# Patient Record
Sex: MALE | Race: WHITE | NOT HISPANIC OR LATINO | Employment: FULL TIME | URBAN - METROPOLITAN AREA
[De-identification: names, ages, dates, MRNs, and addresses within clinical notes are randomized per-mention and may not be internally consistent; named-entity substitution may affect disease eponyms.]

---

## 2017-01-17 ENCOUNTER — OFFICE VISIT (OUTPATIENT)
Dept: SLEEP MEDICINE | Facility: HOSPITAL | Age: 62
End: 2017-01-17

## 2017-01-17 VITALS
OXYGEN SATURATION: 94 % | BODY MASS INDEX: 32.93 KG/M2 | HEART RATE: 78 BPM | WEIGHT: 230 LBS | HEIGHT: 70 IN | SYSTOLIC BLOOD PRESSURE: 116 MMHG | DIASTOLIC BLOOD PRESSURE: 68 MMHG

## 2017-01-17 DIAGNOSIS — E66.9 NON MORBID OBESITY, UNSPECIFIED OBESITY TYPE: ICD-10-CM

## 2017-01-17 DIAGNOSIS — G47.33 SEVERE OBSTRUCTIVE SLEEP APNEA: Primary | ICD-10-CM

## 2017-01-17 PROCEDURE — 99213 OFFICE O/P EST LOW 20 MIN: CPT | Performed by: INTERNAL MEDICINE

## 2017-01-17 NOTE — MR AVS SNAPSHOT
Akshat Winkler   1/17/2017 2:00 PM   Office Visit    Dept Phone:  [phone]   Encounter #:  [id number]    Provider:  Tadeo Houston MD   Department:  [facility] SLEEP MEDICINE                Your Full Care Plan              Your Updated Medication List          This list is accurate as of: 1/17/17  2:43 PM.  Always use your most recent med list.                apixaban 5 MG tablet tablet   Commonly known as:  ELIQUIS   Take 1 tablet by mouth every 12 (twelve) hours for 360 days.       aspirin 81 MG tablet       bumetanide 2 MG tablet   Commonly known as:  BUMEX   Take 1 tablet by mouth 2 (two) times a day for 360 days.       lisinopril 20 MG tablet   Commonly known as:  PRINIVIL,ZESTRIL   Take 1 tablet by mouth daily for 360 days.       rosuvastatin 20 MG tablet   Commonly known as:  CRESTOR   Take 1 tablet by mouth daily for 360 days.       spironolactone 25 MG tablet   Commonly known as:  ALDACTONE   Take 1 tablet by mouth daily for 360 days.               We Performed the Following     PAP Therapy       You Were Diagnosed With        Codes Comments    Severe obstructive sleep apnea    -  Primary ICD-10-CM: G47.33  ICD-9-CM: 327.23     Non morbid obesity, unspecified obesity type     ICD-10-CM: E66.9  ICD-9-CM: 278.00       Instructions     None    Patient Instructions History      Upcoming Appointments     Visit Type Date Time Department    FOLLOW UP 1/17/2017  2:00 PM [facility] SLEEP MEDICINE [facility]    FOLLOW UP 5/9/2017  9:30 AM [facility] [place] [facility]      Eyegroove Signup     [facility] Eyegroove allows you to send messages to your doctor, view your test results, renew your prescriptions, schedule appointments, and more. To sign up, go to [email or web address] and click on the Sign Up Now link in the New User? box. Enter your Eyegroove Activation Code exactly as it appears below along with the last four digits of your Social Security Number and your Date of Birth  "() to complete the sign-up process. If you do not sign up before the expiration date, you must request a new code.    [employer] Activation Code: [email or web address]  Expires: 2017  5:38 AM    If you have questions, you can email [email or web address] or call [phone] to talk to our Webcrumb[employer] staff. Remember, Webcrumbz is NOT to be used for urgent needs. For medical emergencies, dial 911.               Other Info from Your Visit           Your Appointments     May 09, 2017  9:30 AM EDT   Follow Up with FAITH Roy   [facility] CARDIOLOGY (--)    [address] [place] [address] [address] [place] [id number] [phone]           Arrive 15 minutes prior to appointment.              Allergies     Bisoprolol      Flecainide      Metoprolol       bradycardia      Reason for Visit     Follow-up           Vital Signs     Blood Pressure Pulse Height Weight Oxygen Saturation Body Mass Index    116/68 78 70\" (177.8 cm) 230 lb (104 kg) 94% 33 kg/m2    Smoking Status                   Never Smoker           Problems and Diagnoses Noted     Obesity    Severe obstructive sleep apnea        "

## 2017-01-17 NOTE — PROGRESS NOTES
Subjective:     Chief Complaint:   Chief Complaint   Patient presents with   • Follow-up       HPI:    Akshat Winkler is a 61 y.o. male here for follow-up of obstructive sleep apnea.    I have not seen him in over 2 years.  He had a sleep study which revealed severe obstructive sleep apnea.  His titration was incomplete.  We were going to start him on BiPAP therapy but for some reason he didn't get his prescription.  Somehow he got an order for CPAP.  He never came for follow-up.  He is currently using CPAP at a pressure of 18.  He initially turned it up as high as it would go at 20 but has since turned down to 18.  His download indicates he is well treated with an AHI of less than 1.  He is using a fullface mask.  His only complaint is that his humidifier does not seem to be working and he is overly dry.    Since starting PAP sleep problem has: increased  Currently using PAP: yes Hours of usage during the night: 6    Amount of sleep per night : 6 hours  Average length of time it takes to fall asleep : 30 minutes  Number of awakenings per night : 3     He feels fatigue (tiredness, exhaustion, lethargy) in the daytime even when not sleepy? Occasionally   He feels sleepy (or struggles to stay awake) in the daytime? Occasionally     Cecil Scale scored as 8/24.    Type of mask: full face mask    He (since starting PAP or since last visit) has problems with the following:   Pressure from the mask 1 - Mild Problems  Skin irritation from the mask 0 - No Problem  Mask coming off face 1 - Mild Problems  Air leaks from the mask 1 - Mild Problems  Dry mouth or throat 10 - Severe Problems  Nasal congestion 0 - No Problem    I reviewed his PAP download:  Average pressure: 18  Average AHI:  1  Average minutes in large leak per night: NA      Current medications are:   Current Outpatient Prescriptions:   •  apixaban (ELIQUIS) 5 MG tablet tablet, Take 1 tablet by mouth every 12 (twelve) hours for 360 days., Disp: 180  tablet, Rfl: 3  •  aspirin 81 MG tablet, Take  by mouth daily., Disp: , Rfl:   •  bumetanide (BUMEX) 2 MG tablet, Take 1 tablet by mouth 2 (two) times a day for 360 days., Disp: 180 tablet, Rfl: 3  •  lisinopril (PRINIVIL,ZESTRIL) 20 MG tablet, Take 1 tablet by mouth daily for 360 days., Disp: 90 tablet, Rfl: 3  •  rosuvastatin (CRESTOR) 20 MG tablet, Take 1 tablet by mouth daily for 360 days., Disp: 90 tablet, Rfl: 3  •  spironolactone (ALDACTONE) 25 MG tablet, Take 1 tablet by mouth daily for 360 days., Disp: 90 tablet, Rfl: 3.      The patient's relevant past medical, surgical, family and social history were reviewed and updated in Epic as appropriate.     ROS:    Review of Systems   Constitutional: Positive for fatigue.   HENT:        Dry mouth   Respiratory: Positive for apnea.    Psychiatric/Behavioral: Positive for sleep disturbance.         Objective:    Physical Exam   Constitutional: He is oriented to person, place, and time. He appears well-developed and well-nourished.   HENT:   Head: Normocephalic and atraumatic.   Mouth/Throat: Oropharynx is clear and moist.   Class IV airway   Neck: Neck supple. No thyromegaly present.   Cardiovascular: Normal rate and regular rhythm.  Exam reveals no gallop and no friction rub.    No murmur heard.  Pulmonary/Chest: Effort normal. No respiratory distress. He has no wheezes. He has no rales.   Abdominal: Soft. Bowel sounds are normal.   Musculoskeletal: He exhibits edema.   Neurological: He is alert and oriented to person, place, and time.   Skin: Skin is warm and dry.   Psychiatric: He has a normal mood and affect. His behavior is normal.   Vitals reviewed.      Data:    Patient's CPAP download was personally reviewed including raw data and results.    Assessment:    Problem List Items Addressed This Visit        Pulmonary Problems    Severe obstructive sleep apnea - Primary    Overview     CPAP therapy            Other    Obesity              Plan:     He could try a  CPAP setting of 16 but if he does so he should get a download from us or [employer] to make sure his AHI remains normal.  He will take his CPAP in to [facility] so they can look at his humidifier.  I am happy to order a repair or a new machine as they see necessary.  No change in his mask size or type.  Continued efforts at further weight loss.  I urged 7-8 hours of sleep per night on average.  I renewed supplies for the next year.  Follow-up scheduled.  If problems in the interim he knows how to contact us or his [employer] company.  Discussed the above in detail with the patient and any family present.        Signed by  Tadeo Houston MD

## 2017-02-16 DIAGNOSIS — I48.0 PAROXYSMAL ATRIAL FIBRILLATION (HCC): ICD-10-CM

## 2017-05-09 ENCOUNTER — RESULTS ENCOUNTER (OUTPATIENT)
Dept: CARDIOLOGY | Facility: CLINIC | Age: 62
End: 2017-05-09

## 2017-05-09 ENCOUNTER — OFFICE VISIT (OUTPATIENT)
Dept: CARDIOLOGY | Facility: CLINIC | Age: 62
End: 2017-05-09

## 2017-05-09 VITALS
HEIGHT: 70 IN | WEIGHT: 290.6 LBS | SYSTOLIC BLOOD PRESSURE: 120 MMHG | HEART RATE: 76 BPM | BODY MASS INDEX: 41.6 KG/M2 | DIASTOLIC BLOOD PRESSURE: 76 MMHG

## 2017-05-09 DIAGNOSIS — E11.9 TYPE 2 DIABETES MELLITUS WITHOUT COMPLICATION, WITHOUT LONG-TERM CURRENT USE OF INSULIN (HCC): ICD-10-CM

## 2017-05-09 DIAGNOSIS — I10 ESSENTIAL HYPERTENSION: ICD-10-CM

## 2017-05-09 DIAGNOSIS — I25.10 CORONARY ARTERY DISEASE INVOLVING NATIVE CORONARY ARTERY OF NATIVE HEART WITHOUT ANGINA PECTORIS: Primary | ICD-10-CM

## 2017-05-09 DIAGNOSIS — E78.5 HYPERLIPIDEMIA LDL GOAL <70: ICD-10-CM

## 2017-05-09 DIAGNOSIS — I50.32 CHRONIC DIASTOLIC HEART FAILURE (HCC): ICD-10-CM

## 2017-05-09 DIAGNOSIS — I48.0 PAROXYSMAL ATRIAL FIBRILLATION (HCC): ICD-10-CM

## 2017-05-09 PROCEDURE — 99214 OFFICE O/P EST MOD 30 MIN: CPT | Performed by: NURSE PRACTITIONER

## 2017-05-09 RX ORDER — BUMETANIDE 2 MG/1
2 TABLET ORAL 2 TIMES DAILY
Qty: 180 TABLET | Refills: 3 | Status: SHIPPED | OUTPATIENT
Start: 2017-05-09 | End: 2017-05-09 | Stop reason: SDUPTHER

## 2017-05-09 RX ORDER — BUMETANIDE 2 MG/1
2 TABLET ORAL DAILY
Qty: 180 TABLET | Refills: 3 | Status: SHIPPED | OUTPATIENT
Start: 2017-05-09 | End: 2017-07-18 | Stop reason: SDUPTHER

## 2017-05-09 RX ORDER — SPIRONOLACTONE 25 MG/1
25 TABLET ORAL DAILY
Qty: 90 TABLET | Refills: 3 | Status: SHIPPED | OUTPATIENT
Start: 2017-05-09 | End: 2017-11-28 | Stop reason: SDUPTHER

## 2017-05-09 RX ORDER — LISINOPRIL 20 MG/1
20 TABLET ORAL DAILY
Qty: 90 TABLET | Refills: 3 | Status: SHIPPED | OUTPATIENT
Start: 2017-05-09 | End: 2017-11-28 | Stop reason: SDUPTHER

## 2017-05-09 RX ORDER — ROSUVASTATIN CALCIUM 20 MG/1
20 TABLET, COATED ORAL DAILY
Qty: 90 TABLET | Refills: 3 | Status: SHIPPED | OUTPATIENT
Start: 2017-05-09 | End: 2017-09-26 | Stop reason: SDUPTHER

## 2017-05-10 LAB
ALBUMIN SERPL-MCNC: 4 G/DL (ref 3.2–4.8)
ALBUMIN/GLOB SERPL: 1.1 G/DL (ref 1.5–2.5)
ALP SERPL-CCNC: 176 U/L (ref 25–100)
ALT SERPL-CCNC: 36 U/L (ref 7–40)
AST SERPL-CCNC: 54 U/L (ref 0–33)
BILIRUB SERPL-MCNC: 2.4 MG/DL (ref 0.3–1.2)
BUN SERPL-MCNC: 26 MG/DL (ref 9–23)
BUN/CREAT SERPL: 21.7 (ref 7–25)
CALCIUM SERPL-MCNC: 10.2 MG/DL (ref 8.7–10.4)
CHLORIDE SERPL-SCNC: 99 MMOL/L (ref 99–109)
CHOLEST SERPL-MCNC: 138 MG/DL (ref 0–200)
CO2 SERPL-SCNC: 32 MMOL/L (ref 20–31)
CREAT SERPL-MCNC: 1.2 MG/DL (ref 0.6–1.3)
GLOBULIN SER CALC-MCNC: 3.5 GM/DL
GLUCOSE SERPL-MCNC: 99 MG/DL (ref 70–100)
HBA1C MFR BLD: 5.8 % (ref 4.8–5.6)
HDLC SERPL-MCNC: 61 MG/DL (ref 40–60)
LDLC SERPL CALC-MCNC: 68 MG/DL (ref 0–100)
POTASSIUM SERPL-SCNC: 3.7 MMOL/L (ref 3.5–5.5)
PROT SERPL-MCNC: 7.5 G/DL (ref 5.7–8.2)
SODIUM SERPL-SCNC: 137 MMOL/L (ref 132–146)
TRIGL SERPL-MCNC: 44 MG/DL (ref 0–150)
VLDLC SERPL CALC-MCNC: 8.8 MG/DL

## 2017-07-17 ENCOUNTER — TELEPHONE (OUTPATIENT)
Dept: CARDIOLOGY | Facility: HOSPITAL | Age: 62
End: 2017-07-17

## 2017-07-18 ENCOUNTER — OFFICE VISIT (OUTPATIENT)
Dept: CARDIOLOGY | Facility: HOSPITAL | Age: 62
End: 2017-07-18

## 2017-07-18 VITALS
HEART RATE: 76 BPM | HEIGHT: 70 IN | BODY MASS INDEX: 42.95 KG/M2 | DIASTOLIC BLOOD PRESSURE: 64 MMHG | WEIGHT: 300 LBS | TEMPERATURE: 97.7 F | RESPIRATION RATE: 20 BRPM | SYSTOLIC BLOOD PRESSURE: 112 MMHG | OXYGEN SATURATION: 96 %

## 2017-07-18 DIAGNOSIS — I48.0 PAROXYSMAL ATRIAL FIBRILLATION (HCC): Primary | ICD-10-CM

## 2017-07-18 DIAGNOSIS — G47.33 SEVERE OBSTRUCTIVE SLEEP APNEA: ICD-10-CM

## 2017-07-18 DIAGNOSIS — I50.33 ACUTE ON CHRONIC DIASTOLIC HEART FAILURE (HCC): ICD-10-CM

## 2017-07-18 DIAGNOSIS — I10 ESSENTIAL HYPERTENSION: ICD-10-CM

## 2017-07-18 PROCEDURE — 99214 OFFICE O/P EST MOD 30 MIN: CPT | Performed by: NURSE PRACTITIONER

## 2017-07-18 RX ORDER — BUMETANIDE 2 MG/1
2 TABLET ORAL 2 TIMES DAILY
Qty: 180 TABLET | Refills: 3 | Status: SHIPPED | OUTPATIENT
Start: 2017-07-18 | End: 2017-11-28 | Stop reason: SDUPTHER

## 2017-07-18 RX ORDER — METOLAZONE 2.5 MG/1
2.5 TABLET ORAL DAILY
Qty: 10 TABLET | Refills: 0 | Status: SHIPPED | OUTPATIENT
Start: 2017-07-18 | End: 2017-11-28 | Stop reason: SDUPTHER

## 2017-07-18 RX ORDER — APIXABAN 5 MG/1
5 TABLET, FILM COATED ORAL 2 TIMES DAILY
COMMUNITY
Start: 2017-07-15 | End: 2017-11-28 | Stop reason: SDUPTHER

## 2017-07-18 NOTE — TELEPHONE ENCOUNTER
----- Message from Sabrina German MA sent at 7/17/2017  4:24 PM EDT -----  Pt is calling with concerns of swelling bilateral feet, left leg is weeping. Does not think water pill is working as effectively as it used to. Appointment scheduled for 7/18/17 at 2:30pm per FAITH Garibay.

## 2017-07-18 NOTE — PROGRESS NOTES
Encounter Date:07/18/2017      Patient ID: Akshat Winkler is a 62 y.o. male.        Subjective:     Chief Complaint: Follow-up (Edema)     History of Present Illness patient presents to the heart and valve center today as an add-on for ongoing evaluation of his diastolic heart failure. He notes that he had been doing well from a cardiac standpoint up until 3 weeks ago when he began to retain fluid. He notes that he began taking his Bumex to twice a day at that point and has continued to do so.  Patient notes a 12 pound weight gain in the last 3 weeks. He also reports dyspnea, pedal edema, orthopnea, and lightheadedness. Dyspnea occurs at rest intermittently and always occurs with exertion. He notes that he is wearing his compression socks daily but reports that his legs continue to swell. He notes orthopnea over the last 2 nights.   Patient denies cp, chest pressure, presyncope, syncope, pnd, early satiety, claudication or cough.  He  is anticoagulated with Eliquis and denies any signs and symptoms of bleeding.    Patient Active Problem List   Diagnosis   • Chronic diastolic heart failure   • Paroxysmal atrial fibrillation   • Type 2 diabetes mellitus without complication, without long-term current use of insulin   • Hyperlipidemia LDL goal <70   • Essential hypertension   • Coronary artery disease involving native coronary artery of native heart without angina pectoris   • Peripheral artery disease   • Severe obstructive sleep apnea   • Obesity   • HX: anticoagulation       Past Surgical History:   Procedure Laterality Date   • OTHER SURGICAL HISTORY  06/29/2015    PVA       Allergies   Allergen Reactions   • Bisoprolol    • Flecainide    • Metoprolol       bradycardia         Current Outpatient Prescriptions:   •  aspirin 81 MG tablet, Take  by mouth daily., Disp: , Rfl:   •  ELIQUIS 5 MG tablet tablet, Take 5 mg by mouth 2 (Two) Times a Day., Disp: , Rfl:   •  lisinopril (PRINIVIL,ZESTRIL) 20 MG tablet, Take 1  tablet by mouth Daily for 360 days., Disp: 90 tablet, Rfl: 3  •  rosuvastatin (CRESTOR) 20 MG tablet, Take 1 tablet by mouth Daily for 360 days., Disp: 90 tablet, Rfl: 3  •  spironolactone (ALDACTONE) 25 MG tablet, Take 1 tablet by mouth Daily for 360 days., Disp: 90 tablet, Rfl: 3  •  bumetanide (BUMEX) 2 MG tablet, Take 1 tablet by mouth 2 (Two) Times a Day for 360 days., Disp: 180 tablet, Rfl: 3    Medication reconciliation completed today per Kirsten Brady, Pharm D  The following portions of the chart were reviewed and updated as appropriate: Allergies, current medications, past family history, social history, past medical history.     Review of Systems   Constitution: Positive for weight gain (12 lbs in 3 weeks). Negative for chills, decreased appetite, diaphoresis, fever, weakness, malaise/fatigue, night sweats and weight loss.   HENT: Negative for congestion, headaches, hearing loss, hoarse voice and nosebleeds.    Eyes: Negative for blurred vision, visual disturbance and visual halos.   Cardiovascular: Positive for dyspnea on exertion, leg swelling and orthopnea. Negative for chest pain, claudication, cyanosis, irregular heartbeat, near-syncope, palpitations, paroxysmal nocturnal dyspnea and syncope.   Respiratory: Positive for shortness of breath. Negative for cough, hemoptysis, sleep disturbances due to breathing, snoring, sputum production and wheezing.    Hematologic/Lymphatic: Negative for bleeding problem. Does not bruise/bleed easily.   Skin: Negative for dry skin, itching and rash.   Musculoskeletal: Positive for muscle cramps. Negative for arthritis, joint pain, joint swelling and myalgias.   Gastrointestinal: Positive for bloating. Negative for abdominal pain, constipation, diarrhea, flatus, heartburn, hematemesis, hematochezia, melena, nausea and vomiting.   Genitourinary: Negative for dysuria, frequency, hematuria, nocturia and urgency.   Neurological: Positive for excessive daytime sleepiness,  "light-headedness and loss of balance.   Psychiatric/Behavioral: Negative for depression. The patient does not have insomnia and is not nervous/anxious.            Objective:     Vitals:    07/18/17 1434 07/18/17 1435 07/18/17 1436   BP: 113/54 112/56 112/64   BP Location: Right arm Left arm Left arm   Patient Position: Sitting Sitting Standing   Cuff Size: Adult     Pulse: 67 68 76   Resp: 20     Temp: 97.7 °F (36.5 °C)     TempSrc: Temporal Artery      SpO2: 96%     Weight: 300 lb (136 kg)     Height: 70\" (177.8 cm)           Physical Exam   Constitutional: He is oriented to person, place, and time. He appears well-developed and well-nourished. He is active and cooperative. No distress.   HENT:   Head: Normocephalic and atraumatic.   Mouth/Throat: Oropharynx is clear and moist.   Eyes: Conjunctivae and EOM are normal. Pupils are equal, round, and reactive to light.   Neck: Normal range of motion. Neck supple. No JVD present. No tracheal deviation present. No thyromegaly present.   Cardiovascular: Normal rate, regular rhythm, normal heart sounds and intact distal pulses.    Pulmonary/Chest: Effort normal. He has rales.   Abdominal: Bowel sounds are normal. He exhibits distension. There is tenderness.   Musculoskeletal: Normal range of motion. He exhibits edema (3+ pitting edema noted bilaterally, compression socks in place bilaterally. pea sized ulceration noted on LLE, open without drainage. Ulceration beefy red).   Neurological: He is alert and oriented to person, place, and time.   Skin: Skin is warm, dry and intact.   Psychiatric: He has a normal mood and affect. His behavior is normal.   Nursing note and vitals reviewed.      Lab and Diagnostic Review:      Results for orders placed or performed in visit on 05/09/17   Comprehensive Metabolic Panel   Result Value Ref Range    Glucose 99 70 - 100 mg/dL    BUN 26 (H) 9 - 23 mg/dL    Creatinine 1.20 0.60 - 1.30 mg/dL    eGFR Non African Am 62 >60 mL/min/1.73    " eGFR African Am 75 >60 mL/min/1.73    BUN/Creatinine Ratio 21.7 7.0 - 25.0    Sodium 137 132 - 146 mmol/L    Potassium 3.7 3.5 - 5.5 mmol/L    Chloride 99 99 - 109 mmol/L    Total CO2 32.0 (H) 20.0 - 31.0 mmol/L    Calcium 10.2 8.7 - 10.4 mg/dL    Total Protein 7.5 5.7 - 8.2 g/dL    Albumin 4.00 3.20 - 4.80 g/dL    Globulin 3.5 gm/dL    A/G Ratio 1.1 (L) 1.5 - 2.5 g/dL    Total Bilirubin 2.4 (H) 0.3 - 1.2 mg/dL    Alkaline Phosphatase 176 (H) 25 - 100 U/L    AST (SGOT) 54 (H) 0 - 33 U/L    ALT (SGPT) 36 7 - 40 U/L   Lipid Panel   Result Value Ref Range    Total Cholesterol 138 0 - 200 mg/dL    Triglycerides 44 0 - 150 mg/dL    HDL Cholesterol 61 (H) 40 - 60 mg/dL    VLDL Cholesterol 8.8 mg/dL    LDL Cholesterol  68 0 - 100 mg/dL   Hemoglobin A1c   Result Value Ref Range    Hemoglobin A1C 5.80 (H) 4.80 - 5.60 %         Assessment and Plan:         1. Acute on chronic diastolic heart failure  Patient to begin metolazone 2. 5mg qd for the next 3 days.   - RF sent to pharmacy  bumetanide (BUMEX) 2 MG tablet; Take 1 tablet by mouth 2 (Two) Times a Day for 360 days.  Dispense: 180 tablet; Refill: 3  Heart failure education today including signs and symptoms, the role of the heart failure center, daily weights, low sodium diet (less than 1500 mg per day), and daily physical activity. Reviewed HF Zones with patient and family.  Patient to continue current medications as previously ordered.   2. Paroxysmal atrial fibrillation  Maintaining nsr   On eliquis    3. Essential hypertension  Well controlled on lisinopril, bumex and aldactone  HTN Education provided today including signs and symptoms, medication management, daily blood pressure monitoring. Patient encouraged to call the Heart and Valve center with any abnormal readings.   4. Severe obstructive sleep apnea  Compliant with cpap      It has been a pleasure to participate in the care of this patient.  Patient was instructed to call the Heart and Valve Center with any  questions, concerns, or worsening symptoms.      * Please note that portions of this note were completed with a voice recognition program. Efforts were made to edit the dictation but occasionally words are transcribed.

## 2017-07-21 ENCOUNTER — TELEPHONE (OUTPATIENT)
Dept: CARDIOLOGY | Facility: HOSPITAL | Age: 62
End: 2017-07-21

## 2017-07-21 NOTE — TELEPHONE ENCOUNTER
Spoke with patient today regarding metolazone diuretic challenge. He notes a weight loss of 5 lbs  And improvement of his dyspnea and pedal edema. He is to continue current medications. He was instructed to call the office with any symptoms.

## 2017-09-26 DIAGNOSIS — E78.5 HYPERLIPIDEMIA LDL GOAL <70: ICD-10-CM

## 2017-09-26 RX ORDER — ROSUVASTATIN CALCIUM 20 MG/1
20 TABLET, COATED ORAL DAILY
Qty: 90 TABLET | Refills: 3 | Status: SHIPPED | OUTPATIENT
Start: 2017-09-26 | End: 2017-11-28 | Stop reason: SDUPTHER

## 2017-11-28 ENCOUNTER — OFFICE VISIT (OUTPATIENT)
Dept: CARDIOLOGY | Facility: CLINIC | Age: 62
End: 2017-11-28

## 2017-11-28 VITALS
WEIGHT: 302.4 LBS | HEIGHT: 70 IN | BODY MASS INDEX: 43.29 KG/M2 | DIASTOLIC BLOOD PRESSURE: 62 MMHG | OXYGEN SATURATION: 99 % | SYSTOLIC BLOOD PRESSURE: 104 MMHG | HEART RATE: 109 BPM

## 2017-11-28 DIAGNOSIS — I48.0 PAROXYSMAL ATRIAL FIBRILLATION (HCC): ICD-10-CM

## 2017-11-28 DIAGNOSIS — I48.4 ATYPICAL ATRIAL FLUTTER (HCC): Primary | ICD-10-CM

## 2017-11-28 DIAGNOSIS — IMO0001 CLASS 3 OBESITY WITHOUT SERIOUS COMORBIDITY WITH BODY MASS INDEX (BMI) OF 40.0 TO 44.9 IN ADULT, UNSPECIFIED OBESITY TYPE: ICD-10-CM

## 2017-11-28 DIAGNOSIS — I50.32 CHRONIC DIASTOLIC HEART FAILURE (HCC): ICD-10-CM

## 2017-11-28 DIAGNOSIS — E78.5 HYPERLIPIDEMIA LDL GOAL <70: ICD-10-CM

## 2017-11-28 DIAGNOSIS — I10 ESSENTIAL HYPERTENSION: ICD-10-CM

## 2017-11-28 DIAGNOSIS — I25.10 CORONARY ARTERY DISEASE INVOLVING NATIVE CORONARY ARTERY OF NATIVE HEART WITHOUT ANGINA PECTORIS: ICD-10-CM

## 2017-11-28 PROBLEM — I48.91 ATRIAL FIBRILLATION: Status: ACTIVE | Noted: 2017-11-28

## 2017-11-28 PROBLEM — I48.92 ATRIAL FLUTTER (HCC): Status: ACTIVE | Noted: 2017-11-28

## 2017-11-28 PROCEDURE — 99214 OFFICE O/P EST MOD 30 MIN: CPT | Performed by: INTERNAL MEDICINE

## 2017-11-28 RX ORDER — BUMETANIDE 2 MG/1
2 TABLET ORAL DAILY
Qty: 90 TABLET | Refills: 3 | Status: SHIPPED | OUTPATIENT
Start: 2017-11-28 | End: 2017-12-22

## 2017-11-28 RX ORDER — ROSUVASTATIN CALCIUM 20 MG/1
20 TABLET, COATED ORAL DAILY
Qty: 90 TABLET | Refills: 3 | Status: SHIPPED | OUTPATIENT
Start: 2017-11-28 | End: 2019-01-20 | Stop reason: SDUPTHER

## 2017-11-28 RX ORDER — LISINOPRIL 20 MG/1
20 TABLET ORAL DAILY
Qty: 90 TABLET | Refills: 3 | Status: SHIPPED | OUTPATIENT
Start: 2017-11-28 | End: 2017-12-28 | Stop reason: HOSPADM

## 2017-11-28 RX ORDER — METOLAZONE 2.5 MG/1
2.5 TABLET ORAL AS NEEDED
Qty: 30 TABLET | Refills: 1 | Status: SHIPPED | OUTPATIENT
Start: 2017-11-28 | End: 2017-12-22

## 2017-11-28 RX ORDER — SPIRONOLACTONE 25 MG/1
25 TABLET ORAL DAILY
Qty: 90 TABLET | Refills: 3 | Status: SHIPPED | OUTPATIENT
Start: 2017-11-28 | End: 2017-12-28 | Stop reason: HOSPADM

## 2017-11-28 NOTE — ASSESSMENT & PLAN NOTE
· Patient has atypical flutter on EKG today.    · Will attempt cardioversion and initiation of antiarrhythmic therapy with flecainide 100 mg twice a day.

## 2017-11-28 NOTE — PROGRESS NOTES
Encounter Date:11/28/2017    Patient ID: Akshat Winkler is a  62 y.o. male who resides in [place], [place].    CC/Reason for visit:  Atrial Fibrillation and Coronary Artery Disease            Akshat Winkler returns to my office today as a follow up for coronary artery disease, hypertension, hyperlipidemia, and atrial fibrillation. Since last visit, patient has been feeling well overall from a cardiovascular standpoint. He denies feeling palpitations or fluttering. He states his breathing has been stable. He monitors his blood pressure at home and notes it has been running within normal limits. He reports that he tried going through with a weight loss program, but did not proceed due to extreme cost. He is now taking Herbal Life drinks twice a day and spending fifteen minutes on the treadmill daily.     Review of Systems   Constitution: Negative for weakness and malaise/fatigue.   Eyes: Negative for vision loss in left eye and vision loss in right eye.   Cardiovascular: Positive for leg swelling. Negative for chest pain, dyspnea on exertion, near-syncope, orthopnea, palpitations, paroxysmal nocturnal dyspnea and syncope.   Respiratory: Positive for shortness of breath.    Skin: Positive for dry skin and itching.   Musculoskeletal: Positive for muscle cramps. Negative for myalgias.   Gastrointestinal: Positive for bloating.   Neurological: Negative for brief paralysis, excessive daytime sleepiness, focal weakness, numbness and paresthesias.   All other systems reviewed and are negative.      The patient's past medical, social, family history and ROS reviewed in the patient's electronic medical record.    Allergies  Bisoprolol; Flecainide; and Metoprolol    Outpatient Prescriptions Marked as Taking for the 11/28/17 encounter (Office Visit) with Lucian Alvarez IV, MD   Medication Sig Dispense Refill   • apixaban (ELIQUIS) 5 MG tablet tablet Take 1 tablet by mouth 2 (Two) Times a Day for 360 days. 180  "tablet 3   • aspirin 81 MG tablet Take  by mouth daily.     • bumetanide (BUMEX) 2 MG tablet Take 1 tablet by mouth Daily for 360 days. 90 tablet 3   • lisinopril (PRINIVIL,ZESTRIL) 20 MG tablet Take 1 tablet by mouth Daily for 360 days. 90 tablet 3   • metOLazone (ZAROXOLYN) 2.5 MG tablet Take 1 tablet by mouth As Needed (swelling). 30 tablet 1   • rosuvastatin (CRESTOR) 20 MG tablet Take 1 tablet by mouth Daily for 360 days. 90 tablet 3   • [DISCONTINUED] bumetanide (BUMEX) 2 MG tablet Take 1 tablet by mouth 2 (Two) Times a Day for 360 days. (Patient taking differently: Take 2 mg by mouth Daily.) 180 tablet 3   • [DISCONTINUED] ELIQUIS 5 MG tablet tablet Take 5 mg by mouth 2 (Two) Times a Day.     • [DISCONTINUED] lisinopril (PRINIVIL,ZESTRIL) 20 MG tablet Take 1 tablet by mouth Daily for 360 days. 90 tablet 3   • [DISCONTINUED] metOLazone (ZAROXOLYN) 2.5 MG tablet Take 1 tablet by mouth Daily. (Patient taking differently: Take 2.5 mg by mouth As Needed.) 10 tablet 0   • [DISCONTINUED] rosuvastatin (CRESTOR) 20 MG tablet Take 1 tablet by mouth Daily for 360 days. 90 tablet 3         Blood pressure 104/62, pulse 109, height 70\" (177.8 cm), weight (!) 302 lb 6.4 oz (137 kg), SpO2 99 %.  Body mass index is 43.39 kg/(m^2).    Physical Exam   Constitutional: He is oriented to person, place, and time. He appears well-developed and well-nourished.   HENT:   Head: Normocephalic and atraumatic.   Eyes: Pupils are equal, round, and reactive to light. No scleral icterus.   Neck: No JVD present. Carotid bruit is not present. No thyromegaly present.   Cardiovascular: Normal rate, regular rhythm, S1 normal and S2 normal.  Exam reveals no gallop.    No murmur heard.  Pulmonary/Chest: Effort normal and breath sounds normal.   Abdominal: Soft. There is no hepatosplenomegaly. There is no tenderness.   Neurological: He is alert and oriented to person, place, and time.   Skin: Skin is warm and dry. No cyanosis. Nails show no " clubbing.   Psychiatric: He has a normal mood and affect. His behavior is normal.       Data Review:     BUN 20, Cr 1.05      Procedures       Problem List Items Addressed This Visit        Cardiovascular and Mediastinum    Chronic diastolic heart failure    Overview     · Cardiac cath (02/20/14):  elevated LVEDP suggesting diastolic heart failure.  · Intolerant to beta blocker therapy         Current Assessment & Plan     · Stable functional class II heart failure symptoms  · Continue diuretics and lisinopril         Relevant Medications    spironolactone (ALDACTONE) 25 MG tablet    bumetanide (BUMEX) 2 MG tablet    metOLazone (ZAROXOLYN) 2.5 MG tablet    Coronary artery disease involving native coronary artery of native heart without angina pectoris    Overview       · Cardiac PET (01/2014): partially reversible anteroseptal defect, normal LVEF.  · Cardiac cath (02/20/14): mild nonobstructive CAD, LVEF 55%, elevated LVEDP suggesting diastolic heart failure.         Current Assessment & Plan     · No anginal symptoms  · Continue Crestor and low-dose aspirin         Essential hypertension    Current Assessment & Plan     · Resume controlled  · Continue present medical therapy         Relevant Medications    spironolactone (ALDACTONE) 25 MG tablet    bumetanide (BUMEX) 2 MG tablet    lisinopril (PRINIVIL,ZESTRIL) 20 MG tablet    metOLazone (ZAROXOLYN) 2.5 MG tablet    Hyperlipidemia LDL goal <70    Overview     · High intensity statin therapy indicated given presence of coronary artery disease         Relevant Medications    rosuvastatin (CRESTOR) 20 MG tablet    Paroxysmal atrial fibrillation    Overview     · [id number] 3 (HTN, CAD, DM)  · Diagnosed 2011  · Echo (10/11/2011): Normal LVEF, mild MR, mild LA dilation  · PVA with Dr. Cary, 6/29/2015         Relevant Medications    apixaban (ELIQUIS) 5 MG tablet tablet       Other    Atypical atrial flutter - Primary    Overview     · Atrial flutter following PVA  status post cardioversion, 2015  · Asymptomatic atrial flutter noted on EKG, 11/20/17         Current Assessment & Plan     · Patient has atypical flutter on EKG today.    · Will attempt cardioversion and initiation of antiarrhythmic therapy with flecainide 100 mg twice a day.         Class 3 obesity in adult    Current Assessment & Plan     · Recommended routine aerobic exercise and high protein low carbohydrate diet             Akshat has had a stable clinical course from a cardiovascular standpoint.  Today, he is in atrial flutter without symptoms.  This is the second time this is occurred since 2015.  I recommend we proceed with scheduled external cardioversion without LOLY.       · External cardioversion without LOLY for atrial flutter  · Patient should continue Eliquis uninterrupted    Lucian Alvarez IV, MD  11/28/2017     Scribed for Lucian Alvarez IV, MD by Alexandre Doherty. 11/28/2017  5:20 PM

## 2017-11-30 ENCOUNTER — PREP FOR SURGERY (OUTPATIENT)
Dept: OTHER | Facility: HOSPITAL | Age: 62
End: 2017-11-30

## 2017-12-20 ENCOUNTER — HOSPITAL ENCOUNTER (OUTPATIENT)
Dept: CARDIOLOGY | Facility: HOSPITAL | Age: 62
Discharge: HOME | End: 2017-12-20
Attending: INTERNAL MEDICINE | Admitting: INTERNAL MEDICINE

## 2017-12-20 VITALS
TEMPERATURE: 97.8 F | HEIGHT: 70 IN | BODY MASS INDEX: 41.91 KG/M2 | RESPIRATION RATE: 16 BRPM | OXYGEN SATURATION: 97 % | DIASTOLIC BLOOD PRESSURE: 70 MMHG | HEART RATE: 72 BPM | SYSTOLIC BLOOD PRESSURE: 113 MMHG | WEIGHT: 292.77 LBS

## 2017-12-20 DIAGNOSIS — I48.4 ATYPICAL ATRIAL FLUTTER (HCC): ICD-10-CM

## 2017-12-20 LAB
ALBUMIN SERPL-MCNC: 4 G/DL (ref 3.2–4.8)
ALBUMIN/GLOB SERPL: 1.1 G/DL (ref 1.5–2.5)
ALP SERPL-CCNC: 137 U/L (ref 25–100)
ALT SERPL W P-5'-P-CCNC: 34 U/L (ref 7–40)
ANION GAP SERPL CALCULATED.3IONS-SCNC: 10 MMOL/L (ref 3–11)
AST SERPL-CCNC: 42 U/L (ref 0–33)
BILIRUB SERPL-MCNC: 2.3 MG/DL (ref 0.3–1.2)
BUN BLD-MCNC: 25 MG/DL (ref 9–23)
BUN/CREAT SERPL: 20.8 (ref 7–25)
CALCIUM SPEC-SCNC: 9.8 MG/DL (ref 8.7–10.4)
CHLORIDE SERPL-SCNC: 101 MMOL/L (ref 99–109)
CO2 SERPL-SCNC: 26 MMOL/L (ref 20–31)
CREAT BLD-MCNC: 1.2 MG/DL (ref 0.6–1.3)
GFR SERPL CREATININE-BSD FRML MDRD: 62 ML/MIN/1.73
GLOBULIN UR ELPH-MCNC: 3.6 GM/DL
GLUCOSE BLD-MCNC: 116 MG/DL (ref 70–100)
MAGNESIUM SERPL-MCNC: 1.8 MG/DL (ref 1.3–2.7)
POTASSIUM BLD-SCNC: 3.5 MMOL/L (ref 3.5–5.5)
PROT SERPL-MCNC: 7.6 G/DL (ref 5.7–8.2)
SODIUM BLD-SCNC: 137 MMOL/L (ref 132–146)

## 2017-12-20 PROCEDURE — 36415 COLL VENOUS BLD VENIPUNCTURE: CPT

## 2017-12-20 PROCEDURE — 25010000002 MIDAZOLAM PER 1 MG: Performed by: INTERNAL MEDICINE

## 2017-12-20 PROCEDURE — 83735 ASSAY OF MAGNESIUM: CPT | Performed by: INTERNAL MEDICINE

## 2017-12-20 PROCEDURE — 92960 CARDIOVERSION ELECTRIC EXT: CPT | Performed by: INTERNAL MEDICINE

## 2017-12-20 PROCEDURE — 92960 CARDIOVERSION ELECTRIC EXT: CPT

## 2017-12-20 PROCEDURE — 93005 ELECTROCARDIOGRAM TRACING: CPT | Performed by: INTERNAL MEDICINE

## 2017-12-20 PROCEDURE — 93005 ELECTROCARDIOGRAM TRACING: CPT | Performed by: NURSE PRACTITIONER

## 2017-12-20 PROCEDURE — 80053 COMPREHEN METABOLIC PANEL: CPT | Performed by: INTERNAL MEDICINE

## 2017-12-20 PROCEDURE — 99152 MOD SED SAME PHYS/QHP 5/>YRS: CPT

## 2017-12-20 PROCEDURE — 93010 ELECTROCARDIOGRAM REPORT: CPT | Performed by: INTERNAL MEDICINE

## 2017-12-20 RX ORDER — BISOPROLOL FUMARATE 5 MG/1
2.5 TABLET, FILM COATED ORAL DAILY
Qty: 45 TABLET | Refills: 1 | Status: SHIPPED | OUTPATIENT
Start: 2017-12-20 | End: 2017-12-22

## 2017-12-20 RX ORDER — MIDAZOLAM HYDROCHLORIDE 1 MG/ML
INJECTION INTRAMUSCULAR; INTRAVENOUS
Status: DISCONTINUED
Start: 2017-12-20 | End: 2017-12-20 | Stop reason: HOSPADM

## 2017-12-20 RX ORDER — FENTANYL CITRATE 50 UG/ML
INJECTION, SOLUTION INTRAMUSCULAR; INTRAVENOUS
Status: DISCONTINUED
Start: 2017-12-20 | End: 2017-12-20 | Stop reason: WASHOUT

## 2017-12-20 RX ORDER — NALOXONE HYDROCHLORIDE 0.4 MG/ML
INJECTION, SOLUTION INTRAMUSCULAR; INTRAVENOUS; SUBCUTANEOUS
Status: DISCONTINUED
Start: 2017-12-20 | End: 2017-12-20 | Stop reason: WASHOUT

## 2017-12-20 RX ORDER — FLECAINIDE ACETATE 50 MG/1
50 TABLET ORAL EVERY 12 HOURS SCHEDULED
Qty: 60 TABLET | Refills: 5 | Status: SHIPPED | OUTPATIENT
Start: 2017-12-20 | End: 2017-12-22

## 2017-12-20 RX ORDER — FLUMAZENIL 0.1 MG/ML
INJECTION INTRAVENOUS
Status: DISCONTINUED
Start: 2017-12-20 | End: 2017-12-20 | Stop reason: WASHOUT

## 2017-12-20 RX ORDER — MIDAZOLAM HYDROCHLORIDE 1 MG/ML
INJECTION INTRAMUSCULAR; INTRAVENOUS
Status: COMPLETED | OUTPATIENT
Start: 2017-12-20 | End: 2017-12-20

## 2017-12-20 RX ADMIN — METHOHEXITAL SODIUM 20 MG: 500 INJECTION, POWDER, LYOPHILIZED, FOR SOLUTION INTRAMUSCULAR; INTRAVENOUS; RECTAL at 11:12

## 2017-12-20 RX ADMIN — MIDAZOLAM HYDROCHLORIDE 2 MG: 1 INJECTION, SOLUTION INTRAMUSCULAR; INTRAVENOUS at 11:10

## 2017-12-20 RX ADMIN — METHOHEXITAL SODIUM 30 MG: 500 INJECTION, POWDER, LYOPHILIZED, FOR SOLUTION INTRAMUSCULAR; INTRAVENOUS; RECTAL at 11:10

## 2017-12-20 RX ADMIN — MIDAZOLAM HYDROCHLORIDE 2 MG: 1 INJECTION, SOLUTION INTRAMUSCULAR; INTRAVENOUS at 11:13

## 2017-12-20 NOTE — H&P (VIEW-ONLY)
Encounter Date:11/28/2017    Patient ID: Akshat Winkler is a  62 y.o. male who resides in [place], [place].    CC/Reason for visit:  Atrial Fibrillation and Coronary Artery Disease            Akshat Winkler returns to my office today as a follow up for coronary artery disease, hypertension, hyperlipidemia, and atrial fibrillation. Since last visit, patient has been feeling well overall from a cardiovascular standpoint. He denies feeling palpitations or fluttering. He states his breathing has been stable. He monitors his blood pressure at home and notes it has been running within normal limits. He reports that he tried going through with a weight loss program, but did not proceed due to extreme cost. He is now taking Herbal Life drinks twice a day and spending fifteen minutes on the treadmill daily.     Review of Systems   Constitution: Negative for weakness and malaise/fatigue.   Eyes: Negative for vision loss in left eye and vision loss in right eye.   Cardiovascular: Positive for leg swelling. Negative for chest pain, dyspnea on exertion, near-syncope, orthopnea, palpitations, paroxysmal nocturnal dyspnea and syncope.   Respiratory: Positive for shortness of breath.    Skin: Positive for dry skin and itching.   Musculoskeletal: Positive for muscle cramps. Negative for myalgias.   Gastrointestinal: Positive for bloating.   Neurological: Negative for brief paralysis, excessive daytime sleepiness, focal weakness, numbness and paresthesias.   All other systems reviewed and are negative.      The patient's past medical, social, family history and ROS reviewed in the patient's electronic medical record.    Allergies  Bisoprolol; Flecainide; and Metoprolol    Outpatient Prescriptions Marked as Taking for the 11/28/17 encounter (Office Visit) with Lucian Alvarez IV, MD   Medication Sig Dispense Refill   • apixaban (ELIQUIS) 5 MG tablet tablet Take 1 tablet by mouth 2 (Two) Times a Day for 360 days. 180  "tablet 3   • aspirin 81 MG tablet Take  by mouth daily.     • bumetanide (BUMEX) 2 MG tablet Take 1 tablet by mouth Daily for 360 days. 90 tablet 3   • lisinopril (PRINIVIL,ZESTRIL) 20 MG tablet Take 1 tablet by mouth Daily for 360 days. 90 tablet 3   • metOLazone (ZAROXOLYN) 2.5 MG tablet Take 1 tablet by mouth As Needed (swelling). 30 tablet 1   • rosuvastatin (CRESTOR) 20 MG tablet Take 1 tablet by mouth Daily for 360 days. 90 tablet 3   • [DISCONTINUED] bumetanide (BUMEX) 2 MG tablet Take 1 tablet by mouth 2 (Two) Times a Day for 360 days. (Patient taking differently: Take 2 mg by mouth Daily.) 180 tablet 3   • [DISCONTINUED] ELIQUIS 5 MG tablet tablet Take 5 mg by mouth 2 (Two) Times a Day.     • [DISCONTINUED] lisinopril (PRINIVIL,ZESTRIL) 20 MG tablet Take 1 tablet by mouth Daily for 360 days. 90 tablet 3   • [DISCONTINUED] metOLazone (ZAROXOLYN) 2.5 MG tablet Take 1 tablet by mouth Daily. (Patient taking differently: Take 2.5 mg by mouth As Needed.) 10 tablet 0   • [DISCONTINUED] rosuvastatin (CRESTOR) 20 MG tablet Take 1 tablet by mouth Daily for 360 days. 90 tablet 3         Blood pressure 104/62, pulse 109, height 70\" (177.8 cm), weight (!) 302 lb 6.4 oz (137 kg), SpO2 99 %.  Body mass index is 43.39 kg/(m^2).    Physical Exam   Constitutional: He is oriented to person, place, and time. He appears well-developed and well-nourished.   HENT:   Head: Normocephalic and atraumatic.   Eyes: Pupils are equal, round, and reactive to light. No scleral icterus.   Neck: No JVD present. Carotid bruit is not present. No thyromegaly present.   Cardiovascular: Normal rate, regular rhythm, S1 normal and S2 normal.  Exam reveals no gallop.    No murmur heard.  Pulmonary/Chest: Effort normal and breath sounds normal.   Abdominal: Soft. There is no hepatosplenomegaly. There is no tenderness.   Neurological: He is alert and oriented to person, place, and time.   Skin: Skin is warm and dry. No cyanosis. Nails show no " clubbing.   Psychiatric: He has a normal mood and affect. His behavior is normal.       Data Review:     BUN 20, Cr 1.05      Procedures       Problem List Items Addressed This Visit        Cardiovascular and Mediastinum    Chronic diastolic heart failure    Overview     · Cardiac cath (02/20/14):  elevated LVEDP suggesting diastolic heart failure.  · Intolerant to beta blocker therapy         Current Assessment & Plan     · Stable functional class II heart failure symptoms  · Continue diuretics and lisinopril         Relevant Medications    spironolactone (ALDACTONE) 25 MG tablet    bumetanide (BUMEX) 2 MG tablet    metOLazone (ZAROXOLYN) 2.5 MG tablet    Coronary artery disease involving native coronary artery of native heart without angina pectoris    Overview       · Cardiac PET (01/2014): partially reversible anteroseptal defect, normal LVEF.  · Cardiac cath (02/20/14): mild nonobstructive CAD, LVEF 55%, elevated LVEDP suggesting diastolic heart failure.         Current Assessment & Plan     · No anginal symptoms  · Continue Crestor and low-dose aspirin         Essential hypertension    Current Assessment & Plan     · Resume controlled  · Continue present medical therapy         Relevant Medications    spironolactone (ALDACTONE) 25 MG tablet    bumetanide (BUMEX) 2 MG tablet    lisinopril (PRINIVIL,ZESTRIL) 20 MG tablet    metOLazone (ZAROXOLYN) 2.5 MG tablet    Hyperlipidemia LDL goal <70    Overview     · High intensity statin therapy indicated given presence of coronary artery disease         Relevant Medications    rosuvastatin (CRESTOR) 20 MG tablet    Paroxysmal atrial fibrillation    Overview     · [id number] 3 (HTN, CAD, DM)  · Diagnosed 2011  · Echo (10/11/2011): Normal LVEF, mild MR, mild LA dilation  · PVA with Dr. Cary, 6/29/2015         Relevant Medications    apixaban (ELIQUIS) 5 MG tablet tablet       Other    Atypical atrial flutter - Primary    Overview     · Atrial flutter following PVA  status post cardioversion, 2015  · Asymptomatic atrial flutter noted on EKG, 11/20/17         Current Assessment & Plan     · Patient has atypical flutter on EKG today.    · Will attempt cardioversion and initiation of antiarrhythmic therapy with flecainide 100 mg twice a day.         Class 3 obesity in adult    Current Assessment & Plan     · Recommended routine aerobic exercise and high protein low carbohydrate diet             Akshat has had a stable clinical course from a cardiovascular standpoint.  Today, he is in atrial flutter without symptoms.  This is the second time this is occurred since 2015.  I recommend we proceed with scheduled external cardioversion without LOLY.       · External cardioversion without LOLY for atrial flutter  · Patient should continue Eliquis uninterrupted    Lucian Alvarez IV, MD  11/28/2017     Scribed for Lucian Alvarez IV, MD by Alexandre Doherty. 11/28/2017  5:20 PM

## 2017-12-20 NOTE — PLAN OF CARE
Problem: Arrhythmia/Dysrhythmia (Symptomatic) (Adult)  Goal: Signs and Symptoms of Listed Potential Problems Will be Absent or Manageable (Arrhythmia/Dysrhythmia)  Outcome: Outcome(s) achieved Date Met: 12/20/17 12/20/17 4027   Arrhythmia/Dysrhythmia (Symptomatic)   Problems Assessed (Arrhythmia/Dysrhythmia) all   Problems Present (Arrhythmia/Dysrhythmia) none

## 2017-12-20 NOTE — PLAN OF CARE
Problem: Patient Care Overview (Adult)  Goal: Plan of Care Review  Outcome: Outcome(s) achieved Date Met: 12/20/17 12/20/17 3022   Coping/Psychosocial Response Interventions   Plan Of Care Reviewed With spouse;patient   Patient Care Overview   Progress progress toward functional goals as expected   Outcome Evaluation   Outcome Summary/Follow up Plan PT ARRIVED IN [facility] FOR ECV. ECV SUCCESFUL, PT NSR WITH VSS. PT STARTED ON FLECAINIDE AND BISOPROLOL PER DR. FERRARI. PT CONCERNS ABOUT SIDE EFFECTS FROM TAKING MEDICATION PREVIOUSLY DISCUSSED WITH DR. FERRARI. SIDE EFFECTS DETERMINED TO BE INSIGNIFICANT.

## 2017-12-20 NOTE — DISCHARGE INSTR - LAB
Dr. Alvarez wants you to have an EKG on Friday, 12/22/2017.  You have been given an order to have this done. You may go the provider of your choice.  The results can be faxed to Dr. Alvarez's office [phone]

## 2017-12-20 NOTE — INTERVAL H&P NOTE
H & P reviewed with no changes    No changes in physical exam    Hospital Problem List     * (Principal)Atypical atrial flutter    Overview Addendum 12/20/2017  8:33 AM by FAITH Roy     · Atrial flutter following PVA status post cardioversion, 2015  · Asymptomatic atrial flutter noted on EKG, 11/28/17  · External Cardioversion (12/20/2017)             Patient has been intolerant to propafenone, flecainide and beta blockers due to severe bradycardia with syncope with ED presentations in the past.     Plan:    · Obtain EKG   · Proceed with external cardioversion if in flutter       Lucian Alvarez IV, MD  12/20/2017

## 2017-12-21 ENCOUNTER — TELEPHONE (OUTPATIENT)
Dept: CARDIOLOGY | Facility: CLINIC | Age: 62
End: 2017-12-21

## 2017-12-21 NOTE — TELEPHONE ENCOUNTER
Patient called and says that one of the pills that you started yesterday did the same thing to him that it did before.   He says that you all talked about it and he is not sure which medicine and he is not sure which one either.   Says that it made him pass out the last time.  New is flecainide and bisoprolol .

## 2017-12-22 ENCOUNTER — APPOINTMENT (OUTPATIENT)
Dept: GENERAL RADIOLOGY | Facility: HOSPITAL | Age: 62
End: 2017-12-22

## 2017-12-22 ENCOUNTER — HOSPITAL ENCOUNTER (INPATIENT)
Facility: HOSPITAL | Age: 62
LOS: 6 days | Discharge: HOME HEALTH | End: 2017-12-28
Attending: EMERGENCY MEDICINE | Admitting: FAMILY MEDICINE

## 2017-12-22 ENCOUNTER — APPOINTMENT (OUTPATIENT)
Dept: CT IMAGING | Facility: HOSPITAL | Age: 62
End: 2017-12-22

## 2017-12-22 DIAGNOSIS — Z74.09 IMPAIRED FUNCTIONAL MOBILITY, BALANCE, GAIT, AND ENDURANCE: ICD-10-CM

## 2017-12-22 DIAGNOSIS — M43.9 COMPRESSION DEFORMITY OF VERTEBRA: ICD-10-CM

## 2017-12-22 DIAGNOSIS — I95.9 HYPOTENSION, UNSPECIFIED HYPOTENSION TYPE: ICD-10-CM

## 2017-12-22 DIAGNOSIS — I48.0 PAROXYSMAL ATRIAL FIBRILLATION (HCC): ICD-10-CM

## 2017-12-22 DIAGNOSIS — N17.9 ACUTE KIDNEY INJURY (HCC): Primary | ICD-10-CM

## 2017-12-22 DIAGNOSIS — E11.9 TYPE 2 DIABETES MELLITUS WITHOUT COMPLICATION, WITHOUT LONG-TERM CURRENT USE OF INSULIN (HCC): ICD-10-CM

## 2017-12-22 PROBLEM — S32.000A COMPRESSION FRACTURE OF LUMBAR VERTEBRA (HCC): Status: ACTIVE | Noted: 2017-12-22

## 2017-12-22 LAB
ALBUMIN SERPL-MCNC: 4.1 G/DL (ref 3.2–4.8)
ALBUMIN/GLOB SERPL: 1.1 G/DL (ref 1.5–2.5)
ALP SERPL-CCNC: 127 U/L (ref 25–100)
ALT SERPL W P-5'-P-CCNC: 33 U/L (ref 7–40)
ANION GAP SERPL CALCULATED.3IONS-SCNC: 15 MMOL/L (ref 3–11)
ANION GAP SERPL CALCULATED.3IONS-SCNC: 16 MMOL/L (ref 3–11)
AST SERPL-CCNC: 39 U/L (ref 0–33)
BASOPHILS # BLD AUTO: 0.01 10*3/MM3 (ref 0–0.2)
BASOPHILS NFR BLD AUTO: 0.1 % (ref 0–1)
BILIRUB SERPL-MCNC: 2.2 MG/DL (ref 0.3–1.2)
BUN BLD-MCNC: 58 MG/DL (ref 9–23)
BUN BLD-MCNC: 61 MG/DL (ref 9–23)
BUN/CREAT SERPL: 10.2 (ref 7–25)
BUN/CREAT SERPL: 11.3 (ref 7–25)
CALCIUM SPEC-SCNC: 8.6 MG/DL (ref 8.7–10.4)
CALCIUM SPEC-SCNC: 9.4 MG/DL (ref 8.7–10.4)
CHLORIDE SERPL-SCNC: 102 MMOL/L (ref 99–109)
CHLORIDE SERPL-SCNC: 98 MMOL/L (ref 99–109)
CO2 SERPL-SCNC: 18 MMOL/L (ref 20–31)
CO2 SERPL-SCNC: 23 MMOL/L (ref 20–31)
CREAT BLD-MCNC: 5.4 MG/DL (ref 0.6–1.3)
CREAT BLD-MCNC: 5.7 MG/DL (ref 0.6–1.3)
DEPRECATED RDW RBC AUTO: 53.3 FL (ref 37–54)
EOSINOPHIL # BLD AUTO: 0.03 10*3/MM3 (ref 0–0.3)
EOSINOPHIL NFR BLD AUTO: 0.3 % (ref 0–3)
ERYTHROCYTE [DISTWIDTH] IN BLOOD BY AUTOMATED COUNT: 14.7 % (ref 11.3–14.5)
GFR SERPL CREATININE-BSD FRML MDRD: 10 ML/MIN/1.73
GFR SERPL CREATININE-BSD FRML MDRD: 11 ML/MIN/1.73
GLOBULIN UR ELPH-MCNC: 3.6 GM/DL
GLUCOSE BLD-MCNC: 122 MG/DL (ref 70–100)
GLUCOSE BLD-MCNC: 132 MG/DL (ref 70–100)
HCT VFR BLD AUTO: 43.9 % (ref 38.9–50.9)
HGB BLD-MCNC: 14.5 G/DL (ref 13.1–17.5)
HOLD SPECIMEN: NORMAL
HOLD SPECIMEN: NORMAL
IMM GRANULOCYTES # BLD: 0.03 10*3/MM3 (ref 0–0.03)
IMM GRANULOCYTES NFR BLD: 0.3 % (ref 0–0.6)
LYMPHOCYTES # BLD AUTO: 0.52 10*3/MM3 (ref 0.6–4.8)
LYMPHOCYTES NFR BLD AUTO: 4.9 % (ref 24–44)
MAGNESIUM SERPL-MCNC: 2.1 MG/DL (ref 1.3–2.7)
MCH RBC QN AUTO: 32.5 PG (ref 27–31)
MCHC RBC AUTO-ENTMCNC: 33 G/DL (ref 32–36)
MCV RBC AUTO: 98.4 FL (ref 80–99)
MONOCYTES # BLD AUTO: 1.14 10*3/MM3 (ref 0–1)
MONOCYTES NFR BLD AUTO: 10.8 % (ref 0–12)
NEUTROPHILS # BLD AUTO: 8.85 10*3/MM3 (ref 1.5–8.3)
NEUTROPHILS NFR BLD AUTO: 83.6 % (ref 41–71)
PLATELET # BLD AUTO: 121 10*3/MM3 (ref 150–450)
PMV BLD AUTO: 12.3 FL (ref 6–12)
POTASSIUM BLD-SCNC: 3.7 MMOL/L (ref 3.5–5.5)
POTASSIUM BLD-SCNC: 4 MMOL/L (ref 3.5–5.5)
PROT SERPL-MCNC: 7.7 G/DL (ref 5.7–8.2)
RBC # BLD AUTO: 4.46 10*6/MM3 (ref 4.2–5.76)
SODIUM BLD-SCNC: 136 MMOL/L (ref 132–146)
SODIUM BLD-SCNC: 136 MMOL/L (ref 132–146)
TROPONIN I SERPL-MCNC: 0.01 NG/ML (ref 0–0.07)
WBC NRBC COR # BLD: 10.58 10*3/MM3 (ref 3.5–10.8)
WHOLE BLOOD HOLD SPECIMEN: NORMAL
WHOLE BLOOD HOLD SPECIMEN: NORMAL

## 2017-12-22 PROCEDURE — 71250 CT THORAX DX C-: CPT

## 2017-12-22 PROCEDURE — 99223 1ST HOSP IP/OBS HIGH 75: CPT | Performed by: INTERNAL MEDICINE

## 2017-12-22 PROCEDURE — 72131 CT LUMBAR SPINE W/O DYE: CPT

## 2017-12-22 PROCEDURE — 99285 EMERGENCY DEPT VISIT HI MDM: CPT

## 2017-12-22 PROCEDURE — 99232 SBSQ HOSP IP/OBS MODERATE 35: CPT | Performed by: INTERNAL MEDICINE

## 2017-12-22 PROCEDURE — 85025 COMPLETE CBC W/AUTO DIFF WBC: CPT

## 2017-12-22 PROCEDURE — 80053 COMPREHEN METABOLIC PANEL: CPT

## 2017-12-22 PROCEDURE — 25010000002 HEPARIN (PORCINE) PER 1000 UNITS: Performed by: INTERNAL MEDICINE

## 2017-12-22 PROCEDURE — 94660 CPAP INITIATION&MGMT: CPT

## 2017-12-22 PROCEDURE — 80048 BASIC METABOLIC PNL TOTAL CA: CPT | Performed by: INTERNAL MEDICINE

## 2017-12-22 PROCEDURE — 83735 ASSAY OF MAGNESIUM: CPT

## 2017-12-22 PROCEDURE — 25010000002 HYDROMORPHONE PER 4 MG: Performed by: INTERNAL MEDICINE

## 2017-12-22 PROCEDURE — 94799 UNLISTED PULMONARY SVC/PX: CPT

## 2017-12-22 PROCEDURE — 84484 ASSAY OF TROPONIN QUANT: CPT

## 2017-12-22 PROCEDURE — 93005 ELECTROCARDIOGRAM TRACING: CPT | Performed by: EMERGENCY MEDICINE

## 2017-12-22 RX ORDER — ONDANSETRON 4 MG/1
4 TABLET, FILM COATED ORAL EVERY 6 HOURS PRN
Status: DISCONTINUED | OUTPATIENT
Start: 2017-12-22 | End: 2017-12-28 | Stop reason: HOSPADM

## 2017-12-22 RX ORDER — NALOXONE HCL 0.4 MG/ML
0.4 VIAL (ML) INJECTION
Status: DISCONTINUED | OUTPATIENT
Start: 2017-12-22 | End: 2017-12-25

## 2017-12-22 RX ORDER — ASPIRIN 81 MG/1
81 TABLET ORAL DAILY
Status: DISCONTINUED | OUTPATIENT
Start: 2017-12-22 | End: 2017-12-28 | Stop reason: HOSPADM

## 2017-12-22 RX ORDER — FAMOTIDINE 20 MG/1
40 TABLET, FILM COATED ORAL DAILY
Status: DISCONTINUED | OUTPATIENT
Start: 2017-12-22 | End: 2017-12-28 | Stop reason: HOSPADM

## 2017-12-22 RX ORDER — ACETAMINOPHEN 325 MG/1
650 TABLET ORAL EVERY 4 HOURS PRN
Status: DISCONTINUED | OUTPATIENT
Start: 2017-12-22 | End: 2017-12-28 | Stop reason: HOSPADM

## 2017-12-22 RX ORDER — HYDROCODONE BITARTRATE AND ACETAMINOPHEN 7.5; 325 MG/1; MG/1
1 TABLET ORAL EVERY 4 HOURS PRN
Status: DISCONTINUED | OUTPATIENT
Start: 2017-12-22 | End: 2017-12-23

## 2017-12-22 RX ORDER — BUMETANIDE 2 MG/1
2 TABLET ORAL DAILY
COMMUNITY
End: 2018-03-06 | Stop reason: HOSPADM

## 2017-12-22 RX ORDER — SODIUM CHLORIDE 0.9 % (FLUSH) 0.9 %
10 SYRINGE (ML) INJECTION AS NEEDED
Status: DISCONTINUED | OUTPATIENT
Start: 2017-12-22 | End: 2017-12-28

## 2017-12-22 RX ORDER — ONDANSETRON 2 MG/ML
4 INJECTION INTRAMUSCULAR; INTRAVENOUS EVERY 6 HOURS PRN
Status: DISCONTINUED | OUTPATIENT
Start: 2017-12-22 | End: 2017-12-28 | Stop reason: HOSPADM

## 2017-12-22 RX ORDER — ASPIRIN 81 MG/1
81 TABLET ORAL EVERY MORNING
COMMUNITY
End: 2018-10-30

## 2017-12-22 RX ORDER — HYDROMORPHONE HYDROCHLORIDE 1 MG/ML
0.5 INJECTION, SOLUTION INTRAMUSCULAR; INTRAVENOUS; SUBCUTANEOUS
Status: DISCONTINUED | OUTPATIENT
Start: 2017-12-22 | End: 2017-12-25

## 2017-12-22 RX ORDER — HEPARIN SODIUM 5000 [USP'U]/ML
5000 INJECTION, SOLUTION INTRAVENOUS; SUBCUTANEOUS EVERY 12 HOURS SCHEDULED
Status: DISCONTINUED | OUTPATIENT
Start: 2017-12-22 | End: 2017-12-25

## 2017-12-22 RX ORDER — METOLAZONE 2.5 MG/1
2.5 TABLET ORAL DAILY PRN
COMMUNITY
End: 2017-12-28 | Stop reason: HOSPADM

## 2017-12-22 RX ORDER — SODIUM CHLORIDE 9 MG/ML
125 INJECTION, SOLUTION INTRAVENOUS CONTINUOUS
Status: DISCONTINUED | OUTPATIENT
Start: 2017-12-22 | End: 2017-12-24

## 2017-12-22 RX ORDER — OXYCODONE HYDROCHLORIDE AND ACETAMINOPHEN 5; 325 MG/1; MG/1
1 TABLET ORAL EVERY 4 HOURS PRN
Status: DISCONTINUED | OUTPATIENT
Start: 2017-12-22 | End: 2017-12-25

## 2017-12-22 RX ORDER — SODIUM CHLORIDE 0.9 % (FLUSH) 0.9 %
1-10 SYRINGE (ML) INJECTION AS NEEDED
Status: DISCONTINUED | OUTPATIENT
Start: 2017-12-22 | End: 2017-12-28 | Stop reason: HOSPADM

## 2017-12-22 RX ORDER — HEPARIN SODIUM 5000 [USP'U]/ML
5000 INJECTION, SOLUTION INTRAVENOUS; SUBCUTANEOUS EVERY 8 HOURS SCHEDULED
Status: DISCONTINUED | OUTPATIENT
Start: 2017-12-22 | End: 2017-12-22

## 2017-12-22 RX ORDER — SODIUM CHLORIDE 0.9 % (FLUSH) 0.9 %
1-10 SYRINGE (ML) INJECTION AS NEEDED
Status: DISCONTINUED | OUTPATIENT
Start: 2017-12-22 | End: 2017-12-28

## 2017-12-22 RX ADMIN — SODIUM CHLORIDE 1000 ML: 9 INJECTION, SOLUTION INTRAVENOUS at 09:00

## 2017-12-22 RX ADMIN — SODIUM CHLORIDE 1000 ML: 9 INJECTION, SOLUTION INTRAVENOUS at 11:04

## 2017-12-22 RX ADMIN — FAMOTIDINE 40 MG: 20 TABLET ORAL at 13:50

## 2017-12-22 RX ADMIN — POLYETHYLENE GLYCOL 3350 17 G: 17 POWDER, FOR SOLUTION ORAL at 13:51

## 2017-12-22 RX ADMIN — SODIUM CHLORIDE 100 ML/HR: 9 INJECTION, SOLUTION INTRAVENOUS at 12:22

## 2017-12-22 RX ADMIN — HEPARIN SODIUM 5000 UNITS: 5000 INJECTION, SOLUTION INTRAVENOUS; SUBCUTANEOUS at 20:56

## 2017-12-22 RX ADMIN — SODIUM CHLORIDE 125 ML/HR: 9 INJECTION, SOLUTION INTRAVENOUS at 20:56

## 2017-12-22 RX ADMIN — HYDROMORPHONE HYDROCHLORIDE 0.5 MG: 1 INJECTION, SOLUTION INTRAMUSCULAR; INTRAVENOUS; SUBCUTANEOUS at 13:51

## 2017-12-22 RX ADMIN — HYDROMORPHONE HYDROCHLORIDE 0.5 MG: 1 INJECTION, SOLUTION INTRAMUSCULAR; INTRAVENOUS; SUBCUTANEOUS at 20:56

## 2017-12-22 RX ADMIN — SODIUM CHLORIDE 1000 ML: 9 INJECTION, SOLUTION INTRAVENOUS at 11:05

## 2017-12-22 RX ADMIN — ASPIRIN 81 MG: 81 TABLET, COATED ORAL at 13:51

## 2017-12-22 RX ADMIN — HYDROCODONE BITARTRATE AND ACETAMINOPHEN 1 TABLET: 7.5; 325 TABLET ORAL at 14:33

## 2017-12-22 RX ADMIN — HYDROCODONE BITARTRATE AND ACETAMINOPHEN 1 TABLET: 7.5; 325 TABLET ORAL at 18:07

## 2017-12-22 RX ADMIN — HEPARIN SODIUM 5000 UNITS: 5000 INJECTION, SOLUTION INTRAVENOUS; SUBCUTANEOUS at 13:50

## 2017-12-22 RX ADMIN — HYDROMORPHONE HYDROCHLORIDE 0.5 MG: 1 INJECTION, SOLUTION INTRAMUSCULAR; INTRAVENOUS; SUBCUTANEOUS at 16:23

## 2017-12-23 LAB
ANION GAP SERPL CALCULATED.3IONS-SCNC: 18 MMOL/L (ref 3–11)
BUN BLD-MCNC: 64 MG/DL (ref 9–23)
BUN/CREAT SERPL: 13.9 (ref 7–25)
CALCIUM SPEC-SCNC: 8.3 MG/DL (ref 8.7–10.4)
CHLORIDE SERPL-SCNC: 98 MMOL/L (ref 99–109)
CO2 SERPL-SCNC: 17 MMOL/L (ref 20–31)
CREAT BLD-MCNC: 4.6 MG/DL (ref 0.6–1.3)
DEPRECATED RDW RBC AUTO: 54.9 FL (ref 37–54)
ERYTHROCYTE [DISTWIDTH] IN BLOOD BY AUTOMATED COUNT: 15.1 % (ref 11.3–14.5)
GFR SERPL CREATININE-BSD FRML MDRD: 13 ML/MIN/1.73
GLUCOSE BLD-MCNC: 113 MG/DL (ref 70–100)
HCT VFR BLD AUTO: 42.4 % (ref 38.9–50.9)
HGB BLD-MCNC: 13.8 G/DL (ref 13.1–17.5)
MCH RBC QN AUTO: 32.2 PG (ref 27–31)
MCHC RBC AUTO-ENTMCNC: 32.5 G/DL (ref 32–36)
MCV RBC AUTO: 99.1 FL (ref 80–99)
PLATELET # BLD AUTO: 119 10*3/MM3 (ref 150–450)
PMV BLD AUTO: 12.6 FL (ref 6–12)
POTASSIUM BLD-SCNC: 4.1 MMOL/L (ref 3.5–5.5)
RBC # BLD AUTO: 4.28 10*6/MM3 (ref 4.2–5.76)
SODIUM BLD-SCNC: 133 MMOL/L (ref 132–146)
WBC NRBC COR # BLD: 10.42 10*3/MM3 (ref 3.5–10.8)

## 2017-12-23 PROCEDURE — 99232 SBSQ HOSP IP/OBS MODERATE 35: CPT | Performed by: INTERNAL MEDICINE

## 2017-12-23 PROCEDURE — 97110 THERAPEUTIC EXERCISES: CPT | Performed by: PHYSICAL THERAPIST

## 2017-12-23 PROCEDURE — 97162 PT EVAL MOD COMPLEX 30 MIN: CPT | Performed by: PHYSICAL THERAPIST

## 2017-12-23 PROCEDURE — 80048 BASIC METABOLIC PNL TOTAL CA: CPT | Performed by: INTERNAL MEDICINE

## 2017-12-23 PROCEDURE — 25010000002 HEPARIN (PORCINE) PER 1000 UNITS: Performed by: INTERNAL MEDICINE

## 2017-12-23 PROCEDURE — 85027 COMPLETE CBC AUTOMATED: CPT | Performed by: INTERNAL MEDICINE

## 2017-12-23 RX ORDER — HYDROCODONE BITARTRATE AND ACETAMINOPHEN 10; 325 MG/1; MG/1
1 TABLET ORAL EVERY 4 HOURS PRN
Status: DISCONTINUED | OUTPATIENT
Start: 2017-12-23 | End: 2017-12-28 | Stop reason: HOSPADM

## 2017-12-23 RX ADMIN — POLYETHYLENE GLYCOL 3350 17 G: 17 POWDER, FOR SOLUTION ORAL at 09:10

## 2017-12-23 RX ADMIN — HYDROCODONE BITARTRATE AND ACETAMINOPHEN 1 TABLET: 7.5; 325 TABLET ORAL at 01:46

## 2017-12-23 RX ADMIN — HYDROCODONE BITARTRATE AND ACETAMINOPHEN 1 TABLET: 10; 325 TABLET ORAL at 13:31

## 2017-12-23 RX ADMIN — HYDROCODONE BITARTRATE AND ACETAMINOPHEN 1 TABLET: 10; 325 TABLET ORAL at 20:55

## 2017-12-23 RX ADMIN — OXYCODONE AND ACETAMINOPHEN 1 TABLET: 5; 325 TABLET ORAL at 23:09

## 2017-12-23 RX ADMIN — SODIUM CHLORIDE 125 ML/HR: 9 INJECTION, SOLUTION INTRAVENOUS at 14:16

## 2017-12-23 RX ADMIN — SODIUM CHLORIDE 125 ML/HR: 9 INJECTION, SOLUTION INTRAVENOUS at 05:02

## 2017-12-23 RX ADMIN — HEPARIN SODIUM 5000 UNITS: 5000 INJECTION, SOLUTION INTRAVENOUS; SUBCUTANEOUS at 09:10

## 2017-12-23 RX ADMIN — HEPARIN SODIUM 5000 UNITS: 5000 INJECTION, SOLUTION INTRAVENOUS; SUBCUTANEOUS at 20:55

## 2017-12-23 RX ADMIN — ASPIRIN 81 MG: 81 TABLET, COATED ORAL at 09:10

## 2017-12-23 RX ADMIN — FAMOTIDINE 40 MG: 20 TABLET ORAL at 09:10

## 2017-12-23 RX ADMIN — HYDROCODONE BITARTRATE AND ACETAMINOPHEN 1 TABLET: 10; 325 TABLET ORAL at 17:23

## 2017-12-24 LAB
ANION GAP SERPL CALCULATED.3IONS-SCNC: 14 MMOL/L (ref 3–11)
BUN BLD-MCNC: 71 MG/DL (ref 9–23)
BUN/CREAT SERPL: 22.9 (ref 7–25)
CALCIUM SPEC-SCNC: 8.7 MG/DL (ref 8.7–10.4)
CHLORIDE SERPL-SCNC: 104 MMOL/L (ref 99–109)
CO2 SERPL-SCNC: 20 MMOL/L (ref 20–31)
CREAT BLD-MCNC: 3.1 MG/DL (ref 0.6–1.3)
DEPRECATED RDW RBC AUTO: 55.3 FL (ref 37–54)
ERYTHROCYTE [DISTWIDTH] IN BLOOD BY AUTOMATED COUNT: 15.2 % (ref 11.3–14.5)
GFR SERPL CREATININE-BSD FRML MDRD: 21 ML/MIN/1.73
GLUCOSE BLD-MCNC: 106 MG/DL (ref 70–100)
HCT VFR BLD AUTO: 42.4 % (ref 38.9–50.9)
HGB BLD-MCNC: 14 G/DL (ref 13.1–17.5)
MCH RBC QN AUTO: 32.9 PG (ref 27–31)
MCHC RBC AUTO-ENTMCNC: 33 G/DL (ref 32–36)
MCV RBC AUTO: 99.8 FL (ref 80–99)
PLATELET # BLD AUTO: 127 10*3/MM3 (ref 150–450)
PMV BLD AUTO: 12.7 FL (ref 6–12)
POTASSIUM BLD-SCNC: 4.3 MMOL/L (ref 3.5–5.5)
RBC # BLD AUTO: 4.25 10*6/MM3 (ref 4.2–5.76)
SODIUM BLD-SCNC: 138 MMOL/L (ref 132–146)
WBC NRBC COR # BLD: 11.66 10*3/MM3 (ref 3.5–10.8)

## 2017-12-24 PROCEDURE — 99232 SBSQ HOSP IP/OBS MODERATE 35: CPT | Performed by: INTERNAL MEDICINE

## 2017-12-24 PROCEDURE — 85027 COMPLETE CBC AUTOMATED: CPT | Performed by: INTERNAL MEDICINE

## 2017-12-24 PROCEDURE — 25010000002 HEPARIN (PORCINE) PER 1000 UNITS: Performed by: INTERNAL MEDICINE

## 2017-12-24 PROCEDURE — 80048 BASIC METABOLIC PNL TOTAL CA: CPT | Performed by: INTERNAL MEDICINE

## 2017-12-24 PROCEDURE — 99232 SBSQ HOSP IP/OBS MODERATE 35: CPT | Performed by: NURSE PRACTITIONER

## 2017-12-24 RX ORDER — FUROSEMIDE 40 MG/1
40 TABLET ORAL
Status: DISCONTINUED | OUTPATIENT
Start: 2017-12-24 | End: 2017-12-25

## 2017-12-24 RX ADMIN — HEPARIN SODIUM 5000 UNITS: 5000 INJECTION, SOLUTION INTRAVENOUS; SUBCUTANEOUS at 09:23

## 2017-12-24 RX ADMIN — HEPARIN SODIUM 5000 UNITS: 5000 INJECTION, SOLUTION INTRAVENOUS; SUBCUTANEOUS at 20:00

## 2017-12-24 RX ADMIN — FUROSEMIDE 40 MG: 40 TABLET ORAL at 13:15

## 2017-12-24 RX ADMIN — OXYCODONE AND ACETAMINOPHEN 1 TABLET: 5; 325 TABLET ORAL at 20:03

## 2017-12-24 RX ADMIN — FAMOTIDINE 40 MG: 20 TABLET ORAL at 09:23

## 2017-12-24 RX ADMIN — POLYETHYLENE GLYCOL 3350 17 G: 17 POWDER, FOR SOLUTION ORAL at 09:23

## 2017-12-24 RX ADMIN — HYDROCODONE BITARTRATE AND ACETAMINOPHEN 1 TABLET: 10; 325 TABLET ORAL at 17:26

## 2017-12-24 RX ADMIN — FUROSEMIDE 40 MG: 40 TABLET ORAL at 17:26

## 2017-12-24 RX ADMIN — OXYCODONE AND ACETAMINOPHEN 1 TABLET: 5; 325 TABLET ORAL at 23:50

## 2017-12-24 RX ADMIN — HYDROCODONE BITARTRATE AND ACETAMINOPHEN 1 TABLET: 10; 325 TABLET ORAL at 21:53

## 2017-12-24 RX ADMIN — ASPIRIN 81 MG: 81 TABLET, COATED ORAL at 09:23

## 2017-12-24 RX ADMIN — HYDROCODONE BITARTRATE AND ACETAMINOPHEN 1 TABLET: 10; 325 TABLET ORAL at 09:32

## 2017-12-25 LAB
ANION GAP SERPL CALCULATED.3IONS-SCNC: 11 MMOL/L (ref 3–11)
APTT PPP: 37.9 SECONDS (ref 45–60)
APTT PPP: 39.9 SECONDS (ref 45–60)
BUN BLD-MCNC: 79 MG/DL (ref 9–23)
BUN/CREAT SERPL: 28.2 (ref 7–25)
CALCIUM SPEC-SCNC: 9.1 MG/DL (ref 8.7–10.4)
CHLORIDE SERPL-SCNC: 107 MMOL/L (ref 99–109)
CO2 SERPL-SCNC: 20 MMOL/L (ref 20–31)
CREAT BLD-MCNC: 2.8 MG/DL (ref 0.6–1.3)
GFR SERPL CREATININE-BSD FRML MDRD: 23 ML/MIN/1.73
GLUCOSE BLD-MCNC: 101 MG/DL (ref 70–100)
POTASSIUM BLD-SCNC: 4.2 MMOL/L (ref 3.5–5.5)
SODIUM BLD-SCNC: 138 MMOL/L (ref 132–146)

## 2017-12-25 PROCEDURE — 25010000002 HEPARIN (PORCINE) PER 1000 UNITS

## 2017-12-25 PROCEDURE — 25010000002 HEPARIN (PORCINE) PER 1000 UNITS: Performed by: INTERNAL MEDICINE

## 2017-12-25 PROCEDURE — 99232 SBSQ HOSP IP/OBS MODERATE 35: CPT | Performed by: INTERNAL MEDICINE

## 2017-12-25 PROCEDURE — 85730 THROMBOPLASTIN TIME PARTIAL: CPT

## 2017-12-25 PROCEDURE — 80048 BASIC METABOLIC PNL TOTAL CA: CPT | Performed by: INTERNAL MEDICINE

## 2017-12-25 RX ORDER — OXYCODONE HYDROCHLORIDE 5 MG/1
5 TABLET ORAL EVERY 8 HOURS PRN
Status: DISCONTINUED | OUTPATIENT
Start: 2017-12-25 | End: 2017-12-28 | Stop reason: HOSPADM

## 2017-12-25 RX ORDER — SENNA AND DOCUSATE SODIUM 50; 8.6 MG/1; MG/1
2 TABLET, FILM COATED ORAL 2 TIMES DAILY
Status: DISCONTINUED | OUTPATIENT
Start: 2017-12-25 | End: 2017-12-28 | Stop reason: HOSPADM

## 2017-12-25 RX ADMIN — HYDROCODONE BITARTRATE AND ACETAMINOPHEN 1 TABLET: 10; 325 TABLET ORAL at 18:45

## 2017-12-25 RX ADMIN — OXYCODONE HYDROCHLORIDE 5 MG: 5 TABLET ORAL at 12:52

## 2017-12-25 RX ADMIN — POLYETHYLENE GLYCOL 3350 17 G: 17 POWDER, FOR SOLUTION ORAL at 08:40

## 2017-12-25 RX ADMIN — OXYCODONE HYDROCHLORIDE 5 MG: 5 TABLET ORAL at 21:42

## 2017-12-25 RX ADMIN — HEPARIN SODIUM 12 UNITS/KG/HR: 10000 INJECTION, SOLUTION INTRAVENOUS at 12:42

## 2017-12-25 RX ADMIN — FUROSEMIDE 60 MG: 20 TABLET ORAL at 18:37

## 2017-12-25 RX ADMIN — ASPIRIN 81 MG: 81 TABLET, COATED ORAL at 08:40

## 2017-12-25 RX ADMIN — Medication 2 TABLET: at 08:40

## 2017-12-25 RX ADMIN — HEPARIN SODIUM 5000 UNITS: 5000 INJECTION, SOLUTION INTRAVENOUS; SUBCUTANEOUS at 08:40

## 2017-12-25 RX ADMIN — FUROSEMIDE 40 MG: 40 TABLET ORAL at 08:40

## 2017-12-25 RX ADMIN — HYDROCODONE BITARTRATE AND ACETAMINOPHEN 1 TABLET: 10; 325 TABLET ORAL at 23:05

## 2017-12-25 RX ADMIN — FAMOTIDINE 40 MG: 20 TABLET ORAL at 08:40

## 2017-12-25 RX ADMIN — Medication 2 TABLET: at 21:16

## 2017-12-26 LAB
ANION GAP SERPL CALCULATED.3IONS-SCNC: 12 MMOL/L (ref 3–11)
APTT PPP: 41.5 SECONDS (ref 45–60)
APTT PPP: 50 SECONDS (ref 45–60)
APTT PPP: 57.3 SECONDS (ref 45–60)
BUN BLD-MCNC: 92 MG/DL (ref 9–23)
BUN/CREAT SERPL: 31.7 (ref 7–25)
CALCIUM SPEC-SCNC: 8.9 MG/DL (ref 8.7–10.4)
CHLORIDE SERPL-SCNC: 105 MMOL/L (ref 99–109)
CO2 SERPL-SCNC: 20 MMOL/L (ref 20–31)
CREAT BLD-MCNC: 2.9 MG/DL (ref 0.6–1.3)
GFR SERPL CREATININE-BSD FRML MDRD: 22 ML/MIN/1.73
GLUCOSE BLD-MCNC: 93 MG/DL (ref 70–100)
POTASSIUM BLD-SCNC: 4.4 MMOL/L (ref 3.5–5.5)
SODIUM BLD-SCNC: 137 MMOL/L (ref 132–146)

## 2017-12-26 PROCEDURE — 80069 RENAL FUNCTION PANEL: CPT | Performed by: NURSE PRACTITIONER

## 2017-12-26 PROCEDURE — 97116 GAIT TRAINING THERAPY: CPT

## 2017-12-26 PROCEDURE — 85730 THROMBOPLASTIN TIME PARTIAL: CPT

## 2017-12-26 PROCEDURE — 97110 THERAPEUTIC EXERCISES: CPT

## 2017-12-26 PROCEDURE — 99232 SBSQ HOSP IP/OBS MODERATE 35: CPT | Performed by: INTERNAL MEDICINE

## 2017-12-26 PROCEDURE — 99233 SBSQ HOSP IP/OBS HIGH 50: CPT | Performed by: INTERNAL MEDICINE

## 2017-12-26 PROCEDURE — 25010000002 HEPARIN (PORCINE) PER 1000 UNITS

## 2017-12-26 PROCEDURE — 80048 BASIC METABOLIC PNL TOTAL CA: CPT | Performed by: INTERNAL MEDICINE

## 2017-12-26 RX ORDER — FUROSEMIDE 10 MG/ML
20 SYRINGE (ML) INJECTION CONTINUOUS
Status: DISCONTINUED | OUTPATIENT
Start: 2017-12-27 | End: 2017-12-26 | Stop reason: SDUPTHER

## 2017-12-26 RX ORDER — BUMETANIDE 0.25 MG/ML
1 INJECTION INTRAMUSCULAR; INTRAVENOUS CONTINUOUS
Status: DISCONTINUED | OUTPATIENT
Start: 2017-12-27 | End: 2017-12-26 | Stop reason: CLARIF

## 2017-12-26 RX ADMIN — HEPARIN SODIUM 9 UNITS/KG/HR: 10000 INJECTION, SOLUTION INTRAVENOUS at 09:13

## 2017-12-26 RX ADMIN — HYDROCODONE BITARTRATE AND ACETAMINOPHEN 1 TABLET: 10; 325 TABLET ORAL at 21:13

## 2017-12-26 RX ADMIN — POLYETHYLENE GLYCOL 3350 17 G: 17 POWDER, FOR SOLUTION ORAL at 09:13

## 2017-12-26 RX ADMIN — FAMOTIDINE 40 MG: 20 TABLET ORAL at 09:20

## 2017-12-26 RX ADMIN — ASPIRIN 81 MG: 81 TABLET, COATED ORAL at 09:13

## 2017-12-26 RX ADMIN — Medication 2 TABLET: at 09:13

## 2017-12-26 RX ADMIN — Medication 2 TABLET: at 21:13

## 2017-12-26 RX ADMIN — HYDROCODONE BITARTRATE AND ACETAMINOPHEN 1 TABLET: 10; 325 TABLET ORAL at 06:19

## 2017-12-27 LAB
ALBUMIN SERPL-MCNC: 3.6 G/DL (ref 3.2–4.8)
ANION GAP SERPL CALCULATED.3IONS-SCNC: 12 MMOL/L (ref 3–11)
ANION GAP SERPL CALCULATED.3IONS-SCNC: 13 MMOL/L (ref 3–11)
APTT PPP: 50 SECONDS (ref 45–60)
APTT PPP: 50.6 SECONDS (ref 45–60)
APTT PPP: 64.1 SECONDS (ref 45–60)
BUN BLD-MCNC: 82 MG/DL (ref 9–23)
BUN BLD-MCNC: 84 MG/DL (ref 9–23)
BUN/CREAT SERPL: 35 (ref 7–25)
BUN/CREAT SERPL: 37.3 (ref 7–25)
CALCIUM SPEC-SCNC: 9.6 MG/DL (ref 8.7–10.4)
CALCIUM SPEC-SCNC: 9.6 MG/DL (ref 8.7–10.4)
CHLORIDE SERPL-SCNC: 105 MMOL/L (ref 99–109)
CHLORIDE SERPL-SCNC: 108 MMOL/L (ref 99–109)
CO2 SERPL-SCNC: 17 MMOL/L (ref 20–31)
CO2 SERPL-SCNC: 20 MMOL/L (ref 20–31)
CREAT BLD-MCNC: 2.2 MG/DL (ref 0.6–1.3)
CREAT BLD-MCNC: 2.4 MG/DL (ref 0.6–1.3)
DEPRECATED RDW RBC AUTO: 53 FL (ref 37–54)
ERYTHROCYTE [DISTWIDTH] IN BLOOD BY AUTOMATED COUNT: 14.9 % (ref 11.3–14.5)
GFR SERPL CREATININE-BSD FRML MDRD: 28 ML/MIN/1.73
GFR SERPL CREATININE-BSD FRML MDRD: 31 ML/MIN/1.73
GLUCOSE BLD-MCNC: 98 MG/DL (ref 70–100)
GLUCOSE BLD-MCNC: 98 MG/DL (ref 70–100)
HCT VFR BLD AUTO: 39.3 % (ref 38.9–50.9)
HGB BLD-MCNC: 13.1 G/DL (ref 13.1–17.5)
MCH RBC QN AUTO: 32.8 PG (ref 27–31)
MCHC RBC AUTO-ENTMCNC: 33.3 G/DL (ref 32–36)
MCV RBC AUTO: 98.5 FL (ref 80–99)
PHOSPHATE SERPL-MCNC: 5.1 MG/DL (ref 2.4–5.1)
PLATELET # BLD AUTO: 143 10*3/MM3 (ref 150–450)
PMV BLD AUTO: 12.3 FL (ref 6–12)
POTASSIUM BLD-SCNC: 3.4 MMOL/L (ref 3.5–5.5)
POTASSIUM BLD-SCNC: 3.6 MMOL/L (ref 3.5–5.5)
RBC # BLD AUTO: 3.99 10*6/MM3 (ref 4.2–5.76)
SODIUM BLD-SCNC: 137 MMOL/L (ref 132–146)
SODIUM BLD-SCNC: 138 MMOL/L (ref 132–146)
WBC NRBC COR # BLD: 6.53 10*3/MM3 (ref 3.5–10.8)

## 2017-12-27 PROCEDURE — 25010000002 FUROSEMIDE PER 20 MG: Performed by: INTERNAL MEDICINE

## 2017-12-27 PROCEDURE — 80048 BASIC METABOLIC PNL TOTAL CA: CPT | Performed by: INTERNAL MEDICINE

## 2017-12-27 PROCEDURE — 85027 COMPLETE CBC AUTOMATED: CPT | Performed by: INTERNAL MEDICINE

## 2017-12-27 PROCEDURE — 85730 THROMBOPLASTIN TIME PARTIAL: CPT

## 2017-12-27 PROCEDURE — 99232 SBSQ HOSP IP/OBS MODERATE 35: CPT | Performed by: INTERNAL MEDICINE

## 2017-12-27 PROCEDURE — 99232 SBSQ HOSP IP/OBS MODERATE 35: CPT | Performed by: FAMILY MEDICINE

## 2017-12-27 PROCEDURE — 25010000002 HEPARIN (PORCINE) PER 1000 UNITS

## 2017-12-27 RX ORDER — POTASSIUM CHLORIDE 1.5 G/1.77G
40 POWDER, FOR SOLUTION ORAL AS NEEDED
Status: DISCONTINUED | OUTPATIENT
Start: 2017-12-27 | End: 2017-12-28 | Stop reason: HOSPADM

## 2017-12-27 RX ORDER — POTASSIUM CHLORIDE 750 MG/1
40 CAPSULE, EXTENDED RELEASE ORAL AS NEEDED
Status: DISCONTINUED | OUTPATIENT
Start: 2017-12-27 | End: 2017-12-28 | Stop reason: HOSPADM

## 2017-12-27 RX ADMIN — ASPIRIN 81 MG: 81 TABLET, COATED ORAL at 08:26

## 2017-12-27 RX ADMIN — POTASSIUM CHLORIDE 40 MEQ: 750 CAPSULE, EXTENDED RELEASE ORAL at 17:39

## 2017-12-27 RX ADMIN — HEPARIN SODIUM 11 UNITS/KG/HR: 10000 INJECTION, SOLUTION INTRAVENOUS at 03:43

## 2017-12-27 RX ADMIN — POTASSIUM CHLORIDE 40 MEQ: 750 CAPSULE, EXTENDED RELEASE ORAL at 23:02

## 2017-12-27 RX ADMIN — OXYCODONE HYDROCHLORIDE 5 MG: 5 TABLET ORAL at 20:28

## 2017-12-27 RX ADMIN — HYDROCODONE BITARTRATE AND ACETAMINOPHEN 1 TABLET: 10; 325 TABLET ORAL at 23:01

## 2017-12-27 RX ADMIN — POLYETHYLENE GLYCOL 3350 17 G: 17 POWDER, FOR SOLUTION ORAL at 08:26

## 2017-12-27 RX ADMIN — FAMOTIDINE 40 MG: 20 TABLET ORAL at 08:26

## 2017-12-27 RX ADMIN — FUROSEMIDE 20 MG/HR: 10 INJECTION, SOLUTION INTRAMUSCULAR; INTRAVENOUS at 06:35

## 2017-12-27 RX ADMIN — HEPARIN SODIUM 10 UNITS/KG/HR: 10000 INJECTION, SOLUTION INTRAVENOUS at 20:30

## 2017-12-27 RX ADMIN — Medication 2 TABLET: at 08:26

## 2017-12-28 VITALS
SYSTOLIC BLOOD PRESSURE: 108 MMHG | HEART RATE: 64 BPM | TEMPERATURE: 98.2 F | OXYGEN SATURATION: 97 % | RESPIRATION RATE: 20 BRPM | HEIGHT: 70 IN | BODY MASS INDEX: 44.92 KG/M2 | DIASTOLIC BLOOD PRESSURE: 63 MMHG | WEIGHT: 313.8 LBS

## 2017-12-28 LAB
ALBUMIN SERPL-MCNC: 3.5 G/DL (ref 3.2–4.8)
ANION GAP SERPL CALCULATED.3IONS-SCNC: 11 MMOL/L (ref 3–11)
APTT PPP: 31.7 SECONDS (ref 45–60)
APTT PPP: 32.7 SECONDS (ref 45–60)
BUN BLD-MCNC: 61 MG/DL (ref 9–23)
BUN/CREAT SERPL: 35.9 (ref 7–25)
CALCIUM SPEC-SCNC: 9.8 MG/DL (ref 8.7–10.4)
CHLORIDE SERPL-SCNC: 108 MMOL/L (ref 99–109)
CO2 SERPL-SCNC: 21 MMOL/L (ref 20–31)
CREAT BLD-MCNC: 1.7 MG/DL (ref 0.6–1.3)
GFR SERPL CREATININE-BSD FRML MDRD: 41 ML/MIN/1.73
GLUCOSE BLD-MCNC: 95 MG/DL (ref 70–100)
MAGNESIUM SERPL-MCNC: 1.9 MG/DL (ref 1.3–2.7)
PHOSPHATE SERPL-MCNC: 4.4 MG/DL (ref 2.4–5.1)
POTASSIUM BLD-SCNC: 3.6 MMOL/L (ref 3.5–5.5)
SODIUM BLD-SCNC: 140 MMOL/L (ref 132–146)

## 2017-12-28 PROCEDURE — 97116 GAIT TRAINING THERAPY: CPT

## 2017-12-28 PROCEDURE — 85730 THROMBOPLASTIN TIME PARTIAL: CPT | Performed by: FAMILY MEDICINE

## 2017-12-28 PROCEDURE — 85730 THROMBOPLASTIN TIME PARTIAL: CPT

## 2017-12-28 PROCEDURE — 97110 THERAPEUTIC EXERCISES: CPT

## 2017-12-28 PROCEDURE — 80069 RENAL FUNCTION PANEL: CPT | Performed by: INTERNAL MEDICINE

## 2017-12-28 PROCEDURE — 83735 ASSAY OF MAGNESIUM: CPT | Performed by: INTERNAL MEDICINE

## 2017-12-28 PROCEDURE — 99238 HOSP IP/OBS DSCHRG MGMT 30/<: CPT | Performed by: INTERNAL MEDICINE

## 2017-12-28 RX ORDER — FUROSEMIDE 40 MG/1
40 TABLET ORAL DAILY
Qty: 30 TABLET | Refills: 0 | Status: CANCELLED | OUTPATIENT
Start: 2017-12-28 | End: 2018-01-27

## 2017-12-28 RX ORDER — POTASSIUM CHLORIDE 750 MG/1
10 TABLET, FILM COATED, EXTENDED RELEASE ORAL DAILY
Qty: 30 TABLET | Refills: 11 | Status: SHIPPED | OUTPATIENT
Start: 2017-12-28 | End: 2018-01-09

## 2017-12-28 RX ORDER — FUROSEMIDE 40 MG/1
40 TABLET ORAL DAILY
Status: DISCONTINUED | OUTPATIENT
Start: 2017-12-28 | End: 2017-12-28 | Stop reason: HOSPADM

## 2017-12-28 RX ORDER — HYDROCODONE BITARTRATE AND ACETAMINOPHEN 10; 325 MG/1; MG/1
1 TABLET ORAL EVERY 4 HOURS PRN
Qty: 30 TABLET | Refills: 0 | Status: SHIPPED | OUTPATIENT
Start: 2017-12-28 | End: 2018-01-02

## 2017-12-28 RX ORDER — POTASSIUM CHLORIDE 1500 MG/1
20 TABLET, FILM COATED, EXTENDED RELEASE ORAL DAILY
Qty: 90 TABLET | Refills: 3 | Status: CANCELLED | OUTPATIENT
Start: 2017-12-28 | End: 2018-03-28

## 2017-12-28 RX ADMIN — FUROSEMIDE 40 MG: 40 TABLET ORAL at 13:30

## 2017-12-28 RX ADMIN — HYDROCODONE BITARTRATE AND ACETAMINOPHEN 1 TABLET: 10; 325 TABLET ORAL at 12:54

## 2017-12-28 RX ADMIN — Medication 2 TABLET: at 08:57

## 2017-12-28 RX ADMIN — POLYETHYLENE GLYCOL 3350 17 G: 17 POWDER, FOR SOLUTION ORAL at 08:57

## 2017-12-28 RX ADMIN — FAMOTIDINE 40 MG: 20 TABLET ORAL at 08:57

## 2017-12-28 RX ADMIN — APIXABAN 5 MG: 5 TABLET, FILM COATED ORAL at 10:33

## 2017-12-28 RX ADMIN — ASPIRIN 81 MG: 81 TABLET, COATED ORAL at 08:57

## 2017-12-29 ENCOUNTER — TELEPHONE (OUTPATIENT)
Dept: CARDIOLOGY | Facility: CLINIC | Age: 62
End: 2017-12-29

## 2017-12-29 ENCOUNTER — TRANSITIONAL CARE MANAGEMENT TELEPHONE ENCOUNTER (OUTPATIENT)
Dept: FAMILY MEDICINE CLINIC | Facility: CLINIC | Age: 62
End: 2017-12-29

## 2017-12-29 NOTE — TELEPHONE ENCOUNTER
I called to check on the patient and see how he was feeling after he was discharged home. I had to leave a message. Will await return call.

## 2018-01-02 ENCOUNTER — LAB REQUISITION (OUTPATIENT)
Dept: LAB | Facility: HOSPITAL | Age: 63
End: 2018-01-02

## 2018-01-02 ENCOUNTER — TELEPHONE (OUTPATIENT)
Dept: CARDIOLOGY | Facility: CLINIC | Age: 63
End: 2018-01-02

## 2018-01-02 ENCOUNTER — APPOINTMENT (OUTPATIENT)
Dept: LAB | Facility: HOSPITAL | Age: 63
End: 2018-01-02

## 2018-01-02 DIAGNOSIS — Z00.00 ROUTINE GENERAL MEDICAL EXAMINATION AT A HEALTH CARE FACILITY: ICD-10-CM

## 2018-01-02 PROCEDURE — 36415 COLL VENOUS BLD VENIPUNCTURE: CPT | Performed by: INTERNAL MEDICINE

## 2018-01-02 PROCEDURE — 80048 BASIC METABOLIC PNL TOTAL CA: CPT | Performed by: INTERNAL MEDICINE

## 2018-01-02 NOTE — TELEPHONE ENCOUNTER
Patient called to report ankle swelling since he was discharged home. If his BP is OK, can we increase his Bumex? Or add something else? Currently on 2 mg daily. Creatinine is 1.7, BUN 61

## 2018-01-03 ENCOUNTER — LAB REQUISITION (OUTPATIENT)
Dept: LAB | Facility: HOSPITAL | Age: 63
End: 2018-01-03

## 2018-01-03 ENCOUNTER — TELEPHONE (OUTPATIENT)
Dept: CARDIOLOGY | Facility: CLINIC | Age: 63
End: 2018-01-03

## 2018-01-03 DIAGNOSIS — Z00.00 ROUTINE GENERAL MEDICAL EXAMINATION AT A HEALTH CARE FACILITY: ICD-10-CM

## 2018-01-03 DIAGNOSIS — N17.9 ACUTE KIDNEY FAILURE (HCC): ICD-10-CM

## 2018-01-03 LAB
ANION GAP SERPL CALCULATED.3IONS-SCNC: 11 MMOL/L (ref 3–11)
BUN BLD-MCNC: 27 MG/DL (ref 9–23)
BUN/CREAT SERPL: 18 (ref 7–25)
CALCIUM SPEC-SCNC: 8.9 MG/DL (ref 8.7–10.4)
CHLORIDE SERPL-SCNC: 97 MMOL/L (ref 99–109)
CO2 SERPL-SCNC: 32 MMOL/L (ref 20–31)
CREAT BLD-MCNC: 1.5 MG/DL (ref 0.6–1.3)
GFR SERPL CREATININE-BSD FRML MDRD: 48 ML/MIN/1.73
GLUCOSE BLD-MCNC: 92 MG/DL (ref 70–100)
POTASSIUM BLD-SCNC: 3.8 MMOL/L (ref 3.5–5.5)
SODIUM BLD-SCNC: 140 MMOL/L (ref 132–146)

## 2018-01-03 NOTE — TELEPHONE ENCOUNTER
Patient is still c/o severe leg/ankle swelling. After multiple attempts to obtain labs, they were finally resulted and creatinine is down to 1.5. I have called the [facility] and scheduled him an appointment with Hannah Leonard tomorrow at 11:00. He was hoping to get IV diuresis, as that is what he has needed in the past. He has been doubling his Bumex dose at home with no changes in symptoms.

## 2018-01-04 ENCOUNTER — OFFICE VISIT (OUTPATIENT)
Dept: CARDIOLOGY | Facility: HOSPITAL | Age: 63
End: 2018-01-04

## 2018-01-04 ENCOUNTER — HOSPITAL ENCOUNTER (OUTPATIENT)
Dept: CARDIOLOGY | Facility: HOSPITAL | Age: 63
Discharge: HOME | End: 2018-01-04
Admitting: NURSE PRACTITIONER

## 2018-01-04 VITALS
TEMPERATURE: 97.6 F | SYSTOLIC BLOOD PRESSURE: 126 MMHG | RESPIRATION RATE: 20 BRPM | HEART RATE: 109 BPM | OXYGEN SATURATION: 95 % | WEIGHT: 292 LBS | HEIGHT: 70 IN | DIASTOLIC BLOOD PRESSURE: 79 MMHG | BODY MASS INDEX: 41.8 KG/M2

## 2018-01-04 DIAGNOSIS — I50.33 ACUTE ON CHRONIC DIASTOLIC CONGESTIVE HEART FAILURE (HCC): Primary | ICD-10-CM

## 2018-01-04 DIAGNOSIS — I48.4 ATYPICAL ATRIAL FLUTTER (HCC): ICD-10-CM

## 2018-01-04 DIAGNOSIS — I50.32 CHRONIC DIASTOLIC CONGESTIVE HEART FAILURE (HCC): ICD-10-CM

## 2018-01-04 DIAGNOSIS — N28.9 RENAL INSUFFICIENCY: ICD-10-CM

## 2018-01-04 DIAGNOSIS — I10 ESSENTIAL HYPERTENSION: ICD-10-CM

## 2018-01-04 LAB
BUN SERPL-MCNC: 27 MG/DL (ref 9–23)
BUN/CREAT SERPL: 18 (ref 7–25)
CALCIUM SERPL-MCNC: 8.9 MG/DL (ref 8.7–10.4)
CHLORIDE SERPL-SCNC: 97 MMOL/L (ref 99–109)
CO2 SERPL-SCNC: 32 MMOL/L (ref 20–31)
CREAT SERPL-MCNC: 1.5 MG/DL (ref 0.6–1.3)
GLUCOSE SERPL-MCNC: 92 MG/DL (ref 70–100)
POTASSIUM SERPL-SCNC: 3.8 MMOL/L (ref 3.5–5.5)
SODIUM SERPL-SCNC: 140 MMOL/L (ref 132–146)

## 2018-01-04 PROCEDURE — 93005 ELECTROCARDIOGRAM TRACING: CPT | Performed by: NURSE PRACTITIONER

## 2018-01-04 PROCEDURE — 93010 ELECTROCARDIOGRAM REPORT: CPT | Performed by: INTERNAL MEDICINE

## 2018-01-04 PROCEDURE — 99215 OFFICE O/P EST HI 40 MIN: CPT | Performed by: NURSE PRACTITIONER

## 2018-01-04 RX ORDER — HYDROCODONE BITARTRATE AND ACETAMINOPHEN 10; 325 MG/1; MG/1
1 TABLET ORAL EVERY 4 HOURS PRN
COMMUNITY
End: 2018-01-12 | Stop reason: DRUGHIGH

## 2018-01-04 NOTE — PROGRESS NOTES
Encounter Date:01/04/2018      Patient ID: Akshat Winkler is a 62 y.o. male.        Subjective:     Chief Complaint: Follow-up (c/o Shortness of air, swelling, weight gain); Atrial Flutter (s/p ECV  on 12/20/17 for Atrial Flutter); and Follow-up (s/p Hospitalization on 12/22/17-12/28/17 for Kidney injury and Hypotension)     History of Present Illness patient presents to the heart and valve center today for ongoing evaluation of his diastolic heart failure and atrial flutter.  He shouldn't underwent a cardioversion on 12/20/2017 for atrial flutter and was started on flecainide.  Patient experienced syncope and hypotension and was hospitalized [id number] for acute kidney injury.  Creatinine was as high as 5.  Repeat labs from yesterday show a creatinine of 1.5.  Patient notes 10 pound weight gain over the last week, worsening dyspnea, abdominal fullness and pedal edema.  Please note metolazone and aldactone was held due to acute kidney injury. Patient denies symptoms of palpitations, fluttering or tachycardia.     Patient Active Problem List   Diagnosis   • Chronic diastolic heart failure   • Type 2 diabetes mellitus without complication, without long-term current use of insulin   • Hyperlipidemia LDL goal <70   • Essential hypertension   • Coronary artery disease involving native coronary artery of native heart without angina pectoris   • Peripheral artery disease   • Severe obstructive sleep apnea   • Class 3 obesity in adult   • Paroxysmal atrial fibrillation   • Atypical atrial flutter   • Acute kidney injury   • Compression fracture of lumbar vertebra       Past Surgical History:   Procedure Laterality Date   • CARDIOVERSION      multiple   • OTHER SURGICAL HISTORY  06/29/2015    PVA       Allergies   Allergen Reactions   • Bisoprolol Other (See Comments)     Severe Hypotension   • Flecainide Other (See Comments)     Syncope / collapse   • Metoprolol Other (See Comments)      Bradycardia, Severe Hypotension          Current Outpatient Prescriptions:   •  apixaban (ELIQUIS) 5 MG tablet tablet, Take 1 tablet by mouth 2 (Two) Times a Day for 360 days., Disp: 180 tablet, Rfl: 3  •  aspirin 81 MG EC tablet, Take 81 mg by mouth Daily., Disp: , Rfl:   •  bumetanide (BUMEX) 2 MG tablet, Take 2 mg by mouth Daily., Disp: , Rfl:   •  HYDROcodone-acetaminophen (NORCO)  MG per tablet, Take 1 tablet by mouth Every 4 (Four) Hours As Needed., Disp: , Rfl:   •  potassium chloride (K-DUR) 10 MEQ CR tablet, Take 1 tablet by mouth Daily., Disp: 30 tablet, Rfl: 11  •  rosuvastatin (CRESTOR) 20 MG tablet, Take 1 tablet by mouth Daily for 360 days., Disp: 90 tablet, Rfl: 3    The following portions of the chart were reviewed and updated as appropriate: Allergies, current medications, past family history, social history, past medical history.     Review of Systems   Constitution: Positive for weight gain. Negative for chills, decreased appetite, diaphoresis, fever, weakness, malaise/fatigue, night sweats and weight loss.   HENT: Negative for congestion, hearing loss, hoarse voice and nosebleeds.    Eyes: Negative for blurred vision, visual disturbance and visual halos.   Cardiovascular: Positive for dyspnea on exertion and leg swelling. Negative for chest pain, claudication, cyanosis, irregular heartbeat, near-syncope, orthopnea, palpitations, paroxysmal nocturnal dyspnea and syncope.   Respiratory: Positive for shortness of breath and sleep disturbances due to breathing. Negative for cough, hemoptysis, snoring, sputum production and wheezing.    Hematologic/Lymphatic: Negative for bleeding problem. Does not bruise/bleed easily.   Skin: Negative for dry skin, itching and rash.   Musculoskeletal: Negative for arthritis, joint pain, joint swelling and myalgias.   Gastrointestinal: Negative for bloating, abdominal pain, constipation, diarrhea, flatus, heartburn, hematemesis, hematochezia, melena, nausea and vomiting.   Genitourinary:  "Negative for dysuria, frequency, hematuria, nocturia and urgency.   Neurological: Positive for excessive daytime sleepiness. Negative for dizziness, headaches, light-headedness and loss of balance.   Psychiatric/Behavioral: Negative for depression. The patient does not have insomnia and is not nervous/anxious.            Objective:     Vitals:    01/04/18 0907 01/04/18 0909   BP: 131/79 126/79   BP Location: Right arm Left arm   Patient Position: Sitting Sitting   Cuff Size: Adult    Pulse: 110 109   Resp: 20    Temp: 97.6 °F (36.4 °C)    TempSrc: Temporal Artery     SpO2: 95%    Weight: 132 kg (292 lb)    Height: 177.8 cm (70\")          Physical Exam   Constitutional: He is oriented to person, place, and time. He appears well-developed and well-nourished. He is active and cooperative. No distress.   HENT:   Head: Normocephalic and atraumatic.   Mouth/Throat: Oropharynx is clear and moist.   Eyes: Conjunctivae and EOM are normal. Pupils are equal, round, and reactive to light.   Neck: Normal range of motion. Neck supple. No JVD present. No tracheal deviation present. No thyromegaly present.   Cardiovascular: Regular rhythm, normal heart sounds and intact distal pulses.  Tachycardia present.    Pulmonary/Chest: Effort normal and breath sounds normal.   Abdominal: Bowel sounds are normal. He exhibits distension. There is tenderness.   Musculoskeletal: Normal range of motion. He exhibits edema (3+ pitting edema noted).   Neurological: He is alert and oriented to person, place, and time.   Skin: Skin is warm, dry and intact.   Psychiatric: He has a normal mood and affect. His behavior is normal.   Nursing note and vitals reviewed.      Lab and Diagnostic Review:  1/3/2018: Glucose 92, BUN 27, creatinine 1.5, sodium 140, potassium 3.8, chloride 97, CO2 32, calcium 8.9, estimated GFR 48  EKG: atrial flutter with 2:1 conduction at 110 bpm    Assessment and Plan:         1. Acute on chronic diastolic congestive heart " failure  IV diuresis today in office. Patient received 80  mg lasix (NDC 6276-5705-83)  today through a # butterfly in left AC over slow IV push. During IV diuresis, vitals were monitored and stable. Please see IV diuresis record for those vitals. Patient voided 950 ml in the office prior to discharge from the office. Butterfly was d/c'd and area was free of erythema, ecchymosis, or drainage.  Patient was  instructed to record urinary output for the next 24 hours. Patient will receive a follow up call from the HF center in 24 hours to evaluate urinary output and reassess signs and symptoms.   - ECG 12 Lead; Future    2. Atypical atrial flutter  Anticoagulated with eliquis; denies any s/s of bleeding  S/p ECV 12/20/17  Unable to tolerate BB, flecainide,propafenone  PVA per Dr Cary June 2015  - Ambulatory Referral to Cardiology    3. Essential hypertension  Well controlled  HTN Education provided today including signs and symptoms, medication management, daily blood pressure monitoring. Patient encouraged to call the Heart and Valve center with any abnormal readings.     4. Renal insufficiency  Creatinine 1/3/17: 1.5   Will repeat labs in one week    It has been a pleasure to participate in the care of this patient.  Patient was instructed to call the [facility] with any questions, concerns, or worsening symptoms.        * Please note that portions of this note were completed with a voice recognition program. Efforts were made to edit the dictation but occasionally words are transcribed.

## 2018-01-05 ENCOUNTER — TELEPHONE (OUTPATIENT)
Dept: CARDIOLOGY | Facility: HOSPITAL | Age: 63
End: 2018-01-05

## 2018-01-05 NOTE — TELEPHONE ENCOUNTER
Follow up call to patient s/p IV diuresis. Patient notes that he voided 1600 ml post IV diuresis for a total of 2550ml in the last 24 hours. Patient notes dyspnea and abdominal fullness has improved.   Patient's creatinine 1/3/18 was 1.5. Patient notes ongoing pedal edema. Patient to take bumex 2 mg q am and 1 mg qpm Saturday and Sunday only. Keep scheduled follow up in [facility] on 1/9/18

## 2018-01-09 ENCOUNTER — TELEPHONE (OUTPATIENT)
Dept: CARDIOLOGY | Facility: CLINIC | Age: 63
End: 2018-01-09

## 2018-01-09 ENCOUNTER — HOSPITAL ENCOUNTER (OUTPATIENT)
Dept: CARDIOLOGY | Facility: HOSPITAL | Age: 63
Discharge: HOME | End: 2018-01-09
Admitting: NURSE PRACTITIONER

## 2018-01-09 ENCOUNTER — OFFICE VISIT (OUTPATIENT)
Dept: CARDIOLOGY | Facility: HOSPITAL | Age: 63
End: 2018-01-09

## 2018-01-09 VITALS
SYSTOLIC BLOOD PRESSURE: 124 MMHG | WEIGHT: 291 LBS | HEIGHT: 70 IN | HEART RATE: 106 BPM | TEMPERATURE: 97.4 F | RESPIRATION RATE: 20 BRPM | OXYGEN SATURATION: 96 % | DIASTOLIC BLOOD PRESSURE: 78 MMHG | BODY MASS INDEX: 41.66 KG/M2

## 2018-01-09 DIAGNOSIS — I10 ESSENTIAL HYPERTENSION: ICD-10-CM

## 2018-01-09 DIAGNOSIS — N17.9 AKI (ACUTE KIDNEY INJURY) (HCC): ICD-10-CM

## 2018-01-09 DIAGNOSIS — I48.4 ATYPICAL ATRIAL FLUTTER (HCC): ICD-10-CM

## 2018-01-09 DIAGNOSIS — I50.33 ACUTE ON CHRONIC DIASTOLIC HEART FAILURE (HCC): Primary | ICD-10-CM

## 2018-01-09 DIAGNOSIS — I48.0 PAF (PAROXYSMAL ATRIAL FIBRILLATION) (HCC): ICD-10-CM

## 2018-01-09 LAB
ANION GAP SERPL CALCULATED.3IONS-SCNC: 6 MMOL/L (ref 3–11)
BUN BLD-MCNC: 23 MG/DL (ref 9–23)
BUN/CREAT SERPL: 17.7 (ref 7–25)
CALCIUM SPEC-SCNC: 9.3 MG/DL (ref 8.7–10.4)
CHLORIDE SERPL-SCNC: 102 MMOL/L (ref 99–109)
CO2 SERPL-SCNC: 31 MMOL/L (ref 20–31)
CREAT BLD-MCNC: 1.3 MG/DL (ref 0.6–1.3)
GFR SERPL CREATININE-BSD FRML MDRD: 56 ML/MIN/1.73
GLUCOSE BLD-MCNC: 103 MG/DL (ref 70–100)
POTASSIUM BLD-SCNC: 4.1 MMOL/L (ref 3.5–5.5)
SODIUM BLD-SCNC: 139 MMOL/L (ref 132–146)

## 2018-01-09 PROCEDURE — 93005 ELECTROCARDIOGRAM TRACING: CPT | Performed by: NURSE PRACTITIONER

## 2018-01-09 PROCEDURE — 99214 OFFICE O/P EST MOD 30 MIN: CPT | Performed by: NURSE PRACTITIONER

## 2018-01-09 PROCEDURE — 80048 BASIC METABOLIC PNL TOTAL CA: CPT | Performed by: NURSE PRACTITIONER

## 2018-01-09 PROCEDURE — 93010 ELECTROCARDIOGRAM REPORT: CPT | Performed by: INTERNAL MEDICINE

## 2018-01-09 RX ORDER — METOLAZONE 2.5 MG/1
TABLET ORAL
Qty: 10 TABLET | Refills: 1 | Status: SHIPPED | OUTPATIENT
Start: 2018-01-09 | End: 2018-01-23

## 2018-01-09 RX ORDER — SPIRONOLACTONE 25 MG/1
25 TABLET ORAL DAILY
Qty: 90 TABLET | Refills: 3 | Status: ON HOLD | OUTPATIENT
Start: 2018-01-09 | End: 2018-07-20

## 2018-01-09 NOTE — PROGRESS NOTES
Encounter Date:01/09/2018      Patient ID: Akshat Winkler is a 62 y.o. male.        Subjective:     Chief Complaint: Follow-up   DHF, atrial flutter  History of Present Illness patient presents to the heart and valve center today for ongoing evaluation of his acute on chronic diastolic heart failure, atypical atrial flutter and MARKO. Patient was seen last in [facility] on 1/4/18 and underwent IV diuresis. He notes moderate improvement in symptoms of dyspnea, abdominal fullness and pedal edema. Patient notes compliance with his bumex. Creatinine 1/4/18 was 1.5 (as high as 5.4). He notes ongoing dyspnea, pedal edema and abdominal fullness. Denies chest pain. He also notes that he is experiencing a lot of pain in his back and on his right side due to broken ribs he sustained when he passed out.     Patient Active Problem List   Diagnosis   • Chronic diastolic heart failure   • Type 2 diabetes mellitus without complication, without long-term current use of insulin   • Hyperlipidemia LDL goal <70   • Essential hypertension   • Coronary artery disease involving native coronary artery of native heart without angina pectoris   • Peripheral artery disease   • Severe obstructive sleep apnea   • Class 3 obesity in adult   • Paroxysmal atrial fibrillation   • Atypical atrial flutter   • Acute kidney injury   • Compression fracture of lumbar vertebra       Past Surgical History:   Procedure Laterality Date   • CARDIOVERSION      multiple   • OTHER SURGICAL HISTORY  06/29/2015    PVA       Allergies   Allergen Reactions   • Bisoprolol Other (See Comments)     Severe Hypotension   • Flecainide Other (See Comments)     Syncope / collapse   • Metoprolol Other (See Comments)      Bradycardia, Severe Hypotension         Current Outpatient Prescriptions:   •  apixaban (ELIQUIS) 5 MG tablet tablet, Take 1 tablet by mouth 2 (Two) Times a Day for 360 days., Disp: 180 tablet, Rfl: 3  •  aspirin 81 MG EC tablet, Take 81 mg by mouth Daily., Disp:  , Rfl:   •  bumetanide (BUMEX) 2 MG tablet, Take 2 mg by mouth Daily., Disp: , Rfl:   •  HYDROcodone-acetaminophen (NORCO)  MG per tablet, Take 1 tablet by mouth Every 4 (Four) Hours As Needed., Disp: , Rfl:   •  potassium chloride (K-DUR) 10 MEQ CR tablet, Take 1 tablet by mouth Daily., Disp: 30 tablet, Rfl: 11  •  rosuvastatin (CRESTOR) 20 MG tablet, Take 1 tablet by mouth Daily for 360 days., Disp: 90 tablet, Rfl: 3    The following portions of the chart were reviewed and updated as appropriate: Allergies, current medications, past family history, social history, past medical history.     Review of Systems   Constitution: Positive for malaise/fatigue and weight gain. Negative for chills, decreased appetite, diaphoresis, fever, weakness, night sweats and weight loss.   HENT: Negative for congestion, hearing loss, hoarse voice and nosebleeds.    Eyes: Negative for blurred vision, visual disturbance and visual halos.   Cardiovascular: Positive for dyspnea on exertion and leg swelling. Negative for chest pain, claudication, cyanosis, irregular heartbeat, near-syncope, orthopnea, palpitations, paroxysmal nocturnal dyspnea and syncope.   Respiratory: Positive for sleep disturbances due to breathing. Negative for cough, hemoptysis, shortness of breath, snoring, sputum production and wheezing.    Endocrine: Positive for polydipsia.   Hematologic/Lymphatic: Negative for bleeding problem. Does not bruise/bleed easily.   Skin: Positive for itching. Negative for dry skin and rash.   Musculoskeletal: Positive for muscle cramps. Negative for arthritis, falls, joint pain, joint swelling and myalgias.   Gastrointestinal: Negative for bloating, abdominal pain, constipation, diarrhea, flatus, heartburn, hematemesis, hematochezia, melena, nausea and vomiting.   Genitourinary: Negative for dysuria, frequency, hematuria, nocturia and urgency.   Neurological: Positive for excessive daytime sleepiness. Negative for dizziness,  "headaches, light-headedness and loss of balance.   Psychiatric/Behavioral: Negative for depression. The patient does not have insomnia and is not nervous/anxious.            Objective:     Vitals:    01/09/18 0818 01/09/18 0820 01/09/18 0821   BP: 127/76 123/79 124/78   BP Location: Right arm Left arm Left arm   Patient Position: Sitting Sitting Standing   Cuff Size: Adult     Pulse: 105 104 106   Resp: 20     Temp: 97.4 °F (36.3 °C)     TempSrc: Temporal Artery      SpO2: 96%     Weight: 132 kg (291 lb)     Height: 177.8 cm (70\")           Physical Exam   Constitutional: He is oriented to person, place, and time. He appears well-developed and well-nourished. He is active and cooperative. No distress.   HENT:   Head: Normocephalic and atraumatic.   Mouth/Throat: Oropharynx is clear and moist.   Eyes: Conjunctivae and EOM are normal. Pupils are equal, round, and reactive to light.   Neck: Normal range of motion. Neck supple. No JVD present. No tracheal deviation present. No thyromegaly present.   Cardiovascular: Normal rate, regular rhythm, normal heart sounds and intact distal pulses.    Pulmonary/Chest: Effort normal and breath sounds normal.   Abdominal: Soft. Bowel sounds are normal. He exhibits no distension. There is no tenderness.   Musculoskeletal: Normal range of motion. He exhibits edema (2+ pitting edema, improved from last office visit).   Neurological: He is alert and oriented to person, place, and time.   Skin: Skin is warm, dry and intact.   Psychiatric: He has a normal mood and affect. His behavior is normal.   Nursing note and vitals reviewed.      Lab and Diagnostic Review:      Results for orders placed or performed in visit on 01/09/18   Basic Metabolic Panel   Result Value Ref Range    Glucose 103 (H) 70 - 100 mg/dL    BUN 23 9 - 23 mg/dL    Creatinine 1.30 0.60 - 1.30 mg/dL    Sodium 139 132 - 146 mmol/L    Potassium 4.1 3.5 - 5.5 mmol/L    Chloride 102 99 - 109 mmol/L    CO2 31.0 20.0 - 31.0 " mmol/L    Calcium 9.3 8.7 - 10.4 mg/dL    eGFR Non African Amer 56 (L) >60 mL/min/1.73    BUN/Creatinine Ratio 17.7 7.0 - 25.0    Anion Gap 6.0 3.0 - 11.0 mmol/L     EKG today: atrial flutter at 104 bpm      Assessment and Plan:         1. Acute on chronic diastolic heart failure  May take one dose of metolazone today with afternoon dose of bumex 1 mg.   Discontinue KCL due to re initiation of aldactone  Will restart aldactone 25 mg once daily  - Basic Metabolic Panel  Heart failure education today including signs and symptoms, the role of the heart failure center, daily weights, low sodium diet (less than 1500 mg per day), and daily physical activity. Reviewed HF Zones with patient and family.  Patient to continue current medications as previously ordered.   2. Atypical atrial flutter  Patient has been referred to Dr Jeffery for evaluation of Tikosyn vs repeat ablation  - ECG 12 Lead; Future    3. MARKO (acute kidney injury)  Resolved  Creatinine today is 1.3, will continue to monitor  - Basic Metabolic Panel    4. Essential hypertension  Well controlled    It has been a pleasure to participate in the care of this patient.  Patient was instructed to call the [facility] with any questions, concerns, or worsening symptoms.        * Please note that portions of this note were completed with a voice recognition program. Efforts were made to edit the dictation but occasionally words are transcribed.

## 2018-01-09 NOTE — TELEPHONE ENCOUNTER
Patient called saying he is in severe pain from his previous fall. I advised him to call his PCP for a refill. He says he hasn't seen his primary in 3 years. One was scheduled 1/4/18 but patient cancelled it. Did you want to refill his Norco temporarily? He says he has tried tylenol and it has not helped.

## 2018-01-12 ENCOUNTER — TELEPHONE (OUTPATIENT)
Dept: CARDIOLOGY | Facility: HOSPITAL | Age: 63
End: 2018-01-12

## 2018-01-12 ENCOUNTER — OFFICE VISIT (OUTPATIENT)
Dept: FAMILY MEDICINE CLINIC | Facility: CLINIC | Age: 63
End: 2018-01-12

## 2018-01-12 VITALS
BODY MASS INDEX: 40.03 KG/M2 | RESPIRATION RATE: 16 BRPM | DIASTOLIC BLOOD PRESSURE: 80 MMHG | HEART RATE: 104 BPM | WEIGHT: 279 LBS | SYSTOLIC BLOOD PRESSURE: 110 MMHG | TEMPERATURE: 99.3 F

## 2018-01-12 DIAGNOSIS — S22.41XD CLOSED FRACTURE OF MULTIPLE RIBS OF RIGHT SIDE WITH ROUTINE HEALING, SUBSEQUENT ENCOUNTER: ICD-10-CM

## 2018-01-12 DIAGNOSIS — S32.040D CLOSED COMPRESSION FRACTURE OF L4 LUMBAR VERTEBRA WITH ROUTINE HEALING, SUBSEQUENT ENCOUNTER: Primary | ICD-10-CM

## 2018-01-12 DIAGNOSIS — I50.32 CHRONIC DIASTOLIC HEART FAILURE (HCC): ICD-10-CM

## 2018-01-12 PROCEDURE — 99213 OFFICE O/P EST LOW 20 MIN: CPT | Performed by: FAMILY MEDICINE

## 2018-01-12 RX ORDER — HYDROCODONE BITARTRATE AND ACETAMINOPHEN 5; 325 MG/1; MG/1
1 TABLET ORAL EVERY 6 HOURS PRN
Qty: 100 TABLET | Refills: 0 | Status: SHIPPED | OUTPATIENT
Start: 2018-01-12 | End: 2018-03-12 | Stop reason: SDUPTHER

## 2018-01-12 RX ORDER — POTASSIUM CHLORIDE 750 MG/1
10 TABLET, FILM COATED, EXTENDED RELEASE ORAL DAILY
Qty: 180 TABLET | Refills: 3
Start: 2018-01-12 | End: 2018-01-23

## 2018-01-12 NOTE — TELEPHONE ENCOUNTER
Spoke with patient who notes significant improvement in his pedal edema and abdominal fullness. He notes that he took KCL 10 meq due to cramping in bilateral hands and feet. He notes that the cramping resolved after taking KCL. Patient to continue bumex 2 mg q am and aldactone 25 mg once daily. Instructed to call the office with any change in symptoms.

## 2018-01-12 NOTE — PROGRESS NOTES
Subjective   Akshat Winkler is a 62 y.o. male.     History of Present Illness   Hospital for heart failure, atrial fibrillation.  Fell at home fractured two ribs on right, vertebral compression and coccyx fracture.  Renal failure over [place].  Still uncomfortable from rib problem, can extend arms and take deep breath OK.  Pain right hip and leg fold.   Sleeps on side and uses heat pad.   The following portions of the patient's history were reviewed and updated as appropriate: allergies, current medications, past family history, past medical history, past social history, past surgical history and problem list.    Review of Systems   Constitutional: Negative.    Respiratory: Negative.    Cardiovascular: Negative.    Gastrointestinal: Negative.    Musculoskeletal: Positive for arthralgias and back pain.       Objective   Physical Exam   Constitutional: He appears well-developed and well-nourished.   Pulmonary/Chest: Effort normal and breath sounds normal. He exhibits tenderness (right lateral.).   Musculoskeletal: He exhibits edema (2+ right ankle).        Lumbar back: He exhibits bony tenderness (coccyx).   Neurological: He is alert.   Vitals reviewed.      Assessment/Plan   Akshat was seen today for follow-up.    Diagnoses and all orders for this visit:    Closed compression fracture of L4 lumbar vertebra with routine healing, subsequent encounter  -     Ambulatory Referral to Physical Therapy Ortho  -     HYDROcodone-acetaminophen (NORCO) 5-325 MG per tablet; Take 1 tablet by mouth Every 6 (Six) Hours As Needed for Moderate Pain .    Closed fracture of multiple ribs of right side with routine healing, subsequent encounter    Chronic diastolic heart failure

## 2018-01-23 ENCOUNTER — OFFICE VISIT (OUTPATIENT)
Dept: CARDIOLOGY | Facility: CLINIC | Age: 63
End: 2018-01-23

## 2018-01-23 VITALS
SYSTOLIC BLOOD PRESSURE: 128 MMHG | BODY MASS INDEX: 39.57 KG/M2 | HEART RATE: 114 BPM | WEIGHT: 276.4 LBS | HEIGHT: 70 IN | DIASTOLIC BLOOD PRESSURE: 78 MMHG

## 2018-01-23 DIAGNOSIS — I48.19 PERSISTENT ATRIAL FIBRILLATION (HCC): Primary | ICD-10-CM

## 2018-01-23 DIAGNOSIS — I25.10 CORONARY ARTERY DISEASE INVOLVING NATIVE CORONARY ARTERY OF NATIVE HEART WITHOUT ANGINA PECTORIS: ICD-10-CM

## 2018-01-23 DIAGNOSIS — I10 ESSENTIAL HYPERTENSION: ICD-10-CM

## 2018-01-23 DIAGNOSIS — E78.5 HYPERLIPIDEMIA LDL GOAL <70: ICD-10-CM

## 2018-01-23 DIAGNOSIS — I50.32 CHRONIC DIASTOLIC HEART FAILURE (HCC): ICD-10-CM

## 2018-01-23 PROBLEM — N17.9 ACUTE KIDNEY INJURY: Status: RESOLVED | Noted: 2017-12-22 | Resolved: 2018-01-23

## 2018-01-23 PROBLEM — I48.4 ATYPICAL ATRIAL FLUTTER (HCC): Status: RESOLVED | Noted: 2017-11-28 | Resolved: 2018-01-23

## 2018-01-23 PROCEDURE — 99214 OFFICE O/P EST MOD 30 MIN: CPT | Performed by: INTERNAL MEDICINE

## 2018-01-23 PROCEDURE — 93000 ELECTROCARDIOGRAM COMPLETE: CPT | Performed by: INTERNAL MEDICINE

## 2018-01-23 RX ORDER — LISINOPRIL 2.5 MG/1
2.5 TABLET ORAL NIGHTLY
COMMUNITY
End: 2018-03-06 | Stop reason: HOSPADM

## 2018-01-23 RX ORDER — ACETAMINOPHEN 500 MG
500 TABLET ORAL 2 TIMES DAILY
COMMUNITY
End: 2018-07-20 | Stop reason: HOSPADM

## 2018-01-23 NOTE — PROGRESS NOTES
Encounter Date:01/23/2018    Patient ID: Akshat Winkler is a 63 y.o. male who resides in [place], [place].    CC/Reason for visit:  Coronary Artery Disease            Akshat Winkler returns for hospital follow-up after developing profound hypotension and renal failure from low-dose bisoprolol and flecainide. The patient sustained a lumbar compression fracture as well as broken ribs which is his primary complaint today. He has both back pain and rib pain for which he has been requiring intermittent narcotics. He thinks his back is improving although the last several days not so much. He is scheduled to start rehabilitation for this next week. He denies palpitation symptoms and is unaware that he is in atrial flutter with a heart rate of 108 bpm today. He recently saw Dr. Prado regarding acute renal failure and his recent creatinine of 1.3 is a return to his baseline. Dr. Prado resumed low-dose lisinopril as his blood pressure was elevated in the office.    Review of Systems   Constitution: Negative for weakness and malaise/fatigue.   Eyes: Negative for vision loss in left eye and vision loss in right eye.   Cardiovascular: Positive for leg swelling. Negative for chest pain, dyspnea on exertion, near-syncope, orthopnea, palpitations, paroxysmal nocturnal dyspnea and syncope.   Skin: Positive for dry skin.   Musculoskeletal: Positive for back pain. Negative for myalgias.   Neurological: Negative for brief paralysis, excessive daytime sleepiness, focal weakness, numbness and paresthesias.   All other systems reviewed and are negative.      The patient's past medical, social, family history and ROS reviewed in the patient's electronic medical record.    Allergies  Bisoprolol; Flecainide; and Metoprolol    Outpatient Prescriptions Marked as Taking for the 1/23/18 encounter (Office Visit) with Lucian Alvarez IV, MD   Medication Sig Dispense Refill   • acetaminophen (TYLENOL) 500 MG tablet Take 500 mg by  "mouth As Needed for Mild Pain .     • apixaban (ELIQUIS) 5 MG tablet tablet Take 1 tablet by mouth 2 (Two) Times a Day for 360 days. 180 tablet 3   • aspirin 81 MG EC tablet Take 81 mg by mouth Daily.     • bumetanide (BUMEX) 2 MG tablet Take 2 mg by mouth Daily.     • HYDROcodone-acetaminophen (NORCO) 5-325 MG per tablet Take 1 tablet by mouth Every 6 (Six) Hours As Needed for Moderate Pain . 100 tablet 0   • lisinopril (PRINIVIL,ZESTRIL) 2.5 MG tablet Take 2.5 mg by mouth Daily.     • rosuvastatin (CRESTOR) 20 MG tablet Take 1 tablet by mouth Daily for 360 days. 90 tablet 3   • spironolactone (ALDACTONE) 25 MG tablet Take 1 tablet by mouth Daily. 90 tablet 3   • [DISCONTINUED] potassium chloride (K-DUR) 10 MEQ CR tablet Take 1 tablet by mouth Daily. 180 tablet 3         Blood pressure 128/78, pulse 114, height 177.8 cm (70\"), weight 125 kg (276 lb 6.4 oz).  Body mass index is 39.66 kg/(m^2).    Physical Exam   Constitutional: He is oriented to person, place, and time. He appears well-developed and well-nourished.   HENT:   Head: Normocephalic and atraumatic.   Eyes: Pupils are equal, round, and reactive to light. No scleral icterus.   Neck: No JVD present. Carotid bruit is not present. No thyromegaly present.   Cardiovascular: Normal rate, regular rhythm, S1 normal and S2 normal.  Exam reveals no gallop.    No murmur heard.  Pulmonary/Chest: Effort normal and breath sounds normal.   Abdominal: Soft. There is no hepatosplenomegaly. There is no tenderness.   Neurological: He is alert and oriented to person, place, and time.   Skin: Skin is warm and dry. No cyanosis. Nails show no clubbing.   Psychiatric: He has a normal mood and affect. His behavior is normal.       Data Review:     ECG 12 Lead  Date/Time: 1/23/2018 3:00 PM  Performed by: CHARANJIT FERRARI IV  Authorized by: CHARANJIT FERRARI IV   Rhythm: atrial flutter  BPM: 108  QRS axis: right  Clinical impression: abnormal ECG             "   Problem List Items Addressed This Visit        Cardiovascular and Mediastinum    Persistent atrial fibrillation/flutter - Primary    Overview     · Diagnosed 2011  · [id number] 3 (HTN, CAD, DM)  · Echo (10/11/2011): Normal LVEF, mild MR, mild LA dilation  · LOLY/ECVcardioversion, 12/21/2011, with initiation of propafenone therapy.   · Successful LOLY/ECV for recurrent atrial fibrillation, 11/15/2013  · PVA with Dr. Cary, 6/29/2015  · Cardioversion (07/17/2015) for atrial flutter occurring post ablation   · ECV for recurrent atrial flutter, 12/20/17 with reattempt at beta blocker/flecainide.  · Intolerant to beta blocker/flecanide with severe hypotension, intolerant to propafenone         Current Assessment & Plan     Patient is in atrial flutter with variable block and ventricular rate of 108 bpm today. He denies any palpitations or symptomatology from this. He is intolerant to multiple antiarrhythmic medications and was recently discharged from the hospital after having a anaphylactic reaction to flecainide and low-dose beta blocker. Given his intolerance to AV joby blocking agents, I would like the patient to see Dr. Jeffery for evaluation of atypical flutter. Given his asymptomatic status, we may very well continue present medical therapy. However, I would like EP opinion regarding administration of Tikosyn would be of benefit.         Chronic diastolic heart failure    Overview     · Cardiac cath (02/20/14):  elevated LVEDP suggesting diastolic heart failure.  · Intolerant to beta blocker therapy         Current Assessment & Plan     Functional class III diastolic heart failure symptoms.  Continue         Coronary artery disease involving native coronary artery of native heart without angina pectoris    Overview       · Cardiac PET (01/2014): partially reversible anteroseptal defect, normal LVEF.  · Cardiac cath (02/20/14): mild nonobstructive CAD, LVEF 55%, elevated LVEDP suggesting diastolic heart  failure.         Current Assessment & Plan     · No anginal symptoms  · Continue Crestor and low-dose aspirin         Essential hypertension    Relevant Medications    lisinopril (PRINIVIL,ZESTRIL) 2.5 MG tablet    Hyperlipidemia LDL goal <70    Overview     · High intensity statin therapy indicated given presence of coronary artery disease         Current Assessment & Plan     · Continue Crestor 20 mg daily             Patient continues to recover from his back and rib fractures following profound hypotension from low-dose bisoprolol and flecainide. He is in atrial flutter today but has no symptoms. I have previously scheduled him to see Dr. Jeffery regarding his opinion regarding whether Tikosyn should be considered for rhythm control. The patient is an talked to AV joby blocking agents but thankfully his resting heart rate is only modestly elevated.       · Await evaluation with Dr. Jeffery  · Continue present mental therapy  Return in about 6 months (around 7/23/2018).      Lucian Alvarez IV, MD  1/23/2018

## 2018-01-23 NOTE — ASSESSMENT & PLAN NOTE
Patient is in atrial flutter with variable block and ventricular rate of 108 bpm today. He denies any palpitations or symptomatology from this. He is intolerant to multiple antiarrhythmic medications and was recently discharged from the hospital after having a anaphylactic reaction to flecainide and low-dose beta blocker. Given his intolerance to AV joby blocking agents, I would like the patient to see Dr. Jeffery for evaluation of atypical flutter. Given his asymptomatic status, we may very well continue present medical therapy. However, I would like EP opinion regarding administration of Tikosyn would be of benefit.

## 2018-01-25 ENCOUNTER — TREATMENT (OUTPATIENT)
Dept: PHYSICAL THERAPY | Facility: CLINIC | Age: 63
End: 2018-01-25

## 2018-01-25 DIAGNOSIS — M54.50 ACUTE BILATERAL LOW BACK PAIN WITHOUT SCIATICA: Primary | ICD-10-CM

## 2018-01-25 PROCEDURE — 97161 PT EVAL LOW COMPLEX 20 MIN: CPT | Performed by: PHYSICAL THERAPIST

## 2018-01-25 PROCEDURE — 97110 THERAPEUTIC EXERCISES: CPT | Performed by: PHYSICAL THERAPIST

## 2018-01-25 PROCEDURE — 97014 ELECTRIC STIMULATION THERAPY: CPT | Performed by: PHYSICAL THERAPIST

## 2018-01-25 NOTE — PROGRESS NOTES
Physical Therapy Initial Evaluation and Plan of Care    TOTAL TIME: 60 MINUTES    Subjective Evaluation    History of Present Illness  Mechanism of injury: Fall on Dec 20th, was in hospital until ~ [place]   Got dizzy and lightheaded after taking his meds, puppy ran in front of him and he fell forward, took him ~ 1 hour to get up off the floor; went upstairs to bed, could not get back down the stairs during the night secondary to pain; called 911 and went to hospital; stayed in hospital about one week for kidney issues    Chief complaints: pelvic pain around the back and around the front of right hip; difficult to find comfortable position; pain/difficulty raising right leg and foot       Went in for a cardioversion on Dec 20th      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 Quality of life: fair    Pain  Current pain rating: 3  At worst pain ratin  Quality: dull ache and discomfort    Patient Goals  Patient goals for therapy: increased strength, independence with ADLs/IADLs, return to sport/leisure activities, return to work, decreased pain and increased motion             Objective       Postural Observations  Seated posture: poor  Standing posture: poor    Additional Postural Observation Details  Amb with cane, pain observed with rising from chair    Refuses to lie down secondary to pain    Palpation   Left   Tenderness of the lumbar paraspinals and quadratus lumborum.     Right Tenderness of the lumbar paraspinals and quadratus lumborum.     Neurological Testing     Sensation     Lumbar   Left   Intact: light touch    Right   Intact: light touch    Reflexes   Left   Patellar (L4): normal (2+)  Achilles (S1): normal (2+)    Right   Patellar (L4): normal (2+)  Achilles (S1): normal (2+)    Active Range of Motion     Lumbar   Flexion: 60 degrees   Extension: 10 degrees   Left lateral flexion: 25 degrees with pain  Right lateral flexion: 25 degrees with pain  Left rotation: 30 degrees with pain  Right  rotation: 30 degrees with pain    Strength/Myotome Testing     Left Hip   Planes of Motion   Flexion: 4    Right Hip   Planes of Motion   Flexion: 2    Left Knee   Flexion: 4+  Extension: 4+    Right Knee   Flexion: 4+  Extension: 4+    Left Ankle/Foot   Dorsiflexion: 5  Plantar flexion: 5    Right Ankle/Foot   Dorsiflexion: 5  Plantar flexion: 5    Tests     Additional Tests Details  Unable to lie down secondary to pain         Assessment & Plan     Assessment  Impairments: abnormal gait, abnormal or restricted ROM, activity intolerance, impaired physical strength, lacks appropriate home exercise program and pain with function  Assessment details: S/S consistent with LBP secondary to compression fx after a fall; pain with ROM and supine position; neuro intact; performed well with tx today; needs PT to restore ROM and strength in order to regain [place] with AD's and RTW on full duty  Prognosis: fair  Functional Limitations: carrying objects, lifting, sleeping, walking, pulling, pushing, uncomfortable because of pain, moving in bed, sitting, standing and stooping  Goals  Plan Goals: 4 weeks:  1. [place] with HEP  2. Full ROM lumbar spine  3. Able to flex hip with 4+/5 MMT in order to get in/out car without pain  4. Able to tolerate work simulated activities without pain or dysfunction    Plan  Therapy options: will be seen for skilled physical therapy services  Planned modality interventions: iontophoresis, TENS, thermotherapy (hydrocollator packs), ultrasound, cryotherapy, electrical stimulation/Russian stimulation and high voltage pulsed current (pain management)  Planned therapy interventions: manual therapy, neuromuscular re-education, soft tissue mobilization, stretching, strengthening, therapeutic activities, joint mobilization, functional ROM exercises, home exercise program, gait training and flexibility  Frequency: 2x week  Duration in weeks: 4  Treatment plan discussed with: patient        Manual  Therapy:         mins  89025;  Therapeutic Exercise:    20     mins  93753;     Neuromuscular Tami:        mins  43911;    Therapeutic Activity:          mins  76059;     Gait Training:           mins  69255;     Ultrasound:          mins  52208;    Electrical Stimulation:    15     mins  80424 ( );  Dry Needling          mins self-pay    Timed Treatment:   20   mins   Total Treatment:     60   mins    PT SIGNATURE: Trace Boothe, PT   DATE TREATMENT INITIATED: 1/25/2018    Initial Certification  Certification Period: 4/25/2018  I certify that the therapy services are furnished while this patient is under my care.  The services outlined above are required by this patient, and will be reviewed every 90 days.     PHYSICIAN: Oren Mark MD      DATE:     Please sign and return via fax to  .. Thank you, [facility].

## 2018-02-01 ENCOUNTER — TREATMENT (OUTPATIENT)
Dept: PHYSICAL THERAPY | Facility: CLINIC | Age: 63
End: 2018-02-01

## 2018-02-01 DIAGNOSIS — M54.50 ACUTE BILATERAL LOW BACK PAIN WITHOUT SCIATICA: Primary | ICD-10-CM

## 2018-02-01 PROCEDURE — 97014 ELECTRIC STIMULATION THERAPY: CPT | Performed by: PHYSICAL THERAPIST

## 2018-02-01 PROCEDURE — 97110 THERAPEUTIC EXERCISES: CPT | Performed by: PHYSICAL THERAPIST

## 2018-02-01 NOTE — PROGRESS NOTES
Physical Therapy Daily Progress Note    TOTAL TIME: 55 MINUTES    Akshat Winkler reports: C/O BACK PAIN THAT IS MINIMAL, BUT PAIN IS PRIMARILY IN RIGHT LATERAL HIP AND THIGH, WORSENS WITH GETTING OUT OF CAR AND TAKING THE FIRST FEW STEPS; GOT TENS UNIT FOR HOME, HELPS SOME      Objective   See Exercise, Manual, and Modality Logs for complete treatment.     THERAPEUTIC EXERCISES/ACTIVITIES ADDED TODAY: STANDING LUMBAR EXTENSIONS ESPECIALLY AFTER PROLONGED PERIODS OF SITTING OR DRIVING, STANDING HIP ABD, MIP, TOE RAISES    Assessment/Plan  PATIENT NEEDS CONTINUED PHYSICAL THERAPY IN ORDER TO ACHIEVE FULL ROM, STRENGTH AND FUNCTION WITH NO REPORTS OF PAIN IN ORDER TO RTW WITHOUT RESTRICTIONS AND WITHOUT PAIN OR DYSFUNCTION   PATIENT IS UNABLE TO TOLERATE LYING DOWN ON PLINTH FOR TREATMENT, MANUAL THERAPY OR EXERCISE; NO TTP GREATER TROCHANTER; SOME SYMPTOMS CONSISTENT WITH SCIATICA / PIRIFORMIS IRRITATION    EDUCATION RE: TENS UNIT, POSITIONAL CHANGES, ADJUST SEAT OF CAR    Progress per Plan of Care and Progress strengthening /stabilization /functional activity           Manual Therapy:         mins  51095;  Therapeutic Exercise:    20     mins  48373;     Neuromuscular Tami:        mins  53729;    Therapeutic Activity:          mins  96163;     Gait Training:           mins  66227;     Ultrasound:          mins  96397;    Electrical Stimulation:    20     mins  96310 ( );  Dry Needling          mins self-pay    Timed Treatment:   20   mins   Total Treatment:     55   mins    Trace Boothe, PT  Physical Therapist

## 2018-02-02 ENCOUNTER — TELEPHONE (OUTPATIENT)
Dept: FAMILY MEDICINE CLINIC | Facility: CLINIC | Age: 63
End: 2018-02-02

## 2018-02-02 NOTE — TELEPHONE ENCOUNTER
----- Message from Saba Joy Rep sent at 2/2/2018 11:38 AM EST -----  Regarding: CALL PT  Contact: [phone]  PT CALLED TO LET YOU KNOW THAT THE PT,HE HAS BEEN 3X, IS NOT HELPING AND HE WANT S TO KNOW WHAT ELSE HE CAN DO? CALL PT.    Spoke with pt and he stated that he was unable to walk, I advised him to go to the ER to be evaluated by a Dr. Lynne, CMA

## 2018-02-05 ENCOUNTER — TELEPHONE (OUTPATIENT)
Dept: FAMILY MEDICINE CLINIC | Facility: CLINIC | Age: 63
End: 2018-02-05

## 2018-02-05 NOTE — TELEPHONE ENCOUNTER
----- Message from Ольга Bobby sent at 2/5/2018  8:18 AM EST -----  Contact: [phone]  Pt would like someone to call him in regards to the pain medication. He states that it might as well be cough drops. Not helping at all.    Spoke with pt and he wanted to know if ok to take 2 of his pain pills, I told him that I would not advise doing that.  BBsymone, CMA

## 2018-02-06 ENCOUNTER — OFFICE VISIT (OUTPATIENT)
Dept: ORTHOPEDIC SURGERY | Facility: CLINIC | Age: 63
End: 2018-02-06

## 2018-02-06 VITALS
DIASTOLIC BLOOD PRESSURE: 84 MMHG | SYSTOLIC BLOOD PRESSURE: 141 MMHG | HEART RATE: 113 BPM | HEIGHT: 68 IN | BODY MASS INDEX: 40.76 KG/M2 | WEIGHT: 268.96 LBS

## 2018-02-06 DIAGNOSIS — M54.50 ACUTE RIGHT-SIDED LOW BACK PAIN WITHOUT SCIATICA: Primary | ICD-10-CM

## 2018-02-06 DIAGNOSIS — M51.36 BULGING OF LUMBAR INTERVERTEBRAL DISC: ICD-10-CM

## 2018-02-06 PROCEDURE — 99203 OFFICE O/P NEW LOW 30 MIN: CPT | Performed by: PHYSICIAN ASSISTANT

## 2018-02-06 NOTE — PROGRESS NOTES
"    [facility] [place] [facility] Note    Subjective     Pain of the Right Hip (DOI: 12/21/2017, fall at home)      HPI    Akshat Winkler is a 63 y.o. male. Patient presents to the orthopedic clinic for a 1-1/2-2 week history of right sided low back/buttock/posterior lateral hip pain.  He had a history of a fall on the 21st December resulting in significant low back pain for which he's attended 2 sessions of PT.  The low back/ posterior lateral hip pain requires him to use a walker for ambulation assistance.  He denies any bladder or bowel issues.  Patient also denies any numbness, tingling or radicular symptoms into the lower extremities.  No reported loss of motor function to the lower extremities.  No groin pain.  He denies any previous reported history of injury or trauma to his back or hip other than what's stated above.  He describes the pain as a continuous \"knife\" sharp pain.  Pain scale at maximum 9/10.  Oral narcotic pain control provides minimal relief.    Past Medical History:   Diagnosis Date   • Acute diastolic HF (heart failure)    • Acute hypokalemia    • Arrhythmia    • Back injury     Fall on 12/20/17   • Cellulitis of leg    • Chest pain syndrome    • Diabetes mellitus     type 2   • Hyperlipidemia    • Hypertension    • Obesity    • Obstructive sleep apnea     cpap hs   • Paroxysmal a-fib    • Peripheral artery disease       Past Surgical History:   Procedure Laterality Date   • CARDIOVERSION      multiple   • OTHER SURGICAL HISTORY  06/29/2015    PVA      Family History   Problem Relation Age of Onset   • Hypertension Mother    • Stroke Mother    • Stroke Father    • Sleep disorder Father    • Cancer Brother      Social History     Social History   • Marital status:      Spouse name: N/A   • Number of children: N/A   • Years of education: N/A     Occupational History   • Not on file.     Social History Main Topics   • Smoking status: Never Smoker   • Smokeless tobacco: Never Used   • " Alcohol use No   • Drug use: No   • Sexual activity: Defer     Other Topics Concern   • Not on file     Social History Narrative    Patient lives at home with his wife and denies caffeine intake.      Current Outpatient Prescriptions on File Prior to Visit   Medication Sig Dispense Refill   • acetaminophen (TYLENOL) 500 MG tablet Take 500 mg by mouth As Needed for Mild Pain .     • apixaban (ELIQUIS) 5 MG tablet tablet Take 1 tablet by mouth 2 (Two) Times a Day for 360 days. 180 tablet 3   • aspirin 81 MG EC tablet Take 81 mg by mouth Daily.     • bumetanide (BUMEX) 2 MG tablet Take 2 mg by mouth Daily.     • HYDROcodone-acetaminophen (NORCO) 5-325 MG per tablet Take 1 tablet by mouth Every 6 (Six) Hours As Needed for Moderate Pain . 100 tablet 0   • lisinopril (PRINIVIL,ZESTRIL) 2.5 MG tablet Take 2.5 mg by mouth Daily.     • rosuvastatin (CRESTOR) 20 MG tablet Take 1 tablet by mouth Daily for 360 days. 90 tablet 3   • spironolactone (ALDACTONE) 25 MG tablet Take 1 tablet by mouth Daily. 90 tablet 3     No current facility-administered medications on file prior to visit.       Allergies   Allergen Reactions   • Bisoprolol Other (See Comments)     Severe Hypotension   • Flecainide Other (See Comments)     Syncope / collapse   • Metoprolol Other (See Comments)      Bradycardia, Severe Hypotension        The following portions of the patient's history were reviewed and updated as appropriate: allergies, current medications, past family history, past medical history, past social history, past surgical history and problem list.    Review of Systems   Constitutional: Negative.    HENT: Negative.    Eyes: Negative.    Respiratory: Negative.    Cardiovascular: Positive for leg swelling.   Gastrointestinal: Negative.    Endocrine: Negative.    Genitourinary: Negative.    Musculoskeletal: Positive for arthralgias.   Skin: Negative.    Allergic/Immunologic: Negative.    Neurological: Negative.    Hematological: Negative.   "  Psychiatric/Behavioral: Negative.         Objective      Physical Exam  /84  Pulse 113  Ht 172.7 cm (68\")  Wt 122 kg (268 lb 15.4 oz)  BMI 40.9 kg/m2    Body mass index is 40.9 kg/(m^2).    General  Mental Status - alert  General Appearance - cooperative, well groomed, not in acute distress  Orientation - Oriented X3  Build & Nutrition - well developed and well nourished  Posture - normal posture  Gait - Antalgic gait with use of a walker to assist     Integumentary  Global Assessment  Examination of related systems reveals - no lymphadenopathy  General Characteristics  Overall examination of the patient's skin reveals - no rashes, no evidence of scars, no suspicious lesions and no bruises.  Color - normal coloration of skin.    Ortho Exam  Peripheral Vascular  Lower Extremity   Edema - None bilaterally    Musculoskeletal  Lower Extremity   Right Hip    No instability, subluxation or laxity    No known fractures or dislocations    Normal Sensation and coordination   Inspection and palpation    Tenderness - only with deep palpation to the right buttock/posterior hip. Negative hip flexors, greater trochanter     Tissue tension/texture is pliable and soft    Normal warmth   Strength and Tone    Left hip flexors - 4/5    Range of Motion    Internal Rotation: PROM - 25    External Rotation: PROM - 45   Functional Testing    Hip axial load/log roll negative    Straight leg raise positive right buttock pain   Neurovascular    Motor: Intact Q/HS/TA/GS/EHL    Sensory: Intact L2-S1    2+ DP    Reflex: 2+ knee, 2+ ankle, 2 beat clonus    Imaging/Studies  Reviewed CT scan lumbar spine (12/22/2017), with broad-based disc protrusion L3-L4 mild canal stenosis.  Right hip films (2/2/2018) reviewed and show no acute bony abnormality, fracture or dislocation.  See chart for official results    Assessment:  1. Acute right-sided low back pain without sciatica    2. Bulging of lumbar intervertebral disc  " "      Plan:  1. Discussion was had with patient regarding exam, assessment and treatment options.  2. At this time his symptoms don't him to be coming from the hip joint or greater trochanteric area.  3. I have recommended that he continues with physical therapy working on low back stretching strengthening through use other modalities to also help assist with pain control.  4. With regard to his current pain believe this coming from the low back/lumbar spine secondary to the above-stated CT findings and the exam.  5. I have placed a pain management consult to help assist with pain control as well as recommend further evaluation by neurosurgery if he continues to have no resolution or worsening symptoms.  6. Reviewed low back/lumbar spine signs and symptoms that are indicating worsening condition (\"red flags\").  Patient understands that if he has any of these he needs to report to the emergency room immediately for evaluation.  7. Continue pain medication as directed by his PCP.   8. He'll follow up with orthopedic clinic as needed.  9. Questions and concerns were answered.      Medical Decision Making  Management Options : prescription/IM medicine  Data/Risk: radiology tests    Mandy Clark PA-C  02/06/18  10:53 AM          "

## 2018-02-07 ENCOUNTER — TELEPHONE (OUTPATIENT)
Dept: PAIN MEDICINE | Facility: CLINIC | Age: 63
End: 2018-02-07

## 2018-02-07 PROBLEM — M47.816 SPONDYLOSIS OF LUMBAR REGION WITHOUT MYELOPATHY OR RADICULOPATHY: Status: ACTIVE | Noted: 2018-02-07

## 2018-02-07 PROBLEM — M48.062 LUMBAR STENOSIS WITH NEUROGENIC CLAUDICATION: Status: ACTIVE | Noted: 2018-02-07

## 2018-02-07 NOTE — PROGRESS NOTES
"Chief Complaint: \"Pain in my lower back and my right buttocks and groin.\"     History of Present Illness:   Patient: Mr. Akshat Winkler, 63 y.o. male   Referring physician: Mandy Clark PA-C  Reason for referral: Consultation for intractable lower back pain.   Pain history: Patient reports a 7-week history of pain, which began after a fall at his home (got dizzy and fell forward when raising from a chair) on the 21st December. Pain has progressed in intensity since onset.   Pain description: constant pain with intermittent exacerbation, described as aching, dull, sharp, shooting and stabbing sensation.   Radiation of pain: The pain radiates from the lower back into the buttocks and right inguinal region  Pain intensity today: 9/10  Average pain intensity last week: 9/10  Pain intensity ranges from: 6/10 to 10/10  Aggravating factors: Pain increases with twisting, bending, sitting longer than 10 minutes, standing longer than 5 minutes and ambulating more than 5 minutes.  Alleviating factors: Pain decreases with lying on the left side.   Associated symptoms:   Patient denies pain, numbness or weakness in the lower extremities.  Patient denies any new bladder or bowel problems.   Patient denies difficulties with his balance. He uses a walker for ambulation.  Pain interferes with ADLs and sleep     Review of previous therapies and additional medical records:  Akshat Winkler has already failed the following measures, including:   Conservative measures: oral analgesics, opioids, topical analgesics, physical therapy, ice, heat and TENs   Interventional measures: None  Surgical measures: No history of lumbar spine surgery  Akshat Winkler underwent surgical consultation with orthopedics on 02/06/2017, and was found not to be a surgical candidate.  Akshat Winklre presents with significant comorbidities including paroxysmal atrial fibrillation, diabetes mellitus type 2, hypertension, obesity, JOSESITO with CPAP, " peripheral arterial disease, engaged in treatment.  Patient is on ELIQUIS.  In terms of current analgesics, Akshat Winkler takes: Norco, Tylenol  I have reviewed García Report #[id number] consistent to medication reconciliation.     Review of Diagnostic Studies:    CT LUMBAR SPINE WO CONTRAST- 12/21/2017. No significant abnormalities of lumbar vertebral alignment. Sagittal images show no obvious fracture or evidence of displaced fracture. There is a superior endplate compression deformity of L4, with some associated bony lucency, but no fracture line is directly visible on the sagittal series. There is a small Schmorl's node of superior endplate of L3, and minor disc associated endplate irregularities elsewhere. Coronal images show a more concerning lucency paralleling the superior L4 endplate and a suggestion of a fracture line laterally to the left. Axial bone window images show bony structures to appear intact down to the level of the L4 superior endplate where again there is a small fracture or avulsion of the superior anterior aspect of the vertebral endplate on the left. No fracture is seen elsewhere. Incidental note is made of mild right SI joint DJD.   L3-L4: mild canal stenosis due to broad-based disc bulge. The more inferior levels appear normal.  XR Right Hip With Pelvis 02/02/2018: Bones/joints: Small spur lesser trochanter.  No acute fracture.  No dislocation. Soft tissues: Unremarkable.   IMPRESSION: No acute bony abnormality. Small spur lesser trochanter.    Review of Systems   Respiratory: Positive for apnea.    Gastrointestinal: Positive for constipation.   Musculoskeletal: Positive for arthralgias and back pain.   All other systems reviewed and are negative.        Patient Active Problem List   Diagnosis   • Chronic diastolic heart failure   • Type 2 diabetes mellitus without complication, without long-term current use of insulin   • Hyperlipidemia LDL goal <70   • Essential hypertension   •  Coronary artery disease involving native coronary artery of native heart without angina pectoris   • Severe obstructive sleep apnea   • Class 3 obesity in adult   • Persistent atrial fibrillation/flutter   • Compression fracture of lumbar vertebra   • Spondylosis of 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 or radiculopathy   • Lumbar stenosis with neurogenic claudication   • Lumbar disc herniation   • Myofascial pain       Past Medical History:   Diagnosis Date   • Acute diastolic HF (heart failure)    • Acute hypokalemia    • Arrhythmia    • Back injury     Fall on 12/20/17   • Cellulitis of leg    • Chest pain syndrome    • Diabetes mellitus     type 2   • Hyperlipidemia    • Hypertension    • Obesity    • Obstructive sleep apnea     cpap hs   • Paroxysmal a-fib    • Peripheral artery disease    • Spondylosis of 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 or radiculopathy 2/7/2018         Past Surgical History:   Procedure Laterality Date   • CARDIOVERSION      multiple   • OTHER SURGICAL HISTORY  06/29/2015    PVA         Family History   Problem Relation Age of Onset   • Hypertension Mother    • Stroke Mother    • Stroke Father    • Sleep disorder Father    • Cancer Brother          Social History     Social History   • Marital status:      Spouse name: N/A   • Number of children: N/A   • Years of education: N/A     Social History Main Topics   • Smoking status: Never Smoker   • Smokeless tobacco: Never Used   • Alcohol use No   • Drug use: No   • Sexual activity: Defer     Other Topics Concern   • None     Social History Narrative    Patient lives at home with his wife and denies caffeine intake.           Current Outpatient Prescriptions:   •  acetaminophen (TYLENOL) 500 MG tablet, Take 500 mg by mouth As Needed for Mild Pain ., Disp: , Rfl:   •  apixaban (ELIQUIS) 5 MG tablet tablet, Take 1 tablet by mouth 2 (Two) Times a Day for 360 days., Disp: 180 tablet, Rfl: 3  •  aspirin 81 MG EC tablet, Take 81 mg by mouth Daily.,  "Disp: , Rfl:   •  bumetanide (BUMEX) 2 MG tablet, Take 2 mg by mouth Daily., Disp: , Rfl:   •  HYDROcodone-acetaminophen (NORCO) 5-325 MG per tablet, Take 1 tablet by mouth Every 6 (Six) Hours As Needed for Moderate Pain ., Disp: 100 tablet, Rfl: 0  •  lisinopril (PRINIVIL,ZESTRIL) 2.5 MG tablet, Take 2.5 mg by mouth Daily., Disp: , Rfl:   •  rosuvastatin (CRESTOR) 20 MG tablet, Take 1 tablet by mouth Daily for 360 days., Disp: 90 tablet, Rfl: 3  •  spironolactone (ALDACTONE) 25 MG tablet, Take 1 tablet by mouth Daily., Disp: 90 tablet, Rfl: 3  •  gabapentin (NEURONTIN) 100 MG capsule, Start 1 tab QHS for 3 days, then, BID for 3 days, then, TID for 3 days, then, cont. QID, Disp: 120 capsule, Rfl: 1  •  tiZANidine (ZANAFLEX) 2 MG tablet, Take 1 tablet by mouth Every 8 (Eight) Hours As Needed for Muscle Spasms. 1/2 tab QHS, then 1/2 to 1 tab TID PRN muscle spasm, Disp: 90 tablet, Rfl: 0      Allergies   Allergen Reactions   • Bisoprolol Other (See Comments)     Severe Hypotension   • Flecainide Other (See Comments)     Syncope / collapse   • Metoprolol Other (See Comments)      Bradycardia, Severe Hypotension         /73 (BP Location: Left arm, Patient Position: Sitting)  Pulse 115  Temp 98.2 °F (36.8 °C) (Temporal Artery )   Resp 20  Ht 177.8 cm (70\")  Wt 122 kg (268 lb)  SpO2 99%  BMI 38.45 kg/m2      Physical Exam:  Constitutional: Patient is oriented to person, place, and time. Vital signs are normal. Patient appears well-developed and well-nourished.   HENT: Head: Normocephalic and atraumatic. Eyes: Conjunctivae and lids are normal. Pupils: Equal, round, reactive to light.   Neck: Trachea normal. Neck supple. No JVD present.   Pulmonary Respiratory effort: No increased work of breathing or signs of respiratory distress. Auscultation of lungs: Clear to auscultation.   Cardiovascular Auscultation of heart: Normal rate and rhythm, normal S1 and S2, no murmurs.   Bilateral lower extremity edema; 2+. " Presence of varicose veins.  Abdomen: The abdomen was soft and nontender. Bowel sounds were normal.   Musculoskeletal   Gait and station: Gait evaluation demonstrated limping. Walks with a walker  Lumbar spine: The range of motion of the lumbar spine is almost full and without significant pain. Lumbar facet joint loading maneuvers are negative.   Frederick and Gaenslen's tests are negative   Piriformis maneuvers are negative   Palpation of the bilateral ischial tuberosities, unrevealing   Palpation of the bilateral greater trochanter, unrevealing   Examination of the Iliotibial band: unrevealing   Hip joints: The range of motion of the hip joints is full and without pain   Neurological: Patient is alert and oriented to person, place, and time. Speech: speech is normal. Cortical function: Normal mental status.   Cranial nerves: Cranial nerves 2-12 intact.   Reflex Scores:  Right patellar: 1+  Left patellar: 2+  Right achilles: 1+  Left achilles: 1+  Motor strength: 5/5  Motor Tone: normal tone.   Involuntary movements: none.   Superficial/Primitive Reflexes: primitive reflexes were absent.   Right De La Cruz: absent  Left De La Cruz: absent  Right ankle clonus: absent  Left ankle clonus: absent   Negative long tract signs. Straight leg raising test elicits severe right gluteal pain. Femoral stretch sign is negative.   Sensation: No sensory loss. Sensory exam: intact to light touch, intact pain and temperature sensation, intact vibration sensation and normal proprioception.   Coordination: Normal finger to nose and heel to shin. Normal balance and negative. Romberg's sign negative.   Skin and subcutaneous tissue: Skin is warm and intact. No rash noted. No cyanosis.   Psychiatric: Judgment and insight: Normal. Orientation to person, place and time: Normal. Recent and remote memory: Intact. Mood and affect: Normal.     ASSESSMENT:   1. Lumbar disc herniation    2. Closed compression fracture of L4 lumbar vertebra with routine  healing, subsequent encounter    3. Spondylosis of 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 or radiculopathy    4. Lumbar stenosis with neurogenic claudication    5. Myofascial pain    6. Class 3 obesity without serious comorbidity with body mass index (BMI) of 40.0 to 44.9 in adult, unspecified obesity type    7. Type 2 diabetes mellitus without complication, without long-term current use of insulin    8. Severe obstructive sleep apnea    9. Coronary artery disease involving native coronary artery of native heart without angina pectoris    10. Persistent atrial fibrillation/flutter      PLAN/MEDICAL DECISION MAKING: I had a lengthy conversation with . Akshat Winkler regarding his chronic pain condition and potential therapeutic options including risks, benefits, alternative therapies, to name a few. Patient has failed to obtain pain relief with conservative measures, as referenced above. Patient sustained a fall injury on the 21st December from a near syncopal episode resulting in severe lower back pain. Patient has failed conservative measures including opioid therapy. He failed physical therapy. CT lumbar spine 12/21/2017. No significant abnormalities of lumbar vertebral alignment. Superior endplate compression deformity of L4, with associated bony lucency, but no fracture line is directly visible on the sagittal series. There is a small Schmorl's node of superior endplate of L3, and minor disc associated endplate irregularities elsewhere. Coronal images show a more concerning lucency paralleling the superior L4 endplate and a suggestion of a fracture line laterally to the left. Axial images; L3-L4, mild canal stenosis due to broad-based disc bulge. Right hip films (2/2/2018) no acute bony abnormality, fracture or dislocation. His physical examination is unrevealing for right hip derangement or pain due to his compression fracture. Due to the mechanism or injury, a negative history of back pain prior to his accident,  the progression of symptoms, the interference with his daily activities, and the general features of his pain; it raises the suspicion for disc herniation, progression of fracture. Patient presents with significant comorbidity including diabetes mellitus type 2, moderate obesity, obstructive sleep apnea/CPAP, hypertension, paroxysmal a-fib on Eliquis, peripheral artery disease. I have reviewed all available patient's medical records as well as previous therapies as referenced above.  Therefore, I have proposed the following plan:  1. Diagnostic studies:  A. MRI of the lumbar spine without contrast  B. X-rays of the lumbar full views with flexion and extension  2. Pharmacological measures: Reviewed. Discussed.   A. Patient takes Norco, Tylenol with minimal relief  B. Trial with gabapentin 100 mg four times per day   C. Trial with tizanidine 2 mg 1/2 to 1 tablet three times a day as needed muscle spasms  3.  Interventional pain management measures: I have discussed with the patient the possibility of transforaminal epidural steroid injections (after reviewing his MRI). In that case, patient will need to stop apixaban (Eliquis) at least 48 hours between last dose and procedure. I will contact the patient with the results of his MRI and x-rays  4. Long-term rehabilitation efforts:  A. The patient has a history of falls. I did complete a risk assessment for falls. Patient fell as a result of a near syncopal spell due to his heart meds. Since his fall, he has stopped those medications and has not had any dizzy spells  B. Patient will start a comprehensive physical therapy program for core strengthening, gait and balance training, neurodynamics, ultrasound and myofascial release once pain is under control  C. Start an exercise program such as water therapy  D. Trial with TENS unit  E. Contrast therapy: Apply ice-packs for 15-20 minutes, followed by heating pads for 15-20 minutes to affected area   F. Referral to [facility]  5. The patient has been instructed to contact my office with any questions or difficulties. The patient understands the plan and agrees to proceed accordingly.       Patient Care Team:  Oren Mark MD as PCP - General (Family Medicine)  Lucian Alvarez IV, MD as Consulting Physician (Cardiology)  Mandy Clark PA-C as Physician Assistant (Physician Assistant)  Josh Moss MD as Consulting Physician (Pain Medicine)     New Medications Ordered This Visit   Medications   • gabapentin (NEURONTIN) 100 MG capsule     Sig: Start 1 tab QHS for 3 days, then, BID for 3 days, then, TID for 3 days, then, cont. QID     Dispense:  120 capsule     Refill:  1   • tiZANidine (ZANAFLEX) 2 MG tablet     Sig: Take 1 tablet by mouth Every 8 (Eight) Hours As Needed for Muscle Spasms. 1/2 tab QHS, then 1/2 to 1 tab TID PRN muscle spasm     Dispense:  90 tablet     Refill:  0         Future Appointments  Date Time Provider Department Center   2/8/2018 8:00 AM Oren Mark MD [id number] None   3/2/2018 9:00 AM Joaquin Jeffery MD [facility] None   7/24/2018 1:15 PM FA[facility] None         Josh Moss MD     EMR Dragon/Transcription disclaimer:  Much of this encounter note is an electronic transcription of spoken language to printed text. Electronic transcription of spoken language may permit erroneous, or at times, nonsensical words or phrases to be inadvertently transcribed. Although I have reviewed the note for such errors, some may still exist.

## 2018-02-08 ENCOUNTER — OFFICE VISIT (OUTPATIENT)
Dept: PAIN MEDICINE | Facility: CLINIC | Age: 63
End: 2018-02-08

## 2018-02-08 ENCOUNTER — OFFICE VISIT (OUTPATIENT)
Dept: FAMILY MEDICINE CLINIC | Facility: CLINIC | Age: 63
End: 2018-02-08

## 2018-02-08 VITALS
SYSTOLIC BLOOD PRESSURE: 110 MMHG | HEART RATE: 112 BPM | RESPIRATION RATE: 16 BRPM | DIASTOLIC BLOOD PRESSURE: 80 MMHG | OXYGEN SATURATION: 98 % | WEIGHT: 276 LBS | BODY MASS INDEX: 39.6 KG/M2 | TEMPERATURE: 98.4 F

## 2018-02-08 VITALS
DIASTOLIC BLOOD PRESSURE: 73 MMHG | TEMPERATURE: 98.2 F | WEIGHT: 268 LBS | RESPIRATION RATE: 20 BRPM | BODY MASS INDEX: 38.37 KG/M2 | HEART RATE: 115 BPM | SYSTOLIC BLOOD PRESSURE: 119 MMHG | OXYGEN SATURATION: 99 % | HEIGHT: 70 IN

## 2018-02-08 DIAGNOSIS — M79.18 MYOFASCIAL PAIN: ICD-10-CM

## 2018-02-08 DIAGNOSIS — M51.26 LUMBAR DISC HERNIATION: Primary | ICD-10-CM

## 2018-02-08 DIAGNOSIS — IMO0001 CLASS 3 OBESITY WITHOUT SERIOUS COMORBIDITY WITH BODY MASS INDEX (BMI) OF 40.0 TO 44.9 IN ADULT, UNSPECIFIED OBESITY TYPE: ICD-10-CM

## 2018-02-08 DIAGNOSIS — I10 ESSENTIAL HYPERTENSION: ICD-10-CM

## 2018-02-08 DIAGNOSIS — I25.10 CORONARY ARTERY DISEASE INVOLVING NATIVE CORONARY ARTERY OF NATIVE HEART WITHOUT ANGINA PECTORIS: ICD-10-CM

## 2018-02-08 DIAGNOSIS — M47.816 SPONDYLOSIS OF LUMBAR REGION WITHOUT MYELOPATHY OR RADICULOPATHY: ICD-10-CM

## 2018-02-08 DIAGNOSIS — S32.040D CLOSED COMPRESSION FRACTURE OF L4 LUMBAR VERTEBRA WITH ROUTINE HEALING, SUBSEQUENT ENCOUNTER: Primary | ICD-10-CM

## 2018-02-08 DIAGNOSIS — I50.32 CHRONIC DIASTOLIC HEART FAILURE (HCC): ICD-10-CM

## 2018-02-08 DIAGNOSIS — M48.062 LUMBAR STENOSIS WITH NEUROGENIC CLAUDICATION: ICD-10-CM

## 2018-02-08 DIAGNOSIS — M51.26 LUMBAR DISC HERNIATION: ICD-10-CM

## 2018-02-08 DIAGNOSIS — E11.9 TYPE 2 DIABETES MELLITUS WITHOUT COMPLICATION, WITHOUT LONG-TERM CURRENT USE OF INSULIN (HCC): ICD-10-CM

## 2018-02-08 DIAGNOSIS — G47.33 SEVERE OBSTRUCTIVE SLEEP APNEA: ICD-10-CM

## 2018-02-08 DIAGNOSIS — I48.19 PERSISTENT ATRIAL FIBRILLATION (HCC): ICD-10-CM

## 2018-02-08 DIAGNOSIS — S32.040D CLOSED COMPRESSION FRACTURE OF L4 LUMBAR VERTEBRA WITH ROUTINE HEALING, SUBSEQUENT ENCOUNTER: ICD-10-CM

## 2018-02-08 LAB
BASOPHILS # BLD AUTO: 0.02 10*3/MM3 (ref 0–0.2)
BASOPHILS NFR BLD AUTO: 0.3 % (ref 0–1)
BUN SERPL-MCNC: 16 MG/DL (ref 9–23)
BUN/CREAT SERPL: 16 (ref 7–25)
CALCIUM SERPL-MCNC: 9.4 MG/DL (ref 8.7–10.4)
CHLORIDE SERPL-SCNC: 98 MMOL/L (ref 99–109)
CO2 SERPL-SCNC: 31 MMOL/L (ref 20–31)
CREAT SERPL-MCNC: 1 MG/DL (ref 0.6–1.3)
EOSINOPHIL # BLD AUTO: 0.13 10*3/MM3 (ref 0–0.3)
EOSINOPHIL NFR BLD AUTO: 2.3 % (ref 0–3)
ERYTHROCYTE [DISTWIDTH] IN BLOOD BY AUTOMATED COUNT: 15.1 % (ref 11.3–14.5)
GFR SERPLBLD CREATININE-BSD FMLA CKD-EPI: 76 ML/MIN/1.73
GFR SERPLBLD CREATININE-BSD FMLA CKD-EPI: 92 ML/MIN/1.73
GLUCOSE SERPL-MCNC: 81 MG/DL (ref 70–100)
HCT VFR BLD AUTO: 43.8 % (ref 38.9–50.9)
HGB BLD-MCNC: 13.9 G/DL (ref 13.1–17.5)
IMM GRANULOCYTES # BLD: 0 10*3/MM3 (ref 0–0.03)
IMM GRANULOCYTES NFR BLD: 0 % (ref 0–0.6)
LYMPHOCYTES # BLD AUTO: 0.71 10*3/MM3 (ref 0.6–4.8)
LYMPHOCYTES NFR BLD AUTO: 12.4 % (ref 24–44)
MCH RBC QN AUTO: 32.9 PG (ref 27–31)
MCHC RBC AUTO-ENTMCNC: 31.7 G/DL (ref 32–36)
MCV RBC AUTO: 103.8 FL (ref 80–99)
MONOCYTES # BLD AUTO: 0.63 10*3/MM3 (ref 0–1)
MONOCYTES NFR BLD AUTO: 11 % (ref 0–12)
NEUTROPHILS # BLD AUTO: 4.24 10*3/MM3 (ref 1.5–8.3)
NEUTROPHILS NFR BLD AUTO: 74 % (ref 41–71)
PLATELET # BLD AUTO: 169 10*3/MM3 (ref 150–450)
POTASSIUM SERPL-SCNC: 3.4 MMOL/L (ref 3.5–5.5)
RBC # BLD AUTO: 4.22 10*6/MM3 (ref 4.2–5.76)
SODIUM SERPL-SCNC: 138 MMOL/L (ref 132–146)
WBC # BLD AUTO: 5.73 10*3/MM3 (ref 3.5–10.8)

## 2018-02-08 PROCEDURE — 36415 COLL VENOUS BLD VENIPUNCTURE: CPT | Performed by: FAMILY MEDICINE

## 2018-02-08 PROCEDURE — 99204 OFFICE O/P NEW MOD 45 MIN: CPT | Performed by: ANESTHESIOLOGY

## 2018-02-08 PROCEDURE — 99213 OFFICE O/P EST LOW 20 MIN: CPT | Performed by: FAMILY MEDICINE

## 2018-02-08 RX ORDER — GABAPENTIN 100 MG/1
CAPSULE ORAL
Qty: 120 CAPSULE | Refills: 1 | Status: SHIPPED | OUTPATIENT
Start: 2018-02-08 | End: 2018-02-27

## 2018-02-08 RX ORDER — TIZANIDINE 2 MG/1
2 TABLET ORAL EVERY 8 HOURS PRN
Qty: 90 TABLET | Refills: 0 | Status: SHIPPED | OUTPATIENT
Start: 2018-02-08 | End: 2018-02-27

## 2018-02-08 NOTE — PROGRESS NOTES
Subjective   Akshat Winkler is a 63 y.o. male.     History of Present Illness   Continues back pain from vertebra collapse.  Finds that ibuprofen about as effective as any medication.  Not sleeping.  Seen by pain management early this morning, recommended steroid injection series, started on gabapentin and Zanaflex.  No chest discomfort.  Minimal leg swelling.  Using walker.  Turning trunk sends sharp pains into hips and legs.   Needs labs for kidney function. Taking medications routinely for BP, blood thinner.   The following portions of the patient's history were reviewed and updated as appropriate: allergies, current medications, past family history, past medical history, past social history, past surgical history and problem list.    Review of Systems   Constitutional: Negative.    Respiratory: Negative.    Cardiovascular: Negative.    Gastrointestinal: Negative.    Musculoskeletal: Positive for back pain.       Objective   Physical Exam   Constitutional: He appears well-developed and well-nourished. He appears distressed (quite uncomfortable).   Pulmonary/Chest: Effort normal and breath sounds normal.   Musculoskeletal: He exhibits edema (1+ both ankles).        Lumbar back: He exhibits bony tenderness.   Neurological: He is alert.   Vitals reviewed.      Assessment/Plan   Akshat was seen today for back pain.    Diagnoses and all orders for this visit:    Closed compression fracture of L4 lumbar vertebra with routine healing, subsequent encounter    Lumbar stenosis with neurogenic claudication    Chronic diastolic heart failure    Severe obstructive sleep apnea    Essential hypertension  -     Basic Metabolic Panel  -     CBC & Differential

## 2018-02-10 ENCOUNTER — HOSPITAL ENCOUNTER (OUTPATIENT)
Dept: MRI IMAGING | Facility: HOSPITAL | Age: 63
Discharge: HOME | End: 2018-02-10
Attending: ANESTHESIOLOGY | Admitting: ANESTHESIOLOGY

## 2018-02-10 DIAGNOSIS — S32.040K: ICD-10-CM

## 2018-02-10 DIAGNOSIS — M48.062 LUMBAR STENOSIS WITH NEUROGENIC CLAUDICATION: Primary | ICD-10-CM

## 2018-02-10 PROCEDURE — 72148 MRI LUMBAR SPINE W/O DYE: CPT

## 2018-02-14 ENCOUNTER — OFFICE VISIT (OUTPATIENT)
Dept: NEUROSURGERY | Facility: CLINIC | Age: 63
End: 2018-02-14

## 2018-02-14 VITALS — BODY MASS INDEX: 39.37 KG/M2 | WEIGHT: 275 LBS | HEIGHT: 70 IN | TEMPERATURE: 98.6 F

## 2018-02-14 DIAGNOSIS — S32.040G CLOSED COMPRESSION FRACTURE OF L4 LUMBAR VERTEBRA WITH DELAYED HEALING, SUBSEQUENT ENCOUNTER: Primary | ICD-10-CM

## 2018-02-14 PROCEDURE — 99213 OFFICE O/P EST LOW 20 MIN: CPT | Performed by: NEUROLOGICAL SURGERY

## 2018-02-14 NOTE — PROGRESS NOTES
Patient: Akshat Winkler  : 1955    Primary Care Provider: Oren Mark MD    Requesting Provider: Dr. Moss        History    Chief Complaint: Low back and right hip pain.    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  I saw him as an inpatient consultation in December.  He was undergoing adjustment of some of his cardiac medicines and suffered a syncopal or near syncopal episode and fell at home.  He was noted to have a modest upper endplate fracture of L4.  Nonoperative measures were recommended given his extensive medical comorbidities including substantial heart issues and renal insufficiency.  He also has obesity, hypertension, and diabetes.  He was supposed to follow up in 3-4 weeks after discharge.  As of late he has developed increasing pain in his back extending into the right hip.  He's had some right inguinal symptoms.  He is worse with walking, standing, or sitting.  He has no numbness.  He denies bowel or bladder dysfunction.    Review of Systems   Constitutional: Negative for activity change, appetite change, chills, diaphoresis, fatigue, fever and unexpected weight change.   HENT: Negative for congestion, dental problem, drooling, ear discharge, ear pain, facial swelling, hearing loss, mouth sores, nosebleeds, postnasal drip, rhinorrhea, sinus pressure, sneezing, sore throat, tinnitus, trouble swallowing and voice change.    Eyes: Negative for photophobia, pain, discharge, redness, itching and visual disturbance.   Respiratory: Positive for apnea. Negative for cough, choking, chest tightness, shortness of breath, wheezing and stridor.    Cardiovascular: Negative for chest pain, palpitations and leg swelling.   Gastrointestinal: Negative for abdominal distention, abdominal pain, anal bleeding, blood in stool, constipation, diarrhea, nausea, rectal pain and vomiting.   Endocrine: Negative for cold intolerance,  "heat intolerance, polydipsia, polyphagia and polyuria.   Genitourinary: Negative for decreased urine volume, difficulty urinating, dysuria, enuresis, flank pain, frequency, genital sores, hematuria and urgency.   Musculoskeletal: Positive for back pain. Negative for arthralgias, gait problem, joint swelling, myalgias, neck pain and neck stiffness.   Skin: Negative for color change, pallor, rash and wound.   Allergic/Immunologic: Negative for environmental allergies, food allergies and immunocompromised state.   Neurological: Negative for dizziness, tremors, seizures, syncope, facial asymmetry, speech difficulty, weakness, light-headedness, numbness and headaches.   Hematological: Negative for adenopathy. Does not bruise/bleed easily.   Psychiatric/Behavioral: Negative for agitation, behavioral problems, confusion, decreased concentration, dysphoric mood, hallucinations, self-injury, sleep disturbance and suicidal ideas. The patient is not nervous/anxious and is not hyperactive.        The patient's past medical history, past surgical history, family history, and social history have been reviewed at length in the electronic medical record.    Physical Exam:   Temp 98.6 °F (37 °C) (Temporal Artery )   Ht 177.8 cm (70\")  Wt 125 kg (275 lb)  BMI 39.46 kg/m2  MUSCULOSKELETAL:  Straight leg raising is negative.  Frederick's Sign is negative.  ROM in back is normal.  Tenderness in the back to palpation is not observed.  NEUROLOGICAL:  Strength is intact in the lower extremities to direct testing.  Muscle tone is normal throughout.  Station and gait are somewhat stooped.  Sensation is intact to light touch testing throughout.  Deep tendon reflexes are 1+ and symmetrical.  Coordination is intact.      Medical Decision Making    Data Review:   MRI of the lumbar spine reveals a near planum fracture of L4.  There is jogging back of the upper posterior endplates that narrows the spinal canal.  His CT from the hospital in " December shows good maintenance of height.    Diagnosis:   The patient has a substantial L4 fracture that has progressed.  He's probably having a good deal of mechanical pain related to this.  Based on the progression of his fracture I'm wondering whether he has some significantly diminished bone density.    Treatment Options:   I'm going to check a DEXA scan and some plain films.  He may well require kyphoplasty at L4 if that is feasible given the marked collapse of the vertebral body.  Otherwise treatment would entail L4 corpectomy with cage strutting and fusion.  Given his medical comorbidities I doubt that he is up to something of that nature.  Of note he is anticoagulated.       Diagnosis Plan   1. Closed compression fracture of L4 lumbar vertebra with delayed healing, subsequent encounter  dexa bone density axial    XR Spine Lumbar 2 or 3 View       Scribed for Akshat Davidson MD by Indu Massey CMA on 02/14/2018 at 4:36 PM      I, Dr. Davidson, personally performed the services described in the documentation, as scribed in my presence, and it is both accurate and complete.

## 2018-02-16 ENCOUNTER — HOSPITAL ENCOUNTER (OUTPATIENT)
Dept: BONE DENSITY | Facility: HOSPITAL | Age: 63
Discharge: HOME | End: 2018-02-16
Attending: NEUROLOGICAL SURGERY | Admitting: NEUROLOGICAL SURGERY

## 2018-02-16 ENCOUNTER — HOSPITAL ENCOUNTER (OUTPATIENT)
Dept: GENERAL RADIOLOGY | Facility: HOSPITAL | Age: 63
Discharge: HOME | End: 2018-02-16
Attending: NEUROLOGICAL SURGERY

## 2018-02-16 DIAGNOSIS — S32.040G CLOSED COMPRESSION FRACTURE OF L4 LUMBAR VERTEBRA WITH DELAYED HEALING, SUBSEQUENT ENCOUNTER: ICD-10-CM

## 2018-02-16 PROCEDURE — 72100 X-RAY EXAM L-S SPINE 2/3 VWS: CPT

## 2018-02-16 PROCEDURE — 77080 DXA BONE DENSITY AXIAL: CPT

## 2018-02-21 ENCOUNTER — PREP FOR SURGERY (OUTPATIENT)
Dept: OTHER | Facility: HOSPITAL | Age: 63
End: 2018-02-21

## 2018-02-21 ENCOUNTER — OFFICE VISIT (OUTPATIENT)
Dept: NEUROSURGERY | Facility: CLINIC | Age: 63
End: 2018-02-21

## 2018-02-21 ENCOUNTER — TELEPHONE (OUTPATIENT)
Dept: CARDIOLOGY | Facility: CLINIC | Age: 63
End: 2018-02-21

## 2018-02-21 VITALS — WEIGHT: 282 LBS | BODY MASS INDEX: 40.37 KG/M2 | TEMPERATURE: 98.2 F | HEIGHT: 70 IN

## 2018-02-21 DIAGNOSIS — M48.50XA PATHOLOGICAL COMPRESSION FRACTURE OF VERTEBRA, INITIAL ENCOUNTER (HCC): Primary | ICD-10-CM

## 2018-02-21 DIAGNOSIS — S32.040G CLOSED COMPRESSION FRACTURE OF L4 LUMBAR VERTEBRA WITH DELAYED HEALING, SUBSEQUENT ENCOUNTER: Primary | ICD-10-CM

## 2018-02-21 PROCEDURE — 99214 OFFICE O/P EST MOD 30 MIN: CPT | Performed by: NEUROLOGICAL SURGERY

## 2018-02-21 RX ORDER — SODIUM CHLORIDE 0.9 % (FLUSH) 0.9 %
1-10 SYRINGE (ML) INJECTION AS NEEDED
Status: CANCELLED | OUTPATIENT
Start: 2018-02-21

## 2018-02-21 RX ORDER — CEFAZOLIN SODIUM 2 G/100ML
2 INJECTION, SOLUTION INTRAVENOUS ONCE
Status: CANCELLED | OUTPATIENT
Start: 2018-02-21 | End: 2018-02-21

## 2018-02-21 RX ORDER — FAMOTIDINE 20 MG/1
20 TABLET, FILM COATED ORAL
Status: CANCELLED | OUTPATIENT
Start: 2018-02-21

## 2018-02-21 NOTE — H&P
Patient: Akshat Winkler  : 1955     Primary Care Provider: Oren Mark MD     Requesting Provider: As above           History     Chief Complaint: Low back and right hip pain.     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  I saw him as an inpatient consultation in December.  He was undergoing adjustment of some of his cardiac medicines and suffered a syncopal or near syncopal episode and fell at home.  He was noted to have a modest upper endplate fracture of L4.  Nonoperative measures were recommended given his extensive medical comorbidities including substantial heart issues and renal insufficiency.  He also has obesity, hypertension, and diabetes.  He was supposed to follow up in 3-4 weeks after discharge.  As of late he has developed increasing pain in his back extending into the right hip.  He's had some right inguinal symptoms.  He is worse with walking, standing, or sitting.  He has no numbness.  He denies bowel or bladder dysfunction.     Review of Systems   Constitutional: Negative for activity change, appetite change, chills, diaphoresis, fatigue, fever and unexpected weight change.   HENT: Negative for congestion, dental problem, drooling, ear discharge, ear pain, facial swelling, hearing loss, mouth sores, nosebleeds, postnasal drip, rhinorrhea, sinus pressure, sneezing, sore throat, tinnitus, trouble swallowing and voice change.    Eyes: Negative for photophobia, pain, discharge, redness, itching and visual disturbance.   Respiratory: Positive for apnea. Negative for cough, choking, chest tightness, shortness of breath, wheezing and stridor.    Cardiovascular: Positive for palpitations and leg swelling. Negative for chest pain.   Gastrointestinal: Negative for abdominal distention, abdominal pain, anal bleeding, blood in stool, constipation, diarrhea, nausea, rectal pain and vomiting.   Endocrine: Negative for cold  intolerance, heat intolerance, polydipsia, polyphagia and polyuria.   Genitourinary: Negative for decreased urine volume, difficulty urinating, dysuria, enuresis, flank pain, frequency, genital sores, hematuria and urgency.   Musculoskeletal: Positive for back pain. Negative for arthralgias, gait problem, joint swelling, myalgias, neck pain and neck stiffness.   Skin: Negative for color change, pallor, rash and wound.   Allergic/Immunologic: Negative for environmental allergies, food allergies and immunocompromised state.   Neurological: Negative for dizziness, tremors, seizures, syncope, facial asymmetry, speech difficulty, weakness, light-headedness, numbness and headaches.   Hematological: Negative for adenopathy. Does not bruise/bleed easily.   Psychiatric/Behavioral: Negative for agitation, behavioral problems, confusion, decreased concentration, dysphoric mood, hallucinations, self-injury, sleep disturbance and suicidal ideas. The patient is not nervous/anxious and is not hyperactive.          The patient's past medical history, past surgical history, family history, and social history have been reviewed at length in the electronic medical record.     Past Medical History:   Diagnosis Date   • Acute diastolic HF (heart failure)    • Acute hypokalemia    • Arrhythmia    • Back injury     Fall on 12/20/17   • Cellulitis of leg    • Chest pain syndrome    • Diabetes mellitus     type 2   • Hyperlipidemia    • Hypertension    • Obesity    • Obstructive sleep apnea     cpap hs   • Paroxysmal a-fib    • Peripheral artery disease    • Spondylosis of 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 or radiculopathy 2/7/2018     Past Surgical History:   Procedure Laterality Date   • CARDIOVERSION      multiple   • OTHER SURGICAL HISTORY  06/29/2015    PVA     Family History   Problem Relation Age of Onset   • Hypertension Mother    • Stroke Mother    • Stroke Father    • Sleep disorder Father    • Cancer Brother      Social History  "    Social History   • Marital status:      Spouse name: N/A   • Number of children: N/A   • Years of education: N/A     Occupational History   • Not on file.     Social History Main Topics   • Smoking status: Never Smoker   • Smokeless tobacco: Never Used   • Alcohol use No   • Drug use: No   • Sexual activity: Defer     Other Topics Concern   • Not on file     Social History Narrative    Patient lives at home with his wife and denies caffeine intake.     Allergies   Allergen Reactions   • Bisoprolol Other (See Comments)     Severe Hypotension   • Flecainide Other (See Comments)     Syncope / collapse   • Metoprolol Other (See Comments)      Bradycardia, Severe Hypotension       Physical Exam:   Temp 98.2 °F (36.8 °C) (Temporal Artery )   Ht 177.8 cm (70\")  Wt 128 kg (282 lb)  BMI 40.46 kg/m2  MUSCULOSKELETAL:  Straight leg raising is negative.  Frederick's Sign is negative.  ROM in back is limited in all directions.  Tenderness in the back to palpation is not observed.  NEUROLOGICAL:  Strength is intact in the lower extremities to direct testing.  Muscle tone is normal throughout.  Station and gait are somewhat lipping.  Sensation is intact to light touch testing throughout.     Medical Decision Making     Data Review:   Plain films of the lumbar spine demonstrate the known L4 planum compression fracture.  Pedicles are visible.  DEXA scan demonstrates osteopenia.     Diagnosis:   L4 compression fracture in the setting of diminished bone density.     Treatment Options:   I've recommended L4 kyphoplasty in an effort to ameliorate his pain.  This may be technically difficult to do given the severe compression of the vertebral body.  One of the risks of the surgery is that we have to abort because I cannot successfully placed the working cannulae.  Other options would include L4 corpectomy with extensive spinal reconstruction.  Obviously given his osteopenia and other medical comorbidities this is not a " feasible option. The nature of the procedure as well as the potential risks, complications, limitations, and alternatives to the procedure were discussed at length with the patient and the patient has agreed to proceed with surgery.  We will need to hold his Eliquis for 2-3 days preceding the intervention.  He should be able to resume that a day or 2 later.          Diagnosis Plan   1. Closed compression fracture of L4 lumbar vertebra with delayed healing, subsequent encounter

## 2018-02-21 NOTE — PROGRESS NOTES
Patient: Akshat Winkler  : 1955    Primary Care Provider: Oren Mark MD    Requesting Provider: As above        History    Chief Complaint: Low back and right hip pain.    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  I saw him as an inpatient consultation in December.  He was undergoing adjustment of some of his cardiac medicines and suffered a syncopal or near syncopal episode and fell at home.  He was noted to have a modest upper endplate fracture of L4.  Nonoperative measures were recommended given his extensive medical comorbidities including substantial heart issues and renal insufficiency.  He also has obesity, hypertension, and diabetes.  He was supposed to follow up in 3-4 weeks after discharge.  As of late he has developed increasing pain in his back extending into the right hip.  He's had some right inguinal symptoms.  He is worse with walking, standing, or sitting.  He has no numbness.  He denies bowel or bladder dysfunction.    Review of Systems   Constitutional: Negative for activity change, appetite change, chills, diaphoresis, fatigue, fever and unexpected weight change.   HENT: Negative for congestion, dental problem, drooling, ear discharge, ear pain, facial swelling, hearing loss, mouth sores, nosebleeds, postnasal drip, rhinorrhea, sinus pressure, sneezing, sore throat, tinnitus, trouble swallowing and voice change.    Eyes: Negative for photophobia, pain, discharge, redness, itching and visual disturbance.   Respiratory: Positive for apnea. Negative for cough, choking, chest tightness, shortness of breath, wheezing and stridor.    Cardiovascular: Positive for palpitations and leg swelling. Negative for chest pain.   Gastrointestinal: Negative for abdominal distention, abdominal pain, anal bleeding, blood in stool, constipation, diarrhea, nausea, rectal pain and vomiting.   Endocrine: Negative for cold  "intolerance, heat intolerance, polydipsia, polyphagia and polyuria.   Genitourinary: Negative for decreased urine volume, difficulty urinating, dysuria, enuresis, flank pain, frequency, genital sores, hematuria and urgency.   Musculoskeletal: Positive for back pain. Negative for arthralgias, gait problem, joint swelling, myalgias, neck pain and neck stiffness.   Skin: Negative for color change, pallor, rash and wound.   Allergic/Immunologic: Negative for environmental allergies, food allergies and immunocompromised state.   Neurological: Negative for dizziness, tremors, seizures, syncope, facial asymmetry, speech difficulty, weakness, light-headedness, numbness and headaches.   Hematological: Negative for adenopathy. Does not bruise/bleed easily.   Psychiatric/Behavioral: Negative for agitation, behavioral problems, confusion, decreased concentration, dysphoric mood, hallucinations, self-injury, sleep disturbance and suicidal ideas. The patient is not nervous/anxious and is not hyperactive.        The patient's past medical history, past surgical history, family history, and social history have been reviewed at length in the electronic medical record.    Physical Exam:   Temp 98.2 °F (36.8 °C) (Temporal Artery )   Ht 177.8 cm (70\")  Wt 128 kg (282 lb)  BMI 40.46 kg/m2  MUSCULOSKELETAL:  Straight leg raising is negative.  Frederick's Sign is negative.  ROM in back is limited in all directions.  Tenderness in the back to palpation is not observed.  NEUROLOGICAL:  Strength is intact in the lower extremities to direct testing.  Muscle tone is normal throughout.  Station and gait are somewhat lipping.  Sensation is intact to light touch testing throughout.    Medical Decision Making    Data Review:   Plain films of the lumbar spine demonstrate the known L4 planum compression fracture.  Pedicles are visible.  DEXA scan demonstrates osteopenia.    Diagnosis:   L4 compression fracture in the setting of diminished bone " density.    Treatment Options:   I've recommended L4 kyphoplasty in an effort to ameliorate his pain.  This may be technically difficult to do given the severe compression of the vertebral body.  One of the risks of the surgery is that we have to abort because I cannot successfully placed the working cannulae.  Other options would include L4 corpectomy with extensive spinal reconstruction.  Obviously given his osteopenia and other medical comorbidities this is not a feasible option. The nature of the procedure as well as the potential risks, complications, limitations, and alternatives to the procedure were discussed at length with the patient and the patient has agreed to proceed with surgery.  We will need to hold his Eliquis for 2-3 days preceding the intervention.  He should be able to resume that a day or 2 later.       Diagnosis Plan   1. Closed compression fracture of L4 lumbar vertebra with delayed healing, subsequent encounter         Scribed for Akshat Davidson MD by Indu Massey CMA on 02/21/2018 at 8:22 AM    I, Dr. Davidson, personally performed the services described in the documentation, as scribed in my presence, and it is both accurate and complete.

## 2018-02-21 NOTE — TELEPHONE ENCOUNTER
Patient needs cardiac clearance for a kyphoplasty of L4 with Dr. Davidson at [facility].  They are planning on doing the procedure on 2/26/18 and would like the patient to hold his Eliquis 2-3 days prior.  He was last seen in the office on 1/23/18.    Please advise.      [facility]  [phone]

## 2018-02-25 ENCOUNTER — APPOINTMENT (OUTPATIENT)
Dept: PREADMISSION TESTING | Facility: HOSPITAL | Age: 63
End: 2018-02-25

## 2018-02-25 VITALS — WEIGHT: 287.04 LBS | HEIGHT: 69 IN | BODY MASS INDEX: 42.51 KG/M2

## 2018-02-25 DIAGNOSIS — M48.50XA PATHOLOGICAL COMPRESSION FRACTURE OF VERTEBRA, INITIAL ENCOUNTER (HCC): ICD-10-CM

## 2018-02-25 LAB
DEPRECATED RDW RBC AUTO: 54.9 FL (ref 37–54)
ERYTHROCYTE [DISTWIDTH] IN BLOOD BY AUTOMATED COUNT: 15 % (ref 11.3–14.5)
HCT VFR BLD AUTO: 41 % (ref 38.9–50.9)
HGB BLD-MCNC: 13.3 G/DL (ref 13.1–17.5)
MCH RBC QN AUTO: 32.4 PG (ref 27–31)
MCHC RBC AUTO-ENTMCNC: 32.4 G/DL (ref 32–36)
MCV RBC AUTO: 100 FL (ref 80–99)
MRSA DNA SPEC QL NAA+PROBE: NEGATIVE
PLATELET # BLD AUTO: 157 10*3/MM3 (ref 150–450)
PMV BLD AUTO: 11.8 FL (ref 6–12)
POTASSIUM BLD-SCNC: 4 MMOL/L (ref 3.5–5.5)
RBC # BLD AUTO: 4.1 10*6/MM3 (ref 4.2–5.76)
WBC NRBC COR # BLD: 8.61 10*3/MM3 (ref 3.5–10.8)

## 2018-02-25 PROCEDURE — 84132 ASSAY OF SERUM POTASSIUM: CPT | Performed by: ANESTHESIOLOGY

## 2018-02-25 PROCEDURE — 85027 COMPLETE CBC AUTOMATED: CPT | Performed by: ANESTHESIOLOGY

## 2018-02-25 PROCEDURE — 87641 MR-STAPH DNA AMP PROBE: CPT | Performed by: NEUROLOGICAL SURGERY

## 2018-02-25 PROCEDURE — 36415 COLL VENOUS BLD VENIPUNCTURE: CPT

## 2018-02-25 PROCEDURE — 83880 ASSAY OF NATRIURETIC PEPTIDE: CPT | Performed by: NURSE PRACTITIONER

## 2018-02-25 RX ORDER — POTASSIUM CHLORIDE 750 MG/1
10 TABLET, FILM COATED, EXTENDED RELEASE ORAL DAILY PRN
COMMUNITY
End: 2018-05-18 | Stop reason: SDUPTHER

## 2018-02-26 ENCOUNTER — HOSPITAL ENCOUNTER (OUTPATIENT)
Facility: HOSPITAL | Age: 63
Discharge: HOME | End: 2018-02-26
Attending: NEUROLOGICAL SURGERY | Admitting: NEUROLOGICAL SURGERY

## 2018-02-26 ENCOUNTER — ANESTHESIA (OUTPATIENT)
Dept: PERIOP | Facility: HOSPITAL | Age: 63
End: 2018-02-26

## 2018-02-26 ENCOUNTER — ANESTHESIA EVENT (OUTPATIENT)
Dept: PERIOP | Facility: HOSPITAL | Age: 63
End: 2018-02-26

## 2018-02-26 ENCOUNTER — OFFICE VISIT (OUTPATIENT)
Dept: CARDIOLOGY | Facility: HOSPITAL | Age: 63
End: 2018-02-26

## 2018-02-26 VITALS
DIASTOLIC BLOOD PRESSURE: 48 MMHG | OXYGEN SATURATION: 93 % | RESPIRATION RATE: 18 BRPM | TEMPERATURE: 98.1 F | SYSTOLIC BLOOD PRESSURE: 105 MMHG | HEART RATE: 71 BPM

## 2018-02-26 VITALS
DIASTOLIC BLOOD PRESSURE: 53 MMHG | HEART RATE: 68 BPM | RESPIRATION RATE: 20 BRPM | TEMPERATURE: 98.3 F | BODY MASS INDEX: 41.37 KG/M2 | HEIGHT: 70 IN | SYSTOLIC BLOOD PRESSURE: 94 MMHG | OXYGEN SATURATION: 97 % | WEIGHT: 289 LBS

## 2018-02-26 DIAGNOSIS — M48.50XA PATHOLOGICAL COMPRESSION FRACTURE OF VERTEBRA, INITIAL ENCOUNTER (HCC): ICD-10-CM

## 2018-02-26 DIAGNOSIS — S32.040D CLOSED COMPRESSION FRACTURE OF L4 LUMBAR VERTEBRA WITH ROUTINE HEALING, SUBSEQUENT ENCOUNTER: ICD-10-CM

## 2018-02-26 DIAGNOSIS — I10 ESSENTIAL HYPERTENSION: ICD-10-CM

## 2018-02-26 DIAGNOSIS — I48.19 PERSISTENT ATRIAL FIBRILLATION (HCC): ICD-10-CM

## 2018-02-26 DIAGNOSIS — I50.33 ACUTE ON CHRONIC DIASTOLIC HEART FAILURE (HCC): Primary | ICD-10-CM

## 2018-02-26 DIAGNOSIS — N28.9 RENAL INSUFFICIENCY: ICD-10-CM

## 2018-02-26 LAB
ANION GAP SERPL CALCULATED.3IONS-SCNC: 13 MMOL/L (ref 3–11)
BNP SERPL-MCNC: 203 PG/ML (ref 0–100)
BUN BLD-MCNC: 77 MG/DL (ref 9–23)
BUN/CREAT SERPL: 12.4 (ref 7–25)
CALCIUM SPEC-SCNC: 8.9 MG/DL (ref 8.7–10.4)
CHLORIDE SERPL-SCNC: 95 MMOL/L (ref 99–109)
CO2 SERPL-SCNC: 25 MMOL/L (ref 20–31)
CREAT BLD-MCNC: 6.2 MG/DL (ref 0.6–1.3)
GFR SERPL CREATININE-BSD FRML MDRD: 9 ML/MIN/1.73
GLUCOSE BLD-MCNC: 117 MG/DL (ref 70–100)
GLUCOSE BLDC GLUCOMTR-MCNC: 102 MG/DL (ref 70–130)
POTASSIUM BLD-SCNC: 3.8 MMOL/L (ref 3.5–5.5)
SODIUM BLD-SCNC: 133 MMOL/L (ref 132–146)

## 2018-02-26 PROCEDURE — 82962 GLUCOSE BLOOD TEST: CPT

## 2018-02-26 PROCEDURE — 83880 ASSAY OF NATRIURETIC PEPTIDE: CPT | Performed by: NURSE PRACTITIONER

## 2018-02-26 PROCEDURE — 80048 BASIC METABOLIC PNL TOTAL CA: CPT | Performed by: NURSE PRACTITIONER

## 2018-02-26 PROCEDURE — 99214 OFFICE O/P EST MOD 30 MIN: CPT | Performed by: NURSE PRACTITIONER

## 2018-02-26 RX ORDER — SODIUM CHLORIDE 0.9 % (FLUSH) 0.9 %
1-10 SYRINGE (ML) INJECTION AS NEEDED
Status: DISCONTINUED | OUTPATIENT
Start: 2018-02-26 | End: 2018-02-26 | Stop reason: HOSPADM

## 2018-02-26 RX ORDER — FAMOTIDINE 20 MG/1
20 TABLET, FILM COATED ORAL ONCE
Status: CANCELLED | OUTPATIENT
Start: 2018-02-26 | End: 2018-02-26

## 2018-02-26 RX ORDER — FAMOTIDINE 20 MG/1
20 TABLET, FILM COATED ORAL ONCE
Status: COMPLETED | OUTPATIENT
Start: 2018-02-26 | End: 2018-02-26

## 2018-02-26 RX ORDER — SODIUM CHLORIDE, SODIUM LACTATE, POTASSIUM CHLORIDE, CALCIUM CHLORIDE 600; 310; 30; 20 MG/100ML; MG/100ML; MG/100ML; MG/100ML
9 INJECTION, SOLUTION INTRAVENOUS CONTINUOUS
Status: DISCONTINUED | OUTPATIENT
Start: 2018-02-26 | End: 2018-02-26 | Stop reason: HOSPADM

## 2018-02-26 RX ORDER — SODIUM CHLORIDE, SODIUM LACTATE, POTASSIUM CHLORIDE, CALCIUM CHLORIDE 600; 310; 30; 20 MG/100ML; MG/100ML; MG/100ML; MG/100ML
9 INJECTION, SOLUTION INTRAVENOUS CONTINUOUS
Status: CANCELLED | OUTPATIENT
Start: 2018-02-26

## 2018-02-26 RX ORDER — SODIUM CHLORIDE 0.9 % (FLUSH) 0.9 %
1-10 SYRINGE (ML) INJECTION AS NEEDED
Status: CANCELLED | OUTPATIENT
Start: 2018-02-26

## 2018-02-26 RX ORDER — FAMOTIDINE 10 MG/ML
20 INJECTION, SOLUTION INTRAVENOUS ONCE
Status: CANCELLED | OUTPATIENT
Start: 2018-02-26 | End: 2018-02-26

## 2018-02-26 RX ORDER — CEFAZOLIN SODIUM 2 G/100ML
2 INJECTION, SOLUTION INTRAVENOUS ONCE
Status: DISCONTINUED | OUTPATIENT
Start: 2018-02-26 | End: 2018-02-26 | Stop reason: HOSPADM

## 2018-02-26 RX ADMIN — FAMOTIDINE 20 MG: 20 TABLET, FILM COATED ORAL at 09:50

## 2018-02-26 RX ADMIN — SODIUM CHLORIDE, POTASSIUM CHLORIDE, SODIUM LACTATE AND CALCIUM CHLORIDE 9 ML/HR: 600; 310; 30; 20 INJECTION, SOLUTION INTRAVENOUS at 09:51

## 2018-02-26 NOTE — PROGRESS NOTES
Encounter Date:02/26/2018      Patient ID: Akshat Winkler is a 63 y.o. male.        Subjective:     Chief Complaint: Follow-up   [facility]  History of Present Illness patient presents to the office today at the request of Pre-op for ongoing evaluation of his fluid overload. Patient was scheduled to have kyphoplasty today with Dr Flores but surgery was rescheduled due to his fluid overload. He notes that he has been holding on to more fluid over the last week. He notes worsening fatigue, dyspnea on exertion, abdominal fullness and pedal edema. He notes intermittent lightheadedness with sudden position changes. He denies chest pain. He notes compliance with his medications. He ambulates with a walker due to back pain.     Patient Active Problem List   Diagnosis   • Chronic diastolic heart failure   • Type 2 diabetes mellitus without complication, without long-term current use of insulin   • Hyperlipidemia LDL goal <70   • Essential hypertension   • Coronary artery disease involving native coronary artery of native heart without angina pectoris   • Severe obstructive sleep apnea   • Class 3 obesity in adult   • Persistent atrial fibrillation/flutter   • Compression fracture of lumbar vertebra   • Spondylosis of 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 or radiculopathy   • Lumbar stenosis with neurogenic claudication   • Lumbar disc herniation   • Myofascial pain   • Pathologic compression fracture of vertebra       Past Surgical History:   Procedure Laterality Date   • CARDIAC CATHETERIZATION     • CARDIOVERSION      multiple   • COLONOSCOPY     • OTHER SURGICAL HISTORY  06/29/2015    PVA       Allergies   Allergen Reactions   • Bisoprolol Other (See Comments)     Severe Hypotension   • Flecainide Other (See Comments)     Syncope / collapse   • Metoprolol Other (See Comments)      Bradycardia, Severe Hypotension         Current Outpatient Prescriptions:   •  acetaminophen (TYLENOL) 500 MG tablet, Take 500 mg by mouth As Needed for  Mild Pain ., Disp: , Rfl:   •  apixaban (ELIQUIS) 5 MG tablet tablet, Take 1 tablet by mouth 2 (Two) Times a Day for 360 days. (Patient taking differently: Take 5 mg by mouth 2 (Two) Times a Day. Last dose 2-23-18 per orders), Disp: 180 tablet, Rfl: 3  •  aspirin 81 MG EC tablet, Take 81 mg by mouth Daily., Disp: , Rfl:   •  bumetanide (BUMEX) 2 MG tablet, Take 2 mg by mouth Daily., Disp: , Rfl:   •  gabapentin (NEURONTIN) 100 MG capsule, Start 1 tab QHS for 3 days, then, BID for 3 days, then, TID for 3 days, then, cont. QID (Patient taking differently: 100 mg 4 (Four) Times a Day. Start 1 tab QHS for 3 days, then, BID for 3 days, then, TID for 3 days, then, cont. QID ), Disp: 120 capsule, Rfl: 1  •  HYDROcodone-acetaminophen (NORCO) 5-325 MG per tablet, Take 1 tablet by mouth Every 6 (Six) Hours As Needed for Moderate Pain ., Disp: 100 tablet, Rfl: 0  •  lisinopril (PRINIVIL,ZESTRIL) 2.5 MG tablet, Take 2.5 mg by mouth Every Night., Disp: , Rfl:   •  potassium chloride (K-DUR) 10 MEQ CR tablet, Take 10 mEq by mouth As Needed (hypokalemia)., Disp: , Rfl:   •  rosuvastatin (CRESTOR) 20 MG tablet, Take 1 tablet by mouth Daily for 360 days., Disp: 90 tablet, Rfl: 3  •  spironolactone (ALDACTONE) 25 MG tablet, Take 1 tablet by mouth Daily., Disp: 90 tablet, Rfl: 3  •  tiZANidine (ZANAFLEX) 2 MG tablet, Take 1 tablet by mouth Every 8 (Eight) Hours As Needed for Muscle Spasms. 1/2 tab QHS, then 1/2 to 1 tab TID PRN muscle spasm, Disp: 90 tablet, Rfl: 0  No current facility-administered medications for this visit.     The following portions of the chart were reviewed and updated as appropriate: Allergies, current medications, past family history, social history, past medical history.     Review of Systems   Constitution: Positive for weakness and malaise/fatigue. Negative for chills, decreased appetite, diaphoresis, fever, night sweats, weight gain and weight loss.   HENT: Negative for congestion, hearing loss, hoarse  "voice and nosebleeds.    Eyes: Negative for blurred vision, visual disturbance and visual halos.   Cardiovascular: Positive for dyspnea on exertion, irregular heartbeat, leg swelling, near-syncope and orthopnea. Negative for chest pain, claudication, cyanosis, palpitations, paroxysmal nocturnal dyspnea and syncope.   Respiratory: Positive for snoring. Negative for cough, hemoptysis, shortness of breath, sleep disturbances due to breathing, sputum production and wheezing.    Endocrine: Positive for polydipsia.   Hematologic/Lymphatic: Negative for bleeding problem. Does not bruise/bleed easily.   Skin: Negative for dry skin, itching and rash.   Musculoskeletal: Positive for back pain, falls and muscle weakness. Negative for arthritis, joint pain, joint swelling and myalgias.   Gastrointestinal: Positive for bloating and constipation. Negative for abdominal pain, diarrhea, flatus, heartburn, hematemesis, hematochezia, melena, nausea and vomiting.   Genitourinary: Positive for frequency. Negative for dysuria, hematuria, nocturia and urgency.   Neurological: Positive for excessive daytime sleepiness, light-headedness, loss of balance and tremors. Negative for dizziness and headaches.   Psychiatric/Behavioral: Positive for altered mental status. Negative for depression. The patient has insomnia. The patient is not nervous/anxious.            Objective:     Vitals:    02/26/18 1339 02/26/18 1347   BP: 95/50 94/53   BP Location: Right arm Left arm   Patient Position: Sitting Sitting   Pulse: 69 68   Resp: 20    Temp: 98.3 °F (36.8 °C)    TempSrc: Temporal Artery     SpO2: 97%    Weight: 131 kg (289 lb)    Height: 177.8 cm (70\")          Physical Exam   Constitutional: He is oriented to person, place, and time. He appears well-developed and well-nourished. He is active and cooperative. No distress.   HENT:   Head: Normocephalic and atraumatic.   Mouth/Throat: Oropharynx is clear and moist.   Eyes: Conjunctivae and EOM are " normal. Pupils are equal, round, and reactive to light.   Neck: Normal range of motion. Neck supple. No JVD present. No tracheal deviation present. No thyromegaly present.   Cardiovascular: Normal rate, regular rhythm, normal heart sounds and intact distal pulses.    Pulmonary/Chest: Effort normal. He has rales.   Abdominal: Soft. Bowel sounds are normal. He exhibits distension. There is no tenderness.   Musculoskeletal: Normal range of motion. He exhibits edema (3+ pitting edema noted bilateral LEs).   Neurological: He is alert and oriented to person, place, and time.   Skin: Skin is warm, dry and intact.   Psychiatric: He has a normal mood and affect. His behavior is normal.   Nursing note and vitals reviewed.      Lab and Diagnostic Review:      Lab Results   Component Value Date    GLUCOSE 103 (H) 01/09/2018    CALCIUM 9.4 02/08/2018     02/08/2018    K 4.0 02/25/2018    CO2 31.0 02/08/2018    CL 98 (L) 02/08/2018    BUN 16 02/08/2018    CREATININE 1.00 02/08/2018    EGFRIFAFRI 92 02/08/2018    EGFRIFNONA 76 02/08/2018    BCR 16.0 02/08/2018    ANIONGAP 6.0 01/09/2018     Lab Results   Component Value Date    WBC 8.61 02/25/2018    HGB 13.3 02/25/2018    HCT 41.0 02/25/2018    .0 (H) 02/25/2018     02/25/2018     BMP pending from today    Assessment and Plan:         1. Acute on chronic diastolic heart failure  IV diuresis today in office. Patient received 2 mg bumex (NDC 4978-7204-82)  today through a # 18 in the left hand over slow IV push. During IV diuresis, vitals were monitored. Patient fell asleep while being monitored and became hypotensive with bp reading of 73/46. Patient placed in TBurg and pressure was rechecked. Recheck BP was 90/46. BLood pressure continued to run low and NS was initiated. Patient received 500 ml of NS in the office. Bp at discharge was 109/59.  Please see IV diuresis record for those vitals. Patient voided 100 ml in the office prior to discharge from the  office. Urine was very dark and Provider became concerned. BMP was then drawn to compare to previous bmp from 2/8/18.  IV was d/c'd and area was free of erythema, ecchymosis, or drainage.  Patient was  instructed to record urinary output for the next 24 hours.   2. Essential hypertension  Patient's blood pressure dropped while asleep in the 70/40s. NS bolus given over   Hold lisinopril tonight  HTN Education provided today including signs and symptoms, medication management, daily blood pressure monitoring. Patient encouraged to call the [facility] with any abnormal readings.     3. Persistent atrial fibrillation/flutter  CHADS-VASc Risk Assessment            3       Total Score        1 Hypertension    1 DM    1 Vascular Disease        Criteria that do not apply:    CHF    Age >/= 75    PRIOR STROKE/TIA/THROMBO    Age 65-74    Sex: Female        Currently holding eliquis due to upcoming surgery  4. Closed compression fracture of L4 lumbar vertebra with routine healing, subsequent encounter  Plan for kyphoplasty with Dr Flores     It has been a pleasure to participate in the care of this patient.  Patient was instructed to call the [facility] with any questions, concerns, or worsening symptoms.  * Please note that portions of this note were completed with a voice recognition program. Efforts were made to edit the dictation but occasionally words are transcribed.

## 2018-02-27 ENCOUNTER — HOSPITAL ENCOUNTER (INPATIENT)
Facility: HOSPITAL | Age: 63
LOS: 7 days | Discharge: HOME | End: 2018-03-06
Attending: EMERGENCY MEDICINE | Admitting: INTERNAL MEDICINE

## 2018-02-27 ENCOUNTER — APPOINTMENT (OUTPATIENT)
Dept: ULTRASOUND IMAGING | Facility: HOSPITAL | Age: 63
End: 2018-02-27

## 2018-02-27 ENCOUNTER — APPOINTMENT (OUTPATIENT)
Dept: GENERAL RADIOLOGY | Facility: HOSPITAL | Age: 63
End: 2018-02-27

## 2018-02-27 ENCOUNTER — DOCUMENTATION (OUTPATIENT)
Dept: CARDIOLOGY | Facility: HOSPITAL | Age: 63
End: 2018-02-27

## 2018-02-27 ENCOUNTER — APPOINTMENT (OUTPATIENT)
Dept: CARDIOLOGY | Facility: HOSPITAL | Age: 63
End: 2018-02-27
Attending: HOSPITALIST

## 2018-02-27 ENCOUNTER — APPOINTMENT (OUTPATIENT)
Dept: CARDIOLOGY | Facility: HOSPITAL | Age: 63
End: 2018-02-27

## 2018-02-27 ENCOUNTER — APPOINTMENT (OUTPATIENT)
Dept: BONE DENSITY | Facility: HOSPITAL | Age: 63
End: 2018-02-27
Attending: NEUROLOGICAL SURGERY

## 2018-02-27 DIAGNOSIS — N17.9 ACUTE RENAL FAILURE, UNSPECIFIED ACUTE RENAL FAILURE TYPE (HCC): ICD-10-CM

## 2018-02-27 DIAGNOSIS — M48.50XA PATHOLOGICAL COMPRESSION FRACTURE OF VERTEBRA, INITIAL ENCOUNTER (HCC): ICD-10-CM

## 2018-02-27 DIAGNOSIS — I50.9 ACUTE CONGESTIVE HEART FAILURE, UNSPECIFIED CONGESTIVE HEART FAILURE TYPE: Primary | ICD-10-CM

## 2018-02-27 DIAGNOSIS — Z74.09 IMPAIRED FUNCTIONAL MOBILITY, BALANCE, GAIT, AND ENDURANCE: ICD-10-CM

## 2018-02-27 LAB
ALBUMIN SERPL-MCNC: 4 G/DL (ref 3.2–4.8)
ALBUMIN/GLOB SERPL: 1 G/DL (ref 1.5–2.5)
ALP SERPL-CCNC: 230 U/L (ref 25–100)
ALT SERPL W P-5'-P-CCNC: 28 U/L (ref 7–40)
ANION GAP SERPL CALCULATED.3IONS-SCNC: 15 MMOL/L (ref 3–11)
AST SERPL-CCNC: 44 U/L (ref 0–33)
BACTERIA UR QL AUTO: ABNORMAL /HPF
BASOPHILS # BLD AUTO: 0.05 10*3/MM3 (ref 0–0.2)
BASOPHILS NFR BLD AUTO: 0.6 % (ref 0–1)
BILIRUB SERPL-MCNC: 2.2 MG/DL (ref 0.3–1.2)
BILIRUB UR QL STRIP: ABNORMAL
BNP SERPL-MCNC: 256 PG/ML (ref 0–100)
BUN BLD-MCNC: 81 MG/DL (ref 9–23)
BUN/CREAT SERPL: 11.4 (ref 7–25)
CALCIUM SPEC-SCNC: 9.6 MG/DL (ref 8.7–10.4)
CHLORIDE SERPL-SCNC: 96 MMOL/L (ref 99–109)
CLARITY UR: ABNORMAL
CO2 SERPL-SCNC: 24 MMOL/L (ref 20–31)
COLOR UR: ABNORMAL
CREAT BLD-MCNC: 7.1 MG/DL (ref 0.6–1.3)
DEPRECATED RDW RBC AUTO: 55.9 FL (ref 37–54)
EOSINOPHIL # BLD AUTO: 0.21 10*3/MM3 (ref 0–0.3)
EOSINOPHIL NFR BLD AUTO: 2.6 % (ref 0–3)
ERYTHROCYTE [DISTWIDTH] IN BLOOD BY AUTOMATED COUNT: 15.3 % (ref 11.3–14.5)
GFR SERPL CREATININE-BSD FRML MDRD: 8 ML/MIN/1.73
GLOBULIN UR ELPH-MCNC: 4.1 GM/DL
GLUCOSE BLD-MCNC: 115 MG/DL (ref 70–100)
GLUCOSE UR STRIP-MCNC: NEGATIVE MG/DL
GRAN CASTS URNS QL MICRO: ABNORMAL /LPF
HCT VFR BLD AUTO: 42.4 % (ref 38.9–50.9)
HGB BLD-MCNC: 13.8 G/DL (ref 13.1–17.5)
HGB UR QL STRIP.AUTO: ABNORMAL
HOLD SPECIMEN: NORMAL
HOLD SPECIMEN: NORMAL
HYALINE CASTS UR QL AUTO: ABNORMAL /LPF
IMM GRANULOCYTES # BLD: 0.01 10*3/MM3 (ref 0–0.03)
IMM GRANULOCYTES NFR BLD: 0.1 % (ref 0–0.6)
INR PPP: 1.2 (ref 0.91–1.09)
KETONES UR QL STRIP: NEGATIVE
LEUKOCYTE ESTERASE UR QL STRIP.AUTO: ABNORMAL
LYMPHOCYTES # BLD AUTO: 0.46 10*3/MM3 (ref 0.6–4.8)
LYMPHOCYTES NFR BLD AUTO: 5.8 % (ref 24–44)
MAGNESIUM SERPL-MCNC: 2.2 MG/DL (ref 1.3–2.7)
MCH RBC QN AUTO: 32.5 PG (ref 27–31)
MCHC RBC AUTO-ENTMCNC: 32.5 G/DL (ref 32–36)
MCV RBC AUTO: 100 FL (ref 80–99)
MONOCYTES # BLD AUTO: 0.85 10*3/MM3 (ref 0–1)
MONOCYTES NFR BLD AUTO: 10.7 % (ref 0–12)
NEUTROPHILS # BLD AUTO: 6.39 10*3/MM3 (ref 1.5–8.3)
NEUTROPHILS NFR BLD AUTO: 80.2 % (ref 41–71)
NITRITE UR QL STRIP: NEGATIVE
PH UR STRIP.AUTO: <=5 [PH] (ref 5–8)
PLATELET # BLD AUTO: 175 10*3/MM3 (ref 150–450)
PMV BLD AUTO: 12.2 FL (ref 6–12)
POTASSIUM BLD-SCNC: 4.4 MMOL/L (ref 3.5–5.5)
PROT SERPL-MCNC: 8.1 G/DL (ref 5.7–8.2)
PROT UR QL STRIP: ABNORMAL
PROTHROMBIN TIME: 12.6 SECONDS (ref 9.6–11.5)
RBC # BLD AUTO: 4.24 10*6/MM3 (ref 4.2–5.76)
RBC # UR: ABNORMAL /HPF
REF LAB TEST METHOD: ABNORMAL
SODIUM BLD-SCNC: 135 MMOL/L (ref 132–146)
SP GR UR STRIP: 1.02 (ref 1–1.03)
SQUAMOUS #/AREA URNS HPF: ABNORMAL /HPF
TROPONIN I SERPL-MCNC: 0.02 NG/ML
UROBILINOGEN UR QL STRIP: ABNORMAL
WBC NRBC COR # BLD: 7.97 10*3/MM3 (ref 3.5–10.8)
WBC UR QL AUTO: ABNORMAL /HPF
WHOLE BLOOD HOLD SPECIMEN: NORMAL
WHOLE BLOOD HOLD SPECIMEN: NORMAL

## 2018-02-27 PROCEDURE — 25010000002 HYDROMORPHONE PER 4 MG: Performed by: EMERGENCY MEDICINE

## 2018-02-27 PROCEDURE — 99253 IP/OBS CNSLTJ NEW/EST LOW 45: CPT | Performed by: PHYSICIAN ASSISTANT

## 2018-02-27 PROCEDURE — 83880 ASSAY OF NATRIURETIC PEPTIDE: CPT | Performed by: PHYSICIAN ASSISTANT

## 2018-02-27 PROCEDURE — 99284 EMERGENCY DEPT VISIT MOD MDM: CPT

## 2018-02-27 PROCEDURE — 93005 ELECTROCARDIOGRAM TRACING: CPT | Performed by: PHYSICIAN ASSISTANT

## 2018-02-27 PROCEDURE — 99223 1ST HOSP IP/OBS HIGH 75: CPT | Performed by: HOSPITALIST

## 2018-02-27 PROCEDURE — 80053 COMPREHEN METABOLIC PANEL: CPT | Performed by: PHYSICIAN ASSISTANT

## 2018-02-27 PROCEDURE — 93306 TTE W/DOPPLER COMPLETE: CPT | Performed by: INTERNAL MEDICINE

## 2018-02-27 PROCEDURE — 93306 TTE W/DOPPLER COMPLETE: CPT

## 2018-02-27 PROCEDURE — 71045 X-RAY EXAM CHEST 1 VIEW: CPT

## 2018-02-27 PROCEDURE — 85025 COMPLETE CBC W/AUTO DIFF WBC: CPT | Performed by: PHYSICIAN ASSISTANT

## 2018-02-27 PROCEDURE — 25010000002 SULFUR HEXAFLUORIDE MICROSPH 60.7-25 MG RECONSTITUTED SUSPENSION: Performed by: HOSPITALIST

## 2018-02-27 PROCEDURE — 76775 US EXAM ABDO BACK WALL LIM: CPT

## 2018-02-27 PROCEDURE — 81001 URINALYSIS AUTO W/SCOPE: CPT | Performed by: PHYSICIAN ASSISTANT

## 2018-02-27 PROCEDURE — 84484 ASSAY OF TROPONIN QUANT: CPT | Performed by: EMERGENCY MEDICINE

## 2018-02-27 PROCEDURE — 85610 PROTHROMBIN TIME: CPT | Performed by: PHYSICIAN ASSISTANT

## 2018-02-27 PROCEDURE — 83735 ASSAY OF MAGNESIUM: CPT | Performed by: PHYSICIAN ASSISTANT

## 2018-02-27 RX ORDER — GABAPENTIN 100 MG/1
100 CAPSULE ORAL 3 TIMES DAILY
Status: DISCONTINUED | OUTPATIENT
Start: 2018-02-27 | End: 2018-03-06 | Stop reason: HOSPADM

## 2018-02-27 RX ORDER — SODIUM CHLORIDE 9 MG/ML
125 INJECTION, SOLUTION INTRAVENOUS CONTINUOUS
Status: DISCONTINUED | OUTPATIENT
Start: 2018-02-27 | End: 2018-02-27

## 2018-02-27 RX ORDER — ACETAMINOPHEN 500 MG
500 TABLET ORAL AS NEEDED
Status: DISCONTINUED | OUTPATIENT
Start: 2018-02-27 | End: 2018-03-06 | Stop reason: HOSPADM

## 2018-02-27 RX ORDER — SODIUM CHLORIDE 9 MG/ML
100 INJECTION, SOLUTION INTRAVENOUS CONTINUOUS
Status: DISCONTINUED | OUTPATIENT
Start: 2018-02-27 | End: 2018-03-02

## 2018-02-27 RX ORDER — GABAPENTIN 100 MG/1
100 CAPSULE ORAL 3 TIMES DAILY
COMMUNITY
End: 2018-04-11 | Stop reason: SDUPTHER

## 2018-02-27 RX ORDER — BISACODYL 10 MG
10 SUPPOSITORY, RECTAL RECTAL DAILY PRN
Status: DISCONTINUED | OUTPATIENT
Start: 2018-02-27 | End: 2018-03-06 | Stop reason: HOSPADM

## 2018-02-27 RX ORDER — TIZANIDINE 4 MG/1
2 TABLET ORAL EVERY 8 HOURS PRN
Status: DISCONTINUED | OUTPATIENT
Start: 2018-02-27 | End: 2018-02-27

## 2018-02-27 RX ORDER — SODIUM CHLORIDE 0.9 % (FLUSH) 0.9 %
10 SYRINGE (ML) INJECTION AS NEEDED
Status: DISCONTINUED | OUTPATIENT
Start: 2018-02-27 | End: 2018-03-06 | Stop reason: HOSPADM

## 2018-02-27 RX ORDER — HYDROMORPHONE HYDROCHLORIDE 1 MG/ML
1 INJECTION, SOLUTION INTRAMUSCULAR; INTRAVENOUS; SUBCUTANEOUS ONCE
Status: COMPLETED | OUTPATIENT
Start: 2018-02-27 | End: 2018-02-27

## 2018-02-27 RX ORDER — METHOCARBAMOL 500 MG/1
500 TABLET, FILM COATED ORAL EVERY 8 HOURS SCHEDULED
Status: DISCONTINUED | OUTPATIENT
Start: 2018-02-27 | End: 2018-02-28

## 2018-02-27 RX ORDER — SODIUM CHLORIDE 0.9 % (FLUSH) 0.9 %
1-10 SYRINGE (ML) INJECTION AS NEEDED
Status: DISCONTINUED | OUTPATIENT
Start: 2018-02-27 | End: 2018-03-06 | Stop reason: HOSPADM

## 2018-02-27 RX ORDER — MORPHINE SULFATE 2 MG/ML
1 INJECTION, SOLUTION INTRAMUSCULAR; INTRAVENOUS EVERY 8 HOURS PRN
Status: DISCONTINUED | OUTPATIENT
Start: 2018-02-27 | End: 2018-03-06 | Stop reason: HOSPADM

## 2018-02-27 RX ORDER — ASPIRIN 81 MG/1
81 TABLET ORAL DAILY
Status: DISCONTINUED | OUTPATIENT
Start: 2018-02-27 | End: 2018-03-06 | Stop reason: HOSPADM

## 2018-02-27 RX ORDER — HYDROCODONE BITARTRATE AND ACETAMINOPHEN 5; 325 MG/1; MG/1
1 TABLET ORAL EVERY 12 HOURS PRN
Status: COMPLETED | OUTPATIENT
Start: 2018-02-27 | End: 2018-03-02

## 2018-02-27 RX ORDER — TIZANIDINE 2 MG/1
2 TABLET ORAL EVERY 8 HOURS PRN
COMMUNITY
End: 2018-05-08 | Stop reason: ALTCHOICE

## 2018-02-27 RX ORDER — MORPHINE SULFATE 2 MG/ML
2 INJECTION, SOLUTION INTRAMUSCULAR; INTRAVENOUS EVERY 4 HOURS PRN
Status: DISCONTINUED | OUTPATIENT
Start: 2018-02-27 | End: 2018-02-27

## 2018-02-27 RX ORDER — HYDROCODONE BITARTRATE AND ACETAMINOPHEN 5; 325 MG/1; MG/1
1 TABLET ORAL EVERY 6 HOURS PRN
Status: DISCONTINUED | OUTPATIENT
Start: 2018-02-27 | End: 2018-02-27

## 2018-02-27 RX ORDER — DOCUSATE SODIUM 100 MG/1
100 CAPSULE, LIQUID FILLED ORAL 2 TIMES DAILY
Status: DISCONTINUED | OUTPATIENT
Start: 2018-02-27 | End: 2018-03-06 | Stop reason: HOSPADM

## 2018-02-27 RX ORDER — GABAPENTIN 100 MG/1
100 CAPSULE ORAL EVERY 12 HOURS SCHEDULED
Status: DISCONTINUED | OUTPATIENT
Start: 2018-02-27 | End: 2018-03-05 | Stop reason: SDUPTHER

## 2018-02-27 RX ORDER — BISACODYL 5 MG/1
5 TABLET, DELAYED RELEASE ORAL DAILY PRN
Status: DISCONTINUED | OUTPATIENT
Start: 2018-02-27 | End: 2018-03-06 | Stop reason: HOSPADM

## 2018-02-27 RX ORDER — ROSUVASTATIN CALCIUM 20 MG/1
20 TABLET, COATED ORAL DAILY
Status: DISCONTINUED | OUTPATIENT
Start: 2018-02-27 | End: 2018-03-06 | Stop reason: HOSPADM

## 2018-02-27 RX ADMIN — ASPIRIN 81 MG: 81 TABLET, COATED ORAL at 16:37

## 2018-02-27 RX ADMIN — GABAPENTIN 100 MG: 100 CAPSULE ORAL at 21:27

## 2018-02-27 RX ADMIN — ROSUVASTATIN CALCIUM 20 MG: 20 TABLET, FILM COATED ORAL at 16:37

## 2018-02-27 RX ADMIN — SODIUM CHLORIDE: 9 INJECTION, SOLUTION INTRAVENOUS at 09:40

## 2018-02-27 RX ADMIN — SODIUM CHLORIDE 100 ML/HR: 9 INJECTION, SOLUTION INTRAVENOUS at 16:38

## 2018-02-27 RX ADMIN — GABAPENTIN 100 MG: 100 CAPSULE ORAL at 16:37

## 2018-02-27 RX ADMIN — DOCUSATE SODIUM 100 MG: 100 CAPSULE, LIQUID FILLED ORAL at 21:26

## 2018-02-27 RX ADMIN — SODIUM CHLORIDE 500 ML: 9 INJECTION, SOLUTION INTRAVENOUS at 22:30

## 2018-02-27 RX ADMIN — SULFUR HEXAFLUORIDE 4 ML: KIT at 18:00

## 2018-02-27 RX ADMIN — HYDROMORPHONE HYDROCHLORIDE 1 MG: 10 INJECTION INTRAMUSCULAR; INTRAVENOUS; SUBCUTANEOUS at 09:59

## 2018-02-27 NOTE — PROGRESS NOTES
Labs resulted after patient had left the office showing a creatinine of 6.2 after NS bolus given in office. Patient called at 6:30 pm 2/26/18 and instructed  to hold lisinopril, bumex and aldactone. Patient notes that he is feeling fine and he is drinking plenty of fluids. Patient would like to repeat labs in the morning and re-evaluate.  Patient presented to  Center today for repeat labs. Patient notes that he did void last night twice. Pedal edema improved. Patient still notes abdominal fullness.   Patient reluctant to go to ED but patient taken to ED for admission and ongoing evaluation of his acute renal failure in wheelchair by staff member.   After reviewing patient's history patient went into acute renal failure after a syncopal spell. Patient's creatinine increased to 5.4 and he was followed by Nephrology during hospital stay. MARKO resolved and creatinine was 1.0 (his baseline) 2/8/18.

## 2018-02-28 PROBLEM — I50.9 ACUTE CONGESTIVE HEART FAILURE: Status: RESOLVED | Noted: 2018-02-27 | Resolved: 2018-02-28

## 2018-02-28 LAB
ALBUMIN SERPL-MCNC: 3.5 G/DL (ref 3.2–4.8)
ANION GAP SERPL CALCULATED.3IONS-SCNC: 14 MMOL/L (ref 3–11)
BH CV ECHO MEAS - BSA(HAYCOCK): 2.6 M^2
BH CV ECHO MEAS - BSA: 2.4 M^2
BH CV ECHO MEAS - BZI_BMI: 41.6 KILOGRAMS/M^2
BH CV ECHO MEAS - BZI_METRIC_HEIGHT: 177.8 CM
BH CV ECHO MEAS - BZI_METRIC_WEIGHT: 131.5 KG
BH CV ECHO MEAS - EDV(CUBED): 65.6 ML
BH CV ECHO MEAS - EDV(TEICH): 71.3 ML
BH CV ECHO MEAS - EF(CUBED): 71.8 %
BH CV ECHO MEAS - EF(TEICH): 64 %
BH CV ECHO MEAS - ESV(CUBED): 18.5 ML
BH CV ECHO MEAS - ESV(TEICH): 25.7 ML
BH CV ECHO MEAS - FS: 34.4 %
BH CV ECHO MEAS - LVIDD: 4 CM
BH CV ECHO MEAS - LVIDS: 2.6 CM
BH CV ECHO MEAS - MV E MAX VEL: 95.8 CM/SEC
BH CV ECHO MEAS - PA ACC SLOPE: 604.2 CM/SEC^2
BH CV ECHO MEAS - PA ACC TIME: 0.11 SEC
BH CV ECHO MEAS - PA PR(ACCEL): 27.9 MMHG
BH CV ECHO MEAS - RAP SYSTOLE: 15 MMHG
BH CV ECHO MEAS - RVSP: 49 MMHG
BH CV ECHO MEAS - SI(CUBED): 19.2 ML/M^2
BH CV ECHO MEAS - SI(TEICH): 18.7 ML/M^2
BH CV ECHO MEAS - SV(CUBED): 47.1 ML
BH CV ECHO MEAS - SV(TEICH): 45.7 ML
BH CV ECHO MEAS - TAPSE (>1.6): 1.1 CM2
BH CV ECHO MEAS - TR MAX VEL: 291.8 CM/SEC
BH CV VAS BP LEFT ARM: NORMAL MMHG
BH CV XLRA - RV BASE: 4.5 CM
BH CV XLRA - RV LENGTH: 8.7 CM
BH CV XLRA - RV MID: 3.4 CM
BUN BLD-MCNC: 83 MG/DL (ref 9–23)
BUN/CREAT SERPL: 12.8 (ref 7–25)
CALCIUM SPEC-SCNC: 8.5 MG/DL (ref 8.7–10.4)
CHLORIDE SERPL-SCNC: 97 MMOL/L (ref 99–109)
CK SERPL-CCNC: 269 U/L (ref 26–174)
CO2 SERPL-SCNC: 24 MMOL/L (ref 20–31)
CREAT BLD-MCNC: 6.5 MG/DL (ref 0.6–1.3)
CREAT UR-MCNC: 55 MG/DL
DEPRECATED RDW RBC AUTO: 55.6 FL (ref 37–54)
EOSINOPHIL SPEC QL MICRO: 0 % EOS/100 CELLS (ref 0–0)
ERYTHROCYTE [DISTWIDTH] IN BLOOD BY AUTOMATED COUNT: 15.3 % (ref 11.3–14.5)
GFR SERPL CREATININE-BSD FRML MDRD: 9 ML/MIN/1.73
GLUCOSE BLD-MCNC: 106 MG/DL (ref 70–100)
HCT VFR BLD AUTO: 39.1 % (ref 38.9–50.9)
HGB BLD-MCNC: 12.7 G/DL (ref 13.1–17.5)
LV EF 2D ECHO EST: 60 %
MCH RBC QN AUTO: 32.4 PG (ref 27–31)
MCHC RBC AUTO-ENTMCNC: 32.5 G/DL (ref 32–36)
MCV RBC AUTO: 99.7 FL (ref 80–99)
PHOSPHATE SERPL-MCNC: 7.3 MG/DL (ref 2.4–5.1)
PLATELET # BLD AUTO: 162 10*3/MM3 (ref 150–450)
PMV BLD AUTO: 11.6 FL (ref 6–12)
POTASSIUM BLD-SCNC: 3.7 MMOL/L (ref 3.5–5.5)
PROT UR-MCNC: 84 MG/DL (ref 1–14)
RBC # BLD AUTO: 3.92 10*6/MM3 (ref 4.2–5.76)
SODIUM BLD-SCNC: 135 MMOL/L (ref 132–146)
SODIUM UR-SCNC: 48 MMOL/L (ref 30–90)
URATE SERPL-MCNC: 13.7 MG/DL (ref 3.7–9.2)
WBC NRBC COR # BLD: 5.35 10*3/MM3 (ref 3.5–10.8)

## 2018-02-28 PROCEDURE — 99233 SBSQ HOSP IP/OBS HIGH 50: CPT | Performed by: INTERNAL MEDICINE

## 2018-02-28 PROCEDURE — 80069 RENAL FUNCTION PANEL: CPT | Performed by: INTERNAL MEDICINE

## 2018-02-28 PROCEDURE — 84550 ASSAY OF BLOOD/URIC ACID: CPT | Performed by: INTERNAL MEDICINE

## 2018-02-28 PROCEDURE — 84156 ASSAY OF PROTEIN URINE: CPT | Performed by: INTERNAL MEDICINE

## 2018-02-28 PROCEDURE — 82550 ASSAY OF CK (CPK): CPT | Performed by: INTERNAL MEDICINE

## 2018-02-28 PROCEDURE — 82570 ASSAY OF URINE CREATININE: CPT | Performed by: INTERNAL MEDICINE

## 2018-02-28 PROCEDURE — 87186 SC STD MICRODIL/AGAR DIL: CPT | Performed by: INTERNAL MEDICINE

## 2018-02-28 PROCEDURE — 87086 URINE CULTURE/COLONY COUNT: CPT | Performed by: INTERNAL MEDICINE

## 2018-02-28 PROCEDURE — 85027 COMPLETE CBC AUTOMATED: CPT | Performed by: NURSE PRACTITIONER

## 2018-02-28 PROCEDURE — 97162 PT EVAL MOD COMPLEX 30 MIN: CPT

## 2018-02-28 PROCEDURE — 84300 ASSAY OF URINE SODIUM: CPT | Performed by: INTERNAL MEDICINE

## 2018-02-28 PROCEDURE — 25010000002 HEPARIN (PORCINE) PER 1000 UNITS: Performed by: INTERNAL MEDICINE

## 2018-02-28 PROCEDURE — 94660 CPAP INITIATION&MGMT: CPT

## 2018-02-28 PROCEDURE — 94799 UNLISTED PULMONARY SVC/PX: CPT

## 2018-02-28 PROCEDURE — 87077 CULTURE AEROBIC IDENTIFY: CPT | Performed by: INTERNAL MEDICINE

## 2018-02-28 PROCEDURE — 87205 SMEAR GRAM STAIN: CPT | Performed by: INTERNAL MEDICINE

## 2018-02-28 RX ORDER — HEPARIN SODIUM 5000 [USP'U]/ML
5000 INJECTION, SOLUTION INTRAVENOUS; SUBCUTANEOUS EVERY 8 HOURS SCHEDULED
Status: DISCONTINUED | OUTPATIENT
Start: 2018-02-28 | End: 2018-03-06 | Stop reason: HOSPADM

## 2018-02-28 RX ADMIN — GABAPENTIN 100 MG: 100 CAPSULE ORAL at 21:05

## 2018-02-28 RX ADMIN — GABAPENTIN 100 MG: 100 CAPSULE ORAL at 08:14

## 2018-02-28 RX ADMIN — DOCUSATE SODIUM 100 MG: 100 CAPSULE, LIQUID FILLED ORAL at 08:14

## 2018-02-28 RX ADMIN — ROSUVASTATIN CALCIUM 20 MG: 20 TABLET, FILM COATED ORAL at 08:14

## 2018-02-28 RX ADMIN — GABAPENTIN 100 MG: 100 CAPSULE ORAL at 16:14

## 2018-02-28 RX ADMIN — HEPARIN SODIUM 5000 UNITS: 5000 INJECTION, SOLUTION INTRAVENOUS; SUBCUTANEOUS at 14:51

## 2018-02-28 RX ADMIN — SODIUM CHLORIDE 500 ML: 9 INJECTION, SOLUTION INTRAVENOUS at 01:08

## 2018-02-28 RX ADMIN — HEPARIN SODIUM 5000 UNITS: 5000 INJECTION, SOLUTION INTRAVENOUS; SUBCUTANEOUS at 21:06

## 2018-02-28 RX ADMIN — DOCUSATE SODIUM 100 MG: 100 CAPSULE, LIQUID FILLED ORAL at 21:05

## 2018-02-28 RX ADMIN — ASPIRIN 81 MG: 81 TABLET, COATED ORAL at 08:14

## 2018-02-28 RX ADMIN — SODIUM CHLORIDE 100 ML/HR: 9 INJECTION, SOLUTION INTRAVENOUS at 05:30

## 2018-03-01 LAB
ANION GAP SERPL CALCULATED.3IONS-SCNC: 14 MMOL/L (ref 3–11)
BUN BLD-MCNC: 59 MG/DL (ref 9–23)
BUN/CREAT SERPL: 12.8 (ref 7–25)
CALCIUM SPEC-SCNC: 9.5 MG/DL (ref 8.7–10.4)
CHLORIDE SERPL-SCNC: 106 MMOL/L (ref 99–109)
CO2 SERPL-SCNC: 23 MMOL/L (ref 20–31)
CREAT BLD-MCNC: 4.6 MG/DL (ref 0.6–1.3)
GFR SERPL CREATININE-BSD FRML MDRD: 13 ML/MIN/1.73
GLUCOSE BLD-MCNC: 148 MG/DL (ref 70–100)
POTASSIUM BLD-SCNC: 3.1 MMOL/L (ref 3.5–5.5)
SODIUM BLD-SCNC: 143 MMOL/L (ref 132–146)

## 2018-03-01 PROCEDURE — 97110 THERAPEUTIC EXERCISES: CPT

## 2018-03-01 PROCEDURE — 25010000002 MORPHINE SULFATE (PF) 2 MG/ML SOLUTION: Performed by: HOSPITALIST

## 2018-03-01 PROCEDURE — 25010000002 HEPARIN (PORCINE) PER 1000 UNITS: Performed by: INTERNAL MEDICINE

## 2018-03-01 PROCEDURE — 80048 BASIC METABOLIC PNL TOTAL CA: CPT | Performed by: INTERNAL MEDICINE

## 2018-03-01 PROCEDURE — 97116 GAIT TRAINING THERAPY: CPT

## 2018-03-01 PROCEDURE — 99233 SBSQ HOSP IP/OBS HIGH 50: CPT | Performed by: INTERNAL MEDICINE

## 2018-03-01 RX ADMIN — DOCUSATE SODIUM 100 MG: 100 CAPSULE, LIQUID FILLED ORAL at 21:35

## 2018-03-01 RX ADMIN — GABAPENTIN 100 MG: 100 CAPSULE ORAL at 09:10

## 2018-03-01 RX ADMIN — HEPARIN SODIUM 5000 UNITS: 5000 INJECTION, SOLUTION INTRAVENOUS; SUBCUTANEOUS at 15:46

## 2018-03-01 RX ADMIN — HYDROCODONE BITARTRATE AND ACETAMINOPHEN 1 TABLET: 5; 325 TABLET ORAL at 09:12

## 2018-03-01 RX ADMIN — ACETAMINOPHEN 500 MG: 500 TABLET ORAL at 09:09

## 2018-03-01 RX ADMIN — HEPARIN SODIUM 5000 UNITS: 5000 INJECTION, SOLUTION INTRAVENOUS; SUBCUTANEOUS at 21:34

## 2018-03-01 RX ADMIN — GABAPENTIN 100 MG: 100 CAPSULE ORAL at 15:48

## 2018-03-01 RX ADMIN — SODIUM CHLORIDE 100 ML/HR: 9 INJECTION, SOLUTION INTRAVENOUS at 00:03

## 2018-03-01 RX ADMIN — GABAPENTIN 100 MG: 100 CAPSULE ORAL at 21:34

## 2018-03-01 RX ADMIN — MORPHINE SULFATE 1 MG: 2 INJECTION, SOLUTION INTRAMUSCULAR; INTRAVENOUS at 19:36

## 2018-03-01 RX ADMIN — DOCUSATE SODIUM 100 MG: 100 CAPSULE, LIQUID FILLED ORAL at 09:10

## 2018-03-01 RX ADMIN — ROSUVASTATIN CALCIUM 20 MG: 20 TABLET, FILM COATED ORAL at 09:10

## 2018-03-01 RX ADMIN — ASPIRIN 81 MG: 81 TABLET, COATED ORAL at 09:09

## 2018-03-01 RX ADMIN — HEPARIN SODIUM 5000 UNITS: 5000 INJECTION, SOLUTION INTRAVENOUS; SUBCUTANEOUS at 06:02

## 2018-03-01 NOTE — PROGRESS NOTES
Continued Stay Note  [facility]     Patient Name: Akshat Winkler  MRN: [id number]  Today's Date: 3/1/2018    Admit Date: 2/27/2018          Discharge Plan       03/01/18 1424    Case Management/Social Work Plan    Additional Comments Pt consented to the transitional care nurse program              Discharge Codes     None        Expected Discharge Date and Time     Expected Discharge Date Expected Discharge Time    Mar 5, 2018             Francia Ferreira RN

## 2018-03-01 NOTE — PLAN OF CARE
Problem: Patient Care Overview (Adult)  Goal: Plan of Care Review  Outcome: Ongoing (interventions implemented as appropriate)  Patient appeared to rest well overnight. Assisted patient in turning every 2-4 hrs. Patient continues to have a large amount of urine output

## 2018-03-01 NOTE — PROGRESS NOTES
"   LOS: 2 days    Patient Care Team:  Oren Mark MD as PCP - General (Family Medicine)  Lucian Alvarez IV, MD as Consulting Physician (Cardiology)  Mandy Clark PA-C as Physician Assistant (Physician Assistant)  Josh Moss MD as Consulting Physician (Pain Medicine)    Chief Complaint:  CHF    Subjective     Interval History:     No acute events overnight. No new complaints.     Review of Systems:   No CP or SOA    Objective     Vital Sign Min/Max for last 24 hours  Temp  Min: 98.1 °F (36.7 °C)  Max: 99 °F (37.2 °C)   BP  Min: 105/73  Max: 123/81   Pulse  Min: 74  Max: 116   Resp  Min: 14  Max: 20   SpO2  Min: 96 %  Max: 98 %   No Data Recorded   No Data Recorded     Flowsheet Rows         First Filed Value    Admission Height  177.8 cm (70\") Documented at 02/27/2018 0848    Admission Weight  132 kg (290 lb) Documented at 02/27/2018 0848          I/O this shift:  In: 360 [P.O.:360]  Out: 1500 [Urine:1500]  I/O last 3 completed shifts:  In: 2133 [P.O.:280; I.V.:1353; IV Piggyback:500]  Out: 9150 [Urine:9150]    Physical Exam:     General Appearance:    Alert, cooperative, in no acute distress   Head:    Normocephalic, without obvious abnormality, atraumatic               Neck:   No adenopathy, supple, trachea midline, no thyromegaly, no   carotid bruit, no JVD       Lungs:     Clear to auscultation,respirations regular, even and                  unlabored    Heart:    Regular rhythm and normal rate, normal S1 and S2, no            murmur, no gallop, no rub, no click   Chest Wall:    No abnormalities observed   Abdomen:     Normal bowel sounds, no masses, no organomegaly, soft        non-tender, non-distended, no guarding, no rebound                tenderness   Rectal:     Deferred   Extremities:   Moves all extremities well, +2  edema, no cyanosis, no             redness               Neurologic:   Cranial nerves 2 - 12 grossly intact, sensation intact, DTR       present and " equal bilaterally       WBC WBC   Date Value Ref Range Status   02/28/2018 5.35 3.50 - 10.80 10*3/mm3 Final   02/27/2018 7.97 3.50 - 10.80 10*3/mm3 Final      HGB Hemoglobin   Date Value Ref Range Status   02/28/2018 12.7 (L) 13.1 - 17.5 g/dL Final   02/27/2018 13.8 13.1 - 17.5 g/dL Final      HCT Hematocrit   Date Value Ref Range Status   02/28/2018 39.1 38.9 - 50.9 % Final   02/27/2018 42.4 38.9 - 50.9 % Final      Platlets No results found for: LABPLAT   MCV MCV   Date Value Ref Range Status   02/28/2018 99.7 (H) 80.0 - 99.0 fL Final   02/27/2018 100.0 (H) 80.0 - 99.0 fL Final          Sodium Sodium   Date Value Ref Range Status   02/28/2018 135 132 - 146 mmol/L Final   02/27/2018 135 132 - 146 mmol/L Final   02/26/2018 133 132 - 146 mmol/L Final      Potassium Potassium   Date Value Ref Range Status   02/28/2018 3.7 3.5 - 5.5 mmol/L Final   02/27/2018 4.4 3.5 - 5.5 mmol/L Final   02/26/2018 3.8 3.5 - 5.5 mmol/L Final      Chloride Chloride   Date Value Ref Range Status   02/28/2018 97 (L) 99 - 109 mmol/L Final   02/27/2018 96 (L) 99 - 109 mmol/L Final   02/26/2018 95 (L) 99 - 109 mmol/L Final      CO2 CO2   Date Value Ref Range Status   02/28/2018 24.0 20.0 - 31.0 mmol/L Final   02/27/2018 24.0 20.0 - 31.0 mmol/L Final   02/26/2018 25.0 20.0 - 31.0 mmol/L Final      BUN BUN   Date Value Ref Range Status   02/28/2018 83 (H) 9 - 23 mg/dL Final   02/27/2018 81 (H) 9 - 23 mg/dL Final   02/26/2018 77 (H) 9 - 23 mg/dL Final      Creatinine Creatinine   Date Value Ref Range Status   02/28/2018 6.50 (H) 0.60 - 1.30 mg/dL Final   02/27/2018 7.10 (H) 0.60 - 1.30 mg/dL Final   02/26/2018 6.20 (H) 0.60 - 1.30 mg/dL Final      Calcium Calcium   Date Value Ref Range Status   02/28/2018 8.5 (L) 8.7 - 10.4 mg/dL Final   02/27/2018 9.6 8.7 - 10.4 mg/dL Final   02/26/2018 8.9 8.7 - 10.4 mg/dL Final      PO4 No results found for: CAPO4   Albumin Albumin   Date Value Ref Range Status   02/28/2018 3.50 3.20 - 4.80 g/dL Final    02/27/2018 4.00 3.20 - 4.80 g/dL Final      Magnesium Magnesium   Date Value Ref Range Status   02/27/2018 2.2 1.3 - 2.7 mg/dL Final      Uric Acid Uric Acid   Date Value Ref Range Status   02/28/2018 13.7 (H) 3.7 - 9.2 mg/dL Final     Comment:     Falsely depressed results may occur on samples drawn from patients receiving N-Acetylcysteine (NAC) or Metamizole.           Results Review:     I reviewed the patient's new clinical results.      aspirin 81 mg Oral Daily   docusate sodium 100 mg Oral BID   gabapentin 100 mg Oral TID   gabapentin 100 mg Oral Q12H   heparin (porcine) 5,000 Units Subcutaneous Q8H   rosuvastatin 20 mg Oral Daily       sodium chloride 100 mL/hr Last Rate: 100 mL/hr (03/01/18 0003)     Renal Ultrasound:    FINDINGS: The right kidney measures 13.4 cm in length.  The left kidney  measures 12.6 cm in length. There is no evidence of renal mass, stone or  obstruction.      IMPRESSION:  Normal ultrasound of the kidneys.    Medication Review:     Assessment/Plan     Principal Problem:    Acute renal failure  Active Problems:    Acute on chronic diastolic heart failure    Essential hypertension    Coronary artery disease involving native coronary artery of native heart without angina pectoris    Class 3 obesity in adult    Persistent atrial fibrillation/flutter    Compression fracture of lumbar vertebra    Pathologic compression fracture of vertebra      1- MARKO - Non oliguric -Scr 6.5 improved form 7.1 - Pre-renal vs renal vs SNIDER.  Hypotensive at 71 systolic. At home was on spironolactone, lisinopril and bumex.  FeNa 4.2 %   2- Proteinuria -UPCR 1.5 gm.   3- Dysuria   4- HTN - stable  5- Volume overload      Plan:  - labs @ AM   - Continue to hold spironolactone, lisinopril and Bumex for now.   - renal diet.   - Avoid nephrotoxic agents.   - Monitor I/O   - Adjust med per renal function.   - No need of dialysis.         Jens Chan MD  03/01/18  11:33 AM

## 2018-03-01 NOTE — THERAPY TREATMENT NOTE
Acute Care - Physical Therapy Treatment Note  [facility]     Patient Name: Akshat Winkler  : 1955  MRN: [id number]  Today's Date: 3/1/2018  Onset of Illness/Injury or Date of Surgery Date: 18  Date of Referral to PT: 18  Referring Physician: MD Anusha    Admit Date: 2018    Visit Dx:    ICD-10-CM ICD-9-CM   1. Acute congestive heart failure, unspecified congestive heart failure type I50.9 428.0   2. Acute renal failure, unspecified acute renal failure type N17.9 584.9   3. Impaired functional mobility, balance, gait, and endurance Z74.09 V49.89     Patient Active Problem List   Diagnosis   • Acute on chronic diastolic heart failure   • Type 2 diabetes mellitus without complication, without long-term current use of insulin   • Hyperlipidemia LDL goal <70   • Essential hypertension   • Coronary artery disease involving native coronary artery of native heart without angina pectoris   • Severe obstructive sleep apnea   • Class 3 obesity in adult   • Persistent atrial fibrillation/flutter   • Compression fracture of lumbar vertebra   • Spondylosis of 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 or radiculopathy   • Lumbar stenosis with neurogenic claudication   • Lumbar disc herniation   • Myofascial pain   • Pathologic compression fracture of vertebra   • Acute renal failure               Adult Rehabilitation Note       18 0811          Rehab Assessment/Intervention    Discipline physical therapy assistant  -AS      Document Type therapy note (daily note)  -AS      Subjective Information agree to therapy;complains of;pain;weakness  -AS      Patient Effort, Rehab Treatment good  -AS      Symptoms Noted During/After Treatment increased pain  -AS      Precautions/Limitations fall precautions;spinal precautions;other (see comments)   L4 compression fx awaiting surgery  -AS      Recorded by [AS] Kirsten Berger PTA      Pain Assessment    Pain Assessment 0-10  -AS      Pain Score 7  -AS      Post  Pain Score 7  -AS      Pain Type Acute pain  -AS      Pain Location Back  -AS      Pain Orientation Lower  -AS      Pain Intervention(s) Repositioned;Ambulation/increased activity  -AS      Response to Interventions tolerated  -AS      Recorded by [AS] Kirsten Berger PTA      Cognitive Assessment/Intervention    Current Cognitive/Communication Assessment functional  -AS      Orientation Status oriented x 4  -AS      Follows Commands/Answers Questions 100% of the time  -AS      Personal Safety WNL/WFL  -AS      Personal Safety Interventions fall prevention program maintained;gait belt;nonskid shoes/slippers when out of bed;other (see comments)   exit alarm  -AS      Recorded by [AS] Kirsten Berger PTA      Bed Mobility, Assessment/Treatment    Bed Mob, Supine to Sit, [place] verbal cues required;moderate assist (50% patient effort)  -AS      Bed Mob, Sit to Supine, [place] not tested  -AS      Bed Mobility, Comment verbal cues for log roll technique  -AS      Recorded by [AS] Kirsten Berger PTA      Transfer Assessment/Treatment    Transfers, Sit-Stand [place] verbal cues required;moderate assist (50% patient effort);2 person assist required  -AS      Transfers, Stand-Sit [place] supervision required;minimum assist (75% patient effort);2 person assist required  -AS      Transfers, Sit-Stand-Sit, Assist Device rolling walker  -AS      Transfer, Safety Issues weight-shifting ability decreased;step length decreased  -AS      Transfer, Impairments pain;strength decreased  -AS      Transfer, Comment verbal cues for technique and safe hand placement  -AS      Recorded by [AS] Kirsten Berger PTA      Gait Assessment/Treatment    Gait, [place] Level verbal cues required;contact guard assist;2 person assist required  -AS      Gait, Assistive Device rolling walker  -AS      Gait, Distance (Feet) 16  -AS      Gait, Gait Deviations vic decreased;forward flexed posture;step length  decreased  -AS      Gait, Safety Issues step length decreased;weight-shifting ability decreased  -AS      Gait, Impairments strength decreased;pain  -AS      Gait, Comment verbal cues for posture and walker placement  -AS      Recorded by [AS] Kirsten Berger PTA      Therapy Exercises    Bilateral Lower Extremities AROM:;10 reps;sitting;ankle pumps/circles;hip flexion;LAQ  -AS      Recorded by [AS] Kirsten Berger PTA      Positioning and Restraints    Pre-Treatment Position in bed  -AS      Post Treatment Position chair  -AS      In Chair sitting;call light within reach;encouraged to call for assist;exit alarm on;waffle cushion  -AS      Recorded by [AS] Kirsten Berger PTA        User Key  (r) = Recorded By, (t) = Taken By, (c) = Cosigned By    Initials Name Effective Dates    AS Kirsten Berger PTA 06/22/15 -                 IP PT Goals       03/01/18 0846 02/28/18 1446       Bed Mobility PT LTG    Bed Mobility PT LTG, Date Established  02/28/18  -     Bed Mobility PT LTG, Time to Achieve  2 wks  -EH     Bed Mobility PT LTG, Activity Type  roll left/roll right;sidelying to sit/sit to sidelying  -     Bed Mobility PT LTG, [place] Level  conditional independence  -EH     Bed Mobility PT LTG, Date Goal Reviewed 03/01/18  -AS      Bed Mobility PT LTG, Outcome goal ongoing  -AS      Transfer Training PT LTG    Transfer Training PT LTG, Date Established  02/28/18  -     Transfer Training PT LTG, Time to Achieve  2 wks  -EH     Transfer Training PT LTG, Activity Type  sit to stand/stand to sit  -     Transfer Training PT LTG, [place] Level  conditional independence  -EH     Transfer Training PT LTG, Assist Device  walker, rolling  -EH     Transfer Training PT  LTG, Date Goal Reviewed 03/01/18  -AS      Transfer Training PT LTG, Outcome goal ongoing  -AS      Gait Training PT LTG    Gait Training Goal PT LTG, Date Established  02/28/18  -     Gait Training Goal PT LTG, Time to Achieve   1 wk  -     Gait Training Goal PT LTG, [place] Level  conditional independence  -     Gait Training Goal PT LTG, Assist Device  walker, rolling  -EH     Gait Training Goal PT LTG, Distance to Achieve  150  -     Gait Training Goal PT LTG, Date Goal Reviewed 03/01/18  -AS      Gait Training Goal PT LTG, Outcome goal ongoing  -AS goal ongoing  -       User Key  (r) = Recorded By, (t) = Taken By, (c) = Cosigned By    Initials Name Provider Type     Sil Castrejon, PT Physical Therapist    AS Kirsten Berger, JOANNE Physical Therapy Assistant          Physical Therapy Education     Title: PT OT SLP Therapies (Active)     Topic: Physical Therapy (Active)     Point: Mobility training (Active)    Learning Progress Summary    Learner Readiness Method Response Comment Documented by Status   Patient Acceptance E NR  AS 03/01/18 0845 Active    Acceptance E VU,NR   02/28/18 1446 Done               Point: Home exercise program (Active)    Learning Progress Summary    Learner Readiness Method Response Comment Documented by Status   Patient Acceptance E NR  AS 03/01/18 0845 Active    Acceptance E VU,NR   02/28/18 1446 Done               Point: Body mechanics (Active)    Learning Progress Summary    Learner Readiness Method Response Comment Documented by Status   Patient Acceptance E NR  AS 03/01/18 0845 Active    Acceptance E VU,NR   02/28/18 1446 Done               Point: Precautions (Active)    Learning Progress Summary    Learner Readiness Method Response Comment Documented by Status   Patient Acceptance E NR  AS 03/01/18 0845 Active    Acceptance E VU,NR   02/28/18 1446 Done                      User Key     Initials Effective Dates Name Provider Type Discipline     06/19/15 -  Sil Castrejon, PT Physical Therapist PT    AS 06/22/15 -  Kirsten Berger PTA Physical Therapy Assistant PT                    PT Recommendation and Plan  Anticipated Discharge Disposition: long term acute care  facility (dependent upon pt progression)  Planned Therapy Interventions: balance training, bed mobility training, gait training, home exercise program, transfer training, strengthening, ROM (Range of Motion)  PT Frequency: daily  Plan of Care Review  Plan Of Care Reviewed With: patient  Progress: improving  Outcome Summary/Follow up Plan: patient continues to be limited due to pain, verbal cueing for safe transfers and bed mobility, performed B LE up in chair.          Outcome Measures       03/01/18 0811 02/28/18 1400       How much help from another person do you currently need...    Turning from your back to your side while in flat bed without using bedrails? 2  -AS 3  -EH     Moving from lying on back to sitting on the side of a flat bed without bedrails? 2  -AS 2  -EH     Moving to and from a bed to a chair (including a wheelchair)? 2  -AS 3  -EH     Standing up from a chair using your arms (e.g., wheelchair, bedside chair)? 2  -AS 3  -EH     Climbing 3-5 steps with a railing? 2  -AS 3  -EH     To walk in hospital room? 3  -AS 3  -EH     AM-PAC 6 Clicks Score 13  -AS 17  -EH     Functional Assessment    Outcome Measure Options AM-PAC 6 Clicks Basic Mobility (PT)  -AS AM-PAC 6 Clicks Basic Mobility (PT)  -EH       User Key  (r) = Recorded By, (t) = Taken By, (c) = Cosigned By    Initials Name Provider Type     Sil Castrejon, PT Physical Therapist    AS Kirsten Berger PTA Physical Therapy Assistant           Time Calculation:         PT Charges       03/01/18 0847          Time Calculation    Start Time 0811  -AS      PT Received On 03/01/18  -AS      PT Goal Re-Cert Due Date 03/10/18  -AS      Time Calculation- PT    Total Timed Code Minutes- PT 23 minute(s)  -AS        User Key  (r) = Recorded By, (t) = Taken By, (c) = Cosigned By    Initials Name Provider Type    AS Kirsten Berger PTA Physical Therapy Assistant          Therapy Charges for Today     Code Description Service Date Service  Provider Modifiers Qty    03709280374 HC GAIT TRAINING EA 15 MIN 3/1/2018 Kirsten Berger, PTA GP 1    29055564138 HC PT THER PROC EA 15 MIN 3/1/2018 Kirsten Berger, PTA GP 1    86117966553 HC PT THER SUPP EA 15 MIN 3/1/2018 Kirsten Berger, JOANNE GP 2          PT G-Codes  Outcome Measure Options: AM-PAC 6 Clicks Basic Mobility (PT)    Kirsten Berger PTA  3/1/2018

## 2018-03-01 NOTE — NURSING NOTE
Patients heart rate increased from 60's-80's to 100's-110's around 1730 on 2/28   And has maintained in the 100's-110's

## 2018-03-01 NOTE — PLAN OF CARE
Problem: Patient Care Overview (Adult)  Goal: Plan of Care Review  Outcome: Ongoing (interventions implemented as appropriate)   03/01/18 0846   Coping/Psychosocial Response Interventions   Plan Of Care Reviewed With patient   Patient Care Overview   Progress improving   Outcome Evaluation   Outcome Summary/Follow up Plan patient continues to be limited due to pain, verbal cueing for safe transfers and bed mobility, performed B LE up in chair.       Problem: Inpatient Physical Therapy  Goal: Bed Mobility Goal LTG- PT  Outcome: Ongoing (interventions implemented as appropriate)   02/28/18 1446 03/01/18 0846   Bed Mobility PT LTG   Bed Mobility PT LTG, Date Established 02/28/18 --    Bed Mobility PT LTG, Time to Achieve 2 wks --    Bed Mobility PT LTG, Activity Type roll left/roll right;sidelying to sit/sit to sidelying --    Bed Mobility PT LTG, [place] Level conditional independence --    Bed Mobility PT LTG, Date Goal Reviewed --  03/01/18   Bed Mobility PT LTG, Outcome --  goal ongoing     Goal: Transfer Training Goal 1 LTG- PT  Outcome: Ongoing (interventions implemented as appropriate)   02/28/18 1446 03/01/18 0846   Transfer Training PT LTG   Transfer Training PT LTG, Date Established 02/28/18 --    Transfer Training PT LTG, Time to Achieve 2 wks --    Transfer Training PT LTG, Activity Type sit to stand/stand to sit --    Transfer Training PT LTG, [place] Level conditional independence --    Transfer Training PT LTG, Assist Device walker, rolling --    Transfer Training PT LTG, Date Goal Reviewed --  03/01/18   Transfer Training PT LTG, Outcome --  goal ongoing     Goal: Gait Training Goal LTG- PT  Outcome: Ongoing (interventions implemented as appropriate)   02/28/18 1446 03/01/18 0846   Gait Training PT LTG   Gait Training Goal PT LTG, Date Established 02/28/18 --    Gait Training Goal PT LTG, Time to Achieve 1 wk --    Gait Training Goal PT LTG, [place] Level conditional independence --     Gait Training Goal PT LTG, Assist Device walker, rolling --    Gait Training Goal PT LTG, Distance to Achieve 150 --    Gait Training Goal PT LTG, Date Goal Reviewed --  03/01/18   Gait Training Goal PT LTG, Outcome --  goal ongoing

## 2018-03-01 NOTE — PROGRESS NOTES
[facility] Services  PROGRESS NOTE    Patient Name: Akshat Winkler  : 1955  MRN: [id number]    Date of Admission: 2018  Length of Stay: 2  Primary Care Physician: Oren Mark MD    Subjective   Subjective     CC:  F/u ARF    HPI:  Says he feels better today.  Good UOP.  Worked with PT yesterday, is def weak.  Feels LE edema continues to improved    Review of Systems   Constitutional: Negative for fever.   Respiratory: Positive for shortness of breath.    Cardiovascular: Positive for leg swelling. Negative for chest pain.   Neurological: Positive for weakness.   All other systems reviewed and are negative.    Otherwise ROS is negative except as mentioned in the HPI.    Objective   Objective     Vital Signs:   Temp:  [98.1 °F (36.7 °C)-99 °F (37.2 °C)] 98.3 °F (36.8 °C)  Heart Rate:  [105-116] 111  Resp:  [14-18] 16  BP: (105-123)/(66-81) 108/80        Physical Exam:  Constitutional: No acute distress, awake, alert, looks older than age  HENT: NCAT, mucous membranes moist  Respiratory: Clear to auscultation bilaterally, respiratory effort normal   Cardiovascular: irregular  And tachy(looks like SR with PACs on tele) , no murmurs, rubs, or gallops  Gastrointestinal: Positive bowel sounds, soft, nontender, nondistended, obese  Musculoskeletal: 3+ bilateral ankle edema, some chronic venous stasis changes  : azul in place with dark urine  Psychiatric: Appropriate affect, cooperative  Neurologic: Oriented x 3, no focal deficits, speech clear  Skin: No rashes      Results Reviewed:  I have personally reviewed current lab, radiology, and data and agree.      Results from last 7 days  Lab Units 18  0559 18  0926 18  1040   WBC 10*3/mm3 5.35 7.97 8.61   HEMOGLOBIN g/dL 12.7* 13.8 13.3   HEMATOCRIT % 39.1 42.4 41.0   PLATELETS 10*3/mm3 162 175 157   INR   --  1.20*  --        Results from last 7 days  Lab Units 18  0559 18  09  02/26/18  1647   SODIUM mmol/L 135 135 133   POTASSIUM mmol/L 3.7 4.4 3.8   CHLORIDE mmol/L 97* 96* 95*   CO2 mmol/L 24.0 24.0 25.0   BUN mg/dL 83* 81* 77*   CREATININE mg/dL 6.50* 7.10* 6.20*   GLUCOSE mg/dL 106* 115* 117*   CALCIUM mg/dL 8.5* 9.6 8.9   ALT (SGPT) U/L  --  28  --    AST (SGOT) U/L  --  44*  --    TROPONIN I ng/mL  --  0.021  --      Estimated Creatinine Clearance: 16.3 mL/min (by C-G formula based on Cr of 6.5).  BNP   Date Value Ref Range Status   02/27/2018 256.0 (H) 0.0 - 100.0 pg/mL Final     Comment:     Results may be falsely decreased if patient taking Biotin.     No results found for: PHART    Microbiology Results Abnormal     Procedure Component Value - Date/Time    Urine Culture - Urine, Urine, Clean Catch [[id number]]  (Abnormal) Collected:  02/28/18 1448    Lab Status:  Preliminary result Specimen:  Urine from Urine, Clean Catch Updated:  03/01/18 0912     Urine Culture --      30,000-40,000 CFU/mL Enterococcus species (A)    Eosinophil Smear - Urine, Urine, Clean Catch [[id number]]  (Normal) Collected:  02/28/18 1448    Lab Status:  Final result Specimen:  Urine from Urine, Clean Catch Updated:  02/28/18 1708     Eosinophil Smear 0 % EOS/100 Cells     Narrative:       No eosinophil seen          Imaging Results (last 24 hours)     ** No results found for the last 24 hours. **        Results for orders placed during the hospital encounter of 02/27/18   Adult Transthoracic Echo Complete W/ Cont if Necessary Per Protocol    Narrative · Technically difficult study due to body habitus  · Left ventricular systolic function is normal. Estimated EF = 60%.  · The cardiac valves are poorly visualized but there does not appear to be   significant stenotic or regurgitant lesions.  · Right ventricular cavity is mild-to-moderately dilated.  · Atrial flutter noted throughout the examination.          I have reviewed the medications.    Assessment/Plan   Assessment / Plan     Hospital Problem List     *  (Principal)Acute renal failure    Acute on chronic diastolic heart failure    Overview Addendum 11/28/2017  5:15 PM by Lucian Alvarez IV, MD     · Cardiac cath (02/20/14):  elevated LVEDP suggesting diastolic heart failure.  · Intolerant to beta blocker therapy         Essential hypertension    Coronary artery disease involving native coronary artery of native heart without angina pectoris    Overview Addendum 5/9/2017  9:00 AM by FAITH Roy       · Cardiac PET (01/2014): partially reversible anteroseptal defect, normal LVEF.  · Cardiac cath (02/20/14): mild nonobstructive CAD, LVEF 55%, elevated LVEDP suggesting diastolic heart failure.         Class 3 obesity in adult    Persistent atrial fibrillation/flutter    Overview Addendum 12/26/2017  7:42 AM by Lucian Alvarez IV, MD     · Diagnosed 2011  · [id number] 3 (HTN, CAD, DM)  · Echo (10/11/2011): Normal LVEF, mild MR, mild LA dilation  · LOLY/ECVcardioversion, 12/21/2011, with initiation of propafenone therapy.   · Successful LOLY/ECV for recurrent atrial fibrillation, 11/15/2013  · PVA with Dr. Cary, 6/29/2015  · Cardioversion (07/17/2015) for atrial flutter occurring post ablation   · ECV for recurrent atrial flutter, 12/20/17 with reattempt at beta blocker/flecainide.  · Intolerant to beta blocker/flecanide with severe hypotension, intolerant to propafenone         Compression fracture of lumbar vertebra    Pathologic compression fracture of vertebra    Overview Signed 2/21/2018  2:36 PM by Akshat Davidson MD     Added automatically from request for surgery [id number]                  Brief Hospital Course to date:  Akshat Winkler is a 63 y.o. male with a-flutter, obeisity, chronic diastolic CHF, recent compression fx presents with ARF.  Had Cr of 5 in 12/2017 but returned to normal.  Seen in CHF clinic on 2/26 and noted to be hypotensive.  Labs returned Cr 6.1 and called to come in.      Assessment & Plan:  - BMP is pending  today, Cr was trending down yesterday. History seems most consistent with pre-renal from possible hypotension and over-diuresis (was 70s SBP in CHF clinic).  Also possible obstructive (does mention some difficulty getting started) but US is normal.  Will keep azul for now, consider out tomorrow.  - hold diuretics, ACE.  Renal following.  - hold eliquis until renal function improves.  - has paroxysmal flutter s/p ECV in 12/2017, set up to see Jose D as outpatient.  - has back pain related to recent compression fx, followed by Mark.  Considering kyphoplasty if medical status allows.  - d/w Dr. Alvarez yesterday, can consult him if needed.  - reviewed therapy notes, might need rehab at discharge vs home health.  Will probably be here through the weekend watching renal function.    DVT Prophylaxis:  SQ heparin    CODE STATUS: Full Code    Disposition: I expect the patient to be discharged early next week.    High complexity.    Electronically signed by Christian Tavares MD, 03/01/18, 2:30 PM.

## 2018-03-01 NOTE — PLAN OF CARE
Problem: Patient Care Overview (Adult)  Goal: Plan of Care Review  Outcome: Ongoing (interventions implemented as appropriate)   03/01/18 1271   Coping/Psychosocial Response Interventions   Plan Of Care Reviewed With patient   Patient Care Overview   Progress improving   Outcome Evaluation   Outcome Summary/Follow up Plan Pt has produced and drained over 3 liters of fluid this shift. His leg edema is much improved from yesterday evening and pt reports decreased pain in BLE. Reports continued pain in back. Likely discharge the first of next week. No acute changes/issues.

## 2018-03-02 PROBLEM — E87.6 HYPOKALEMIA: Status: ACTIVE | Noted: 2018-03-02

## 2018-03-02 LAB
ALBUMIN SERPL-MCNC: 3.7 G/DL (ref 3.2–4.8)
ANION GAP SERPL CALCULATED.3IONS-SCNC: 9 MMOL/L (ref 3–11)
BACTERIA SPEC AEROBE CULT: ABNORMAL
BACTERIA SPEC AEROBE CULT: ABNORMAL
BUN BLD-MCNC: 43 MG/DL (ref 9–23)
BUN/CREAT SERPL: 13 (ref 7–25)
CALCIUM SPEC-SCNC: 9.6 MG/DL (ref 8.7–10.4)
CHLORIDE SERPL-SCNC: 109 MMOL/L (ref 99–109)
CO2 SERPL-SCNC: 24 MMOL/L (ref 20–31)
CREAT BLD-MCNC: 3.3 MG/DL (ref 0.6–1.3)
GFR SERPL CREATININE-BSD FRML MDRD: 19 ML/MIN/1.73
GLUCOSE BLD-MCNC: 99 MG/DL (ref 70–100)
MAGNESIUM SERPL-MCNC: 1.4 MG/DL (ref 1.3–2.7)
PHOSPHATE SERPL-MCNC: 4.5 MG/DL (ref 2.4–5.1)
POTASSIUM BLD-SCNC: 2.7 MMOL/L (ref 3.5–5.5)
POTASSIUM BLD-SCNC: 3 MMOL/L (ref 3.5–5.5)
SODIUM BLD-SCNC: 142 MMOL/L (ref 132–146)

## 2018-03-02 PROCEDURE — 83735 ASSAY OF MAGNESIUM: CPT | Performed by: INTERNAL MEDICINE

## 2018-03-02 PROCEDURE — 80069 RENAL FUNCTION PANEL: CPT | Performed by: INTERNAL MEDICINE

## 2018-03-02 PROCEDURE — 84132 ASSAY OF SERUM POTASSIUM: CPT | Performed by: INTERNAL MEDICINE

## 2018-03-02 PROCEDURE — 25010000002 MORPHINE SULFATE (PF) 2 MG/ML SOLUTION: Performed by: HOSPITALIST

## 2018-03-02 PROCEDURE — 94660 CPAP INITIATION&MGMT: CPT

## 2018-03-02 PROCEDURE — 25010000002 HEPARIN (PORCINE) PER 1000 UNITS: Performed by: INTERNAL MEDICINE

## 2018-03-02 PROCEDURE — 99232 SBSQ HOSP IP/OBS MODERATE 35: CPT | Performed by: INTERNAL MEDICINE

## 2018-03-02 PROCEDURE — 99232 SBSQ HOSP IP/OBS MODERATE 35: CPT | Performed by: NEUROLOGICAL SURGERY

## 2018-03-02 PROCEDURE — 94799 UNLISTED PULMONARY SVC/PX: CPT

## 2018-03-02 RX ORDER — MAGNESIUM SULFATE HEPTAHYDRATE 40 MG/ML
2 INJECTION, SOLUTION INTRAVENOUS AS NEEDED
Status: DISCONTINUED | OUTPATIENT
Start: 2018-03-02 | End: 2018-03-06 | Stop reason: HOSPADM

## 2018-03-02 RX ORDER — POTASSIUM CHLORIDE 7.45 MG/ML
10 INJECTION INTRAVENOUS
Status: DISCONTINUED | OUTPATIENT
Start: 2018-03-02 | End: 2018-03-06 | Stop reason: HOSPADM

## 2018-03-02 RX ORDER — HYDROCODONE BITARTRATE AND ACETAMINOPHEN 5; 325 MG/1; MG/1
1 TABLET ORAL EVERY 12 HOURS PRN
Status: DISCONTINUED | OUTPATIENT
Start: 2018-03-02 | End: 2018-03-03

## 2018-03-02 RX ORDER — POTASSIUM CHLORIDE 750 MG/1
40 CAPSULE, EXTENDED RELEASE ORAL AS NEEDED
Status: DISCONTINUED | OUTPATIENT
Start: 2018-03-02 | End: 2018-03-06 | Stop reason: HOSPADM

## 2018-03-02 RX ORDER — POTASSIUM CHLORIDE 1.5 G/1.77G
40 POWDER, FOR SOLUTION ORAL AS NEEDED
Status: DISCONTINUED | OUTPATIENT
Start: 2018-03-02 | End: 2018-03-06 | Stop reason: HOSPADM

## 2018-03-02 RX ORDER — MAGNESIUM SULFATE HEPTAHYDRATE 40 MG/ML
4 INJECTION, SOLUTION INTRAVENOUS AS NEEDED
Status: DISCONTINUED | OUTPATIENT
Start: 2018-03-02 | End: 2018-03-06 | Stop reason: HOSPADM

## 2018-03-02 RX ADMIN — ROSUVASTATIN CALCIUM 20 MG: 20 TABLET, FILM COATED ORAL at 08:17

## 2018-03-02 RX ADMIN — DOCUSATE SODIUM 100 MG: 100 CAPSULE, LIQUID FILLED ORAL at 20:06

## 2018-03-02 RX ADMIN — DOCUSATE SODIUM 100 MG: 100 CAPSULE, LIQUID FILLED ORAL at 08:16

## 2018-03-02 RX ADMIN — SODIUM CHLORIDE 100 ML/HR: 9 INJECTION, SOLUTION INTRAVENOUS at 05:40

## 2018-03-02 RX ADMIN — GABAPENTIN 100 MG: 100 CAPSULE ORAL at 20:06

## 2018-03-02 RX ADMIN — GABAPENTIN 100 MG: 100 CAPSULE ORAL at 08:16

## 2018-03-02 RX ADMIN — HEPARIN SODIUM 5000 UNITS: 5000 INJECTION, SOLUTION INTRAVENOUS; SUBCUTANEOUS at 05:26

## 2018-03-02 RX ADMIN — HEPARIN SODIUM 5000 UNITS: 5000 INJECTION, SOLUTION INTRAVENOUS; SUBCUTANEOUS at 13:42

## 2018-03-02 RX ADMIN — ASPIRIN 81 MG: 81 TABLET, COATED ORAL at 08:17

## 2018-03-02 RX ADMIN — HYDROCODONE BITARTRATE AND ACETAMINOPHEN 1 TABLET: 5; 325 TABLET ORAL at 00:23

## 2018-03-02 RX ADMIN — HYDROCODONE BITARTRATE AND ACETAMINOPHEN 1 TABLET: 5; 325 TABLET ORAL at 14:30

## 2018-03-02 RX ADMIN — MORPHINE SULFATE 1 MG: 2 INJECTION, SOLUTION INTRAMUSCULAR; INTRAVENOUS at 08:18

## 2018-03-02 RX ADMIN — GABAPENTIN 100 MG: 100 CAPSULE ORAL at 16:17

## 2018-03-02 RX ADMIN — MORPHINE SULFATE 1 MG: 2 INJECTION, SOLUTION INTRAMUSCULAR; INTRAVENOUS at 21:50

## 2018-03-02 RX ADMIN — POTASSIUM CHLORIDE 40 MEQ: 750 CAPSULE, EXTENDED RELEASE ORAL at 21:49

## 2018-03-02 RX ADMIN — POTASSIUM CHLORIDE 40 MEQ: 750 CAPSULE, EXTENDED RELEASE ORAL at 09:17

## 2018-03-02 RX ADMIN — HEPARIN SODIUM 5000 UNITS: 5000 INJECTION, SOLUTION INTRAVENOUS; SUBCUTANEOUS at 20:05

## 2018-03-02 NOTE — PROGRESS NOTES
Adult Nutrition  Assessment/PES    Patient Name:  Akshat Winkler  YOB: 1955  MRN: [id number]  Admit Date:  2/27/2018    Assessment Date:  3/2/2018    Comments:            Reason for Assessment       03/02/18 1057    Reason for Assessment    Reason For Assessment/Visit follow up protocol    Time Spent (min) 15    Diagnosis --   per notes this adm                        Nutrition Prescription Ordered       03/02/18 1057    Nutrition Prescription PO    Current PO Diet Regular    Supplement Boost Glucose Control    Supplement Frequency Daily    Common Modifiers Renal            Evaluation of Received Nutrient/Fluid Intake       03/02/18 1058    PO Evaluation    Number of Meals 4    % PO Intake 81            Problem/Interventions:        Problem 1       03/02/18 1058    Nutrition Diagnoses Problem 1    Problem 1 Nutrition Appropriate for Condition at this Time    Etiology (related to) MNT for Treatment/Condition    Signs/Symptoms (evidenced by) PO Intake    Percent (%) intake recorded 81 %    Over number of meals 4                    Intervention Goal       03/02/18 1058    Intervention Goal    PO Maintain intake            Nutrition Intervention       03/02/18 1059    Nutrition Intervention    RD/Tech Action Follow Tx progress              Education/Evaluation       03/02/18 1059    Monitor/Evaluation    Monitor Per protocol        Electronically signed by:  Emelia Eubanks, MS,RD,LD  03/02/18 10:59 AM

## 2018-03-02 NOTE — PLAN OF CARE
"Problem: Patient Care Overview (Adult)  Goal: Plan of Care Review  Outcome: Ongoing (interventions implemented as appropriate)   03/02/18 1317   Coping/Psychosocial Response Interventions   Plan Of Care Reviewed With patient   Outcome Evaluation   Outcome Summary/Follow up Plan pt stable this shift. C/o pain/discomfort. PRN pain meds given and were effective. Pt has been up with x 1 assist all shift but told other nurses that he is supposed to have 3 people \"lifting\" him at all times. I educated pt that this was not the case and that he shouldn't be bending and twisting but he was abolutely safe to be up with his walker and x 1 assist.      Goal: Adult Individualization and Mutuality  Outcome: Ongoing (interventions implemented as appropriate)    Goal: Discharge Needs Assessment  Outcome: Ongoing (interventions implemented as appropriate)      Problem: Cardiac: Heart Failure (Adult)  Goal: Signs and Symptoms of Listed Potential Problems Will be Absent or Manageable (Cardiac: Heart Failure)  Outcome: Ongoing (interventions implemented as appropriate)      Problem: Skin Integrity Impairment, Risk/Actual (Adult)  Goal: Identify Related Risk Factors and Signs and Symptoms  Outcome: Ongoing (interventions implemented as appropriate)    Goal: Skin Integrity/Wound Healing  Outcome: Ongoing (interventions implemented as appropriate)      Problem: Fall Risk (Adult)  Goal: Identify Related Risk Factors and Signs and Symptoms  Outcome: Ongoing (interventions implemented as appropriate)    Goal: Absence of Falls  Outcome: Ongoing (interventions implemented as appropriate)        "

## 2018-03-02 NOTE — PROGRESS NOTES
Continued Stay Note  [facility]     Patient Name: Akshat Winkler  MRN: [id number]  Today's Date: 3/2/2018    Admit Date: 2/27/2018          Discharge Plan     Consent obtained for the participation in the [facility] Transitions Visit Laila Hernandez RN                Discharge Codes     None        Expected Discharge Date and Time     Expected Discharge Date Expected Discharge Time    Mar 5, 2018             Laila Hernandez RN

## 2018-03-02 NOTE — PLAN OF CARE
Problem: Patient Care Overview (Adult)  Goal: Plan of Care Review  Outcome: Ongoing (interventions implemented as appropriate)  Patient uncomfortable in bed. Slept in chair the second half of the shift. Edema in leds has improved. Reports back pain. Appeared to rest well overnight

## 2018-03-02 NOTE — PROGRESS NOTES
[facility] Medicine Services  PROGRESS NOTE    Patient Name: Akshat Winkler  : 1955  MRN: [id number]    Date of Admission: 2018  Length of Stay: 3  Primary Care Physician: Oren Mark MD    Subjective   Subjective     CC:  F/u ARF    HPI:  LE edema continues to improved (down 12L since admission).  Continues to complain of some back pain.    Review of Systems   Constitutional: Negative for fever.   Respiratory: Positive for shortness of breath.    Cardiovascular: Positive for leg swelling. Negative for chest pain.   Neurological: Positive for weakness.   All other systems reviewed and are negative.    Otherwise ROS is negative except as mentioned in the HPI.    Objective   Objective     Vital Signs:   Temp:  [98 °F (36.7 °C)-98.8 °F (37.1 °C)] 98 °F (36.7 °C)  Heart Rate:  [102-120] 116  Resp:  [16-22] 18  BP: (117-140)/() 135/101        Physical Exam:  Constitutional: No acute distress, awake, alert, chronically ill appearing  HENT: NCAT, mucous membranes moist  Respiratory: Clear to auscultation bilaterally, respiratory effort normal   Cardiovascular: irregular and tachy, no murmurs, rubs, or gallops  Gastrointestinal: Positive bowel sounds, soft, nontender, nondistended, obese  Musculoskeletal: 2+ bilateral ankle edema (improved), some chronic venous stasis changes  : azul in place with dark urine  Psychiatric: Appropriate affect, cooperative  Neurologic: Oriented x 3, no focal deficits, speech clear  Skin: No rashes      Results Reviewed:  I have personally reviewed current lab, radiology, and data and agree.      Results from last 7 days  Lab Units 18  0559 18  0926 18  1040   WBC 10*3/mm3 5.35 7.97 8.61   HEMOGLOBIN g/dL 12.7* 13.8 13.3   HEMATOCRIT % 39.1 42.4 41.0   PLATELETS 10*3/mm3 162 175 157   INR   --  1.20*  --        Results from last 7 days  Lab Units 18  0649 18  1337 18  0559 18  0926    SODIUM mmol/L 142 143 135 135   POTASSIUM mmol/L 2.7* 3.1* 3.7 4.4   CHLORIDE mmol/L 109 106 97* 96*   CO2 mmol/L 24.0 23.0 24.0 24.0   BUN mg/dL 43* 59* 83* 81*   CREATININE mg/dL 3.30* 4.60* 6.50* 7.10*   GLUCOSE mg/dL 99 148* 106* 115*   CALCIUM mg/dL 9.6 9.5 8.5* 9.6   ALT (SGPT) U/L  --   --   --  28   AST (SGOT) U/L  --   --   --  44*   TROPONIN I ng/mL  --   --   --  0.021     Estimated Creatinine Clearance: 32.2 mL/min (by C-G formula based on Cr of 3.3).  No results found for: BNP  No results found for: PHART    Microbiology Results Abnormal     Procedure Component Value - Date/Time    Urine Culture - Urine, Urine, Clean Catch [[id number]]  (Abnormal)  (Susceptibility) Collected:  02/28/18 1448    Lab Status:  Final result Specimen:  Urine from Urine, Clean Catch Updated:  03/02/18 1326     Urine Culture --      30,000-40,000 CFU/mL Enterococcus faecalis (A)    Susceptibility      Enterococcus faecalis     GOPAL     Ampicillin <=2 ug/ml Susceptible     Gentamicin High Level Synergy <=500 ug/ml Susceptible     Levofloxacin <=1 ug/ml Susceptible     Linezolid 2 ug/ml Susceptible     Nitrofurantoin <=32 ug/ml Susceptible     Penicillin G 2 ug/ml Susceptible     Streptomycin High Level Synergy <=1000 ug/ml Susceptible     Tetracycline >8 ug/ml Resistant     Vancomycin 2 ug/ml Susceptible                    Eosinophil Smear - Urine, Urine, Clean Catch [[id number]]  (Normal) Collected:  02/28/18 1448    Lab Status:  Final result Specimen:  Urine from Urine, Clean Catch Updated:  02/28/18 1708     Eosinophil Smear 0 % EOS/100 Cells     Narrative:       No eosinophil seen          Imaging Results (last 24 hours)     ** No results found for the last 24 hours. **        Results for orders placed during the hospital encounter of 02/27/18   Adult Transthoracic Echo Complete W/ Cont if Necessary Per Protocol    Narrative · Technically difficult study due to body habitus  · Left ventricular systolic function is normal.  Estimated EF = 60%.  · The cardiac valves are poorly visualized but there does not appear to be   significant stenotic or regurgitant lesions.  · Right ventricular cavity is mild-to-moderately dilated.  · Atrial flutter noted throughout the examination.          I have reviewed the medications.    Assessment/Plan   Assessment / Plan     Hospital Problem List     * (Principal)Acute renal failure    Acute on chronic diastolic heart failure    Overview Addendum 11/28/2017  5:15 PM by Lucian Alvarez IV, MD     · Cardiac cath (02/20/14):  elevated LVEDP suggesting diastolic heart failure.  · Intolerant to beta blocker therapy         Essential hypertension    Coronary artery disease involving native coronary artery of native heart without angina pectoris    Overview Addendum 5/9/2017  9:00 AM by FAITH Roy       · Cardiac PET (01/2014): partially reversible anteroseptal defect, normal LVEF.  · Cardiac cath (02/20/14): mild nonobstructive CAD, LVEF 55%, elevated LVEDP suggesting diastolic heart failure.         Class 3 obesity in adult    Persistent atrial fibrillation/flutter    Overview Addendum 12/26/2017  7:42 AM by Lucian Alvarez IV, MD     · Diagnosed 2011  · [id number] 3 (HTN, CAD, DM)  · Echo (10/11/2011): Normal LVEF, mild MR, mild LA dilation  · LOLY/ECVcardioversion, 12/21/2011, with initiation of propafenone therapy.   · Successful LOLY/ECV for recurrent atrial fibrillation, 11/15/2013  · PVA with Dr. Cary, 6/29/2015  · Cardioversion (07/17/2015) for atrial flutter occurring post ablation   · ECV for recurrent atrial flutter, 12/20/17 with reattempt at beta blocker/flecainide.  · Intolerant to beta blocker/flecanide with severe hypotension, intolerant to propafenone         Compression fracture of lumbar vertebra    Hypokalemia             Brief Hospital Course to date:  Akshat Winkler is a 63 y.o. male with a-flutter, obeisity, chronic diastolic CHF, recent compression fx  presents with ARF.  Had Cr of 5 in 12/2017 but returned to normal.  Seen in CHF clinic on 2/26 and noted to be hypotensive.  Labs returned Cr 6.1 and called to come in.      Assessment & Plan:  - Cr continues to improved.   History seems most consistent with pre-renal from possible hypotension and over-diuresis (was 70s SBP in CHF clinic).  Also possible obstructive (does mention some difficulty getting started) but US is normal.  Will keep azul for now, consider out soon.  Has been auto-diuresing well (down 12L since admit)  - hold diuretics, consider restarting ACE soon  - hold eliquis until renal function improves.  - has paroxysmal flutter s/p ECV in 12/2017, set up to see Jose D as outpatient.  D/w Dr. Alvarez at start of admission, can consult him if needed  - has back pain related to recent compression fx, followed by Mark.  D/w Dr. Flores this morning, he will tenatively plan on kyphoplasty on Monday if medically appropriate.  - continue PT/OT and see how he does after procedure to determine home vs rehab.    DVT Prophylaxis:  SQ heparin    CODE STATUS: Full Code    Disposition: I expect the patient to be discharged mid next week.    High complexity.    Electronically signed by Christian Tavares MD, 03/02/18, 5:27 PM.

## 2018-03-02 NOTE — PROGRESS NOTES
"   LOS: 3 days    Patient Care Team:  Oren Mark MD as PCP - General (Family Medicine)  Lucian Alvarez IV, MD as Consulting Physician (Cardiology)  Mandy Clark PA-C as Physician Assistant (Physician Assistant)  Josh Moss MD as Consulting Physician (Pain Medicine)    Chief Complaint:  CHF    Subjective     Interval History:     No acute events overnight. No new complaints.     Review of Systems:   No CP or SOA    Objective     Vital Sign Min/Max for last 24 hours  Temp  Min: 98.3 °F (36.8 °C)  Max: 98.8 °F (37.1 °C)   BP  Min: 108/80  Max: 140/103   Pulse  Min: 102  Max: 115   Resp  Min: 16  Max: 16   SpO2  Min: 96 %  Max: 98 %   No Data Recorded   No Data Recorded     Flowsheet Rows         First Filed Value    Admission Height  177.8 cm (70\") Documented at 02/27/2018 0848    Admission Weight  132 kg (290 lb) Documented at 02/27/2018 0848             I/O last 3 completed shifts:  In: 1080 [P.O.:1080]  Out: 29822 [Urine:75335]    Physical Exam:     General Appearance:    Alert, cooperative, in no acute distress   Head:    Normocephalic, without obvious abnormality, atraumatic               Neck:   No adenopathy, supple, trachea midline, no thyromegaly, no   carotid bruit, no JVD       Lungs:     Clear to auscultation,respirations regular, even and                  unlabored    Heart:    Regular rhythm and normal rate, normal S1 and S2, no            murmur, no gallop, no rub, no click   Chest Wall:    No abnormalities observed   Abdomen:     Normal bowel sounds, no masses, no organomegaly, soft        non-tender, non-distended, no guarding, no rebound                tenderness   Rectal:     Deferred   Extremities:   Moves all extremities well, +2  edema, no cyanosis, no             redness               Neurologic:   Cranial nerves 2 - 12 grossly intact, sensation intact, DTR       present and equal bilaterally       WBC WBC   Date Value Ref Range Status   02/28/2018 5.35 " 3.50 - 10.80 10*3/mm3 Final   02/27/2018 7.97 3.50 - 10.80 10*3/mm3 Final      HGB Hemoglobin   Date Value Ref Range Status   02/28/2018 12.7 (L) 13.1 - 17.5 g/dL Final   02/27/2018 13.8 13.1 - 17.5 g/dL Final      HCT Hematocrit   Date Value Ref Range Status   02/28/2018 39.1 38.9 - 50.9 % Final   02/27/2018 42.4 38.9 - 50.9 % Final      Platlets No results found for: LABPLAT   MCV MCV   Date Value Ref Range Status   02/28/2018 99.7 (H) 80.0 - 99.0 fL Final   02/27/2018 100.0 (H) 80.0 - 99.0 fL Final          Sodium Sodium   Date Value Ref Range Status   03/02/2018 142 132 - 146 mmol/L Final   03/01/2018 143 132 - 146 mmol/L Final   02/28/2018 135 132 - 146 mmol/L Final   02/27/2018 135 132 - 146 mmol/L Final      Potassium Potassium   Date Value Ref Range Status   03/02/2018 2.7 (C) 3.5 - 5.5 mmol/L Final   03/01/2018 3.1 (L) 3.5 - 5.5 mmol/L Final   02/28/2018 3.7 3.5 - 5.5 mmol/L Final   02/27/2018 4.4 3.5 - 5.5 mmol/L Final      Chloride Chloride   Date Value Ref Range Status   03/02/2018 109 99 - 109 mmol/L Final   03/01/2018 106 99 - 109 mmol/L Final   02/28/2018 97 (L) 99 - 109 mmol/L Final   02/27/2018 96 (L) 99 - 109 mmol/L Final      CO2 CO2   Date Value Ref Range Status   03/02/2018 24.0 20.0 - 31.0 mmol/L Final   03/01/2018 23.0 20.0 - 31.0 mmol/L Final   02/28/2018 24.0 20.0 - 31.0 mmol/L Final   02/27/2018 24.0 20.0 - 31.0 mmol/L Final      BUN BUN   Date Value Ref Range Status   03/02/2018 43 (H) 9 - 23 mg/dL Final   03/01/2018 59 (H) 9 - 23 mg/dL Final   02/28/2018 83 (H) 9 - 23 mg/dL Final   02/27/2018 81 (H) 9 - 23 mg/dL Final      Creatinine Creatinine   Date Value Ref Range Status   03/02/2018 3.30 (H) 0.60 - 1.30 mg/dL Final   03/01/2018 4.60 (H) 0.60 - 1.30 mg/dL Final   02/28/2018 6.50 (H) 0.60 - 1.30 mg/dL Final   02/27/2018 7.10 (H) 0.60 - 1.30 mg/dL Final      Calcium Calcium   Date Value Ref Range Status   03/02/2018 9.6 8.7 - 10.4 mg/dL Final   03/01/2018 9.5 8.7 - 10.4 mg/dL Final    02/28/2018 8.5 (L) 8.7 - 10.4 mg/dL Final   02/27/2018 9.6 8.7 - 10.4 mg/dL Final      PO4 No results found for: CAPO4   Albumin Albumin   Date Value Ref Range Status   03/02/2018 3.70 3.20 - 4.80 g/dL Final   02/28/2018 3.50 3.20 - 4.80 g/dL Final   02/27/2018 4.00 3.20 - 4.80 g/dL Final      Magnesium Magnesium   Date Value Ref Range Status   02/27/2018 2.2 1.3 - 2.7 mg/dL Final      Uric Acid Uric Acid   Date Value Ref Range Status   02/28/2018 13.7 (H) 3.7 - 9.2 mg/dL Final     Comment:     Falsely depressed results may occur on samples drawn from patients receiving N-Acetylcysteine (NAC) or Metamizole.           Results Review:     I reviewed the patient's new clinical results.      aspirin 81 mg Oral Daily   docusate sodium 100 mg Oral BID   gabapentin 100 mg Oral TID   gabapentin 100 mg Oral Q12H   heparin (porcine) 5,000 Units Subcutaneous Q8H   rosuvastatin 20 mg Oral Daily       sodium chloride 100 mL/hr Last Rate: 100 mL/hr (03/02/18 0540)     Renal Ultrasound:    FINDINGS: The right kidney measures 13.4 cm in length.  The left kidney  measures 12.6 cm in length. There is no evidence of renal mass, stone or  obstruction.      IMPRESSION:  Normal ultrasound of the kidneys.    Medication Review:     Assessment/Plan     Principal Problem:    Acute renal failure  Active Problems:    Acute on chronic diastolic heart failure    Essential hypertension    Coronary artery disease involving native coronary artery of native heart without angina pectoris    Class 3 obesity in adult    Persistent atrial fibrillation/flutter    Compression fracture of lumbar vertebra    Pathologic compression fracture of vertebra      1- MARKO - Non oliguric -Scr 3.3 improved form 7.1 - Pre-renal vs renal vs SNIDER.  Hypotensive at 71 systolic. At home was on spironolactone, lisinopril and bumex. FeNa 4.2 %   2- Proteinuria -UPCR 1.5 gm.   3- Dysuria   4- HTN - not controlled  5- Volume overload- improving.   6- Hypokalemia    Plan:  -  Will initiate Potassium replacement protocol. Will check magnesium level.   - Monitor blood pressure. If elevated can be restarted on lisinopril.   - Continue to hold spironolactone and Bumex for now.   - Renal diet.   - Avoid nephrotoxic agents.   - Monitor I/O.   - Adjust med per renal function.   - No need of dialysis.       Jens Chan MD  03/02/18  8:30 AM

## 2018-03-03 LAB
MAGNESIUM SERPL-MCNC: 1.3 MG/DL (ref 1.3–2.7)
POTASSIUM BLD-SCNC: 3.5 MMOL/L (ref 3.5–5.5)
POTASSIUM BLD-SCNC: 3.7 MMOL/L (ref 3.5–5.5)

## 2018-03-03 PROCEDURE — 86225 DNA ANTIBODY NATIVE: CPT | Performed by: INTERNAL MEDICINE

## 2018-03-03 PROCEDURE — 84155 ASSAY OF PROTEIN SERUM: CPT | Performed by: INTERNAL MEDICINE

## 2018-03-03 PROCEDURE — 86160 COMPLEMENT ANTIGEN: CPT | Performed by: INTERNAL MEDICINE

## 2018-03-03 PROCEDURE — 84165 PROTEIN E-PHORESIS SERUM: CPT | Performed by: INTERNAL MEDICINE

## 2018-03-03 PROCEDURE — 86256 FLUORESCENT ANTIBODY TITER: CPT | Performed by: INTERNAL MEDICINE

## 2018-03-03 PROCEDURE — 84132 ASSAY OF SERUM POTASSIUM: CPT | Performed by: INTERNAL MEDICINE

## 2018-03-03 PROCEDURE — 25010000002 MORPHINE SULFATE (PF) 2 MG/ML SOLUTION: Performed by: HOSPITALIST

## 2018-03-03 PROCEDURE — 94660 CPAP INITIATION&MGMT: CPT

## 2018-03-03 PROCEDURE — 83516 IMMUNOASSAY NONANTIBODY: CPT | Performed by: INTERNAL MEDICINE

## 2018-03-03 PROCEDURE — 97110 THERAPEUTIC EXERCISES: CPT

## 2018-03-03 PROCEDURE — 97116 GAIT TRAINING THERAPY: CPT

## 2018-03-03 PROCEDURE — 86038 ANTINUCLEAR ANTIBODIES: CPT | Performed by: INTERNAL MEDICINE

## 2018-03-03 PROCEDURE — 99232 SBSQ HOSP IP/OBS MODERATE 35: CPT | Performed by: NURSE PRACTITIONER

## 2018-03-03 PROCEDURE — 83520 IMMUNOASSAY QUANT NOS NONAB: CPT | Performed by: INTERNAL MEDICINE

## 2018-03-03 PROCEDURE — 25010000002 HEPARIN (PORCINE) PER 1000 UNITS: Performed by: INTERNAL MEDICINE

## 2018-03-03 PROCEDURE — 94799 UNLISTED PULMONARY SVC/PX: CPT

## 2018-03-03 PROCEDURE — 83735 ASSAY OF MAGNESIUM: CPT | Performed by: INTERNAL MEDICINE

## 2018-03-03 RX ORDER — HYDROCODONE BITARTRATE AND ACETAMINOPHEN 5; 325 MG/1; MG/1
1 TABLET ORAL EVERY 6 HOURS PRN
Status: DISCONTINUED | OUTPATIENT
Start: 2018-03-03 | End: 2018-03-06 | Stop reason: HOSPADM

## 2018-03-03 RX ADMIN — POTASSIUM CHLORIDE 40 MEQ: 750 CAPSULE, EXTENDED RELEASE ORAL at 02:30

## 2018-03-03 RX ADMIN — GABAPENTIN 100 MG: 100 CAPSULE ORAL at 08:13

## 2018-03-03 RX ADMIN — POTASSIUM CHLORIDE 40 MEQ: 750 CAPSULE, EXTENDED RELEASE ORAL at 08:13

## 2018-03-03 RX ADMIN — POTASSIUM CHLORIDE 40 MEQ: 750 CAPSULE, EXTENDED RELEASE ORAL at 11:36

## 2018-03-03 RX ADMIN — HYDROCODONE BITARTRATE AND ACETAMINOPHEN 1 TABLET: 5; 325 TABLET ORAL at 03:50

## 2018-03-03 RX ADMIN — GABAPENTIN 100 MG: 100 CAPSULE ORAL at 20:53

## 2018-03-03 RX ADMIN — HYDROCODONE BITARTRATE AND ACETAMINOPHEN 1 TABLET: 5; 325 TABLET ORAL at 22:18

## 2018-03-03 RX ADMIN — ASPIRIN 81 MG: 81 TABLET, COATED ORAL at 08:13

## 2018-03-03 RX ADMIN — MORPHINE SULFATE 1 MG: 2 INJECTION, SOLUTION INTRAMUSCULAR; INTRAVENOUS at 08:12

## 2018-03-03 RX ADMIN — GABAPENTIN 100 MG: 100 CAPSULE ORAL at 16:35

## 2018-03-03 RX ADMIN — POTASSIUM CHLORIDE 40 MEQ: 750 CAPSULE, EXTENDED RELEASE ORAL at 17:16

## 2018-03-03 RX ADMIN — HEPARIN SODIUM 5000 UNITS: 5000 INJECTION, SOLUTION INTRAVENOUS; SUBCUTANEOUS at 21:00

## 2018-03-03 RX ADMIN — GABAPENTIN 100 MG: 100 CAPSULE ORAL at 20:55

## 2018-03-03 RX ADMIN — DOCUSATE SODIUM 100 MG: 100 CAPSULE, LIQUID FILLED ORAL at 08:13

## 2018-03-03 RX ADMIN — DOCUSATE SODIUM 100 MG: 100 CAPSULE, LIQUID FILLED ORAL at 20:53

## 2018-03-03 RX ADMIN — HEPARIN SODIUM 5000 UNITS: 5000 INJECTION, SOLUTION INTRAVENOUS; SUBCUTANEOUS at 05:21

## 2018-03-03 RX ADMIN — HYDROCODONE BITARTRATE AND ACETAMINOPHEN 1 TABLET: 5; 325 TABLET ORAL at 14:22

## 2018-03-03 RX ADMIN — ROSUVASTATIN CALCIUM 20 MG: 20 TABLET, FILM COATED ORAL at 08:13

## 2018-03-03 RX ADMIN — ACETAMINOPHEN 500 MG: 500 TABLET ORAL at 09:11

## 2018-03-03 RX ADMIN — HEPARIN SODIUM 5000 UNITS: 5000 INJECTION, SOLUTION INTRAVENOUS; SUBCUTANEOUS at 14:22

## 2018-03-03 NOTE — PLAN OF CARE
Problem: Patient Care Overview (Adult)  Goal: Plan of Care Review  Outcome: Ongoing (interventions implemented as appropriate)   03/03/18 1507   Coping/Psychosocial Response Interventions   Plan Of Care Reviewed With patient   Patient Care Overview   Progress progress toward functional goals as expected   Outcome Evaluation   Outcome Summary/Follow up Plan Pt performed sit to stand with mod A with cues for safe hand placement. Pt able to ambulate 150' with RW and CGA. Limited by pain, decreased strength, and decreased ROM secondary to spinal precautions/pain.        Problem: Inpatient Physical Therapy  Goal: Bed Mobility Goal LTG- PT  Outcome: Ongoing (interventions implemented as appropriate)   02/28/18 1446 03/01/18 0846   Bed Mobility PT LTG   Bed Mobility PT LTG, Date Established 02/28/18 --    Bed Mobility PT LTG, Time to Achieve 2 wks --    Bed Mobility PT LTG, Activity Type roll left/roll right;sidelying to sit/sit to sidelying --    Bed Mobility PT LTG, [place] Level conditional independence --    Bed Mobility PT LTG, Date Goal Reviewed --  03/01/18   Bed Mobility PT LTG, Outcome --  goal ongoing     Goal: Transfer Training Goal 1 LTG- PT  Outcome: Ongoing (interventions implemented as appropriate)   02/28/18 1446 03/01/18 0846   Transfer Training PT LTG   Transfer Training PT LTG, Date Established 02/28/18 --    Transfer Training PT LTG, Time to Achieve 2 wks --    Transfer Training PT LTG, Activity Type sit to stand/stand to sit --    Transfer Training PT LTG, [place] Level conditional independence --    Transfer Training PT LTG, Assist Device walker, rolling --    Transfer Training PT LTG, Date Goal Reviewed --  03/01/18   Transfer Training PT LTG, Outcome --  goal ongoing     Goal: Gait Training Goal LTG- PT  Outcome: Ongoing (interventions implemented as appropriate)   02/28/18 1446 03/01/18 0846 03/03/18 1507   Gait Training PT LTG   Gait Training Goal PT LTG, Date Established 02/28/18 --  --     Gait Training Goal PT LTG, Time to Achieve --  --  2 wks   Gait Training Goal PT LTG, [place] Level --  --  conditional independence   Gait Training Goal PT LTG, Assist Device --  --  walker, rolling   Gait Training Goal PT LTG, Distance to Achieve --  --  150   Gait Training Goal PT LTG, Date Goal Reviewed --  03/01/18 --    Gait Training Goal PT LTG, Outcome --  goal ongoing --

## 2018-03-03 NOTE — PROGRESS NOTES
[facility] Medicine Services  PROGRESS NOTE    Patient Name: Akshat Winkler  : 1955  MRN: [id number]    Date of Admission: 2018  Length of Stay: 4  Primary Care Physician: Oren Mark MD    Subjective   Subjective     CC:  F/u ARF    HPI:  Complains of back pain. Thinks his heart rate is up more because of the ball game ([place] is loosing)    Review of Systems   Gen- No fevers, chills  CV- No chest pain, palpitations  Resp- No cough, dyspnea  GI- No N/V/D, abd pain    Otherwise ROS is negative except as mentioned in the HPI.    Objective   Objective     Vital Signs:   Temp:  [97.7 °F (36.5 °C)-98.7 °F (37.1 °C)] 98 °F (36.7 °C)  Heart Rate:  [] 111  Resp:  [14-18] 18  BP: (118-140)/() 137/93        Physical Exam:  Constitutional: No acute distress, awake, alert, sitting up in chair  Respiratory: Clear to auscultation bilaterally, respiratory effort normal   Cardiovascular: RR tachy 126 with occ PAC, no murmurs, rubs, or gallops  Gastrointestinal: Positive bowel sounds, soft, nontender, nondistended, obese  Musculoskeletal: 2+ bilateral ankle edema (improved), some chronic venous stasis changes  : azul yellow with sediment  Psychiatric: Appropriate affect, cooperative  Neurologic: Oriented x 3, no focal deficits,     Results Reviewed:  I have personally reviewed current lab, radiology, and data and agree.      Results from last 7 days  Lab Units 18  0559 18  0926 18  1040   WBC 10*3/mm3 5.35 7.97 8.61   HEMOGLOBIN g/dL 12.7* 13.8 13.3   HEMATOCRIT % 39.1 42.4 41.0   PLATELETS 10*3/mm3 162 175 157   INR   --  1.20*  --        Results from last 7 days  Lab Units 18  0757 18  1845 18  0649 18  1337 18  0559 18  0926   SODIUM mmol/L  --   --  142 143 135 135   POTASSIUM mmol/L 3.5 3.0* 2.7* 3.1* 3.7 4.4   CHLORIDE mmol/L  --   --  109 106 97* 96*   CO2 mmol/L  --   --  24.0 23.0 24.0 24.0   BUN mg/dL   --   --  43* 59* 83* 81*   CREATININE mg/dL  --   --  3.30* 4.60* 6.50* 7.10*   GLUCOSE mg/dL  --   --  99 148* 106* 115*   CALCIUM mg/dL  --   --  9.6 9.5 8.5* 9.6   ALT (SGPT) U/L  --   --   --   --   --  28   AST (SGOT) U/L  --   --   --   --   --  44*   TROPONIN I ng/mL  --   --   --   --   --  0.021     Estimated Creatinine Clearance: 32.2 mL/min (by C-G formula based on Cr of 3.3).      Microbiology Results Abnormal     Procedure Component Value - Date/Time    Urine Culture - Urine, Urine, Clean Catch [[id number]]  (Abnormal)  (Susceptibility) Collected:  02/28/18 1448    Lab Status:  Final result Specimen:  Urine from Urine, Clean Catch Updated:  03/02/18 1326     Urine Culture --      30,000-40,000 CFU/mL Enterococcus faecalis (A)    Susceptibility      Enterococcus faecalis     GOPAL     Ampicillin <=2 ug/ml Susceptible     Gentamicin High Level Synergy <=500 ug/ml Susceptible     Levofloxacin <=1 ug/ml Susceptible     Linezolid 2 ug/ml Susceptible     Nitrofurantoin <=32 ug/ml Susceptible     Penicillin G 2 ug/ml Susceptible     Streptomycin High Level Synergy <=1000 ug/ml Susceptible     Tetracycline >8 ug/ml Resistant     Vancomycin 2 ug/ml Susceptible                    Eosinophil Smear - Urine, Urine, Clean Catch [[id number]]  (Normal) Collected:  02/28/18 1448    Lab Status:  Final result Specimen:  Urine from Urine, Clean Catch Updated:  02/28/18 1708     Eosinophil Smear 0 % EOS/100 Cells     Narrative:       No eosinophil seen          Results for orders placed during the hospital encounter of 02/27/18   Adult Transthoracic Echo Complete W/ Cont if Necessary Per Protocol    Narrative · Technically difficult study due to body habitus  · Left ventricular systolic function is normal. Estimated EF = 60%.  · The cardiac valves are poorly visualized but there does not appear to be   significant stenotic or regurgitant lesions.  · Right ventricular cavity is mild-to-moderately dilated.  · Atrial flutter noted  throughout the examination.          I have reviewed the medications.    Assessment/Plan   Assessment / Plan     Hospital Problem List     * (Principal)Acute renal failure    Acute on chronic diastolic heart failure    Overview Addendum 11/28/2017  5:15 PM by Lucian Alvarez IV, MD     · Cardiac cath (02/20/14):  elevated LVEDP suggesting diastolic heart failure.  · Intolerant to beta blocker therapy         Essential hypertension    Coronary artery disease involving native coronary artery of native heart without angina pectoris    Overview Addendum 5/9/2017  9:00 AM by FAITH Roy       · Cardiac PET (01/2014): partially reversible anteroseptal defect, normal LVEF.  · Cardiac cath (02/20/14): mild nonobstructive CAD, LVEF 55%, elevated LVEDP suggesting diastolic heart failure.         Class 3 obesity in adult    Persistent atrial fibrillation/flutter    Overview Addendum 12/26/2017  7:42 AM by Lucian Alvarez IV, MD     · Diagnosed 2011  · [id number] 3 (HTN, CAD, DM)  · Echo (10/11/2011): Normal LVEF, mild MR, mild LA dilation  · LOLY/ECVcardioversion, 12/21/2011, with initiation of propafenone therapy.   · Successful LOLY/ECV for recurrent atrial fibrillation, 11/15/2013  · PVA with Dr. Cary, 6/29/2015  · Cardioversion (07/17/2015) for atrial flutter occurring post ablation   · ECV for recurrent atrial flutter, 12/20/17 with reattempt at beta blocker/flecainide.  · Intolerant to beta blocker/flecanide with severe hypotension, intolerant to propafenone         Compression fracture of lumbar vertebra    Hypokalemia             Brief Hospital Course to date:  Akshat Winkler is a 63 y.o. male with a-flutter, obeisity, chronic diastolic CHF, recent compression fx presents with ARF.  Had Cr of 5 in 12/2017 but returned to normal.  Seen in CHF clinic on 2/26 and noted to be hypotensive.  Labs returned Cr 6.1 and called to come in.      Assessment & Plan:  - Cr continues to improved.    History seems most consistent with pre-renal from possible hypotension and over-diuresis (was 70s SBP in CHF clinic).  Also possible obstructive (does mention some difficulty getting started) but US is normal.  Will keep azul for now, consider out soon.  Has been auto-diuresing well (down 12L since admit), nephro following  - hold diuretics, consider restarting ACE soon  - hold eliquis until renal function improves.   - has paroxysmal flutter s/p ECV in 12/2017, set up to see Jose D as outpatient.  D/w Dr. Alvarez at start of admission, can consult him if needed  - has back pain related to recent compression fx, followed by Mark. Tenative plan for kyphoplasty on Monday if he is clinically stable  - continue PT/OT and see how he does after procedure to determine home vs rehab.     DVT Prophylaxis:  SQ heparin    CODE STATUS: Full Code    Disposition: I expect the patient to be discharged mid next week.    High complexity.    Electronically signed by FAITH Garcia, 03/03/18, 2:10 PM.

## 2018-03-03 NOTE — PLAN OF CARE
Problem: Patient Care Overview (Adult)  Goal: Plan of Care Review  Outcome: Ongoing (interventions implemented as appropriate)   03/03/18 1107   Coping/Psychosocial Response Interventions   Plan Of Care Reviewed With patient   Outcome Evaluation   Outcome Summary/Follow up Plan pt stable this shift. C/o pain in back. Scheduled for Kyphoplasty with Dr. Davidson on Monday. Giving PRN pain meds to decrease pain.      Goal: Adult Individualization and Mutuality  Outcome: Ongoing (interventions implemented as appropriate)    Goal: Discharge Needs Assessment  Outcome: Ongoing (interventions implemented as appropriate)      Problem: Cardiac: Heart Failure (Adult)  Goal: Signs and Symptoms of Listed Potential Problems Will be Absent or Manageable (Cardiac: Heart Failure)  Outcome: Ongoing (interventions implemented as appropriate)      Problem: Skin Integrity Impairment, Risk/Actual (Adult)  Goal: Identify Related Risk Factors and Signs and Symptoms  Outcome: Ongoing (interventions implemented as appropriate)    Goal: Skin Integrity/Wound Healing  Outcome: Ongoing (interventions implemented as appropriate)      Problem: Fall Risk (Adult)  Goal: Identify Related Risk Factors and Signs and Symptoms  Outcome: Ongoing (interventions implemented as appropriate)    Goal: Absence of Falls  Outcome: Ongoing (interventions implemented as appropriate)

## 2018-03-03 NOTE — PROGRESS NOTES
"   LOS: 4 days    Patient Care Team:  Oren Mark MD as PCP - General (Family Medicine)  Lucian Alvarez IV, MD as Consulting Physician (Cardiology)  Mandy Clark PA-C as Physician Assistant (Physician Assistant)  Josh Moss MD as Consulting Physician (Pain Medicine)    Chief Complaint:  CHF    Subjective     Interval History:     No acute events overnight. No new complaints.     Review of Systems:   No CP or SOA    Objective     Vital Sign Min/Max for last 24 hours  Temp  Min: 97.7 °F (36.5 °C)  Max: 98.7 °F (37.1 °C)   BP  Min: 118/87  Max: 140/102   Pulse  Min: 96  Max: 122   Resp  Min: 14  Max: 22   SpO2  Min: 94 %  Max: 97 %   No Data Recorded   No Data Recorded     Flowsheet Rows         First Filed Value    Admission Height  177.8 cm (70\") Documented at 02/27/2018 0848    Admission Weight  132 kg (290 lb) Documented at 02/27/2018 0848             I/O last 3 completed shifts:  In: 240 [P.O.:240]  Out: 5400 [Urine:5400]    Physical Exam:     General Appearance:    Alert, cooperative, in no acute distress   Head:    Normocephalic, without obvious abnormality, atraumatic               Neck:   No adenopathy, supple, trachea midline, no thyromegaly, no   carotid bruit, no JVD       Lungs:     Clear to auscultation,respirations regular, even and                  unlabored    Heart:    Regular rhythm and normal rate, normal S1 and S2, no            murmur, no gallop, no rub, no click   Chest Wall:    No abnormalities observed   Abdomen:     Normal bowel sounds, no masses, no organomegaly, soft        non-tender, non-distended, no guarding, no rebound                tenderness   Rectal:     Deferred   Extremities:   Moves all extremities well, +2  edema, no cyanosis, no             redness               Neurologic:   Cranial nerves 2 - 12 grossly intact, sensation intact, DTR       present and equal bilaterally       WBC No results found for: WBC   HGB No results found for: HGB "   HCT No results found for: HCT   Platlets No results found for: LABPLAT   MCV No results found for: MCV       Sodium Sodium   Date Value Ref Range Status   03/02/2018 142 132 - 146 mmol/L Final   03/01/2018 143 132 - 146 mmol/L Final      Potassium Potassium   Date Value Ref Range Status   03/03/2018 3.5 3.5 - 5.5 mmol/L Final   03/02/2018 3.0 (L) 3.5 - 5.5 mmol/L Final   03/02/2018 2.7 (C) 3.5 - 5.5 mmol/L Final   03/01/2018 3.1 (L) 3.5 - 5.5 mmol/L Final      Chloride Chloride   Date Value Ref Range Status   03/02/2018 109 99 - 109 mmol/L Final   03/01/2018 106 99 - 109 mmol/L Final      CO2 CO2   Date Value Ref Range Status   03/02/2018 24.0 20.0 - 31.0 mmol/L Final   03/01/2018 23.0 20.0 - 31.0 mmol/L Final      BUN BUN   Date Value Ref Range Status   03/02/2018 43 (H) 9 - 23 mg/dL Final   03/01/2018 59 (H) 9 - 23 mg/dL Final      Creatinine Creatinine   Date Value Ref Range Status   03/02/2018 3.30 (H) 0.60 - 1.30 mg/dL Final   03/01/2018 4.60 (H) 0.60 - 1.30 mg/dL Final      Calcium Calcium   Date Value Ref Range Status   03/02/2018 9.6 8.7 - 10.4 mg/dL Final   03/01/2018 9.5 8.7 - 10.4 mg/dL Final      PO4 No results found for: CAPO4   Albumin Albumin   Date Value Ref Range Status   03/02/2018 3.70 3.20 - 4.80 g/dL Final      Magnesium Magnesium   Date Value Ref Range Status   03/03/2018 1.3 1.3 - 2.7 mg/dL Final   03/02/2018 1.4 1.3 - 2.7 mg/dL Final      Uric Acid No results found for: URICACID        Results Review:     I reviewed the patient's new clinical results.      aspirin 81 mg Oral Daily   docusate sodium 100 mg Oral BID   gabapentin 100 mg Oral TID   gabapentin 100 mg Oral Q12H   heparin (porcine) 5,000 Units Subcutaneous Q8H   pharmacy consult - MTM  Does not apply Daily   rosuvastatin 20 mg Oral Daily        Renal Ultrasound:    FINDINGS: The right kidney measures 13.4 cm in length.  The left kidney  measures 12.6 cm in length. There is no evidence of renal mass, stone  or  obstruction.      IMPRESSION:  Normal ultrasound of the kidneys.    Medication Review:     Assessment/Plan     Principal Problem:    Acute renal failure  Active Problems:    Acute on chronic diastolic heart failure    Essential hypertension    Coronary artery disease involving native coronary artery of native heart without angina pectoris    Class 3 obesity in adult    Persistent atrial fibrillation/flutter    Compression fracture of lumbar vertebra    Hypokalemia      1- MARKO - Non oliguric -Scr 3.3 improved form 7.1 - Pre-renal vs renal vs SNIDER.  Hypotensive at 71 systolic. At home was on spironolactone, lisinopril and bumex. FeNa 4.2 %   2- Proteinuria -UPCR 1.5 gm.   3- Dysuria   4- HTN -  controlled  5- Volume overload- improving.   6- Hypokalemia - improving.     Plan:  - Labs @ AM.  - Continue with  Potassium replacement protocol.    - Continue to hold spironolactone and Bumex for now.   - Renal diet.   - Avoid nephrotoxic agents.   - Monitor I/O. Discontinue IV fluids.   - Adjust med per renal function.   - No need of dialysis.       Jens Chan MD  03/03/18  8:53 AM

## 2018-03-03 NOTE — PLAN OF CARE
Problem: Patient Care Overview (Adult)  Goal: Plan of Care Review  Outcome: Ongoing (interventions implemented as appropriate)      Problem: Cardiac: Heart Failure (Adult)  Goal: Signs and Symptoms of Listed Potential Problems Will be Absent or Manageable (Cardiac: Heart Failure)  Outcome: Ongoing (interventions implemented as appropriate)      Problem: Fall Risk (Adult)  Goal: Identify Related Risk Factors and Signs and Symptoms  Outcome: Ongoing (interventions implemented as appropriate)   03/03/18 1657   Fall Risk   Fall Risk: Related Risk Factors age-related changes;gait/mobility problems;fear of falling;fatigue/slow reaction

## 2018-03-03 NOTE — PLAN OF CARE
Problem: Patient Care Overview (Adult)  Goal: Plan of Care Review  Outcome: Ongoing (interventions implemented as appropriate)   03/03/18 1057   Coping/Psychosocial Response Interventions   Plan Of Care Reviewed With patient;spouse   Outcome Evaluation   Outcome Summary/Follow up Plan pt stable this shift. Febrile, Tylenol given and was effective. Spouse at bedside. No c/o pain or discomfort at this time.      Goal: Adult Individualization and Mutuality  Outcome: Ongoing (interventions implemented as appropriate)    Goal: Discharge Needs Assessment  Outcome: Ongoing (interventions implemented as appropriate)      Problem: Cardiac: Heart Failure (Adult)  Goal: Signs and Symptoms of Listed Potential Problems Will be Absent or Manageable (Cardiac: Heart Failure)  Outcome: Ongoing (interventions implemented as appropriate)      Problem: Skin Integrity Impairment, Risk/Actual (Adult)  Goal: Identify Related Risk Factors and Signs and Symptoms  Outcome: Ongoing (interventions implemented as appropriate)    Goal: Skin Integrity/Wound Healing  Outcome: Ongoing (interventions implemented as appropriate)      Problem: Fall Risk (Adult)  Goal: Identify Related Risk Factors and Signs and Symptoms  Outcome: Ongoing (interventions implemented as appropriate)    Goal: Absence of Falls  Outcome: Ongoing (interventions implemented as appropriate)

## 2018-03-03 NOTE — THERAPY TREATMENT NOTE
Acute Care - Physical Therapy Treatment Note  [facility]     Patient Name: Akshat Winkler  : 1955  MRN: [id number]  Today's Date: 3/3/2018  Onset of Illness/Injury or Date of Surgery Date: 18  Date of Referral to PT: 18  Referring Physician: MD Anusha    Admit Date: 2018    Visit Dx:    ICD-10-CM ICD-9-CM   1. Acute congestive heart failure, unspecified congestive heart failure type I50.9 428.0   2. Acute renal failure, unspecified acute renal failure type N17.9 584.9   3. Impaired functional mobility, balance, gait, and endurance Z74.09 V49.89   4. Pathological compression fracture of vertebra, initial encounter M48.50XA 733.13     Patient Active Problem List   Diagnosis   • Acute on chronic diastolic heart failure   • Type 2 diabetes mellitus without complication, without long-term current use of insulin   • Hyperlipidemia LDL goal <70   • Essential hypertension   • Coronary artery disease involving native coronary artery of native heart without angina pectoris   • Severe obstructive sleep apnea   • Class 3 obesity in adult   • Persistent atrial fibrillation/flutter   • Compression fracture of lumbar vertebra   • Spondylosis of 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 or radiculopathy   • Lumbar stenosis with neurogenic claudication   • Lumbar disc herniation   • Myofascial pain   • Acute renal failure   • Hypokalemia               Adult Rehabilitation Note       18 1430 18 0811       Rehab Assessment/Intervention    Discipline physical therapist  -ES physical therapy assistant  -AS     Document Type therapy note (daily note)  -ES therapy note (daily note)  -AS     Subjective Information agree to therapy;complains of;pain  -ES agree to therapy;complains of;pain;weakness  -AS     Patient Effort, Rehab Treatment good  -ES good  -AS     Symptoms Noted During/After Treatment none  -ES increased pain  -AS     Precautions/Limitations spinal precautions  -ES fall precautions;spinal  precautions;other (see comments)   L4 compression fx awaiting surgery  -AS     Recorded by [ES] Sarah Pan, PT [AS] Kirsten Berger PTA     Vital Signs    Pretreatment Heart Rate (beats/min) 127  -ES      Posttreatment Heart Rate (beats/min) 128  -ES      Pre Patient Position Sitting  -ES      Intra Patient Position Standing  -ES      Post Patient Position Sitting  -ES      Recorded by [ES] Sarah Pan, PT      Pain Assessment    Pain Assessment 0-10  -ES 0-10  -AS     Pain Score 9  -ES 7  -AS     Post Pain Score 9  -ES 7  -AS     Pain Type Acute pain  -ES Acute pain  -AS     Pain Location Back  -ES Back  -AS     Pain Orientation  Lower  -AS     Pain Intervention(s) Medication (See MAR);Ambulation/increased activity;Repositioned   RN gave medication 5 minutes prior to PT   -ES Repositioned;Ambulation/increased activity  -AS     Response to Interventions tolerated  -ES tolerated  -AS     Recorded by [ES] Sarah Pan, PT [AS] Kirsten Berger PTA     Cognitive Assessment/Intervention    Current Cognitive/Communication Assessment functional  -ES functional  -AS     Orientation Status oriented x 4  -ES oriented x 4  -AS     Follows Commands/Answers Questions 100% of the time;able to follow single-step instructions  -% of the time  -AS     Personal Safety  WNL/WFL  -AS     Personal Safety Interventions  fall prevention program maintained;gait belt;nonskid shoes/slippers when out of bed;other (see comments)   exit alarm  -AS     Recorded by [ES] Sarah Pan, PT [AS] Kirsten Berger PTA     Bed Mobility, Assessment/Treatment    Bed Mob, Supine to Sit, [place]  verbal cues required;moderate assist (50% patient effort)  -AS     Bed Mob, Sit to Supine, [place]  not tested  -AS     Bed Mobility, Comment  verbal cues for log roll technique  -AS     Recorded by  [AS] Kirsten Berger PTA     Transfer Assessment/Treatment    Transfers, Sit-Stand [place] moderate assist (50% patient  effort)  -ES verbal cues required;moderate assist (50% patient effort);2 person assist required  -AS     Transfers, Stand-Sit [place] contact guard assist  -ES supervision required;minimum assist (75% patient effort);2 person assist required  -AS     Transfers, Sit-Stand-Sit, Assist Device rolling walker  -ES rolling walker  -AS     Transfer, Safety Issues weight-shifting ability decreased;step length decreased  -ES weight-shifting ability decreased;step length decreased  -AS     Transfer, Impairments pain;strength decreased  -ES pain;strength decreased  -AS     Transfer, Comment v/c for technique and hand placement  -ES verbal cues for technique and safe hand placement  -AS     Recorded by [ES] Sarah Pan, PT [AS] Kirsten Berger, JOANNE     Gait Assessment/Treatment    Gait, [place] Level contact guard assist  -ES verbal cues required;contact guard assist;2 person assist required  -AS     Gait, Assistive Device rolling walker  -ES rolling walker  -AS     Gait, Distance (Feet) 150  -ES 16  -AS     Gait, Gait Pattern Analysis swing-through gait  -ES      Gait, Gait Deviations forward flexed posture;vic decreased  -ES vic decreased;forward flexed posture;step length decreased  -AS     Gait, Safety Issues step length decreased;balance decreased during turns;weight-shifting ability decreased  -ES step length decreased;weight-shifting ability decreased  -AS     Gait, Impairments pain;strength decreased;ROM decreased  -ES strength decreased;pain  -AS     Gait, Comment Pt ambulated in hallway, requires extra time for pre-gait activities.  -ES verbal cues for posture and walker placement  -AS     Recorded by [ES] Sarah Pan, ROBERT [AS] Kirsten Berger, JOANNE     Therapy Exercises    Bilateral Lower Extremities AROM:;10 reps;sitting;ankle pumps/circles;LAQ;hip flexion  -ES AROM:;10 reps;sitting;ankle pumps/circles;hip flexion;LAQ  -AS     Recorded by [ES] Sarah Pan PT [AS] Kirsten Berger,  PTA     Positioning and Restraints    Pre-Treatment Position sitting in chair/recliner  -ES in bed  -AS     Post Treatment Position chair  -ES chair  -AS     In Chair notified nsg;reclined;call light within reach;encouraged to call for assist  -ES sitting;call light within reach;encouraged to call for assist;exit alarm on;waffle cushion  -AS     Recorded by [ES] Sarah Pan, PT [AS] Kirsten Berger, JOANNE       User Key  (r) = Recorded By, (t) = Taken By, (c) = Cosigned By    Initials Name Effective Dates    AS Kirsten Berger, PTA 06/22/15 -     ES Sarah Pan, PT 11/13/17 -                 IP PT Goals       03/03/18 1507 03/01/18 0846 02/28/18 1446    Bed Mobility PT LTG    Bed Mobility PT LTG, Date Established   02/28/18  -EH    Bed Mobility PT LTG, Time to Achieve   2 wks  -EH    Bed Mobility PT LTG, Activity Type   roll left/roll right;sidelying to sit/sit to sidelying  -EH    Bed Mobility PT LTG, [place] Level   conditional independence  -EH    Bed Mobility PT LTG, Date Goal Reviewed  03/01/18  -AS     Bed Mobility PT LTG, Outcome  goal ongoing  -AS     Transfer Training PT LTG    Transfer Training PT LTG, Date Established   02/28/18  -EH    Transfer Training PT LTG, Time to Achieve   2 wks  -EH    Transfer Training PT LTG, Activity Type   sit to stand/stand to sit  -EH    Transfer Training PT LTG, [place] Level   conditional independence  -EH    Transfer Training PT LTG, Assist Device   walker, rolling  -EH    Transfer Training PT  LTG, Date Goal Reviewed  03/01/18  -AS     Transfer Training PT LTG, Outcome  goal ongoing  -AS     Gait Training PT LTG    Gait Training Goal PT LTG, Date Established   02/28/18  -EH    Gait Training Goal PT LTG, Time to Achieve 2 wks  -ES  1 wk  -EH    Gait Training Goal PT LTG, [place] Level conditional independence  -ES  conditional independence  -EH    Gait Training Goal PT LTG, Assist Device walker, rolling  -ES  walker, rolling  -EH    Gait Training  Goal PT LTG, Distance to Achieve 150  -ES  150  -EH    Gait Training Goal PT LTG, Date Goal Reviewed  03/01/18  -AS     Gait Training Goal PT LTG, Outcome  goal ongoing  -AS goal ongoing  -EH      User Key  (r) = Recorded By, (t) = Taken By, (c) = Cosigned By    Initials Name Provider Type     Sil Castrejon, PT Physical Therapist    AS Kirsten Berger, JOANNE Physical Therapy Assistant    ES Sarah Pan, PT Physical Therapist          Physical Therapy Education     Title: PT OT SLP Therapies (Active)     Topic: Physical Therapy (Active)     Point: Mobility training (Active)    Learning Progress Summary    Learner Readiness Method Response Comment Documented by Status   Patient Acceptance E NR  ES 03/03/18 1501 Active    Acceptance E NR  AS 03/01/18 0845 Active    Acceptance E VU,NR   02/28/18 1446 Done               Point: Home exercise program (Active)    Learning Progress Summary    Learner Readiness Method Response Comment Documented by Status   Patient Acceptance E NR  ES 03/03/18 1501 Active    Acceptance E NR  AS 03/01/18 0845 Active    Acceptance E VU,NR   02/28/18 1446 Done               Point: Body mechanics (Active)    Learning Progress Summary    Learner Readiness Method Response Comment Documented by Status   Patient Acceptance E NR  ES 03/03/18 1501 Active    Acceptance E NR  AS 03/01/18 0845 Active    Acceptance E VU,NR   02/28/18 1446 Done               Point: Precautions (Active)    Learning Progress Summary    Learner Readiness Method Response Comment Documented by Status   Patient Acceptance E NR  ES 03/03/18 1501 Active    Acceptance E NR  AS 03/01/18 0845 Active    Acceptance E VU,NR   02/28/18 1446 Done                      User Key     Initials Effective Dates Name Provider Type [facility] 06/19/15 -  Sil Castrejon, PT Physical Therapist PT    AS 06/22/15 -  Kirsten Berger, JOANNE Physical Therapy Assistant PT    ES 11/13/17 -  Sarah Pan, PT Physical Therapist  PT                    PT Recommendation and Plan  Anticipated Discharge Disposition: long term acute care facility (dependent upon pt progression)  Planned Therapy Interventions: balance training, bed mobility training, gait training, home exercise program, transfer training, strengthening, ROM (Range of Motion)  PT Frequency: daily  Plan of Care Review  Plan Of Care Reviewed With: patient  Progress: progress toward functional goals as expected  Outcome Summary/Follow up Plan: Pt performed sit to stand with mod A with cues for safe hand placement.  Pt able to ambulate 150' with RW and CGA.  Limited by pain, decreased strength, and decreased ROM secondary to spinal precautions/pain.            Outcome Measures       03/03/18 1430 03/01/18 0811       How much help from another person do you currently need...    Turning from your back to your side while in flat bed without using bedrails? 2  -ES 2  -AS     Moving from lying on back to sitting on the side of a flat bed without bedrails? 2  -ES 2  -AS     Moving to and from a bed to a chair (including a wheelchair)? 2  -ES 2  -AS     Standing up from a chair using your arms (e.g., wheelchair, bedside chair)? 3  -ES 2  -AS     Climbing 3-5 steps with a railing? 2  -ES 2  -AS     To walk in hospital room? 3  -ES 3  -AS     AM-PAC 6 Clicks Score 14  -ES 13  -AS     Functional Assessment    Outcome Measure Options  AM-PAC 6 Clicks Basic Mobility (PT)  -AS       User Key  (r) = Recorded By, (t) = Taken By, (c) = Cosigned By    Initials Name Provider Type    AS Kirsten Berger, JOANNE Physical Therapy Assistant    RAMON Pan, PT Physical Therapist           Time Calculation:         PT Charges       03/03/18 1513          Time Calculation    Start Time 1430  -ES      PT Received On 03/03/18  -ES      PT Goal Re-Cert Due Date 03/10/18  -ES      Time Calculation- PT    Total Timed Code Minutes- PT 23 minute(s)  -ES        User Key  (r) = Recorded By, (t) = Taken By, (c) =  Cosigned By    Initials Name Provider Type    ES Sarah Pan PT Physical Therapist          Therapy Charges for Today     Code Description Service Date Service Provider Modifiers Qty    85601525888 HC GAIT TRAINING EA 15 MIN 3/3/2018 Sarah Pan, PT GP 1    12744931386 HC PT THER PROC EA 15 MIN 3/3/2018 Sarah Pan PT GP 1          PT G-Codes  Outcome Measure Options: AM-PAC 6 Clicks Basic Mobility (PT)    Sarah Pan PT  3/3/2018

## 2018-03-04 LAB
ALBUMIN SERPL-MCNC: 3.9 G/DL (ref 3.2–4.8)
ANION GAP SERPL CALCULATED.3IONS-SCNC: 10 MMOL/L (ref 3–11)
BUN BLD-MCNC: 23 MG/DL (ref 9–23)
BUN/CREAT SERPL: 13.5 (ref 7–25)
C3 SERPL-MCNC: 189 MG/DL (ref 82–167)
C4 SERPL-MCNC: 38 MG/DL (ref 14–44)
CALCIUM SPEC-SCNC: 9.8 MG/DL (ref 8.7–10.4)
CHLORIDE SERPL-SCNC: 109 MMOL/L (ref 99–109)
CO2 SERPL-SCNC: 21 MMOL/L (ref 20–31)
CREAT BLD-MCNC: 1.7 MG/DL (ref 0.6–1.3)
DEPRECATED RDW RBC AUTO: 55.4 FL (ref 37–54)
ERYTHROCYTE [DISTWIDTH] IN BLOOD BY AUTOMATED COUNT: 15.4 % (ref 11.3–14.5)
GFR SERPL CREATININE-BSD FRML MDRD: 41 ML/MIN/1.73
GLUCOSE BLD-MCNC: 94 MG/DL (ref 70–100)
HCT VFR BLD AUTO: 45.4 % (ref 38.9–50.9)
HGB BLD-MCNC: 14.7 G/DL (ref 13.1–17.5)
MCH RBC QN AUTO: 32.3 PG (ref 27–31)
MCHC RBC AUTO-ENTMCNC: 32.4 G/DL (ref 32–36)
MCV RBC AUTO: 99.8 FL (ref 80–99)
PHOSPHATE SERPL-MCNC: 2.8 MG/DL (ref 2.4–5.1)
PLATELET # BLD AUTO: 177 10*3/MM3 (ref 150–450)
PMV BLD AUTO: 11.7 FL (ref 6–12)
POTASSIUM BLD-SCNC: 3.5 MMOL/L (ref 3.5–5.5)
POTASSIUM BLD-SCNC: 3.5 MMOL/L (ref 3.5–5.5)
RBC # BLD AUTO: 4.55 10*6/MM3 (ref 4.2–5.76)
SODIUM BLD-SCNC: 140 MMOL/L (ref 132–146)
WBC NRBC COR # BLD: 7.27 10*3/MM3 (ref 3.5–10.8)

## 2018-03-04 PROCEDURE — 80069 RENAL FUNCTION PANEL: CPT | Performed by: NURSE PRACTITIONER

## 2018-03-04 PROCEDURE — 84132 ASSAY OF SERUM POTASSIUM: CPT | Performed by: INTERNAL MEDICINE

## 2018-03-04 PROCEDURE — 25010000002 MORPHINE SULFATE (PF) 2 MG/ML SOLUTION: Performed by: HOSPITALIST

## 2018-03-04 PROCEDURE — 99233 SBSQ HOSP IP/OBS HIGH 50: CPT | Performed by: INTERNAL MEDICINE

## 2018-03-04 PROCEDURE — 25010000002 HEPARIN (PORCINE) PER 1000 UNITS: Performed by: INTERNAL MEDICINE

## 2018-03-04 PROCEDURE — 85027 COMPLETE CBC AUTOMATED: CPT | Performed by: NURSE PRACTITIONER

## 2018-03-04 RX ORDER — BUMETANIDE 1 MG/1
1 TABLET ORAL DAILY
Status: DISCONTINUED | OUTPATIENT
Start: 2018-03-04 | End: 2018-03-05

## 2018-03-04 RX ADMIN — GABAPENTIN 100 MG: 100 CAPSULE ORAL at 20:59

## 2018-03-04 RX ADMIN — ROSUVASTATIN CALCIUM 20 MG: 20 TABLET, FILM COATED ORAL at 08:46

## 2018-03-04 RX ADMIN — BUMETANIDE 1 MG: 1 TABLET ORAL at 11:24

## 2018-03-04 RX ADMIN — ASPIRIN 81 MG: 81 TABLET, COATED ORAL at 08:46

## 2018-03-04 RX ADMIN — HYDROCODONE BITARTRATE AND ACETAMINOPHEN 1 TABLET: 5; 325 TABLET ORAL at 20:59

## 2018-03-04 RX ADMIN — DOCUSATE SODIUM 100 MG: 100 CAPSULE, LIQUID FILLED ORAL at 08:46

## 2018-03-04 RX ADMIN — HEPARIN SODIUM 5000 UNITS: 5000 INJECTION, SOLUTION INTRAVENOUS; SUBCUTANEOUS at 20:59

## 2018-03-04 RX ADMIN — HYDROCODONE BITARTRATE AND ACETAMINOPHEN 1 TABLET: 5; 325 TABLET ORAL at 08:46

## 2018-03-04 RX ADMIN — HEPARIN SODIUM 5000 UNITS: 5000 INJECTION, SOLUTION INTRAVENOUS; SUBCUTANEOUS at 06:46

## 2018-03-04 RX ADMIN — POTASSIUM CHLORIDE 40 MEQ: 750 CAPSULE, EXTENDED RELEASE ORAL at 15:08

## 2018-03-04 RX ADMIN — HEPARIN SODIUM 5000 UNITS: 5000 INJECTION, SOLUTION INTRAVENOUS; SUBCUTANEOUS at 15:07

## 2018-03-04 RX ADMIN — GABAPENTIN 100 MG: 100 CAPSULE ORAL at 15:07

## 2018-03-04 RX ADMIN — DOCUSATE SODIUM 100 MG: 100 CAPSULE, LIQUID FILLED ORAL at 20:59

## 2018-03-04 RX ADMIN — GABAPENTIN 100 MG: 100 CAPSULE ORAL at 08:46

## 2018-03-04 RX ADMIN — HYDROCODONE BITARTRATE AND ACETAMINOPHEN 1 TABLET: 5; 325 TABLET ORAL at 15:12

## 2018-03-04 RX ADMIN — MORPHINE SULFATE 1 MG: 2 INJECTION, SOLUTION INTRAMUSCULAR; INTRAVENOUS at 06:46

## 2018-03-04 NOTE — PLAN OF CARE
"Problem: Patient Care Overview (Adult)  Goal: Plan of Care Review  Outcome: Ongoing (interventions implemented as appropriate)   03/04/18 1247   Coping/Psychosocial Response Interventions   Plan Of Care Reviewed With patient   Outcome Evaluation   Outcome Summary/Follow up Plan pt stable this shift. C/o back pain that is chronic. PRN pain meds given. Pt reports that meds help \"take the edge off.\" Pt having Kyphoplasty Monday. NPO after midnight tonight.     Goal: Adult Individualization and Mutuality  Outcome: Ongoing (interventions implemented as appropriate)    Goal: Discharge Needs Assessment  Outcome: Ongoing (interventions implemented as appropriate)      Problem: Cardiac: Heart Failure (Adult)  Goal: Signs and Symptoms of Listed Potential Problems Will be Absent or Manageable (Cardiac: Heart Failure)  Outcome: Ongoing (interventions implemented as appropriate)      Problem: Skin Integrity Impairment, Risk/Actual (Adult)  Goal: Identify Related Risk Factors and Signs and Symptoms  Outcome: Ongoing (interventions implemented as appropriate)    Goal: Skin Integrity/Wound Healing  Outcome: Ongoing (interventions implemented as appropriate)      Problem: Fall Risk (Adult)  Goal: Identify Related Risk Factors and Signs and Symptoms  Outcome: Ongoing (interventions implemented as appropriate)    Goal: Absence of Falls  Outcome: Ongoing (interventions implemented as appropriate)        "

## 2018-03-04 NOTE — PLAN OF CARE
Problem: Patient Care Overview (Adult)  Goal: Plan of Care Review  Outcome: Ongoing (interventions implemented as appropriate)    Goal: Adult Individualization and Mutuality  Outcome: Ongoing (interventions implemented as appropriate)    Goal: Discharge Needs Assessment  Outcome: Ongoing (interventions implemented as appropriate)      Problem: Cardiac: Heart Failure (Adult)  Goal: Signs and Symptoms of Listed Potential Problems Will be Absent or Manageable (Cardiac: Heart Failure)  Outcome: Ongoing (interventions implemented as appropriate)      Problem: Skin Integrity Impairment, Risk/Actual (Adult)  Goal: Identify Related Risk Factors and Signs and Symptoms  Outcome: Ongoing (interventions implemented as appropriate)    Goal: Skin Integrity/Wound Healing  Outcome: Ongoing (interventions implemented as appropriate)      Problem: Fall Risk (Adult)  Goal: Identify Related Risk Factors and Signs and Symptoms  Outcome: Ongoing (interventions implemented as appropriate)    Goal: Absence of Falls  Outcome: Ongoing (interventions implemented as appropriate)

## 2018-03-04 NOTE — PROGRESS NOTES
[facility] Medicine Services  PROGRESS NOTE    Patient Name: Akshat Winkler  : 1955  MRN: [id number]    Date of Admission: 2018  Length of Stay: 5  Primary Care Physician: Oren Mark MD    Subjective   Subjective     CC:  F/u ARF    HPI:  Wife at bedside.  Continues to improve (down 17L from admission).  HR remains elevated.    Review of Systems   Constitutional: Negative for fever.   Respiratory: Positive for shortness of breath.    Cardiovascular: Positive for leg swelling. Negative for chest pain.   Neurological: Positive for weakness.   All other systems reviewed and are negative.    Otherwise ROS is negative except as mentioned in the HPI.    Objective   Objective     Vital Signs:   Temp:  [97.9 °F (36.6 °C)-99 °F (37.2 °C)] 99 °F (37.2 °C)  Heart Rate:  [111-123] 123  Resp:  [18] 18  BP: (131-137)/(84-97) 133/84        Physical Exam:  Constitutional: No acute distress, awake, alert, up in bed  HENT: NCAT, mucous membranes moist  Respiratory: Clear to auscultation bilaterally, respiratory effort normal   Cardiovascular: irregular and tachy, no murmurs, rubs, or gallops  Gastrointestinal: Positive bowel sounds, soft, nontender, nondistended, obese  Musculoskeletal: 2+ bilateral ankle edema (improved), some chronic venous stasis changes  : azul in place with dark urine  Psychiatric: Appropriate affect, cooperative  Neurologic: Oriented x 3, no focal deficits, speech clear  Skin: No rashes  Exam is unchanged from previous    Results Reviewed:  I have personally reviewed current lab, radiology, and data and agree.      Results from last 7 days  Lab Units 18  0738 18  0559 18  0926   WBC 10*3/mm3 7.27 5.35 7.97   HEMOGLOBIN g/dL 14.7 12.7* 13.8   HEMATOCRIT % 45.4 39.1 42.4   PLATELETS 10*3/mm3 177 162 175   INR   --   --  1.20*       Results from last 7 days  Lab Units 18  0738 18  2054 18  0757  18  0649  03/01/18  1337  02/27/18  0926   SODIUM mmol/L 140  --   --   --  142 143  < > 135   POTASSIUM mmol/L 3.5 3.7 3.5  < > 2.7* 3.1*  < > 4.4   CHLORIDE mmol/L 109  --   --   --  109 106  < > 96*   CO2 mmol/L 21.0  --   --   --  24.0 23.0  < > 24.0   BUN mg/dL 23  --   --   --  43* 59*  < > 81*   CREATININE mg/dL 1.70*  --   --   --  3.30* 4.60*  < > 7.10*   GLUCOSE mg/dL 94  --   --   --  99 148*  < > 115*   CALCIUM mg/dL 9.8  --   --   --  9.6 9.5  < > 9.6   ALT (SGPT) U/L  --   --   --   --   --   --   --  28   AST (SGOT) U/L  --   --   --   --   --   --   --  44*   TROPONIN I ng/mL  --   --   --   --   --   --   --  0.021   < > = values in this interval not displayed.  Estimated Creatinine Clearance: 62.4 mL/min (by C-G formula based on Cr of 1.7).  No results found for: BNP  No results found for: PHART    Microbiology Results Abnormal     Procedure Component Value - Date/Time    Urine Culture - Urine, Urine, Clean Catch [[id number]]  (Abnormal)  (Susceptibility) Collected:  02/28/18 1448    Lab Status:  Final result Specimen:  Urine from Urine, Clean Catch Updated:  03/02/18 1326     Urine Culture --      30,000-40,000 CFU/mL Enterococcus faecalis (A)    Susceptibility      Enterococcus faecalis     GOPAL     Ampicillin <=2 ug/ml Susceptible     Gentamicin High Level Synergy <=500 ug/ml Susceptible     Levofloxacin <=1 ug/ml Susceptible     Linezolid 2 ug/ml Susceptible     Nitrofurantoin <=32 ug/ml Susceptible     Penicillin G 2 ug/ml Susceptible     Streptomycin High Level Synergy <=1000 ug/ml Susceptible     Tetracycline >8 ug/ml Resistant     Vancomycin 2 ug/ml Susceptible                    Eosinophil Smear - Urine, Urine, Clean Catch [[id number]]  (Normal) Collected:  02/28/18 1448    Lab Status:  Final result Specimen:  Urine from Urine, Clean Catch Updated:  02/28/18 2356     Eosinophil Smear 0 % EOS/100 Cells     Narrative:       No eosinophil seen          Imaging Results (last 24 hours)     ** No results  found for the last 24 hours. **        Results for orders placed during the hospital encounter of 02/27/18   Adult Transthoracic Echo Complete W/ Cont if Necessary Per Protocol    Narrative · Technically difficult study due to body habitus  · Left ventricular systolic function is normal. Estimated EF = 60%.  · The cardiac valves are poorly visualized but there does not appear to be   significant stenotic or regurgitant lesions.  · Right ventricular cavity is mild-to-moderately dilated.  · Atrial flutter noted throughout the examination.          I have reviewed the medications.    Assessment/Plan   Assessment / Plan     Hospital Problem List     * (Principal)Acute renal failure    Acute on chronic diastolic heart failure    Overview Addendum 11/28/2017  5:15 PM by Lucian Alvarez IV, MD     · Cardiac cath (02/20/14):  elevated LVEDP suggesting diastolic heart failure.  · Intolerant to beta blocker therapy         Essential hypertension    Coronary artery disease involving native coronary artery of native heart without angina pectoris    Overview Addendum 5/9/2017  9:00 AM by FAITH Roy       · Cardiac PET (01/2014): partially reversible anteroseptal defect, normal LVEF.  · Cardiac cath (02/20/14): mild nonobstructive CAD, LVEF 55%, elevated LVEDP suggesting diastolic heart failure.         Class 3 obesity in adult    Persistent atrial fibrillation/flutter    Overview Addendum 12/26/2017  7:42 AM by Lucian Alvarez IV, MD     · Diagnosed 2011  · [id number] 3 (HTN, CAD, DM)  · Echo (10/11/2011): Normal LVEF, mild MR, mild LA dilation  · LOLY/ECVcardioversion, 12/21/2011, with initiation of propafenone therapy.   · Successful LOLY/ECV for recurrent atrial fibrillation, 11/15/2013  · PVA with Dr. Cary, 6/29/2015  · Cardioversion (07/17/2015) for atrial flutter occurring post ablation   · ECV for recurrent atrial flutter, 12/20/17 with reattempt at beta blocker/flecainide.  · Intolerant  to beta blocker/flecanide with severe hypotension, intolerant to propafenone         Compression fracture of lumbar vertebra    Hypokalemia             Brief Hospital Course to date:  Akshat Winkler is a 63 y.o. male with a-flutter, obeisity, chronic diastolic CHF, recent compression fx presents with ARF.  Had Cr of 5 in 12/2017 but returned to normal.  Seen in CHF clinic on 2/26 and noted to be hypotensive.  Labs returned Cr 6.1 and called to come in.      Assessment & Plan:  - Cr is almost back to baseline.   History seems most consistent with pre-renal from possible hypotension and over-diuresis (was 70s SBP in CHF clinic).  Also possible obstructive (does mention some difficulty getting started) but US is normal.   Has been auto-diuresing well (down 17L since admit)  - trial azul out tomorrow after procedure  - hold diuretics, consider restarting ACE soon if ok with renal  - hold eliquis until after kyphoplasty  - has paroxysmal flutter s/p ECV in 12/2017, set up to see Jose D as outpatient.  He has had persistent tachycardia last couple days.  Per my previous discussion with Dr. Alvarez, he is intolerant of any AV joby bloking agents (BB, sotaolol, tikosyn).  Given lack of effect med options, will ask Jose D to see tomorrow while he is here.  - has back pain related to recent compression fx, followed by Mark.  D/w Dr. Flores on Friday, he will tenatively plan on kyphoplasty on Monday if medically appropriate.  NPO p MN.  - continue PT/OT, he plans home at discharge    DVT Prophylaxis:  SQ heparin    CODE STATUS: Full Code    Disposition: I expect the patient to be discharged home possibly 2-3 days.    High complexity.    Electronically signed by Christian Tavares MD, 03/04/18, 10:18 AM

## 2018-03-04 NOTE — PROGRESS NOTES
"   LOS: 5 days    Patient Care Team:  Oren Mark MD as PCP - General (Family Medicine)  Lucian Alvarez IV, MD as Consulting Physician (Cardiology)  Mandy Clark PA-C as Physician Assistant (Physician Assistant)  Josh Moss MD as Consulting Physician (Pain Medicine)    Chief Complaint:  CHF    Subjective     Interval History:     No acute events overnight. No new complaints.     Review of Systems:   No CP or SOA    Objective     Vital Sign Min/Max for last 24 hours  Temp  Min: 97.9 °F (36.6 °C)  Max: 98.4 °F (36.9 °C)   BP  Min: 118/87  Max: 140/102   Pulse  Min: 96  Max: 114   Resp  Min: 18  Max: 18   SpO2  Min: 96 %  Max: 98 %   No Data Recorded   No Data Recorded     Flowsheet Rows         First Filed Value    Admission Height  177.8 cm (70\") Documented at 02/27/2018 0848    Admission Weight  132 kg (290 lb) Documented at 02/27/2018 0848             I/O last 3 completed shifts:  In: 960 [P.O.:960]  Out: 3100 [Urine:3100]    Physical Exam:     General Appearance:    Alert, cooperative, in no acute distress   Head:    Normocephalic, without obvious abnormality, atraumatic               Neck:   No adenopathy, supple, trachea midline, no thyromegaly, no   carotid bruit, no JVD       Lungs:     Clear to auscultation,respirations regular, even and                  unlabored    Heart:    Regular rhythm and normal rate, normal S1 and S2, no            murmur, no gallop, no rub, no click   Chest Wall:    No abnormalities observed   Abdomen:     Normal bowel sounds, no masses, no organomegaly, soft        non-tender, non-distended, no guarding, no rebound                tenderness   Rectal:     Deferred   Extremities:   Moves all extremities well, +2  edema, no cyanosis, no             redness               Neurologic:   Cranial nerves 2 - 12 grossly intact, sensation intact, DTR       present and equal bilaterally       WBC No results found for: WBC   HGB No results found for: HGB "   HCT No results found for: HCT   Platlets No results found for: LABPLAT   MCV No results found for: MCV       Sodium Sodium   Date Value Ref Range Status   03/04/2018 140 132 - 146 mmol/L Final   03/02/2018 142 132 - 146 mmol/L Final   03/01/2018 143 132 - 146 mmol/L Final      Potassium Potassium   Date Value Ref Range Status   03/04/2018 3.5 3.5 - 5.5 mmol/L Final   03/03/2018 3.7 3.5 - 5.5 mmol/L Final   03/03/2018 3.5 3.5 - 5.5 mmol/L Final   03/02/2018 3.0 (L) 3.5 - 5.5 mmol/L Final   03/02/2018 2.7 (C) 3.5 - 5.5 mmol/L Final   03/01/2018 3.1 (L) 3.5 - 5.5 mmol/L Final      Chloride Chloride   Date Value Ref Range Status   03/04/2018 109 99 - 109 mmol/L Final   03/02/2018 109 99 - 109 mmol/L Final   03/01/2018 106 99 - 109 mmol/L Final      CO2 CO2   Date Value Ref Range Status   03/04/2018 21.0 20.0 - 31.0 mmol/L Final   03/02/2018 24.0 20.0 - 31.0 mmol/L Final   03/01/2018 23.0 20.0 - 31.0 mmol/L Final      BUN BUN   Date Value Ref Range Status   03/02/2018 43 (H) 9 - 23 mg/dL Final   03/01/2018 59 (H) 9 - 23 mg/dL Final      Creatinine Creatinine   Date Value Ref Range Status   03/04/2018 1.70 (H) 0.60 - 1.30 mg/dL Final   03/02/2018 3.30 (H) 0.60 - 1.30 mg/dL Final   03/01/2018 4.60 (H) 0.60 - 1.30 mg/dL Final      Calcium Calcium   Date Value Ref Range Status   03/04/2018 9.8 8.7 - 10.4 mg/dL Final   03/02/2018 9.6 8.7 - 10.4 mg/dL Final   03/01/2018 9.5 8.7 - 10.4 mg/dL Final      PO4 No results found for: CAPO4   Albumin Albumin   Date Value Ref Range Status   03/02/2018 3.70 3.20 - 4.80 g/dL Final      Magnesium Magnesium   Date Value Ref Range Status   03/03/2018 1.3 1.3 - 2.7 mg/dL Final   03/02/2018 1.4 1.3 - 2.7 mg/dL Final      Uric Acid No results found for: URICACID        Results Review:     I reviewed the patient's new clinical results.      aspirin 81 mg Oral Daily   docusate sodium 100 mg Oral BID   gabapentin 100 mg Oral TID   gabapentin 100 mg Oral Q12H   heparin (porcine) 5,000 Units  Subcutaneous Q8H   pharmacy consult - MTM  Does not apply Daily   rosuvastatin 20 mg Oral Daily        Renal Ultrasound:    FINDINGS: The right kidney measures 13.4 cm in length.  The left kidney  measures 12.6 cm in length. There is no evidence of renal mass, stone or  obstruction.      IMPRESSION:  Normal ultrasound of the kidneys.    Medication Review:     Assessment/Plan     Principal Problem:    Acute renal failure  Active Problems:    Acute on chronic diastolic heart failure    Essential hypertension    Coronary artery disease involving native coronary artery of native heart without angina pectoris    Class 3 obesity in adult    Persistent atrial fibrillation/flutter    Compression fracture of lumbar vertebra    Hypokalemia      1- MARKO - Non oliguric -Scr 1.7 improved form 7.1 - Pre-renal vs renal vs SNIDER.  Hypotensive at 71 systolic. At home was on spironolactone, lisinopril and bumex. FeNa 4.2 % baseline Scr 1.1 -1.2 range.   2- Proteinuria -UPCR 1.5 gm. Complement level normal. Serologic workup pendng.   3- Dysuria   4- HTN -  Not controlled  5- Volume overload  6- Hypokalemia - improving.     Plan:  - Continue with  Potassium replacement protocol.    - Continue to hold spironolactone.  - Restart bumex- will do at 1mg po q daily for now.   - Renal diet.   - Avoid nephrotoxic agents.   - Monitor I/O.  - Adjust med per renal function.   - No need of dialysis.       Jens Chan MD  03/04/18  7:45 AM

## 2018-03-05 ENCOUNTER — ANESTHESIA EVENT (OUTPATIENT)
Dept: PERIOP | Facility: HOSPITAL | Age: 63
End: 2018-03-05

## 2018-03-05 ENCOUNTER — ANESTHESIA (OUTPATIENT)
Dept: PERIOP | Facility: HOSPITAL | Age: 63
End: 2018-03-05

## 2018-03-05 ENCOUNTER — APPOINTMENT (OUTPATIENT)
Dept: GENERAL RADIOLOGY | Facility: HOSPITAL | Age: 63
End: 2018-03-05

## 2018-03-05 LAB
ALBUMIN SERPL-MCNC: 2.8 G/DL (ref 2.9–4.4)
ALBUMIN/GLOB SERPL: 0.7 {RATIO} (ref 0.7–1.7)
ALPHA1 GLOB FLD ELPH-MCNC: 0.4 G/DL (ref 0–0.4)
ALPHA2 GLOB SERPL ELPH-MCNC: 1 G/DL (ref 0.4–1)
ANA SER QL: NEGATIVE
ANION GAP SERPL CALCULATED.3IONS-SCNC: 10 MMOL/L (ref 3–11)
B-GLOBULIN SERPL ELPH-MCNC: 1.3 G/DL (ref 0.7–1.3)
BUN BLD-MCNC: 19 MG/DL (ref 9–23)
BUN/CREAT SERPL: 11.9 (ref 7–25)
C-ANCA TITR SER IF: ABNORMAL TITER
CALCIUM SPEC-SCNC: 9.8 MG/DL (ref 8.7–10.4)
CHLORIDE SERPL-SCNC: 107 MMOL/L (ref 99–109)
CO2 SERPL-SCNC: 22 MMOL/L (ref 20–31)
CREAT BLD-MCNC: 1.6 MG/DL (ref 0.6–1.3)
DSDNA AB SER-ACNC: 1 IU/ML (ref 0–9)
GAMMA GLOB SERPL ELPH-MCNC: 1.4 G/DL (ref 0.4–1.8)
GBM IGG SER-ACNC: 4 UNITS (ref 0–20)
GFR SERPL CREATININE-BSD FRML MDRD: 44 ML/MIN/1.73
GLOBULIN SER CALC-MCNC: 4.2 G/DL (ref 2.2–3.9)
GLUCOSE BLD-MCNC: 87 MG/DL (ref 70–100)
GLUCOSE BLDC GLUCOMTR-MCNC: 80 MG/DL (ref 70–130)
Lab: ABNORMAL
M-SPIKE: ABNORMAL G/DL
MYELOPEROXIDASE AB SER-ACNC: <9 U/ML (ref 0–9)
P-ANCA ATYPICAL TITR SER IF: ABNORMAL TITER
P-ANCA TITR SER IF: ABNORMAL TITER
POTASSIUM BLD-SCNC: 3.3 MMOL/L (ref 3.5–5.5)
PROT PATTERN SERPL ELPH-IMP: ABNORMAL
PROT SERPL-MCNC: 7 G/DL (ref 6–8.5)
PROTEINASE3 AB SER IA-ACNC: <3.5 U/ML (ref 0–3.5)
SODIUM BLD-SCNC: 139 MMOL/L (ref 132–146)

## 2018-03-05 PROCEDURE — 25010000002 HEPARIN (PORCINE) PER 1000 UNITS: Performed by: INTERNAL MEDICINE

## 2018-03-05 PROCEDURE — 25010000002 FENTANYL CITRATE (PF) 100 MCG/2ML SOLUTION: Performed by: NURSE ANESTHETIST, CERTIFIED REGISTERED

## 2018-03-05 PROCEDURE — 25010000003 CEFAZOLIN 1 GM/50ML SOLUTION: Performed by: NEUROLOGICAL SURGERY

## 2018-03-05 PROCEDURE — 25010000002 ONDANSETRON PER 1 MG: Performed by: NURSE ANESTHETIST, CERTIFIED REGISTERED

## 2018-03-05 PROCEDURE — C1713 ANCHOR/SCREW BN/BN,TIS/BN: HCPCS | Performed by: NEUROLOGICAL SURGERY

## 2018-03-05 PROCEDURE — 0 IOPAMIDOL 41 % SOLUTION: Performed by: NEUROLOGICAL SURGERY

## 2018-03-05 PROCEDURE — 0QU03JZ SUPPLEMENT LUMBAR VERTEBRA WITH SYNTHETIC SUBSTITUTE, PERCUTANEOUS APPROACH: ICD-10-PCS | Performed by: NEUROLOGICAL SURGERY

## 2018-03-05 PROCEDURE — 80048 BASIC METABOLIC PNL TOTAL CA: CPT | Performed by: INTERNAL MEDICINE

## 2018-03-05 PROCEDURE — 99233 SBSQ HOSP IP/OBS HIGH 50: CPT | Performed by: INTERNAL MEDICINE

## 2018-03-05 PROCEDURE — 22514 PERQ VERTEBRAL AUGMENTATION: CPT | Performed by: NEUROLOGICAL SURGERY

## 2018-03-05 PROCEDURE — 22510 PERQ CERVICOTHORACIC INJECT: CPT

## 2018-03-05 PROCEDURE — 25010000002 PROPOFOL 10 MG/ML EMULSION: Performed by: NURSE ANESTHETIST, CERTIFIED REGISTERED

## 2018-03-05 PROCEDURE — 25010000002 MORPHINE SULFATE (PF) 2 MG/ML SOLUTION: Performed by: HOSPITALIST

## 2018-03-05 PROCEDURE — 25010000002 DEXAMETHASONE PER 1 MG: Performed by: NURSE ANESTHETIST, CERTIFIED REGISTERED

## 2018-03-05 PROCEDURE — 0QS03ZZ REPOSITION LUMBAR VERTEBRA, PERCUTANEOUS APPROACH: ICD-10-PCS | Performed by: NEUROLOGICAL SURGERY

## 2018-03-05 PROCEDURE — 82962 GLUCOSE BLOOD TEST: CPT

## 2018-03-05 DEVICE — BONE CEMENT C10A KYPHON ACTIVOS 10
Type: IMPLANTABLE DEVICE | Site: SPINE LUMBAR | Status: FUNCTIONAL
Brand: ACTIVOS™ 10 BONE CEMENT

## 2018-03-05 RX ORDER — BUMETANIDE 1 MG/1
1 TABLET ORAL 2 TIMES DAILY
Status: DISCONTINUED | OUTPATIENT
Start: 2018-03-05 | End: 2018-03-06 | Stop reason: HOSPADM

## 2018-03-05 RX ORDER — ATRACURIUM BESYLATE 10 MG/ML
INJECTION, SOLUTION INTRAVENOUS AS NEEDED
Status: DISCONTINUED | OUTPATIENT
Start: 2018-03-05 | End: 2018-03-05 | Stop reason: SURG

## 2018-03-05 RX ORDER — FAMOTIDINE 20 MG/1
20 TABLET, FILM COATED ORAL ONCE
Status: COMPLETED | OUTPATIENT
Start: 2018-03-05 | End: 2018-03-05

## 2018-03-05 RX ORDER — SODIUM CHLORIDE, SODIUM LACTATE, POTASSIUM CHLORIDE, CALCIUM CHLORIDE 600; 310; 30; 20 MG/100ML; MG/100ML; MG/100ML; MG/100ML
INJECTION, SOLUTION INTRAVENOUS CONTINUOUS PRN
Status: DISCONTINUED | OUTPATIENT
Start: 2018-03-05 | End: 2018-03-05 | Stop reason: SURG

## 2018-03-05 RX ORDER — LIDOCAINE HYDROCHLORIDE 10 MG/ML
0.5 INJECTION, SOLUTION EPIDURAL; INFILTRATION; INTRACAUDAL; PERINEURAL ONCE AS NEEDED
Status: COMPLETED | OUTPATIENT
Start: 2018-03-05 | End: 2018-03-05

## 2018-03-05 RX ORDER — SODIUM CHLORIDE 0.9 % (FLUSH) 0.9 %
1-10 SYRINGE (ML) INJECTION AS NEEDED
Status: DISCONTINUED | OUTPATIENT
Start: 2018-03-05 | End: 2018-03-05 | Stop reason: HOSPADM

## 2018-03-05 RX ORDER — PROMETHAZINE HYDROCHLORIDE 25 MG/1
25 SUPPOSITORY RECTAL ONCE AS NEEDED
Status: DISCONTINUED | OUTPATIENT
Start: 2018-03-05 | End: 2018-03-05 | Stop reason: HOSPADM

## 2018-03-05 RX ORDER — ONDANSETRON 2 MG/ML
4 INJECTION INTRAMUSCULAR; INTRAVENOUS ONCE AS NEEDED
Status: DISCONTINUED | OUTPATIENT
Start: 2018-03-05 | End: 2018-03-05 | Stop reason: HOSPADM

## 2018-03-05 RX ORDER — PROMETHAZINE HYDROCHLORIDE 25 MG/ML
6.25 INJECTION, SOLUTION INTRAMUSCULAR; INTRAVENOUS ONCE AS NEEDED
Status: DISCONTINUED | OUTPATIENT
Start: 2018-03-05 | End: 2018-03-05 | Stop reason: HOSPADM

## 2018-03-05 RX ORDER — SODIUM CHLORIDE 9 MG/ML
9 INJECTION, SOLUTION INTRAVENOUS CONTINUOUS PRN
Status: DISCONTINUED | OUTPATIENT
Start: 2018-03-05 | End: 2018-03-05 | Stop reason: HOSPADM

## 2018-03-05 RX ORDER — SODIUM CHLORIDE, SODIUM LACTATE, POTASSIUM CHLORIDE, CALCIUM CHLORIDE 600; 310; 30; 20 MG/100ML; MG/100ML; MG/100ML; MG/100ML
9 INJECTION, SOLUTION INTRAVENOUS CONTINUOUS
Status: CANCELLED | OUTPATIENT
Start: 2018-03-05

## 2018-03-05 RX ORDER — SPIRONOLACTONE 25 MG/1
25 TABLET ORAL DAILY
Status: DISCONTINUED | OUTPATIENT
Start: 2018-03-05 | End: 2018-03-06 | Stop reason: HOSPADM

## 2018-03-05 RX ORDER — LIDOCAINE HYDROCHLORIDE 10 MG/ML
INJECTION, SOLUTION EPIDURAL; INFILTRATION; INTRACAUDAL; PERINEURAL AS NEEDED
Status: DISCONTINUED | OUTPATIENT
Start: 2018-03-05 | End: 2018-03-05 | Stop reason: SURG

## 2018-03-05 RX ORDER — DEXAMETHASONE SODIUM PHOSPHATE 10 MG/ML
INJECTION INTRAMUSCULAR; INTRAVENOUS AS NEEDED
Status: DISCONTINUED | OUTPATIENT
Start: 2018-03-05 | End: 2018-03-05 | Stop reason: SURG

## 2018-03-05 RX ORDER — ONDANSETRON 2 MG/ML
INJECTION INTRAMUSCULAR; INTRAVENOUS AS NEEDED
Status: DISCONTINUED | OUTPATIENT
Start: 2018-03-05 | End: 2018-03-05 | Stop reason: SURG

## 2018-03-05 RX ORDER — FENTANYL CITRATE 50 UG/ML
INJECTION, SOLUTION INTRAMUSCULAR; INTRAVENOUS AS NEEDED
Status: DISCONTINUED | OUTPATIENT
Start: 2018-03-05 | End: 2018-03-05 | Stop reason: SURG

## 2018-03-05 RX ORDER — PROMETHAZINE HYDROCHLORIDE 25 MG/1
25 TABLET ORAL ONCE AS NEEDED
Status: DISCONTINUED | OUTPATIENT
Start: 2018-03-05 | End: 2018-03-05 | Stop reason: HOSPADM

## 2018-03-05 RX ORDER — FENTANYL CITRATE 50 UG/ML
50 INJECTION, SOLUTION INTRAMUSCULAR; INTRAVENOUS
Status: DISCONTINUED | OUTPATIENT
Start: 2018-03-05 | End: 2018-03-05 | Stop reason: HOSPADM

## 2018-03-05 RX ORDER — PROPOFOL 10 MG/ML
VIAL (ML) INTRAVENOUS AS NEEDED
Status: DISCONTINUED | OUTPATIENT
Start: 2018-03-05 | End: 2018-03-05 | Stop reason: SURG

## 2018-03-05 RX ADMIN — ONDANSETRON 4 MG: 2 INJECTION INTRAMUSCULAR; INTRAVENOUS at 10:12

## 2018-03-05 RX ADMIN — ATRACURIUM BESYLATE 40 MG: 10 INJECTION, SOLUTION INTRAVENOUS at 10:11

## 2018-03-05 RX ADMIN — FENTANYL CITRATE 50 MCG: 50 INJECTION INTRAMUSCULAR; INTRAVENOUS at 11:25

## 2018-03-05 RX ADMIN — FENTANYL CITRATE 100 MCG: 50 INJECTION, SOLUTION INTRAMUSCULAR; INTRAVENOUS at 10:05

## 2018-03-05 RX ADMIN — MORPHINE SULFATE 1 MG: 2 INJECTION, SOLUTION INTRAMUSCULAR; INTRAVENOUS at 14:05

## 2018-03-05 RX ADMIN — CEFAZOLIN SODIUM 1 G: 1 INJECTION, SOLUTION INTRAVENOUS at 16:50

## 2018-03-05 RX ADMIN — LIDOCAINE HYDROCHLORIDE 0.5 ML: 10 INJECTION, SOLUTION EPIDURAL; INFILTRATION; INTRACAUDAL; PERINEURAL at 08:15

## 2018-03-05 RX ADMIN — LIDOCAINE HYDROCHLORIDE 50 MG: 10 INJECTION, SOLUTION EPIDURAL; INFILTRATION; INTRACAUDAL; PERINEURAL at 10:05

## 2018-03-05 RX ADMIN — DOCUSATE SODIUM 100 MG: 100 CAPSULE, LIQUID FILLED ORAL at 20:28

## 2018-03-05 RX ADMIN — SPIRONOLACTONE 25 MG: 25 TABLET, FILM COATED ORAL at 15:22

## 2018-03-05 RX ADMIN — SODIUM CHLORIDE 9 ML/HR: 9 INJECTION, SOLUTION INTRAVENOUS at 08:15

## 2018-03-05 RX ADMIN — GABAPENTIN 100 MG: 100 CAPSULE ORAL at 20:28

## 2018-03-05 RX ADMIN — FAMOTIDINE 20 MG: 20 TABLET, FILM COATED ORAL at 08:25

## 2018-03-05 RX ADMIN — FENTANYL CITRATE 50 MCG: 50 INJECTION INTRAMUSCULAR; INTRAVENOUS at 11:35

## 2018-03-05 RX ADMIN — HYDROCODONE BITARTRATE AND ACETAMINOPHEN 1 TABLET: 5; 325 TABLET ORAL at 20:59

## 2018-03-05 RX ADMIN — HEPARIN SODIUM 5000 UNITS: 5000 INJECTION, SOLUTION INTRAVENOUS; SUBCUTANEOUS at 15:18

## 2018-03-05 RX ADMIN — POTASSIUM CHLORIDE 40 MEQ: 750 CAPSULE, EXTENDED RELEASE ORAL at 15:23

## 2018-03-05 RX ADMIN — HYDROCODONE BITARTRATE AND ACETAMINOPHEN 1 TABLET: 5; 325 TABLET ORAL at 12:12

## 2018-03-05 RX ADMIN — BUMETANIDE 1 MG: 1 TABLET ORAL at 20:28

## 2018-03-05 RX ADMIN — PROPOFOL 200 MG: 10 INJECTION, EMULSION INTRAVENOUS at 10:05

## 2018-03-05 RX ADMIN — POTASSIUM CHLORIDE 40 MEQ: 750 CAPSULE, EXTENDED RELEASE ORAL at 20:28

## 2018-03-05 RX ADMIN — SODIUM CHLORIDE, POTASSIUM CHLORIDE, SODIUM LACTATE AND CALCIUM CHLORIDE: 600; 310; 30; 20 INJECTION, SOLUTION INTRAVENOUS at 10:04

## 2018-03-05 RX ADMIN — GABAPENTIN 100 MG: 100 CAPSULE ORAL at 15:18

## 2018-03-05 RX ADMIN — DEXAMETHASONE SODIUM PHOSPHATE 8 MG: 10 INJECTION INTRAMUSCULAR; INTRAVENOUS at 10:12

## 2018-03-05 RX ADMIN — HEPARIN SODIUM 5000 UNITS: 5000 INJECTION, SOLUTION INTRAVENOUS; SUBCUTANEOUS at 20:29

## 2018-03-05 NOTE — PROGRESS NOTES
[facility] Medicine Services  PROGRESS NOTE    Patient Name: Akshat Winkler  : 1955  MRN: [id number]    Date of Admission: 2018  Length of Stay: 6  Primary Care Physician: Oren Mark MD    Subjective   Subjective     CC: f/u back pain    HPI: Patient seen after kyphoplasty. Doing relatively well. Comfortable. No complaints.    Review of Systems  Gen- No fevers, chills  CV- No chest pain, palpitations  Resp- No cough, dyspnea  GI- No N/V/D, abd pain    Otherwise ROS is negative except as mentioned in the HPI.    Objective   Objective     Vital Signs:   Temp:  [98 °F (36.7 °C)-99.1 °F (37.3 °C)] 98.4 °F (36.9 °C)  Heart Rate:  [] 114  Resp:  [16-20] 18  BP: ()/(71-95) 124/91        Physical Exam:  Constitutional: No acute distress, awake, alert  HENT: NCAT, mucous membranes moist  Respiratory: Clear to auscultation bilaterally, respiratory effort normal   Cardiovascular: RRR, no murmurs, rubs, or gallops, palpable pedal pulses bilaterally  Gastrointestinal: Positive bowel sounds, soft, nontender, nondistended  : [place]  Musculoskeletal: Tr edema bilaterally  Psychiatric: Appropriate affect, cooperative  Neurologic: Oriented x 3, strength symmetric in all extremities, Cranial Nerves grossly intact to confrontation, speech clear  Skin: Chronic venous stasis changes in LE.    Results Reviewed:  I have personally reviewed current lab, radiology, and data and agree.      Results from last 7 days  Lab Units 18  0738 18  0559 18  0926   WBC 10*3/mm3 7.27 5.35 7.97   HEMOGLOBIN g/dL 14.7 12.7* 13.8   HEMATOCRIT % 45.4 39.1 42.4   PLATELETS 10*3/mm3 177 162 175   INR   --   --  1.20*       Results from last 7 days  Lab Units 18  0543 18  1909 18  0738  18  0649  18  0926   SODIUM mmol/L 139  --  140  --  142  < > 135   POTASSIUM mmol/L 3.3* 3.5 3.5  < > 2.7*  < > 4.4   CHLORIDE mmol/L 107  --  109  --  109  < >  96*   CO2 mmol/L 22.0  --  21.0  --  24.0  < > 24.0   BUN mg/dL 19  --  23  --  43*  < > 81*   CREATININE mg/dL 1.60*  --  1.70*  --  3.30*  < > 7.10*   GLUCOSE mg/dL 87  --  94  --  99  < > 115*   CALCIUM mg/dL 9.8  --  9.8  --  9.6  < > 9.6   ALT (SGPT) U/L  --   --   --   --   --   --  28   AST (SGOT) U/L  --   --   --   --   --   --  44*   TROPONIN I ng/mL  --   --   --   --   --   --  0.021   < > = values in this interval not displayed.  Estimated Creatinine Clearance: 62.5 mL/min (by C-G formula based on Cr of 1.6).  No results found for: BNP  No results found for: PHART    Microbiology Results Abnormal     Procedure Component Value - Date/Time    Urine Culture - Urine, Urine, Clean Catch [[id number]]  (Abnormal)  (Susceptibility) Collected:  02/28/18 1448    Lab Status:  Final result Specimen:  Urine from Urine, Clean Catch Updated:  03/02/18 1326     Urine Culture --      30,000-40,000 CFU/mL Enterococcus faecalis (A)    Susceptibility      Enterococcus faecalis     GOPAL     Ampicillin <=2 ug/ml Susceptible     Gentamicin High Level Synergy <=500 ug/ml Susceptible     Levofloxacin <=1 ug/ml Susceptible     Linezolid 2 ug/ml Susceptible     Nitrofurantoin <=32 ug/ml Susceptible     Penicillin G 2 ug/ml Susceptible     Streptomycin High Level Synergy <=1000 ug/ml Susceptible     Tetracycline >8 ug/ml Resistant     Vancomycin 2 ug/ml Susceptible                    Eosinophil Smear - Urine, Urine, Clean Catch [[id number]]  (Normal) Collected:  02/28/18 1448    Lab Status:  Final result Specimen:  Urine from Urine, Clean Catch Updated:  02/28/18 1708     Eosinophil Smear 0 % EOS/100 Cells     Narrative:       No eosinophil seen          Imaging Results (last 24 hours)     Procedure Component Value Units Date/Time    Kyphoplasty Vertebroplasty [[id number]] Updated:  03/05/18 1058        Results for orders placed during the hospital encounter of 02/27/18   Adult Transthoracic Echo Complete W/ Cont if Necessary Per  Protocol    Narrative · Technically difficult study due to body habitus  · Left ventricular systolic function is normal. Estimated EF = 60%.  · The cardiac valves are poorly visualized but there does not appear to be   significant stenotic or regurgitant lesions.  · Right ventricular cavity is mild-to-moderately dilated.  · Atrial flutter noted throughout the examination.          I have reviewed the medications.    Assessment/Plan   Assessment / Plan     Hospital Problem List     * (Principal)Acute renal failure    Acute on chronic diastolic heart failure    Overview Addendum 11/28/2017  5:15 PM by Lucian Alvarez IV, MD     · Cardiac cath (02/20/14):  elevated LVEDP suggesting diastolic heart failure.  · Intolerant to beta blocker therapy         Essential hypertension    Coronary artery disease involving native coronary artery of native heart without angina pectoris    Overview Addendum 5/9/2017  9:00 AM by FAITH Roy       · Cardiac PET (01/2014): partially reversible anteroseptal defect, normal LVEF.  · Cardiac cath (02/20/14): mild nonobstructive CAD, LVEF 55%, elevated LVEDP suggesting diastolic heart failure.         Class 3 obesity in adult    Persistent atrial fibrillation/flutter    Overview Addendum 12/26/2017  7:42 AM by Lucian Alvarez IV, MD     · Diagnosed 2011  · [id number] 3 (HTN, CAD, DM)  · Echo (10/11/2011): Normal LVEF, mild MR, mild LA dilation  · LOLY/ECVcardioversion, 12/21/2011, with initiation of propafenone therapy.   · Successful LOLY/ECV for recurrent atrial fibrillation, 11/15/2013  · PVA with Dr. Cary, 6/29/2015  · Cardioversion (07/17/2015) for atrial flutter occurring post ablation   · ECV for recurrent atrial flutter, 12/20/17 with reattempt at beta blocker/flecainide.  · Intolerant to beta blocker/flecanide with severe hypotension, intolerant to propafenone         Compression fracture of lumbar vertebra    Hypokalemia             [facility]  Course to date:  Akshat Winkler is a 63 y.o. male with a-flutter, obeisity, chronic diastolic CHF, recent compression fx presents with ARF.  Had Cr of 5 in 12/2017 but returned to normal.  Seen in CHF clinic on 2/26 and noted to be hypotensive.  Labs returned Cr 6.1 and called to come in.        Assessment & Plan:    MARKO  --SCr is almost back to baseline.   History seems most consistent with pre-renal from possible hypotension and over-diuresis (was 70s SBP in CHF clinic).  Also possible obstructive (does mention some difficulty getting started) but US is normal.  --Back on bumex. Hold aldactone. Consider restarting ACE soon if ok with renal  --D/C azul today for voiding trial.    Atiral fibrillation.  --Holding eliquis until okay with NS following kypho.  --Has paroxysmal flutter s/p ECV in 12/2017, set up to see Jose D as outpatient.  He has had persistent tachycardia last couple days.  Per my previous discussion with Dr. Alvarez, he is intolerant of any AV joby bloking agents (BB, sotaolol, tikosyn).  Given lack of effect med options, will ask Jose D to see tomorrow while he is here.    L4 compression fx  --S/P L4 kyphoplasty.    --PT/OT, he plans home at discharge     DVT Prophylaxis:  SQ heparin. Restart eliquis upon d/c.     CODE STATUS: Full Code     Disposition: I expect the patient to be discharged home 1-2 days.     Electronically signed by Trina Dupont II, DO, 03/05/18, 1:36 PM.

## 2018-03-05 NOTE — ANESTHESIA PROCEDURE NOTES
Airway  Urgency: elective    Date/Time: 3/5/2018 10:33 AM  End Time:3/5/2018 10:33 AM  Airway not difficult    General Information and Staff    Patient location during procedure: OR  Anesthesiologist: FRANCISCA BANDA  CRNA: DEANNA TEMPLE    Indications and Patient Condition  Indications for airway management: airway protection    Preoxygenated: yes  MILS not maintained throughout  Mask difficulty assessment: 1 - vent by mask    Final Airway Details  Final airway type: endotracheal airway      Successful airway: ETT  Cuffed: yes   Successful intubation technique: direct laryngoscopy  Facilitating devices/methods: anterior pressure/BURP and intubating stylet  Endotracheal tube insertion site: oral  Blade: Ralph  Blade size: #3  ETT size: 7.5 mm  Cormack-Lehane Classification: grade IIb - view of arytenoids or posterior of glottis only  Placement verified by: chest auscultation and capnometry   Measured from: lips  ETT to lips (cm): 20  Number of attempts at approach: 1    Additional Comments  Negative epigastric sounds, Breath sound equal bilaterally with symmetric chest rise and fall. Atraumatic

## 2018-03-05 NOTE — ANESTHESIA PREPROCEDURE EVALUATION
Anesthesia Evaluation     Patient summary reviewed and Nursing notes reviewed   NPO Solid Status: > 8 hours  NPO Liquid Status: > 8 hours           Airway   Mallampati: II  TM distance: >3 FB  Neck ROM: full  No difficulty expected  Dental      Pulmonary    (+) sleep apnea,   (-) COPD, asthma, not a smoker, lung cancer  Cardiovascular     ECG reviewed    (+) hypertension, dysrhythmias Atrial Flutter, CHF, PVD, hyperlipidemia,   CAD: MILD 2014 cath     ROS comment: EKG  Normal sinus rhythm   Rightward axis  Nonspecific ST and T wave abnormality Prolonged QT    ECHO Technically difficult study  EF = 60%.   NO significant stenotic or regurgitant lesions   RV mild-to-moderately dilated.  Atrial flutter noted   CATH 2014  Mild, nonobstructive coronary artery disease.   elevated left ventricular filling pressures -chronic  diastolic heart failure.     Neuro/Psych  (-) seizures, TIA, CVA  GI/Hepatic/Renal/Endo    (+) morbid obesity,  renal disease ARF and CRI,   (-)  obesity, liver disease    Musculoskeletal     (+) back pain,   Abdominal    Substance History      OB/GYN          Other   (+) arthritis     ROS/Med Hx Other: Eliquis   Admitted with renal failure   acute on chronic   Creat 1.7- 1.6    BSL <100                Anesthesia Plan    ASA 3     general     intravenous induction   Anesthetic plan and risks discussed with patient.    Plan discussed with CRNA.

## 2018-03-05 NOTE — PLAN OF CARE
Problem: Patient Care Overview (Adult)  Goal: Plan of Care Review  Outcome: Ongoing (interventions implemented as appropriate)    Goal: Adult Individualization and Mutuality  Outcome: Ongoing (interventions implemented as appropriate)      Problem: Perioperative Period (Adult)  Goal: Signs and Symptoms of Listed Potential Problems Will be Absent or Manageable (Perioperative Period)  Outcome: Outcome(s) achieved Date Met: 03/05/18

## 2018-03-05 NOTE — PLAN OF CARE
Problem: Patient Care Overview (Adult)  Goal: Plan of Care Review  Outcome: Ongoing (interventions implemented as appropriate)   03/05/18 0126   Coping/Psychosocial Response Interventions   Plan Of Care Reviewed With patient   Patient Care Overview   Progress progress towards functional goals is fair   Outcome Evaluation   Outcome Summary/Follow up Plan Pt continues to sit in chair.       Problem: Cardiac: Heart Failure (Adult)  Goal: Signs and Symptoms of Listed Potential Problems Will be Absent or Manageable (Cardiac: Heart Failure)  Outcome: Ongoing (interventions implemented as appropriate)   03/05/18 0126   Cardiac: Heart Failure   Problems Assessed (Heart Failure) all   Problems Present (Heart Failure) fluid/electrolyte imbalance      03/05/18 0126   Cardiac: Heart Failure   Problems Assessed (Heart Failure) all   Problems Present (Heart Failure) fluid/electrolyte imbalance       Problem: Skin Integrity Impairment, Risk/Actual (Adult)  Goal: Identify Related Risk Factors and Signs and Symptoms  Outcome: Ongoing (interventions implemented as appropriate)   03/05/18 0126   Skin Integrity Impairment, Risk/Actual   Skin Integrity Impairment, Risk/Actual: Related Risk Factors age extremes   Signs and Symptoms (Skin Integrity Impairment) inflammation       Problem: Fall Risk (Adult)  Goal: Identify Related Risk Factors and Signs and Symptoms  Outcome: Ongoing (interventions implemented as appropriate)   03/05/18 0126   Fall Risk   Fall Risk: Related Risk Factors age-related changes   Fall Risk: Signs and Symptoms presence of risk factors

## 2018-03-05 NOTE — PLAN OF CARE
Problem: Patient Care Overview (Adult)  Goal: Plan of Care Review  Outcome: Ongoing (interventions implemented as appropriate)    Goal: Adult Individualization and Mutuality  Outcome: Ongoing (interventions implemented as appropriate)    Goal: Discharge Needs Assessment  Outcome: Ongoing (interventions implemented as appropriate)    Goal: Plan of Care Review  Outcome: Ongoing (interventions implemented as appropriate)    Goal: Adult Individualization and Mutuality  Outcome: Ongoing (interventions implemented as appropriate)    Goal: Discharge Needs Assessment  Outcome: Ongoing (interventions implemented as appropriate)      Problem: Cardiac: Heart Failure (Adult)  Goal: Signs and Symptoms of Listed Potential Problems Will be Absent or Manageable (Cardiac: Heart Failure)  Outcome: Ongoing (interventions implemented as appropriate)      Problem: Skin Integrity Impairment, Risk/Actual (Adult)  Goal: Identify Related Risk Factors and Signs and Symptoms  Outcome: Ongoing (interventions implemented as appropriate)    Goal: Skin Integrity/Wound Healing  Outcome: Ongoing (interventions implemented as appropriate)      Problem: Fall Risk (Adult)  Goal: Identify Related Risk Factors and Signs and Symptoms  Outcome: Ongoing (interventions implemented as appropriate)    Goal: Absence of Falls  Outcome: Ongoing (interventions implemented as appropriate)

## 2018-03-05 NOTE — PROGRESS NOTES
Continued Stay Note  [facility]     Patient Name: Akshat Winkler  MRN: [id number]  Today's Date: 3/5/2018    Admit Date: 2/27/2018          Discharge Plan       03/05/18 1439    Case Management/Social Work Plan    Plan Home    Patient/Family In Agreement With Plan yes    Additional Comments Spoke with patient in room.  Patient's plan is to return home with his wife at discharge.  Denies any needs at this time.  CM will continue to follow.  Sara Dubose RN [phone]              Discharge Codes     None        Expected Discharge Date and Time     Expected Discharge Date Expected Discharge Time    Mar 5, 2018             Sara Dubose RN

## 2018-03-05 NOTE — PROGRESS NOTES
"   LOS: 6 days    Patient Care Team:  Oren Mark MD as PCP - General (Family Medicine)  Lucian Alvarez IV, MD as Consulting Physician (Cardiology)  Mandy Clark PA-C as Physician Assistant (Physician Assistant)  Josh Moss MD as Consulting Physician (Pain Medicine)    Chief Complaint:  CHF    Subjective     New events, nor is distress.    Review of Systems:   Complains of lower extremities edema.  Denies any nausea vomiting, dysuria, hematuria, shortness of breath or chest pain.    Objective     Vital Sign Min/Max for last 24 hours  Temp  Min: 98 °F (36.7 °C)  Max: 99.1 °F (37.3 °C)   BP  Min: 99/71  Max: 139/95   Pulse  Min: 98  Max: 123   Resp  Min: 16  Max: 20   SpO2  Min: 93 %  Max: 99 %   Flow (L/min)  Min: 2  Max: 2   Weight  Min: 122 kg (270 lb)  Max: 122 kg (270 lb)     Flowsheet Rows         First Filed Value    Admission Height  177.8 cm (70\") Documented at 02/27/2018 0848    Admission Weight  132 kg (290 lb) Documented at 02/27/2018 0848             I/O last 3 completed shifts:  In: -   Out: 4475 [Urine:4475]    Physical Exam:     General appearance: No obvious distress alert oriented ×3 morbidly obese  male.  Eyes: PERR, EOMI.  Neck: Supple no JVD.  Oral: Mucosa moist.  Lungs: There are rhonchi's or Rales equal chest movement.  Heart: Irregular heartbeat,    Murmur or gallop  Abdomen: Morbidly obese nontender positive bowel sounds.  Extremities: Positive edema is noted.  Stasis dermatitis.  Skin: Warm and dry, skin changes are noted.  Neuro: Grossly intact no focal deficit.  Moving all extremities.    WBC WBC   Date Value Ref Range Status   03/04/2018 7.27 3.50 - 10.80 10*3/mm3 Final      HGB Hemoglobin   Date Value Ref Range Status   03/04/2018 14.7 13.1 - 17.5 g/dL Final      HCT Hematocrit   Date Value Ref Range Status   03/04/2018 45.4 38.9 - 50.9 % Final      Platlets No results found for: LABPLAT   MCV MCV   Date Value Ref Range Status   03/04/2018 " 99.8 (H) 80.0 - 99.0 fL Final          Sodium Sodium   Date Value Ref Range Status   03/05/2018 139 132 - 146 mmol/L Final   03/04/2018 140 132 - 146 mmol/L Final      Potassium Potassium   Date Value Ref Range Status   03/05/2018 3.3 (L) 3.5 - 5.5 mmol/L Final   03/04/2018 3.5 3.5 - 5.5 mmol/L Final   03/04/2018 3.5 3.5 - 5.5 mmol/L Final   03/03/2018 3.7 3.5 - 5.5 mmol/L Final   03/03/2018 3.5 3.5 - 5.5 mmol/L Final   03/02/2018 3.0 (L) 3.5 - 5.5 mmol/L Final      Chloride Chloride   Date Value Ref Range Status   03/05/2018 107 99 - 109 mmol/L Final   03/04/2018 109 99 - 109 mmol/L Final      CO2 CO2   Date Value Ref Range Status   03/05/2018 22.0 20.0 - 31.0 mmol/L Final   03/04/2018 21.0 20.0 - 31.0 mmol/L Final      BUN BUN   Date Value Ref Range Status   03/05/2018 19 9 - 23 mg/dL Final   03/04/2018 23 9 - 23 mg/dL Final      Creatinine Creatinine   Date Value Ref Range Status   03/05/2018 1.60 (H) 0.60 - 1.30 mg/dL Final   03/04/2018 1.70 (H) 0.60 - 1.30 mg/dL Final      Calcium Calcium   Date Value Ref Range Status   03/05/2018 9.8 8.7 - 10.4 mg/dL Final   03/04/2018 9.8 8.7 - 10.4 mg/dL Final      PO4 No results found for: CAPO4   Albumin Albumin   Date Value Ref Range Status   03/04/2018 3.90 3.20 - 4.80 g/dL Final      Magnesium Magnesium   Date Value Ref Range Status   03/03/2018 1.3 1.3 - 2.7 mg/dL Final   03/02/2018 1.4 1.3 - 2.7 mg/dL Final      Uric Acid No results found for: URICACID        Results Review:     I reviewed the patient's new clinical results.      aspirin 81 mg Oral Daily   bumetanide 1 mg Oral Daily   ceFAZolin 1 g Intravenous Q8H   docusate sodium 100 mg Oral BID   gabapentin 100 mg Oral TID   gabapentin 100 mg Oral Q12H   heparin (porcine) 5,000 Units Subcutaneous Q8H   pharmacy consult - MTM  Does not apply Daily   rosuvastatin 20 mg Oral Daily        Renal Ultrasound:    FINDINGS: The right kidney measures 13.4 cm in length.  The left kidney  measures 12.6 cm in length. There is  no evidence of renal mass, stone or  obstruction.      IMPRESSION:  Normal ultrasound of the kidneys.    Medication Review:     Assessment/Plan     Principal Problem:    Acute renal failure  Active Problems:    Acute on chronic diastolic heart failure    Essential hypertension    Coronary artery disease involving native coronary artery of native heart without angina pectoris    Class 3 obesity in adult    Persistent atrial fibrillation/flutter    Compression fracture of lumbar vertebra    Hypokalemia      1- MARKO - Non oliguric -Scr 1.6 improved form 7.1 - Pre-renal vs renal vs SNIDER.  Hypotensive at 71 systolic. At home was on spironolactone, lisinopril and bumex. FeNa 4.2 % baseline Scr 1.1 -1.2 range.   2- Proteinuria -UPCR 1.5 gm. Complement level normal. Serologic TAY negative, anti-double-stranded DNA negative, complement C5 3 189, C4 38, anti-GBM antibody negative.  ANCA pending  3- Dysuria   4- HTN -  Not controlled  5- Volume overload  6- Hypokalemia - improving.     Plan:  - Continue with  Potassium replacement protocol.    - Start low-dose spironolactone.  - Restart bumex-   1mg po 2 times a day  - Renal diet.   - Avoid nephrotoxic agents.   - Monitor I/O.  - Adjust med per renal function.   - No need of dialysis.       Arturo Michael MD  03/05/18  2:51 PM

## 2018-03-05 NOTE — ANESTHESIA POSTPROCEDURE EVALUATION
Patient: Akshat Winkler    Procedure Summary     Date Anesthesia Start Anesthesia Stop Room / Location    03/05/18 1005  [facility] / [facility]       Procedure Diagnosis Surgeon Provider    KYPHOPLASTY, L4 (N/A Spine Lumbar) Pathological compression fracture of vertebra, initial encounter  (Pathological compression fracture of vertebra, initial encounter [M48.50XA]) MD Jhon Lofton MD          Anesthesia Type: general  Last vitals  /86   Temp 89   98 111   Resp 18   SpO2 96     Post Anesthesia Care and Evaluation    Patient location during evaluation: PACU  Patient participation: complete - patient participated  Level of consciousness: awake and alert  Pain score: 0  Pain management: adequate  Airway patency: patent  Anesthetic complications: No anesthetic complications  PONV Status: none  Cardiovascular status: hemodynamically stable and acceptable  Respiratory status: nonlabored ventilation, acceptable and nasal cannula  Hydration status: acceptable

## 2018-03-05 NOTE — OP NOTE
NEUROSURGICAL OPERATIVE NOTE        PREOPERATIVE DIAGNOSIS:    L4 compression fracture  Osteoporosis      POSTOPERATIVE DIAGNOSIS:  Same      PROCEDURE:  L4 Kyphoplasty      SURGEON:  Akshat Davidson M.D.      ASSISTANT:  Halie Enrique PA-C      ANESTHESIA:  General      ESTIMATED BLOOD LOSS:  Minimal      SPECIMEN:  None      DRAINS:  None      COMPLICATIONS:  None      CLINICAL NOTE:  The patient is a 63-year-old gentleman who suffered a fall several weeks ago during a syncopal or near syncopal event.  He's had ongoing low back pain.  Studies demonstrate an L4 compression fracture that has advanced substantially.  Plain films demonstrate significantly diminished bone density.  Given his ongoing pain he presents at this time for L4 kyphoplasty.  The nature of the procedure as well as the potential risks, complications, limitations, and alternatives to the procedure were discussed at length with the patient and the patient has agreed to proceed with surgery.      TECHNICAL NOTE:  The patient was brought to the operating room and while on his cart general endotracheal anesthesia was achieved. He was then turned prone onto blanket rolls maintained longitudinally under his chest and abdomen. Special care was ensured to protect pressure points. Two C-arms were brought into use and aligned to provide good AP and lateral imaging of the L4 fracture site. The low back was prepared and draped in the usual fashion. Punctate incisions were made lateral to the pedicles as noted on the AP imaging. One-Step cannulae were impacted through the pedicles into the dorsal vertebral body. Hand drill was utilized through the working cannula to create a central trough. Once again this was challenging given that the fracture was a near-planum fracture. Kyphon balloons were inserted and inflated and were actually able to reduce the fracture to some extent. The Kyphon balloons were lowered and approximately 6 mL of methylmethacrylate was  instilled in the standard fashion on the left and 4.5 mL on the right.The cement was well contained. The cement was allowed to harden and the working cannulae were removed. The punctate incisions were closed with Steri-Strips and covered with sterile dressings. He did receive preoperative antibiotics and there were no overt intraoperative complications. He was rolled onto his cart, extubated, and taken to recovery room in satisfactory condition.             Akshat Davidson M.D.

## 2018-03-06 VITALS
SYSTOLIC BLOOD PRESSURE: 141 MMHG | TEMPERATURE: 97.9 F | BODY MASS INDEX: 39.99 KG/M2 | WEIGHT: 270 LBS | RESPIRATION RATE: 18 BRPM | HEART RATE: 103 BPM | HEIGHT: 69 IN | DIASTOLIC BLOOD PRESSURE: 89 MMHG | OXYGEN SATURATION: 95 %

## 2018-03-06 LAB
ALBUMIN SERPL-MCNC: 3.7 G/DL (ref 3.2–4.8)
ANION GAP SERPL CALCULATED.3IONS-SCNC: 12 MMOL/L (ref 3–11)
BUN BLD-MCNC: 23 MG/DL (ref 9–23)
BUN/CREAT SERPL: 16.4 (ref 7–25)
CALCIUM SPEC-SCNC: 9.2 MG/DL (ref 8.7–10.4)
CHLORIDE SERPL-SCNC: 106 MMOL/L (ref 99–109)
CO2 SERPL-SCNC: 18 MMOL/L (ref 20–31)
CREAT BLD-MCNC: 1.4 MG/DL (ref 0.6–1.3)
GFR SERPL CREATININE-BSD FRML MDRD: 52 ML/MIN/1.73
GLUCOSE BLD-MCNC: 140 MG/DL (ref 70–100)
PHOSPHATE SERPL-MCNC: 2.7 MG/DL (ref 2.4–5.1)
POTASSIUM BLD-SCNC: 4.2 MMOL/L (ref 3.5–5.5)
SODIUM BLD-SCNC: 136 MMOL/L (ref 132–146)

## 2018-03-06 PROCEDURE — 25010000003 CEFAZOLIN 1 GM/50ML SOLUTION: Performed by: NEUROLOGICAL SURGERY

## 2018-03-06 PROCEDURE — 25010000002 HEPARIN (PORCINE) PER 1000 UNITS: Performed by: INTERNAL MEDICINE

## 2018-03-06 PROCEDURE — 99254 IP/OBS CNSLTJ NEW/EST MOD 60: CPT | Performed by: INTERNAL MEDICINE

## 2018-03-06 PROCEDURE — 97164 PT RE-EVAL EST PLAN CARE: CPT

## 2018-03-06 PROCEDURE — 25010000002 MORPHINE SULFATE (PF) 2 MG/ML SOLUTION: Performed by: HOSPITALIST

## 2018-03-06 PROCEDURE — 99239 HOSP IP/OBS DSCHRG MGMT >30: CPT | Performed by: INTERNAL MEDICINE

## 2018-03-06 PROCEDURE — 97110 THERAPEUTIC EXERCISES: CPT

## 2018-03-06 PROCEDURE — 80069 RENAL FUNCTION PANEL: CPT | Performed by: INTERNAL MEDICINE

## 2018-03-06 PROCEDURE — 93005 ELECTROCARDIOGRAM TRACING: CPT | Performed by: NURSE PRACTITIONER

## 2018-03-06 PROCEDURE — 93010 ELECTROCARDIOGRAM REPORT: CPT | Performed by: INTERNAL MEDICINE

## 2018-03-06 RX ORDER — BUMETANIDE 1 MG/1
1 TABLET ORAL 2 TIMES DAILY
Qty: 60 TABLET | Refills: 0 | Status: SHIPPED | OUTPATIENT
Start: 2018-03-06 | End: 2018-05-18

## 2018-03-06 RX ORDER — SPIRONOLACTONE 25 MG/1
25 TABLET ORAL DAILY
Status: DISCONTINUED | OUTPATIENT
Start: 2018-03-06 | End: 2018-03-06 | Stop reason: HOSPADM

## 2018-03-06 RX ADMIN — ROSUVASTATIN CALCIUM 20 MG: 20 TABLET, FILM COATED ORAL at 08:54

## 2018-03-06 RX ADMIN — HYDROCODONE BITARTRATE AND ACETAMINOPHEN 1 TABLET: 5; 325 TABLET ORAL at 05:13

## 2018-03-06 RX ADMIN — BUMETANIDE 1 MG: 1 TABLET ORAL at 08:54

## 2018-03-06 RX ADMIN — MORPHINE SULFATE 1 MG: 2 INJECTION, SOLUTION INTRAMUSCULAR; INTRAVENOUS at 01:11

## 2018-03-06 RX ADMIN — ASPIRIN 81 MG: 81 TABLET, COATED ORAL at 08:54

## 2018-03-06 RX ADMIN — SPIRONOLACTONE 25 MG: 25 TABLET, FILM COATED ORAL at 08:54

## 2018-03-06 RX ADMIN — HEPARIN SODIUM 5000 UNITS: 5000 INJECTION, SOLUTION INTRAVENOUS; SUBCUTANEOUS at 05:13

## 2018-03-06 RX ADMIN — GABAPENTIN 100 MG: 100 CAPSULE ORAL at 08:54

## 2018-03-06 RX ADMIN — HEPARIN SODIUM 5000 UNITS: 5000 INJECTION, SOLUTION INTRAVENOUS; SUBCUTANEOUS at 16:25

## 2018-03-06 RX ADMIN — CEFAZOLIN SODIUM 1 G: 1 INJECTION, SOLUTION INTRAVENOUS at 01:12

## 2018-03-06 RX ADMIN — GABAPENTIN 100 MG: 100 CAPSULE ORAL at 16:25

## 2018-03-06 RX ADMIN — DOCUSATE SODIUM 100 MG: 100 CAPSULE, LIQUID FILLED ORAL at 08:54

## 2018-03-06 NOTE — PROGRESS NOTES
"NEUROSURGERY PROGRESS NOTE     LOS: 7 days   Patient Care Team:  Oren Mark MD as PCP - General (Family Medicine)  Lucian Alvarez IV, MD as Consulting Physician (Cardiology)  Mandy Clark PA-C as Physician Assistant (Physician Assistant)  Josh Moss MD as Consulting Physician (Pain Medicine)    Chief Complaint: Low back pain.    POD#: 1 Day Post-Op  Procedures:  L4 Kyphoplasty.    Interval History:   Yesterday patient ambulated frequently in the lewis.  Patient Complaints: Modest low back pain.  Patient Denies: Leg pain, weakness, or numbness.    Vital Signs: Blood pressure (!) 144/101, pulse 97, temperature 98.6 °F (37 °C), temperature source Oral, resp. rate 18, height 175.3 cm (69\"), weight 122 kg (270 lb), SpO2 95 %.  Intake/Output:   Intake/Output Summary (Last 24 hours) at 03/06/18 0743  Last data filed at 03/06/18 0100   Gross per 24 hour   Intake              480 ml   Output             2400 ml   Net            -1920 ml       Physical Exam:  The patient is awake, alert, and sitting up in a chair.  He looks quite comfortable.  Trace amount of heme is noted on his small dressings on his back.     Data Review:      Results from last 7 days  Lab Units 03/06/18  0405   SODIUM mmol/L 136   POTASSIUM mmol/L 4.2   CHLORIDE mmol/L 106   CO2 mmol/L 18.0*   BUN mg/dL 23   CREATININE mg/dL 1.40*   GLUCOSE mg/dL 140*   CALCIUM mg/dL 9.2       Assessment/Plan:  1.  Severe L4 compression fracture.  Yesterday the patient underwent L4 kyphoplasty.  2.  Renal insufficiency, slowly improving.  Discontinue Sheridan catheter for voiding trial.  3.  Congestive heart failure, coronary artery disease, atrial fibrillation.  4.  Hypertension.  5.  Obesity.  6.  Diabetes mellitus.  7.  Disposition: Mobilize patient.  The patient may be discharged home at any time from the neurosurgical perspective.  He will follow-up in my clinic in about 3 weeks with my CELINE.      Akshat Davidson, " MD  03/06/18  7:43 AM

## 2018-03-06 NOTE — PROGRESS NOTES
"   LOS: 7 days    Patient Care Team:  Oren Mark MD as PCP - General (Family Medicine)  Lucian Alvarez IV, MD as Consulting Physician (Cardiology)  Mandy Clark PA-C as Physician Assistant (Physician Assistant)  Josh Moss MD as Consulting Physician (Pain Medicine)    Chief Complaint:  CHF    Subjective     It's, no obvious distress.    Review of Systems:   Complains of lower extremity edema..  Denies any nausea vomiting, dysuria, hematuria, shortness of breath or chest pain.    Objective     Vital Sign Min/Max for last 24 hours  Temp  Min: 97.9 °F (36.6 °C)  Max: 98.8 °F (37.1 °C)   BP  Min: 124/91  Max: 160/113   Pulse  Min: 90  Max: 114   Resp  Min: 18  Max: 18   SpO2  Min: 95 %  Max: 95 %   Flow (L/min)  Min: 2  Max: 2   No Data Recorded     Flowsheet Rows         First Filed Value    Admission Height  177.8 cm (70\") Documented at 02/27/2018 0848    Admission Weight  132 kg (290 lb) Documented at 02/27/2018 0848          I/O this shift:  In: 240 [P.O.:240]  Out: 1125 [Urine:1125]  I/O last 3 completed shifts:  In: 480 [P.O.:480]  Out: 4475 [Urine:4475]    Physical Exam:     General appearance: No obvious distress alert oriented ×3 morbidly obese  male.  Eyes: PERR, EOMI.  Neck: Supple no JVD.  Oral: Mucosa moist.  Lungs: There are rhonchi's or Rales equal chest movement.  Heart: Irregular heartbeat,    Murmur or gallop  Abdomen: Morbidly obese nontender positive bowel sounds.  Extremities: Positive lower extremity edema 2+, stasis dermatitis noted.  Skin: Warm and dry, skin changes are noted.  Neuro: Grossly intact no focal deficit.  Moving all extremities.    WBC WBC   Date Value Ref Range Status   03/04/2018 7.27 3.50 - 10.80 10*3/mm3 Final      HGB Hemoglobin   Date Value Ref Range Status   03/04/2018 14.7 13.1 - 17.5 g/dL Final      HCT Hematocrit   Date Value Ref Range Status   03/04/2018 45.4 38.9 - 50.9 % Final      Platlets No results found for: LABPLAT "   MCV MCV   Date Value Ref Range Status   03/04/2018 99.8 (H) 80.0 - 99.0 fL Final          Sodium Sodium   Date Value Ref Range Status   03/06/2018 136 132 - 146 mmol/L Final   03/05/2018 139 132 - 146 mmol/L Final   03/04/2018 140 132 - 146 mmol/L Final      Potassium Potassium   Date Value Ref Range Status   03/06/2018 4.2 3.5 - 5.5 mmol/L Final   03/05/2018 3.3 (L) 3.5 - 5.5 mmol/L Final   03/04/2018 3.5 3.5 - 5.5 mmol/L Final   03/04/2018 3.5 3.5 - 5.5 mmol/L Final   03/03/2018 3.7 3.5 - 5.5 mmol/L Final      Chloride Chloride   Date Value Ref Range Status   03/06/2018 106 99 - 109 mmol/L Final   03/05/2018 107 99 - 109 mmol/L Final   03/04/2018 109 99 - 109 mmol/L Final      CO2 CO2   Date Value Ref Range Status   03/06/2018 18.0 (L) 20.0 - 31.0 mmol/L Final   03/05/2018 22.0 20.0 - 31.0 mmol/L Final   03/04/2018 21.0 20.0 - 31.0 mmol/L Final      BUN BUN   Date Value Ref Range Status   03/06/2018 23 9 - 23 mg/dL Final   03/05/2018 19 9 - 23 mg/dL Final   03/04/2018 23 9 - 23 mg/dL Final      Creatinine Creatinine   Date Value Ref Range Status   03/06/2018 1.40 (H) 0.60 - 1.30 mg/dL Final   03/05/2018 1.60 (H) 0.60 - 1.30 mg/dL Final   03/04/2018 1.70 (H) 0.60 - 1.30 mg/dL Final      Calcium Calcium   Date Value Ref Range Status   03/06/2018 9.2 8.7 - 10.4 mg/dL Final   03/05/2018 9.8 8.7 - 10.4 mg/dL Final   03/04/2018 9.8 8.7 - 10.4 mg/dL Final      PO4 No results found for: CAPO4   Albumin Albumin   Date Value Ref Range Status   03/06/2018 3.70 3.20 - 4.80 g/dL Final   03/04/2018 3.90 3.20 - 4.80 g/dL Final      Magnesium No results found for: MG   Uric Acid No results found for: URICACID        Results Review:     I reviewed the patient's new clinical results.      aspirin 81 mg Oral Daily   bumetanide 1 mg Oral BID   docusate sodium 100 mg Oral BID   gabapentin 100 mg Oral TID   heparin (porcine) 5,000 Units Subcutaneous Q8H   pharmacy consult - MTM  Does not apply Daily   rosuvastatin 20 mg Oral Daily    spironolactone 25 mg Oral Daily        Renal Ultrasound:    FINDINGS: The right kidney measures 13.4 cm in length.  The left kidney  measures 12.6 cm in length. There is no evidence of renal mass, stone or  obstruction.      IMPRESSION:  Normal ultrasound of the kidneys.    Medication Review:     Assessment/Plan     Principal Problem:    Acute renal failure  Active Problems:    Acute on chronic diastolic heart failure    Essential hypertension    Coronary artery disease involving native coronary artery of native heart without angina pectoris    Class 3 obesity in adult    Persistent atrial fibrillation/flutter    Compression fracture of lumbar vertebra    Hypokalemia      1- MARKO - Non oliguric -Scr 1.4 improved form 7.1 . At home was on spironolactone, lisinopril and bumex. FeNa 4.2 % baseline Scr 1.1 -1.2 range.  ANCA negative  2- Proteinuria -UPCR 1.5 gm. Complement level normal. Serologic TAY negative, anti-double-stranded DNA negative, complement C5 3 189, C4 38, anti-GBM antibody negative.  ANCA negative  3- Dysuria   4- HTN -  Not controlled  5- Volume overload  6- Hypokalemia - improving.     Plan:     - Start low-dose spironolactone.  - Restart bumex-   1mg po 2 times a day  - Renal diet.   - Avoid nephrotoxic agents.   - Monitor I/O.  - Adjust med per renal function.   - Okay to [place] home      Arturo Michael MD  03/06/18  12:15 PM

## 2018-03-06 NOTE — CONSULTS
Akshat Winkler  [id number]  1955   LOS: 7 days   Patient Care Team:  PHYSICIAN: Oren Mark MD   ELECTROPHYSIOLOGIST: Joaquin Jeffery MD, [facility]  CARDIOLOGIST: Lucian Alvarez IV, MD   PAIN PHYSICIAN: Josh Moss MD   REMOTE ELECTROPHYSIOLOGIST: Palomo Cary MD, [facility]    Mr. Winkler is a 63-year-old , white male, resident of [place], [place] who is a  at [employer].    Chief Complaint:  Atrial fibrillation / flutter    Problem List:  1. Atrial fibrillation:  a. Diagnosed initially during routine physical  exam 2011.   b. CHADS-VASc score = 3/Eliquis therapy.   c. Echocardiogram, 2011 revealing normal LV function, mild MR, mild left atrial dilatation.   d. LOLY and cardioversion, 12/21/201, with initiation of propafenone therapy.   e. Successful LOLY and  cardioversion, 11/15/2013, for recurrent atrial fibrillation.  EF 60%.  f. NYHA functional class III symptoms, December 2011.   g. Atrial fibrillation with initial diagnosis of beta blocker/Tambocor therapy resulting in severe bradycardia/vasovagal syncope,  admission to [facility] Room, 2011.  h. Successful LOLY/ECV for recurrent atrial fibrillation 11/15/13  i. Recurrent atrial fibrillation requiring cardioversion on 03/19/2015/patient continues to have persistent symptoms of shortness of breath, fatigue, questionable related to propafenone medication.  j. Echocardiogram 4/22/15: LVEF 0.50-0.55, technically limited study due to poor acoustic windows, all valves structurally and functionally normal with no hemodynamically significant valve disease  k. PVA with Dr. Cary 6/29/15  l. ECV 7/17/15 for atrial flutter post ablation  m. ECV for recurrent atrial flutter 12/20/17 with reattempt at beta blocker/flecainide  n. Intolerance to beta blocker/flecainide with severe hypotension, intolerant to propafenone  o. Echocardiogram 2/27/18: Technically difficult study due to  body habitus, LVEF 0.60, cardiac valves poorly visualized but there does not appear to be significant stenotic or regurgitant lesions, RV mild to moderately dilated, atrial flutter noted throughout the exam  2. Chest pain syndrome:  a. Myocardial perfusion study, 2011:  New onset ischemia, LVEF 57%.  b. Atypical chest pain/PET Cardiolite stress test, January 2014 revealing possible reversible  anteroseptal defect.  Normal LV function.  c. Left heart cardiac catheterization, 02/20/2014, revealing nonobstructive coronary artery disease, EF 55% with elevated LVEDP suggesting diastolic heart failure by Dr. Abhijit Alvarez.  d. CCS class I chest discomfort/NYHA class II CHF  3. Chronic diastolic heart failure:  a. Weight gain of 19 pounds over the last 4 months with significant lower extremity edema as well as abdominal edema, 10/21/2014.   b. Persistent lower extremity  edema/recent initiation of Zaroxolyn therapy per atrial fibrillation clinic in April 2015.  4. Obstructive sleep apnea with compliance of CPAP.   5. Hypertension on Lipitor.  6.  Moderate Obesity: BMI 39  7. Type 2 Diabetes mellitus ( 5.8% May 2017).  8. Hyperlipidemia  9. L4 closed compression fracture 2/2/18 with kyphoplasty 3/5/18  10. Surgical history  a. PVA ablation  b. Left heart catheterization  c. L4 kyphoplasty 3/5/18    Allergies   Allergen Reactions   • Bisoprolol Other (See Comments)     Severe Hypotension   • Flecainide Other (See Comments)     Syncope / collapse   • Metoprolol Other (See Comments)      Bradycardia, Severe Hypotension     Prescriptions Prior to Admission   Medication Sig Dispense Refill Last Dose   • apixaban (ELIQUIS) 5 MG tablet tablet Take 5 mg by mouth 2 (Two) Times a Day.      • gabapentin (NEURONTIN) 100 MG capsule Take 100 mg by mouth 3 (Three) Times a Day.      • tiZANidine (ZANAFLEX) 2 MG tablet Take 2 mg by mouth Every 8 (Eight) Hours As Needed for Muscle Spasms.      • acetaminophen (TYLENOL) 500 MG tablet Take  500 mg by mouth As Needed for Mild Pain .   Past Week   • aspirin 81 MG EC tablet Take 81 mg by mouth Daily.   2/23/2018   • bumetanide (BUMEX) 2 MG tablet Take 2 mg by mouth Daily.   2/26/2018   • HYDROcodone-acetaminophen (NORCO) 5-325 MG per tablet Take 1 tablet by mouth Every 6 (Six) Hours As Needed for Moderate Pain . 100 tablet 0 2/25/2018   • lisinopril (PRINIVIL,ZESTRIL) 2.5 MG tablet Take 2.5 mg by mouth Every Night.   2/26/2018   • potassium chloride (K-DUR) 10 MEQ CR tablet Take 10 mEq by mouth Daily As Needed (hypokalemia).   Past Week   • rosuvastatin (CRESTOR) 20 MG tablet Take 1 tablet by mouth Daily for 360 days. 90 tablet 3 2/26/2018   • spironolactone (ALDACTONE) 25 MG tablet Take 1 tablet by mouth Daily. 90 tablet 3 2/26/2018     Scheduled Meds:  aspirin 81 mg Oral Daily   bumetanide 1 mg Oral BID   docusate sodium 100 mg Oral BID   gabapentin 100 mg Oral TID   heparin (porcine) 5,000 Units Subcutaneous Q8H   pharmacy consult - MTM  Does not apply Daily   rosuvastatin 20 mg Oral Daily   spironolactone 25 mg Oral Daily          History of Present Illness:   Patient presented to [facility] 2/27/18 after having abnormal labs (creatinine) at his physician's office the day prior.  He has had a recent fall from hypotension and acute renal failure with creatinine 5.7 in December 2017.  Over the last 3 days prior to this admission, patient had increased bilateral lower extremity edema and decreased urine output.  His creatinine on admission to ED was 7.1;  on 2/27/18.  He was seen by Dr. Alvarez 2/26/18 for increased edema and was given IV diuretics at the office.  His systolic blood pressure dropped in the 70s and he received a 500 mg liter bolus of saline while there.  His blood pressure elevated to 100 systolic before he was sent home.  He had a fall from hypotension at home and sustained a closed compression fracture of L4 and 3 rib fractures when he went from a sitting to standing position and had a  "syncopal episode.  His cardiac medications have been adjusted and he has had many intolerances to beta blockers, flecainide, propafenone in the past with hypotension.  He had an L4 kyphoplasty 3/5/18.  Due to his elevated creatinine levels, a renal artery duplex was obtained which demonstrated normal kidneys. He is asymptomatic with his atrial flutter, no chest pain, increased SOB, or presyncope. He has orthostatic hypotension. Does not regularly check his blood pressure/HR at home even though he has a cuff. He was supposed to see Dr. Jeffeyr soon for a consult in office but had to be rescheduled.     Cardiac risk factors: advanced age (older than 55 for men, 65 for women), diabetes mellitus, dyslipidemia, hypertension, male gender, obesity (BMI >= 30 kg/m2) and sedentary lifestyle.    Social History     Social History   • Marital status:      Spouse name: N/A   • Number of children: N/A   • Years of education: N/A     Occupational History   • Not on file.     Social History Main Topics   • Smoking status: Never Smoker   • Smokeless tobacco: Never Used   • Alcohol use No   • Drug use: No   • Sexual activity: Defer     Other Topics Concern   • Not on file     Social History Narrative    Patient lives at home with his wife and denies caffeine intake.     Family History   Problem Relation Age of Onset   • Hypertension Mother    • Stroke Mother    • Stroke Father    • Sleep disorder Father    • Cancer Brother        Review of Systems  Pertinent items are noted in HPI and problem list.     Objective:       Physical Exam  BP (!) 144/101 (BP Location: Left arm, Patient Position: Lying)  Pulse 97  Temp 98.6 °F (37 °C) (Oral)   Resp 18  Ht 175.3 cm (69\")  Wt 122 kg (270 lb)  SpO2 95%  BMI 39.87 kg/m2  Last 2 weights    02/27/18  1803 03/05/18  0821   Weight: 132 kg (290 lb) 122 kg (270 lb)     Body mass index is 39.87 kg/(m^2).    Intake/Output Summary (Last 24 hours) at 03/06/18 0905  Last data filed at " 18 0100   Gross per 24 hour   Intake              480 ml   Output             2400 ml   Net            -1920 ml       General Appearance:  Alert, cooperative, no distress, appears stated age   Head:  Normocephalic, without obvious abnormality, atraumatic   Neck: Supple, symmetrical, trachea midline, no adenopathy, thyroid: not enlarged, symmetric, no tenderness/mass/nodules, no carotid bruit or JVD   Lungs:   Scattered bibasilar crackles, respirations unlabored   Heart:  Irregular rate and rhythm, S1, S2 normal, no murmur, rub or gallop   Abdomen:   Soft, non-tender, no masses, no organomegaly, bowel sounds audible x4   Extremities: 2+ edema, normal range of motion   Pulses: 2+ and symmetric   Skin: Skin color, texture, turgor normal, venous insufficiency in BLE   Neurologic: Normal       Cardiographics:    · EK18:Normal sinus rhythm  Rightward axis  Nonspecific ST and T wave abnormality  Prolonged QT  Abnormal ECG  When compared with ECG of 2018 08:23,  Sinus rhythm has replaced Atrial flutter  Nonspecific T wave abnormality has replaced inverted T waves in Inferior leads; QT has lengthened; reviewed.    · Echocardiogram 18:  · Technically difficult study due to body habitus  · Left ventricular systolic function is normal. Estimated EF = 60%.  · The cardiac valves are poorly visualized but there does not appear to be significant stenotic or regurgitant lesions.  · Right ventricular cavity is mild-to-moderately dilated.  · Atrial flutter noted throughout the examination    Imaging:    · Chest x-ray: 18:Cardiomegaly with increased pulmonary vascularity and ill-defined bibasilar pulmonary opacifications with an overall pulmonaryedema pattern    Lab Review     Results from last 7 days  Lab Units 18  0405 18  0543 18  1909 18  0738   SODIUM mmol/L 136 139  --  140   POTASSIUM mmol/L 4.2 3.3* 3.5 3.5   CHLORIDE mmol/L 106 107  --  109   CO2 mmol/L 18.0* 22.0  --  21.0    BUN mg/dL 23 19  --  23   CREATININE mg/dL 1.40* 1.60*  --  1.70*   GLUCOSE mg/dL 140* 87  --  94   CALCIUM mg/dL 9.2 9.8  --  9.8       Results from last 7 days  Lab Units 03/04/18  0738 02/28/18  0559 02/27/18  0926   WBC 10*3/mm3 7.27 5.35 7.97   HEMOGLOBIN g/dL 14.7 12.7* 13.8   HEMATOCRIT % 45.4 39.1 42.4   PLATELETS 10*3/mm3 177 162 175       Results from last 7 days  Lab Units 02/28/18  0559 02/27/18  0926   CK TOTAL U/L 269*  --    TROPONIN I ng/mL  --  0.021      Assessment:   Patient with paroxysmal atrial fibrillation/flutter with a prior PVA, multiple cardioversions, and intolerance to beta blockers, propafenone, and flecainide. He will need to restart Eliquis tonight. Will discuss with Dr. Jeffery about possible trial of Tikosyn versus repeat ablation versus AV node ablation with pacemaker placement.  Very complex patient with intermittent severe acute on chronic renal dysfunction in the setting of chronic volume overload presumed secondary to chronic diastolic congestive heart failure.  Clinically stable with minimal activity and acceptable diuresis with improving GFR over the past 2-3 days.     Plan:   1. Patient will need to restart Eliquis tonight  2. Will discuss with Dr. Jeffery about a trial of Tikosyn versus additional options as outlined above  3. ECG now  4. Defer ECV for now    Scribed for Malvin Short MD by Janine Barfield, APRN. 3/6/2018  9:33 AM     I, Malvin Short MD, [facility], personally performed the services described in this documentation as scribed by the above named individual in my presence, and it is both accurate and complete.

## 2018-03-06 NOTE — PLAN OF CARE
Problem: Patient Care Overview (Adult)  Goal: Plan of Care Review  Outcome: Ongoing (interventions implemented as appropriate)   03/06/18 1014   Coping/Psychosocial Response Interventions   Plan Of Care Reviewed With patient   Patient Care Overview   Progress progress toward functional goals as expected   Outcome Evaluation   Outcome Summary/Follow up Plan Patient is now s/p L4 kyphoplasty with improved pain control and mobility. Patient ambulated 650 feet with RW and no difficulty. Denied pain after ambulation. CGA for all mobility. Should be able to safety mobilize about the home if d/c home today. Achieved previous gait distance goal, new goal established today. Established stair goal today. Will continue to progress mobility and strength as able.        Problem: Inpatient Physical Therapy  Goal: Bed Mobility Goal LTG- PT  Outcome: Ongoing (interventions implemented as appropriate)   03/06/18 1014   Bed Mobility PT LTG   Bed Mobility PT LTG, Date Established 02/28/18   Bed Mobility PT LTG, Time to Achieve 2 wks   Bed Mobility PT LTG, Activity Type roll left/roll right;sidelying to sit/sit to sidelying   Bed Mobility PT LTG, [place] Level conditional independence   Bed Mobility PT LTG, Date Goal Reviewed 03/06/18   Bed Mobility PT LTG, Outcome goal ongoing     Goal: Transfer Training Goal 1 LTG- PT  Outcome: Ongoing (interventions implemented as appropriate)   03/06/18 1014   Transfer Training PT LTG   Transfer Training PT LTG, Date Established 02/28/18   Transfer Training PT LTG, Time to Achieve 2 wks   Transfer Training PT LTG, Activity Type sit to stand/stand to sit   Transfer Training PT LTG, [place] Level conditional independence   Transfer Training PT LTG, Assist Device walker, rolling   Transfer Training PT LTG, Date Goal Reviewed 03/06/18   Transfer Training PT LTG, Outcome goal ongoing     Goal: Gait Training Goal LTG- PT  Outcome: Revised   03/06/18 1014   Gait Training PT LTG   Gait Training  Goal PT LTG, Date Established 03/06/18   Gait Training Goal PT LTG, Time to Achieve 5 days   Gait Training Goal PT LTG, [place] Level conditional independence   Gait Training Goal PT LTG, Assist Device walker, rolling   Gait Training Goal PT LTG, Distance to Achieve 1,000 feet   Gait Training Goal PT LTG, Date Goal Reviewed 03/06/18   Gait Training Goal PT LTG, Outcome goal revised  (achieved previously set gait distance. )     Goal: Stair Training Goal LTG- PT  Outcome: Ongoing (interventions implemented as appropriate)   03/06/18 1014   Stair Training PT LTG   Stair Training Goal PT LTG, Date Established 03/06/18   Stair Training Goal PT LTG, Time to Achieve 5 days   Stair Training Goal PT LTG, Number of Steps 1   Stair Training Goal PT LTG, [place] Level contact guard assist   Stair Training Goal PT LTG, Assist Device walker, rolling;other (see comments)  (backwards)   Stair Training Goal PT LTG, Date Goal Reviewed 03/06/18   Stair Training Goal PT LTG, Outcome goal ongoing

## 2018-03-06 NOTE — PROGRESS NOTES
[facility] Medicine Services  PROGRESS NOTE    Patient Name: Akshat Winkler  : 1955  MRN: [id number]    Date of Admission: 2018  Length of Stay: 7  Primary Care Physician: Oren Mark MD    Subjective   Subjective     CC: f/u back pain    HPI: Up in chair. Feeling much better. Pain controlled. Urinating well. Wants to go home.    Review of Systems  Gen- No fevers, chills  CV- No chest pain, palpitations  Resp- No cough, dyspnea  GI- No N/V/D, abd pain    Otherwise ROS is negative except as mentioned in the HPI.    Objective   Objective     Vital Signs:   Temp:  [97.9 °F (36.6 °C)-98.8 °F (37.1 °C)] 98.4 °F (36.9 °C)  Heart Rate:  [] 97  Resp:  [18] 18  BP: (125-160)/() 125/83        Physical Exam:  Constitutional: No acute distress, awake, alert  HENT: NCAT, mucous membranes moist  Respiratory: Clear to auscultation bilaterally, respiratory effort normal   Cardiovascular: RRR, no murmurs, rubs, or gallops, palpable pedal pulses bilaterally  Gastrointestinal: Positive bowel sounds, soft, nontender, nondistended  Musculoskeletal: 2-3+ LE edema bilaterally with chronic venous changes  Psychiatric: Appropriate affect, cooperative  Neurologic: Oriented x 3, strength symmetric in all extremities, Cranial Nerves grossly intact to confrontation, speech clear  Skin: No rashes    Results Reviewed:  I have personally reviewed current lab, radiology, and data and agree.      Results from last 7 days  Lab Units 18  0738 18  0559   WBC 10*3/mm3 7.27 5.35   HEMOGLOBIN g/dL 14.7 12.7*   HEMATOCRIT % 45.4 39.1   PLATELETS 10*3/mm3 177 162       Results from last 7 days  Lab Units 18  0405 18  0543 18  1909 18  0738   SODIUM mmol/L 136 139  --  140   POTASSIUM mmol/L 4.2 3.3* 3.5 3.5   CHLORIDE mmol/L 106 107  --  109   CO2 mmol/L 18.0* 22.0  --  21.0   BUN mg/dL 23 19  --  23   CREATININE mg/dL 1.40* 1.60*  --  1.70*   GLUCOSE  mg/dL 140* 87  --  94   CALCIUM mg/dL 9.2 9.8  --  9.8     Estimated Creatinine Clearance: 71.5 mL/min (by C-G formula based on Cr of 1.4).  No results found for: BNP  No results found for: PHART    Microbiology Results Abnormal     Procedure Component Value - Date/Time    Urine Culture - Urine, Urine, Clean Catch [[id number]]  (Abnormal)  (Susceptibility) Collected:  02/28/18 1448    Lab Status:  Final result Specimen:  Urine from Urine, Clean Catch Updated:  03/02/18 1326     Urine Culture --      30,000-40,000 CFU/mL Enterococcus faecalis (A)    Susceptibility      Enterococcus faecalis     GOPAL     Ampicillin <=2 ug/ml Susceptible     Gentamicin High Level Synergy <=500 ug/ml Susceptible     Levofloxacin <=1 ug/ml Susceptible     Linezolid 2 ug/ml Susceptible     Nitrofurantoin <=32 ug/ml Susceptible     Penicillin G 2 ug/ml Susceptible     Streptomycin High Level Synergy <=1000 ug/ml Susceptible     Tetracycline >8 ug/ml Resistant     Vancomycin 2 ug/ml Susceptible                    Eosinophil Smear - Urine, Urine, Clean Catch [[id number]]  (Normal) Collected:  02/28/18 1448    Lab Status:  Final result Specimen:  Urine from Urine, Clean Catch Updated:  02/28/18 1708     Eosinophil Smear 0 % EOS/100 Cells     Narrative:       No eosinophil seen          Imaging Results (last 24 hours)     Procedure Component Value Units Date/Time    Kyphoplasty Vertebroplasty [[id number]] Collected:  03/05/18 1500     Updated:  03/05/18 1643    Narrative:       EXAMINATION: IR KYPHOPLASTY VERTEBROPLASTY-     INDICATION: Kyphoplasty L4; I50.9-Heart failure, unspecified;  N17.9-Acute kidney failure, unspecified; Z74.09-Other reduced mobility;  M48.50XA-Collapsed vertebra, not elsewhere classified, site unspecified,  initial encounter for fracture.     TECHNIQUE: One minute 51 seconds of fluoroscopic time was utilized.     COMPARISON: None.     FINDINGS: Nine intraoperative images were obtained prior to during and  following  kyphoplasty at L4.       Impression:       C-arm fluoroscopy and intraoperative imaging was utilized to  assist in kyphoplasty at L4.      D:  03/05/2018  E:  03/05/2018     This report was finalized on 3/5/2018 4:41 PM by Dr. Johnathan Loera MD.           Results for orders placed during the hospital encounter of 02/27/18   Adult Transthoracic Echo Complete W/ Cont if Necessary Per Protocol    Narrative · Technically difficult study due to body habitus  · Left ventricular systolic function is normal. Estimated EF = 60%.  · The cardiac valves are poorly visualized but there does not appear to be   significant stenotic or regurgitant lesions.  · Right ventricular cavity is mild-to-moderately dilated.  · Atrial flutter noted throughout the examination.          I have reviewed the medications.    Assessment/Plan   Assessment / Plan     Hospital Problem List     * (Principal)Acute renal failure    Acute on chronic diastolic heart failure    Overview Addendum 11/28/2017  5:15 PM by Lucian Alvarez IV, MD     · Cardiac cath (02/20/14):  elevated LVEDP suggesting diastolic heart failure.  · Intolerant to beta blocker therapy         Essential hypertension    Coronary artery disease involving native coronary artery of native heart without angina pectoris    Overview Addendum 5/9/2017  9:00 AM by FAITH Roy       · Cardiac PET (01/2014): partially reversible anteroseptal defect, normal LVEF.  · Cardiac cath (02/20/14): mild nonobstructive CAD, LVEF 55%, elevated LVEDP suggesting diastolic heart failure.         Class 3 obesity in adult    Persistent atrial fibrillation/flutter    Overview Addendum 12/26/2017  7:42 AM by Lucian Alvarez IV, MD     · Diagnosed 2011  · [id number] 3 (HTN, CAD, DM)  · Echo (10/11/2011): Normal LVEF, mild MR, mild LA dilation  · LOLY/ECVcardioversion, 12/21/2011, with initiation of propafenone therapy.   · Successful LOLY/ECV for recurrent atrial fibrillation,  11/15/2013  · PVA with Dr. Cary, 6/29/2015  · Cardioversion (07/17/2015) for atrial flutter occurring post ablation   · ECV for recurrent atrial flutter, 12/20/17 with reattempt at beta blocker/flecainide.  · Intolerant to beta blocker/flecanide with severe hypotension, intolerant to propafenone         Compression fracture of lumbar vertebra    Hypokalemia          Brief Hospital Course to date:  Akshat Winkler is a 63 y.o. male with a-flutter, obeisity, chronic diastolic CHF, recent compression fx presents with ARF.  Had Cr of 5 in 12/2017 but returned to normal.  Seen in CHF clinic on 2/26 and noted to be hypotensive.  Labs returned Cr 6.1 and called to come in.          Assessment & Plan:     MARKO  --SCr is almost back to baseline.   History seems most consistent with pre-renal from possible hypotension and over-diuresis (was 70s SBP in CHF clinic).  Also possible obstructive (does mention some difficulty getting started) but US is normal.  --Back on bumex and have restarted aldactone. Okay for d/c per renal. Can f/u as outpatient.     Atiral fibrillation.  --Restart eliquis upon d/c.  --Has paroxysmal flutter s/p ECV in 12/2017, set up to see Jose D as outpatient.  He has had persistent tachycardia last couple days which is atrial flutter per recent EKG. Dr. Short has seen and recommends eval by Dr. Jeffery for further recs. Further plan per his eval.     L4 compression fx  --S/P L4 kyphoplasty. Doing well. Okay for d/c per NS.     --PT/OT, he plans home at discharge      DVT Prophylaxis:  SQ heparin. Restart eliquis upon d/c.      CODE STATUS: Full Code      Disposition: I expect the patient to be discharged home later today vs tomorrow.      Electronically signed by Trina Dupont II, DO, 03/06/18, 1:37 PM.

## 2018-03-06 NOTE — THERAPY RE-EVALUATION
Acute Care - Physical Therapy Re-Evaluation  [facility]     Patient Name: Akshat Winkler  : 1955  MRN: [id number]  Today's Date: 3/6/2018   Onset of Illness/Injury or Date of Surgery Date: 18  Date of Referral to PT: 18  Referring Physician: MD Kiah      Admit Date: 2018     Visit Dx:    ICD-10-CM ICD-9-CM   1. Acute congestive heart failure, unspecified congestive heart failure type I50.9 428.0   2. Acute renal failure, unspecified acute renal failure type N17.9 584.9   3. Impaired functional mobility, balance, gait, and endurance Z74.09 V49.89   4. Pathological compression fracture of vertebra, initial encounter M48.50XA 733.13     Patient Active Problem List   Diagnosis   • Acute on chronic diastolic heart failure   • Type 2 diabetes mellitus without complication, without long-term current use of insulin   • Hyperlipidemia LDL goal <70   • Essential hypertension   • Coronary artery disease involving native coronary artery of native heart without angina pectoris   • Severe obstructive sleep apnea   • Class 3 obesity in adult   • Persistent atrial fibrillation/flutter   • Compression fracture of lumbar vertebra   • Spondylosis of 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 or radiculopathy   • Lumbar stenosis with neurogenic claudication   • Lumbar disc herniation   • Myofascial pain   • Acute renal failure   • Hypokalemia     Past Medical History:   Diagnosis Date   • Acute diastolic HF (heart failure)    • Acute hypokalemia    • Arrhythmia    • Back injury     Fall on 17   • Cellulitis of leg    • Chest pain syndrome    • CHF (congestive heart failure)    • Diabetes mellitus     type 2   • Hyperlipidemia    • Hypertension    • Obesity    • Obstructive sleep apnea     cpap hs   • Paroxysmal a-fib    • Peripheral artery disease    • Spondylosis of 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 or radiculopathy 2018   • Wears glasses      Past Surgical History:   Procedure Laterality Date   •  CARDIAC CATHETERIZATION     • CARDIOVERSION      multiple   • COLONOSCOPY     • KYPHOPLASTY N/A 3/5/2018    Procedure: KYPHOPLASTY L4;  Surgeon: Akshat Davidson MD;  Location: [facility];  Service:    • OTHER SURGICAL HISTORY  06/29/2015    PVA          PT ASSESSMENT (last 72 hours)      PT Evaluation       03/06/18 1014 03/06/18 0815    Rehab Evaluation    Document Type re-evaluation  -LR     Subjective Information agree to therapy;complains of;pain  -LR     Patient Effort, Rehab Treatment excellent  -LR     Symptoms Noted During/After Treatment other (see comments)   decreased pain  -LR     Symptoms Noted Comment Denied pain after re-eval.   -LR     General Information    Patient Profile Review yes  -LR     Onset of Illness/Injury or Date of Surgery Date 03/05/18  -LR     Referring Physician MD Kiah  -LR     General Observations Patient sitting [facility] on arrival.   -LR     Pertinent History Of Current Problem Patient underwent L4 kyphoplasty yesterday d/t severe L4 compression fx he sustained several weeks ago when he fell during a syncopal/near syncopal event.   -LR     Precautions/Limitations fall precautions;spinal precautions  -LR     Prior Level of Function min assist:;all household mobility;community mobility;gait;transfer;bed mobility;home management;cooking;cleaning;shopping;using stairs;independent:;driving;work   all mobility limited by pain.   -LR     Equipment Currently Used at Home --   see initial eval.   -LR     Plans/Goals Discussed With patient;agreed upon  -LR     Risks Reviewed patient:;LOB;dizziness;increased discomfort  -LR     Benefits Reviewed patient:;improve function;increase independence;increase strength;increase balance;decrease pain;increase knowledge  -LR     Barriers to Rehab previous functional deficit  -LR     Living Environment    Living Environment Comment See initial eval for living environment information.   -LR     Clinical Impression    Date of Referral to PT 03/06/18  -LR      PT Diagnosis severe L4 compression fx s/p fall/ s/p L4 kyphoplasty 3/6/18  -LR     Prognosis good  -LR     Patient/Family Goals Statement go home  -LR     Criteria for Skilled Therapeutic Interventions Met yes;treatment indicated  -LR     Rehab Potential good, to achieve stated therapy goals  -LR     Pain Assessment    Pain Assessment 0-10  -LR     Pain Score 4  -LR     Post Pain Score 0  -LR     Pain Type Acute pain  -LR     Pain Location Back  -LR     Pain Orientation Lower  -LR     Pain Intervention(s) Repositioned;Ambulation/increased activity  -LR     Vision Assessment/Intervention    Visual Impairment WFL  -LR     Cognitive Assessment/Intervention    Current Cognitive/Communication Assessment functional  -LR     Orientation Status oriented x 4;required verbal cueing (specifiy in comments)  -LR     Follows Commands/Answers Questions 100% of the time;able to follow single-step instructions;needs cueing;needs repetition  -LR     Personal Safety WNL/WFL  -LR     ROM (Range of Motion)    General ROM no range of motion deficits identified  -LR     MMT (Manual Muscle Testing)    General MMT Assessment lower extremity strength deficits identified  -LR     Lower Extremity    Lower Ext Manual Muscle Testing Detail L LE functionally 4+/5, R LE functionally 4-/5  -LR     Muscle Tone Assessment    Muscle Tone Assessment  Bilateral Upper Extremities;Bilateral Lower Extremities  -LN    Bilateral Upper Extremities Muscle Tone Assessment  associated movements noted  -LN    Bilateral Lower Extremities Muscle Tone Assessment  mildly decreased tone  -LN    Bed Mobility, Assessment/Treatment    Bed Mob, Supine to Sit, [place] not tested   UIC on arrival.   -LR     Bed Mob, Sit to Supine, [place] not tested   UIC at end of re-eval  -LR     Bed Mobility, Comment Educated patient on correct log rolling technique. Patient reports he has been performing bed mobility using the log rolling technique for a while now and  verbalized understanding.   -LR     Transfer Assessment/Treatment    Transfers, Sit-Stand [place] verbal cues required;contact guard assist  -LR     Transfers, Stand-Sit [place] verbal cues required;stand by assist  -LR     Transfers, Sit-Stand-Sit, Assist Device rolling walker  -LR     Toilet Transfer, [place] verbal cues required;supervision required  -LR     Toilet Transfer, Assistive Device rolling walker  -LR     Transfer, Safety Issues step length decreased  -LR     Transfer, Impairments strength decreased;pain  -LR     Transfer, Comment Verbal cues for correct hand placement with t/f and to limit trunk flexion during t/f. Assisted patient to and from bathroom to use urinal to void in standing. Patient requires increased time to stand from chair. Increased pain during sit<->stand t/f.   -LR     Gait Assessment/Treatment    Gait, [place] Level verbal cues required;stand by assist  -LR     Gait, Assistive Device rolling walker  -LR     Gait, Distance (Feet) 650  -LR     Gait, Gait Pattern Analysis swing-through gait  -LR     Gait, Gait Deviations bilateral:;step length decreased;forward flexed posture  -LR     Gait, Safety Issues step length decreased  -LR     Gait, Impairments strength decreased;pain  -LR     Gait, Comment Patient ambulated with step through gait pattern at slow pace. Verbal cues for upright posture, decreased UE weight bearing, and increased LE weight bearing. Improved with cues for correction. Gait limited by fatigue.   -LR     Stairs Assessment/Treatment    Stairs, Comment Educated patient on correct stair training technique for one step with use of RW. Patient verbalized understanding.   -LR     Therapy Exercises    Bilateral Lower Extremities AROM:;10 reps;sitting;ankle pumps/circles;glut sets;heel slides;hip ER;hip IR;quad sets;other reps   ab sets; cues for technique;demonstrated BKFO,arms overhead  -LR     Sensory Assessment/Intervention    Sensory Impairment --    denies numbness/tingling; able to actively DF bilaterally  -LR     Light Touch LLE;RLE  -LR RLE;LLE  -LN    LLE Light Touch WNL  -LR mild impairment  -LN    RLE Light Touch WNL  -LR mild impairment  -LN    Positioning and Restraints    Pre-Treatment Position sitting in chair/recliner  -LR     Post Treatment Position chair  -LR     In Chair notified nsg;reclined;sitting;call light within reach;encouraged to call for assist;legs elevated  -LR       03/05/18 2028 03/05/18 1216    Muscle Tone Assessment    Muscle Tone Assessment Bilateral Upper Extremities;Bilateral Lower Extremities  -JS     Bilateral Upper Extremities Muscle Tone Assessment associated movements noted  -JS mildly decreased tone  -CW    Bilateral Lower Extremities Muscle Tone Assessment mildly decreased tone  -JS mildly decreased tone  -CW    Sensory Assessment/Intervention    Light Touch RLE;LLE  -JS LLE;RLE  -CW    LLE Light Touch mild impairment  -JS mild impairment  -CW    RLE Light Touch mild impairment  -JS mild impairment  -CW      03/04/18 0815 03/03/18 2030    Muscle Tone Assessment    Bilateral Upper Extremities Muscle Tone Assessment mildly decreased tone  -CW mildly decreased tone  -SF    Bilateral Lower Extremities Muscle Tone Assessment mildly decreased tone  -CW mildly decreased tone  -SF    Sensory Assessment/Intervention    Light Touch  --  -SF    LLE Light Touch  --  -SF    RLE Light Touch  --  -SF      03/03/18 1430       Rehab Evaluation    Document Type therapy note (daily note)  -ES     Subjective Information agree to therapy;complains of;pain  -ES     Patient Effort, Rehab Treatment good  -ES     Symptoms Noted During/After Treatment none  -ES     General Information    Precautions/Limitations spinal precautions  -ES     Vital Signs    Pretreatment Heart Rate (beats/min) 127  -ES     Posttreatment Heart Rate (beats/min) 128  -ES     Pre Patient Position Sitting  -ES     Intra Patient Position Standing  -ES     Post Patient  Position Sitting  -ES     Pain Assessment    Pain Assessment 0-10  -ES     Pain Score 9  -ES     Post Pain Score 9  -ES     Pain Type Acute pain  -ES     Pain Location Back  -ES     Pain Intervention(s) Medication (See MAR);Ambulation/increased activity;Repositioned   RN gave medication 5 minutes prior to PT   -ES     Response to Interventions tolerated  -ES     Cognitive Assessment/Intervention    Current Cognitive/Communication Assessment functional  -ES     Orientation Status oriented x 4  -ES     Follows Commands/Answers Questions 100% of the time;able to follow single-step instructions  -ES     Transfer Assessment/Treatment    Transfers, Sit-Stand [place] moderate assist (50% patient effort)  -ES     Transfers, Stand-Sit [place] contact guard assist  -ES     Transfers, Sit-Stand-Sit, Assist Device rolling walker  -ES     Transfer, Safety Issues weight-shifting ability decreased;step length decreased  -ES     Transfer, Impairments pain;strength decreased  -ES     Transfer, Comment v/c for technique and hand placement  -ES     Gait Assessment/Treatment    Gait, [place] Level contact guard assist  -ES     Gait, Assistive Device rolling walker  -ES     Gait, Distance (Feet) 150  -ES     Gait, Gait Pattern Analysis swing-through gait  -ES     Gait, Gait Deviations forward flexed posture;vic decreased  -ES     Gait, Safety Issues step length decreased;balance decreased during turns;weight-shifting ability decreased  -ES     Gait, Impairments pain;strength decreased;ROM decreased  -ES     Gait, Comment Pt ambulated in hallway, requires extra time for pre-gait activities.  -ES     Therapy Exercises    Bilateral Lower Extremities AROM:;10 reps;sitting;ankle pumps/circles;LAQ;hip flexion  -ES     Positioning and Restraints    Pre-Treatment Position sitting in chair/recliner  -ES     Post Treatment Position chair  -ES     In Chair notified nsg;reclined;call light within reach;encouraged to call for  assist  -ES       User Key  (r) = Recorded By, (t) = Taken By, (c) = Cosigned By    Initials Name Provider Type    LR Nereida Thompson, PT Physical Therapist    SALIMA Avendano, RN Registered Nurse    ALEJO Chang, RN Registered Nurse    CW Shayan Krishnamurthy, RN Registered Nurse    ANTHONY Bejarano, RN Registered Nurse    ES Sarah Pan, PT Physical Therapist          Physical Therapy Education     Title: PT OT SLP Therapies (Done)     Topic: Physical Therapy (Done)     Point: Mobility training (Done)    Learning Progress Summary    Learner Readiness Method Response Comment Documented by Status   Patient Acceptance D,H,E VU,DU Issued and reviewed written/illustrated HEP. Educated on correct gait mechanics, correct log rolling technique, correct car t/f technique, and correct stair training tecnique. LR 03/06/18 1058 Done    Acceptance E NR  ES 03/03/18 1501 Active    Acceptance E NR  AS 03/01/18 0845 Active    Acceptance E VU,NR   02/28/18 1446 Done               Point: Home exercise program (Done)    Learning Progress Summary    Learner Readiness Method Response Comment Documented by Status   Patient Acceptance D,H,E VU,DU Issued and reviewed written/illustrated HEP. Educated on correct gait mechanics, correct log rolling technique, correct car t/f technique, and correct stair training tecnique.  03/06/18 1058 Done    Acceptance E NR  ES 03/03/18 1501 Active    Acceptance E NR  AS 03/01/18 0845 Active    Acceptance E VU,NR   02/28/18 1446 Done               Point: Body mechanics (Done)    Learning Progress Summary    Learner Readiness Method Response Comment Documented by Status   Patient Acceptance D,H,E [place],DU Issued and reviewed written/illustrated HEP. Educated on correct gait mechanics, correct log rolling technique, correct car t/f technique, and correct stair training tecnique. LR 03/06/18 1058 Done    Acceptance E NR  ES 03/03/18 1501 Active    Acceptance E NR  AS 03/01/18 0845 Active     Acceptance E [place],NR   02/28/18 1446 Done               Point: Precautions (Done)    Learning Progress Summary    Learner Readiness Method Response Comment Documented by Status   Patient Acceptance D,H,E LUBA HORNER Issued and reviewed written/illustrated HEP. Educated on correct gait mechanics, correct log rolling technique, correct car t/f technique, and correct stair training tecnique.  03/06/18 1058 Done    Acceptance E NR  ES 03/03/18 1501 Active    Acceptance E NR  AS 03/01/18 0845 Active    Acceptance E VU,NR   02/28/18 1446 Done                      User Key     Initials Effective Dates Name Provider Type Discipline     06/19/15 -  Sil Castrejon, PT Physical Therapist PT     06/19/15 -  Nereida Thompson, PT Physical Therapist PT    AS 06/22/15 -  Kirsten Berger, PTA Physical Therapy Assistant PT     11/13/17 -  Sarah Pan, PT Physical Therapist PT                PT Recommendation and Plan  Anticipated Equipment Needs At Discharge:  (none)  Anticipated Discharge Disposition: home with assist  Planned Therapy Interventions: balance training, bed mobility training, gait training, home exercise program, patient/family education, stair training, strengthening, transfer training  PT Frequency: daily  Plan of Care Review  Plan Of Care Reviewed With: patient  Progress: progress toward functional goals as expected  Outcome Summary/Follow up Plan: Patient is now s/p L4 kyphoplasty with improved pain control and mobility. Patient ambulated 650 feet with RW and no difficulty. Denied pain after ambulation. CGA for all mobility. Should be able to safety mobilize about the home if d/c home today. Achieved previous gait distance goal, new goal established today. Established stair goal today. Will continue to progress mobility and strength as able.           IP PT Goals       03/06/18 1014 03/03/18 1507 03/01/18 0846    Bed Mobility PT LTG    Bed Mobility PT LTG, Date Established 02/28/18  -LR      Bed  Mobility PT LTG, Time to Achieve 2 wks  -LR      Bed Mobility PT LTG, Activity Type roll left/roll right;sidelying to sit/sit to sidelying  -LR      Bed Mobility PT LTG, [place] Level conditional independence  -LR      Bed Mobility PT LTG, Date Goal Reviewed 03/06/18  -LR  03/01/18  -AS    Bed Mobility PT LTG, Outcome goal ongoing  -LR  goal ongoing  -AS    Transfer Training PT LTG    Transfer Training PT LTG, Date Established 02/28/18  -LR      Transfer Training PT LTG, Time to Achieve 2 wks  -LR      Transfer Training PT LTG, Activity Type sit to stand/stand to sit  -LR      Transfer Training PT LTG, [place] Level conditional independence  -LR      Transfer Training PT LTG, Assist Device walker, rolling  -LR      Transfer Training PT  LTG, Date Goal Reviewed 03/06/18  -LR  03/01/18  -AS    Transfer Training PT LTG, Outcome goal ongoing  -LR  goal ongoing  -AS    Gait Training PT LTG    Gait Training Goal PT LTG, Date Established 03/06/18  -LR      Gait Training Goal PT LTG, Time to Achieve 5 days  -LR 2 wks  -ES     Gait Training Goal PT LTG, [place] Level conditional independence  -LR conditional independence  -ES     Gait Training Goal PT LTG, Assist Device walker, rolling  -LR walker, rolling  -ES     Gait Training Goal PT LTG, Distance to Achieve 1,000 feet  -  -ES     Gait Training Goal PT LTG, Date Goal Reviewed 03/06/18  -LR  03/01/18  -AS    Gait Training Goal PT LTG, Outcome goal revised   achieved previously set gait distance.   -LR  goal ongoing  -AS    Stair Training PT LTG    Stair Training Goal PT LTG, Date Established 03/06/18  -LR      Stair Training Goal PT LTG, Time to Achieve 5 days  -LR      Stair Training Goal PT LTG, Number of Steps 1  -LR      Stair Training Goal PT LTG, [place] Level contact guard assist  -LR      Stair Training Goal PT LTG, Assist Device walker, rolling;other (see comments)   backwards  -LR      Stair Training Goal PT LTG, Date Goal Reviewed  03/06/18  -LR      Stair Training Goal PT LTG, Outcome goal ongoing  -LR        02/28/18 1446          Bed Mobility PT LTG    Bed Mobility PT LTG, Date Established 02/28/18  -      Bed Mobility PT LTG, Time to Achieve 2 wks  -EH      Bed Mobility PT LTG, Activity Type roll left/roll right;sidelying to sit/sit to sidelying  -EH      Bed Mobility PT LTG, [place] Level conditional independence  -EH      Transfer Training PT LTG    Transfer Training PT LTG, Date Established 02/28/18  -EH      Transfer Training PT LTG, Time to Achieve 2 wks  -EH      Transfer Training PT LTG, Activity Type sit to stand/stand to sit  -EH      Transfer Training PT LTG, [place] Level conditional independence  -EH      Transfer Training PT LTG, Assist Device walker, rolling  -EH      Gait Training PT LTG    Gait Training Goal PT LTG, Date Established 02/28/18  -      Gait Training Goal PT LTG, Time to Achieve 1 wk  -EH      Gait Training Goal PT LTG, [place] Level conditional independence  -EH      Gait Training Goal PT LTG, Assist Device walker, rolling  -EH      Gait Training Goal PT LTG, Distance to Achieve 150  -EH      Gait Training Goal PT LTG, Outcome goal ongoing  -        User Key  (r) = Recorded By, (t) = Taken By, (c) = Cosigned By    Initials Name Provider Type     Sil Castrejon, PT Physical Therapist    LR Nereida Thompson, PT Physical Therapist    AS Kirsten Berger, PTA Physical Therapy Assistant    ES Sarah Pan, PT Physical Therapist                Outcome Measures       03/06/18 1014 03/03/18 1430       How much help from another person do you currently need...    Turning from your back to your side while in flat bed without using bedrails? 3  -LR 2  -ES     Moving from lying on back to sitting on the side of a flat bed without bedrails? 3  -LR 2  -ES     Moving to and from a bed to a chair (including a wheelchair)? 3  -LR 2  -ES     Standing up from a chair using your arms (e.g.,  wheelchair, bedside chair)? 3  -LR 3  -ES     Climbing 3-5 steps with a railing? 3  -LR 2  -ES     To walk in hospital room? 3  -LR 3  -ES     AM-PAC 6 Clicks Score 18  -LR 14  -ES     Functional Assessment    Outcome Measure Options AM-PAC 6 Clicks Basic Mobility (PT)  -LR        User Key  (r) = Recorded By, (t) = Taken By, (c) = Cosigned By    Initials Name Provider Type    LR Nereida Thompson, PT Physical Therapist    ES Sarah Pan, PT Physical Therapist           Time Calculation:         PT Charges       03/06/18 1100          Time Calculation    Start Time 1014  -LR      PT Received On 03/06/18  -LR      PT Goal Re-Cert Due Date 03/16/18  -LR      Time Calculation- PT    Total Timed Code Minutes- PT 15 minute(s)  -LR        User Key  (r) = Recorded By, (t) = Taken By, (c) = Cosigned By    Initials Name Provider Type    LR Nereida Thompson, PT Physical Therapist          Therapy Charges for Today     Code Description Service Date Service Provider Modifiers Qty    72035033530 HC PT THER PROC EA 15 MIN 3/6/2018 Nereida Thompson, PT GP 1    80631892678 HC PT RE-EVAL ESTABLISHED PLAN 2 3/6/2018 Nereida Thompson, PT GP 1          PT G-Codes  Outcome Measure Options: AM-PAC 6 Clicks Basic Mobility (PT)      Nereida Thompson, PT  3/6/2018

## 2018-03-06 NOTE — DISCHARGE INSTR - ACTIVITY
Keep incisions clean and dry.  You may shower post-op day #5, no tub bathing or swimming until follow-up appointment.

## 2018-03-07 ENCOUNTER — TRANSITIONAL CARE MANAGEMENT TELEPHONE ENCOUNTER (OUTPATIENT)
Dept: FAMILY MEDICINE CLINIC | Facility: CLINIC | Age: 63
End: 2018-03-07

## 2018-03-07 NOTE — OUTREACH NOTE
DILLON call completed.  Please refer to TCM call flowsheet for call documentation.  Patient would like a refill on hydrocodone and potassium.  He would like to be called when scrip is ready for .  Thank you.

## 2018-03-09 ENCOUNTER — TELEPHONE (OUTPATIENT)
Dept: FAMILY MEDICINE CLINIC | Facility: CLINIC | Age: 63
End: 2018-03-09

## 2018-03-09 NOTE — TELEPHONE ENCOUNTER
Appt made 03/14/2018 @ 8am  ----- Message from Saba Joy Rep sent at 3/9/2018 10:10 AM EST -----  Regarding: RX REQ  Contact: [phone]  HE SPOKE TO SOMEONE EARLIER THIS WEEK ABOUT GETTING SOME PAIN MEDS FROM DR DE LA ROSA SINCE HE JUST GOT OUT OF THE HOSPITAL FROM BACK SURGERY. THEY WERE SUPPOSED TO CALL HIM BACK AND HAS NOT HEARD ANYTHING.

## 2018-03-12 DIAGNOSIS — S32.040D CLOSED COMPRESSION FRACTURE OF L4 LUMBAR VERTEBRA WITH ROUTINE HEALING, SUBSEQUENT ENCOUNTER: ICD-10-CM

## 2018-03-12 NOTE — TELEPHONE ENCOUNTER
Provider:  Stuart  Caller: Akshat Winkler  Time of call:   11:24  Phone #:  [phone]   Surgery:  Kypho L4  Surgery Date:  03/05/18  Last visit:   02/21/18  Next visit: none scheduled    [facility]:       01/09/2018 Hydrocodone/Acetaminophen  325MG/10MG 1955 56 14 Lucian Alvarez Iv  02/01/2018 Hydrocodone/Acetaminophen  325MG/5MG 1955 100 25 Oren Mark  02/08/2018 Gabapentin 100MG 1955 120 30 Josh Moss  (Stuart's rx for Norco is not showing on the [facility] yet, I cannot find it documented in EPIC anywhere)    Reason for call:       Called pt to get more details, he said he was prescribed Norco, he said he called his PCP and they would not refill it and said that Dr. Davidson need to refill it. I went ahead and pended Dr. Davidson's normal rx for Norco since it's not documented anywhere what he gave the patient.    Pt also said that he was having trouble sleeping due to pain as well, said that he has to sleep in a recliner. When he lays flat he gets severe pain in his back and into his chest, not sure what to do about that. Said that Dr. Davidson gave him some zanaflex at discharge, and that is not helping at all.

## 2018-03-12 NOTE — TELEPHONE ENCOUNTER
Surgery:  L4 kypho - 3/5/18  Next visit:  Follow up not scheduled.     Patient left a message on the clinical line @ 11:05am. He has a question about the medication provided after surgery. (He did not name the medication.)

## 2018-03-13 RX ORDER — HYDROCODONE BITARTRATE AND ACETAMINOPHEN 5; 325 MG/1; MG/1
1 TABLET ORAL 2 TIMES DAILY PRN
Qty: 20 TABLET | Refills: 0 | Status: SHIPPED | OUTPATIENT
Start: 2018-03-13 | End: 2018-03-14

## 2018-03-13 NOTE — TELEPHONE ENCOUNTER
Is this RX back there? Raquel looked and was unable to find- Leah has looked and was also unable to find.   -Also routing to [facility] Quinonez to schedule

## 2018-03-13 NOTE — TELEPHONE ENCOUNTER
Refilled back pain medicine. If he is having any chest pain at all, he needs to get in with his PCP today or tomorrow so they can do an EKG and make sure there are no changes.    Also, he is not scheduled for a follow up. Unless Dr. Davidson does not need one, be sure he has something scheduled for a postop appt before he runs out of the [place]

## 2018-03-13 NOTE — DISCHARGE SUMMARY
[facility] Medicine Services  DISCHARGE SUMMARY    Patient Name: Akshat Winkler  : 1955  MRN: [id number]    Date of Admission: 2018  Date of Discharge: 3/6/2018  Primary Care Physician: Oren Mrak MD    Consults     Date and Time Order Name Status Description    3/4/2018 1018 Cardiac Electrophysiologist Inpatient Consult Completed         Hospital Course     Presenting Problem:   Acute congestive heart failure, unspecified congestive heart failure type [I50.9]    Active Hospital Problems (** Indicates Principal Problem)    Diagnosis Date Noted   • **Acute renal failure [N17.9] 2018   • Hypokalemia [E87.6] 2018   • Compression fracture of lumbar vertebra [S32.000A] 2017   • Persistent atrial fibrillation/flutter [I48.1] 2017   • Coronary artery disease involving native coronary artery of native heart without angina pectoris [I25.10] 2016   • Essential hypertension [I10]    • Acute on chronic diastolic heart failure [I50.33]    • Class 3 obesity in adult [E66.9]       Resolved Hospital Problems    Diagnosis Date Noted Date Resolved   • Acute congestive heart failure [I50.9] 2018          Hospital Course:  Akshat Winkler is a 63 y.o. male sent from his PCP office due to MARKO and persistent back pain.    Acute kidney injury: 64 y/o male sent by his pcp due to creatinine of 6.1 on routine labs. Upon arrival patient noted to be hypotensive and on multiple diuretics and nephrotoxins. Those were held and patient was hydrated to which he responded well. Nephrology followed patient during his stay. His creatinine had improved to 1.4 on day of discharge. He was restarted on his diuretics and will continue to be followed by [facility] upon d/c.    L4 compression fracture: Patient sustained fracture at a prior visit but had been canceled due to his volume overload. He underwent successful kyphoplasty by Dr. Davidson here.     **Of  note, patient had persistent atrial flutter. He was seen by Dr. Jeffery who will follow him as outpatient.    Procedure(s):  KYPHOPLASTY L4       Day of Discharge     See daily progress note.    Review of Systems    Pertinent  and/or Most Recent Results               Invalid input(s): PROT, LABALBU        Invalid input(s): TG, LDLCALC, LDLREALC      Brief Urine Lab Results  (Last result in the past 365 days)      Color   Clarity   Blood   Leuk Est   Nitrite   Protein   CREAT   Urine HCG        02/28/18 1448             55.0             Microbiology Results Abnormal     Procedure Component Value - Date/Time    Urine Culture - Urine, Urine, Clean Catch [[id number]]  (Abnormal)  (Susceptibility) Collected:  02/28/18 1448    Lab Status:  Final result Specimen:  Urine from Urine, Clean Catch Updated:  03/02/18 1326     Urine Culture --      30,000-40,000 CFU/mL Enterococcus faecalis (A)    Susceptibility      Enterococcus faecalis     GOPAL     Ampicillin <=2 ug/ml Susceptible     Gentamicin High Level Synergy <=500 ug/ml Susceptible     Levofloxacin <=1 ug/ml Susceptible     Linezolid 2 ug/ml Susceptible     Nitrofurantoin <=32 ug/ml Susceptible     Penicillin G 2 ug/ml Susceptible     Streptomycin High Level Synergy <=1000 ug/ml Susceptible     Tetracycline >8 ug/ml Resistant     Vancomycin 2 ug/ml Susceptible                    Eosinophil Smear - Urine, Urine, Clean Catch [[id number]]  (Normal) Collected:  02/28/18 1448    Lab Status:  Final result Specimen:  Urine from Urine, Clean Catch Updated:  02/28/18 1708     Eosinophil Smear 0 % EOS/100 Cells     Narrative:       No eosinophil seen          Imaging Results (all)     Procedure Component Value Units Date/Time    SCANNED - IMAGING [[id number]] Resulted:  02/27/18      Updated:  03/10/18 1121    Kyphoplasty Vertebroplasty [[id number]] Collected:  03/05/18 1500     Updated:  03/05/18 1643    Narrative:       EXAMINATION: IR KYPHOPLASTY VERTEBROPLASTY-     INDICATION:  Kyphoplasty L4; I50.9-Heart failure, unspecified;  N17.9-Acute kidney failure, unspecified; Z74.09-Other reduced mobility;  M48.50XA-Collapsed vertebra, not elsewhere classified, site unspecified,  initial encounter for fracture.     TECHNIQUE: One minute 51 seconds of fluoroscopic time was utilized.     COMPARISON: None.     FINDINGS: Nine intraoperative images were obtained prior to during and  following kyphoplasty at L4.       Impression:       C-arm fluoroscopy and intraoperative imaging was utilized to  assist in kyphoplasty at L4.      D:  03/05/2018  E:  03/05/2018     This report was finalized on 3/5/2018 4:41 PM by Dr. Johnathan Loera MD.       US Renal Bilateral [[id number]] Collected:  02/28/18 1054     Updated:  02/28/18 1143    Narrative:       EXAMINATION: US RENAL, BILATERAL-02/27/2018:     INDICATION: MARKO; I50.9-Heart failure, unspecified; N17.9-Acute kidney  failure, unspecified.     TECHNIQUE: Ultrasound of the kidneys was performed in the longitudinal  and transverse planes.     COMPARISON: NONE.     FINDINGS: The right kidney measures 13.4 cm in length.  The left kidney  measures 12.6 cm in length. There is no evidence of renal mass, stone or  obstruction.       Impression:       Normal ultrasound of the kidneys.     D:  02/28/2018  E:  02/28/2018            This report was finalized on 2/28/2018 11:41 AM by Dr. Johnathan Loera MD.       XR Chest 1 View [[id number]] Collected:  02/27/18 0948     Updated:  02/27/18 1618    Narrative:       EXAMINATION: XR CHEST 1 VW-02/27/2018:      INDICATION: ABNORMAL LABS, INCREASE.      COMPARISON: Chest x-ray 07/02/2015.     FINDINGS: Cardiac size enlarged with central pulmonary vascularity  increased as well as bibasilar opacifications. No pneumothorax or  significant pleural effusion.           Impression:       Cardiomegaly with increased pulmonary vascularity and  ill-defined bibasilar pulmonary opacifications with an overall pulmonary  edema pattern.     D:   02/27/2018  E:  02/27/2018     This report was finalized on 2/27/2018 4:16 PM by Dr. Rosendo Vincent.             Results for orders placed in visit on 02/26/18   SCANNED VASCULAR STUDIES       Results for orders placed in visit on 02/26/18   SCANNED VASCULAR STUDIES       Results for orders placed during the hospital encounter of 02/27/18   Adult Transthoracic Echo Complete W/ Cont if Necessary Per Protocol    Narrative · Technically difficult study due to body habitus  · Left ventricular systolic function is normal. Estimated EF = 60%.  · The cardiac valves are poorly visualized but there does not appear to be   significant stenotic or regurgitant lesions.  · Right ventricular cavity is mild-to-moderately dilated.  · Atrial flutter noted throughout the examination.            Discharge Details      Akshat Winkler   Home Medication Instructions EMILIA:[id number]    Printed on:03/13/18 6059   Medication Information                      acetaminophen (TYLENOL) 500 MG tablet  Take 500 mg by mouth As Needed for Mild Pain .             apixaban (ELIQUIS) 5 MG tablet tablet  Take 5 mg by mouth 2 (Two) Times a Day.             aspirin 81 MG EC tablet  Take 81 mg by mouth Daily.             bumetanide (BUMEX) 1 MG tablet  Take 1 tablet by mouth 2 (Two) Times a Day.             gabapentin (NEURONTIN) 100 MG capsule  Take 100 mg by mouth 3 (Three) Times a Day.             HYDROcodone-acetaminophen (NORCO) 5-325 MG per tablet  Take 1 tablet by mouth Every 6 (Six) Hours As Needed for Moderate Pain .             potassium chloride (K-DUR) 10 MEQ CR tablet  Take 10 mEq by mouth Daily As Needed (hypokalemia).             rosuvastatin (CRESTOR) 20 MG tablet  Take 1 tablet by mouth Daily for 360 days.             spironolactone (ALDACTONE) 25 MG tablet  Take 1 tablet by mouth Daily.             tiZANidine (ZANAFLEX) 2 MG tablet  Take 2 mg by mouth Every 8 (Eight) Hours As Needed for Muscle Spasms.                   Discharge  Disposition:  Home or Self Care    Discharge Diet: cardiac      Discharge Activity:   Activity Instructions     Keep incisions clean and dry.  You may shower post-op day #5, no tub bathing or swimming until follow-up appointment.                  Future Appointments  Date Time Provider Department Center   3/14/2018 8:00 AM Oren Mark MD [facility] None   3/28/2018 8:30 AM Joaquin Jeffery MD [facility] None   7/24/2018 1:15 PM [facility] None       Additional Instructions for the Follow-ups that You Need to Schedule     Basic Metabolic Panel     Mar 11, 2018 (Approximate)      Discharge Follow-up with PCP    As directed      Follow Up Details:  1 week hospital follow up         Discharge Follow-up with Specialty: ALAN; 2 Weeks    As directed      Specialty:  ALAN    Follow Up:  2 Weeks         Discharge Follow-up with Specified Provider: Jose D; 3 Weeks    As directed      To:  Jose D    Follow Up:  3 Weeks               Time Spent on Discharge: 45 minutes    Electronically signed by Trina Dupont II, DO, 03/13/18, 2:55 PM.

## 2018-03-13 NOTE — TELEPHONE ENCOUNTER
"Per Dr. Davidson's last hospital note on the patient from 3/06 he said: \"He will follow-up in my clinic in about 3 weeks with my PA-C.\" So the patient needs an appt scheduled w/ Lety  "

## 2018-03-14 ENCOUNTER — OFFICE VISIT (OUTPATIENT)
Dept: FAMILY MEDICINE CLINIC | Facility: CLINIC | Age: 63
End: 2018-03-14

## 2018-03-14 VITALS
RESPIRATION RATE: 16 BRPM | DIASTOLIC BLOOD PRESSURE: 80 MMHG | WEIGHT: 268 LBS | OXYGEN SATURATION: 98 % | TEMPERATURE: 98.2 F | SYSTOLIC BLOOD PRESSURE: 106 MMHG | HEART RATE: 104 BPM | BODY MASS INDEX: 39.58 KG/M2

## 2018-03-14 DIAGNOSIS — S32.040D CLOSED COMPRESSION FRACTURE OF L4 LUMBAR VERTEBRA WITH ROUTINE HEALING, SUBSEQUENT ENCOUNTER: ICD-10-CM

## 2018-03-14 DIAGNOSIS — G89.18 POST-OPERATIVE PAIN: Primary | ICD-10-CM

## 2018-03-14 PROCEDURE — 99213 OFFICE O/P EST LOW 20 MIN: CPT | Performed by: FAMILY MEDICINE

## 2018-03-14 RX ORDER — HYDROCODONE BITARTRATE AND ACETAMINOPHEN 5; 325 MG/1; MG/1
1 TABLET ORAL EVERY 8 HOURS PRN
Qty: 90 TABLET | Refills: 0 | Status: SHIPPED | OUTPATIENT
Start: 2018-03-14 | End: 2018-04-11 | Stop reason: SDUPTHER

## 2018-03-14 NOTE — PROGRESS NOTES
Subjective   Akshat Winkler is a 63 y.o. male.     History of Present Illness   Lumbar discectomy ten days ago.  Continues back pain at incision site.  Given three days of pain medication following surgery, not sleeping much.  Using walker to get about. Not eating much. Taking fluids OK. Bowels slow, not taking much Metamucil.  Leg swelling improved. No neck or arm pain.   The following portions of the patient's history were reviewed and updated as appropriate: allergies, current medications, past family history, past medical history, past social history, past surgical history and problem list.    Review of Systems   Constitutional: Positive for activity change.   Respiratory: Negative for cough, chest tightness and shortness of breath.    Cardiovascular: Negative for chest pain.   Gastrointestinal: Positive for constipation.   Musculoskeletal: Positive for back pain.       Objective   Physical Exam   Constitutional: He appears well-developed and well-nourished. He appears distressed.   Pulmonary/Chest: Effort normal.   Musculoskeletal: He exhibits no edema.   Uncomfortable sitting, using walker and moving slow.    Vitals reviewed.      Assessment/Plan   Akshat was seen today for follow-up.    Diagnoses and all orders for this visit:    Post-operative pain    Closed compression fracture of L4 lumbar vertebra with routine healing, subsequent encounter  -     HYDROcodone-acetaminophen (NORCO) 5-325 MG per tablet; Take 1 tablet by mouth Every 8 (Eight) Hours As Needed for Moderate Pain .      Recommend take Metamucil each day until bowels get regulated.

## 2018-03-15 NOTE — TELEPHONE ENCOUNTER
RX signed, called pt on cell #, left vm asking pt if he wants it mailed or to .     RX in [facility] office

## 2018-03-23 ENCOUNTER — OFFICE VISIT (OUTPATIENT)
Dept: CARDIOLOGY | Facility: HOSPITAL | Age: 63
End: 2018-03-23

## 2018-03-23 ENCOUNTER — LAB REQUISITION (OUTPATIENT)
Dept: LAB | Facility: HOSPITAL | Age: 63
End: 2018-03-23

## 2018-03-23 VITALS
RESPIRATION RATE: 20 BRPM | DIASTOLIC BLOOD PRESSURE: 70 MMHG | HEIGHT: 69 IN | BODY MASS INDEX: 42.06 KG/M2 | TEMPERATURE: 97.2 F | OXYGEN SATURATION: 98 % | SYSTOLIC BLOOD PRESSURE: 130 MMHG | WEIGHT: 284 LBS | HEART RATE: 75 BPM

## 2018-03-23 DIAGNOSIS — I10 ESSENTIAL HYPERTENSION: ICD-10-CM

## 2018-03-23 DIAGNOSIS — I48.19 PERSISTENT ATRIAL FIBRILLATION (HCC): ICD-10-CM

## 2018-03-23 DIAGNOSIS — Z00.00 ROUTINE GENERAL MEDICAL EXAMINATION AT A HEALTH CARE FACILITY: ICD-10-CM

## 2018-03-23 DIAGNOSIS — N28.9 RENAL INSUFFICIENCY: ICD-10-CM

## 2018-03-23 DIAGNOSIS — I50.33 ACUTE ON CHRONIC DIASTOLIC HEART FAILURE (HCC): Primary | ICD-10-CM

## 2018-03-23 LAB
ANION GAP SERPL CALCULATED.3IONS-SCNC: 12 MMOL/L (ref 3–11)
BUN BLD-MCNC: 24 MG/DL (ref 9–23)
BUN/CREAT SERPL: 18.5 (ref 7–25)
CALCIUM SPEC-SCNC: 9.3 MG/DL (ref 8.7–10.4)
CHLORIDE SERPL-SCNC: 102 MMOL/L (ref 99–109)
CO2 SERPL-SCNC: 29 MMOL/L (ref 20–31)
CREAT BLD-MCNC: 1.3 MG/DL (ref 0.6–1.3)
GFR SERPL CREATININE-BSD FRML MDRD: 56 ML/MIN/1.73
GLUCOSE BLD-MCNC: 86 MG/DL (ref 70–100)
POTASSIUM BLD-SCNC: 3.7 MMOL/L (ref 3.5–5.5)
SODIUM BLD-SCNC: 143 MMOL/L (ref 132–146)

## 2018-03-23 PROCEDURE — 99214 OFFICE O/P EST MOD 30 MIN: CPT | Performed by: NURSE PRACTITIONER

## 2018-03-23 PROCEDURE — 80048 BASIC METABOLIC PNL TOTAL CA: CPT | Performed by: NURSE PRACTITIONER

## 2018-03-23 NOTE — PROGRESS NOTES
Encounter Date:03/23/2018      Patient ID: Akshat Winkler is a 63 y.o. male.        Subjective:     Chief Complaint: Follow-up (Walk-in c/o shortness of air, rapid weight gain (10-12 lbs) and swelling)     History of Present Illness  Patient presents to the office today as an add-on for ongoing evaluation of his acute on chronic diastolic heart failure. He notes a weight gain of 10-12 lbs over the last few days. He notes compliance with his medications. He notes pedal edema and abdominal fullness. He recently underwent a kyphoplasty per Dr Davidson and is ambulating with a walker.   Patient Active Problem List   Diagnosis   • Acute on chronic diastolic heart failure   • Type 2 diabetes mellitus without complication, without long-term current use of insulin   • Hyperlipidemia LDL goal <70   • Essential hypertension   • Coronary artery disease involving native coronary artery of native heart without angina pectoris   • Severe obstructive sleep apnea   • Class 3 obesity in adult   • Persistent atrial fibrillation/flutter   • Compression fracture of lumbar vertebra   • Spondylosis of 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 or radiculopathy   • Lumbar stenosis with neurogenic claudication   • Lumbar disc herniation   • Myofascial pain   • Acute renal failure   • Hypokalemia       Past Surgical History:   Procedure Laterality Date   • CARDIAC CATHETERIZATION     • CARDIOVERSION      multiple   • COLONOSCOPY     • KYPHOPLASTY N/A 3/5/2018    Procedure: KYPHOPLASTY L4;  Surgeon: Akshat Davidson MD;  [facility];  Service:    • OTHER SURGICAL HISTORY  06/29/2015    PVA       Allergies   Allergen Reactions   • Bisoprolol Other (See Comments)     Severe Hypotension   • Flecainide Other (See Comments)     Syncope / collapse   • Metoprolol Other (See Comments)      Bradycardia, Severe Hypotension         Current Outpatient Prescriptions:   •  acetaminophen (TYLENOL) 500 MG tablet, Take 500 mg by mouth As Needed for Mild Pain  ., Disp: , Rfl:   •  apixaban (ELIQUIS) 5 MG tablet tablet, Take 5 mg by mouth 2 (Two) Times a Day., Disp: , Rfl:   •  aspirin 81 MG EC tablet, Take 81 mg by mouth Daily., Disp: , Rfl:   •  bumetanide (BUMEX) 1 MG tablet, Take 1 tablet by mouth 2 (Two) Times a Day., Disp: 60 tablet, Rfl: 0  •  gabapentin (NEURONTIN) 100 MG capsule, Take 100 mg by mouth 3 (Three) Times a Day., Disp: , Rfl:   •  HYDROcodone-acetaminophen (NORCO) 5-325 MG per tablet, Take 1 tablet by mouth Every 8 (Eight) Hours As Needed for Moderate Pain ., Disp: 90 tablet, Rfl: 0  •  potassium chloride (K-DUR) 10 MEQ CR tablet, Take 10 mEq by mouth Daily As Needed (hypokalemia)., Disp: , Rfl:   •  rosuvastatin (CRESTOR) 20 MG tablet, Take 1 tablet by mouth Daily for 360 days., Disp: 90 tablet, Rfl: 3  •  spironolactone (ALDACTONE) 25 MG tablet, Take 1 tablet by mouth Daily., Disp: 90 tablet, Rfl: 3  •  tiZANidine (ZANAFLEX) 2 MG tablet, Take 2 mg by mouth Every 8 (Eight) Hours As Needed for Muscle Spasms., Disp: , Rfl:     The following portions of the chart were reviewed and updated as appropriate: Allergies, current medications, past family history, social history, past medical history.     Review of Systems   Constitution: Positive for weight gain. Negative for chills, decreased appetite, diaphoresis, fever, weakness, malaise/fatigue, night sweats and weight loss.   HENT: Negative for congestion, hearing loss, hoarse voice and nosebleeds.    Eyes: Negative for blurred vision, visual disturbance and visual halos.   Cardiovascular: Positive for irregular heartbeat and leg swelling. Negative for chest pain, claudication, cyanosis, dyspnea on exertion, near-syncope, orthopnea, palpitations, paroxysmal nocturnal dyspnea and syncope.   Respiratory: Negative for cough, hemoptysis, shortness of breath, sleep disturbances due to breathing, snoring, sputum production and wheezing.    Hematologic/Lymphatic: Negative for bleeding problem. Does not  "bruise/bleed easily.   Skin: Negative for dry skin, itching and rash.   Musculoskeletal: Negative for arthritis, joint pain, joint swelling and myalgias.   Gastrointestinal: Positive for bloating. Negative for abdominal pain, constipation, diarrhea, flatus, heartburn, hematemesis, hematochezia, melena, nausea and vomiting.   Genitourinary: Negative for dysuria, frequency, hematuria, nocturia and urgency.   Neurological: Negative for excessive daytime sleepiness, dizziness, headaches, light-headedness and loss of balance.   Psychiatric/Behavioral: Negative for depression. The patient does not have insomnia and is not nervous/anxious.            Objective:     Vitals:    03/23/18 1445 03/23/18 1447 03/23/18 1448   BP: 120/70 133/71 130/70   BP Location: Right arm Left arm Right arm   Patient Position: Sitting Sitting Standing   Cuff Size: Large Adult     Pulse: 71 63 75   Resp: 20     Temp: 97.2 °F (36.2 °C)     TempSrc: Temporal Artery      SpO2: 98%     Weight: 129 kg (284 lb)     Height: 175.3 cm (69.02\")           Physical Exam   Constitutional: He is oriented to person, place, and time. He appears well-developed and well-nourished. He is active and cooperative. No distress.   HENT:   Head: Normocephalic and atraumatic.   Mouth/Throat: Oropharynx is clear and moist.   Eyes: Conjunctivae and EOM are normal. Pupils are equal, round, and reactive to light.   Neck: Normal range of motion. Neck supple. No JVD present. No tracheal deviation present. No thyromegaly present.   Cardiovascular: Normal rate, regular rhythm, normal heart sounds and intact distal pulses.    Pulmonary/Chest: Effort normal and breath sounds normal.   Abdominal: Soft. Bowel sounds are normal. He exhibits distension. There is tenderness.   Musculoskeletal: Normal range of motion. He exhibits edema (3+ pitting edema noted BLEs).   Ambulates with walker   Neurological: He is alert and oriented to person, place, and time.   Skin: Skin is warm, dry " and intact.   Psychiatric: He has a normal mood and affect. His behavior is normal.   Nursing note and vitals reviewed.      Lab and Diagnostic Review:      Results for orders placed or performed in visit on 03/23/18   Basic Metabolic Panel   Result Value Ref Range    Glucose 86 70 - 100 mg/dL    BUN 24 (H) 9 - 23 mg/dL    Creatinine 1.30 0.60 - 1.30 mg/dL    Sodium 143 132 - 146 mmol/L    Potassium 3.7 3.5 - 5.5 mmol/L    Chloride 102 99 - 109 mmol/L    CO2 29.0 20.0 - 31.0 mmol/L    Calcium 9.3 8.7 - 10.4 mg/dL    eGFR Non African Amer 56 (L) >60 mL/min/1.73    BUN/Creatinine Ratio 18.5 7.0 - 25.0    Anion Gap 12.0 (H) 3.0 - 11.0 mmol/L         Assessment and Plan:         1. Acute on chronic diastolic heart failure  IV diuresis today in office. Patient received bumex 2 mg NDC [id number]  today through a butterfly in the right AC over slow IV push. During IV diuresis, vitals were monitored and stable. Please see IV diuresis record for those vitals. Patient voided 375 ml in the office prior to discharge from the office. Butterfly was d/c'd and area was free of erythema, ecchymosis, or drainage.  Patient was  instructed to record urinary output for the next 24 hours. Patient will receive a follow up call from the HF center in 24 hours to evaluate urinary output and reassess signs and symptoms.   Heart failure education today including signs and symptoms, the role of the heart failure center, daily weights, low sodium diet (less than 1500 mg per day), and daily physical activity. Reviewed HF Zones with patient and family.  Patient to continue current medications as previously ordered.   2. Essential hypertension  Well controlled  HTN Education provided today including signs and symptoms, medication management, daily blood pressure monitoring. Patient encouraged to call the [facility] and Valve center with any abnormal readings.   3. Renal insufficiency  Recent MARKO with Creatinine >6  Creatinine 3/6/18 was 1.4 upon  discharge from the hospital   - Basic Metabolic Panel; Future  Creatinine today 1.3  4. Persistent atrial fibrillation/flutter  Patient to establish with Dr Jeffery in near future    F/u PRN    It has been a pleasure to participate in the care of this patient.  Patient was instructed to call the [facility] Center with any questions, concerns, or worsening symptoms.        * Please note that portions of this note were completed with a voice recognition program. Efforts were made to edit the dictation but occasionally words are transcribed.

## 2018-03-27 ENCOUNTER — OFFICE VISIT (OUTPATIENT)
Dept: NEUROSURGERY | Facility: CLINIC | Age: 63
End: 2018-03-27

## 2018-03-27 ENCOUNTER — TELEPHONE (OUTPATIENT)
Dept: CARDIOLOGY | Facility: HOSPITAL | Age: 63
End: 2018-03-27

## 2018-03-27 VITALS
HEIGHT: 69 IN | RESPIRATION RATE: 18 BRPM | HEART RATE: 63 BPM | SYSTOLIC BLOOD PRESSURE: 111 MMHG | TEMPERATURE: 97.8 F | BODY MASS INDEX: 40.43 KG/M2 | DIASTOLIC BLOOD PRESSURE: 71 MMHG | WEIGHT: 273 LBS | OXYGEN SATURATION: 99 %

## 2018-03-27 DIAGNOSIS — S32.040D CLOSED COMPRESSION FRACTURE OF L4 LUMBAR VERTEBRA WITH ROUTINE HEALING, SUBSEQUENT ENCOUNTER: Primary | ICD-10-CM

## 2018-03-27 PROCEDURE — 99213 OFFICE O/P EST LOW 20 MIN: CPT | Performed by: PHYSICIAN ASSISTANT

## 2018-03-27 NOTE — PROGRESS NOTES
Patient: Akshat Winkler  : 1955  Chart #: [id number]    Date of Service: 2018    CHIEF COMPLAINT:   1. L4 compression fracture  2. Osteoporosis    History of Present Illness Mr. 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  He was noted to have a modest upper endplate fracture of L4 vertebral body.  Conservative measures were recommended.  He had ongoing pain and new studies of his spine demonstrated the compression fracture had advanced substantially.  He was also noted to have significantly diminished bone density.  As such on 3/5/2018 he underwent kyphoplasty at this level.    Today patient is 3-1/2 weeks postop.  Overall he has had improvement in his back pain with the kyphoplasty. Yesterday he twisted funny and now has more pain in the low back across the belt line. Every now and then he has random jolts of pain in the right leg.  He continues to have trouble sleeping at night because lying flat is uncomfortable. He is sleeping in a recliner. He takes a muscle relaxer and a pain pill at bedtime which helps him sleep.     I reviewed the following portions of the patient's history and updated as appropriate: allergies, current medications, past family history, past medical history, past social history, past surgical history and problem list.      Review of Systems   Constitutional: Negative for activity change, appetite change, chills, diaphoresis, fatigue, fever and unexpected weight change.   HENT: Negative for congestion, dental problem, drooling, ear discharge, ear pain, facial swelling, hearing loss, mouth sores, nosebleeds, postnasal drip, rhinorrhea, sinus pressure, sneezing, sore throat, tinnitus, trouble swallowing and voice change.    Eyes: Negative for photophobia, pain, discharge, redness, itching and visual disturbance.   Respiratory: Negative for apnea,  "cough, choking, chest tightness, shortness of breath, wheezing and stridor.    Cardiovascular: Positive for palpitations and leg swelling. Negative for chest pain.   Gastrointestinal: Negative for abdominal distention, abdominal pain, anal bleeding, blood in stool, constipation, diarrhea, nausea, rectal pain and vomiting.   Endocrine: Negative for cold intolerance, heat intolerance, polydipsia, polyphagia and polyuria.   Genitourinary: Negative for decreased urine volume, difficulty urinating, dysuria, enuresis, flank pain, frequency, genital sores, hematuria and urgency.   Musculoskeletal: Positive for back pain (Pt states while taking a shower yesterday he felt a \"pop\" in his low back). Negative for arthralgias, gait problem, joint swelling, myalgias, neck pain and neck stiffness.   Skin: Negative for color change, pallor, rash and wound.   Allergic/Immunologic: Negative for environmental allergies, food allergies and immunocompromised state.   Neurological: Negative for dizziness, tremors, seizures, syncope, facial asymmetry, speech difficulty, weakness, light-headedness, numbness and headaches.   Hematological: Negative for adenopathy. Does not bruise/bleed easily.   Psychiatric/Behavioral: Negative for agitation, behavioral problems, confusion, decreased concentration, dysphoric mood, hallucinations, self-injury, sleep disturbance and suicidal ideas. The patient is not nervous/anxious and is not hyperactive.    All other systems reviewed and are negative.      Objective   Vital Signs: Blood pressure 111/71, pulse 63, temperature 97.8 °F (36.6 °C), temperature source Temporal Artery , resp. rate 18, height 175.3 cm (69.02\"), weight 124 kg (273 lb), SpO2 99 %.  Physical Exam   Constitutional: He appears well-developed and well-nourished. No distress.   HENT:   Head: Normocephalic and atraumatic.   Eyes: EOM are normal.   Cardiovascular: Normal rate and regular rhythm.    Pulmonary/Chest: Effort normal and breath " sounds normal.   Skin:   Well healed punctate incisions.    Psychiatric: He has a normal mood and affect. His behavior is normal. Thought content normal.   Nursing note and vitals reviewed.  Musculoskeletal: tenderness in the low back in the paraspinal tissues.   Strength is intact in upper and lower extremities to direct testing.  Station and gait are normal.  Neurologic:  Muscle tone is normal throughout.  Coordination is intact.  Sensation is intact to light touch throughout.  Patient is oriented to person, place, and time.    Assessment/Plan    Diagnosis:   1.  L4 compression fracture s/p kyphoplasty  2.  Osteoporosis     Medical Decision Making: Mr. Winkler is coming along. Yesterday he twisted funny and is having a bit more pain in the low back as a result. His symptoms are mild at this point.  I recommended symptomatic treatment for a few weeks.  If his pain worsens in the interim he will call us, otherwise he will follow up in one month.         Akshat was seen today for back pain.    Diagnoses and all orders for this visit:    Closed compression fracture of L4 lumbar vertebra with routine healing, subsequent encounter               Shalonda Asher PA-C  Patient Care Team:  Oren Mark MD as PCP - General (Family Medicine)  Lucian Alvarez IV, MD as Consulting Physician (Cardiology)  Mandy Clark PA-C as Physician Assistant (Physician Assistant)  Josh Moss MD as Consulting Physician (Pain Medicine)

## 2018-03-27 NOTE — TELEPHONE ENCOUNTER
Patient reports that he has lost 6 lbs since IV diuresis. Patient to continue bumex and aldactone as previously ordered. Patient instructed to call the office with any worsening symptoms.

## 2018-03-28 ENCOUNTER — CONSULT (OUTPATIENT)
Dept: CARDIOLOGY | Facility: CLINIC | Age: 63
End: 2018-03-28

## 2018-03-28 VITALS
HEART RATE: 72 BPM | WEIGHT: 282.8 LBS | BODY MASS INDEX: 40.49 KG/M2 | SYSTOLIC BLOOD PRESSURE: 118 MMHG | HEIGHT: 70 IN | OXYGEN SATURATION: 96 % | DIASTOLIC BLOOD PRESSURE: 76 MMHG

## 2018-03-28 DIAGNOSIS — I50.33 ACUTE ON CHRONIC DIASTOLIC HEART FAILURE (HCC): ICD-10-CM

## 2018-03-28 DIAGNOSIS — R60.1 GENERALIZED EDEMA: ICD-10-CM

## 2018-03-28 DIAGNOSIS — I10 ESSENTIAL HYPERTENSION: ICD-10-CM

## 2018-03-28 DIAGNOSIS — I48.19 PERSISTENT ATRIAL FIBRILLATION (HCC): Primary | ICD-10-CM

## 2018-03-28 PROCEDURE — 99204 OFFICE O/P NEW MOD 45 MIN: CPT | Performed by: INTERNAL MEDICINE

## 2018-03-28 PROCEDURE — 93000 ELECTROCARDIOGRAM COMPLETE: CPT | Performed by: INTERNAL MEDICINE

## 2018-03-28 NOTE — PROGRESS NOTES
Electrophysiology Consult     Akshat Winkler  1955  [phone]    03/28/18    DATE OF ADMISSION: (Not on file)  [facility] CARDIOLOGY    Oren Mark MD  [address]  [address] / [facility] [address]    Chief Complaint   Patient presents with   • Atrial Fibrillation     Problem List:  1. Persistent atrial fibrillation/flutter    A. Diagnosed 2011    B. [id number] 3 (HTN, CAD, DM)    C. Echo (10/11/2011): Normal LVEF, mild MR, mild LA dilation    D. LOLY/ECVcardioversion, 12/21/2011, with initiation of propafenone therapy.    E. Successful LOLY/ECV for recurrent atrial fibrillation, 11/15/2013    F. PVA with Dr. Cary, 6/29/2015    G. Cardioversion (07/17/2015) for atrial flutter occurring post ablation    H. ECV for recurrent atrial flutter, 12/20/17 with reattempt at beta blocker/flecainide.    I. Intolerant to beta blocker/flecanide with severe hypotension, intolerant to propafenone   2. Chronic diastolic heart failure    A. Cardiac cath (02/20/14):  elevated LVEDP suggesting diastolic heart failure.    B. Intolerant to beta blocker therapy    C. Echocardiogram 2/28/18: EF 60%, no significant valvular disease  3. Coronary artery disease involving native coronary artery of native heart without angina pectoris    A. Cardiac PET (01/2014): partially reversible anteroseptal defect, normal LVEF.    B. Cardiac cath (02/20/14): mild nonobstructive CAD, LVEF 55%, elevated LVEDP suggesting diastolic heart failure.   4. Type 2 diabetes mellitus without complication, without long-term current use of insulin   5. Hyperlipidemia LDL goal <70   6. Essential hypertension   7. Severe obstructive sleep apnea   8. Class 3 obesity in adult   9. Compression fracture of lumbar vertebra   10. Spondylosis of 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 or radiculopathy   11. Lumbar stenosis with neurogenic claudication   12. Lumbar disc herniation   13. Myofascial pain    14. Pathologic compression fracture of vertebra      History of Present Illness:   Patient is 63-year-old  male with a past medical history notable for persistent atrial fibrillation s/p PVA in 2015, chronic diastolic CHF, coronary artery disease, hypertension, hyperlipidemia, sleep apnea who presents today by referral from FAITH Archibald for management of persistent atrial fibrillation/flutter.  He was initially diagnosed with atrial fibrillation in 2011 and has been intolerant to propafenone, and flecainide, and is also intolerant to most AV joby agents.  He underwent PVA with Dr. Cary 2015 and had recurrent atrial flutter for which he has undergone two external cardioversions, most recent of which was in December.  He successfully cardioverted to normal rhythm and was initiated on flecainide and low-dose beta blocker but was intolerant to this.  He developed severe hypotension, syncope, and suffered a back injury for which he underwent back surgery 3 weeks ago.  When he last saw Dr. Alvarez in the office in January was noted again to be in atrial flutter with a heart rate of 108 bpm.  He denies any complaints of palpitations or racing heart.  He states since his back surgery, he has been having so much pain that he feels he is not as aware of his afib/flutter as he was before.  Of note, he had MARKO in the hospital earlier this month with a Cr as high as 4, now has improved to 1.3 as of last week.  He notes chronic shortness of breath with exertion and LE swelling despite bumex and spironolactone.  He was recently seen by FAITH Garibay last week and received IV bumex in the office with improved swelling but only for a few days.  He has been anticoagulated with Eliquis with no bleeding issues, missed doses, or TIA/CVA symptoms.  Last echocardiogram in February shows EF 60% and no significant valvular disease.     Allergies   Allergen Reactions   • Bisoprolol Other (See Comments)     Severe  Hypotension   • Flecainide Other (See Comments)     Syncope / collapse   • Metoprolol Other (See Comments)      Bradycardia, Severe Hypotension        Cannot display prior to admission medications because the patient has not been admitted in this contact.            Current Outpatient Prescriptions:   •  acetaminophen (TYLENOL) 500 MG tablet, Take 500 mg by mouth As Needed for Mild Pain ., Disp: , Rfl:   •  apixaban (ELIQUIS) 5 MG tablet tablet, Take 5 mg by mouth 2 (Two) Times a Day., Disp: , Rfl:   •  aspirin 81 MG EC tablet, Take 81 mg by mouth Daily., Disp: , Rfl:   •  bumetanide (BUMEX) 1 MG tablet, Take 1 tablet by mouth 2 (Two) Times a Day. (Patient taking differently: Take 2 mg by mouth 2 (Two) Times a Day.), Disp: 60 tablet, Rfl: 0  •  gabapentin (NEURONTIN) 100 MG capsule, Take 100 mg by mouth 3 (Three) Times a Day., Disp: , Rfl:   •  HYDROcodone-acetaminophen (NORCO) 5-325 MG per tablet, Take 1 tablet by mouth Every 8 (Eight) Hours As Needed for Moderate Pain ., Disp: 90 tablet, Rfl: 0  •  potassium chloride (K-DUR) 10 MEQ CR tablet, Take 10 mEq by mouth Daily As Needed (hypokalemia)., Disp: , Rfl:   •  rosuvastatin (CRESTOR) 20 MG tablet, Take 1 tablet by mouth Daily for 360 days., Disp: 90 tablet, Rfl: 3  •  spironolactone (ALDACTONE) 25 MG tablet, Take 1 tablet by mouth Daily., Disp: 90 tablet, Rfl: 3  •  tiZANidine (ZANAFLEX) 2 MG tablet, Take 2 mg by mouth Every 8 (Eight) Hours As Needed for Muscle Spasms., Disp: , Rfl:     Social History     Social History   • Marital status:      Social History Main Topics   • Smoking status: Never Smoker   • Smokeless tobacco: Never Used   • Alcohol use No   • Drug use: No   • Sexual activity: Defer     Other Topics Concern   • Not on file     Social History Narrative    Patient lives at home with his wife and denies caffeine intake.       Family History   Problem Relation Age of Onset   • Hypertension Mother    • Stroke Mother    • Stroke Father    • Sleep  "disorder Father    • Cancer Brother        REVIEW OF SYSTEMS:   CONST:  + fatigue, no weight loss, fever, chills  HEENT:  No visual loss, blurred vision, double vision, yellow sclerae.                   No hearing loss, congestion, sore throat.   SKIN:      No rashes, urticaria, ulcers, sores.     RESP:     + shortness of breath, - hemoptysis, cough, sputum.   GI:           No anorexia, nausea, vomiting, diarrhea. No abdominal pain, melena.   :         No burning on urination, hematuria or increased frequency.  ENDO:    No diaphoresis, cold or heat intolerance. No polyuria or polydipsia.   NEURO:  No headache, dizziness, syncope, paralysis, ataxia, or parasthesias.                  No change in bowel or bladder control. No history of CVA/TIA  MUSC:    + back pain   HEME:    No anemia, bleeding, bruising. No history of DVT/PE.  PSYCH:  No history of depression, anxiety    Vitals:    03/28/18 0847   BP: 118/76   BP Location: Right arm   Patient Position: Sitting   Pulse: 72   SpO2: 96%   Weight: 128 kg (282 lb 12.8 oz)   Height: 177.8 cm (70\")                 Physical Exam:  GEN: Well nourished, Well- developed  No acute distress  HEENT: Normocephalic, Atraumatic, PERRLA, moist mucous membranes  NECK: supple, NO JVD, no thyromegaly, no lymphadenopathy  CARD: S1S2, RRR no murmur, gallop, rub  LUNGS: Clear to auscultataion, normal respiratory effort  ABDOMEN: Soft, nontender, normal bowel sounds  EXTREMITIES: No gross deformities,  No clubbing, cyanosis, 3+ bilateral LE edema  SKIN: Warm, dry  NEURO: No focal deficits  PSYCHIATRIC: Normal affect and mood        ECG 12 Lead  Date/Time: 3/28/2018 9:21 AM  Performed by: SIDDHARTH SANTANA  Authorized by: SIDDHARTH SANTANA   Comparison: compared with previous ECG from 3/6/2018  Comparison to previous ECG: NSR has replaced atrial flutter  Rhythm: sinus rhythm  BPM: 63                ICD-10-CM ICD-9-CM   1. Persistent atrial fibrillation/flutter I48.1 427.31   2. Acute on " chronic diastolic heart failure I50.33 428.33   3. Essential hypertension I10 401.9   4. Generalized edema R60.1 782.3       Assessment and Plan:   1. Persistent atrial fibrillation/flutter:  - Diagnosed in 2011, previous treatment with propafenone, flecainide, and BBL for which he intolerant  - He underwent ECV in December and was noted to be back out of rhythm in January  - He is in NSR today, we discussed Tikosyn vs re-do PVA, he will need to improve from a back standpoint and then we will address further options in the future.  His kidney function has improved so he would be able to do Tikosyn.     2. Acute on chronic diastolic heart failure:  - Last EF 60% in February  - Continue Bumex, spironolactone    3. Essential hypertension:  - Well controlled    4. Recurrent LE edema: consider unna boots      RTC 6M     Scribed for Joaquin Jeffery MD by Maty South, FAITH. 3/28/2018  9:36 AM     I, Joaquin Jeffery MD, personally performed the services described in this documentation as scribed by the above named individual in my presence, and it is both accurate and complete.  3/28/2018  9:36 AM

## 2018-03-29 ENCOUNTER — TELEPHONE (OUTPATIENT)
Dept: NEUROSURGERY | Facility: CLINIC | Age: 63
End: 2018-03-29

## 2018-03-29 DIAGNOSIS — Z98.890 S/P KYPHOPLASTY: Primary | ICD-10-CM

## 2018-03-29 NOTE — TELEPHONE ENCOUNTER
Provider: Stuart  Caller: Akshat Winkler  Time of call:   08:46 AM  Phone #:  [phone]  Surgery:  KYPHO L4  Surgery Date:  03/05/18  Last visit:   03/27/18  Next visit: 04/18/18    Reason for call:       Pt saw Apro tuesday, said that his pain is just as bad today as it was then, wants to know what his options are

## 2018-03-29 NOTE — TELEPHONE ENCOUNTER
Pt called again, I spoke with him and reassured him that we would be discussing this with Dr. Davidson in the AM and then call him with some direction.

## 2018-03-29 NOTE — TELEPHONE ENCOUNTER
I advised pt to continue with the conservative therapies per Apro but he let me know that he feels like he is back to feeling like he did when he just got out of surgery.  He stated that he has not been able to go back to work in 2 days and he cant keep missing.

## 2018-03-30 RX ORDER — METHYLPREDNISOLONE 4 MG/1
TABLET ORAL
Qty: 1 EACH | Refills: 0 | Status: SHIPPED | OUTPATIENT
Start: 2018-03-30 | End: 2018-04-11

## 2018-03-30 NOTE — TELEPHONE ENCOUNTER
Pt aware.  I advised him to give us a call when he is done with his medrol dose pack with an update.

## 2018-03-30 NOTE — TELEPHONE ENCOUNTER
I talked to Dr. Davidson about this this morning.  Our plan is to have patient start a Medrol Dosepak today.  I have sent the prescription to his pharmacy.  If his symptoms have not improved by early next week we will go ahead and order a new MRI of the lumbar spine. Please call patient

## 2018-04-03 ENCOUNTER — DOCUMENTATION (OUTPATIENT)
Dept: CARDIOLOGY | Facility: HOSPITAL | Age: 63
End: 2018-04-03

## 2018-04-10 ENCOUNTER — LAB REQUISITION (OUTPATIENT)
Dept: LAB | Facility: HOSPITAL | Age: 63
End: 2018-04-10

## 2018-04-10 ENCOUNTER — TELEPHONE (OUTPATIENT)
Dept: CARDIOLOGY | Facility: HOSPITAL | Age: 63
End: 2018-04-10

## 2018-04-10 DIAGNOSIS — Z00.00 ROUTINE GENERAL MEDICAL EXAMINATION AT A HEALTH CARE FACILITY: ICD-10-CM

## 2018-04-10 PROCEDURE — 36415 COLL VENOUS BLD VENIPUNCTURE: CPT

## 2018-04-10 NOTE — TELEPHONE ENCOUNTER
Spoke with patient who notes worsening pedal edema. He notes worsening pedal edema with some weeping L>R. He notes that he took bumex 2 mg bid for a few days with very mild improvement in his symptoms. Patient had labs drawn today for Nephrology. Patient to be seen tomorrow in HF center by Hannah CUEVAS for IV diuresis. Patient verbalized understanding.

## 2018-04-11 ENCOUNTER — TELEPHONE (OUTPATIENT)
Dept: CARDIOLOGY | Facility: CLINIC | Age: 63
End: 2018-04-11

## 2018-04-11 ENCOUNTER — OFFICE VISIT (OUTPATIENT)
Dept: CARDIOLOGY | Facility: HOSPITAL | Age: 63
End: 2018-04-11

## 2018-04-11 ENCOUNTER — OFFICE VISIT (OUTPATIENT)
Dept: NEUROSURGERY | Facility: CLINIC | Age: 63
End: 2018-04-11

## 2018-04-11 ENCOUNTER — PREP FOR SURGERY (OUTPATIENT)
Dept: OTHER | Facility: HOSPITAL | Age: 63
End: 2018-04-11

## 2018-04-11 ENCOUNTER — DOCUMENTATION (OUTPATIENT)
Dept: NEUROSURGERY | Facility: CLINIC | Age: 63
End: 2018-04-11

## 2018-04-11 ENCOUNTER — HOSPITAL ENCOUNTER (OUTPATIENT)
Dept: MRI IMAGING | Facility: HOSPITAL | Age: 63
Discharge: HOME | End: 2018-04-11
Admitting: PHYSICIAN ASSISTANT

## 2018-04-11 VITALS — HEIGHT: 70 IN | BODY MASS INDEX: 39.22 KG/M2 | WEIGHT: 274 LBS

## 2018-04-11 VITALS
RESPIRATION RATE: 20 BRPM | WEIGHT: 274 LBS | HEART RATE: 110 BPM | OXYGEN SATURATION: 96 % | TEMPERATURE: 98.2 F | BODY MASS INDEX: 39.22 KG/M2 | SYSTOLIC BLOOD PRESSURE: 136 MMHG | HEIGHT: 70 IN | DIASTOLIC BLOOD PRESSURE: 89 MMHG

## 2018-04-11 DIAGNOSIS — R60.0 LOCALIZED EDEMA: Primary | ICD-10-CM

## 2018-04-11 DIAGNOSIS — I50.32 CHRONIC DIASTOLIC HEART FAILURE (HCC): ICD-10-CM

## 2018-04-11 DIAGNOSIS — I87.2 VENOUS INSUFFICIENCY: ICD-10-CM

## 2018-04-11 DIAGNOSIS — M48.50XA PATHOLOGICAL COMPRESSION FRACTURE OF VERTEBRA, INITIAL ENCOUNTER (HCC): Primary | ICD-10-CM

## 2018-04-11 DIAGNOSIS — S32.040D CLOSED COMPRESSION FRACTURE OF L4 LUMBAR VERTEBRA WITH ROUTINE HEALING, SUBSEQUENT ENCOUNTER: ICD-10-CM

## 2018-04-11 DIAGNOSIS — Z98.890 S/P KYPHOPLASTY: ICD-10-CM

## 2018-04-11 DIAGNOSIS — I48.19 PERSISTENT ATRIAL FIBRILLATION (HCC): ICD-10-CM

## 2018-04-11 DIAGNOSIS — S32.010A COMPRESSION FRACTURE OF L1 LUMBAR VERTEBRA, CLOSED, INITIAL ENCOUNTER (HCC): Primary | ICD-10-CM

## 2018-04-11 DIAGNOSIS — S32.020A COMPRESSION FRACTURE OF L2 LUMBAR VERTEBRA, CLOSED, INITIAL ENCOUNTER (HCC): ICD-10-CM

## 2018-04-11 PROCEDURE — 99214 OFFICE O/P EST MOD 30 MIN: CPT | Performed by: NURSE PRACTITIONER

## 2018-04-11 PROCEDURE — 99213 OFFICE O/P EST LOW 20 MIN: CPT | Performed by: NEUROLOGICAL SURGERY

## 2018-04-11 PROCEDURE — 72148 MRI LUMBAR SPINE W/O DYE: CPT

## 2018-04-11 RX ORDER — CEFAZOLIN SODIUM 2 G/100ML
2 INJECTION, SOLUTION INTRAVENOUS ONCE
Status: CANCELLED | OUTPATIENT
Start: 2018-04-11 | End: 2018-04-11

## 2018-04-11 RX ORDER — HYDROCODONE BITARTRATE AND ACETAMINOPHEN 5; 325 MG/1; MG/1
1 TABLET ORAL 3 TIMES DAILY PRN
Qty: 50 TABLET | Refills: 0 | Status: SHIPPED | OUTPATIENT
Start: 2018-04-11 | End: 2018-07-24

## 2018-04-11 RX ORDER — GABAPENTIN 100 MG/1
100 CAPSULE ORAL 3 TIMES DAILY
Qty: 90 CAPSULE | Refills: 1 | OUTPATIENT
Start: 2018-04-11 | End: 2018-06-11 | Stop reason: SDUPTHER

## 2018-04-11 RX ORDER — SODIUM CHLORIDE 0.9 % (FLUSH) 0.9 %
1-10 SYRINGE (ML) INJECTION AS NEEDED
Status: CANCELLED | OUTPATIENT
Start: 2018-04-11

## 2018-04-11 RX ORDER — FAMOTIDINE 20 MG/1
20 TABLET, FILM COATED ORAL
Status: CANCELLED | OUTPATIENT
Start: 2018-04-11

## 2018-04-11 NOTE — H&P
Patient: Akshat Winkler  : 1955     Primary Care Provider: Oren Mark MD     Requesting Provider: As above           History     Chief Complaint:   1.  Recurrent low back pain.  2.  History of L4 compression fracture status post kyphoplasty.  3.  Osteoporosis.     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  He had a modest upper endplate fracture of L4.  He did not follow-up but subsequently returned with increasing pain and a profound nearly planum fracture of L4.  On 3/5/18 he underwent kyphoplasty.  Overall his pain has been better.  About 3 weeks ago he reached for a towel and felt a pop in his back.  Since then he's had increased low back pain.  He has no radicular symptoms.  His symptoms are worse with any movement.     Review of Systems   Constitutional: Negative for activity change, appetite change, chills, diaphoresis, fatigue, fever and unexpected weight change.   HENT: Negative for congestion, dental problem, drooling, ear discharge, ear pain, facial swelling, hearing loss, mouth sores, nosebleeds, postnasal drip, rhinorrhea, sinus pressure, sneezing, sore throat, tinnitus, trouble swallowing and voice change.    Eyes: Negative for photophobia, pain, discharge, redness, itching and visual disturbance.   Respiratory: Negative for apnea, cough, choking, chest tightness, shortness of breath, wheezing and stridor.    Cardiovascular: Negative for chest pain, palpitations and leg swelling.   Gastrointestinal: Negative for abdominal distention, abdominal pain, anal bleeding, blood in stool, constipation, diarrhea, nausea, rectal pain and vomiting.   Endocrine: Negative for cold intolerance, heat intolerance, polydipsia, polyphagia and polyuria.   Genitourinary: Negative for decreased urine volume, difficulty urinating, dysuria, enuresis,  flank pain, frequency, genital sores, hematuria and urgency.   Musculoskeletal: Positive for back pain and gait problem (Walking with assistance of a walker). Negative for arthralgias, joint swelling, myalgias, neck pain and neck stiffness.   Skin: Negative for color change, pallor, rash and wound.   Allergic/Immunologic: Negative for environmental allergies, food allergies and immunocompromised state.   Neurological: Negative for dizziness, tremors, seizures, syncope, facial asymmetry, speech difficulty, weakness, light-headedness, numbness and headaches.   Hematological: Negative for adenopathy. Does not bruise/bleed easily.   Psychiatric/Behavioral: Negative for agitation, behavioral problems, confusion, decreased concentration, dysphoric mood, hallucinations, self-injury, sleep disturbance and suicidal ideas. The patient is not nervous/anxious and is not hyperactive.    All other systems reviewed and are negative.        The patient's past medical history, past surgical history, family history, and social history have been reviewed at length in the electronic medical record.     Past Medical History:   Diagnosis Date   • Acute diastolic HF (heart failure)    • Acute hypokalemia    • Arrhythmia    • Back injury     Fall on 12/20/17   • Cellulitis of leg    • Chest pain syndrome    • CHF (congestive heart failure)    • Diabetes mellitus     type 2   • Hyperlipidemia    • Hypertension    • Obesity    • Obstructive sleep apnea     cpap hs   • Paroxysmal a-fib    • Peripheral artery disease    • Spondylosis of 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 or radiculopathy 2/7/2018   • Wears glasses      Past Surgical History:   Procedure Laterality Date   • CARDIAC CATHETERIZATION     • CARDIOVERSION      multiple   • COLONOSCOPY     • KYPHOPLASTY N/A 3/5/2018    Procedure: KYPHOPLASTY L4;  Surgeon: Akshat Davidson MD;  [facility];  Service:    • OTHER SURGICAL HISTORY  06/29/2015    PVA     Family History   Problem  "Relation Age of Onset   • Hypertension Mother    • Stroke Mother    • Stroke Father    • Sleep disorder Father    • Cancer Brother      Social History     Social History   • Marital status:      Spouse name: N/A   • Number of children: N/A   • Years of education: N/A     Occupational History   • Not on file.     Social History Main Topics   • Smoking status: Never Smoker   • Smokeless tobacco: Never Used   • Alcohol use No   • Drug use: No   • Sexual activity: Defer     Other Topics Concern   • Not on file     Social History Narrative    Patient lives at home with his wife and denies caffeine intake.     Allergies   Allergen Reactions   • Bisoprolol Other (See Comments)     Severe Hypotension   • Flecainide Other (See Comments)     Syncope / collapse   • Metoprolol Other (See Comments)      Bradycardia, Severe Hypotension       Physical Exam:   Ht 177.8 cm (70\")   Wt 124 kg (274 lb) Comment: Per pt-Refused weight  BMI 39.31 kg/m²   MUSCULOSKELETAL:  Straight leg raising is negative.  Frederick's Sign is negative.  ROM in back is limited in all directions.  Tenderness in the back to palpation is present in the lumbosacral midline.  NEUROLOGICAL:  Strength is intact in the lower extremities to direct testing.  Muscle tone is normal throughout.  Station and gait are normal.  Sensation is intact to light touch testing throughout.  Deep tendon reflexes are 1+ and symmetrical.  Coordination is intact.        Medical Decision Making     Data Review:   His new MRI study demonstrates changes related to his prior L4 kyphoplasty.  Substantial stenosis is still identified at that level.  No fractures are noted at L1 and more impressively at L2.     Diagnosis:   New osteoporotic compression fractures with severe pain.     Treatment Options:   I have recommended L1 and L2 kyphoplasty to address his new fractures.  We will need to hold his Eliquis for about 3 days preceding the intervention.  The nature of the procedure as " well as the potential risks, complications, limitations, and alternatives to the procedure were discussed at length with the patient and the patient has agreed to proceed with surgery.          Diagnosis Plan   1. Compression fracture of L1 lumbar vertebra, closed, initial encounter      2. Compression fracture of L2 lumbar vertebra, closed, initial encounter      3. Closed compression fracture of L4 lumbar vertebra with routine healing, subsequent encounter  HYDROcodone-acetaminophen (NORCO) 5-325 MG per tablet

## 2018-04-11 NOTE — PROGRESS NOTES
Patient: Akshat Winkler  : 1955    Primary Care Provider: Oren Mark MD    Requesting Provider: As above        History    Chief Complaint:   1.  Recurrent low back pain.  2.  History of L4 compression fracture status post kyphoplasty.  3.  Osteoporosis.    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  He had a modest upper endplate fracture of L4.  He did not follow-up but subsequently returned with increasing pain and a profound nearly planum fracture of L4.  On 3/5/18 he underwent kyphoplasty.  Overall his pain has been better.  About 3 weeks ago he reached for a towel and felt a pop in his back.  Since then he's had increased low back pain.  He has no radicular symptoms.  His symptoms are worse with any movement.    Review of Systems   Constitutional: Negative for activity change, appetite change, chills, diaphoresis, fatigue, fever and unexpected weight change.   HENT: Negative for congestion, dental problem, drooling, ear discharge, ear pain, facial swelling, hearing loss, mouth sores, nosebleeds, postnasal drip, rhinorrhea, sinus pressure, sneezing, sore throat, tinnitus, trouble swallowing and voice change.    Eyes: Negative for photophobia, pain, discharge, redness, itching and visual disturbance.   Respiratory: Negative for apnea, cough, choking, chest tightness, shortness of breath, wheezing and stridor.    Cardiovascular: Negative for chest pain, palpitations and leg swelling.   Gastrointestinal: Negative for abdominal distention, abdominal pain, anal bleeding, blood in stool, constipation, diarrhea, nausea, rectal pain and vomiting.   Endocrine: Negative for cold intolerance, heat intolerance, polydipsia, polyphagia and polyuria.   Genitourinary: Negative for decreased urine volume, difficulty urinating, dysuria, enuresis, flank  "pain, frequency, genital sores, hematuria and urgency.   Musculoskeletal: Positive for back pain and gait problem (Walking with assistance of a walker). Negative for arthralgias, joint swelling, myalgias, neck pain and neck stiffness.   Skin: Negative for color change, pallor, rash and wound.   Allergic/Immunologic: Negative for environmental allergies, food allergies and immunocompromised state.   Neurological: Negative for dizziness, tremors, seizures, syncope, facial asymmetry, speech difficulty, weakness, light-headedness, numbness and headaches.   Hematological: Negative for adenopathy. Does not bruise/bleed easily.   Psychiatric/Behavioral: Negative for agitation, behavioral problems, confusion, decreased concentration, dysphoric mood, hallucinations, self-injury, sleep disturbance and suicidal ideas. The patient is not nervous/anxious and is not hyperactive.    All other systems reviewed and are negative.      The patient's past medical history, past surgical history, family history, and social history have been reviewed at length in the electronic medical record.    Physical Exam:   Ht 177.8 cm (70\")   Wt 124 kg (274 lb) Comment: Per pt-Refused weight  BMI 39.31 kg/m²   MUSCULOSKELETAL:  Straight leg raising is negative.  Frederick's Sign is negative.  ROM in back is limited in all directions.  Tenderness in the back to palpation is present in the lumbosacral midline.  NEUROLOGICAL:  Strength is intact in the lower extremities to direct testing.  Muscle tone is normal throughout.  Station and gait are normal.  Sensation is intact to light touch testing throughout.  Deep tendon reflexes are 1+ and symmetrical.  Coordination is intact.      Medical Decision Making    Data Review:   His new MRI study demonstrates changes related to his prior L4 kyphoplasty.  Substantial stenosis is still identified at that level.  No fractures are noted at L1 and more impressively at L2.    Diagnosis:   New osteoporotic " compression fractures with severe pain.    Treatment Options:   I have recommended L1 and L2 kyphoplasty to address his new fractures.  We will need to hold his Eliquis for about 3 days preceding the intervention.  The nature of the procedure as well as the potential risks, complications, limitations, and alternatives to the procedure were discussed at length with the patient and the patient has agreed to proceed with surgery.       Diagnosis Plan   1. Compression fracture of L1 lumbar vertebra, closed, initial encounter     2. Compression fracture of L2 lumbar vertebra, closed, initial encounter     3. Closed compression fracture of L4 lumbar vertebra with routine healing, subsequent encounter  HYDROcodone-acetaminophen (NORCO) 5-325 MG per tablet       Scribed for Akshat Davidson MD by Stephanie Matson CMA on 04/11/2018 at 12:13 PM    I, Dr. Davidson, personally performed the services described in the documentation, as scribed in my presence, and it is both accurate and complete.

## 2018-04-11 NOTE — PROGRESS NOTES
[facility]      Encounter Date:04/11/2018     Akshat Winkler  [address] [address]  [phone]    1955    Oren Mark MD    Akshat Winkler is a 63 y.o. male.      Subjective:     Chief Complaint:  Follow-up (Pedal Edema and Left Weeping)       HPI     Patient with a history of diastolic heart failure.  Patient presents today complaining of lower extremity swelling, blisters on left lower leg weeping for one week.  Patient tried increasing his Bumex from 2 mg daily to 2 mg twice a day for 2 days with no improvement.  Dyspnea described as baseline, moderate with minimal exertion.  Patient has been very immobile due to back pain.  Plans for redo kyphoplasty in approximately one week.  Denies orthopnea, PND.  Dizziness, syncope, chest pain, pressure, feelings of palpitations.  Pt reports stuggling with intermittent edema >3 months.  Improved with diuretics then returns.  Does not wear compression.  Has worsened since his back injury. Hx of MARKO    Patient Active Problem List    Diagnosis   • Hypokalemia [E87.6]   • Acute renal failure [N17.9]   • Lumbar disc herniation [M51.26]   • Myofascial pain [M79.1]   • Spondylosis of 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 or radiculopathy [M47.816]   • Lumbar stenosis with neurogenic claudication [M48.062]   • Compression fracture of lumbar vertebra [S32.000A]   • Persistent atrial fibrillation/flutter [I48.1]     Overview Note:     · Diagnosed 2011  · [id number] 3 (HTN, CAD, DM)  · Echo (10/11/2011): Normal LVEF, mild MR, mild LA dilation  · LOLY/ECVcardioversion, 12/21/2011, with initiation of propafenone therapy.   · Successful LOLY/ECV for recurrent atrial fibrillation, 11/15/2013  · PVA with Dr. Cary, 6/29/2015  · Cardioversion (07/17/2015) for atrial flutter occurring post ablation   · ECV for recurrent atrial flutter, 12/20/17 with reattempt at beta blocker/flecainide.  · Intolerant to beta  blocker/flecanide with severe hypotension, intolerant to propafenone     • Coronary artery disease involving native coronary artery of native heart without angina pectoris [I25.10]     Overview Note:       · Cardiac PET (01/2014): partially reversible anteroseptal defect, normal LVEF.  · Cardiac cath (02/20/14): mild nonobstructive CAD, LVEF 55%, elevated LVEDP suggesting diastolic heart failure.     • Acute on chronic diastolic heart failure [I50.33]     Overview Note:     · Cardiac cath (02/20/14):  elevated LVEDP suggesting diastolic heart failure.  · Intolerant to beta blocker therapy     • Type 2 diabetes mellitus without complication, without long-term current use of insulin [E11.9]     Overview Note:     · Diet controlled     • Hyperlipidemia LDL goal <70 [E78.5]     Overview Note:     · High intensity statin therapy indicated given presence of coronary artery disease     • Essential hypertension [I10]   • Severe obstructive sleep apnea [G47.33]     Overview Note:     CPAP therapy     • Class 3 obesity in adult [E66.9]         Past Surgical History:   Procedure Laterality Date   • CARDIAC CATHETERIZATION     • CARDIOVERSION      multiple   • COLONOSCOPY     • KYPHOPLASTY N/A 3/5/2018    Procedure: KYPHOPLASTY L4;  Surgeon: Akshat Davidson MD;  Location: [facility];  Service:    • OTHER SURGICAL HISTORY  06/29/2015    PVA       Allergies   Allergen Reactions   • Bisoprolol Other (See Comments)     Severe Hypotension   • Flecainide Other (See Comments)     Syncope / collapse   • Metoprolol Other (See Comments)      Bradycardia, Severe Hypotension         Current Outpatient Prescriptions:   •  acetaminophen (TYLENOL) 500 MG tablet, Take 500 mg by mouth As Needed for Mild Pain ., Disp: , Rfl:   •  apixaban (ELIQUIS) 5 MG tablet tablet, Take 5 mg by mouth 2 (Two) Times a Day., Disp: , Rfl:   •  aspirin 81 MG EC tablet, Take 81 mg by mouth Daily., Disp: , Rfl:   •  bumetanide (BUMEX) 1 MG tablet, Take 1 tablet by  mouth 2 (Two) Times a Day. (Patient taking differently: Take 2 mg by mouth 2 (Two) Times a Day.), Disp: 60 tablet, Rfl: 0  •  gabapentin (NEURONTIN) 100 MG capsule, Take 1 capsule by mouth 3 (Three) Times a Day., Disp: 90 capsule, Rfl: 1  •  HYDROcodone-acetaminophen (NORCO) 5-325 MG per tablet, Take 1 tablet by mouth 3 (Three) Times a Day As Needed for Moderate Pain ., Disp: 50 tablet, Rfl: 0  •  rosuvastatin (CRESTOR) 20 MG tablet, Take 1 tablet by mouth Daily for 360 days., Disp: 90 tablet, Rfl: 3  •  spironolactone (ALDACTONE) 25 MG tablet, Take 1 tablet by mouth Daily., Disp: 90 tablet, Rfl: 3  •  tiZANidine (ZANAFLEX) 2 MG tablet, Take 2 mg by mouth Every 8 (Eight) Hours As Needed for Muscle Spasms., Disp: , Rfl:   •  potassium chloride (K-DUR) 10 MEQ CR tablet, Take 10 mEq by mouth Daily As Needed (hypokalemia)., Disp: , Rfl:     The following portions of the patient's history were reviewed and updated as appropriate: allergies, current medications, past family history, past medical history, past social history, past surgical history and problem list.    Review of Systems   Constitution: Negative for chills, decreased appetite, diaphoresis, fever, weakness, malaise/fatigue, night sweats, weight gain and weight loss.   HENT: Negative for congestion and nosebleeds.    Eyes: Negative for blurred vision, visual disturbance and visual halos.   Cardiovascular: Positive for dyspnea on exertion and leg swelling. Negative for chest pain, claudication, cyanosis, irregular heartbeat, near-syncope, orthopnea, palpitations, paroxysmal nocturnal dyspnea and syncope.   Respiratory: Negative for cough, hemoptysis, shortness of breath, sleep disturbances due to breathing, snoring, sputum production and wheezing.    Endocrine: Negative for cold intolerance, heat intolerance, polydipsia, polyphagia and polyuria.   Hematologic/Lymphatic: Does not bruise/bleed easily.   Skin: Negative for dry skin, itching and rash.  "  Musculoskeletal: Positive for joint pain. Negative for falls, joint swelling, muscle weakness and myalgias.   Gastrointestinal: Negative for bloating, abdominal pain, constipation, diarrhea, dysphagia, heartburn, melena, nausea and vomiting.   Genitourinary: Negative for dysuria, flank pain, hematuria and nocturia.   Neurological: Negative for difficulty with concentration, excessive daytime sleepiness, dizziness, headaches and loss of balance.   Psychiatric/Behavioral: Negative for altered mental status and depression. The patient is not nervous/anxious.    Allergic/Immunologic: Negative for environmental allergies.       Objective:     Vitals:    04/11/18 1333 04/11/18 1336   BP: 132/88 136/89   BP Location: Right arm Left arm   Patient Position: Sitting Sitting   Cuff Size: Large Adult    Pulse: 110 110   Resp: 20    Temp: 98.2 °F (36.8 °C)    TempSrc: Temporal Artery     SpO2: 96%    Weight: 124 kg (274 lb)    Height: 177.8 cm (70\")          Physical Exam   Constitutional: He is oriented to person, place, and time. He appears well-developed and well-nourished. No distress.   HENT:   Head: Normocephalic and atraumatic.   Mouth/Throat: Oropharynx is clear and moist.   Eyes: Conjunctivae are normal. Pupils are equal, round, and reactive to light. No scleral icterus.   Neck: No hepatojugular reflux and no JVD present. Carotid bruit is not present. No tracheal deviation present. No thyromegaly present.   Cardiovascular: Normal rate, regular rhythm, normal heart sounds and intact distal pulses.  Exam reveals no friction rub.    No murmur heard.  Pulmonary/Chest: Effort normal and breath sounds normal.   Abdominal: Soft. Bowel sounds are normal. He exhibits no distension. There is no tenderness.   Musculoskeletal: He exhibits edema (2+ pitting ric, left leg weeping. Venous statis changes of lower extremitiy).   Lymphadenopathy:     He has no cervical adenopathy.   Neurological: He is alert and oriented to person, " place, and time.   Skin: Skin is warm, dry and intact. No rash noted. No cyanosis or erythema. No pallor.   Psychiatric: He has a normal mood and affect. His behavior is normal. Thought content normal.   Vitals reviewed.      Lab and Diagnostic Review:  4/11/18  Glucose 62, BUN 18, creatinine 0.8, estimated GFR 96  Sodium 141, potassium 4.0, chloride 99, carbidopa 24, calcium 9.5  WBC 6.1, hemoglobin 15.7, hematocrit 46.8, platelets 171  Vitamin D hydroxy 13.9      IV diuresis today in office. Patient received Lasix 80 mg IV  today through a butterfly needle in right FA  over slow IV push. During IV diuresis, vitals were monitored and stable. Please see IV diuresis record for those vitals. Patient voided 600ml, +1 voind in the office prior to discharge from the office.  Area was free of erythema, ecchymosis, pain or drainage.    Assessment and Plan:         1. Localized edema  Weeping ric  With hx of DHF and worsening edema, given Lasix 80 mg IV today (at 14:30 by FAITH Bay.  NDC:  70017-822-80)     Continue daily Bumex 2 mg    - Ambulatory Referral to Vascular Surgery  - Ambulatory Referral to Wound Clinic (for evaluation and possible unna boots)    2. Chronic diastolic heart failure  LCTA today  Reviewed s/s HF worsening    3. Persistent atrial fibrillation/flutter    - ECG 12 Lead; Plans to have with PAT if needed.    4. Venous insufficiency    - Ambulatory Referral to Vascular Surgery  - Ambulatory Referral to Wound Clinic    F/u with FAITH Garibay as directed.    *Please note that portions of this note were completed with a voice recognition program. Efforts were made to edit the dictations, but occasionally words are mistranscribed.

## 2018-04-11 NOTE — TELEPHONE ENCOUNTER
Kristine at Dr. Flores's office called requesting cardiac clearance for a kyphoplasty. They are requesting to hold Eliquis. OK to clear and hold Eliquis for 2-3 days?

## 2018-04-12 ENCOUNTER — DOCUMENTATION (OUTPATIENT)
Dept: CARDIOLOGY | Facility: HOSPITAL | Age: 63
End: 2018-04-12

## 2018-04-12 PROBLEM — M48.50XA PATHOLOGIC COMPRESSION FRACTURE OF VERTEBRA (HCC): Status: ACTIVE | Noted: 2018-04-12

## 2018-04-12 NOTE — PROGRESS NOTES
Called to f/u with pt.    States his edema has improved .  He is at work without any concerns.  HR was elevated yesterday in clinic.  Pt with hx of afib/flutter.  Discussed having repeat EKG with PAT for sx next week.  Pt feels that he has returned to NSR.  HR 60's today.  Eliquis recommendations per [facility] and Neuro recs.

## 2018-04-16 ENCOUNTER — APPOINTMENT (OUTPATIENT)
Dept: GENERAL RADIOLOGY | Facility: HOSPITAL | Age: 63
End: 2018-04-16

## 2018-04-16 ENCOUNTER — ANESTHESIA (OUTPATIENT)
Dept: PERIOP | Facility: HOSPITAL | Age: 63
End: 2018-04-16

## 2018-04-16 ENCOUNTER — HOSPITAL ENCOUNTER (OUTPATIENT)
Facility: HOSPITAL | Age: 63
Discharge: HOME | End: 2018-04-16
Attending: NEUROLOGICAL SURGERY | Admitting: NEUROLOGICAL SURGERY

## 2018-04-16 ENCOUNTER — ANESTHESIA EVENT (OUTPATIENT)
Dept: PERIOP | Facility: HOSPITAL | Age: 63
End: 2018-04-16

## 2018-04-16 VITALS
DIASTOLIC BLOOD PRESSURE: 81 MMHG | BODY MASS INDEX: 39.22 KG/M2 | RESPIRATION RATE: 16 BRPM | HEART RATE: 114 BPM | HEIGHT: 70 IN | SYSTOLIC BLOOD PRESSURE: 117 MMHG | WEIGHT: 274 LBS | TEMPERATURE: 98.1 F | OXYGEN SATURATION: 92 %

## 2018-04-16 DIAGNOSIS — M48.50XA PATHOLOGICAL COMPRESSION FRACTURE OF VERTEBRA, INITIAL ENCOUNTER (HCC): ICD-10-CM

## 2018-04-16 LAB
DEPRECATED RDW RBC AUTO: 55.8 FL (ref 37–54)
ERYTHROCYTE [DISTWIDTH] IN BLOOD BY AUTOMATED COUNT: 15.3 % (ref 11.3–14.5)
GLUCOSE BLDC GLUCOMTR-MCNC: 86 MG/DL (ref 70–130)
HCT VFR BLD AUTO: 48.6 % (ref 38.9–50.9)
HGB BLD-MCNC: 15.9 G/DL (ref 13.1–17.5)
MCH RBC QN AUTO: 32.3 PG (ref 27–31)
MCHC RBC AUTO-ENTMCNC: 32.7 G/DL (ref 32–36)
MCV RBC AUTO: 98.8 FL (ref 80–99)
PLATELET # BLD AUTO: 144 10*3/MM3 (ref 150–450)
PMV BLD AUTO: 10.7 FL (ref 6–12)
POTASSIUM BLDA-SCNC: 4.04 MMOL/L (ref 3.5–5.3)
RBC # BLD AUTO: 4.92 10*6/MM3 (ref 4.2–5.76)
WBC NRBC COR # BLD: 5.3 10*3/MM3 (ref 3.5–10.8)

## 2018-04-16 PROCEDURE — 84132 ASSAY OF SERUM POTASSIUM: CPT | Performed by: ANESTHESIOLOGY

## 2018-04-16 PROCEDURE — 88311 DECALCIFY TISSUE: CPT | Performed by: NEUROLOGICAL SURGERY

## 2018-04-16 PROCEDURE — 82962 GLUCOSE BLOOD TEST: CPT

## 2018-04-16 PROCEDURE — 88307 TISSUE EXAM BY PATHOLOGIST: CPT | Performed by: NEUROLOGICAL SURGERY

## 2018-04-16 PROCEDURE — 25010000002 PROPOFOL 10 MG/ML EMULSION: Performed by: NURSE ANESTHETIST, CERTIFIED REGISTERED

## 2018-04-16 PROCEDURE — 85027 COMPLETE CBC AUTOMATED: CPT | Performed by: ANESTHESIOLOGY

## 2018-04-16 PROCEDURE — 25010000002 HYDROMORPHONE PER 4 MG: Performed by: NURSE ANESTHETIST, CERTIFIED REGISTERED

## 2018-04-16 PROCEDURE — 25010000002 NEOSTIGMINE 10 MG/10ML SOLUTION: Performed by: NURSE ANESTHETIST, CERTIFIED REGISTERED

## 2018-04-16 PROCEDURE — 25010000002 PHENYLEPHRINE PER 1 ML: Performed by: NURSE ANESTHETIST, CERTIFIED REGISTERED

## 2018-04-16 PROCEDURE — 0 IOPAMIDOL 41 % SOLUTION: Performed by: NEUROLOGICAL SURGERY

## 2018-04-16 PROCEDURE — C1713 ANCHOR/SCREW BN/BN,TIS/BN: HCPCS | Performed by: NEUROLOGICAL SURGERY

## 2018-04-16 PROCEDURE — 22514 PERQ VERTEBRAL AUGMENTATION: CPT | Performed by: NEUROLOGICAL SURGERY

## 2018-04-16 PROCEDURE — 25010000003 CEFAZOLIN IN DEXTROSE 2-4 GM/100ML-% SOLUTION: Performed by: NEUROLOGICAL SURGERY

## 2018-04-16 PROCEDURE — 25010000002 ONDANSETRON PER 1 MG: Performed by: NURSE ANESTHETIST, CERTIFIED REGISTERED

## 2018-04-16 PROCEDURE — 25010000002 FENTANYL CITRATE (PF) 100 MCG/2ML SOLUTION: Performed by: NURSE ANESTHETIST, CERTIFIED REGISTERED

## 2018-04-16 PROCEDURE — 22510 PERQ CERVICOTHORACIC INJECT: CPT

## 2018-04-16 PROCEDURE — 22515 PERQ VERTEBRAL AUGMENTATION: CPT | Performed by: NEUROLOGICAL SURGERY

## 2018-04-16 RX ORDER — LIDOCAINE HYDROCHLORIDE 10 MG/ML
0.5 INJECTION, SOLUTION EPIDURAL; INFILTRATION; INTRACAUDAL; PERINEURAL ONCE AS NEEDED
Status: COMPLETED | OUTPATIENT
Start: 2018-04-16 | End: 2018-04-16

## 2018-04-16 RX ORDER — NEOSTIGMINE METHYLSULFATE 1 MG/ML
INJECTION, SOLUTION INTRAVENOUS AS NEEDED
Status: DISCONTINUED | OUTPATIENT
Start: 2018-04-16 | End: 2018-04-16 | Stop reason: SURG

## 2018-04-16 RX ORDER — ONDANSETRON 2 MG/ML
4 INJECTION INTRAMUSCULAR; INTRAVENOUS ONCE AS NEEDED
Status: DISCONTINUED | OUTPATIENT
Start: 2018-04-16 | End: 2018-04-16 | Stop reason: HOSPADM

## 2018-04-16 RX ORDER — ATRACURIUM BESYLATE 10 MG/ML
INJECTION, SOLUTION INTRAVENOUS AS NEEDED
Status: DISCONTINUED | OUTPATIENT
Start: 2018-04-16 | End: 2018-04-16 | Stop reason: SURG

## 2018-04-16 RX ORDER — HYDROCODONE BITARTRATE AND ACETAMINOPHEN 5; 325 MG/1; MG/1
1 TABLET ORAL ONCE AS NEEDED
Status: COMPLETED | OUTPATIENT
Start: 2018-04-16 | End: 2018-04-16

## 2018-04-16 RX ORDER — SODIUM CHLORIDE, SODIUM LACTATE, POTASSIUM CHLORIDE, CALCIUM CHLORIDE 600; 310; 30; 20 MG/100ML; MG/100ML; MG/100ML; MG/100ML
9 INJECTION, SOLUTION INTRAVENOUS CONTINUOUS
Status: DISCONTINUED | OUTPATIENT
Start: 2018-04-16 | End: 2018-04-16 | Stop reason: HOSPADM

## 2018-04-16 RX ORDER — GLYCOPYRROLATE 0.2 MG/ML
INJECTION INTRAMUSCULAR; INTRAVENOUS AS NEEDED
Status: DISCONTINUED | OUTPATIENT
Start: 2018-04-16 | End: 2018-04-16 | Stop reason: SURG

## 2018-04-16 RX ORDER — SODIUM CHLORIDE 0.9 % (FLUSH) 0.9 %
1-10 SYRINGE (ML) INJECTION AS NEEDED
Status: DISCONTINUED | OUTPATIENT
Start: 2018-04-16 | End: 2018-04-16 | Stop reason: HOSPADM

## 2018-04-16 RX ORDER — FENTANYL CITRATE 50 UG/ML
INJECTION, SOLUTION INTRAMUSCULAR; INTRAVENOUS AS NEEDED
Status: DISCONTINUED | OUTPATIENT
Start: 2018-04-16 | End: 2018-04-16 | Stop reason: SURG

## 2018-04-16 RX ORDER — PROMETHAZINE HYDROCHLORIDE 25 MG/1
25 SUPPOSITORY RECTAL ONCE AS NEEDED
Status: DISCONTINUED | OUTPATIENT
Start: 2018-04-16 | End: 2018-04-16 | Stop reason: HOSPADM

## 2018-04-16 RX ORDER — FENTANYL CITRATE 50 UG/ML
50 INJECTION, SOLUTION INTRAMUSCULAR; INTRAVENOUS
Status: DISCONTINUED | OUTPATIENT
Start: 2018-04-16 | End: 2018-04-16 | Stop reason: HOSPADM

## 2018-04-16 RX ORDER — PROPOFOL 10 MG/ML
VIAL (ML) INTRAVENOUS AS NEEDED
Status: DISCONTINUED | OUTPATIENT
Start: 2018-04-16 | End: 2018-04-16 | Stop reason: SURG

## 2018-04-16 RX ORDER — PROMETHAZINE HYDROCHLORIDE 25 MG/ML
6.25 INJECTION, SOLUTION INTRAMUSCULAR; INTRAVENOUS ONCE AS NEEDED
Status: DISCONTINUED | OUTPATIENT
Start: 2018-04-16 | End: 2018-04-16 | Stop reason: HOSPADM

## 2018-04-16 RX ORDER — PROMETHAZINE HYDROCHLORIDE 25 MG/1
25 TABLET ORAL ONCE AS NEEDED
Status: DISCONTINUED | OUTPATIENT
Start: 2018-04-16 | End: 2018-04-16 | Stop reason: HOSPADM

## 2018-04-16 RX ORDER — PANTOPRAZOLE SODIUM 40 MG/10ML
40 INJECTION, POWDER, LYOPHILIZED, FOR SOLUTION INTRAVENOUS
Status: DISCONTINUED | OUTPATIENT
Start: 2018-04-16 | End: 2018-04-16 | Stop reason: HOSPADM

## 2018-04-16 RX ORDER — HYDROMORPHONE HYDROCHLORIDE 1 MG/ML
0.5 INJECTION, SOLUTION INTRAMUSCULAR; INTRAVENOUS; SUBCUTANEOUS
Status: DISCONTINUED | OUTPATIENT
Start: 2018-04-16 | End: 2018-04-16 | Stop reason: HOSPADM

## 2018-04-16 RX ORDER — CEFAZOLIN SODIUM 2 G/100ML
2 INJECTION, SOLUTION INTRAVENOUS ONCE
Status: COMPLETED | OUTPATIENT
Start: 2018-04-16 | End: 2018-04-16

## 2018-04-16 RX ORDER — FAMOTIDINE 20 MG/1
20 TABLET, FILM COATED ORAL
Status: COMPLETED | OUTPATIENT
Start: 2018-04-16 | End: 2018-04-16

## 2018-04-16 RX ORDER — ONDANSETRON 2 MG/ML
INJECTION INTRAMUSCULAR; INTRAVENOUS AS NEEDED
Status: DISCONTINUED | OUTPATIENT
Start: 2018-04-16 | End: 2018-04-16 | Stop reason: SURG

## 2018-04-16 RX ORDER — MAGNESIUM HYDROXIDE 1200 MG/15ML
LIQUID ORAL AS NEEDED
Status: DISCONTINUED | OUTPATIENT
Start: 2018-04-16 | End: 2018-04-16 | Stop reason: HOSPADM

## 2018-04-16 RX ADMIN — HYDROCODONE BITARTRATE AND ACETAMINOPHEN 1 TABLET: 5; 325 TABLET ORAL at 11:47

## 2018-04-16 RX ADMIN — FAMOTIDINE 20 MG: 20 TABLET, FILM COATED ORAL at 08:12

## 2018-04-16 RX ADMIN — SODIUM CHLORIDE, POTASSIUM CHLORIDE, SODIUM LACTATE AND CALCIUM CHLORIDE: 600; 310; 30; 20 INJECTION, SOLUTION INTRAVENOUS at 09:12

## 2018-04-16 RX ADMIN — PHENYLEPHRINE HYDROCHLORIDE 100 MCG: 10 INJECTION INTRAVENOUS at 09:14

## 2018-04-16 RX ADMIN — CEFAZOLIN SODIUM 2 G: 2 INJECTION, SOLUTION INTRAVENOUS at 09:13

## 2018-04-16 RX ADMIN — PHENYLEPHRINE HYDROCHLORIDE 100 MCG: 10 INJECTION INTRAVENOUS at 09:33

## 2018-04-16 RX ADMIN — GLYCOPYRROLATE 0.4 MG: 0.2 INJECTION, SOLUTION INTRAMUSCULAR; INTRAVENOUS at 09:54

## 2018-04-16 RX ADMIN — PROPOFOL 150 MG: 10 INJECTION, EMULSION INTRAVENOUS at 09:14

## 2018-04-16 RX ADMIN — FENTANYL CITRATE 25 MCG: 50 INJECTION, SOLUTION INTRAMUSCULAR; INTRAVENOUS at 09:39

## 2018-04-16 RX ADMIN — FENTANYL CITRATE 50 MCG: 50 INJECTION, SOLUTION INTRAMUSCULAR; INTRAVENOUS at 09:13

## 2018-04-16 RX ADMIN — FENTANYL CITRATE 25 MCG: 50 INJECTION, SOLUTION INTRAMUSCULAR; INTRAVENOUS at 09:45

## 2018-04-16 RX ADMIN — LIDOCAINE HYDROCHLORIDE 0.5 ML: 10 INJECTION, SOLUTION EPIDURAL; INFILTRATION; INTRACAUDAL; PERINEURAL at 08:12

## 2018-04-16 RX ADMIN — ATRACURIUM BESYLATE 40 MG: 10 INJECTION, SOLUTION INTRAVENOUS at 09:14

## 2018-04-16 RX ADMIN — NEOSTIGMINE METHYLSULFATE 3 MG: 1 INJECTION, SOLUTION INTRAVENOUS at 09:54

## 2018-04-16 RX ADMIN — ONDANSETRON 4 MG: 2 INJECTION INTRAMUSCULAR; INTRAVENOUS at 09:54

## 2018-04-16 RX ADMIN — SODIUM CHLORIDE, POTASSIUM CHLORIDE, SODIUM LACTATE AND CALCIUM CHLORIDE 9 ML/HR: 600; 310; 30; 20 INJECTION, SOLUTION INTRAVENOUS at 08:13

## 2018-04-16 RX ADMIN — HYDROMORPHONE HYDROCHLORIDE 0.5 MG: 1 INJECTION, SOLUTION INTRAMUSCULAR; INTRAVENOUS; SUBCUTANEOUS at 10:50

## 2018-04-16 NOTE — ANESTHESIA POSTPROCEDURE EVALUATION
Patient: Akshat Winkler    Procedure Summary     Date:  04/16/18 [facility] / [facility]    Anesthesia Start:  0913 Anesthesia Stop:      Procedure:  KYPHOPLASTY L1 and L2 (N/A Spine Lumbar) Diagnosis:       Pathological compression fracture of vertebra, initial encounter      (Pathological compression fracture of vertebra, initial encounter [M48.50XA])    Surgeon:  Akshat Davidson MD Provider:  Naida Mayo MD    Anesthesia Type:  general ASA Status:  4          Anesthesia Type: general  Last vitals  BP   103/78 (04/16/18 0754)139/102   Temp   98.4 °F (36.9 °C) (04/16/18 0754)98.2   Pulse   116 (04/16/18 0754)112   Resp   18 (04/16/18 0754)  20   SpO2   93 % (04/16/18 0754)97%     Post Anesthesia Care and Evaluation    Patient location during evaluation: PACU  Patient participation: complete - patient participated  Level of consciousness: sleepy but conscious  Pain score: 0  Pain management: adequate  Airway patency: patent  Anesthetic complications: No anesthetic complications  PONV Status: none  Cardiovascular status: hemodynamically stable and acceptable  Respiratory status: nonlabored ventilation, acceptable and nasal cannula  Hydration status: acceptable

## 2018-04-16 NOTE — H&P (VIEW-ONLY)
Patient: Akshat Winkler  : 1955     Primary Care Provider: Oren Mark MD     Requesting Provider: As above           History     Chief Complaint:   1.  Recurrent low back pain.  2.  History of L4 compression fracture status post kyphoplasty.  3.  Osteoporosis.     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  He had a modest upper endplate fracture of L4.  He did not follow-up but subsequently returned with increasing pain and a profound nearly planum fracture of L4.  On 3/5/18 he underwent kyphoplasty.  Overall his pain has been better.  About 3 weeks ago he reached for a towel and felt a pop in his back.  Since then he's had increased low back pain.  He has no radicular symptoms.  His symptoms are worse with any movement.     Review of Systems   Constitutional: Negative for activity change, appetite change, chills, diaphoresis, fatigue, fever and unexpected weight change.   HENT: Negative for congestion, dental problem, drooling, ear discharge, ear pain, facial swelling, hearing loss, mouth sores, nosebleeds, postnasal drip, rhinorrhea, sinus pressure, sneezing, sore throat, tinnitus, trouble swallowing and voice change.    Eyes: Negative for photophobia, pain, discharge, redness, itching and visual disturbance.   Respiratory: Negative for apnea, cough, choking, chest tightness, shortness of breath, wheezing and stridor.    Cardiovascular: Negative for chest pain, palpitations and leg swelling.   Gastrointestinal: Negative for abdominal distention, abdominal pain, anal bleeding, blood in stool, constipation, diarrhea, nausea, rectal pain and vomiting.   Endocrine: Negative for cold intolerance, heat intolerance, polydipsia, polyphagia and polyuria.   Genitourinary: Negative for decreased urine volume, difficulty urinating, dysuria, enuresis,  flank pain, frequency, genital sores, hematuria and urgency.   Musculoskeletal: Positive for back pain and gait problem (Walking with assistance of a walker). Negative for arthralgias, joint swelling, myalgias, neck pain and neck stiffness.   Skin: Negative for color change, pallor, rash and wound.   Allergic/Immunologic: Negative for environmental allergies, food allergies and immunocompromised state.   Neurological: Negative for dizziness, tremors, seizures, syncope, facial asymmetry, speech difficulty, weakness, light-headedness, numbness and headaches.   Hematological: Negative for adenopathy. Does not bruise/bleed easily.   Psychiatric/Behavioral: Negative for agitation, behavioral problems, confusion, decreased concentration, dysphoric mood, hallucinations, self-injury, sleep disturbance and suicidal ideas. The patient is not nervous/anxious and is not hyperactive.    All other systems reviewed and are negative.        The patient's past medical history, past surgical history, family history, and social history have been reviewed at length in the electronic medical record.     Past Medical History:   Diagnosis Date   • Acute diastolic HF (heart failure)    • Acute hypokalemia    • Arrhythmia    • Back injury     Fall on 12/20/17   • Cellulitis of leg    • Chest pain syndrome    • CHF (congestive heart failure)    • Diabetes mellitus     type 2   • Hyperlipidemia    • Hypertension    • Obesity    • Obstructive sleep apnea     cpap hs   • Paroxysmal a-fib    • Peripheral artery disease    • Spondylosis of 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 or radiculopathy 2/7/2018   • Wears glasses      Past Surgical History:   Procedure Laterality Date   • CARDIAC CATHETERIZATION     • CARDIOVERSION      multiple   • COLONOSCOPY     • KYPHOPLASTY N/A 3/5/2018    Procedure: KYPHOPLASTY L4;  Surgeon: Akshat Davidson MD;  Location: [facility];  Service:    • OTHER SURGICAL HISTORY  06/29/2015    PVA     Family History   Problem  "Relation Age of Onset   • Hypertension Mother    • Stroke Mother    • Stroke Father    • Sleep disorder Father    • Cancer Brother      Social History     Social History   • Marital status:      Spouse name: N/A   • Number of children: N/A   • Years of education: N/A     Occupational History   • Not on file.     Social History Main Topics   • Smoking status: Never Smoker   • Smokeless tobacco: Never Used   • Alcohol use No   • Drug use: No   • Sexual activity: Defer     Other Topics Concern   • Not on file     Social History Narrative    Patient lives at home with his wife and denies caffeine intake.     Allergies   Allergen Reactions   • Bisoprolol Other (See Comments)     Severe Hypotension   • Flecainide Other (See Comments)     Syncope / collapse   • Metoprolol Other (See Comments)      Bradycardia, Severe Hypotension       Physical Exam:   Ht 177.8 cm (70\")   Wt 124 kg (274 lb) Comment: Per pt-Refused weight  BMI 39.31 kg/m²   MUSCULOSKELETAL:  Straight leg raising is negative.  Frederick's Sign is negative.  ROM in back is limited in all directions.  Tenderness in the back to palpation is present in the lumbosacral midline.  NEUROLOGICAL:  Strength is intact in the lower extremities to direct testing.  Muscle tone is normal throughout.  Station and gait are normal.  Sensation is intact to light touch testing throughout.  Deep tendon reflexes are 1+ and symmetrical.  Coordination is intact.        Medical Decision Making     Data Review:   His new MRI study demonstrates changes related to his prior L4 kyphoplasty.  Substantial stenosis is still identified at that level.  No fractures are noted at L1 and more impressively at L2.     Diagnosis:   New osteoporotic compression fractures with severe pain.     Treatment Options:   I have recommended L1 and L2 kyphoplasty to address his new fractures.  We will need to hold his Eliquis for about 3 days preceding the intervention.  The nature of the procedure as " well as the potential risks, complications, limitations, and alternatives to the procedure were discussed at length with the patient and the patient has agreed to proceed with surgery.          Diagnosis Plan   1. Compression fracture of L1 lumbar vertebra, closed, initial encounter      2. Compression fracture of L2 lumbar vertebra, closed, initial encounter      3. Closed compression fracture of L4 lumbar vertebra with routine healing, subsequent encounter  HYDROcodone-acetaminophen (NORCO) 5-325 MG per tablet

## 2018-04-16 NOTE — ANESTHESIA PROCEDURE NOTES
Airway  Urgency: elective    Date/Time: 4/16/2018 9:14 AM  End Time:4/16/2018 9:17 AM  Airway not difficult    General Information and Staff    Patient location during procedure: OR  Anesthesiologist: FELICIA WARE  CRNA: TERESA LIMA    Indications and Patient Condition  Indications for airway management: airway protection    Preoxygenated: yes  MILS not maintained throughout  Mask difficulty assessment: 1 - vent by mask    Final Airway Details  Final airway type: endotracheal airway      Successful airway: ETT  Cuffed: yes   Successful intubation technique: direct laryngoscopy  Facilitating devices/methods: intubating stylet  Endotracheal tube insertion site: oral  Blade: Ralph  Blade size: #3  ETT size: 7.5 mm  Cormack-Lehane Classification: grade I - full view of glottis  Placement verified by: chest auscultation and capnometry   Inital cuff pressure (cm H2O): 22  Cuff volume (mL): 6  Measured from: lips  ETT to lips (cm): 21  Number of attempts at approach: 1    Additional Comments  Negative epigastric sounds, Breath sound equal bilaterally with symmetric chest rise and fall

## 2018-04-16 NOTE — ANESTHESIA PREPROCEDURE EVALUATION
Anesthesia Evaluation     Patient summary reviewed and Nursing notes reviewed   no history of anesthetic complications:  NPO Solid Status: > 8 hours  NPO Liquid Status: > 8 hours           Airway   Mallampati: III  TM distance: <3 FB  Neck ROM: full  possible difficult intubation  Dental    (+) poor dentition    Pulmonary    (+) sleep apnea on CPAP, decreased breath sounds,   Cardiovascular   Exercise tolerance: poor (<4 METS)    ECG reviewed  Rhythm: irregular  Rate: normal    (+) hypertension, dysrhythmias Atrial Fib, Atrial Flutter, CHF (diastolic failure), BILLS, PVD, hyperlipidemia,   (-) angina    ROS comment: Ok for surgery per cardiology  Required IV diuresis 04/11/18    ECHO Technically difficult study  EF = 60%.   NO significant stenotic or regurgitant lesions   RV mild-to-moderately dilated.  Atrial flutter noted   CATH 2014  Mild, nonobstructive coronary artery disease.   elevated left ventricular filling pressures -chronic  diastolic heart failure.     Neuro/Psych- negative ROS  GI/Hepatic/Renal/Endo    (+) morbid obesity,  diabetes mellitus type 2,   (-) hepatitis, liver disease, no renal disease, hypothyroidism    Musculoskeletal     (+) back pain,       ROS comment: compresssion fracture lumbar  Abdominal    Substance History - negative use     OB/GYN          Other   (+) arthritis                     Anesthesia Plan    ASA 4     general   (Labs/studies reviewed  )  intravenous induction   Anesthetic plan and risks discussed with patient.    Plan discussed with CRNA.

## 2018-04-16 NOTE — OP NOTE
NEUROSURGICAL OPERATIVE NOTE        PREOPERATIVE DIAGNOSIS:    L1 and L2 compression fractures  Osteoporosis      POSTOPERATIVE DIAGNOSIS:  Same      PROCEDURE:  L1 and L2 kyphoplasty      SURGEON:  Akshat Davidson M.D.      ASSISTANT:  Lety العلي PA-C      ANESTHESIA:  General      ESTIMATED BLOOD LOSS:  Minimal      SPECIMEN:  Bone core from L2      DRAINS:  None      COMPLICATIONS:  None      CLINICAL NOTE:  The patient is a 63-year-old gentleman with diminished bone density who has recently undergone L4 kyphoplasty.  He now presents with severe increased pain and is noted to have new fractures at L1 and L2.  Given his profound and intractable pain he presents at this time for L1 and L2 kyphoplasty.  The nature of the procedure as well as the potential risks, complications, limitations, and alternatives to the procedure were discussed at length with the patient and the patient has agreed to proceed with surgery.      TECHNICAL NOTE:  The patient was brought to the operating room, and while on his cart general endotracheal anesthesia was achieved. He was then turned prone onto blanket rolls maintained longitudinally under his chest and abdomen. Special care was ensured to protect pressure points. His low back was prepared and draped in the usual fashion. Two C-arms were brought into use and aligned to provide good AP and lateral imaging of the L1 and L2 fracture sites. Beginning at L1, punctate incisions were made just lateral to the pedicles as noted on the AP imaging. One-step cannulae were impacted through the pedicles into the dorsal vertebral body. A hand drill was utilized on each side to create a trough. Kyphon balloons were inserted. These were 20 mm balloons. These were inflated. They were subsequently deflated, and approximately 4.5 mL of methyl methacrylate were instilled on each side in the standard fashion. The cement was allowed to harden. The working cannulae were removed. Cement was well  contained. The procedure was then repeated at the L2 level. A biopsy was performed on the left in lieu of using the hand drill. Kyphon balloons were inserted. I was able to reduce the fracture somewhat. The balloons were lowered, and once again approximately 4.5 mL of methyl methacrylate were instilled on each side. The cement was allowed to harden, and the working cannulae were removed. The cement was again well contained. The punctate incisions were closed with a subcutaneous Vicryl suture followed by a Steri-Strip at each incision site. Sterile dressings were applied. The patient was subsequently rolled onto his cart, extubated and taken to recovery room in satisfactory condition. He did receive preoperative antibiotics, and there were no overt intraoperative complications.            Akshat Davidson M.D.

## 2018-04-16 NOTE — INTERVAL H&P NOTE
Pre-Op H&P (See Recent Office Note Attached for Full H&P)    Chief complaint: Low back pain    HPI:      Patient is a 63 y.o. male who presents with L1 and L2 compression fractures and here today for L1 and L2 kyphoplasty.  S/P L4 kyphoplasty 3/5/18    Review of Systems:  General ROS:  no fever, chills, rashes, No change since last office visit  Cardiovascular ROS: +cardiac clearance.  no chest pain or dyspnea on exertion.  Edema improved.  Last IV diuresis 4/11/18 in heart failure clinic.  Tachycardic.  History of PAflutter/fib.  LD Eliquis 4/12/18  · 2/27/18 TTE:  Technically difficult study due to body habitus  · Left ventricular systolic function is normal. Estimated EF = 60%.  · The cardiac valves are poorly visualized but there does not appear to be significant stenotic or regurgitant lesions.  · Right ventricular cavity is mild-to-moderately dilated.  Atrial flutter noted throughout the examination.  Respiratory ROS: no cough,+baseline shortness of breath, or wheezing    Immunization History:   Influenza:  no  Pneumococcal:  no  Tetanus:  unknown    Meds:    No current facility-administered medications on file prior to encounter.      Current Outpatient Prescriptions on File Prior to Encounter   Medication Sig Dispense Refill   • acetaminophen (TYLENOL) 500 MG tablet Take 500 mg by mouth As Needed for Mild Pain .     • bumetanide (BUMEX) 1 MG tablet Take 1 tablet by mouth 2 (Two) Times a Day. (Patient taking differently: Take 2 mg by mouth 2 (Two) Times a Day.) 60 tablet 0   • gabapentin (NEURONTIN) 100 MG capsule Take 1 capsule by mouth 3 (Three) Times a Day. 90 capsule 1   • HYDROcodone-acetaminophen (NORCO) 5-325 MG per tablet Take 1 tablet by mouth 3 (Three) Times a Day As Needed for Moderate Pain . 50 tablet 0   • potassium chloride (K-DUR) 10 MEQ CR tablet Take 10 mEq by mouth Daily As Needed (hypokalemia).     • rosuvastatin (CRESTOR) 20 MG tablet Take 1 tablet by mouth Daily for 360 days. 90 tablet 3  "  • spironolactone (ALDACTONE) 25 MG tablet Take 1 tablet by mouth Daily. 90 tablet 3   • tiZANidine (ZANAFLEX) 2 MG tablet Take 2 mg by mouth Every 8 (Eight) Hours As Needed for Muscle Spasms.     • apixaban (ELIQUIS) 5 MG tablet tablet Take 5 mg by mouth 2 (Two) Times a Day.     • aspirin 81 MG EC tablet Take 81 mg by mouth Daily.         Vital Signs:  /78 (BP Location: Right arm, Patient Position: Lying)   Pulse 116   Temp 98.4 °F (36.9 °C) (Temporal Artery )   Resp 18   Ht 177.8 cm (70\")   Wt 124 kg (274 lb)   SpO2 93%   BMI 39.31 kg/m²     Physical Exam:    CV:  S1S2 regular rate and rhythm, no murmur               Resp:  Clear to auscultation; respirations regular, even and unlabored    Results Review:    I reviewed the patient's new clinical results.    Cancer Staging (if applicable)  Cancer Patient: __ yes _x_no __unknown; If yes, clinical stage T:__ N:__M:__, stage group or __N/A    Assessment/Plan:    L1 and L2 osteoporotic compression fracture - Kyphoplasty L1 and L2    Ashley Zuniga, FAITH  4/16/2018   8:12 AM    "

## 2018-04-18 ENCOUNTER — TELEPHONE (OUTPATIENT)
Dept: NEUROSURGERY | Facility: CLINIC | Age: 63
End: 2018-04-18

## 2018-04-18 LAB
CYTO UR: NORMAL
LAB AP CASE REPORT: NORMAL
LAB AP CLINICAL INFORMATION: NORMAL
Lab: NORMAL
PATH REPORT.FINAL DX SPEC: NORMAL
PATH REPORT.GROSS SPEC: NORMAL

## 2018-04-18 NOTE — TELEPHONE ENCOUNTER
Pt stated that his whole back hurts and it has not gotten any better since going home from the surgery

## 2018-04-18 NOTE — TELEPHONE ENCOUNTER
Pt states that he has been taking the tiznidine, but not much relief from that. He states that he will try the Soma, adv pt to take it for 10 days, and to continue ice/heat, as he is currently sitting on heating pad. Rx called into the pharmacy

## 2018-04-18 NOTE — TELEPHONE ENCOUNTER
Provider:  Stuart   Caller: Akshat  Phone #:  [phone]  Surgery:  Kyphoplasty L1 L2  Surgery Date:  4/16/18  Last visit:   4/11/18  Next visit: Today    SONY:         Reason for call:           Pt states that he is in too much pain to make today's appointment and will need to reschedule. Is inquiring if there is anything that he can do, anything different that he can take to maybe get some relief. Please advise

## 2018-04-18 NOTE — TELEPHONE ENCOUNTER
Then I don't suspect a new compression fx. Does he have a muscle relaxer, if not we could use soma 350 tid for 10 days. She should try ice and heat to the back and let's see if that helps.

## 2018-05-03 ENCOUNTER — TELEPHONE (OUTPATIENT)
Dept: NEUROSURGERY | Facility: CLINIC | Age: 63
End: 2018-05-03

## 2018-05-03 NOTE — TELEPHONE ENCOUNTER
Called pt to let him know, he understood.     Transferred to Ashley to get appt scheduled for tomorrow

## 2018-05-03 NOTE — TELEPHONE ENCOUNTER
No new scans right now. It sounds like he is doing better. Rest and use ice. Go ahead and schedule follow up with PA

## 2018-05-03 NOTE — TELEPHONE ENCOUNTER
Provider:  Stuart  Caller: Akshat Winkler  Time of call:   08:45 AM  Phone #:  [phone]  Surgery:  KYPHO L1 & L2  Surgery Date:  04/16/18  Last visit:   04/11/18  Next visit: none scheduled    Reason for call:       Pt left  requesting that a PA call him back, no details.     Called patient back to get more details, he said that Monday morning he was getting dressed and putting his belt on when he felt a pop in his back. Said that he is now having new pain on the left side of his back, but it wasn't that horrible.     This morning he was getting into his car his jacket got caught on the seat and he had a really sharp pain on the left side of his body that left him breathless for about 5 minutes. And then he had another incident when walking with the sharp pain.     He said that the new pain on the left side is not there constantly, but if he moves a certain way it will create a really sharp excruciating pain. Twisting at the waist or sitting in a certain way makes it worse.     Pt wants to schedule his post op f/u (he cancelled his original one because he was hurting too bad to drive), said that he can come in tomorrow afternoon. He wants to know if he needs to get any new scans for his appt, he is worried that he's hurt something new in his back.

## 2018-05-03 NOTE — TELEPHONE ENCOUNTER
There were no openings on a PA schedule for Friday so the patient was offered Tuesday 5/8.  He was scheduled with Kristine in the early morning per patient request due to his work schedule.

## 2018-05-08 ENCOUNTER — TELEPHONE (OUTPATIENT)
Dept: NEUROSURGERY | Facility: CLINIC | Age: 63
End: 2018-05-08

## 2018-05-08 ENCOUNTER — OFFICE VISIT (OUTPATIENT)
Dept: NEUROSURGERY | Facility: CLINIC | Age: 63
End: 2018-05-08

## 2018-05-08 VITALS
HEIGHT: 70 IN | SYSTOLIC BLOOD PRESSURE: 126 MMHG | DIASTOLIC BLOOD PRESSURE: 84 MMHG | BODY MASS INDEX: 39.51 KG/M2 | TEMPERATURE: 98 F | WEIGHT: 276 LBS

## 2018-05-08 DIAGNOSIS — Z98.890 S/P KYPHOPLASTY: Primary | ICD-10-CM

## 2018-05-08 PROCEDURE — 99213 OFFICE O/P EST LOW 20 MIN: CPT | Performed by: PHYSICIAN ASSISTANT

## 2018-05-08 RX ORDER — METHOCARBAMOL 750 MG/1
750 TABLET, FILM COATED ORAL 3 TIMES DAILY
Qty: 60 TABLET | Refills: 1 | Status: SHIPPED | OUTPATIENT
Start: 2018-05-08 | End: 2018-07-12 | Stop reason: SDUPTHER

## 2018-05-08 NOTE — TELEPHONE ENCOUNTER
Take 1 tablet my mouth daily as need for pain.     Will they accept this - let me know if there are any problems - sorry about that!   Thanks, Kristine

## 2018-05-08 NOTE — TELEPHONE ENCOUNTER
Provider:  Stuart  Caller: David from pharmacy  Time of call: [phone]    Phone #: [phone]   Surgery:  kyphoplasty  Surgery Date: 4/16/18   Last visit:  5/8/18   Next visit: 6/19/18    Reason for call:         Pharmacy called stating that the pt brought in an Rx for Percocet earlier but it did not have the dosing frequency on it. How often should the pt take this?

## 2018-05-08 NOTE — PROGRESS NOTES
"Patient: Akshat Winkler  : 1955  Chart #: [id number]    Date of Service: 2018    CHIEF COMPLAINT: back pain s/p kyphoplasty    History of Present Illness   History Peterson is a 63-year-old male who was initially seen as an inpatient for low back pain following a fall associated with a syncopal episode.  Patient was found to have a fracture of L4.  He did not follow-up in the office at that time but subsequently returned with increasing pain and was found to have a profound nearly planum fracture of L4.  On 3/5/18 he underwent kyphoplasty.  He returned to the office on 18 after experience a pop in his back with increased low back pain and no radicular symptoms.  He was found on MRI to have a L1 and L2 fractures.      Patient underwent kyphoplasty on 2018 of L1 and L2.      Since that time patient has complained of persistent paraspinal pain which is 3-4inches away from his spine bilaterally.  He has tried Soma but had to discontinue due to daytime drowsiness and nighttime wakefulness.  He is currently taking today as tizanidine without much relief.  He reports taking Percocet 5-325 mg 1 in the morning and 1 at night he would like a refill of this medication.  Patient also endorses a sharp pain with twisting that is from shoulder level to hip level.  This gets better with time and limiting movement.  She denies any leg pain radicular symptoms or or pain that is similar to his preoperative pain.       Review of Systems   Musculoskeletal: Positive for back pain.   All other systems reviewed and are negative.      Objective   Vital Signs: Blood pressure 126/84, temperature 98 °F (36.7 °C), temperature source Temporal Artery , height 177.8 cm (70\"), weight 125 kg (276 lb).  Physical Exam   Constitutional: He is oriented to person, place, and time. He appears well-developed and well-nourished. No distress.   HENT:   Head: Normocephalic and atraumatic.   Neck: Normal range of motion. "   Pulmonary/Chest: Effort normal.   Musculoskeletal: Normal range of motion.   Patient finds it difficult to rise out of chair.  Reports subjective weakness which is decreasing as the patient increases his activity levels   Neurological: He is alert and oriented to person, place, and time. He exhibits normal muscle tone. Coordination normal.   Slow gait.   Skin: Skin is warm and dry. He is not diaphoretic.   Psychiatric: He has a normal mood and affect. His behavior is normal. Judgment and thought content normal.       Assessment/Plan   Medical Decision Making:   Patient is a 63-year-old male who returns for follow-up from an L1 and L2 kyphoplasty on 4/16/18.  Of note patient had an L4 kyphoplasty on 3/5/18 for a nearly planum fracture of L4.  Counseled patient that he most likely would never be completely free of pain.  Patient currently is endorsing paraspinal pain that has not been relieved with Soma or tizanidine.  I have ordered Robaxin for him to try.  I have given him a refill for Percocet 5-3 25 mg quantity 30 but have advised patient that we would like to titrate him down and off this dose.  Dr. Davidson will not provide further refills, patient will have to follow up with his primary care provider for any further pain medication.  Also counseled patient that with time his paraspinal muscle pain should continue to get better.     Patient will return for follow-up in 6-7 weeks with Dr. Davidson.  Patient is in agreement with plan.    Akshat was seen today for post-op.    Diagnoses and all orders for this visit:    S/P kyphoplasty    Other orders  -     methocarbamol (ROBAXIN) 750 MG tablet; Take 1 tablet by mouth 3 (Three) Times a Day.           Kristine Bettencourt PA-C    Patient Care Team:  Oren Mark MD as PCP - General (Family Medicine)  Lucian Alvarez IV, MD as Consulting Physician (Cardiology)  Mandy Clark PA-C as Physician Assistant (Physician Assistant)  Josh Moss MD  as Consulting Physician (Pain Medicine)  FAITH Nix as Nurse Practitioner (Cardiology)  Akshat Davidson MD as Surgeon (Neurosurgery)

## 2018-05-09 RX ORDER — TIZANIDINE 2 MG/1
2 TABLET ORAL NIGHTLY PRN
Qty: 30 TABLET | Refills: 0 | Status: SHIPPED | OUTPATIENT
Start: 2018-05-09 | End: 2018-06-11 | Stop reason: SDUPTHER

## 2018-05-09 NOTE — TELEPHONE ENCOUNTER
Provider:  Stuart  Caller: self  Time of call: [phone]    Phone #: [phone]   Surgery:  kyphoplasty  Surgery Date: 4/16/18   Last visit:  5/8/18   Next visit: 6/19/18       Reason for call:         Pt called to report that methocarbamol caused him to not sleep therefor he would like to switch back to tizanidine. Med pended, pharmacy verified. (he was told to call if this interfered with his sleep)

## 2018-05-18 ENCOUNTER — OFFICE VISIT (OUTPATIENT)
Dept: CARDIOLOGY | Facility: HOSPITAL | Age: 63
End: 2018-05-18

## 2018-05-18 VITALS
SYSTOLIC BLOOD PRESSURE: 113 MMHG | OXYGEN SATURATION: 97 % | DIASTOLIC BLOOD PRESSURE: 70 MMHG | TEMPERATURE: 97.4 F | HEART RATE: 66 BPM | WEIGHT: 281 LBS | BODY MASS INDEX: 40.23 KG/M2 | HEIGHT: 70 IN

## 2018-05-18 DIAGNOSIS — N28.9 RENAL INSUFFICIENCY: ICD-10-CM

## 2018-05-18 DIAGNOSIS — E87.6 HYPOKALEMIA: ICD-10-CM

## 2018-05-18 DIAGNOSIS — I50.33 ACUTE ON CHRONIC DIASTOLIC HEART FAILURE (HCC): Primary | ICD-10-CM

## 2018-05-18 DIAGNOSIS — I10 ESSENTIAL HYPERTENSION: ICD-10-CM

## 2018-05-18 LAB
ANION GAP SERPL CALCULATED.3IONS-SCNC: 9 MMOL/L (ref 3–11)
BUN BLD-MCNC: 20 MG/DL (ref 9–23)
BUN/CREAT SERPL: 18.2 (ref 7–25)
CALCIUM SPEC-SCNC: 9.4 MG/DL (ref 8.7–10.4)
CHLORIDE SERPL-SCNC: 99 MMOL/L (ref 99–109)
CO2 SERPL-SCNC: 30 MMOL/L (ref 20–31)
CREAT BLD-MCNC: 1.1 MG/DL (ref 0.6–1.3)
GFR SERPL CREATININE-BSD FRML MDRD: 68 ML/MIN/1.73
GLUCOSE BLD-MCNC: 113 MG/DL (ref 70–100)
POTASSIUM BLD-SCNC: 3.4 MMOL/L (ref 3.5–5.5)
SODIUM BLD-SCNC: 138 MMOL/L (ref 132–146)

## 2018-05-18 PROCEDURE — 99214 OFFICE O/P EST MOD 30 MIN: CPT | Performed by: NURSE PRACTITIONER

## 2018-05-18 PROCEDURE — 80048 BASIC METABOLIC PNL TOTAL CA: CPT | Performed by: NURSE PRACTITIONER

## 2018-05-18 RX ORDER — POTASSIUM CHLORIDE 750 MG/1
10 TABLET, FILM COATED, EXTENDED RELEASE ORAL DAILY
Qty: 30 TABLET | Refills: 5 | Status: ON HOLD | OUTPATIENT
Start: 2018-05-18 | End: 2018-07-20

## 2018-05-18 RX ORDER — BUMETANIDE 2 MG/1
2 TABLET ORAL EVERY MORNING
COMMUNITY
End: 2018-11-12 | Stop reason: SDUPTHER

## 2018-05-18 NOTE — PROGRESS NOTES
[facility]  Heart and Valve Center      Encounter Date:05/18/2018     Akshat Winkler  [address] [address]  [phone]    1955    Oren Mark MD    Akshat Winkler is a 63 y.o. male.      Subjective:     Chief Complaint:  Edema       HPI patient presents to the office as an add-on for ongoing evaluation of his chronic diastolic heart failure.  He notes worsening pedal edema, abdominal fullness, fatigue. He notes compliance with his medications but notes he has a hard time following a low NA diet. Denies chest pain, palps, tachycardia, presyncope, syncope. He notes that his blood pressure has been well controlled at home in the 110-120s.     Patient Active Problem List   Diagnosis   • Acute on chronic diastolic heart failure   • Type 2 diabetes mellitus without complication, without long-term current use of insulin   • Hyperlipidemia LDL goal <70   • Essential hypertension   • Coronary artery disease involving native coronary artery of native heart without angina pectoris   • Severe obstructive sleep apnea   • Class 3 obesity in adult   • Persistent atrial fibrillation/flutter   • Compression fracture of lumbar vertebra   • Spondylosis of 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 or radiculopathy   • Lumbar stenosis with neurogenic claudication   • Lumbar disc herniation   • Myofascial pain   • Acute renal failure   • Hypokalemia   • Pathologic compression fracture of vertebra       Past Surgical History:   Procedure Laterality Date   • CARDIAC CATHETERIZATION     • CARDIOVERSION      multiple   • COLONOSCOPY     • KYPHOPLASTY N/A 3/5/2018    Procedure: KYPHOPLASTY L4;  Surgeon: Akshat Davidson MD;  Location: [facility] OR;  Service:    • KYPHOPLASTY N/A 4/16/2018    Procedure: KYPHOPLASTY L1 AND L2;  Surgeon: Akshat Davidson MD;  Location: [facility] OR;  Service: Neurosurgery   • OTHER SURGICAL HISTORY  06/29/2015    PVA       Allergies   Allergen Reactions   • Bisoprolol  Other (See Comments)     Severe Hypotension   • Flecainide Other (See Comments)     Syncope / collapse   • Metoprolol Other (See Comments)      Bradycardia, Severe Hypotension         Current Outpatient Prescriptions:   •  acetaminophen (TYLENOL) 500 MG tablet, Take 500 mg by mouth As Needed for Mild Pain ., Disp: , Rfl:   •  apixaban (ELIQUIS) 5 MG tablet tablet, Take 1 tablet by mouth Every 12 (Twelve) Hours., Disp: 60 tablet, Rfl:   •  aspirin 81 MG EC tablet, Take 81 mg by mouth Daily., Disp: , Rfl:   •  bumetanide (BUMEX) 2 MG tablet, Take 2 mg by mouth Daily. Take an additional 1mg daily as needed, Disp: , Rfl:   •  gabapentin (NEURONTIN) 100 MG capsule, Take 1 capsule by mouth 3 (Three) Times a Day., Disp: 90 capsule, Rfl: 1  •  HYDROcodone-acetaminophen (NORCO) 5-325 MG per tablet, Take 1 tablet by mouth 3 (Three) Times a Day As Needed for Moderate Pain ., Disp: 50 tablet, Rfl: 0  •  methocarbamol (ROBAXIN) 750 MG tablet, Take 1 tablet by mouth 3 (Three) Times a Day., Disp: 60 tablet, Rfl: 1  •  potassium chloride (K-DUR) 10 MEQ CR tablet, Take 1 tablet by mouth Daily., Disp: 30 tablet, Rfl: 5  •  rosuvastatin (CRESTOR) 20 MG tablet, Take 1 tablet by mouth Daily for 360 days., Disp: 90 tablet, Rfl: 3  •  spironolactone (ALDACTONE) 25 MG tablet, Take 1 tablet by mouth Daily., Disp: 90 tablet, Rfl: 3  •  tiZANidine (ZANAFLEX) 2 MG tablet, Take 1 tablet by mouth At Night As Needed for Muscle Spasms., Disp: 30 tablet, Rfl: 0    The following portions of the patient's history were reviewed and updated as appropriate: allergies, current medications, past family history, past medical history, past social history, past surgical history and problem list.    Review of Systems   Constitution: Positive for malaise/fatigue. Negative for chills, decreased appetite, diaphoresis, fever, weakness, night sweats, weight gain and weight loss.   HENT: Negative for congestion, hearing loss, hoarse voice and nosebleeds.    Eyes:  "Negative for blurred vision, visual disturbance and visual halos.   Cardiovascular: Positive for leg swelling. Negative for chest pain, claudication, cyanosis, dyspnea on exertion, irregular heartbeat, near-syncope, orthopnea, palpitations, paroxysmal nocturnal dyspnea and syncope.   Respiratory: Negative for cough, hemoptysis, shortness of breath, sleep disturbances due to breathing, snoring, sputum production and wheezing.    Hematologic/Lymphatic: Negative for bleeding problem. Does not bruise/bleed easily.   Skin: Negative for dry skin, itching and rash.   Musculoskeletal: Positive for back pain and joint pain. Negative for arthritis, falls, joint swelling and myalgias.        Ambulates with walker   Gastrointestinal: Negative for bloating, abdominal pain, constipation, diarrhea, flatus, heartburn, hematemesis, hematochezia, melena, nausea and vomiting.   Genitourinary: Negative for dysuria, frequency, hematuria, nocturia and urgency.   Neurological: Negative for excessive daytime sleepiness, dizziness, headaches, light-headedness and loss of balance.   Psychiatric/Behavioral: Negative for depression. The patient does not have insomnia and is not nervous/anxious.        Objective:     Vitals:    05/18/18 1217   BP: 113/70   BP Location: Right arm   Patient Position: Sitting   Pulse: 66   Temp: 97.4 °F (36.3 °C)   SpO2: 97%   Weight: 127 kg (281 lb)   Height: 177.8 cm (70\")         Physical Exam   Constitutional: He is oriented to person, place, and time. He appears well-developed and well-nourished. He is active and cooperative. No distress.   HENT:   Head: Normocephalic and atraumatic.   Mouth/Throat: Oropharynx is clear and moist.   Eyes: Conjunctivae and EOM are normal. Pupils are equal, round, and reactive to light.   Neck: Normal range of motion. Neck supple. No JVD present. No tracheal deviation present. No thyromegaly present.   Cardiovascular: Normal rate, regular rhythm, normal heart sounds and intact " distal pulses.    Pulmonary/Chest: Effort normal. He has rales.   Abdominal: Bowel sounds are normal. He exhibits distension. There is tenderness.   Musculoskeletal: Normal range of motion. He exhibits edema (3+ pitting edema noted BLEs, small serous filled blisters noted LLE with weeping of serous fluid noted ).   Neurological: He is alert and oriented to person, place, and time.   Skin: Skin is warm, dry and intact.   Psychiatric: He has a normal mood and affect. His behavior is normal.   Nursing note and vitals reviewed.      Lab and Diagnostic Review:  Results for orders placed or performed in visit on 05/18/18   Basic Metabolic Panel   Result Value Ref Range    Glucose 113 (H) 70 - 100 mg/dL    BUN 20 9 - 23 mg/dL    Creatinine 1.10 0.60 - 1.30 mg/dL    Sodium 138 132 - 146 mmol/L    Potassium 3.4 (L) 3.5 - 5.5 mmol/L    Chloride 99 99 - 109 mmol/L    CO2 30.0 20.0 - 31.0 mmol/L    Calcium 9.4 8.7 - 10.4 mg/dL    eGFR Non African Amer 68 >60 mL/min/1.73    BUN/Creatinine Ratio 18.2 7.0 - 25.0    Anion Gap 9.0 3.0 - 11.0 mmol/L     Assessment and Plan:         1. Acute on chronic diastolic heart failure  IV diuresis today in office. Patient received 2mg bumex (NDC 7194-3580-73)  today through a # butterfly in the left ac over slow IV push. During IV diuresis, vitals were monitored and stable. Please see IV diuresis record for those vitals. Patient voided 500 ml in the office prior to discharge from the office. Butterfly was d/c'd and area was free of erythema, ecchymosis, or drainage.  Patient was  instructed to record urinary output for the next 24 hours. Patient will receive a follow up call from the HF center in 24 hours to evaluate urinary output and reassess signs and symptoms.   - Basic Metabolic Panel  Heart failure education today including signs and symptoms, the role of the heart failure center, daily weights, low sodium diet (less than 1500 mg per day), and daily physical activity. Reviewed HF Zones  with patient and family.  Patient to continue current medications as previously ordered.   2. Renal insufficiency  Creatinine WNL today, will continue to monitor  - Basic Metabolic Panel    3. Hypokalemia  Continue KCL 10 meq daily, take extra 20 meq today only    4. Essential hypertension  Well controlled  HTN Education provided today including signs and symptoms, medication management, daily blood pressure monitoring. Patient encouraged to call the Heart and Valve center with any abnormal readings.     It has been a pleasure to participate in the care of this patient.  Patient was instructed to call the Heart and Valve Center with any questions, concerns, or worsening symptoms.      *Please note that portions of this note were completed with a voice recognition program. Efforts were made to edit the dictations, but occasionally words are mistranscribed.

## 2018-05-22 ENCOUNTER — LAB REQUISITION (OUTPATIENT)
Dept: LAB | Facility: HOSPITAL | Age: 63
End: 2018-05-22

## 2018-05-22 DIAGNOSIS — Z00.00 ROUTINE GENERAL MEDICAL EXAMINATION AT A HEALTH CARE FACILITY: ICD-10-CM

## 2018-05-22 PROCEDURE — 36415 COLL VENOUS BLD VENIPUNCTURE: CPT | Performed by: INTERNAL MEDICINE

## 2018-05-25 ENCOUNTER — TELEPHONE (OUTPATIENT)
Dept: CARDIOLOGY | Facility: HOSPITAL | Age: 63
End: 2018-05-25

## 2018-05-25 NOTE — TELEPHONE ENCOUNTER
Patient notes significant improvement in his pedal edema and abdominal fullness after IV diuresis. Continue current medications. Patient to call office with any change in symptoms, questions, or concerns.

## 2018-06-08 ENCOUNTER — OFFICE VISIT (OUTPATIENT)
Dept: CARDIOLOGY | Facility: HOSPITAL | Age: 63
End: 2018-06-08

## 2018-06-08 ENCOUNTER — PREP FOR SURGERY (OUTPATIENT)
Dept: OTHER | Facility: HOSPITAL | Age: 63
End: 2018-06-08

## 2018-06-08 VITALS
HEIGHT: 70 IN | BODY MASS INDEX: 42.03 KG/M2 | RESPIRATION RATE: 18 BRPM | HEART RATE: 116 BPM | SYSTOLIC BLOOD PRESSURE: 122 MMHG | OXYGEN SATURATION: 97 % | WEIGHT: 293.6 LBS | TEMPERATURE: 98.4 F | DIASTOLIC BLOOD PRESSURE: 74 MMHG

## 2018-06-08 DIAGNOSIS — I48.19 PERSISTENT ATRIAL FIBRILLATION (HCC): Primary | ICD-10-CM

## 2018-06-08 DIAGNOSIS — I50.33 ACUTE ON CHRONIC DIASTOLIC HEART FAILURE (HCC): Primary | ICD-10-CM

## 2018-06-08 DIAGNOSIS — E11.9 TYPE 2 DIABETES MELLITUS WITHOUT COMPLICATION, WITHOUT LONG-TERM CURRENT USE OF INSULIN (HCC): ICD-10-CM

## 2018-06-08 DIAGNOSIS — I25.10 CORONARY ARTERY DISEASE INVOLVING NATIVE CORONARY ARTERY OF NATIVE HEART WITHOUT ANGINA PECTORIS: ICD-10-CM

## 2018-06-08 DIAGNOSIS — I48.19 PERSISTENT ATRIAL FIBRILLATION (HCC): ICD-10-CM

## 2018-06-08 LAB
ANION GAP SERPL CALCULATED.3IONS-SCNC: 10 MMOL/L (ref 3–11)
BUN BLD-MCNC: 16 MG/DL (ref 9–23)
BUN/CREAT SERPL: 15.8 (ref 7–25)
CALCIUM SPEC-SCNC: 8.9 MG/DL (ref 8.7–10.4)
CHLORIDE SERPL-SCNC: 103 MMOL/L (ref 99–109)
CO2 SERPL-SCNC: 26 MMOL/L (ref 20–31)
CREAT BLD-MCNC: 1.01 MG/DL (ref 0.6–1.3)
GFR SERPL CREATININE-BSD FRML MDRD: 75 ML/MIN/1.73
GLUCOSE BLD-MCNC: 146 MG/DL (ref 70–100)
POTASSIUM BLD-SCNC: 3.5 MMOL/L (ref 3.5–5.5)
SODIUM BLD-SCNC: 139 MMOL/L (ref 132–146)

## 2018-06-08 PROCEDURE — 80048 BASIC METABOLIC PNL TOTAL CA: CPT | Performed by: NURSE PRACTITIONER

## 2018-06-08 PROCEDURE — 99214 OFFICE O/P EST MOD 30 MIN: CPT | Performed by: NURSE PRACTITIONER

## 2018-06-08 RX ORDER — BUMETANIDE 0.25 MG/ML
1 INJECTION INTRAMUSCULAR; INTRAVENOUS ONCE
Status: DISCONTINUED | OUTPATIENT
Start: 2018-06-08 | End: 2018-07-18

## 2018-06-08 NOTE — PROGRESS NOTES
Subjective:     Encounter Date:06/08/2018      Patient ID: Akshat Winkler is a 63 y.o. male.    Chief Complaint: fluid retention    History of Present Illness:  Mr. Winkler has hx of chronic diastolic HR and persistent atrial fibrillation/atrial flutter.  He is recovering from recent back surgery per Dr Flores.  He is back to work.  Due to his work schedule, he is limited in the opportunity to take his diuretics regularly.  Sometimes he gets overloaded, as he is today.  This is a chronic/ recurrent problem for him.      Past Medical History:   Diagnosis Date   • Acute diastolic HF (heart failure)    • Acute hypokalemia    • Arrhythmia    • Back injury     Fall on 12/20/17   • Cellulitis of leg    • Chest pain syndrome    • CHF (congestive heart failure)    • Diabetes mellitus     type 2   • Hyperlipidemia    • Hypertension    • Obesity    • Obstructive sleep apnea     cpap hs   • Paroxysmal a-fib    • Peripheral artery disease    • Spondylosis of 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 or radiculopathy 2/7/2018   • Wears glasses        Past Surgical History:   Procedure Laterality Date   • CARDIAC CATHETERIZATION     • CARDIOVERSION      multiple   • COLONOSCOPY     • KYPHOPLASTY N/A 3/5/2018    Procedure: KYPHOPLASTY L4;  Surgeon: Akshat Davidson MD;  Location:  [email or web address] OR;  Service:    • KYPHOPLASTY N/A 4/16/2018    Procedure: KYPHOPLASTY L1 AND L2;  Surgeon: Akshat Davidson MD;  Location:  [email or web address] OR;  Service: Neurosurgery   • OTHER SURGICAL HISTORY  06/29/2015    PVA       Social History     Social History   • Marital status:      Spouse name: N/A   • Number of children: N/A   • Years of education: N/A     Occupational History   • Not on file.     Social History Main Topics   • Smoking status: Never Smoker   • Smokeless tobacco: Never Used   • Alcohol use No   • Drug use: No   • Sexual activity: Defer     Other Topics Concern   • Not on file     Social History Narrative    Patient lives at home with his wife  "and denies caffeine intake.       Family History   Problem Relation Age of Onset   • Hypertension Mother    • Stroke Mother    • Stroke Father    • Sleep disorder Father    • Cancer Brother        Review of Systems   Constitution: Negative for chills, decreased appetite, diaphoresis, fever, weakness, malaise/fatigue, night sweats, weight gain and weight loss.   HENT: Negative for congestion, hearing loss, hoarse voice and nosebleeds.    Eyes: Negative for blurred vision, visual disturbance and visual halos.   Cardiovascular: Positive for leg swelling. Negative for chest pain, claudication, cyanosis, dyspnea on exertion, irregular heartbeat, near-syncope, orthopnea, palpitations, paroxysmal nocturnal dyspnea and syncope.   Respiratory: Negative for cough, hemoptysis, shortness of breath, sleep disturbances due to breathing, snoring, sputum production and wheezing.    Hematologic/Lymphatic: Negative for bleeding problem. Does not bruise/bleed easily.   Skin: Negative for dry skin, itching and rash.   Musculoskeletal: Negative for arthritis, joint pain, joint swelling and myalgias.   Gastrointestinal: Negative for bloating, abdominal pain, constipation, diarrhea, flatus, heartburn, hematemesis, hematochezia, melena, nausea and vomiting.   Genitourinary: Negative for dysuria, frequency, hematuria, nocturia and urgency.   Neurological: Negative for excessive daytime sleepiness, dizziness, headaches, light-headedness and loss of balance.   Psychiatric/Behavioral: Negative for depression. The patient does not have insomnia and is not nervous/anxious.      Vitals:    06/08/18 1252 06/08/18 1256 06/08/18 1258   BP: 130/81 133/84 122/74   BP Location: Right arm Left arm Left arm   Patient Position: Sitting Sitting Standing   Pulse: 114  116   Resp: 18     Temp: 98.4 °F (36.9 °C)     TempSrc: Temporal Artery      SpO2: 97%     Weight: 133 kg (293 lb 9.6 oz)     Height: 177.8 cm (70\")         Objective:     Physical Exam "   Constitutional: He is oriented to person, place, and time. He appears well-developed and well-nourished. No distress.   Very pleasant, well groomed, obese young adult gentleman in NAD.     HENT:   Head: Normocephalic and atraumatic.   Eyes: Conjunctivae are normal. Pupils are equal, round, and reactive to light. No scleral icterus.   Neck: Neck supple. No JVD present.   Cardiovascular: Normal heart sounds.    Pulmonary/Chest: Effort normal. No respiratory distress. He has no wheezes. He has rales.   Rales left base only. Otherwise clear   Abdominal: Bowel sounds are normal. He exhibits distension.   Abdomen protuberant   Musculoskeletal: Normal range of motion. He exhibits edema.   +3 bilateral lower extremity pitting edema.  Skin has confluent pink/red appearance   Neurological: He is alert and oriented to person, place, and time. No cranial nerve deficit.   Skin: Skin is warm.   No open areas or vesicular lesions   Psychiatric: He has a normal mood and affect. His behavior is normal.       Lab Review: Basic Metabolic Panel    Ref Range & Units 13:52   Glucose 70 - 100 mg/dL 146     BUN 9 - 23 mg/dL 16    Creatinine 0.60 - 1.30 mg/dL 1.01    Sodium 132 - 146 mmol/L 139    Potassium 3.5 - 5.5 mmol/L 3.5    Chloride 99 - 109 mmol/L 103    CO2 20.0 - 31.0 mmol/L 26.0    Calcium 8.7 - 10.4 mg/dL 8.9    eGFR Non African Amer >60 mL/min/1.73 75    BUN/Creatinine Ratio 7.0 - 25.0 15.8    Anion Gap 3.0 - 11.0 mmol/L 10.0                 Assessment/ Plan:             Diagnosis Plan   1. Acute on chronic diastolic heart failure  IV diuresis today in clinic.  Patient given 1 mg Bumex via slow IV push.  This was via #24 butterfly left AC.  Patient was monitored pre and post infusion and did not experience hypotension or other symptoms.  He voided: 800 ml.   He will resume his usual regimen of Bumex 2 mg QD with 2 mg extra daily dose PRN weight gain 3 lbs overnight or 5 lbs in a week.  Patient may call and return to the  HF/Afib clinic before next Cardiology follow up PRN problems.     2. Renal insufficiency BUN/Creatine, electrolytes WNL today as noted above.     3. Persistent atrial fibrillation/flutter  Following up with Dr. Jeffery in July.  Plans to admit for Tikosyn start.

## 2018-06-11 DIAGNOSIS — S32.000S COMPRESSION FX, LUMBAR SPINE, SEQUELA: Primary | ICD-10-CM

## 2018-06-11 RX ORDER — GABAPENTIN 100 MG/1
100 CAPSULE ORAL 3 TIMES DAILY
Qty: 90 CAPSULE | Refills: 1 | OUTPATIENT
Start: 2018-06-11 | End: 2018-08-20 | Stop reason: SDUPTHER

## 2018-06-11 RX ORDER — OXYCODONE HYDROCHLORIDE AND ACETAMINOPHEN 5; 325 MG/1; MG/1
0.5 TABLET ORAL 3 TIMES DAILY PRN
Qty: 30 TABLET | Refills: 0 | Status: ON HOLD | OUTPATIENT
Start: 2018-06-11 | End: 2018-07-18

## 2018-06-11 RX ORDER — TIZANIDINE 2 MG/1
2 TABLET ORAL NIGHTLY PRN
Qty: 30 TABLET | Refills: 0 | Status: SHIPPED | OUTPATIENT
Start: 2018-06-11 | End: 2018-06-19 | Stop reason: DRUGHIGH

## 2018-06-11 RX ORDER — TRAMADOL HYDROCHLORIDE 50 MG/1
50 TABLET ORAL 2 TIMES DAILY
Qty: 60 TABLET | Refills: 1 | OUTPATIENT
Start: 2018-06-11 | End: 2018-08-20 | Stop reason: SDUPTHER

## 2018-06-11 NOTE — TELEPHONE ENCOUNTER
Provider:  Stuart  Caller: Akshat Winkler  Time of call:   09:10 AM  Phone #:  [phone]  Surgery:  KYPHO L1, L2  Surgery Date:  04/16/18  Last visit:   05/08/18  Next visit: 06/19/18    SONY:       03/14/2018 Hydrocodone/Acetaminophen  325MG/5MG 1955 90 30 Oren Mark  04/12/2018 Gabapentin 100MG 1955 90 30 Akshat Davidson  04/14/2018 Hydrocodone/Acetaminophen  325MG/5MG 1955 50 16 Akshat Davidson  05/08/2018 Oxycodone/Acetaminophen 325MG/5MG 1955 30 10 Akshat Davidson  05/13/2018 Gabapentin 100MG 1955 90 30 Akshat Davidson    Reason for call:       Pt needs refills on Gabapentin, tizanidine, & oxycodone.     Pt said that he would like to start weaning himself off of the oxycodone, I suggested that he break them in half, he said he would do that with the refill we give him.     He did say that he would like it mailed, verified address.

## 2018-06-11 NOTE — TELEPHONE ENCOUNTER
Tried again at 5:00, still getting fast busy signal at both the oge pharmacy and main [facility] #

## 2018-06-11 NOTE — TELEPHONE ENCOUNTER
"I'm confused by your message? The Percocet was approved by you, but your note says \"he could have Tramadol but not Percocet\"  "

## 2018-06-11 NOTE — TELEPHONE ENCOUNTER
Attempted to call in prescriptions. Both the pharmacy and the main line for [facility] seem to not be working, quick busy signal. I tried calling several times and got the same thing. Will try again in a few minutes

## 2018-06-12 NOTE — TELEPHONE ENCOUNTER
Called in Gabapentin 100 mg TID #90 1RF   &  Tramadol 50 mg BID #60 1RF to patient's pharmacy.     Called pt to let him know, adv of change from Percocet to Tramadol, he is aware.

## 2018-06-14 ENCOUNTER — TELEPHONE (OUTPATIENT)
Dept: CARDIOLOGY | Facility: HOSPITAL | Age: 63
End: 2018-06-14

## 2018-06-14 DIAGNOSIS — R60.9 PERIPHERAL EDEMA: ICD-10-CM

## 2018-06-14 DIAGNOSIS — I87.2 CHRONIC VENOUS INSUFFICIENCY: Primary | ICD-10-CM

## 2018-06-14 RX ORDER — METOLAZONE 2.5 MG/1
TABLET ORAL
Qty: 5 TABLET | Refills: 0 | Status: ON HOLD | OUTPATIENT
Start: 2018-06-14 | End: 2018-07-18

## 2018-06-14 NOTE — TELEPHONE ENCOUNTER
Patient notes worsening pedal edema and notes very little relief after IV diuresis. Patient is taking bumex 2 mg q am and 1 mg qpm.  Patient to take one dose of metolazone 2. 5mg today only. Patient notes significant weeping from BLEs. Order sent for unna boots with outpatient PT.

## 2018-06-19 ENCOUNTER — OFFICE VISIT (OUTPATIENT)
Dept: NEUROSURGERY | Facility: CLINIC | Age: 63
End: 2018-06-19

## 2018-06-19 VITALS — BODY MASS INDEX: 39.65 KG/M2 | WEIGHT: 277 LBS | HEIGHT: 70 IN | TEMPERATURE: 98.4 F

## 2018-06-19 DIAGNOSIS — Z98.890 HISTORY OF KYPHOPLASTY: Primary | ICD-10-CM

## 2018-06-19 PROCEDURE — 99024 POSTOP FOLLOW-UP VISIT: CPT | Performed by: NEUROLOGICAL SURGERY

## 2018-06-19 RX ORDER — TIZANIDINE 2 MG/1
2 TABLET ORAL NIGHTLY PRN
Qty: 30 TABLET | Refills: 0 | Status: CANCELLED | OUTPATIENT
Start: 2018-06-19

## 2018-06-19 RX ORDER — TIZANIDINE 4 MG/1
4 TABLET ORAL NIGHTLY PRN
Qty: 30 TABLET | Refills: 1 | Status: SHIPPED | OUTPATIENT
Start: 2018-06-19 | End: 2018-10-16

## 2018-06-19 RX ORDER — METHOCARBAMOL 750 MG/1
750 TABLET, FILM COATED ORAL 3 TIMES DAILY
Qty: 60 TABLET | Refills: 1 | Status: CANCELLED | OUTPATIENT
Start: 2018-06-19

## 2018-06-19 NOTE — PROGRESS NOTES
Patient: Akshat Winkler  : 1955    Primary Care Provider: Oren Mark MD    Requesting Provider: As above        History    Chief Complaint: Low back pain.    History of Present Illness: History Peterson is a 63-year-old male who was initially seen as an inpatient for low back pain following a fall associated with a syncopal episode.  Patient was found to have a fracture of L4.  He did not follow-up in the office at that time but subsequently returned with increasing pain and was found to have a profound nearly planum fracture of L4.  On 3/5/18 he underwent kyphoplasty.  He returned to the office on 18 after experience a pop in his back with increased low back pain and no radicular symptoms.  He was found on MRI to have a L1 and L2 fractures.  On 18 he underwent L1 and L2 kyphoplasty.  He has little to no pain at this point.  He is using Tylenol for pain.  He does take a muscle relaxant at night and in the morning.    Review of Systems   Constitutional: Negative for activity change, appetite change, chills, diaphoresis, fatigue, fever and unexpected weight change.   HENT: Negative for congestion, dental problem, drooling, ear discharge, ear pain, facial swelling, hearing loss, mouth sores, nosebleeds, postnasal drip, rhinorrhea, sinus pressure, sneezing, sore throat, tinnitus, trouble swallowing and voice change.    Eyes: Negative for photophobia, pain, discharge, redness, itching and visual disturbance.   Respiratory: Negative for apnea, cough, choking, chest tightness, shortness of breath, wheezing and stridor.    Cardiovascular: Negative for chest pain, palpitations and leg swelling.   Gastrointestinal: Negative for abdominal distention, abdominal pain, anal bleeding, blood in stool, constipation, diarrhea, nausea, rectal pain and vomiting.   Endocrine: Negative for cold intolerance, heat intolerance, polydipsia, polyphagia and polyuria.   Genitourinary: Negative for decreased  "urine volume, difficulty urinating, dysuria, enuresis, flank pain, frequency, genital sores, hematuria and urgency.   Musculoskeletal: Positive for back pain. Negative for arthralgias, gait problem, joint swelling, myalgias, neck pain and neck stiffness.   Skin: Negative for color change, pallor, rash and wound.   Allergic/Immunologic: Negative for environmental allergies, food allergies and immunocompromised state.   Neurological: Negative for dizziness, tremors, seizures, syncope, facial asymmetry, speech difficulty, weakness, light-headedness, numbness and headaches.   Hematological: Negative for adenopathy. Does not bruise/bleed easily.   Psychiatric/Behavioral: Negative for agitation, behavioral problems, confusion, decreased concentration, dysphoric mood, hallucinations, self-injury, sleep disturbance and suicidal ideas. The patient is not nervous/anxious and is not hyperactive.    All other systems reviewed and are negative.      The patient's past medical history, past surgical history, family history, and social history have been reviewed at length in the electronic medical record.    Physical Exam:   Temp 98.4 °F (36.9 °C)   Ht 177.8 cm (70\")   Wt 126 kg (277 lb)   BMI 39.75 kg/m²   MUSCULOSKELETAL:  Straight leg raising is negative.  Frederick's Sign is negative.  ROM in back is normal.  Tenderness in the back to palpation is not observed.  NEUROLOGICAL:  Strength is intact in the lower extremities to direct testing.  Muscle tone is normal throughout.  Station and gait are normal.  Sensation is intact to light touch testing throughout.      Medical Decision Making    Diagnosis:   Lumbar osteoporotic compression fractures status post kyphoplasty at multiple levels.    Treatment Options:   Mr. Winkler is doing quite well.  I'm pleased with his progress.  He'll continue his Tylenol as needed.  I'm going to put him on Zanaflex just at night and will hold off on the  daytime muscle relaxant.  He will follow " up on an as-needed basis.       Diagnosis Plan   1. History of kyphoplasty       Scribed for Akshat Davidson MD by Stephanie Matson CMA on 06/19/2018 at 9:05 AM    I, Dr. Davidson, personally performed the services described in the documentation, as scribed in my presence, and it is both accurate and complete.

## 2018-07-12 ENCOUNTER — TELEPHONE (OUTPATIENT)
Dept: NEUROSURGERY | Facility: CLINIC | Age: 63
End: 2018-07-12

## 2018-07-12 RX ORDER — METHOCARBAMOL 750 MG/1
750 TABLET, FILM COATED ORAL 3 TIMES DAILY
Qty: 60 TABLET | Refills: 1 | Status: SHIPPED | OUTPATIENT
Start: 2018-07-12 | End: 2018-10-16

## 2018-07-12 NOTE — TELEPHONE ENCOUNTER
I have refilled the robaxin, but he needs to make sure he is not taking both. Please call and advise the patient. Ask where he is keeping the tizanadine bottle and if he is taking it at all

## 2018-07-12 NOTE — TELEPHONE ENCOUNTER
Provider:  Stuart  Caller: self  Time of call: 1142    Phone #: [phone]   Surgery:  kyphoplasty  Surgery Date: 4/16/18   Last visit: 06/19/18   Next visit:         Reason: Pt requesting RF on Robaxin 750mg BID       -I called pt to verify it was Robaxin not tizanidine- he said no he d/c tizanidine because it was keeping him up.      -after looking through the phone encounter- pt was on Robaxin but stated it kept him up- so we switched to tizanidine on 05/09/18    -Pt stated he was not home to read me the bottle but that  he takes the 750mg tabs, so that would be Robaxin.       -In his OV w/  RX'd tizanidine. I called pharmacy to see and pt has picked up both Zanaflex on 06/21/18, and Robaxin on 06/08.     -please advise how you'd like to proceed.

## 2018-07-12 NOTE — TELEPHONE ENCOUNTER
Called pt to adv him that we have refilled the robaxin, he is aware. I clarified with him again that we had a message in the computer from back in May that the Robaxin was keeping him up at night. He seemed confused and said that he was taking the 'big horse pill' right now.     I advised pt that if he is now taking the Robaxin without issue he can keep taking it but emphasized that he should not take both that and the tizanidine at the same time. He understood, made note of the name/spelling of tizanidine and said that he would make sure his wife made sure she wasn't giving him that in addition to the Robaxin.

## 2018-07-15 ENCOUNTER — APPOINTMENT (OUTPATIENT)
Dept: PREADMISSION TESTING | Facility: HOSPITAL | Age: 63
End: 2018-07-15

## 2018-07-15 LAB
ANION GAP SERPL CALCULATED.3IONS-SCNC: 12 MMOL/L (ref 3–11)
APTT PPP: 34.6 SECONDS (ref 24–31)
BUN BLD-MCNC: 18 MG/DL (ref 9–23)
BUN/CREAT SERPL: 14.3 (ref 7–25)
CA-I SERPL ISE-MCNC: 1.19 MMOL/L (ref 1.12–1.32)
CALCIUM SPEC-SCNC: 9.2 MG/DL (ref 8.7–10.4)
CHLORIDE SERPL-SCNC: 100 MMOL/L (ref 99–109)
CO2 SERPL-SCNC: 27 MMOL/L (ref 20–31)
CREAT BLD-MCNC: 1.26 MG/DL (ref 0.6–1.3)
GFR SERPL CREATININE-BSD FRML MDRD: 58 ML/MIN/1.73
GLUCOSE BLD-MCNC: 115 MG/DL (ref 70–100)
INR PPP: 1.35 (ref 0.91–1.09)
MAGNESIUM SERPL-MCNC: 1.9 MG/DL (ref 1.3–2.7)
POTASSIUM BLD-SCNC: 3.5 MMOL/L (ref 3.5–5.5)
PROTHROMBIN TIME: 14.2 SECONDS (ref 9.6–11.5)
SODIUM BLD-SCNC: 139 MMOL/L (ref 132–146)

## 2018-07-15 PROCEDURE — 82330 ASSAY OF CALCIUM: CPT | Performed by: NURSE PRACTITIONER

## 2018-07-15 PROCEDURE — 85610 PROTHROMBIN TIME: CPT | Performed by: NURSE PRACTITIONER

## 2018-07-15 PROCEDURE — 83735 ASSAY OF MAGNESIUM: CPT | Performed by: NURSE PRACTITIONER

## 2018-07-15 PROCEDURE — 93010 ELECTROCARDIOGRAM REPORT: CPT | Performed by: INTERNAL MEDICINE

## 2018-07-15 PROCEDURE — 80048 BASIC METABOLIC PNL TOTAL CA: CPT | Performed by: NURSE PRACTITIONER

## 2018-07-15 PROCEDURE — 85730 THROMBOPLASTIN TIME PARTIAL: CPT | Performed by: NURSE PRACTITIONER

## 2018-07-15 PROCEDURE — 93005 ELECTROCARDIOGRAM TRACING: CPT | Performed by: NURSE PRACTITIONER

## 2018-07-15 RX ORDER — SENNOSIDES 8.6 MG
650 CAPSULE ORAL EVERY 8 HOURS PRN
Status: ON HOLD | COMMUNITY
End: 2018-07-18

## 2018-07-17 ENCOUNTER — HOSPITAL ENCOUNTER (INPATIENT)
Facility: HOSPITAL | Age: 63
LOS: 3 days | Discharge: HOME | End: 2018-07-20
Attending: INTERNAL MEDICINE | Admitting: INTERNAL MEDICINE

## 2018-07-17 DIAGNOSIS — I48.19 PERSISTENT ATRIAL FIBRILLATION (HCC): Primary | ICD-10-CM

## 2018-07-17 DIAGNOSIS — R60.0 BILATERAL LOWER EXTREMITY EDEMA: ICD-10-CM

## 2018-07-17 DIAGNOSIS — I48.19 PERSISTENT ATRIAL FIBRILLATION (HCC): ICD-10-CM

## 2018-07-17 LAB
ANION GAP SERPL CALCULATED.3IONS-SCNC: 8 MMOL/L (ref 3–11)
BUN BLD-MCNC: 20 MG/DL (ref 9–23)
BUN/CREAT SERPL: 15.3 (ref 7–25)
CALCIUM SPEC-SCNC: 9.7 MG/DL (ref 8.7–10.4)
CHLORIDE SERPL-SCNC: 99 MMOL/L (ref 99–109)
CO2 SERPL-SCNC: 29 MMOL/L (ref 20–31)
CREAT BLD-MCNC: 1.31 MG/DL (ref 0.6–1.3)
GFR SERPL CREATININE-BSD FRML MDRD: 56 ML/MIN/1.73
GLUCOSE BLD-MCNC: 92 MG/DL (ref 70–100)
MAGNESIUM SERPL-MCNC: 2.1 MG/DL (ref 1.3–2.7)
POTASSIUM BLD-SCNC: 4.2 MMOL/L (ref 3.5–5.5)
SODIUM BLD-SCNC: 136 MMOL/L (ref 132–146)

## 2018-07-17 PROCEDURE — 80048 BASIC METABOLIC PNL TOTAL CA: CPT

## 2018-07-17 PROCEDURE — 93005 ELECTROCARDIOGRAM TRACING: CPT | Performed by: INTERNAL MEDICINE

## 2018-07-17 PROCEDURE — 93010 ELECTROCARDIOGRAM REPORT: CPT | Performed by: INTERNAL MEDICINE

## 2018-07-17 PROCEDURE — 93005 ELECTROCARDIOGRAM TRACING: CPT | Performed by: NURSE PRACTITIONER

## 2018-07-17 PROCEDURE — 83735 ASSAY OF MAGNESIUM: CPT

## 2018-07-17 RX ORDER — BUMETANIDE 1 MG/1
2 TABLET ORAL DAILY
Status: DISCONTINUED | OUTPATIENT
Start: 2018-07-18 | End: 2018-07-20 | Stop reason: HOSPADM

## 2018-07-17 RX ORDER — DOFETILIDE 0.5 MG/1
500 CAPSULE ORAL ONCE
Status: COMPLETED | OUTPATIENT
Start: 2018-07-17 | End: 2018-07-17

## 2018-07-17 RX ORDER — ROSUVASTATIN CALCIUM 20 MG/1
20 TABLET, COATED ORAL DAILY
Status: DISCONTINUED | OUTPATIENT
Start: 2018-07-17 | End: 2018-07-20

## 2018-07-17 RX ORDER — GABAPENTIN 100 MG/1
100 CAPSULE ORAL EVERY 12 HOURS SCHEDULED
Status: DISCONTINUED | OUTPATIENT
Start: 2018-07-17 | End: 2018-07-20 | Stop reason: HOSPADM

## 2018-07-17 RX ORDER — ASPIRIN 81 MG/1
81 TABLET ORAL DAILY
Status: DISCONTINUED | OUTPATIENT
Start: 2018-07-17 | End: 2018-07-20 | Stop reason: HOSPADM

## 2018-07-17 RX ORDER — TIZANIDINE 4 MG/1
2 TABLET ORAL NIGHTLY PRN
Status: DISCONTINUED | OUTPATIENT
Start: 2018-07-17 | End: 2018-07-20 | Stop reason: HOSPADM

## 2018-07-17 RX ORDER — ACETAMINOPHEN 325 MG/1
650 TABLET ORAL EVERY 8 HOURS PRN
Status: DISCONTINUED | OUTPATIENT
Start: 2018-07-17 | End: 2018-07-20 | Stop reason: HOSPADM

## 2018-07-17 RX ORDER — POTASSIUM CHLORIDE 750 MG/1
40 CAPSULE, EXTENDED RELEASE ORAL AS NEEDED
Status: DISCONTINUED | OUTPATIENT
Start: 2018-07-17 | End: 2018-07-20 | Stop reason: HOSPADM

## 2018-07-17 RX ORDER — DOFETILIDE 0.5 MG/1
500 CAPSULE ORAL EVERY 12 HOURS SCHEDULED
Status: DISCONTINUED | OUTPATIENT
Start: 2018-07-18 | End: 2018-07-18

## 2018-07-17 RX ORDER — TRAMADOL HYDROCHLORIDE 50 MG/1
50 TABLET ORAL 2 TIMES DAILY
Status: DISCONTINUED | OUTPATIENT
Start: 2018-07-17 | End: 2018-07-20 | Stop reason: HOSPADM

## 2018-07-17 RX ORDER — SPIRONOLACTONE 25 MG/1
25 TABLET ORAL DAILY
Status: DISCONTINUED | OUTPATIENT
Start: 2018-07-17 | End: 2018-07-20 | Stop reason: HOSPADM

## 2018-07-17 RX ORDER — MAGNESIUM SULFATE HEPTAHYDRATE 40 MG/ML
2 INJECTION, SOLUTION INTRAVENOUS AS NEEDED
Status: DISCONTINUED | OUTPATIENT
Start: 2018-07-17 | End: 2018-07-20 | Stop reason: HOSPADM

## 2018-07-17 RX ORDER — POTASSIUM CHLORIDE 1.5 G/1.77G
40 POWDER, FOR SOLUTION ORAL AS NEEDED
Status: DISCONTINUED | OUTPATIENT
Start: 2018-07-17 | End: 2018-07-20 | Stop reason: HOSPADM

## 2018-07-17 RX ORDER — METHOCARBAMOL 750 MG/1
750 TABLET, FILM COATED ORAL 2 TIMES DAILY
Status: DISCONTINUED | OUTPATIENT
Start: 2018-07-17 | End: 2018-07-20 | Stop reason: HOSPADM

## 2018-07-17 RX ORDER — DOFETILIDE 0.5 MG/1
500 CAPSULE ORAL ONCE
Status: COMPLETED | OUTPATIENT
Start: 2018-07-18 | End: 2018-07-18

## 2018-07-17 RX ORDER — MAGNESIUM SULFATE HEPTAHYDRATE 40 MG/ML
4 INJECTION, SOLUTION INTRAVENOUS AS NEEDED
Status: DISCONTINUED | OUTPATIENT
Start: 2018-07-17 | End: 2018-07-20 | Stop reason: HOSPADM

## 2018-07-17 RX ORDER — BUMETANIDE 1 MG/1
2 TABLET ORAL DAILY
Status: DISCONTINUED | OUTPATIENT
Start: 2018-07-17 | End: 2018-07-17

## 2018-07-17 RX ORDER — BUMETANIDE 0.25 MG/ML
1 INJECTION INTRAMUSCULAR; INTRAVENOUS ONCE
Status: DISCONTINUED | OUTPATIENT
Start: 2018-07-17 | End: 2018-07-20 | Stop reason: HOSPADM

## 2018-07-17 RX ADMIN — GABAPENTIN 100 MG: 100 CAPSULE ORAL at 20:38

## 2018-07-17 RX ADMIN — DOFETILIDE 500 MCG: 0.5 CAPSULE ORAL at 13:41

## 2018-07-17 RX ADMIN — TRAMADOL HYDROCHLORIDE 50 MG: 50 TABLET, COATED ORAL at 20:38

## 2018-07-17 RX ADMIN — METHOCARBAMOL 750 MG: 750 TABLET ORAL at 20:38

## 2018-07-17 RX ADMIN — APIXABAN 5 MG: 5 TABLET, FILM COATED ORAL at 20:38

## 2018-07-17 NOTE — PROGRESS NOTES
"Tikosyn Initiation - Pharmacy Evaluation    Name- Akshat Winkler  Age- 63 y.o.  Sex- male  HT - 172.7 cm (67.99\")  Wt - 134 kg (296 lb 8 oz)      Evaluation of Drug-Drug Interactions     Previous antiarrythmic medications D/C 'ed prior to admission      Amiodarone D/C 'ed >3 weeks   Sotalol D/C 'ed > 48hr   Class I antiarrythmic's (Disopyramide,Flecainide, Mexiletine, Propafenone) D/C 'ed >48hr      Proceed - Yes      Drug-Drug Interactions with admission regimen    The Following medications are contraindicated with Dofetilide and will be discontinued:  -none    The following medications are recognized to prolong QT interval, however the medication continue during initial Dofetilide dosing:  -bumetanide    The following medications may increase Dofetilide serum concentrations and can be co-administered:  -none    Laboratory      Results from last 7 days     Lab Units 07/17/18  1142 07/15/18  1408   SODIUM mmol/L 136 139   POTASSIUM mmol/L 4.2 3.5   CHLORIDE mmol/L 99 100   CO2 mmol/L 29.0 27.0   BUN mg/dL 20 18   CREATININE mg/dL 1.31* 1.26   GLUCOSE mg/dL 92 115*   CALCIUM mg/dL 9.7 9.2       Results from last 7 days     Lab Units 07/17/18  1142   MAGNESIUM mg/dL 2.1       Electrolyte replacement ordered:   Mg <2.0 - Yes     K <4.0  - Yes    Est CrCl =  112 ml/min  (calculated with Cockroft-Gault equation using actual body weight and serum creatinine for calculation)  (laboratory values from previous 24 hours can be used)      Initial Dose    QTc = 429    QTc </= 440 msec -  Yes   Proceed - Yes    Initial Dose:    Yes - CrCl >60      Dofetilide 500mcg po q12h  (dosed at 10 hours intervals until administration times between 7-9)    Thank you,  Sly Hodges [facility]  7/17/2018  12:22 PM   "

## 2018-07-17 NOTE — H&P
Cardiology H&P     Akshat Winkler  1955    [phone] (work)      07/17/18    DATE OF ADMISSION: 7/17/2018  [address]    Oren Mark MD  [address] [facility]  [address] / [facility] [address]    Chief Complaint: Atrial fibrillation    Problem List:  1. Persistent atrial fibrillation/flutter               A. Diagnosed 2011               B. [id number] 3 (HTN, CAD, DM)               C. Echo (10/11/2011): Normal LVEF, mild MR, mild LA dilation               D. LOLY/ECVcardioversion, 12/21/2011, with initiation of propafenone therapy.               E. Successful LOLY/ECV for recurrent atrial fibrillation, 11/15/2013               F. PVA with Dr. Cary, 6/29/2015               G. Cardioversion (07/17/2015) for atrial flutter occurring post ablation               H. ECV for recurrent atrial flutter, 12/20/17 with reattempt at beta blocker/flecainide.               I. Intolerant to beta blocker/flecanide with severe hypotension, intolerant to propafenone   2. Chronic diastolic heart failure               A. Cardiac cath (02/20/14):  elevated LVEDP suggesting diastolic heart failure.               B. Intolerant to beta blocker therapy               C. Echocardiogram 2/28/18: EF 60%, no significant valvular disease  3. Coronary artery disease involving native coronary artery of native heart without angina pectoris               A. Cardiac PET (01/2014): partially reversible anteroseptal defect, normal LVEF.               B. Cardiac cath (02/20/14): mild nonobstructive CAD, LVEF 55%, elevated LVEDP suggesting diastolic heart failure.   4. Type 2 diabetes mellitus without complication, without long-term current use of insulin   5. Hyperlipidemia LDL goal <70   6. Essential hypertension   7. Severe obstructive sleep apnea   8. Class 3 obesity in adult   9. Compression fracture of lumbar vertebra   10. Spondylosis of 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 or radiculopathy   11. Lumbar  stenosis with neurogenic claudication   12. Lumbar disc herniation   13. Myofascial pain   14. Pathologic compression fracture of vertebra      History of Present Illness:   Patient is a 63-year-old  male with the above-noted past medical history who presents today to initiate Tikosyn for recurrent, symptomatic atrial fibrillation.  He has a long-standing history of atrial fibrillation since 2011, underwent PVA with Dr. Cary in 2015 and most recently underwent external cardioversion in December.  He was trialed on flecainide after his cardioversion but was intolerant to this due to severe hypotension, syncope, with a subsequent back injury for which she underwent back surgery in March.  Symptoms with atrial fibrillation include fatigue, SOB, exacerabated by activity, relieved by rest.  He has been anticoagulated with Eliquis with no missed doses, bleeding issues, or TIA/CVA symptoms.  He does continue to have extreme lower extremity swelling for which [facility].  He has had limited improvement with IV diuresis at his office visits.  He was referred for outpatient PT in June for Unna boots but has not had these put on.      Allergies   Allergen Reactions   • Bisoprolol Other (See Comments)     Severe Hypotension   • Flecainide Other (See Comments)     Syncope / collapse   • Metoprolol Other (See Comments)      Bradycardia, Severe Hypotension       Prior to Admission Medications     Prescriptions Last Dose Informant Patient Reported? Taking?    acetaminophen (TYLENOL) 500 MG tablet  Medication Bottle Yes No    Take 500 mg by mouth As Needed for Mild Pain .    acetaminophen (TYLENOL) 650 MG 8 hr tablet 7/17/2018 Medication Bottle Yes Yes    Take 650 mg by mouth Every 8 (Eight) Hours As Needed for Mild Pain .    apixaban (ELIQUIS) 5 MG tablet tablet 7/17/2018 Medication Bottle No Yes    Take 1 tablet by mouth Every 12 (Twelve) Hours.    aspirin 81 MG EC tablet 7/17/2018  Medication Bottle Yes Yes    Take 81 mg by mouth Daily.    bumetanide (BUMEX) 2 MG tablet 7/16/2018 Medication Bottle Yes Yes    Take 2 mg by mouth Daily. Take an additional 1mg daily as needed    bumetanide (BUMEX) injection 1 mg   No No    gabapentin (NEURONTIN) 100 MG capsule 7/17/2018 Medication Bottle No Yes    Take 1 capsule by mouth 3 (Three) Times a Day.    Patient taking differently:  Take 100 mg by mouth 2 (Two) Times a Day.    HYDROcodone-acetaminophen (NORCO) 5-325 MG per tablet More than a month  No No    Take 1 tablet by mouth 3 (Three) Times a Day As Needed for Moderate Pain .    methocarbamol (ROBAXIN) 750 MG tablet 7/17/2018 Medication Bottle No Yes    Take 1 tablet by mouth 3 (Three) Times a Day.    Patient taking differently:  Take 750 mg by mouth 2 (Two) Times a Day.    metOLazone (ZAROXOLYN) 2.5 MG tablet   No No    Take one tablet by mouth daily or as directed    oxyCODONE-acetaminophen (PERCOCET) 5-325 MG per tablet   No No    Take 0.5 tablets by mouth 3 (Three) Times a Day As Needed for Severe Pain .    potassium chloride (K-DUR) 10 MEQ CR tablet 7/16/2018 Medication Bottle No Yes    Take 1 tablet by mouth Daily.    rosuvastatin (CRESTOR) 20 MG tablet 7/16/2018 Medication Bottle No Yes    Take 1 tablet by mouth Daily for 360 days.    spironolactone (ALDACTONE) 25 MG tablet 7/17/2018 Medication Bottle No Yes    Take 1 tablet by mouth Daily.    tiZANidine (ZANAFLEX) 4 MG tablet 7/16/2018 Medication Bottle No Yes    Take 1 tablet by mouth At Night As Needed for Muscle Spasms.    Patient taking differently:  Take 2 mg by mouth At Night As Needed for Muscle Spasms.    traMADol (ULTRAM) 50 MG tablet 7/16/2018 Medication Bottle No Yes    Take 1 tablet by mouth 2 (Two) Times a Day.            Current Facility-Administered Medications:   •  acetaminophen (TYLENOL) tablet 650 mg, 650 mg, Oral, Q8H PRN, FAITH Gregorio  •  apixaban (ELIQUIS) tablet 5 mg, 5 mg, Oral, Q12H, Maty Hollis  [facility]  •  aspirin EC tablet 81 mg, 81 mg, Oral, Daily, FAITH Gregorio  •  bumetanide (BUMEX) tablet 2 mg, 2 mg, Oral, Daily, FAITH Gregorio  •  [START ON 7/18/2018] dofetilide (TIKOSYN) capsule 500 mcg, 500 mcg, Oral, Once, FAITH Gregorio  •  [START ON 7/18/2018] dofetilide (TIKOSYN) capsule 500 mcg, 500 mcg, Oral, Once, FAITH Gregorio  •  [START ON 7/18/2018] dofetilide (TIKOSYN) capsule 500 mcg, 500 mcg, Oral, Q12H, FAITH Gregorio  •  gabapentin (NEURONTIN) capsule 100 mg, 100 mg, Oral, Q12H, Joaquin Jeffery MD  •  Magnesium Sulfate 2 gram Bolus, followed by 8 gram infusion (total Mg dose 10 grams)- Mg less than or equal to 1mg/dL, 2 g, Intravenous, PRN **OR** Magnesium Sulfate 2 gram / 50mL Infusion (GIVE X 3 BAGS TO EQUAL 6GM TOTAL DOSE) - Mg 1.1 - 1/5 mg/dl, 2 g, Intravenous, PRN **OR** Magnesium Sulfate 4 gram infusion- Mg 1.6-1.9 mg/dL, 4 g, Intravenous, PRN, Sly Hodges [facility]  •  methocarbamol (ROBAXIN) tablet 750 mg, 750 mg, Oral, BID, FAITH Gregorio  •  Pharmacy To Dose Tikosyn, , Does not apply, Once PRN, FAITH Gregorio  •  potassium chloride (KLOR-CON) packet 40 mEq, 40 mEq, Oral, PRN, Sly Hodges [facility]  •  potassium chloride (MICRO-K) CR capsule 40 mEq, 40 mEq, Oral, PRN, Sly Hodges, [facility]  •  rosuvastatin (CRESTOR) tablet 20 mg, 20 mg, Oral, Daily, FAITH Gregorio  •  spironolactone (ALDACTONE) tablet 25 mg, 25 mg, Oral, Daily, FAITH Gregorio  •  tiZANidine (ZANAFLEX) tablet 2 mg, 2 mg, Oral, Nightly PRN, FAITH Gregorio  •  traMADol (ULTRAM) tablet 50 mg, 50 mg, Oral, BID, Joaquin Jeffery MD    Social History     Social History   • Marital status:      Social History Main Topics   • Smoking status: Never Smoker   • Smokeless tobacco: Never Used   • Alcohol use No   • Drug use: No   • Sexual activity: Defer  "    Other Topics Concern   • Not on file     Social History Narrative    Patient lives at home with his wife and denies caffeine intake.       Family History   Problem Relation Age of Onset   • Hypertension Mother    • Stroke Mother    • Stroke Father    • Sleep disorder Father    • Cancer Brother        REVIEW OF SYSTEMS:   CONST:  + fatigue, no weight loss, fever, chills  HEENT:  No visual loss, blurred vision, double vision, yellow sclerae.                   No hearing loss, congestion, sore throat.   SKIN:      No rashes, urticaria, ulcers, sores.     RESP:     + BILLS, no hemoptysis, cough, sputum.   GI:           No anorexia, nausea, vomiting, diarrhea. No abdominal pain, melena.   :         No burning on urination, hematuria or increased frequency.  ENDO:    No diaphoresis, cold or heat intolerance. No polyuria or polydipsia.   NEURO:  No headache, dizziness, syncope, paralysis, ataxia, or parasthesias.                  No change in bowel or bladder control. No history of CVA/TIA  MUSC:    No muscle, back pain, joint pain or stiffness.   HEME:    No anemia, bleeding, bruising. No history of DVT/PE.  PSYCH:  No history of depression, anxiety    Vitals:    07/17/18 0900 07/17/18 0902 07/17/18 1106   BP: 134/86 132/95 132/78   BP Location: Right arm Left arm    Patient Position: Sitting Sitting    Pulse: 77 75 67   Resp: 18  18   Temp: 98.2 °F (36.8 °C)  98.1 °F (36.7 °C)   TempSrc: Oral  Oral   SpO2:   95%   Weight: 134 kg (296 lb 8 oz)  134 kg (296 lb 8 oz)   Height: 172.7 cm (68\")  172.7 cm (67.99\")         Vital Sign Min/Max for last 24 hours  Temp  Min: 98.1 °F (36.7 °C)  Max: 98.2 °F (36.8 °C)   BP  Min: 132/78  Max: 134/86   Pulse  Min: 67  Max: 77   Resp  Min: 18  Max: 18   SpO2  Min: 95 %  Max: 95 %   No Data Recorded    No intake or output data in the 24 hours ending 07/17/18 1404          Physical Exam:  GEN: Obese gentleman, no acute distress  HEENT: Normocephalic, atraumatic, PERRLA, moist mucous " membranes  NECK: Supple, No JVD, no thyromegaly, no lymphadenopathy  CARD: Irregularly irregular, S1S2, no murmur, gallop, or rub  LUNGS: Clear to auscultation, normal respiratory effort  ABDOMEN: Obese, soft, nontender, normal bowel sounds  EXTREMITIES: No gross deformities, no clubbing, cyanosis, 3-4+ LE edema, venous stasis changes noted  SKIN: Warm, dry  NEURO: No focal deficits, alert and oriented x 3  PSYCHIATRIC: Normal affect and mood      Data:         Results from last 7 days  Lab Units 07/17/18  1142 07/15/18  1408   SODIUM mmol/L 136 139   POTASSIUM mmol/L 4.2 3.5   CHLORIDE mmol/L 99 100   CO2 mmol/L 29.0 27.0   BUN mg/dL 20 18   CREATININE mg/dL 1.31* 1.26   GLUCOSE mg/dL 92 115*      Magnesium: 2.1                Results from last 7 days  Lab Units 07/15/18  1408   PROTIME Seconds 14.2*   INR  1.35*   APTT seconds 34.6*                 No intake or output data in the 24 hours ending 07/17/18 1404    Chest X-Ray:  Imaging Results (last 24 hours)     ** No results found for the last 24 hours. **          Telemetry: Atrial fibrillation, HR 67-77 bpm  EKG: NSR HR 69 bpm, QTc: 429 ms    Assessment and Plan:   1. Persistent atrial fibrillation: in NSR  - Patient presents to initiate Tikosyn.  CrCl 116.  QTc acceptable.  Will initiate Tikosyn 500 mcg BID.   - Repeat EKG this afternoon  - Continue close monitoring of QTc and electrolytes.   - Continue Eliquis    2. Chronic diastolic CHF:  - Normal LVEF per echo 2/2018  - Continue PO Bumex, give one time dose bumex 1 mg IVP  - Consult PT for Unna boots for significant LE swelling  - Strict I/O's, daily weights    3. HTN:  - Well controlled, continue to monitor and adjust meds as needed      [facility]  7/17/2018  2:04 PM        IJoaquin MD, personally performed the services face to face as described and documented by the above named individual. I have made any necessary edits and it is both accurate  and complete 7/17/2018  5:14 PM

## 2018-07-18 ENCOUNTER — APPOINTMENT (OUTPATIENT)
Dept: CARDIOLOGY | Facility: HOSPITAL | Age: 63
End: 2018-07-18

## 2018-07-18 PROBLEM — I51.89 DIASTOLIC DYSFUNCTION: Status: ACTIVE | Noted: 2018-07-18

## 2018-07-18 PROBLEM — R00.0 WIDE-COMPLEX TACHYCARDIA: Status: ACTIVE | Noted: 2018-07-18

## 2018-07-18 PROBLEM — R60.0 BILATERAL LOWER EXTREMITY EDEMA: Status: ACTIVE | Noted: 2018-07-18

## 2018-07-18 LAB
ANION GAP SERPL CALCULATED.3IONS-SCNC: 13 MMOL/L (ref 3–11)
BUN BLD-MCNC: 21 MG/DL (ref 9–23)
BUN/CREAT SERPL: 16.9 (ref 7–25)
CALCIUM SPEC-SCNC: 9.6 MG/DL (ref 8.7–10.4)
CHLORIDE SERPL-SCNC: 98 MMOL/L (ref 99–109)
CO2 SERPL-SCNC: 27 MMOL/L (ref 20–31)
CREAT BLD-MCNC: 1.24 MG/DL (ref 0.6–1.3)
GFR SERPL CREATININE-BSD FRML MDRD: 59 ML/MIN/1.73
GLUCOSE BLD-MCNC: 88 MG/DL (ref 70–100)
MAGNESIUM SERPL-MCNC: 2 MG/DL (ref 1.3–2.7)
POTASSIUM BLD-SCNC: 3.7 MMOL/L (ref 3.5–5.5)
SODIUM BLD-SCNC: 138 MMOL/L (ref 132–146)

## 2018-07-18 PROCEDURE — 93010 ELECTROCARDIOGRAM REPORT: CPT | Performed by: INTERNAL MEDICINE

## 2018-07-18 PROCEDURE — 80048 BASIC METABOLIC PNL TOTAL CA: CPT

## 2018-07-18 PROCEDURE — 83735 ASSAY OF MAGNESIUM: CPT

## 2018-07-18 PROCEDURE — 93970 EXTREMITY STUDY: CPT | Performed by: INTERNAL MEDICINE

## 2018-07-18 PROCEDURE — 99232 SBSQ HOSP IP/OBS MODERATE 35: CPT | Performed by: INTERNAL MEDICINE

## 2018-07-18 PROCEDURE — 25010000002 MAGNESIUM SULFATE 2 GM/50ML SOLUTION: Performed by: INTERNAL MEDICINE

## 2018-07-18 PROCEDURE — 93005 ELECTROCARDIOGRAM TRACING: CPT | Performed by: NURSE PRACTITIONER

## 2018-07-18 PROCEDURE — 93970 EXTREMITY STUDY: CPT

## 2018-07-18 PROCEDURE — 93005 ELECTROCARDIOGRAM TRACING: CPT | Performed by: INTERNAL MEDICINE

## 2018-07-18 PROCEDURE — 99233 SBSQ HOSP IP/OBS HIGH 50: CPT | Performed by: INTERNAL MEDICINE

## 2018-07-18 RX ORDER — CALCIUM CARBONATE 200(500)MG
2 TABLET,CHEWABLE ORAL 3 TIMES DAILY PRN
Status: DISCONTINUED | OUTPATIENT
Start: 2018-07-18 | End: 2018-07-20 | Stop reason: HOSPADM

## 2018-07-18 RX ORDER — BUMETANIDE 2 MG/1
2 TABLET ORAL DAILY
COMMUNITY
End: 2018-10-30 | Stop reason: SDDI

## 2018-07-18 RX ORDER — DOFETILIDE 0.25 MG/1
250 CAPSULE ORAL EVERY 12 HOURS SCHEDULED
Status: DISCONTINUED | OUTPATIENT
Start: 2018-07-18 | End: 2018-07-18

## 2018-07-18 RX ORDER — DOFETILIDE 0.12 MG/1
125 CAPSULE ORAL EVERY 12 HOURS SCHEDULED
Status: DISCONTINUED | OUTPATIENT
Start: 2018-07-18 | End: 2018-07-20

## 2018-07-18 RX ORDER — METOPROLOL TARTRATE 5 MG/5ML
5 INJECTION INTRAVENOUS ONCE
Status: COMPLETED | OUTPATIENT
Start: 2018-07-18 | End: 2018-07-18

## 2018-07-18 RX ORDER — MAGNESIUM SULFATE HEPTAHYDRATE 40 MG/ML
2 INJECTION, SOLUTION INTRAVENOUS ONCE
Status: COMPLETED | OUTPATIENT
Start: 2018-07-18 | End: 2018-07-18

## 2018-07-18 RX ADMIN — CALCIUM CARBONATE 2 TABLET: 500 TABLET ORAL at 09:25

## 2018-07-18 RX ADMIN — Medication 5 MG: at 02:07

## 2018-07-18 RX ADMIN — DOFETILIDE 500 MCG: 0.5 CAPSULE ORAL at 00:04

## 2018-07-18 RX ADMIN — APIXABAN 5 MG: 5 TABLET, FILM COATED ORAL at 09:20

## 2018-07-18 RX ADMIN — APIXABAN 5 MG: 5 TABLET, FILM COATED ORAL at 20:08

## 2018-07-18 RX ADMIN — METOPROLOL TARTRATE 5 MG: 5 INJECTION INTRAVENOUS at 14:51

## 2018-07-18 RX ADMIN — ROSUVASTATIN CALCIUM 20 MG: 20 TABLET, FILM COATED ORAL at 20:10

## 2018-07-18 RX ADMIN — BUMETANIDE 2 MG: 1 TABLET ORAL at 09:20

## 2018-07-18 RX ADMIN — DOFETILIDE 500 MCG: 0.5 CAPSULE ORAL at 09:25

## 2018-07-18 RX ADMIN — MAGNESIUM SULFATE IN WATER 4 G: 40 INJECTION, SOLUTION INTRAVENOUS at 12:28

## 2018-07-18 RX ADMIN — GABAPENTIN 100 MG: 100 CAPSULE ORAL at 20:09

## 2018-07-18 RX ADMIN — LIDOCAINE HYDROCHLORIDE 125 MG: 20 INJECTION, SOLUTION INTRAVENOUS at 12:13

## 2018-07-18 RX ADMIN — GABAPENTIN 100 MG: 100 CAPSULE ORAL at 09:20

## 2018-07-18 RX ADMIN — METHOCARBAMOL 750 MG: 750 TABLET ORAL at 20:08

## 2018-07-18 RX ADMIN — SPIRONOLACTONE 25 MG: 25 TABLET, FILM COATED ORAL at 09:20

## 2018-07-18 RX ADMIN — DOFETILIDE 125 MCG: 0.12 CAPSULE ORAL at 20:09

## 2018-07-18 RX ADMIN — METHOCARBAMOL 750 MG: 750 TABLET ORAL at 09:20

## 2018-07-18 RX ADMIN — ASPIRIN 81 MG: 81 TABLET, COATED ORAL at 09:20

## 2018-07-18 RX ADMIN — TRAMADOL HYDROCHLORIDE 50 MG: 50 TABLET, COATED ORAL at 20:09

## 2018-07-18 RX ADMIN — CALCIUM CARBONATE 2 TABLET: 500 TABLET ORAL at 02:07

## 2018-07-18 RX ADMIN — MAGNESIUM SULFATE HEPTAHYDRATE 2 G: 40 INJECTION, SOLUTION INTRAVENOUS at 17:41

## 2018-07-18 NOTE — NURSING NOTE
"[facility]  In-Patient Heart and Valve APRN Progress Note   LOS: 1 day   Patient Care Team:  Oren Mark MD as PCP - General (Family Medicine)  Lucian Alvarez IV, MD as Consulting Physician (Cardiology)  Mandy Clark PA-C as Physician Assistant (Physician Assistant)  Josh Moss MD as Consulting Physician (Pain Medicine)  FAITH Nix as Nurse Practitioner (Cardiology)  Akshat Davidson MD as Surgeon (Neurosurgery)    Chief Complaint:  Atrial fib/flutter   Tikosyn admit      Patient Active Problem List   Diagnosis   • Acute on chronic diastolic heart failure (CMS/HCC)   • Type 2 diabetes mellitus without complication, without long-term current use of insulin (CMS/HCC)   • Hyperlipidemia LDL goal <70   • Essential hypertension   • Coronary artery disease involving native coronary artery of native heart without angina pectoris   • Severe obstructive sleep apnea   • Class 3 obesity in adult   • Persistent atrial fibrillation/flutter   • Compression fracture of lumbar vertebra (CMS/HCC)   • Spondylosis of 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 or radiculopathy   • Lumbar stenosis with neurogenic claudication   • Lumbar disc herniation   • Myofascial pain   • Acute renal failure (CMS/HCC)   • Hypokalemia   • Pathologic compression fracture of vertebra (CMS/HCC)         Tele: SR now but recent episode of VT    Vitals:  Blood pressure 121/76, pulse 80, temperature 97.8 °F (36.6 °C), temperature source Oral, resp. rate 20, height 172.7 cm (67.99\"), weight 134 kg (296 lb 6.4 oz), SpO2 95 %.     Intake/Output Summary (Last 24 hours) at 07/18/18 1420  Last data filed at 07/18/18 1347   Gross per 24 hour   Intake              360 ml   Output             1100 ml   Net             -740 ml       Physical Exam:      General: alert, no acute distress, acyanotic, well developed, well nourished   Chest: Clear Auscultation   CV: Heart regular rate and rhythm   Abdomen: Soft, " non-tender, normal bowel sounds; no bruits, organomegaly or masses.   Extremities:3-4 + LE edema with venous stasis changes noted    Results Review:     I reviewed the patient's new clinical results.    ·     ·   Results from last 7 days  Lab Units 07/18/18  0525   SODIUM mmol/L 138   POTASSIUM mmol/L 3.7   CHLORIDE mmol/L 98*   CO2 mmol/L 27.0   BUN mg/dL 21   CREATININE mg/dL 1.24   CALCIUM mg/dL 9.6   GLUCOSE mg/dL 88   ·   ·   Results from last 7 days  Lab Units 07/15/18  1408   INR  1.35*   ·   ·     ·     ·       Scheduled Meds:  apixaban 5 mg Oral Q12H   aspirin 81 mg Oral Daily   bumetanide 1 mg Intravenous Once   bumetanide 2 mg Oral Daily   dofetilide 250 mcg Oral Q12H   gabapentin 100 mg Oral Q12H   methocarbamol 750 mg Oral BID   metoprolol tartrate 5 mg Intravenous Once   rosuvastatin 20 mg Oral Daily   spironolactone 25 mg Oral Daily   traMADol 50 mg Oral BID       Continuous Infusions:  IV magnesium    ASSESSMENT/PLAN:  1. Persistent atrial fibrillation (CMS/HCC)    here for Tikosyn admit  2. VT-  transferrred to ICU ,   IV magnesium  3. Chronic diastolic heart failure  Adele cash per In patient PT    FU in one week  Trina Joy, APRN - 7/18/2018, 2:20 PM

## 2018-07-18 NOTE — PLAN OF CARE
Problem: Patient Care Overview  Goal: Plan of Care Review  Outcome: Ongoing (interventions implemented as appropriate)    Goal: Individualization and Mutuality  Outcome: Ongoing (interventions implemented as appropriate)    Goal: Discharge Needs Assessment  Outcome: Ongoing (interventions implemented as appropriate)    Goal: Interprofessional Rounds/Family Conf  Outcome: Ongoing (interventions implemented as appropriate)      Problem: Arrhythmia/Dysrhythmia (Symptomatic) (Adult)  Goal: Signs and Symptoms of Listed Potential Problems Will be Absent, Minimized or Managed (Arrhythmia/Dysrhythmia)  Outcome: Ongoing (interventions implemented as appropriate)      Problem: Fall Risk (Adult)  Goal: Identify Related Risk Factors and Signs and Symptoms  Outcome: Ongoing (interventions implemented as appropriate)    Goal: Absence of Fall  Outcome: Ongoing (interventions implemented as appropriate)

## 2018-07-18 NOTE — PROGRESS NOTES
Continued Stay Note  [facility]     Patient Name: Akshat Winkler  MRN: [id number]  Today's Date: 7/18/2018    Admit Date: 7/17/2018          Discharge Plan     Row Name 07/18/18 1401       Plan    Plan update    Patient/Family in Agreement with Plan yes    Plan Comments Patient moved to ICU.  [facility]       Row Name 07/18/18 4347       Plan    Plan Home    Patient/Family in Agreement with Plan yes    Plan Comments Spoke with patient and wife at bedside regarding discharge planning.  Patient denies use of HH and has a Rolling Walker that he uses PRN, Straight Cane used PRN, Grab Bar in the shower and a Raised Toilet Seat.  Patient reports that he has prescription coverage.  Patient lives with his wife in a mulilevel house with 2 steps to access but indicates that he often sleeps in the recliner and that there is a bedroom on the first level and could stay on one level if needed.  No home safety concerns noted.  Per CM consult CM following.  Patient plan is to discharge home via car with family to transport when medically ready.     Final Discharge Disposition Code 01 - home or self-care              Discharge Codes    No documentation.       Expected Discharge Date and Time     Expected Discharge Date Expected Discharge Time    Jul 20, 2018             Veronica Abraham RN

## 2018-07-18 NOTE — PROGRESS NOTES
Intensive Care Follow-up      LOS: 1 day     Mr. Akshat Winkler, 63 y.o. male is followed for: Persistent atrial fibrillation (CMS/HCC)     Subjective - Interval History     Mr. Winkler is a 63 year old obese white male lifelong non-smoker with PMH significant for mild CAD, chronic renal failure, hypertension, chronic diastolic heart failure(LVEDP 34 mmHg on cardiac catheterization 2/20/14), chronic severe lower extremity edema, HLD, JOSESITO on home CPAP, and ? DM who was admitted to [facility] on 7/17/18 per Dr. Jeffery for initiation of Tikosyn. He has had A-Fib for many years and underwent PVA in 2015 as well as ECV in December, 2017. He took Flecainide after cardioversion but it cause severe hypotension and syncope. He has continued to have symptoms associated with A-Fib including shortness of breath and fatigue. He is chronically on Eliquis for anticoagulation. He was started on Tikosyn 7/17/18 and initially did very well. This afternoon he developed what was felt to be slow VT and was transferred to the ICU for close monitoring. Was given lidocaine prior to transfer with resolution of rhythm only to have it recur. Has received magnesium replacement.    Patient has chronic, severe lower extremity lymphedema up to the groin and states he has had issues with the edema for many years. He had a heart cath back in 2014. EF at that time was 55% and he was found to have severely elevated left ventricular filling pressures with LVEDP 34. He was initiated on diuretic therapy but has not noted significant improvement. Legs and thighs continue to be massively edematous with peau d'orange and hyperpigmentation. His most recent echo was in February 2018 and LVEF was 60%. Due to body habitus, valves were not well visualized and estimated RVSP was 45 to 55 liters of mercury. He was referred for Unna boots in June but it appears he did not follow up to have this done or it was canceled. I actually see no open wounds.     He has  chronic back pain and underwent kyphoplasty of L4 on 3/5/18. On 4/16/18 he underwent kyphoplasty of L1-L2. Neither of these surgeries have relieved his pain although it is more tolerable.       The patient's relevant past medical, surgical and social history were reviewed and updated in Epic as appropriate.     Objective     Infusions:     Medications:    apixaban 5 mg Oral Q12H   aspirin 81 mg Oral Daily   bumetanide 1 mg Intravenous Once   bumetanide 2 mg Oral Daily   dofetilide 125 mcg Oral Q12H   gabapentin 100 mg Oral Q12H   methocarbamol 750 mg Oral BID   rosuvastatin 20 mg Oral Daily   spironolactone 25 mg Oral Daily   traMADol 50 mg Oral BID     Intake/Output       07/17/18 0700 - 07/18/18 0659 07/18/18 0700 - 07/19/18 0659    Intake (ml) 480 120    Output (ml) 200 1800    Net (ml) 280 -1680    Last Weight  134 kg (296 lb 6.4 oz)  --        Vital Sign Min/Max for last 24 hours  Temp  Min: 97.5 °F (36.4 °C)  Max: 99.1 °F (37.3 °C)   BP  Min: 101/70  Max: 146/83   Pulse  Min: 54  Max: 146   Resp  Min: 16  Max: 20   SpO2  Min: 93 %  Max: 96 %   Flow (L/min)  Min: 2  Max: 2        Physical Exam:   GENERAL: Obese white male in no acute distress   HEENT: Atraumatic, normocephalic, no JVD   LUNGS: Clear and equal bilaterally   HEART: RRR, no murmurs gallops, clicks or rubs   ABDOMEN: Obese, soft, non-tender, active bowel sounds   EXTREMITIES: Marked edema with long term venous stasis changes with hyperpigmentation and peau d'orange. He is edematous up to his thighs. Pulses are palpable   NEURO/PSYCH: Alert and oriented with no focal neuro deficits          Results from last 7 days  Lab Units 07/18/18  0525 07/17/18  1142 07/15/18  1408   SODIUM mmol/L 138 136 139   POTASSIUM mmol/L 3.7 4.2 3.5   CO2 mmol/L 27.0 29.0 27.0   BUN mg/dL 21 20 18   CREATININE mg/dL 1.24 1.31* 1.26   MAGNESIUM mg/dL 2.0 2.1 1.9   GLUCOSE mg/dL 88 92 115*     Estimated Creatinine Clearance: 83.7 mL/min (by C-G formula based on SCr of 1.24  mg/dL).          Chest x-ray: None obtained    Impression      Hospital Problem List     * (Principal)Persistent atrial fibrillation/flutter admitted for initiation of Tikosyn     · Diagnosed 2011  · [id number] 3 (HTN, CAD, DM)  · Echo (10/11/2011): Normal LVEF, mild MR, mild LA dilation  · LOLY/ECVcardioversion, 12/21/2011, with initiation of propafenone therapy.   · Successful LOLY/ECV for recurrent atrial fibrillation, 11/15/2013  · PVA with Dr. Cary, 6/29/2015  · Cardioversion (07/17/2015) for atrial flutter occurring post ablation   · ECV for recurrent atrial flutter, 12/20/17 with reattempt at beta blocker/flecainide.  · Intolerant to beta blocker/flecanide with severe hypotension, intolerant to propafenone         Chronic anticoagulation with Eliquis     Wide-complex tachycardia (CMS/HCC)    Hypertension    Severe Diastolic dysfunction    Hyperlipidemia LDL goal <70     · High intensity statin therapy indicated given presence of mild CAD         Severe obstructive sleep apnea. On home CPAP    Chronic bilateral severe lower extremity edema    Class 3 obesity in adult           Discussion: Unclear if this was a slow V. tach or a supraventricular rhythm with aberrancy     Plan        1.  Monitor in ICU  2.  Anti-arrhythmics per Dr. Jeffery  3.  Bilateral LE duplex   Plan of care and goals reviewed with mulitdisciplinary team at daily rounds   I discussed the patient's findings and my recommendations with patient and nursing staff       FAITH Gutierrez, [facility]  Pulmonary & Critical Care    Lex Lehman MD  Pulmonary and Critical Care Medicine  07/18/18 7:07 PM

## 2018-07-18 NOTE — PROGRESS NOTES
"[facility]  Progress Note     LOS: 1 day   Patient Care Team:  Oren Mark MD as PCP - General (Family Medicine)  Lucian Alvarez IV, MD as Consulting Physician (Cardiology)  Mandy Clark PA-C as Physician Assistant (Physician Assistant)  Josh Moss MD as Consulting Physician (Pain Medicine)  FAITH Nix as Nurse Practitioner (Cardiology)  Akshat Davidson MD as Surgeon (Neurosurgery)    Chief Complaint:  Atrial Fibrillation    Subjective    Admitted for Tikosyn, yesterday. Complains that he has a sensation that his last meal is \"stuck in his esophagus\", not necessarily heartburn. Rolaids helped. No CP or SOB. Has not had Unna boots yet.  No new complaints      Interval History:         Review of Systems:   Pertinent positives in HPI, all others reviewed and negative.      Objective       Current Facility-Administered Medications:   •  acetaminophen (TYLENOL) tablet 650 mg, 650 mg, Oral, Q8H PRN, FAITH Gregorio  •  apixaban (ELIQUIS) tablet 5 mg, 5 mg, Oral, Q12H, FAITH Gregorio, 5 mg at 07/17/18 2038  •  aspirin EC tablet 81 mg, 81 mg, Oral, Daily, FAITH Gregorio  •  bumetanide (BUMEX) injection 1 mg, 1 mg, Intravenous, Once, FAITH Gregorio  •  bumetanide (BUMEX) tablet 2 mg, 2 mg, Oral, Daily, FAITH Gregorio  •  calcium carbonate (TUMS) chewable tablet 500 mg (200 mg elemental), 2 tablet, Oral, TID PRN, Lex Teran III, MD, 2 tablet at 07/18/18 0207  •  dofetilide (TIKOSYN) capsule 500 mcg, 500 mcg, Oral, Once, FAITH Gregorio  •  dofetilide (TIKOSYN) capsule 500 mcg, 500 mcg, Oral, Q12H, FAITH Gregorio  •  gabapentin (NEURONTIN) capsule 100 mg, 100 mg, Oral, Q12H, Joaquin Jeffery MD, 100 mg at 07/17/18 2038  •  Magnesium Sulfate 2 gram Bolus, followed by 8 gram infusion (total Mg dose 10 grams)- Mg less than or " "equal to 1mg/dL, 2 g, Intravenous, PRN **OR** Magnesium Sulfate 2 gram / 50mL Infusion (GIVE X 3 BAGS TO EQUAL 6GM TOTAL DOSE) - Mg 1.1 - 1/5 mg/dl, 2 g, Intravenous, PRN **OR** Magnesium Sulfate 4 gram infusion- Mg 1.6-1.9 mg/dL, 4 g, Intravenous, PRN, Sly Hodges [facility]  •  melatonin sublingual tablet 5 mg, 5 mg, Sublingual, Nightly PRN, Lex Teran III, MD, 5 mg at 07/18/18 0207  •  methocarbamol (ROBAXIN) tablet 750 mg, 750 mg, Oral, BID, FAITH Gregorio, 750 mg at 07/17/18 2038  •  Pharmacy To Dose Tikosyn, , Does not apply, Once PRN, FAITH Gregorio  •  potassium chloride (KLOR-CON) packet 40 mEq, 40 mEq, Oral, PRN, Sly Hodges ISAB[facility]LE  •  potassium chloride (MICRO-K) CR capsule 40 mEq, 40 mEq, Oral, PRN, Sly Hodges ISABELLE  •  rosuvastatin (CRESTOR) tablet 20 mg, 20 mg, Oral, Daily, FAITH Gregorio  •  spironolactone (ALDACTONE) tablet 25 mg, 25 mg, Oral, Daily, FAITH Gregorio  •  tiZANidine (ZANAFLEX) tablet 2 mg, 2 mg, Oral, Nightly PRN, FAITH Gregorio  •  traMADol (ULTRAM) tablet 50 mg, 50 mg, Oral, BID, Joaquin Jeffery MD, 50 mg at 07/17/18 2038      Vital Sign Min/Max for last 24 hours  Temp  Min: 98.1 °F (36.7 °C)  Max: 98.5 °F (36.9 °C)   BP  Min: 115/79  Max: 146/83   Pulse  Min: 67  Max: 80   Resp  Min: 18  Max: 18   SpO2  Min: 93 %  Max: 95 %   No Data Recorded   Weight  Min: 134 kg (296 lb 6.4 oz)  Max: 134 kg (296 lb 8 oz)     Flowsheet Rows      First Filed Value   Admission Height  172.7 cm (68\") Documented at 07/17/2018 0900   Admission Weight  134 kg (296 lb 8 oz) Documented at 07/17/2018 0900          Physical Exam:     General Appearance:    Alert, cooperative, in no acute distress   Lungs:     CTA bilaterally    Heart:    RRR Nl S1 S2 S4   Chest Wall:    No abnormalities observed   Abdomen:     Normal bowel sounds, no masses, no organomegaly, soft        non-tender, non-distended, no guarding, no " rebound                tenderness   Extremities:   Moves all extremities well,+ significant edema 3+ and chronic venous stasis skin changes.    Pulses:   Pulses palpable and equal bilaterally   Skin:   No bleeding, bruising or rash        Results Review:         Results from last 7 days  Lab Units 07/18/18  0525 07/17/18  1142 07/15/18  1408   SODIUM mmol/L 138 136 139   POTASSIUM mmol/L 3.7 4.2 3.5   CHLORIDE mmol/L 98* 99 100   CO2 mmol/L 27.0 29.0 27.0   BUN mg/dL 21 20 18   CREATININE mg/dL 1.24 1.31* 1.26   GLUCOSE mg/dL 88 92 115*            Results from last 7 days  Lab Units 07/15/18  1408   PROTIME Seconds 14.2*   INR  1.35*   APTT seconds 34.6*         Magnesium: 2.0        Intake/Output Summary (Last 24 hours) at 07/18/18 0721  Last data filed at 07/17/18 2300   Gross per 24 hour   Intake              480 ml   Output              200 ml   Net              280 ml       I personally viewed and interpreted the patient's EKG/Telemetry data      EKG 7/18/18 5:08 AM: NSR  QTC: 440 ms  EKG 7/18/18 11:47: WCT prob AFL with LBBB aberrancy at 140 bpm  EKG 7/18/18 15:18: NSR HR 56, first degre AVB, QTc 520 ms    Telemetry: NSR 67-80 bpm, WCT      Present on Admission:  • Persistent atrial fibrillation/flutter    Assessment/Plan   1. Atrial Fibrillation: Admitted for Tikosyn yesterday, already in NSR.  QTc prolonged after 500 mcg dose of tikosyn:will lower to 125 mcg po BID and give magnesium IV  2. WCT: prob AFL with LBBB aberrancy  3. Chronic diastolic CHF:  - Normal LVEF per echo 2/2018  - Continue PO Bumex, refused IV Bumex last night. Wants it this morning now.   - Unna boots for significant LE swelling  - Strict I/O's, daily weights     4. HTN:  - Well controlled, continue to monitor and adjust meds as needed    Plan for disposition: continue to monitor. EKG in AM. BMP in AM.     RUPESH Martino  07/18/18  7:21 AM

## 2018-07-18 NOTE — PLAN OF CARE
Problem: Patient Care Overview  Goal: Plan of Care Review  Outcome: Ongoing (interventions implemented as appropriate)   07/18/18 1639   Coping/Psychosocial   Plan of Care Reviewed With patient   Plan of Care Review   Progress improving   OTHER   Outcome Summary Transfered to 2A ICU for HR 140s with wide complex. SR on admission to ICU. Again, HR 140s with wide QRS. IV metoprolol. SB.        Problem: Arrhythmia/Dysrhythmia (Symptomatic) (Adult)  Goal: Signs and Symptoms of Listed Potential Problems Will be Absent, Minimized or Managed (Arrhythmia/Dysrhythmia)  Outcome: Ongoing (interventions implemented as appropriate)   07/18/18 1639   Goal/Outcome Evaluation   Problems Assessed (Arrhythmia/Dysrhythmia) all   Problems Present (Dysrhythmia) electrophysiologic conduction defect;situational response       Problem: Fall Risk (Adult)  Goal: Identify Related Risk Factors and Signs and Symptoms  Outcome: Outcome(s) achieved Date Met: 07/18/18    Goal: Absence of Fall  Outcome: Ongoing (interventions implemented as appropriate)   07/18/18 1639   Fall Risk (Adult)   Absence of Fall making progress toward outcome       Problem: Skin Injury Risk (Adult)  Goal: Identify Related Risk Factors and Signs and Symptoms  Outcome: Outcome(s) achieved Date Met: 07/18/18    Goal: Skin Health and Integrity  Outcome: Ongoing (interventions implemented as appropriate)   07/18/18 1639   Skin Injury Risk (Adult)   Skin Health and Integrity making progress toward outcome

## 2018-07-18 NOTE — PROGRESS NOTES
Discharge Planning Assessment  [facility]     Patient Name: Akshat Winkler  MRN: [id number]  Today's Date: 7/18/2018    Admit Date: 7/17/2018          Discharge Needs Assessment     Row Name 07/18/18 0758       Living Environment    Lives With spouse    Name(s) of Who Lives With Patient Shama Winkler, spouse    Current Living Arrangements home/apartment/condo    Primary Care Provided by self;spouse/significant other    Provides Primary Care For no one    Family Caregiver if Needed spouse    Family Caregiver Names Shama Winkler, spouse    Quality of Family Relationships helpful;involved;supportive    Able to Return to Prior Arrangements yes       Resource/Environmental Concerns    Resource/Environmental Concerns none       Transition Planning    Patient/Family Anticipates Transition to home;home with family    Patient/Family Anticipated Services at Transition none    Transportation Anticipated family or friend will provide       Discharge Needs Assessment    Readmission Within the Last 30 Days no previous admission in last 30 days    Concerns to be Addressed no discharge needs identified;denies needs/concerns at this time    Equipment Currently Used at Home cane, straight;walker, rolling;grab bar   raised toilet    Anticipated Changes Related to Illness none    Equipment Needed After Discharge none            Discharge Plan     Row Name 07/18/18 5602       Plan    Plan Home    Patient/Family in Agreement with Plan yes    Plan Comments Spoke with patient and wife at bedside regarding discharge planning.  Patient denies use of HH and has a Rolling Walker that he uses PRN, Straight Cane used PRN, Grab Bar in the shower and a Raised Toilet Seat.  Patient reports that he has prescription coverage.  Patient lives with his wife in a mulilevel house with 2 steps to access but indicates that he often sleeps in the recliner and that there is a bedroom on the first level and could stay on one level if needed.  No home  safety concerns noted.  Per CM consult faxed Prior Authorization to [facility] at [phone].  CM following.  Patient plan is to discharge home via car with family to transport when medically ready.      Final Discharge Disposition Code 01 - home or self-care        Destination     No service coordination in this encounter.      Durable Medical Equipment     No service coordination in this encounter.      Dialysis/Infusion     No service coordination in this encounter.      Home Medical Care     No service coordination in this encounter.      Social Care     No service coordination in this encounter.        Expected Discharge Date and Time     Expected Discharge Date Expected Discharge Time    Jul 20, 2018               Demographic Summary     Row Name 07/18/18 1311       General Information    Admission Type inpatient    Arrived From home    Referral Source admission list    Reason for Consult discharge planning    Preferred Language English     Used During This Interaction no    General Information Comments Oren Mark MD       Contact Information    Permission Granted to Share Info With     Contact Information Obtained for     Contact Information Comments Shama Winkler, spouse  [phone]            Functional Status     Row Name 07/18/18 1311       Functional Status    Usual Activity Tolerance good    Current Activity Tolerance moderate       Functional Status, IADL    Medications assistive equipment    Meal Preparation assistive equipment    Housekeeping assistive equipment    Laundry assistive equipment    Shopping assistive equipment       Employment/    Employment/ Comments [employer]            Psychosocial    No documentation.           Abuse/Neglect    No documentation.           Legal    No documentation.           Substance Abuse    No documentation.           Patient Forms    No documentation.         Veronica Abraham, RN

## 2018-07-18 NOTE — SIGNIFICANT NOTE
Called by RN to report new onset wide complex tachycardia with rates in the 140's per telemetry.  EKG obtained showing wide complex tachycardia, appears consistent with VT, also possibly atrial fibrillation/flutter with aberrancy.  EKG reviewed by Dr. Jeffery.  Patient overall asymptomatic, BP stable in the 130's/80's.  Patient given one time dose 125 mg IV lidocaine and IV magnesium with no change in rhythm.  Will transfer to ICU for closer monitoring and administration of BBL agents.    Addendum 1415: Upon patient transfer to ICU, patient converted to bigeminy then to rate controlled atrial fibrillation.  12-lead EKG obtained.  QTc slightly prolonged.  Will decrease Tikosyn to 250 mcg BID.  Will plan for ECV tomorrow if he remains in atrial fibrillation prior to discharge.    FAITH [facility]  7/18/2018  12:50 PM

## 2018-07-19 PROBLEM — E80.6 HYPERBILIRUBINEMIA: Status: ACTIVE | Noted: 2018-07-19

## 2018-07-19 LAB
ALBUMIN SERPL-MCNC: 3.7 G/DL (ref 3.2–4.8)
ALBUMIN/GLOB SERPL: 1 G/DL (ref 1.5–2.5)
ALP SERPL-CCNC: 248 U/L (ref 25–100)
ALT SERPL W P-5'-P-CCNC: 14 U/L (ref 7–40)
ANION GAP SERPL CALCULATED.3IONS-SCNC: 12 MMOL/L (ref 3–11)
AST SERPL-CCNC: 32 U/L (ref 0–33)
BASOPHILS # BLD AUTO: 0.08 10*3/MM3 (ref 0–0.2)
BASOPHILS NFR BLD AUTO: 1.5 % (ref 0–1)
BH CV ECHO MEAS - BSA(HAYCOCK): 2.6 M^2
BH CV ECHO MEAS - BSA: 2.4 M^2
BH CV ECHO MEAS - BZI_BMI: 45 KILOGRAMS/M^2
BH CV ECHO MEAS - BZI_METRIC_HEIGHT: 172.7 CM
BH CV ECHO MEAS - BZI_METRIC_WEIGHT: 134.3 KG
BH CV LOWER VASCULAR LEFT COMMON FEMORAL AUGMENT: NORMAL
BH CV LOWER VASCULAR LEFT COMMON FEMORAL COMPETENT: NORMAL
BH CV LOWER VASCULAR LEFT COMMON FEMORAL COMPRESS: NORMAL
BH CV LOWER VASCULAR LEFT COMMON FEMORAL PHASIC: NORMAL
BH CV LOWER VASCULAR LEFT COMMON FEMORAL SPONT: NORMAL
BH CV LOWER VASCULAR LEFT DISTAL FEMORAL COMPRESS: NORMAL
BH CV LOWER VASCULAR LEFT GASTRONEMIUS COMPRESS: NORMAL
BH CV LOWER VASCULAR LEFT GREATER SAPH AK COMPRESS: NORMAL
BH CV LOWER VASCULAR LEFT GREATER SAPH BK COMPRESS: NORMAL
BH CV LOWER VASCULAR LEFT LESSER SAPH COMPRESS: NORMAL
BH CV LOWER VASCULAR LEFT MID FEMORAL AUGMENT: NORMAL
BH CV LOWER VASCULAR LEFT MID FEMORAL COMPETENT: NORMAL
BH CV LOWER VASCULAR LEFT MID FEMORAL COMPRESS: NORMAL
BH CV LOWER VASCULAR LEFT MID FEMORAL PHASIC: NORMAL
BH CV LOWER VASCULAR LEFT MID FEMORAL SPONT: NORMAL
BH CV LOWER VASCULAR LEFT PERONEAL COMPRESS: NORMAL
BH CV LOWER VASCULAR LEFT POPLITEAL AUGMENT: NORMAL
BH CV LOWER VASCULAR LEFT POPLITEAL COMPETENT: NORMAL
BH CV LOWER VASCULAR LEFT POPLITEAL COMPRESS: NORMAL
BH CV LOWER VASCULAR LEFT POPLITEAL PHASIC: NORMAL
BH CV LOWER VASCULAR LEFT POPLITEAL SPONT: NORMAL
BH CV LOWER VASCULAR LEFT POSTERIOR TIBIAL COMPRESS: NORMAL
BH CV LOWER VASCULAR LEFT PROXIMAL FEMORAL COMPRESS: NORMAL
BH CV LOWER VASCULAR LEFT SAPHENOFEMORAL JUNCTION AUGMENT: NORMAL
BH CV LOWER VASCULAR LEFT SAPHENOFEMORAL JUNCTION COMPETENT: NORMAL
BH CV LOWER VASCULAR LEFT SAPHENOFEMORAL JUNCTION COMPRESS: NORMAL
BH CV LOWER VASCULAR LEFT SAPHENOFEMORAL JUNCTION PHASIC: NORMAL
BH CV LOWER VASCULAR LEFT SAPHENOFEMORAL JUNCTION SPONT: NORMAL
BH CV LOWER VASCULAR RIGHT COMMON FEMORAL AUGMENT: NORMAL
BH CV LOWER VASCULAR RIGHT COMMON FEMORAL COMPETENT: NORMAL
BH CV LOWER VASCULAR RIGHT COMMON FEMORAL COMPRESS: NORMAL
BH CV LOWER VASCULAR RIGHT COMMON FEMORAL PHASIC: NORMAL
BH CV LOWER VASCULAR RIGHT COMMON FEMORAL SPONT: NORMAL
BH CV LOWER VASCULAR RIGHT DISTAL FEMORAL COMPRESS: NORMAL
BH CV LOWER VASCULAR RIGHT GASTRONEMIUS COMPRESS: NORMAL
BH CV LOWER VASCULAR RIGHT GREATER SAPH AK COMPRESS: NORMAL
BH CV LOWER VASCULAR RIGHT GREATER SAPH BK COMPRESS: NORMAL
BH CV LOWER VASCULAR RIGHT LESSER SAPH COMPRESS: NORMAL
BH CV LOWER VASCULAR RIGHT MID FEMORAL AUGMENT: NORMAL
BH CV LOWER VASCULAR RIGHT MID FEMORAL COMPETENT: NORMAL
BH CV LOWER VASCULAR RIGHT MID FEMORAL COMPRESS: NORMAL
BH CV LOWER VASCULAR RIGHT MID FEMORAL PHASIC: NORMAL
BH CV LOWER VASCULAR RIGHT MID FEMORAL SPONT: NORMAL
BH CV LOWER VASCULAR RIGHT PERONEAL COMPRESS: NORMAL
BH CV LOWER VASCULAR RIGHT POPLITEAL AUGMENT: NORMAL
BH CV LOWER VASCULAR RIGHT POPLITEAL COMPETENT: NORMAL
BH CV LOWER VASCULAR RIGHT POPLITEAL COMPRESS: NORMAL
BH CV LOWER VASCULAR RIGHT POPLITEAL PHASIC: NORMAL
BH CV LOWER VASCULAR RIGHT POPLITEAL SPONT: NORMAL
BH CV LOWER VASCULAR RIGHT POSTERIOR TIBIAL COMPRESS: NORMAL
BH CV LOWER VASCULAR RIGHT PROXIMAL FEMORAL COMPRESS: NORMAL
BH CV LOWER VASCULAR RIGHT SAPHENOFEMORAL JUNCTION AUGMENT: NORMAL
BH CV LOWER VASCULAR RIGHT SAPHENOFEMORAL JUNCTION COMPETENT: NORMAL
BH CV LOWER VASCULAR RIGHT SAPHENOFEMORAL JUNCTION COMPRESS: NORMAL
BH CV LOWER VASCULAR RIGHT SAPHENOFEMORAL JUNCTION PHASIC: NORMAL
BH CV LOWER VASCULAR RIGHT SAPHENOFEMORAL JUNCTION SPONT: NORMAL
BILIRUB SERPL-MCNC: 3.8 MG/DL (ref 0.3–1.2)
BUN BLD-MCNC: 22 MG/DL (ref 9–23)
BUN/CREAT SERPL: 17.5 (ref 7–25)
CALCIUM SPEC-SCNC: 9.5 MG/DL (ref 8.7–10.4)
CHLORIDE SERPL-SCNC: 99 MMOL/L (ref 99–109)
CO2 SERPL-SCNC: 27 MMOL/L (ref 20–31)
CREAT BLD-MCNC: 1.26 MG/DL (ref 0.6–1.3)
DEPRECATED RDW RBC AUTO: 59.2 FL (ref 37–54)
EOSINOPHIL # BLD AUTO: 0.25 10*3/MM3 (ref 0–0.3)
EOSINOPHIL NFR BLD AUTO: 4.6 % (ref 0–3)
ERYTHROCYTE [DISTWIDTH] IN BLOOD BY AUTOMATED COUNT: 16.5 % (ref 11.3–14.5)
GFR SERPL CREATININE-BSD FRML MDRD: 58 ML/MIN/1.73
GLOBULIN UR ELPH-MCNC: 3.6 GM/DL
GLUCOSE BLD-MCNC: 89 MG/DL (ref 70–100)
HBA1C MFR BLD: 6.2 % (ref 4.8–5.6)
HCT VFR BLD AUTO: 42.4 % (ref 38.9–50.9)
HGB BLD-MCNC: 13.6 G/DL (ref 13.1–17.5)
IMM GRANULOCYTES # BLD: 0.01 10*3/MM3 (ref 0–0.03)
IMM GRANULOCYTES NFR BLD: 0.2 % (ref 0–0.6)
LYMPHOCYTES # BLD AUTO: 0.78 10*3/MM3 (ref 0.6–4.8)
LYMPHOCYTES NFR BLD AUTO: 14.5 % (ref 24–44)
MAGNESIUM SERPL-MCNC: 2.5 MG/DL (ref 1.3–2.7)
MCH RBC QN AUTO: 31.7 PG (ref 27–31)
MCHC RBC AUTO-ENTMCNC: 32.1 G/DL (ref 32–36)
MCV RBC AUTO: 98.8 FL (ref 80–99)
MONOCYTES # BLD AUTO: 0.81 10*3/MM3 (ref 0–1)
MONOCYTES NFR BLD AUTO: 15.1 % (ref 0–12)
NEUTROPHILS # BLD AUTO: 3.46 10*3/MM3 (ref 1.5–8.3)
NEUTROPHILS NFR BLD AUTO: 64.3 % (ref 41–71)
PHOSPHATE SERPL-MCNC: 3.9 MG/DL (ref 2.4–5.1)
PLATELET # BLD AUTO: 175 10*3/MM3 (ref 150–450)
PMV BLD AUTO: 11.5 FL (ref 6–12)
POTASSIUM BLD-SCNC: 3.4 MMOL/L (ref 3.5–5.5)
POTASSIUM BLD-SCNC: 3.6 MMOL/L (ref 3.5–5.5)
PROT SERPL-MCNC: 7.3 G/DL (ref 5.7–8.2)
RBC # BLD AUTO: 4.29 10*6/MM3 (ref 4.2–5.76)
SODIUM BLD-SCNC: 138 MMOL/L (ref 132–146)
WBC NRBC COR # BLD: 5.38 10*3/MM3 (ref 3.5–10.8)

## 2018-07-19 PROCEDURE — 93005 ELECTROCARDIOGRAM TRACING: CPT | Performed by: PHYSICIAN ASSISTANT

## 2018-07-19 PROCEDURE — 84132 ASSAY OF SERUM POTASSIUM: CPT | Performed by: INTERNAL MEDICINE

## 2018-07-19 PROCEDURE — 85025 COMPLETE CBC W/AUTO DIFF WBC: CPT | Performed by: INTERNAL MEDICINE

## 2018-07-19 PROCEDURE — 83036 HEMOGLOBIN GLYCOSYLATED A1C: CPT | Performed by: INTERNAL MEDICINE

## 2018-07-19 PROCEDURE — 80053 COMPREHEN METABOLIC PANEL: CPT | Performed by: INTERNAL MEDICINE

## 2018-07-19 PROCEDURE — 84100 ASSAY OF PHOSPHORUS: CPT | Performed by: INTERNAL MEDICINE

## 2018-07-19 PROCEDURE — 93010 ELECTROCARDIOGRAM REPORT: CPT | Performed by: INTERNAL MEDICINE

## 2018-07-19 PROCEDURE — 99233 SBSQ HOSP IP/OBS HIGH 50: CPT | Performed by: INTERNAL MEDICINE

## 2018-07-19 PROCEDURE — 29580 STRAPPING UNNA BOOT: CPT

## 2018-07-19 PROCEDURE — 97162 PT EVAL MOD COMPLEX 30 MIN: CPT

## 2018-07-19 PROCEDURE — 99232 SBSQ HOSP IP/OBS MODERATE 35: CPT | Performed by: INTERNAL MEDICINE

## 2018-07-19 PROCEDURE — 83735 ASSAY OF MAGNESIUM: CPT | Performed by: INTERNAL MEDICINE

## 2018-07-19 RX ORDER — POTASSIUM CHLORIDE 750 MG/1
40 CAPSULE, EXTENDED RELEASE ORAL ONCE
Status: DISCONTINUED | OUTPATIENT
Start: 2018-07-19 | End: 2018-07-20 | Stop reason: HOSPADM

## 2018-07-19 RX ADMIN — METHOCARBAMOL 750 MG: 750 TABLET ORAL at 21:10

## 2018-07-19 RX ADMIN — GABAPENTIN 100 MG: 100 CAPSULE ORAL at 21:10

## 2018-07-19 RX ADMIN — DOFETILIDE 125 MCG: 0.12 CAPSULE ORAL at 21:10

## 2018-07-19 RX ADMIN — TRAMADOL HYDROCHLORIDE 50 MG: 50 TABLET, COATED ORAL at 21:10

## 2018-07-19 RX ADMIN — SPIRONOLACTONE 25 MG: 25 TABLET, FILM COATED ORAL at 08:14

## 2018-07-19 RX ADMIN — POTASSIUM CHLORIDE 40 MEQ: 750 CAPSULE, EXTENDED RELEASE ORAL at 06:52

## 2018-07-19 RX ADMIN — APIXABAN 5 MG: 5 TABLET, FILM COATED ORAL at 21:09

## 2018-07-19 RX ADMIN — POTASSIUM CHLORIDE 40 MEQ: 750 CAPSULE, EXTENDED RELEASE ORAL at 18:19

## 2018-07-19 RX ADMIN — POTASSIUM CHLORIDE 40 MEQ: 750 CAPSULE, EXTENDED RELEASE ORAL at 11:06

## 2018-07-19 RX ADMIN — BUMETANIDE 2 MG: 1 TABLET ORAL at 08:14

## 2018-07-19 RX ADMIN — POTASSIUM CHLORIDE 40 MEQ: 750 CAPSULE, EXTENDED RELEASE ORAL at 21:31

## 2018-07-19 RX ADMIN — ASPIRIN 81 MG: 81 TABLET, COATED ORAL at 08:14

## 2018-07-19 RX ADMIN — METHOCARBAMOL 750 MG: 750 TABLET ORAL at 08:14

## 2018-07-19 RX ADMIN — GABAPENTIN 100 MG: 100 CAPSULE ORAL at 08:17

## 2018-07-19 RX ADMIN — DOFETILIDE 125 MCG: 0.12 CAPSULE ORAL at 08:17

## 2018-07-19 RX ADMIN — APIXABAN 5 MG: 5 TABLET, FILM COATED ORAL at 08:14

## 2018-07-19 RX ADMIN — Medication 5 MG: at 21:11

## 2018-07-19 NOTE — PLAN OF CARE
Problem: Patient Care Overview  Goal: Plan of Care Review  Outcome: Ongoing (interventions implemented as appropriate)   07/19/18 1731   Coping/Psychosocial   Plan of Care Reviewed With patient   Plan of Care Review   Progress improving   OTHER   Outcome Summary OOB to chair. Cont tikosyn at 125 mcg depsite QTc of 521 ms. Repeat QTc 519 ms. Ambulated in lewis. AM EKG and possible DC in AM.        Problem: Arrhythmia/Dysrhythmia (Symptomatic) (Adult)  Goal: Signs and Symptoms of Listed Potential Problems Will be Absent, Minimized or Managed (Arrhythmia/Dysrhythmia)  Outcome: Ongoing (interventions implemented as appropriate)   07/19/18 1731   Goal/Outcome Evaluation   Problems Assessed (Arrhythmia/Dysrhythmia) all   Problems Present (Dysrhythmia) situational response       Problem: Fall Risk (Adult)  Goal: Absence of Fall  Outcome: Outcome(s) achieved Date Met: 07/19/18 07/19/18 1731   Fall Risk (Adult)   Absence of Fall making progress toward outcome       Problem: Skin Injury Risk (Adult)  Goal: Skin Health and Integrity  Outcome: Ongoing (interventions implemented as appropriate)   07/19/18 1731   Skin Injury Risk (Adult)   Skin Health and Integrity making progress toward outcome

## 2018-07-19 NOTE — PROGRESS NOTES
Clinical Nutrition     Multidisciplinary Rounds    Time: 20min  Patient Name: Akshat Winkler  Date of Encounter: 07/19/18 12:12 PM  MRN: [id number]  Admission date: 7/17/2018    WANDA ALTAMIRANO to continue to follow per protocol.       Current diet: Diet Regular; Cardiac, Consistent Carbohydrate  No active supplement orders      Intervention:  Follow treatment plan  Care plan reviewed    Follow up:   Per protocol      Ashley Dover RD  12:12 PM

## 2018-07-19 NOTE — PLAN OF CARE
Problem: Patient Care Overview  Goal: Plan of Care Review  Outcome: Ongoing (interventions implemented as appropriate)   07/19/18 1264   Coping/Psychosocial   Plan of Care Reviewed With patient   OTHER   Outcome Summary Pt presents with h/o BLE edema and erythema. Skin noted to be intact at this time, but pt reports h/o blisters and small ulcerations to LEs. Pt will benefit from light compression with unna boot to help increase venous return to improve skin integrity and decrease LE edema.

## 2018-07-19 NOTE — NURSING NOTE
63 year old male well known to HF clinic due to DHF, atrial fib/flutter admitted for tikosyn. Had episode of WCT yesterday, transferred to unit  tikosyn dose lowered, next EKG at 3 pm  Unna boots placed by inpatient pt today    Denies any dyspnea, chest pain, dizziness.

## 2018-07-19 NOTE — PLAN OF CARE
Problem: Patient Care Overview  Goal: Plan of Care Review  Outcome: Ongoing (interventions implemented as appropriate)   07/19/18 0517   Coping/Psychosocial   Plan of Care Reviewed With patient   Plan of Care Review   Progress no change   OTHER   Outcome Summary VSS. duplex complete, results pending. remained in SR/SB throughout night with noted  this AM on EKG       Problem: Arrhythmia/Dysrhythmia (Symptomatic) (Adult)  Goal: Signs and Symptoms of Listed Potential Problems Will be Absent, Minimized or Managed (Arrhythmia/Dysrhythmia)  Outcome: Ongoing (interventions implemented as appropriate)      Problem: Fall Risk (Adult)  Goal: Absence of Fall  Outcome: Ongoing (interventions implemented as appropriate)      Problem: Skin Injury Risk (Adult)  Goal: Skin Health and Integrity  Outcome: Ongoing (interventions implemented as appropriate)

## 2018-07-19 NOTE — PROGRESS NOTES
Intensivist Note     7/19/2018  Hospital Day: 2  * No surgery found *      Mr. Akshat Winkler, 63 y.o. male is followed for:  Principal Problem:    Persistent atrial fibrillation/flutter admitted for initiation of Tikosyn  Active Problems:    Chronic anticoagulation    Wide-complex tachycardia (CMS/HCC)    Hypertension    Severe Diastolic dysfunction    Chronically elevated bilirubin and alkaline phosphatase. Probable chronic liver congestion versus occult cirrhosis    Hyperlipidemia LDL goal <70    Severe obstructive sleep apnea. On home CPAP    Chronic bilateral severe lower extremity edema    Class 3 obesity in adult       SUBJECTIVE     Mr. Winkler is a 63 year old obese white male lifelong non-smoker with PMH significant for mild CAD, chronic renal failure, hypertension, chronic diastolic heart failure(LVEDP 34 mmHg on cardiac catheterization 2/20/14), chronic severe lower extremity lymphedemaedema, HLD, JOSESITO on home CPAP, and ? DM who was admitted to [facility] on 7/17/18 per Dr. Jeffery for initiation of Tikosyn. He has had A-Fib for many years and underwent PVA in 2015 as well as ECV in December, 2017. He took Flecainide after cardioversion but it cause severe hypotension and syncope. He has continued to have symptoms associated with A-Fib including shortness of breath and fatigue. He is chronically on Eliquis for anticoagulation. He was started on Tikosyn 7/17/18 and initially did very well although QT was prolonged after 500 µg dose so it was lowered to 125 µg twice a day. (Today-7/19/18 is 460-480 ms).     The afternoon of 7/18/18 he developed WCT initially felt to be slow VT and was transferred to the ICU for close monitoring. On reassessment is now felt to have been atrial flutter with aberrancy. Has now converted to sinus and has been ordered out of the ICU to telemetry. He has no complaints at the present time and specifically denies dyspnea. Is presently well oxygenated on room air and blood pressure is  "good. He is now receiving 2 mg of Bumex in addition to 25 mg of spironolactone daily and remains on his usual dose of Eliquis, as well as 125 mg BID of Tikosyn.       The patient's relevant past medical, surgical and social history were reviewed and updated in Epic as appropriate.    OBJECTIVE     /81   Pulse 77   Temp 97.8 °F (36.6 °C) (Oral)   Resp 16   Ht 172.7 cm (67.99\")   Wt 134 kg (296 lb 6.4 oz)   SpO2 97%   BMI 45.08 kg/m²      Flow (L/min): 2    Flowsheet Rows      First Filed Value   Admission Height  172.7 cm (68\") Documented at 07/17/2018 0900   Admission Weight  134 kg (296 lb 8 oz) Documented at 07/17/2018 0900        Intake & Output (last day)       07/18 0701 - 07/19 0700 07/19 0701 - 07/20 0700    P.O. 120     Total Intake(mL/kg) 120 (0.9)     Urine (mL/kg/hr) 1950 (0.6)     Total Output 1950      Net -1830            Unmeasured Urine Occurrence 2 x     Unmeasured Stool Occurrence 1 x           Exam:  General Exam:  . Obese white male sitting up in a chair in NAD  HEENT: Pupils equal and reactive. Nose and throat clear.  Neck:                          Supple, no JVD, thyromegaly, or adenopathy  Lungs: Clear anteriorly, laterally, posteriorly without wheezes, rales, rhonchi..  Cardiovascular: RRR without murmurs or gallops.  Abdomen: Soft nontender without organomegaly or masses. Obese   and rectal: Deferred.  Extremities: Marked edema up to the thighs with chronic long-term venous stasis changes manifesting as hyperpigmentation and peau d'orange. Pedal pulses palpable  Neurologic:                 Symmetric strength. No focal deficits.    Chest X-Ray: No recent chest x-ray. Last chest x-ray in February just revealed cardiomegaly and some mild congestive changes.      Results from last 7 days  Lab Units 07/19/18  0520   WBC 10*3/mm3 5.38   HEMOGLOBIN g/dL 13.6   HEMATOCRIT % 42.4   PLATELETS 10*3/mm3 175       Results from last 7 days  Lab Units 07/19/18  1521 07/19/18  0520 " 07/18/18  0525   SODIUM mmol/L  --  138 138   POTASSIUM mmol/L 3.6 3.4* 3.7   CHLORIDE mmol/L  --  99 98*   CO2 mmol/L  --  27.0 27.0   BUN mg/dL  --  22 21   CREATININE mg/dL  --  1.26 1.24   GLUCOSE mg/dL  --  89 88   CALCIUM mg/dL  --  9.5 9.6       Results from last 7 days  Lab Units 07/19/18  0520 07/18/18  0525 07/17/18  1142   MAGNESIUM mg/dL 2.5 2.0 2.1   PHOSPHORUS mg/dL 3.9  --   --        Results from last 7 days  Lab Units 07/19/18  0520   ALK PHOS U/L 248*   BILIRUBIN mg/dL 3.8*   ALT (SGPT) U/L 14   AST (SGOT) U/L 32       No results found for: SEDRATE  Lab Results   Component Value Date    .0 (H) 02/27/2018     Lab Results   Component Value Date    CKTOTAL 269 (H) 02/28/2018    TROPONINI 0.021 02/27/2018     No results found for: TSH  No results found for: LACTATE  No results found for: CORTISOL    I reviewed the patient's results, images and medication.    Assessment/Plan   ASSESSMENT      Principal Problem:    Persistent atrial fibrillation/flutter admitted for initiation of Tikosyn  Active Problems:    Chronic anticoagulation    Wide-complex tachycardia (CMS/HCC)    Hypertension    Severe Diastolic dysfunction    Chronically elevated bilirubin and alkaline phosphatase. Probable chronic liver congestion versus occult cirrhosis    Hyperlipidemia LDL goal <70    Severe obstructive sleep apnea. On home CPAP    Chronic bilateral severe lower extremity edema    Class 3 obesity in adult      DISCUSSION: Situation seems resolved as it appears the WCT that precipitated his transfer to the ICU was flutter with a rate related bundle rather than VT. Agree with transfer to the floor. I suspect that his lower extremity edema is all due to diastolic heart failure versus chronic lymphedema. Lower extremity venous duplex revealed no DVT. I am a little concerned about the slow increase in his bilirubin and alkaline phosphatase since February. May all just be due to a congested liver and cholestasis.    PLAN      1. Transfer to the floor per Dr. Jeffery  2. Electrolytes are being replaced  3. Unna boots are now in place for chronic lower extremity swelling  4. We will check a hepatitis panel but I am sure it will be normal  5. Consider obtaining a CT scan of the abdomen to evaluate for cirrhosis. Defer to Dr. Jeffery as could be done on an outpatient basis    Will be available as needed    Plan of care and goals reviewed with mulitdisciplinary team at daily rounds.    I discussed the patient's findings and my recommendations with patient and nursing staff    Time spent Critical care 25 min (It does not include procedure time).    Lex Lehman MD  Intensive Care Medicine  07/19/18 6:41 PM

## 2018-07-19 NOTE — THERAPY WOUND CARE TREATMENT
Acute Care - Wound/Debridement Initial Evaluation  [facility]     Patient Name: Akshat Winkler  : 1955  MRN: [id number]  Today's Date: 2018  Onset of Illness/Injury or Date of Surgery: 18  Date of Referral to PT: 18   Referring Physician: Dr fisher        Admit Date: 2018    Visit Dx:    ICD-10-CM ICD-9-CM   1. Persistent atrial fibrillation (CMS/HCC) I48.1 427.31       Patient Active Problem List   Diagnosis   • Acute on chronic diastolic heart failure (CMS/HCC)   • Type 2 diabetes mellitus without complication, without long-term current use of insulin (CMS/HCC)   • Hyperlipidemia LDL goal <70   • Hypertension   • Coronary artery disease involving native coronary artery of native heart without angina pectoris   • Severe obstructive sleep apnea. On home CPAP   • Class 3 obesity in adult   • Chronic anticoagulation   • Persistent atrial fibrillation/flutter admitted for initiation of Tikosyn   • Compression fracture of lumbar vertebra (CMS/HCC)   • Spondylosis of 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 or radiculopathy   • Lumbar stenosis with neurogenic claudication   • Lumbar disc herniation   • Myofascial pain   • Acute renal failure (CMS/HCC)   • Hypokalemia   • Pathologic compression fracture of vertebra (CMS/HCC)   • Chronic bilateral severe lower extremity edema   • Wide-complex tachycardia (CMS/HCC)   • Severe Diastolic dysfunction        Past Medical History:   Diagnosis Date   • Acute diastolic HF (heart failure) (CMS/HCC)    • Acute hypokalemia    • Arrhythmia    • Back injury     Fall on 17   • Cellulitis of leg    • Chest pain syndrome    • CHF (congestive heart failure) (CMS/HCC)    • Diabetes mellitus (CMS/HCC)     patient reports borderline    • Hyperlipidemia    • Hypertension    • Muscle pain     around back   • Obesity    • Obstructive sleep apnea     right now patient is sleeeping in recliner and does not use-when he lies down he will have to use cpap   •  Paroxysmal a-fib (CMS/HCC)    • Peripheral artery disease (CMS/HCC)    • Spondylosis of 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 or radiculopathy 2/7/2018   • Wears glasses         Past Surgical History:   Procedure Laterality Date   • CARDIAC CATHETERIZATION     • CARDIOVERSION      multiple   • COLONOSCOPY      about 14 years ago   • KYPHOPLASTY N/A 3/5/2018    Procedure: KYPHOPLASTY L4;  Surgeon: Akshat Davidson MD;  Location:  [facility];  Service:    • KYPHOPLASTY N/A 4/16/2018    Procedure: KYPHOPLASTY L1 AND L2;  Surgeon: Akshat Davidson MD;  Location:  [facility];  Service: Neurosurgery   • OTHER SURGICAL HISTORY  06/29/2015    PVA                    WOUND DEBRIDEMENT                       PT ASSESSMENT (last 12 hours)      Physical Therapy Evaluation     Row Name 07/19/18 1115          PT Evaluation Time/Intention    Subjective Information no complaints  -     Document Type evaluation;therapy note (daily note);wound care  -     Mode of Treatment physical therapy  -     Row Name 07/19/18 1115          General Information    Patient Profile Reviewed? yes  -     Onset of Illness/Injury or Date of Surgery 07/17/18  -     Referring Physician Dr fisher    -     Pertinent History of Current Functional Problem PT with chronic BLE edema and erythema, now presenting with worsening LE edema.   -     Risks Reviewed patient:;increased discomfort  -     Benefits Reviewed patient:;improve skin integrity  -     Barriers to Rehab medically complex;previous functional deficit  -     Row Name 07/19/18 1115          Pain Assessment    Additional Documentation Pain Scale: FACES Pre/Post-Treatment (Group)  -     Row Name 07/19/18 1115          Pain Scale: FACES Pre/Post-Treatment    Pain: FACES Scale, Pretreatment 0-->no hurt  -     Pain: FACES Scale, Post-Treatment 0-->no hurt  -     Row Name 07/19/18 1115          Coping    Observed Emotional State accepting;cooperative  -     Verbalized Emotional State  acceptance  -     Row Name 07/19/18 1115          Plan of Care Review    Plan of Care Reviewed With patient  -     Row Name 07/19/18 1115          Physical Therapy Clinical Impression    Date of Referral to PT 07/17/18  -     PT Diagnosis (PT Clinical Impression) BLE edema with possible cellulitis.   -     Criteria for Skilled Interventions Met (PT Clinical Impression) yes;treatment indicated  -     Rehab Potential (PT Clinical Summary) good, to achieve stated therapy goals  -     Care Plan Review (PT) evaluation/treatment results reviewed;care plan/treatment goals reviewed;risks/benefits reviewed;current/potential barriers reviewed;patient/other agree to care plan  -     Row Name 07/19/18 1115          Physical Therapy Goals    Wound Care Goal Selection (PT) wound care, PT goal 1  -     Additional Documentation Wound Care Goal Selection (PT) (Row)  -     Row Name 07/19/18 1115          Wound Care Goal 1 (PT)    Wound Care Goal 1 (PT) Decrease BLE edema by 20% to allow for transition to compression stockings   -     Row Name 07/19/18 1115          Positioning and Restraints    Pre-Treatment Position sitting in chair/recliner  -     Post Treatment Position chair  -     In Chair sitting;call light within reach  -       User Key  (r) = Recorded By, (t) = Taken By, (c) = Cosigned By    Initials Name Provider Type     Juni Samuel, PT Physical Therapist                Recommendation and Plan       Plan of Care Reviewed With: patient           Outcome Summary: Pt presents with h/o BLE edema and erythema.  Skin noted to be intact at this time, but pt reports h/o blisters and small ulcerations to LEs. Pt will benefit from light compression with unna boot to help increase venous return to improve skin integrity and decrease LE edema.    Plan of Care Reviewed With: patient              Time Calculation        PT Charges     Row Name 07/19/18 1115             Time Calculation    Start Time 1113   -      PT Goal Re-Cert Due Date 07/29/18  -        User Key  (r) = Recorded By, (t) = Taken By, (c) = Cosigned By    Initials Name Provider Type     Juni Samuel, PT Physical Therapist        Therapy Suggested Charges     Code   Minutes Charges    None             Therapy Charges for Today     Code Description Service Date Service Provider Modifiers Qty    32905218703 HC PT EVAL MOD COMPLEXITY 4 7/19/2018 Juni Samuel, PT GP 1    16836579707 HC PT STAPPING UNNA BOOT 7/19/2018 Juni Samuel, PT GP 1                    Juni Samuel, PT  7/19/2018

## 2018-07-19 NOTE — PROGRESS NOTES
[facility]  Progress Note     LOS: 2 days   Patient Care Team:  Oren Mark MD as PCP - General (Family Medicine)  Lucian Alvarez IV, MD as Consulting Physician (Cardiology)  Mandy Clark PA-C as Physician Assistant (Physician Assistant)  Josh Moss MD as Consulting Physician (Pain Medicine)  FAITH Nix as Nurse Practitioner (Cardiology)  Akshat Davidson MD as Surgeon (Neurosurgery)    Chief Complaint:  Atrial Fibrillation    Subjective  Feels well No new complaints. No CP or SOB        Interval History:         Review of Systems:   Pertinent positives in HPI, all others reviewed and negative.      Objective       Current Facility-Administered Medications:   •  acetaminophen (TYLENOL) tablet 650 mg, 650 mg, Oral, Q8H PRN, FAITH Gregorio  •  apixaban (ELIQUIS) tablet 5 mg, 5 mg, Oral, Q12H, Maty South APRN, 5 mg at 07/18/18 2008  •  aspirin EC tablet 81 mg, 81 mg, Oral, Daily, Maty South APRN, 81 mg at 07/18/18 0920  •  bumetanide (BUMEX) injection 1 mg, 1 mg, Intravenous, Once, FAITH Gregorio  •  bumetanide (BUMEX) tablet 2 mg, 2 mg, Oral, Daily, FAITH Gregorio, 2 mg at 07/18/18 0920  •  calcium carbonate (TUMS) chewable tablet 500 mg (200 mg elemental), 2 tablet, Oral, TID PRN, Lex Teran III, MD, 2 tablet at 07/18/18 0925  •  dofetilide (TIKOSYN) capsule 125 mcg, 125 mcg, Oral, Q12H, Joaquin Jeffery MD, 125 mcg at 07/18/18 2009  •  gabapentin (NEURONTIN) capsule 100 mg, 100 mg, Oral, Q12H, Joaquin Jeffery MD, 100 mg at 07/18/18 2009  •  Magnesium Sulfate 2 gram Bolus, followed by 8 gram infusion (total Mg dose 10 grams)- Mg less than or equal to 1mg/dL, 2 g, Intravenous, PRN **OR** Magnesium Sulfate 2 gram / 50mL Infusion (GIVE X 3 BAGS TO EQUAL 6GM TOTAL DOSE) - Mg 1.1 - 1/5 mg/dl, 2 g, Intravenous, PRN **OR** Magnesium Sulfate 4 gram  "infusion- Mg 1.6-1.9 mg/dL, 4 g, Intravenous, PRN, Sly Hodges, RP, Last Rate: 25 mL/hr at 07/18/18 1228, 4 g at 07/18/18 1228  •  melatonin sublingual tablet 5 mg, 5 mg, Sublingual, Nightly PRN, Lex Teran III, MD, 5 mg at 07/18/18 0207  •  methocarbamol (ROBAXIN) tablet 750 mg, 750 mg, Oral, BID, FAITH Gregorio, 750 mg at 07/18/18 2008  •  Pharmacy To Dose Tikosyn, , Does not apply, Once PRN, FAITH Gregorio  •  potassium chloride (KLOR-CON) packet 40 mEq, 40 mEq, Oral, PRN, Sly Hodges, RP  •  potassium chloride (MICRO-K) CR capsule 40 mEq, 40 mEq, Oral, PRN, Sly Hodges, [facility], 40 mEq at 07/19/18 0652  •  rosuvastatin (CRESTOR) tablet 20 mg, 20 mg, Oral, Daily, FAITH Gregorio, 20 mg at 07/18/18 2010  •  spironolactone (ALDACTONE) tablet 25 mg, 25 mg, Oral, Daily, FAITH Gregorio, 25 mg at 07/18/18 0920  •  tiZANidine (ZANAFLEX) tablet 2 mg, 2 mg, Oral, Nightly PRN, FAITH Gregorio  •  traMADol (ULTRAM) tablet 50 mg, 50 mg, Oral, BID, Joaquin Jeffery MD, 50 mg at 07/18/18 2009      Vital Sign Min/Max for last 24 hours  Temp  Min: 97.5 °F (36.4 °C)  Max: 98.7 °F (37.1 °C)   BP  Min: 101/70  Max: 135/86   Pulse  Min: 54  Max: 146   Resp  Min: 16  Max: 20   SpO2  Min: 91 %  Max: 97 %   Flow (L/min)  Min: 2  Max: 2   No Data Recorded     Flowsheet Rows      First Filed Value   Admission Height  172.7 cm (68\") Documented at 07/17/2018 0900   Admission Weight  134 kg (296 lb 8 oz) Documented at 07/17/2018 0900          Physical Exam:     General Appearance:    Alert, cooperative, in no acute distress   Lungs:     CTA bilaterally NL resp effort    Heart:    RRR Nl S1 S2 S4 No murmurs   Chest Wall:    No abnormalities observed   Abdomen:     Normal bowel sounds, no masses, no organomegaly, soft        non-tender, non-distended, no guarding, no rebound                tenderness   Extremities:   Moves all extremities well,+ " significant edema 3+ and chronic venous stasis skin changes.    Pulses:   Pulses palpable and equal bilaterally   Skin:   No bleeding, bruising or rash        Results Review:     Results from last 7 days  Lab Units 18  0520   WBC 10*3/mm3 5.38   HEMOGLOBIN g/dL 13.6   HEMATOCRIT % 42.4   PLATELETS 10*3/mm3 175       Results from last 7 days  Lab Units 18  0520 18  0525 18  1142   SODIUM mmol/L 138 138 136   POTASSIUM mmol/L 3.4* 3.7 4.2   CHLORIDE mmol/L 99 98* 99   CO2 mmol/L 27.0 27.0 29.0   BUN mg/dL 22 21 20   CREATININE mg/dL 1.26 1.24 1.31*   GLUCOSE mg/dL 89 88 92            Results from last 7 days  Lab Units 07/15/18  1408   PROTIME Seconds 14.2*   INR  1.35*   APTT seconds 34.6*         Magnesium: 2.0        Intake/Output Summary (Last 24 hours) at 18 0743  Last data filed at 18 2200   Gross per 24 hour   Intake              120 ml   Output             1950 ml   Net            -1830 ml       I personally viewed and interpreted the patient's EKG/Telemetry data      EK: NSR 63 bpm, QTc 480 ms    Telemetry: NSR overnight,       Present on Admission:  • Persistent atrial fibrillation/flutter admitted for initiation of Tikosyn  • Class 3 obesity in adult  • Severe obstructive sleep apnea. On home CPAP  • Chronic bilateral severe lower extremity edema  • Hypertension  • Hyperlipidemia LDL goal <70  • Severe Diastolic dysfunction    Assessment/Plan   1. Atrial Fibrillation: Admitted for Tikosyn 18.   QTc prolonged after 500 mcg dose of tikosyn: dose lowered to 125 mcg po BID. Today, his QTc this AM is 460-480  ms   2. WCT: prob AFL with LBBB aberrancy, now in NSR  3. Chronic diastolic CHF:  - Normal LVEF per echo 2018  - Continue PO Bumex  - Unna boots for significant LE swelling before discharge.   - Strict I/O's, daily weights     4. HTN:  - Well controlled, continue to monitor and adjust meds as needed    5. Hypokalemia - replace today.     Plan for  disposition: continue to monitor,EKG at 3 pm. May keep another 24 hrs Follow up in 2 months. EKG outpatient tomorrow. Transfer to floor.    RUPESH Martino  07/19/18  7:43 AM      I, Joaquin Jeffery MD, personally performed the services face to face as described and documented by the above named individual. I have made any necessary edits and it is both accurate and complete 7/19/2018  7:57 AM

## 2018-07-20 ENCOUNTER — APPOINTMENT (OUTPATIENT)
Dept: CT IMAGING | Facility: HOSPITAL | Age: 63
End: 2018-07-20

## 2018-07-20 VITALS
DIASTOLIC BLOOD PRESSURE: 98 MMHG | BODY MASS INDEX: 44.34 KG/M2 | OXYGEN SATURATION: 93 % | HEART RATE: 69 BPM | RESPIRATION RATE: 16 BRPM | HEIGHT: 68 IN | WEIGHT: 292.6 LBS | TEMPERATURE: 97.9 F | SYSTOLIC BLOOD PRESSURE: 134 MMHG

## 2018-07-20 LAB
ALBUMIN SERPL-MCNC: 3.84 G/DL (ref 3.2–4.8)
ALP SERPL-CCNC: 250 U/L (ref 25–100)
ALT SERPL W P-5'-P-CCNC: 19 U/L (ref 7–40)
ANION GAP SERPL CALCULATED.3IONS-SCNC: 12 MMOL/L (ref 3–11)
AST SERPL-CCNC: 43 U/L (ref 0–33)
BILIRUB CONJ SERPL-MCNC: 2.2 MG/DL (ref 0–0.2)
BILIRUB INDIRECT SERPL-MCNC: 1.9 MG/DL (ref 0.1–1.1)
BILIRUB SERPL-MCNC: 4.1 MG/DL (ref 0.3–1.2)
BUN BLD-MCNC: 21 MG/DL (ref 9–23)
BUN/CREAT SERPL: 16.7 (ref 7–25)
CALCIUM SPEC-SCNC: 9.5 MG/DL (ref 8.7–10.4)
CHLORIDE SERPL-SCNC: 100 MMOL/L (ref 99–109)
CO2 SERPL-SCNC: 25 MMOL/L (ref 20–31)
CREAT BLD-MCNC: 1.26 MG/DL (ref 0.6–1.3)
FERRITIN SERPL-MCNC: 69 NG/ML (ref 22–322)
GFR SERPL CREATININE-BSD FRML MDRD: 58 ML/MIN/1.73
GLUCOSE BLD-MCNC: 78 MG/DL (ref 70–100)
HAV IGM SERPL QL IA: NORMAL
HBV CORE IGM SERPL QL IA: NORMAL
HBV SURFACE AG SERPL QL IA: NORMAL
HCV AB SER DONR QL: NORMAL
POTASSIUM BLD-SCNC: 4 MMOL/L (ref 3.5–5.5)
PROT SERPL-MCNC: 7.7 G/DL (ref 5.7–8.2)
SODIUM BLD-SCNC: 137 MMOL/L (ref 132–146)
TSH SERPL DL<=0.05 MIU/L-ACNC: 5.61 MIU/ML (ref 0.35–5.35)

## 2018-07-20 PROCEDURE — 0 DIATRIZOATE MEGLUMINE & SODIUM PER 1 ML

## 2018-07-20 PROCEDURE — 25010000002 IOPAMIDOL 61 % SOLUTION: Performed by: INTERNAL MEDICINE

## 2018-07-20 PROCEDURE — 93010 ELECTROCARDIOGRAM REPORT: CPT | Performed by: INTERNAL MEDICINE

## 2018-07-20 PROCEDURE — 74170 CT ABD WO CNTRST FLWD CNTRST: CPT

## 2018-07-20 PROCEDURE — 86038 ANTINUCLEAR ANTIBODIES: CPT | Performed by: PHYSICIAN ASSISTANT

## 2018-07-20 PROCEDURE — 83516 IMMUNOASSAY NONANTIBODY: CPT | Performed by: PHYSICIAN ASSISTANT

## 2018-07-20 PROCEDURE — 93005 ELECTROCARDIOGRAM TRACING: CPT | Performed by: INTERNAL MEDICINE

## 2018-07-20 PROCEDURE — 80048 BASIC METABOLIC PNL TOTAL CA: CPT | Performed by: PHYSICIAN ASSISTANT

## 2018-07-20 PROCEDURE — 93005 ELECTROCARDIOGRAM TRACING: CPT | Performed by: PHYSICIAN ASSISTANT

## 2018-07-20 PROCEDURE — 84443 ASSAY THYROID STIM HORMONE: CPT | Performed by: PHYSICIAN ASSISTANT

## 2018-07-20 PROCEDURE — 82728 ASSAY OF FERRITIN: CPT | Performed by: PHYSICIAN ASSISTANT

## 2018-07-20 PROCEDURE — 80074 ACUTE HEPATITIS PANEL: CPT | Performed by: INTERNAL MEDICINE

## 2018-07-20 PROCEDURE — 99253 IP/OBS CNSLTJ NEW/EST LOW 45: CPT | Performed by: PHYSICIAN ASSISTANT

## 2018-07-20 PROCEDURE — 80076 HEPATIC FUNCTION PANEL: CPT | Performed by: INTERNAL MEDICINE

## 2018-07-20 PROCEDURE — 99238 HOSP IP/OBS DSCHRG MGMT 30/<: CPT | Performed by: INTERNAL MEDICINE

## 2018-07-20 RX ORDER — METOLAZONE 2.5 MG/1
2.5 TABLET ORAL EVERY OTHER DAY
Qty: 20 TABLET | Refills: 0 | Status: SHIPPED | OUTPATIENT
Start: 2018-07-20 | End: 2018-07-20

## 2018-07-20 RX ORDER — POTASSIUM CHLORIDE 750 MG/1
10 TABLET, FILM COATED, EXTENDED RELEASE ORAL EVERY OTHER DAY
Qty: 60 TABLET | Refills: 6 | Status: SHIPPED | OUTPATIENT
Start: 2018-07-20 | End: 2018-10-09 | Stop reason: SDUPTHER

## 2018-07-20 RX ORDER — POTASSIUM CHLORIDE 750 MG/1
40 CAPSULE, EXTENDED RELEASE ORAL ONCE
Status: COMPLETED | OUTPATIENT
Start: 2018-07-20 | End: 2018-07-20

## 2018-07-20 RX ORDER — POTASSIUM CHLORIDE 750 MG/1
10 TABLET, FILM COATED, EXTENDED RELEASE ORAL EVERY OTHER DAY
Qty: 60 TABLET | Refills: 6 | Status: SHIPPED | OUTPATIENT
Start: 2018-07-20 | End: 2018-07-20

## 2018-07-20 RX ORDER — METOLAZONE 2.5 MG/1
2.5 TABLET ORAL EVERY OTHER DAY
Qty: 20 TABLET | Refills: 0 | Status: SHIPPED | OUTPATIENT
Start: 2018-07-20 | End: 2018-10-09 | Stop reason: SDUPTHER

## 2018-07-20 RX ORDER — SPIRONOLACTONE 25 MG/1
12.5 TABLET ORAL DAILY
Qty: 30 TABLET | Refills: 6 | Status: SHIPPED | OUTPATIENT
Start: 2018-07-20 | End: 2018-07-20

## 2018-07-20 RX ORDER — SPIRONOLACTONE 25 MG/1
12.5 TABLET ORAL DAILY
Qty: 30 TABLET | Refills: 6 | Status: SHIPPED | OUTPATIENT
Start: 2018-07-20 | End: 2019-03-14 | Stop reason: SDUPTHER

## 2018-07-20 RX ADMIN — POTASSIUM CHLORIDE 40 MEQ: 750 CAPSULE, EXTENDED RELEASE ORAL at 09:39

## 2018-07-20 RX ADMIN — ROSUVASTATIN CALCIUM 20 MG: 20 TABLET, FILM COATED ORAL at 08:08

## 2018-07-20 RX ADMIN — ASPIRIN 81 MG: 81 TABLET, COATED ORAL at 08:07

## 2018-07-20 RX ADMIN — TRAMADOL HYDROCHLORIDE 50 MG: 50 TABLET, COATED ORAL at 08:08

## 2018-07-20 RX ADMIN — IOPAMIDOL 95 ML: 612 INJECTION, SOLUTION INTRAVENOUS at 14:28

## 2018-07-20 RX ADMIN — BUMETANIDE 2 MG: 1 TABLET ORAL at 08:08

## 2018-07-20 RX ADMIN — METHOCARBAMOL 750 MG: 750 TABLET ORAL at 08:08

## 2018-07-20 RX ADMIN — Medication: at 13:15

## 2018-07-20 RX ADMIN — GABAPENTIN 100 MG: 100 CAPSULE ORAL at 08:08

## 2018-07-20 RX ADMIN — SPIRONOLACTONE 25 MG: 25 TABLET, FILM COATED ORAL at 08:08

## 2018-07-20 RX ADMIN — APIXABAN 5 MG: 5 TABLET, FILM COATED ORAL at 08:08

## 2018-07-20 RX ADMIN — DOFETILIDE 125 MCG: 0.12 CAPSULE ORAL at 08:08

## 2018-07-20 NOTE — PLAN OF CARE
Problem: Patient Care Overview  Goal: Plan of Care Review  Outcome: Ongoing (interventions implemented as appropriate)   07/20/18 0593   Coping/Psychosocial   Plan of Care Reviewed With patient   Plan of Care Review   Progress no change   OTHER   Outcome Summary Pt's vitals stable, c/o chronic low back pain, difficulty falling asleep. Pt's rhythm appeared sinus until this am - EKG confirmed back in Afib, although appears to be back in sinus at the moment - will get repeat EKG. Pt expressed readiness for discharge.      Goal: Discharge Needs Assessment  Outcome: Ongoing (interventions implemented as appropriate)   07/18/18 1312 07/20/18 0553   Discharge Needs Assessment   Readmission Within the Last 30 Days no previous admission in last 30 days --    Concerns to be Addressed no discharge needs identified;denies needs/concerns at this time --    Patient/Family Anticipates Transition to home;home with family --    Transportation Anticipated family or friend will provide --    Current Discharge Risk --  chronically ill       Problem: Arrhythmia/Dysrhythmia (Symptomatic) (Adult)  Goal: Signs and Symptoms of Listed Potential Problems Will be Absent, Minimized or Managed (Arrhythmia/Dysrhythmia)  Outcome: Ongoing (interventions implemented as appropriate)   07/19/18 1731 07/20/18 0553   Goal/Outcome Evaluation   Problems Assessed (Arrhythmia/Dysrhythmia) --  all   Problems Present (Dysrhythmia) situational response --        Problem: Skin Injury Risk (Adult)  Goal: Skin Health and Integrity  Outcome: Ongoing (interventions implemented as appropriate)   07/20/18 0579   Skin Injury Risk (Adult)   Skin Health and Integrity making progress toward outcome

## 2018-07-20 NOTE — PROGRESS NOTES
Continued Stay Note   [facility]     Patient Name: Akshat Winkler  MRN: [id number]  Today's Date: 7/20/2018    Admit Date: 7/17/2018          Discharge Plan     Row Name 07/20/18 1625       Plan    Plan Social work with the patient about Tikosyn and provided him with a patient assistance program application for Tikosyn that he will be required to complete and send to the drug company as well as his proof of income to see if he might qualify for the program.      Patient/Family in Agreement with Plan yes              Discharge Codes    No documentation.       Expected Discharge Date and Time     Expected Discharge Date Expected Discharge Time    Jul 20, 2018             JOSE Ortega

## 2018-07-20 NOTE — PROGRESS NOTES
[facility] at [facility]  Progress Note     LOS: 3 days   Patient Care Team:  Oren Mark MD as PCP - General (Family Medicine)  Lucian Alvarez IV, MD as Consulting Physician (Cardiology)  Mandy Clark PA-C as Physician Assistant (Physician Assistant)  Josh Moss MD as Consulting Physician (Pain Medicine)  FAITH Nix as Nurse Practitioner (Cardiology)  Akshat Davidson MD as Surgeon (Neurosurgery)    Chief Complaint:  Atrial Fibrillation    Subjective      Doing well. No complaints. Wants to go home. Has Unna boots now, put on yesterday by PT. GI to see today for elevated liver enzymes.  No new complaints    Interval History:         Review of Systems:   Pertinent positives in HPI, all others reviewed and negative.      Objective       Current Facility-Administered Medications:   •  acetaminophen (TYLENOL) tablet 650 mg, 650 mg, Oral, Q8H PRN, FAITH Gregorio  •  apixaban (ELIQUIS) tablet 5 mg, 5 mg, Oral, Q12H, FAITH Gregorio, 5 mg at 07/19/18 2109  •  aspirin EC tablet 81 mg, 81 mg, Oral, Daily, FAITH Gregorio, 81 mg at 07/19/18 0814  •  bumetanide (BUMEX) injection 1 mg, 1 mg, Intravenous, Once, FAITH Gregorio  •  bumetanide (BUMEX) tablet 2 mg, 2 mg, Oral, Daily, FAITH Gregorio, 2 mg at 07/19/18 0814  •  calcium carbonate (TUMS) chewable tablet 500 mg (200 mg elemental), 2 tablet, Oral, TID PRN, Lex Teran III, MD, 2 tablet at 07/18/18 0925  •  dofetilide (TIKOSYN) capsule 125 mcg, 125 mcg, Oral, Q12H, Joaquin Jeffery MD, 125 mcg at 07/19/18 2110  •  gabapentin (NEURONTIN) capsule 100 mg, 100 mg, Oral, Q12H, Joaquin Jeffery MD, 100 mg at 07/19/18 2110  •  Magnesium Sulfate 2 gram Bolus, followed by 8 gram infusion (total Mg dose 10 grams)- Mg less than or equal to 1mg/dL, 2 g, Intravenous, PRN **OR** Magnesium Sulfate 2 gram / 50mL Infusion (GIVE X 3 BAGS  "TO EQUAL 6GM TOTAL DOSE) - Mg 1.1 - 1/5 mg/dl, 2 g, Intravenous, PRN **OR** Magnesium Sulfate 4 gram infusion- Mg 1.6-1.9 mg/dL, 4 g, Intravenous, PRN, Sly Hodges, [facility], Last Rate: 25 mL/hr at 07/18/18 1228, 4 g at 07/18/18 1228  •  melatonin sublingual tablet 5 mg, 5 mg, Sublingual, Nightly PRN, Lex Teran III, MD, 5 mg at 07/19/18 2111  •  methocarbamol (ROBAXIN) tablet 750 mg, 750 mg, Oral, BID, FAITH Gregorio, 750 mg at 07/19/18 2110  •  Pharmacy To Dose Tikosyn, , Does not apply, Once PRN, FAITH Gregorio  •  potassium chloride (KLOR-CON) packet 40 mEq, 40 mEq, Oral, PRN, Sly Hodges, [facility]  •  potassium chloride (MICRO-K) CR capsule 40 mEq, 40 mEq, Oral, PRN, Sly Hodges, [facility], 40 mEq at 07/19/18 2131  •  potassium chloride (MICRO-K) CR capsule 40 mEq, 40 mEq, Oral, Once, RUPESH Rodriguez  •  rosuvastatin (CRESTOR) tablet 20 mg, 20 mg, Oral, Daily, FAITH Gregorio, Stopped at 07/19/18 0815  •  spironolactone (ALDACTONE) tablet 25 mg, 25 mg, Oral, Daily, FAITH Gregorio, 25 mg at 07/19/18 0814  •  tiZANidine (ZANAFLEX) tablet 2 mg, 2 mg, Oral, Nightly PRN, FAITH Gregorio  •  traMADol (ULTRAM) tablet 50 mg, 50 mg, Oral, BID, Joaquin Jfefery MD, 50 mg at 07/19/18 2110      Vital Sign Min/Max for last 24 hours  Temp  Min: 97.8 °F (36.6 °C)  Max: 98.3 °F (36.8 °C)   BP  Min: 115/81  Max: 150/94   Pulse  Min: 64  Max: 82   Resp  Min: 16  Max: 18   SpO2  Min: 93 %  Max: 97 %   No Data Recorded   Weight  Min: 133 kg (292 lb 9.6 oz)  Max: 133 kg (292 lb 9.6 oz)     Flowsheet Rows      First Filed Value   Admission Height  172.7 cm (68\") Documented at 07/17/2018 0900   Admission Weight  134 kg (296 lb 8 oz) Documented at 07/17/2018 0900          Physical Exam:     General Appearance:    Alert, cooperative, in no acute distress   Lungs:     CTA bilaterally. No rales or wheezes    Heart:    RRR Nl S1 S2 S4   Chest Wall:    No " abnormalities observed   Abdomen:     Normal bowel sounds, no masses, no organomegaly, soft        non-tender, non-distended, no guarding, no rebound                tenderness   Extremities:   Moves all extremities well,+ significant edema 3+ and chronic venous stasis skin changes. Unna boots in place.    Pulses:   Pulses palpable and equal bilaterally   Skin:   No bleeding, bruising or rash        Results Review:     Results from last 7 days  Lab Units 18  0520   WBC 10*3/mm3 5.38   HEMOGLOBIN g/dL 13.6   HEMATOCRIT % 42.4   PLATELETS 10*3/mm3 175       Results from last 7 days  Lab Units 18  1521 18  0520 18  0525 18  1142   SODIUM mmol/L  --  138 138 136   POTASSIUM mmol/L 3.6 3.4* 3.7 4.2   CHLORIDE mmol/L  --  99 98* 99   CO2 mmol/L  --  27.0 27.0 29.0   BUN mg/dL  --  22 21 20   CREATININE mg/dL  --  1.26 1.24 1.31*   GLUCOSE mg/dL  --  89 88 92            Results from last 7 days  Lab Units 07/15/18  1408   PROTIME Seconds 14.2*   INR  1.35*   APTT seconds 34.6*       Lab Results   Component Value Date    ALT 19 2018     Hepatic Function Panel   Order: [id number]   Status:  Final result   Visible to patient:  No (Not Released)    Ref Range & Units 07:09   Total Protein 5.7 - 8.2 g/dL 7.7    Albumin 3.20 - 4.80 g/dL 3.84    ALT (SGPT) 7 - 40 U/L 19    AST (SGOT) 0 - 33 U/L 43     Alkaline Phosphatase 25 - 100 U/L 250     Total Bilirubin 0.3 - 1.2 mg/dL 4.1     Bilirubin, Direct 0.0 - 0.2 mg/dL 2.2     Bilirubin, Indirect 0.1 - 1.1 mg/dL 1.9                     Magnesium: 2.5      No intake or output data in the 24 hours ending 18 0804    I personally viewed and interpreted the patient's EKG/Telemetry data      EK18: NSR 73 bpm, QTc 440 ms    Telemetry: NSR 64-82 bpm      Present on Admission:  • Persistent atrial fibrillation/flutter admitted for initiation of Tikosyn  • Class 3 obesity in adult  • Severe obstructive sleep apnea. On home CPAP  • Chronic  bilateral severe lower extremity edema  • Hypertension  • Hyperlipidemia LDL goal <70  • Severe Diastolic dysfunction  • Chronically elevated bilirubin and alkaline phosphatase. Probable chronic liver congestion versus occult cirrhosis    Assessment/Plan   1. Atrial Fibrillation: Admitted for Tikosyn 7/17/18.   QTc prolonged after 500 mcg dose of tikosyn: dose lowered to 125 mcg po BID, and now stopped due to elevated liver enzymes.  Today, his QTc is normal at 440 ms.    2. WCT: prob AFL with LBBB aberrancy, now in NSR  3. Chronic diastolic CHF:  - Normal LVEF per echo 2/2018  - Continue PO Bumex: add prn metloazine  - Unna boots for significant LE swelling- now in place  - Strict I/O's, daily weights     4. HTN:  - Well controlled, continue to monitor and adjust meds as needed    5. Hypokalemia -  Resolved and replaced.     6. Elevated liver enzymes: GI consulted and to see today.     Plan for disposition:  GI to see today, home possibly today. Follow up in 2 months with Dr. Jeffery.     RUPESH Martino  07/20/18  8:04 AM      IJoaquin MD, personally performed the services face to face as described and documented by the above named individual. I have made any necessary edits and it is both accurate and complete 7/20/2018  5:50 PM

## 2018-07-20 NOTE — PROGRESS NOTES
Continued Stay Note  [facility]     Patient Name: Akshat Winkler  MRN: [id number]  Today's Date: 7/20/2018    Admit Date: 7/17/2018          Discharge Plan     Row Name 07/20/18 1702       Plan    Plan Home    Plan Comments Spoke with Mr. Winkler. As of now, he is not taking Tikosyn. His Tikosyn precert was denied twice and the appeal will need to be started on Monday. Until then, if Mr. Winkler is discharged on Tikosyn, he can afford to pay for it out of pocket and plans to get it from their [facility] pharmacy.     Final Discharge Disposition Code 01 - home or self-care    Row Name 07/20/18 1639       Plan    Plan Plans home with family    Row Name 07/20/18 1623       Plan    Plan Social work with the patient about Tikosyn and provided him with a patient assistance program application for Tikosyn that he will be required to complete and send to the drug company as well as his proof of income to see if he might qualify for the program.      Patient/Family in Agreement with Plan yes              Discharge Codes    No documentation.       Expected Discharge Date and Time     Expected Discharge Date Expected Discharge Time    Jul 20, 2018             Nereida Greenberg RN

## 2018-07-20 NOTE — CONSULTS
[facility] Consult    Referring Provider: Joaquin Jeffery MD    PCP: Oren Mark MD    Reason for Consultation: Hyperbilirubinemia, elevated AST     Chief complaint:  Atrial fibrillation     History of present illness:    Akshat Winkler is a 63 y.o. male who is admitted with Atrial fibrillation and initiation of Tikosyn.  He has a history of chronic diastolic heart failure, CAD, DM and obesity.  GI is consulted due to findings of elevated LFTs.  Labs show chronic hyperbilirubinemia from 2014 that has steadily increased to 4.1.  AST and Alk phos are elevated.  He denies any history of liver disease.  He denies any alcohol use.  No family history of liver disease.      Allergies:  Bisoprolol; Flecainide; and Metoprolol    Scheduled Meds:    apixaban 5 mg Oral Q12H   aspirin 81 mg Oral Daily   bumetanide 1 mg Intravenous Once   bumetanide 2 mg Oral Daily   gabapentin 100 mg Oral Q12H   methocarbamol 750 mg Oral BID   potassium chloride 40 mEq Oral Once   spironolactone 25 mg Oral Daily   traMADol 50 mg Oral BID        Infusions:       PRN Meds:  •  acetaminophen  •  calcium carbonate  •  magnesium sulfate **OR** magnesium sulfate **OR** magnesium sulfate  •  melatonin  •  Pharmacy Consult  •  potassium chloride  •  potassium chloride  •  tiZANidine    Home Meds:  Facility-Administered Medications Prior to Admission   Medication Dose Route Frequency Provider Last Rate Last Dose   • [DISCONTINUED] bumetanide (BUMEX) injection 1 mg  1 mg Intravenous Once FAITH Zambrano         Prescriptions Prior to Admission   Medication Sig Dispense Refill Last Dose   • apixaban (ELIQUIS) 5 MG tablet tablet Take 1 tablet by mouth Every 12 (Twelve) Hours. 60 tablet  7/17/2018 at Unknown time   • aspirin 81 MG EC tablet Take 81 mg by mouth Every Morning.   7/17/2018 at Unknown time   • bumetanide (BUMEX) 2 MG tablet Take 1 mg by mouth Daily. On Monday through Friday 7/16/2018 at Unknown time   •  bumetanide (BUMEX) 2 MG tablet Take 3 mg by mouth Daily. On Saturday and Sunday      • gabapentin (NEURONTIN) 100 MG capsule Take 1 capsule by mouth 3 (Three) Times a Day. 90 capsule 1 7/17/2018 at Unknown time   • methocarbamol (ROBAXIN) 750 MG tablet Take 1 tablet by mouth 3 (Three) Times a Day. (Patient taking differently: Take 750 mg by mouth 2 (Two) Times a Day. Take one tablet at noon and one tablet nightly) 60 tablet 1 7/17/2018 at Unknown time   • potassium chloride (K-DUR) 10 MEQ CR tablet Take 1 tablet by mouth Daily. (Patient taking differently: Take 10 mEq by mouth Every Other Day.) 30 tablet 5 7/16/2018 at Unknown time   • rosuvastatin (CRESTOR) 20 MG tablet Take 1 tablet by mouth Daily for 360 days. 90 tablet 3 7/16/2018 at Unknown time   • spironolactone (ALDACTONE) 25 MG tablet Take 1 tablet by mouth Daily. (Patient taking differently: Take 12.5 mg by mouth Daily.) 90 tablet 3 7/17/2018 at Unknown time   • tiZANidine (ZANAFLEX) 4 MG tablet Take 1 tablet by mouth At Night As Needed for Muscle Spasms. 30 tablet 1 7/16/2018 at Unknown time   • traMADol (ULTRAM) 50 MG tablet Take 1 tablet by mouth 2 (Two) Times a Day. 60 tablet 1 7/16/2018 at Unknown time   • acetaminophen (TYLENOL) 500 MG tablet Take 500 mg by mouth 2 (Two) Times a Day. Takes 1 tablet in the morning and 1 tablet at noon   Taking   • HYDROcodone-acetaminophen (NORCO) 5-325 MG per tablet Take 1 tablet by mouth 3 (Three) Times a Day As Needed for Moderate Pain . (Patient taking differently: Take 1 tablet by mouth 2 (Two) Times a Day. Take 1 tablet at noon and 1 tablet nightly) 50 tablet 0 More than a month at Unknown time       ROS: Review of Systems   Constitutional: Positive for fatigue.   HENT: Negative for trouble swallowing and voice change.    Eyes: Negative.    Respiratory: Positive for shortness of breath.    Cardiovascular: Positive for palpitations and leg swelling.   Gastrointestinal: Negative for abdominal pain, nausea and  vomiting.   Endocrine: Negative.    Genitourinary: Negative.    Musculoskeletal: Negative.    Skin: Negative.    Allergic/Immunologic: Negative.    Neurological: Negative.    Hematological: Negative.    Psychiatric/Behavioral: Negative.        PAST MED HX:  Past Medical History:   Diagnosis Date   • Acute diastolic HF (heart failure) (CMS/[facility])    • Acute hypokalemia    • Arrhythmia    • Back injury     Fall on 12/20/17   • Cellulitis of leg    • Chest pain syndrome    • CHF (congestive heart failure) (CMS/[facility])    • Diabetes mellitus (CMS/[facility])     patient reports borderline    • Hyperlipidemia    • Hypertension    • Muscle pain     around back   • Obesity    • Obstructive sleep apnea     right now patient is sleeeping in recliner and does not use-when he lies down he will have to use cpap   • Paroxysmal a-fib (CMS/[facility])    • Peripheral artery disease (CMS/[facility])    • Spondylosis of 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 or radiculopathy 2/7/2018   • Wears glasses        PAST SURG HX:  Past Surgical History:   Procedure Laterality Date   • CARDIAC CATHETERIZATION     • CARDIOVERSION      multiple   • COLONOSCOPY      about 14 years ago   • KYPHOPLASTY N/A 3/5/2018    Procedure: KYPHOPLASTY L4;  Surgeon: Akshat Davidson MD;  Location:  [facility] OR;  Service:    • KYPHOPLASTY N/A 4/16/2018    Procedure: KYPHOPLASTY L1 AND L2;  Surgeon: Akshat Davidson MD;  Location:  [facility] OR;  Service: Neurosurgery   • OTHER SURGICAL HISTORY  06/29/2015    PVA       FAM HX:  Family History   Problem Relation Age of Onset   • Hypertension Mother    • Stroke Mother    • Stroke Father    • Sleep disorder Father    • Cancer Brother        SOC HX:  Social History     Social History   • Marital status:      Spouse name: N/A   • Number of children: N/A   • Years of education: N/A     Occupational History   • Not on file.     Social History Main Topics   • Smoking status: Never Smoker   • Smokeless tobacco: Never Used   • Alcohol use No   • Drug  "use: No   • Sexual activity: Defer     Other Topics Concern   • Not on file     Social History Narrative    Patient lives at home with his wife and denies caffeine intake.       PHYSICAL EXAM  /85   Pulse 79   Temp 98.1 °F (36.7 °C) (Oral)   Resp 16   Ht 172.7 cm (67.99\")   Wt 133 kg (292 lb 9.6 oz)   SpO2 93%   BMI 44.50 kg/m²   Wt Readings from Last 3 Encounters:   07/20/18 133 kg (292 lb 9.6 oz)   06/19/18 126 kg (277 lb)   06/08/18 133 kg (293 lb 9.6 oz)   ,body mass index is 44.5 kg/m².  Physical Exam   Constitutional: He is oriented to person, place, and time. He appears well-developed. No distress.   Morbid obesity.     HENT:   Head: Normocephalic and atraumatic.   Eyes: No scleral icterus.   Neck: Normal range of motion.   Cardiovascular: Normal rate and regular rhythm.    Pulmonary/Chest: Effort normal. No respiratory distress.   Abdominal: He exhibits distension. There is no tenderness.   Exam limited by obesity.   ? Ascites    Musculoskeletal: He exhibits edema.   Marked peripheral edema    Neurological: He is alert and oriented to person, place, and time.   Skin: Skin is warm and dry. He is not diaphoretic.   Psychiatric: His behavior is normal.       Results Review:   I reviewed the patient's new clinical results.    Lab Results   Component Value Date    WBC 5.38 07/19/2018    HGB 13.6 07/19/2018    HGB 15.9 04/16/2018    HGB 14.7 03/04/2018    HCT 42.4 07/19/2018    MCV 98.8 07/19/2018     07/19/2018       Lab Results   Component Value Date    INR 1.35 (H) 07/15/2018    INR 1.20 (H) 02/27/2018    INR 1.32 03/16/2015       Lab Results   Component Value Date    GLUCOSE 78 07/20/2018    BUN 21 07/20/2018    CREATININE 1.26 07/20/2018    EGFRIFNONA 58 (L) 07/20/2018    EGFRIFAFRI 92 02/08/2018    BCR 16.7 07/20/2018    CO2 25.0 07/20/2018    CALCIUM 9.5 07/20/2018    PROTENTOTREF 7.0 03/03/2018    ALBUMIN 3.84 07/20/2018    ALKPHOS 250 (H) 07/20/2018    BILITOT 4.1 (H) 07/20/2018    " BILIDIR 2.2 (H) 07/20/2018    ALT 19 07/20/2018    AST 43 (H) 07/20/2018     Direct Bilirubin 2.2  Indirect Bilirubin 1.9  Acute hepatitis panel is negative     ASSESSMENTS/PLANS    1. Hyperbilirubinemia, chronic  2. Elevated LFTs   3. Chronic diastolic heart failure   4. Atrial fibrillation   5. Coagulopathy    Labs concerning for underlying cirrhosis.  Possible non alcoholic fatty liver disease.    >>> Will order CT abdomen/pelvis today   >>> Check TAY, antismooth muscle antibodies, antimitochondrial antibody and Ferritin   >>> TSH     I discussed the patients findings and my recommendations with patient    RUPESH Hodge  07/20/18  10:34 AM

## 2018-07-20 NOTE — PLAN OF CARE
Problem: Arrhythmia/Dysrhythmia (Symptomatic) (Adult)  Goal: Signs and Symptoms of Listed Potential Problems Will be Absent, Minimized or Managed (Arrhythmia/Dysrhythmia)  Outcome: Ongoing (interventions implemented as appropriate)

## 2018-07-21 LAB
ACTIN IGG SERPL-ACNC: 20 UNITS (ref 0–19)
DEPRECATED MITOCHONDRIA M2 IGG SER-ACNC: <20 UNITS (ref 0–20)

## 2018-07-23 LAB — ANA SER QL: NEGATIVE

## 2018-07-23 NOTE — DISCHARGE SUMMARY
[facility]  DISCHARGE SUMMARY    Date of Discharge:  7/23/2018    Discharge Diagnosis: Persistent atrial fibrillation, chronic diastolic congestive heart failure    Presenting Problem/History of Present Illness  A-fib (CMS/[facility]) [I48.91]  Persistent atrial fibrillation (CMS/[facility]) [I48.1]  AF (atrial fibrillation) (CMS/[facility]) [I48.91]    Patient is a 63-year-old  male with the above-noted past medical history who presents today to initiate Tikosyn for recurrent, symptomatic atrial fibrillation.  He has a long-standing history of atrial fibrillation since 2011, underwent PVA with Dr. Cary in 2015 and most recently underwent external cardioversion in December.  He was trialed on flecainide after his cardioversion but was intolerant to this due to severe hypotension, syncope, with a subsequent back injury for which she underwent back surgery in March.  Symptoms with atrial fibrillation include fatigue, SOB, exacerabated by activity, relieved by rest.  He has been anticoagulated with Eliquis with no missed doses, bleeding issues, or TIA/CVA symptoms.  He does continue to have extreme lower extremity swelling for which [facility].  He has had limited improvement with IV diuresis at his office visits.  He was referred for outpatient PT in June for Unna boots but has not had these put on.    Hospital Course  The patient was admitted to the telemetry floor on 7/17 for Tikosyn initiation. On admission, the patient's labs were reviewed, pharmacy was consulted and the patient's dose was calculated. Tikosyn was initiated at 500 mcg BID and the QTc interval was followed per protocol.  The patient self converted to NSR.   On 7/18, the patient was noted to convert from normal sinus rhythm to a wide complex tachycardia with a rate in the 140s.  The patient was overall asymptomatic with this.  There was concern for possible VT versus atrial flutter with RVR  and aberrancy.  The patient was given a one-time dose of IV lidocaine and IV magnesium.  He was transferred to the ICU for closer monitoring.  He self converted back to normal rhythm.  Repeat EKG did show a prolonged QTc so his Tikosyn dose was decreased to 125 µg BID.  During his admission, labs revealed an elevated bilirubin (somewhat chronic in nature though higher than baseline), elevated alkaline phosphatase, and mild elevation of AST for which GI was consulted.  There was concern for cirrhosis versus fatty liver disease.  His Tikosyn was subsequently discontinued.  Hepatitis panel was negative.  CT did show distended IVC and iliac veins, small right and minimal left pleural effusion, with suspected mild/early formation of retroperitoneal varices.  He does have a history of diastolic heart failure with extensive lower extremity swelling.  He did have Unna boots placed per PT during his admission.  On 7/20, he was felt stable for discharge home on Bumex 1 mg daily Monday through Friday and 3 mg on Saturday and Sunday.  Metolazone 2.5 mg was added to his regimen to take Monday Wednesday and Friday.  At the time of discharge, the patient is stable and appropriate for discharge to home with plan for follow up with Dr. Jeffery in 6-8 weeks.  He will continue Eliquis for stroke prevention.    Procedures Performed         Consults:   Consults     Date and Time Order Name Status Description    7/20/2018 0822 Inpatient Gastroenterology Consult Completed           Condition on Discharge:  Stable    Physical Exam at Discharge  Vital Signs:  Temp: 36.6 C  Pulse: 69  Resp: 16  /98  SpO2: 93%     Physical Exam:  General Appearance:    Alert, cooperative, in no acute distress   Lungs:     CTA bilaterally. No rales or wheezes    Heart:    RRR Nl S1 S2 S4   Chest Wall:    No abnormalities observed   Abdomen:     Normal bowel sounds, no masses, no organomegaly, soft   non-tender, non-distended, no guarding, no rebound    tenderness   Extremities:   Moves all extremities well,+ significant edema 3+ and chronic venous stasis skin changes. Unna boots in place.    Pulses:   Pulses palpable and equal bilaterally   Skin:   No bleeding, bruising or rash         Discharge Disposition: Home    Discharge Diet: Cardiac heart healthy diet    Activity at Discharge: As tolerated    Follow-up Appointments  Future Appointments  Date Time Provider Department Center   7/24/2018 1:15 PM FAITH Roy [facility] None     Additional Instructions for the Follow-ups that You Need to Schedule     Discharge Follow-up with Specialty: Dr. Jeffery; 2 Months    As directed      Specialty:  Dr. Jeffery    Follow Up:  2 Months         Basic Metabolic Panel     Jul 27, 2018 (Approximate)            Discharge Medications     Discharge Medications      New Medications      Instructions Start Date   metOLazone 2.5 MG tablet  Commonly known as:  ZAROXOLYN   2.5 mg, Oral, Every Other Day, Take 1 tablet with bumex QOD M, W, F         Changes to Medications      Instructions Start Date   HYDROcodone-acetaminophen 5-325 MG per tablet  Commonly known as:  NORCO  What changed:  · when to take this  · additional instructions   1 tablet, Oral, 3 Times Daily PRN      methocarbamol 750 MG tablet  Commonly known as:  ROBAXIN  What changed:  · when to take this  · additional instructions   750 mg, Oral, 3 Times Daily         Continue These Medications      Instructions Start Date   apixaban 5 MG tablet tablet  Commonly known as:  ELIQUIS   5 mg, Oral, Every 12 Hours Scheduled      aspirin 81 MG EC tablet   81 mg, Oral, Every Morning      bumetanide 2 MG tablet  Commonly known as:  BUMEX   1 mg, Oral, Daily, On Monday through Friday      bumetanide 2 MG tablet  Commonly known as:  BUMEX   3 mg, Oral, Daily, On Saturday and Sunday       gabapentin 100 MG capsule  Commonly known as:  NEURONTIN   100 mg, Oral, 3 Times Daily      potassium chloride 10 MEQ CR  tablet  Commonly known as:  K-DUR   10 mEq, Oral, Every Other Day      rosuvastatin 20 MG tablet  Commonly known as:  CRESTOR   20 mg, Oral, Daily      spironolactone 25 MG tablet  Commonly known as:  ALDACTONE   12.5 mg, Oral, Daily      tiZANidine 4 MG tablet  Commonly known as:  ZANAFLEX   4 mg, Oral, Nightly PRN      traMADol 50 MG tablet  Commonly known as:  ULTRAM   50 mg, Oral, 2 Times Daily         Stop These Medications    acetaminophen 500 MG tablet  Commonly known as:  TYLENOL             Maty South, FAITH  07/23/18  10:44 AM

## 2018-07-23 NOTE — PROGRESS NOTES
Continued Stay Note  [facility]     Patient Name: Akshat Winkler  MRN: [id number]  Today's Date: 7/23/2018    Admit Date: 7/17/2018          Discharge Plan     Row Name 07/23/18 0908       Plan    Plan Comments PA does not need to be done. Tikosyn stopped prior to d/c due to elevated liver enzymes.               Discharge Codes    No documentation.       Expected Discharge Date and Time     Expected Discharge Date Expected Discharge Time    Jul 20, 2018             Peggy Collins

## 2018-07-24 ENCOUNTER — OFFICE VISIT (OUTPATIENT)
Dept: CARDIOLOGY | Facility: CLINIC | Age: 63
End: 2018-07-24

## 2018-07-24 VITALS
WEIGHT: 294 LBS | HEIGHT: 70 IN | HEART RATE: 75 BPM | BODY MASS INDEX: 42.09 KG/M2 | SYSTOLIC BLOOD PRESSURE: 142 MMHG | DIASTOLIC BLOOD PRESSURE: 90 MMHG

## 2018-07-24 DIAGNOSIS — I10 ESSENTIAL HYPERTENSION: ICD-10-CM

## 2018-07-24 DIAGNOSIS — I48.19 PERSISTENT ATRIAL FIBRILLATION (HCC): Primary | ICD-10-CM

## 2018-07-24 DIAGNOSIS — E78.5 HYPERLIPIDEMIA LDL GOAL <70: ICD-10-CM

## 2018-07-24 DIAGNOSIS — R60.0 BILATERAL LOWER EXTREMITY EDEMA: ICD-10-CM

## 2018-07-24 DIAGNOSIS — I50.32 CHRONIC DIASTOLIC HEART FAILURE (HCC): ICD-10-CM

## 2018-07-24 DIAGNOSIS — I25.119 CORONARY ARTERY DISEASE INVOLVING NATIVE CORONARY ARTERY OF NATIVE HEART WITH ANGINA PECTORIS (HCC): ICD-10-CM

## 2018-07-24 PROBLEM — M48.50XA PATHOLOGIC COMPRESSION FRACTURE OF VERTEBRA (HCC): Status: RESOLVED | Noted: 2018-02-21 | Resolved: 2018-07-24

## 2018-07-24 PROBLEM — N17.9 ACUTE RENAL FAILURE (HCC): Status: RESOLVED | Noted: 2018-02-27 | Resolved: 2018-07-24

## 2018-07-24 PROBLEM — E87.6 HYPOKALEMIA: Status: RESOLVED | Noted: 2018-03-02 | Resolved: 2018-07-24

## 2018-07-24 PROBLEM — R00.0 WIDE-COMPLEX TACHYCARDIA: Status: RESOLVED | Noted: 2018-07-18 | Resolved: 2018-07-24

## 2018-07-24 PROBLEM — I51.89 DIASTOLIC DYSFUNCTION: Status: RESOLVED | Noted: 2018-07-18 | Resolved: 2018-07-24

## 2018-07-24 PROCEDURE — 99214 OFFICE O/P EST MOD 30 MIN: CPT | Performed by: INTERNAL MEDICINE

## 2018-07-24 PROCEDURE — 93000 ELECTROCARDIOGRAM COMPLETE: CPT | Performed by: INTERNAL MEDICINE

## 2018-07-24 NOTE — ASSESSMENT & PLAN NOTE
· Functional class III symptoms  · Continue present dosing of Bumex, spironolactone, and metolazone  · BMP in 1 week  · Surgery referral to [facility] for venous insufficiency if patient desires

## 2018-07-24 NOTE — ASSESSMENT & PLAN NOTE
· Presently maintaining sinus rhythm  · Defer antiarrhythmic therapy if fibrillation/flutter recurs unless the patient is symptomatic  · Continue Eliquis 5 mg twice a day

## 2018-07-24 NOTE — ASSESSMENT & PLAN NOTE
· Continue diuretics  · Recommend elevation  · Consider referral to [facility] for evaluation if patient desires

## 2018-07-24 NOTE — PROGRESS NOTES
Encounter Date:07/24/2018    Patient ID: Akshat Winkler is a  63 y.o. male who resides in [place], [place].    CC/Reason for visit:  Shortness of Breath            Akshat Winkler returns to my office today in follow up of his chronic diastolic heart failure, asymptomatic atrial fibrillation/flutter, and cardiac risk factors. The patient was recently admitted to the hospital for initiation of Tikosyn. He was unable to tolerate Tikosyn due to ventricular tachycardia. The patient has had intolerance to flecainide in the past. The patient's primary complaints presently are that of lower extremity edema. At the time of discharge, Dr. Jeffery prescribed metolazone 2.5 mg to be taken every other day. His back continues to bother him from where he fell and suffered a compression fracture.          Review of Systems   Constitution: Negative for weakness and malaise/fatigue.   Eyes: Negative for vision loss in left eye and vision loss in right eye.   Cardiovascular: Positive for leg swelling. Negative for chest pain, dyspnea on exertion, near-syncope, orthopnea, palpitations, paroxysmal nocturnal dyspnea and syncope.   Respiratory: Positive for shortness of breath.    Musculoskeletal: Negative for myalgias.   Neurological: Negative for brief paralysis, excessive daytime sleepiness, focal weakness, numbness and paresthesias.   All other systems reviewed and are negative.      The patient's past medical, social, family history and ROS reviewed in the patient's electronic medical record.    Allergies  Bisoprolol; Flecainide; Metoprolol; and Tikosyn [dofetilide]    Outpatient Prescriptions Marked as Taking for the 7/24/18 encounter (Office Visit) with Lucian Alvarez IV, MD   Medication Sig Dispense Refill   • apixaban (ELIQUIS) 5 MG tablet tablet Take 1 tablet by mouth Every 12 (Twelve) Hours for 360 days. 180 tablet 3   • aspirin 81 MG EC tablet Take 81 mg by mouth Every Morning.     • bumetanide (BUMEX) 2 MG  "tablet Take 1 mg by mouth Daily. On Monday through Friday     • bumetanide (BUMEX) 2 MG tablet Take 3 mg by mouth Daily. On Saturday and Sunday     • gabapentin (NEURONTIN) 100 MG capsule Take 1 capsule by mouth 3 (Three) Times a Day. 90 capsule 1   • methocarbamol (ROBAXIN) 750 MG tablet Take 1 tablet by mouth 3 (Three) Times a Day. (Patient taking differently: Take 750 mg by mouth 2 (Two) Times a Day. Take one tablet at noon and one tablet nightly) 60 tablet 1   • metOLazone (ZAROXOLYN) 2.5 MG tablet Take 1 tablet by mouth Every Other Day. Take 1 tablet with bumex QOD M, W, F 20 tablet 0   • potassium chloride (K-DUR) 10 MEQ CR tablet Take 1 tablet by mouth Every Other Day. 60 tablet 6   • rosuvastatin (CRESTOR) 20 MG tablet Take 1 tablet by mouth Daily for 360 days. 90 tablet 3   • spironolactone (ALDACTONE) 25 MG tablet Take 0.5 tablets by mouth Daily. 30 tablet 6   • tiZANidine (ZANAFLEX) 4 MG tablet Take 1 tablet by mouth At Night As Needed for Muscle Spasms. 30 tablet 1   • traMADol (ULTRAM) 50 MG tablet Take 1 tablet by mouth 2 (Two) Times a Day. 60 tablet 1   • [DISCONTINUED] apixaban (ELIQUIS) 5 MG tablet tablet Take 1 tablet by mouth Every 12 (Twelve) Hours. 60 tablet          Blood pressure 142/90, pulse 75, height 177.8 cm (70\"), weight 133 kg (294 lb).  Body mass index is 42.18 kg/m².  There were no vitals filed for this visit.    Physical Exam   Constitutional: He is oriented to person, place, and time. He appears well-developed and well-nourished.   HENT:   Head: Normocephalic and atraumatic.   Eyes: Pupils are equal, round, and reactive to light. No scleral icterus.   Neck: No JVD present. Carotid bruit is not present. No thyromegaly present.   Cardiovascular: Normal rate, regular rhythm, S1 normal and S2 normal.  Exam reveals no gallop.    No murmur heard.  Pulmonary/Chest: Effort normal and breath sounds normal.   Abdominal: Soft. There is no hepatosplenomegaly. There is no tenderness. "   Neurological: He is alert and oriented to person, place, and time.   Skin: Skin is warm and dry. No cyanosis. Nails show no clubbing.   Psychiatric: He has a normal mood and affect. His behavior is normal.       Data Review:       ECG 12 Lead  Date/Time: 7/24/2018 6:36 PM  Performed by: CHARANJIT FERRARI IV  Authorized by: CHARANJIT FERRARI IV   Rhythm: sinus rhythm  BPM: 75  QRS axis: right              Lab Results   Component Value Date    TRIG 44 05/09/2017    HDL 61 (H) 05/09/2017    LDL 68 05/09/2017    AST 43 (H) 07/20/2018    ALT 19 07/20/2018     Lab Results   Component Value Date    HGBA1C 6.20 (H) 07/19/2018     Lab Results   Component Value Date    GLUCOSE 78 07/20/2018    BUN 21 07/20/2018    CREATININE 1.26 07/20/2018    EGFRIFNONA 58 (L) 07/20/2018    EGFRIFAFRI 92 02/08/2018    BCR 16.7 07/20/2018    K 4.0 07/20/2018    CO2 25.0 07/20/2018    CALCIUM 9.5 07/20/2018    PROTENTOTREF 7.0 03/03/2018    ALBUMIN 3.84 07/20/2018    LABIL2 0.7 03/03/2018    AST 43 (H) 07/20/2018    ALT 19 07/20/2018     Lab Results   Component Value Date    TSH 5.612 (H) 07/20/2018     Lab Results   Component Value Date    WBC 5.38 07/19/2018    HGB 13.6 07/19/2018    HCT 42.4 07/19/2018    MCV 98.8 07/19/2018     07/19/2018        Problem List Items Addressed This Visit        Cardiovascular and Mediastinum    Chronic diastolic heart failure (CMS/HCC)    Overview     · Cardiac cath (02/20/14):  elevated LVEDP suggesting diastolic heart failure.  · Intolerant to beta blocker therapy         Current Assessment & Plan     · Functional class III symptoms  · Continue present dosing of Bumex, spironolactone, and metolazone  · BMP in 1 week  · Surgery referral to [facility] for venous insufficiency if patient desires         Relevant Orders    Basic Metabolic Panel    Persistent atrial fibrillation/flutter - Primary    Overview     · Diagnosed 2011  · [id number] 3 (HTN, CAD, DM)  · Echo (10/11/2011):  Normal LVEF, mild MR, mild LA dilation  · LOLY/ECVcardioversion, 12/21/2011, with initiation of propafenone therapy.   · Successful LOLY/ECV for recurrent atrial fibrillation, 11/15/2013  · PVA with Dr. Cary, 6/29/2015  · Cardioversion (07/17/2015) for atrial flutter occurring post ablation   · ECV for recurrent atrial flutter, 12/20/17 with reattempt at beta blocker/flecainide.  · Intolerant to beta blocker/flecanide with severe hypotension, intolerant to propafenone         Current Assessment & Plan     · Continue Eliquis 5 mg twice a day  · Defer a blocker, flecainide and propafenone due to severe hypotension         Relevant Medications    apixaban (ELIQUIS) 5 MG tablet tablet    Coronary artery disease involving native coronary artery of native heart with angina pectoris (CMS/[facility])    Overview     · Cardiac catheterization (02/20/14): mild nonobstructive CAD. LVEF 55%, elevated LVEDP suggesting diastolic heart failure.         Current Assessment & Plan     · Continue aspirin 81 mg daily         Hyperlipidemia LDL goal <70    Overview     · High intensity statin therapy indicated given presence of coronary artery disease         Current Assessment & Plan     · Continue Crestor 20 mg daily            Other    Chronic bilateral severe lower extremity edema    Current Assessment & Plan     · Continue diuretics  · Recommend elevation  · Consider referral to [facility] for evaluation if patient desires                    · BMP in one week  · Continue present diuretic therapy otherwise  · Consider referral to [facility] for venous insufficiency if patient desires  · Defer antiarrhythmic therapy unless patient develops symptoms  · Return in about 3 months (around 10/24/2018).      Lucian Alvarez IV, MD  7/24/2018

## 2018-08-03 ENCOUNTER — LAB REQUISITION (OUTPATIENT)
Dept: LAB | Facility: HOSPITAL | Age: 63
End: 2018-08-03

## 2018-08-03 DIAGNOSIS — Z00.00 ROUTINE GENERAL MEDICAL EXAMINATION AT A HEALTH CARE FACILITY: ICD-10-CM

## 2018-08-03 LAB
ANION GAP SERPL CALCULATED.3IONS-SCNC: 8 MMOL/L (ref 3–11)
BUN BLD-MCNC: 15 MG/DL (ref 9–23)
BUN/CREAT SERPL: 15.3 (ref 7–25)
CALCIUM SPEC-SCNC: 9.1 MG/DL (ref 8.7–10.4)
CHLORIDE SERPL-SCNC: 102 MMOL/L (ref 99–109)
CO2 SERPL-SCNC: 33 MMOL/L (ref 20–31)
CREAT BLD-MCNC: 0.98 MG/DL (ref 0.6–1.3)
GFR SERPL CREATININE-BSD FRML MDRD: 78 ML/MIN/1.73
GLUCOSE BLD-MCNC: 56 MG/DL (ref 70–100)
POTASSIUM BLD-SCNC: 3.5 MMOL/L (ref 3.5–5.5)
SODIUM BLD-SCNC: 143 MMOL/L (ref 132–146)

## 2018-08-03 PROCEDURE — 36415 COLL VENOUS BLD VENIPUNCTURE: CPT | Performed by: INTERNAL MEDICINE

## 2018-08-03 PROCEDURE — 80048 BASIC METABOLIC PNL TOTAL CA: CPT | Performed by: INTERNAL MEDICINE

## 2018-08-06 ENCOUNTER — TELEPHONE (OUTPATIENT)
Dept: CARDIOLOGY | Facility: HOSPITAL | Age: 63
End: 2018-08-06

## 2018-08-06 NOTE — TELEPHONE ENCOUNTER
----- Message from FAITH Collado sent at 8/1/2018  6:52 AM EDT -----  Labs  Metolazone sat and sunday    Patient notes 6 lb weight loss after taking metolazone Saturday and Sunday. He notes improvement in pedal edema. He is currently taking bumex 1 mg daily and 2 mg on Saturdays. He notes that his pants size dropped from 46 to 42. He was instructed to call the office with any questions, concerns or change in symptoms.   Creatinine 0.98, K 35.

## 2018-08-09 LAB
BUN SERPL-MCNC: 15 MG/DL (ref 9–23)
BUN/CREAT SERPL: 15.3 (ref 7–25)
CALCIUM SERPL-MCNC: 9.1 MG/DL (ref 8.7–10.4)
CHLORIDE SERPL-SCNC: 102 MMOL/L (ref 99–109)
CO2 SERPL-SCNC: 33 MMOL/L (ref 20–31)
CREAT SERPL-MCNC: 0.98 MG/DL (ref 0.6–1.3)
GLUCOSE SERPL-MCNC: 56 MG/DL (ref 70–100)
POTASSIUM SERPL-SCNC: 3.5 MMOL/L (ref 3.5–5.5)
SODIUM SERPL-SCNC: 143 MMOL/L (ref 132–146)

## 2018-08-14 ENCOUNTER — HOSPITAL ENCOUNTER (OUTPATIENT)
Dept: PHYSICAL THERAPY | Facility: HOSPITAL | Age: 63
Setting detail: THERAPIES SERIES
Discharge: HOME | End: 2018-08-14

## 2018-08-14 DIAGNOSIS — R60.0 BILATERAL LOWER EXTREMITY EDEMA: ICD-10-CM

## 2018-08-14 DIAGNOSIS — R60.0 BILATERAL LEG EDEMA: Primary | ICD-10-CM

## 2018-08-14 DIAGNOSIS — I89.0 LYMPHEDEMA OF BOTH LOWER EXTREMITIES: ICD-10-CM

## 2018-08-14 DIAGNOSIS — L97.211 VENOUS STASIS ULCER OF RIGHT CALF LIMITED TO BREAKDOWN OF SKIN WITHOUT VARICOSE VEINS (HCC): ICD-10-CM

## 2018-08-14 DIAGNOSIS — I87.2 VENOUS STASIS ULCER OF RIGHT CALF LIMITED TO BREAKDOWN OF SKIN WITHOUT VARICOSE VEINS (HCC): ICD-10-CM

## 2018-08-14 PROCEDURE — 97140 MANUAL THERAPY 1/> REGIONS: CPT | Performed by: PHYSICAL THERAPIST

## 2018-08-14 PROCEDURE — 97162 PT EVAL MOD COMPLEX 30 MIN: CPT | Performed by: PHYSICAL THERAPIST

## 2018-08-14 NOTE — THERAPY EVALUATION
Physical Therapy Lymphedema Initial Evaluation  [facility]     Patient Name: Akshat Winkler  : 1955  MRN: [id number]  Today's Date: 2018      Visit Date: 2018    Visit Dx:    ICD-10-CM ICD-9-CM   1. Bilateral leg edema R60.0 782.3   2. Lymphedema of both lower extremities I89.0 457.1   3. Venous stasis ulcer of right calf limited to breakdown of skin without varicose veins (CMS/HCC) I87.2 459.81    L97.211 707.12       Patient Active Problem List   Diagnosis   • Chronic diastolic heart failure (CMS/HCC)   • Type 2 diabetes mellitus without complication, without long-term current use of insulin (CMS/HCC)   • Hyperlipidemia LDL goal <70   • Essential hypertension   • Coronary artery disease involving native coronary artery of native heart with angina pectoris (CMS/HCC)   • Severe obstructive sleep apnea. On home CPAP   • Class 3 obesity in adult   • Chronic anticoagulation   • Persistent atrial fibrillation/flutter   • Compression fracture of lumbar vertebra (CMS/HCC)   • Spondylosis of 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 or radiculopathy   • Lumbar stenosis with neurogenic claudication   • Lumbar disc herniation   • Myofascial pain   • Pathologic compression fracture of vertebra (CMS/HCC)   • Chronic bilateral severe lower extremity edema   • Chronically elevated bilirubin and alkaline phosphatase. Probable chronic liver congestion versus occult cirrhosis        Past Medical History:   Diagnosis Date   • Acute diastolic HF (heart failure) (CMS/HCC)    • Acute hypokalemia    • Arrhythmia    • Back injury     Fall on 17   • Cellulitis of leg    • Chest pain syndrome    • CHF (congestive heart failure) (CMS/HCC)    • Diabetes mellitus (CMS/HCC)     patient reports borderline    • Hyperlipidemia    • Hypertension    • Muscle pain     around back   • Obesity    • Obstructive sleep apnea     right now patient is sleeeping in recliner and does not use-when he lies down he will have to use cpap    • Paroxysmal a-fib (CMS/HCC)    • Peripheral artery disease (CMS/HCC)    • Spondylosis of 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 or radiculopathy 2/7/2018   • Wears glasses         Past Surgical History:   Procedure Laterality Date   • CARDIAC CATHETERIZATION     • CARDIOVERSION      multiple   • COLONOSCOPY      about 14 years ago   • KYPHOPLASTY N/A 3/5/2018    Procedure: KYPHOPLASTY L4;  Surgeon: Akshat Davidson MD;  Location:  [facility];  Service:    • KYPHOPLASTY N/A 4/16/2018    Procedure: KYPHOPLASTY L1 AND L2;  Surgeon: Akshat Davidson MD;  Location:  [facility];  Service: Neurosurgery   • OTHER SURGICAL HISTORY  06/29/2015    PVA       Visit Dx:    ICD-10-CM ICD-9-CM   1. Bilateral leg edema R60.0 782.3   2. Lymphedema of both lower extremities I89.0 457.1   3. Venous stasis ulcer of right calf limited to breakdown of skin without varicose veins (CMS/HCC) I87.2 459.81    L97.211 707.12             Patient History     Row Name 08/14/18 0800          Chief Complaint Swelling;Difficulty Walking;Difficulty with daily activities;Tightness  -    Date Current Problem(s) Began --   4 to 5 years ago   -MW    Brief Description of Current Complaint Pt report 4-5 year history of progressive leg swelling. He relates that swelling worsens as the day goes on, and is also worse if he does not take diuretics as prescribed. He reports work situations where he feels he is unable to take his daily diuretic. He takes additional doses on the weekends.   -MW    Previous treatment for THIS PROBLEM Medication;Other (comment)   Unnaboots in hospital in June -July   -MW    Patient/Caregiver Goals Decrease swelling;Know what to do to help the symptoms;Improve mobility;Return to prior level of function  -MW    Patient's Rating of General Health Fair  -MW    Occupation/sports/leisure activities works full time as service and  at [employer]  -    Patient seeing anyone else for problem(s)? PCP, cardiologist   -    How has  patient tried to help current problem? Elevation and diuretics   -MW          Pain Location Back;Leg  -MW    Pain Comments Legs tight and ache; still recovering from back surgeries   -MW    Is your sleep disturbed? Yes  -MW    What position do you sleep in? Other (comment)   recliner   -MW    Difficulties at work? Sitting more since back surgery and increased leg sweling.   -MW    Difficulties with ADL's? Getting in/ out of car, in / out of bath tub; wife dons socks and shoes.   -MW          Any falls in the past year: Yes  -MW    Number of falls reported in the last 12 months 2  -MW    Factors that contributed to the fall: Lost balance;Other (comment)   medication change   -MW    Does patient have a fear of falling Yes (comment)  -MW          Primary Language English  -MW    How does patient learn best? Listening  -MW    Teaching needs identified Management of Condition  -MW    Patient is concerned about/has problems with Difficulty with self care (i.e. bathing, dressing, toileting:;Performing job responsibilities/community activities (work, school,;Performing home management (household chores, shopping, care of dependents);Standing;Walking;Climbing Stairs;Bed Mobility  -MW    Barriers to learning None  -MW    Pt Participated in POC and Goals Yes  -MW          Are you being hurt, hit, or frightened by anyone at home or in your life? No  -MW    Are you being neglected by a caregiver No  -MW      User Key  (r) = Recorded By, (t) = Taken By, (c) = Cosigned By    Initials Name Provider Type    MW Ashley Nettles, PT Physical Therapist                Lymphedema     Row Name 08/14/18 0800          Lymphedema Classification RLE:;LLE:;Trunk:;stage 2 (Spontaneously Irreversible)  -MW    Infections or Cellulitis? no  -MW          Ptting Edema Category By severity  -MW    Pitting Edema Severe;Very Severe  -MW    Stemmer Sign bilateral:;positive  -MW    Hertford Hump bilateral:;positive   mild as shoes compress foot   -MW     Edema Assessment Comment PItting edema extends into lower abdomen and flanks bilaterally   -MW          Location/Assessment Lower Extremity;Lower Quadrant  -MW    Lower Extremity Conditions bilateral:;dry;shiny;crust;fragile;left:;intact;clean;right:;weeping  -MW    Lower Extremity Color/Pigment bilateral:;brawny;woody;hyperpigmented;fibrosis   multiple mild papilomas   -MW    Lower Extremity Skin Details Rt medial proximal shin with open weeping ulcer   -MW    Lower Quadrant Conditions intact;clean   pitting lower 1/4 of abdomen; lateral flanks to edge of ribs  -MW          Lymphedema Sensation Reports RLE:;LLE:   mild hypersensitivity   -MW          Lymphedema Pulses/Capillary Refill lower extremity pulses;capillary refill  -MW    Dorsalis Pedis Pulse right:;left:;+1 diminished   difficult to palpate through edema   -MW    Posterior Tibialis Pulse right:;left:;not tested  -MW    Capillary Refill lower extremity capillary refill  -MW    Lower Extremity Capillary Refill right:;left:;less than 3 seconds  -MW          Measurement Type(s) Quick Girth  -MW    Quick Girth Areas Lower extremities  -MW          Met-heads 26.8 cm  -MW    Mid foot 28 cm  -MW    Smallest ankle 29.2 cm  -MW    Largest calf 49.2 cm  -MW    Tib tuberosity 46.5 cm  -MW    Mid patella 51.5 cm  -MW    Distal thigh 62 cm  -MW    Proximal thigh 65.5 cm  -MW    Other 1 37 cm   ankle tip   -MW    Other 2 36 cm   10 cm prox to ankle  -MW          Met-heads 26.2 cm  -MW    Mid foot 27 cm  -MW    Smallest ankle 28 cm  -MW    Largest calf 47.3 cm  -MW    Tib tuberosity 43.4 cm  -MW    Mid patella 48.8 cm  -MW    Distal thigh 60.2 cm  -MW    Proximal thigh 65.6 cm  -MW    Other 1 35.9 cm   ankle tip   -MW    Other 2 28.5 cm   10 cm prox to ankle   -MW    RLE Quick Girth Total 410.9  -MW          Manual Lymphatic Drainage initial sequence;opened regional lymph nodes;opened anastamoses;extremity treatment  -MW    Initial Sequence supraclavicular;abdomen  -MW     Supraclavicular right;left  -MW    Abdomen superficial  -MW    Opened Regional Lymph Nodes inguinal  -MW    Inguinal right;left  -MW    Opened Anastamoses inguino-axillary  -MW    Inguino-Axillary right;left  -MW    Extremity Treatment simple/brief MLD  -MW    Simple/Brief MLD BLE focused on clearing abdomen and then thighs   -MW          Compression/Skin Care skin care;wrapping location;bandaging  -MW    Skin Care washed/dried;moisturizing lotion applied;other (comment)   Theraworx wipes; z guard mixed with Eucerin   -MW    Wrapping Location lower extremity  -MW    Wrapping Location LE bilateral:;foot to knee  -MW    Wrapping Comments Compressogrip layered and overlapping to create gradient compression   -MW    Bandage Layers cotton elastic stocking- single layer (comment size);cotton elastic stocking- double layer (comment size)   size 4 foot/ ankle, size 5 calf; both doubled distally   -MW    Bandaging Technique light compression  -MW      User Key  (r) = Recorded By, (t) = Taken By, (c) = Cosigned By    Initials Name Provider Type    MW Ashley Nettles PT Physical Therapist                LDA Wound     Row Name 08/14/18 0800             Wound 08/14/18 0800 Right proximal leg ulceration, venous    Wound - Properties Group Date first assessed: 08/14/18  -MW Time first assessed: 0800  -MW Present On Admission : picture taken  -MW Side: Right  -MW Orientation: proximal  -MW Location: leg  -MW Type: ulceration, venous  -MW    Dressing Appearance open to air  -MW      Base moist;pink;red/granulating;epithelialization   new epith growth at edges   -MW      Red (%), Wound Tissue Color 100  -MW      Periwound intact;dry;swelling;warm  -MW      Periwound Temperature warm  -MW      Periwound Skin Turgor firm  -MW      Edges irregular;open  -MW      Wound Length (cm) 2.5 cm  -MW      Wound Width (cm) 3 cm  -MW      Wound Depth (cm) 0.1 cm  -MW      Tunneling [Depth (cm)/Location] 0  -MW      Drainage  "Characteristics/Odor serous;yellow   dried drainage on leg   -MW      Drainage Amount small  -MW      Care, Wound cleansed with;wound cleanser  -MW      Dressing Care, Wound dressing applied;silver impregnated;low-adherent;foam   Mepilex AG, 4\" optifoam   -MW      Periwound Care, Wound cleansed with pH balanced cleanser;dry periwound area maintained  -MW        User Key  (r) = Recorded By, (t) = Taken By, (c) = Cosigned By    Initials Name Provider Type    MW Ashley Nettles, PT Physical Therapist                PT Ortho     Row Name 08/14/18 0800       Subjective Comments    Subjective Comments \"I don't like unnaboots, they don't work and I can't shower\"   -MW       Precautions and Contraindications    Precautions/Limitations fall precautions  -MW       Subjective Pain    Able to rate subjective pain? yes  -MW    Pre-Treatment Pain Level 4  -MW    Post-Treatment Pain Level 2  -MW    Subjective Pain Comment tightness in legs   -MW       Posture/Observations    Alignment Options Forward head;Thoracic kyphosis;Rounded shoulders;Lumbar lordosis  -MW    Forward Head Mild;Increased  -MW    Thoracic Kyphosis Mild;Moderate;Increased  -MW    Rounded Shoulders Bilateral:;Moderate  -MW    Lumbar lordosis Mild;Decreased  -MW       General ROM    RT Lower Ext Comment  -MW    LT Lower Ext Comment  -MW       Right Lower Ext    RT Lower Extremity Comments Mild limitations due to soft tissue approximation at thigh to abdomen, thigh to calf; ankles WFL but skin tight.   -MW       Left Lower Ext    LT Lower Extremity Comments Mild limitations due to soft tissue approximation at thigh to abdomen, thigh to calf; ankles WFL but skin tight.   -MW       Transfers    Sit-Stand Pettis (Transfers) conditional independence   addtional time needed; UE support from arm rest   -MW    Transfers, Sit-Stand-Sit, Assist Device straight cane  -MW       Gait/Stairs Assessment/Training    Pettis Level (Gait) conditional independence  -MW " "   Assistive Device (Gait) cane, straight  -MW    Pattern (Gait) swing-through  -MW    Deviations/Abnormal Patterns (Gait) vic decreased;base of support, wide;gait speed decreased  -MW    Bilateral Gait Deviations forward flexed posture;weight shift ability decreased  -MW      User Key  (r) = Recorded By, (t) = Taken By, (c) = Cosigned By    Initials Name Provider Type    Ashley Armas, PT Physical Therapist                    Therapy Education  Education Details: Skin care, dressing change, compression.   Given: Edema management, Bandaging/dressing change  Program: New  How Provided: Verbal, Demonstration  Provided to: Patient  Level of Understanding: Teach back education performed, Verbalized            Exercises     Row Name 08/14/18 0800             Subjective Comments    Subjective Comments \"I don't like unnaboots, they don't work and I can't shower\"   -MW         Subjective Pain    Able to rate subjective pain? yes  -MW      Pre-Treatment Pain Level 4  -MW      Post-Treatment Pain Level 2  -MW      Subjective Pain Comment tightness in legs   -MW         Total Minutes    63034 - PT Manual Therapy Minutes 30  -MW        User Key  (r) = Recorded By, (t) = Taken By, (c) = Cosigned By    Initials Name Provider Type    Ashley Armas, PT Physical Therapist                              PT OP Goals     Row Name 08/14/18 0800          STG Date to Achieve 09/11/18  -MW    STG 1 Pt able to tolerate BLE compression > 12 hours per day.   -MW    STG 2 Pt and spouse independent with basic LE compression (layering and or wraping) to promote edema reduction.   -MW    STG 3 BLE circumferential measurements to decrease at least 2 cm at ankle and calf to promote improved function and decreased risk of cellulits.   -MW          LTG 1 BLE circumferential measurements to decrease at least 4 cm at calf to promote improved function and decreased risk of cellulits.   -MW    LTG 2 Pt / spouse independent with " compression garment or system for long term management of condition.   -MW    LTG 3 Pt / spouse independent with HEP to promote fluid return and LE flexibility.   -MW    LTG 4 Pt / spouse independent with BLE edema management (skin care, dressing changes prn, compression garment or system, possible compression pump).   -MW          PT Goal Re-Cert Due Date 11/06/18  -MW      User Key  (r) = Recorded By, (t) = Taken By, (c) = Cosigned By    Initials Name Provider Type    MW Ashley Nettles, PT Physical Therapist                PT Assessment/Plan     Row Name 08/14/18 0800          Functional Limitations Performance in work activities;Performance in self-care ADL;Limitation in home management;Impaired gait  -MW    Impairments Edema;Impaired lymphatic circulation;Impaired venous circulation;Integumentary integrity;Pain  -MW    Assessment Comments Pt presents with severe bilateral LE edema/ lymphedema that has been present for at least 4 years. Edema is likely multifactoral with h/o A fib and diastolic HF (per pt and chart review). Skin changes consistent with long standing venous insufficiency and Stemmer sign positive bilaterally confirming lymphedema as well. Pitting edema extends into lower abdomen and bilateral flanks. RLE with open wound. Pt is limited in standing tolerance, car transfers, ADL's, work and home activities. Transfers are impacted by edema limiting hip and knee flexion, and weight of LE edema also creates additional stress on low back (s/p two back surgeries this year). Pt would benefit from skilled PT for CDT-MLD, skin care, wound care, compression and education to heal wound, decrease edema, minimize risk of cellulitis and promote improved functional mobility.   -MW    Please refer to paper survey for additional self-reported information Yes  -MW    Rehab Potential Fair  -MW    Patient/caregiver participated in establishment of treatment plan and goals Yes  -MW    Patient would benefit from  skilled therapy intervention Yes  -MW          PT Frequency 2x/week  -MW    Predicted Duration of Therapy Intervention (Therapy Eval) 12 weeks; 24 visits   -MW    Planned CPT's? PT EVAL MOD COMPLELITY: 68260;PT MANUAL THERAPY EA 15 MIN: 12471;PT THER PROC EA 15 MIN: 46237;PT STEFANIE DEBRIDE OPEN WOUND UP TO 20 CM: 86384;PT NLFU MIST: 91218;PT UNNA BOOT: 15506;PT MULTI LAYER COMP SYS LE  -MW    Physical Therapy Interventions (Optional Details) manual lymphatic drainage;bandaging;patient/family education;home exercise program;strengthening  -MW    PT Plan Comments Pt/ spouse to change dressing Rt leg wound q 1-2 days; skin care and compression daily. Encouraged pt to resume ther exer from back surgery PT.   -MW      User Key  (r) = Recorded By, (t) = Taken By, (c) = Cosigned By    Initials Name Provider Type    Ashley Armas, PT Physical Therapist             Outcome Measure Options: Lower Extremity Functional Scale (LEFS)  Lower Extremity Functional Index  Any of your usual work, housework or school activities: Quite a bit of difficulty  Your usual hobbies, recreational or sporting activities: Quite a bit of difficulty  Getting into or out of the bath: Quite a bit of difficulty  Walking between rooms: A little bit of difficulty  Putting on your shoes or socks: Extreme difficulty or unable to perform activity  Squatting: Moderate difficulty  Lifting an object, like a bag of groceries from the floor: Moderate difficulty  Performing light activities around your home: Quite a bit of difficulty  Performing heavy activities around your home: Extreme difficulty or unable to perform activity  Getting into or out of a car: A little bit of difficulty  Walking 2 blocks: Moderate difficulty  Walking a mile: Extreme difficulty or unable to perform activity  Going up or down 10 stairs (about 1 flight of stairs): A little bit of difficulty  Standing for 1 hour: Quite a bit of difficulty  Sitting for 1 hour: A little bit  of difficulty  Running on even ground: Extreme difficulty or unable to perform activity  Running on uneven ground: Extreme difficulty or unable to perform activity  Making sharp turns while running fast: Extreme difficulty or unable to perform activity  Hopping: Extreme difficulty or unable to perform activity  Rolling over in bed: Extreme difficulty or unable to perform activity  Total: 23      Time Calculation:   Start Time: 0800  Total Timed Code Minutes- PT: 30 minute(s)   Therapy Suggested Charges     Code   Minutes Charges    47527 (CPT®) Hc Pt Neuromusc Re Education Ea 15 Min      25444 (CPT®) Hc Pt Ther Proc Ea 15 Min      75188 (CPT®) Hc Gait Training Ea 15 Min      10899 (CPT®) Hc Pt Therapeutic Act Ea 15 Min      92351 (CPT®) Hc Pt Manual Therapy Ea 15 Min 30 2    16635 (CPT®) Hc Pt Ther Massage- Per 15 Min      75218 (CPT®) Hc Pt Iontophoresis Ea 15 Min      56509 (CPT®) Hc Pt Elec Stim Ea-Per 15 Min      12873 (CPT®) Hc Pt Ultrasound Ea 15 Min      01902 (CPT®) Hc Pt Self Care/Mgmt/Train Ea 15 Min      47903 (CPT®) Hc Pt Prosthetic (S) Train Initial Encounter, Each 15 Min      56582 (CPT®) Hc Orthotic(S) Mgmt/Train Initial Encounter, Each 15min      03058 (CPT®) Hc Pt Aquatic Therapy Ea 15 Min      35225 (CPT®) Hc Pt Orthotic(S)/Prosthetic(S) Encounter, Each 15 Min      Total  30 2        Therapy Charges for Today     Code Description Service Date Service Provider Modifiers Qty    44442632914 HC PT MANUAL THERAPY EA 15 MIN 8/14/2018 Ashley Nettles, PT GP 2    24210593749 HC PT EVAL MOD COMPLEXITY 4 8/14/2018 Ashley Nettles, PT GP 1          PT G-Codes  Outcome Measure Options: Lower Extremity Functional Scale (LEFS)  Score: Raw score 23/80 or 71% impaired; LEFS score is also impacted by recent back surgeries          Ashley Nettles, PT  8/14/2018

## 2018-08-20 DIAGNOSIS — S32.000S COMPRESSION FX, LUMBAR SPINE, SEQUELA: ICD-10-CM

## 2018-08-20 RX ORDER — TRAMADOL HYDROCHLORIDE 50 MG/1
50 TABLET ORAL
Qty: 60 TABLET | Refills: 0 | OUTPATIENT
Start: 2018-08-20 | End: 2018-09-12 | Stop reason: SDUPTHER

## 2018-08-20 RX ORDER — GABAPENTIN 100 MG/1
100 CAPSULE ORAL TAKE AS DIRECTED
Qty: 21 CAPSULE | Refills: 0 | OUTPATIENT
Start: 2018-08-20 | End: 2018-10-16

## 2018-08-20 RX ORDER — BUMETANIDE 2 MG/1
1 TABLET ORAL DAILY
Status: CANCELLED | OUTPATIENT
Start: 2018-08-20

## 2018-08-20 NOTE — TELEPHONE ENCOUNTER
Please call patient, bumex he should get from his cardiologist or PCP.  The Ultram and gabapentin was prescribed by us in June, I would hope he is not having to continue on this medication.  Please clarify

## 2018-08-20 NOTE — TELEPHONE ENCOUNTER
Provider:  Stuart  Caller: Akshat Winkler  Time of call:   10:16 AM  Phone #:  [phone]  Surgery:  Kypho L1 & L2  Surgery Date:  04/16/8  Last visit:   06/19/18  Next visit: none scheduled    SONY:       05/13/2018 Gabapentin 100MG 1955 90 30 MD Stuart, Akshat  06/12/2018 Gabapentin 100MG 1955 90 30 MD Stuart, Akshat  07/16/2018 Gabapentin 100MG 1955 90 30 MD Stuart, Akshat    06/12/2018 Tramadol 50mg 1955 60 30 MD Stuart, Akshat  07/30/2018 Tramadol Hcl 50MG 1955 6 3 MD Stuart, Akshat  08/03/2018 Tramadol Hcl 50MG 1955 54 27 MD Stuart, Akshat    Reason for call:       Patient called in to request refills on Gabapentin 100 mg,  Tramadol 50 mg.     He also requested a refill on Bumex 1 mg, I wasn't sure about that one, should that come from his PCP?

## 2018-08-20 NOTE — TELEPHONE ENCOUNTER
Called in Gabapentin 100 mg 1 BID for 1 week and 1 QHS for 1 week,  Then stop #21 0RF &     Tramadol 50 mg QHS #60 0RF to patient's pharmacy.     Called pt to let him know, he is aware

## 2018-08-20 NOTE — TELEPHONE ENCOUNTER
Called patient, adv that the bumex would need to come from PCP or his cardiologist, he understood.     He said that he was ok with coming off the gabapentin, but he does not have enough to wean off of it, said he only has enough to take till Thursday.     Adv pt that I would ask if he would could call in a small refill of the alie so that he could wean off by taking it twice a day for 1 week and then 1 at bedtime for 1 week, he understood.     He did say that he was still needing to take the Tramadol at bedtime for pain, but that he would try to start weaning himself off of it. He asked for 1 more months supply on that so he could have some when he does need it.

## 2018-08-23 ENCOUNTER — HOSPITAL ENCOUNTER (OUTPATIENT)
Dept: PHYSICAL THERAPY | Facility: HOSPITAL | Age: 63
Setting detail: THERAPIES SERIES
Discharge: HOME | End: 2018-08-23

## 2018-08-23 DIAGNOSIS — L97.211 VENOUS STASIS ULCER OF RIGHT CALF LIMITED TO BREAKDOWN OF SKIN WITHOUT VARICOSE VEINS (HCC): ICD-10-CM

## 2018-08-23 DIAGNOSIS — I87.2 VENOUS STASIS ULCER OF RIGHT CALF LIMITED TO BREAKDOWN OF SKIN WITHOUT VARICOSE VEINS (HCC): ICD-10-CM

## 2018-08-23 DIAGNOSIS — R60.0 BILATERAL LEG EDEMA: Primary | ICD-10-CM

## 2018-08-23 DIAGNOSIS — I89.0 LYMPHEDEMA OF BOTH LOWER EXTREMITIES: ICD-10-CM

## 2018-08-23 DIAGNOSIS — L97.929 VENOUS STASIS ULCER OF LEFT LOWER EXTREMITY (HCC): ICD-10-CM

## 2018-08-23 DIAGNOSIS — I83.029 VENOUS STASIS ULCER OF LEFT LOWER EXTREMITY (HCC): ICD-10-CM

## 2018-08-23 PROCEDURE — 97597 DBRDMT OPN WND 1ST 20 CM/<: CPT | Performed by: PHYSICAL THERAPIST

## 2018-08-23 PROCEDURE — 97140 MANUAL THERAPY 1/> REGIONS: CPT | Performed by: PHYSICAL THERAPIST

## 2018-08-23 NOTE — THERAPY TREATMENT NOTE
Outpatient Rehabilitation - Lymphedema and Wound/Debridement Treatment Note   Danilo     Patient Name: Akshat Winkler  : 1955  MRN: [id number]  Today's Date: 2018                 Admit Date: 2018    Visit Dx:    ICD-10-CM ICD-9-CM   1. Bilateral leg edema R60.0 782.3   2. Lymphedema of both lower extremities I89.0 457.1   3. Venous stasis ulcer of right calf limited to breakdown of skin without varicose veins (CMS/HCC) I87.2 459.81    L97.211 707.12   4. Venous stasis ulcer of left lower extremity (CMS/HCC) I83.029 454.0       Patient Active Problem List   Diagnosis   • Chronic diastolic heart failure (CMS/HCC)   • Type 2 diabetes mellitus without complication, without long-term current use of insulin (CMS/HCC)   • Hyperlipidemia LDL goal <70   • Essential hypertension   • Coronary artery disease involving native coronary artery of native heart with angina pectoris (CMS/HCC)   • Severe obstructive sleep apnea. On home CPAP   • Class 3 obesity in adult   • Chronic anticoagulation   • Persistent atrial fibrillation/flutter   • Compression fracture of lumbar vertebra (CMS/HCC)   • Spondylosis of 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 or radiculopathy   • Lumbar stenosis with neurogenic claudication   • Lumbar disc herniation   • Myofascial pain   • Pathologic compression fracture of vertebra (CMS/HCC)   • Chronic bilateral severe lower extremity edema   • Chronically elevated bilirubin and alkaline phosphatase. Probable chronic liver congestion versus occult cirrhosis        Past Medical History:   Diagnosis Date   • Acute diastolic HF (heart failure) (CMS/HCC)    • Acute hypokalemia    • Arrhythmia    • Back injury     Fall on 17   • Cellulitis of leg    • Chest pain syndrome    • CHF (congestive heart failure) (CMS/HCC)    • Diabetes mellitus (CMS/HCC)     patient reports borderline    • Hyperlipidemia    • Hypertension    • Muscle pain     around back   • Obesity    • Obstructive sleep  apnea     right now patient is sleeeping in recliner and does not use-when he lies down he will have to use cpap   • Paroxysmal a-fib (CMS/HCC)    • Peripheral artery disease (CMS/HCC)    • Spondylosis of 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 or radiculopathy 2/7/2018   • Wears glasses         Past Surgical History:   Procedure Laterality Date   • CARDIAC CATHETERIZATION     • CARDIOVERSION      multiple   • COLONOSCOPY      about 14 years ago   • KYPHOPLASTY N/A 3/5/2018    Procedure: KYPHOPLASTY L4;  Surgeon: Akshat Davidson MD;  Location:  [facility];  Service:    • KYPHOPLASTY N/A 4/16/2018    Procedure: KYPHOPLASTY L1 AND L2;  Surgeon: Akshat Davidson MD;  Location:  [facility];  Service: Neurosurgery   • OTHER SURGICAL HISTORY  06/29/2015    PVA      RLE PROXIMAL MEDIAL ULCER         RLE PROXIMAL ANTERIOR AND LATERAL ULCERS          RLE POSTERIOR ULCER               LDA Wound     Row Name 08/23/18 1510             Wound 08/14/18 0800 Right proximal;medial leg ulceration, venous    Wound - Properties Group Date first assessed: 08/14/18  -MW Time first assessed: 0800  -MW Present On Admission : picture taken  -MW Side: Right  -MW Orientation: proximal;medial  -MW Location: leg  -MW Type: ulceration, venous  -MW    Dressing Appearance open to air  -MW      Base moist;yellow;slough;scab;pink;red/granulating   thick slough/ biofilm; red/ granulating post debridement   -MW      Red (%), Wound Tissue Color 100   post debridement  -MW      Periwound intact;dry;swelling;warm   crusts   -MW      Periwound Temperature warm  -MW      Periwound Skin Turgor firm  -MW      Edges irregular;open  -MW      Wound Length (cm) 1.8 cm  -MW      Wound Width (cm) 1.5 cm  -MW      Wound Depth (cm) 0.1 cm  -MW      Drainage Characteristics/Odor serous;yellow   dried drainage on leg   -MW      Drainage Amount small  -MW      Care, Wound cleansed with;wound cleanser;debrided  -MW      Dressing Care, Wound dressing changed;antimicrobial agent  "applied;low-adherent;foam   Cutimed sorbact, 4\" optifoam   -MW      Periwound Care, Wound cleansed with pH balanced cleanser;dry periwound area maintained;moisturizer applied   Eucerin skin calming to intact skin   -MW         Wound 08/23/18 1510 Right proximal;lateral;posterior calf blisters;ulceration, venous    Wound - Properties Group Date first assessed: 08/23/18  -MW Time first assessed: 1510  -MW Present On Admission : picture taken  -MW Side: Right  -MW Orientation: proximal;lateral;posterior  -MW Location: calf  -MW Type: blisters;ulceration, venous  -MW Additional Comments: Multiple blisters open and weeping   -MW    Dressing Appearance open to air  -MW      Base moist;white;slough;clean;pink   Post and largest lateral sites w thick spongy white nonviabl  -MW      Red (%), Wound Tissue Color 100   post debridement  -MW      Periwound intact;dry;swelling;redness;warm  -MW      Periwound Temperature warm  -MW      Periwound Skin Turgor firm  -MW      Edges irregular;open  -MW      Wound Length (cm) 2 cm   largest ant; post site 3 x 0.7 x 0.1   -MW      Wound Width (cm) 1.2 cm  -MW      Wound Depth (cm) 0.1 cm  -MW      Tunneling [Depth (cm)/Location] 0  -MW      Undermining [Depth (cm)/Location] 0  -MW      Drainage Characteristics/Odor serous;yellow;clear  -MW      Drainage Amount large  -MW      Care, Wound cleansed with;wound cleanser;debrided;moist wound environment maintained  -MW      Dressing Care, Wound dressing applied;antimicrobial agent applied;low-adherent;foam   Cutimed sorbact, Optifoam x 3   -MW      Periwound Care, Wound cleansed with pH balanced cleanser;dry periwound area maintained  -MW         Wound 08/23/18 1510 Left proximal;lateral calf blisters;ulceration, venous    Wound - Properties Group Date first assessed: 08/23/18  -MW Time first assessed: 1510  -MW Side: Left  -MW Orientation: proximal;lateral  -MW Location: calf  -MW Type: blisters;ulceration, venous  -MW Additional Comments: " "multiple small blisters, some open some intact   -MW    Dressing Appearance open to air  -MW      Base slough;yellow;scab;moist;pink;red/granulating   yellow/brown slough & scab; moist pink post debridement  -MW      Red (%), Wound Tissue Color 100  -MW      Periwound intact;dry;redness;swelling;warm  -MW      Periwound Temperature warm  -MW      Periwound Skin Turgor firm  -MW      Edges irregular;open  -MW      Wound Length (cm) 1.5 cm  -MW      Wound Width (cm) 0.3 cm  -MW      Wound Depth (cm) 0.1 cm  -MW      Tunneling [Depth (cm)/Location] 0  -MW      Undermining [Depth (cm)/Location] 0  -MW      Drainage Characteristics/Odor clear;serous;yellow  -MW      Drainage Amount moderate  -MW      Care, Wound cleansed with;wound cleanser;debrided;moist wound environment maintained  -MW      Dressing Care, Wound dressing applied;antimicrobial agent applied;low-adherent;foam   Cutimed sorbact, Optifoam 4\"   -MW      Periwound Care, Wound cleansed with pH balanced cleanser;dry periwound area maintained;moisturizer applied  -MW        User Key  (r) = Recorded By, (t) = Taken By, (c) = Cosigned By    Initials Name Provider Type    MW Ashley Nettles, PT Physical Therapist              Lymphedema     Row Name 08/23/18 2711          Able to rate subjective pain? yes  -MW    Pre-Treatment Pain Level 4  -MW    Post-Treatment Pain Level 3  -MW    Subjective Pain Comment tightness; tender with RT posterior ulcer debridement   -MW          Subjective Comments Reports \"bubbling\" started over the weekend; stopped using compression Wednesday morning. Unsure if legs looked any smaller. Struggle for pt and wife to apply   -MW       Ptting Edema Category By severity  -MW    Pitting Edema Severe;Very Severe  -MW    Stemmer Sign --  -MW    Wallace Hump bilateral:;positive   mild as shoes compress foot   -MW    Edema Assessment Comment PItting edema extends into lower abdomen and flanks bilaterally   -MW          Location/Assessment " Lower Extremity;Lower Quadrant  -MW    Lower Extremity Conditions bilateral:;dry;shiny;crust;fragile;left:;intact;clean;right:;weeping  -MW    Lower Extremity Color/Pigment bilateral:;brawny;woody;hyperpigmented;fibrosis   multiple mild papilomas   -MW    Lower Extremity Skin Details Multiple new blisters/ ulceration Rt proximal calf ant-lat and poster. new blisters / ulcerations Lt lateral prox calf.   -MW    Lower Quadrant Conditions intact;clean   pitting lower 1/4 of abdomen; lateral flanks to edge of ribs  -MW    Skin Observations Comment Multiple prominent veins visible on mid to proximal thighs as well as scattered across lower legs/ ankles and feet   -MW          Measurement Type(s) Quick Girth  -MW    Quick Girth Areas Lower extremities  -MW          Met-heads 25.8 cm  -MW    Mid foot 27.2 cm  -MW    Smallest ankle 29.3 cm  -MW    Largest calf 49 cm  -MW    Tib tuberosity 46.8 cm   over foam dressing   -MW    Mid patella 53.5 cm  -MW    Distal thigh 63.7 cm  -MW    Proximal thigh 67 cm  -MW    Other 1 37.1 cm   ankle tip   -MW    Other 2 35.5 cm   10 cm prox to ankle   -MW          Met-heads 26.4 cm  -MW    Mid foot 26.5 cm  -MW    Smallest ankle 27.4 cm  -MW    Largest calf 47 cm  -MW    Tib tuberosity 46.5 cm  -MW    Mid patella 52.3 cm  -MW    Distal thigh 62 cm  -MW    Proximal thigh 68.8 cm  -MW    Other 1 36.4 cm   ankle tip   -MW    Other 2 31.5 cm   10 cm prox to ankle   -MW    RLE Quick Girth Total 424.8  -MW          Manual Lymphatic Drainage initial sequence;opened regional lymph nodes;opened anastamoses;extremity treatment  -MW    Initial Sequence supraclavicular;abdomen  -MW    Supraclavicular right;left  -MW    Abdomen superficial  -MW    Opened Regional Lymph Nodes inguinal  -MW    Inguinal right;left  -MW    Opened Anastamoses inguino-axillary  -MW    Inguino-Axillary right;left  -MW    Extremity Treatment simple/brief MLD;MLD to full limb  -MW    Simple/Brief MLD LLE   -MW    MLD to Full  Limb RLE   -MW    Manual Lymphatic Drainage Comments Focused on clearing abdomen working inguinoaxillary pathway; reworking abdomen. Pt requested urinal to void.   -MW          Compression/Skin Care skin care;wrapping location;bandaging  -MW    Skin Care washed/dried;moisturizing lotion applied;other (comment)   Eucerin skin calming to intact skin   -MW    Wrapping Location lower extremity  -MW    Wrapping Location LE bilateral:;foot to knee  -MW    Wrapping Comments Compressogrip layered and overlapping to create gradient compression   -MW    Bandage Layers cotton elastic stocking- single layer (comment size);cotton elastic stocking- double layer (comment size)   size 4 foot/ ankle, size 6 calf; both doubled distally   -MW    Bandaging Technique light compression  -MW    Compression/Skin Care Comments Increased compressogrip size on calves as pt reports too difficult to put on; also proximal blisters and ulcerations.   -MW      User Key  (r) = Recorded By, (t) = Taken By, (c) = Cosigned By    Initials Name Provider Type    MW Ashley Nettles, PT Physical Therapist          WOUND DEBRIDEMENT  Debridement Site 1  Location- Site 1: BLE ulcerations  Selective Debridement- Site 1: Wound Surface <20cmsq  Instruments- Site 1: tweezers  Excised Tissue Description- Site 1: moderate, slough, other (comment) (slough, biofilm, spongy white blister debris )  Bleeding- Site 1: none                   Therapy Education     Row Name 08/23/18 1510             Therapy Education    Education Details Skin care, dressing change, compression. Issued Optifoams and cutimed sorbact. Size 10 compressogrips to try on thighs at home. Discussed velcro closure options for easier application.   -MW      Given Edema management;Bandaging/dressing change  -MW      Program Modified  -MW      How Provided Verbal;Demonstration  -MW      Provided to Patient  -MW      Level of Understanding Verbalized  -MW        User Key  (r) = Recorded By, (t) =  Taken By, (c) = Cosigned By    Initials Name Provider Type    Ashley Armas, PT Physical Therapist          Recommendation and Plan        PT Assessment/Plan     Row Name 08/23/18 1510          PT Assessment    Functional Limitations Performance in work activities;Performance in self-care ADL;Limitation in home management;Impaired gait  -MW     Impairments Edema;Impaired lymphatic circulation;Impaired venous circulation;Integumentary integrity;Pain  -MW     Assessment Comments Pt returns after 9 days of home management with multiple blisters on proximal calves bilaterally. Original Rt proximal-medial ulcer decreased in size with granulation tissue covering base after debridement of thick slough/ biofilm from total wound bed. Trial of less compression on lower legs as little progress with size 5 but noted increase in thigh edema circumferential measurements. Given scan report of dilated iliac and inferior vena cava, and changes in skin and measurements, unsure if CDT-MLD and compression will benefit patient. Will continue to modify POC with lighter compression for more gentle edema reduction. Home lymph pump may also be very helpful for daily fluid reduction.   -MW     Rehab Potential Fair  -MW     Patient/caregiver participated in establishment of treatment plan and goals Yes  -MW     Patient would benefit from skilled therapy intervention Yes  -MW        PT Plan    PT Frequency 2x/week  -MW     Physical Therapy Interventions (Optional Details) wound care;manual lymphatic drainage;bandaging;patient/family education;home exercise program;strengthening  -MW     PT Plan Comments Monitor skin/ wounds and edema especially in thighs; CDT-MLD; compression as tolerated   -MW       User Key  (r) = Recorded By, (t) = Taken By, (c) = Cosigned By    Initials Name Provider Type    Ashley Armas, PT Physical Therapist                         Time Calculation: Start Time: 1510  Total Timed Code Minutes- PT: 45  minute(s)    Therapy Charges for Today     Code Description Service Date Service Provider Modifiers Qty    6819573 HC PT MANUAL THERAPY EA 15 MIN 8/23/2018 Ashley Nettles, PT GP 3    [id number] HC STEFANIE DEBRIDE OPEN WOUND UP TO 20CM 8/23/2018 Ashley Nettles, PT GP 1        Therapy Suggested Charges     Code   Minutes Charges    69774 (CPT®) Hc Pt Neuromusc Re Education Ea 15 Min      46062 (CPT®) Hc Pt Ther Proc Ea 15 Min      60755 (CPT®) Hc Gait Training Ea 15 Min      84034 (CPT®) Hc Pt Therapeutic Act Ea 15 Min      24778 (CPT®) Hc Pt Manual Therapy Ea 15 Min 45 3    40017 (CPT®) Hc Pt Ther Massage- Per 15 Min      33428 (CPT®) Hc Pt Iontophoresis Ea 15 Min      10494 (CPT®) Hc Pt Elec Stim Ea-Per 15 Min      22507 (CPT®) Hc Pt Ultrasound Ea 15 Min      60531 (CPT®) Hc Pt Self Care/Mgmt/Train Ea 15 Min      15774 (CPT®) Hc Pt Prosthetic (S) Train Initial Encounter, Each 15 Min      05134 (CPT®) Hc Orthotic(S) Mgmt/Train Initial Encounter, Each 15min      36435 (CPT®) Hc Pt Aquatic Therapy Ea 15 Min      04829 (CPT®) Hc Pt Orthotic(S)/Prosthetic(S) Encounter, Each 15 Min      Total  45 3                Ashley Nettles, PT  8/23/2018

## 2018-08-30 ENCOUNTER — HOSPITAL ENCOUNTER (OUTPATIENT)
Dept: PHYSICAL THERAPY | Facility: HOSPITAL | Age: 63
Setting detail: THERAPIES SERIES
Discharge: HOME | End: 2018-08-30

## 2018-08-30 DIAGNOSIS — L97.211 VENOUS STASIS ULCER OF RIGHT CALF LIMITED TO BREAKDOWN OF SKIN WITHOUT VARICOSE VEINS (HCC): ICD-10-CM

## 2018-08-30 DIAGNOSIS — I87.2 VENOUS STASIS ULCER OF RIGHT CALF LIMITED TO BREAKDOWN OF SKIN WITHOUT VARICOSE VEINS (HCC): ICD-10-CM

## 2018-08-30 DIAGNOSIS — L97.929 VENOUS STASIS ULCER OF LEFT LOWER EXTREMITY (HCC): ICD-10-CM

## 2018-08-30 DIAGNOSIS — I83.029 VENOUS STASIS ULCER OF LEFT LOWER EXTREMITY (HCC): ICD-10-CM

## 2018-08-30 DIAGNOSIS — R60.0 BILATERAL LEG EDEMA: Primary | ICD-10-CM

## 2018-08-30 PROCEDURE — 97535 SELF CARE MNGMENT TRAINING: CPT | Performed by: PHYSICAL THERAPIST

## 2018-09-04 NOTE — PROGRESS NOTES
Encounter Date:09/05/2018      Patient ID: Akshat Winkler is a 63 y.o. male.        Subjective:     Chief Complaint: Follow-up (leg Edema)     History of Present Illness patient presents to the office today for ongoing evaluation of his acute on chronic diastolic heart failure. His weight recently increased to 272 and he took a dose of metolazone over the weekend. Monday morning his weight was 258. He notes improvement in his abdominal fullness and dyspnea. He has ongoing edema in BLEs. He has been under the care of PT for unna boots and is being fitted for lymphadema pumps to be used at home. Denies cp, palpitations. He notes he is feeling better after his metolazone dosing.     Patient Active Problem List   Diagnosis   • Chronic diastolic heart failure (CMS/HCC)   • Type 2 diabetes mellitus without complication, without long-term current use of insulin (CMS/HCC)   • Hyperlipidemia LDL goal <70   • Essential hypertension   • Coronary artery disease involving native coronary artery of native heart with angina pectoris (CMS/HCC)   • Severe obstructive sleep apnea. On home CPAP   • Class 3 obesity in adult   • Chronic anticoagulation   • Persistent atrial fibrillation/flutter   • Compression fracture of lumbar vertebra (CMS/HCC)   • Spondylosis of 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 or radiculopathy   • Lumbar stenosis with neurogenic claudication   • Lumbar disc herniation   • Myofascial pain   • Pathologic compression fracture of vertebra (CMS/HCC)   • Chronic bilateral severe lower extremity edema   • Chronically elevated bilirubin and alkaline phosphatase. Probable chronic liver congestion versus occult cirrhosis       Past Surgical History:   Procedure Laterality Date   • CARDIAC CATHETERIZATION     • CARDIOVERSION      multiple   • COLONOSCOPY      about 14 years ago   • KYPHOPLASTY N/A 3/5/2018    Procedure: KYPHOPLASTY L4;  Surgeon: Akshat Davidson MD;  Location: [facility];  Service:    • KYPHOPLASTY N/A  4/16/2018    Procedure: KYPHOPLASTY L1 AND L2;  Surgeon: Akshat Davidson MD;  Location: [facility];  Service: Neurosurgery   • OTHER SURGICAL HISTORY  06/29/2015    PVA       Allergies   Allergen Reactions   • Bisoprolol Other (See Comments)     Severe Hypotension   • Flecainide Other (See Comments)     Syncope / collapse   • Metoprolol Other (See Comments)      Bradycardia, Severe Hypotension   • Tikosyn [Dofetilide] Other (See Comments)     Wide complex tachycardia         Current Outpatient Prescriptions:   •  apixaban (ELIQUIS) 5 MG tablet tablet, Take 1 tablet by mouth Every 12 (Twelve) Hours for 360 days., Disp: 180 tablet, Rfl: 3  •  aspirin 81 MG EC tablet, Take 81 mg by mouth Every Morning., Disp: , Rfl:   •  bumetanide (BUMEX) 2 MG tablet, Take 1 mg by mouth Daily. On Monday through Friday, Disp: , Rfl:   •  bumetanide (BUMEX) 2 MG tablet, Take 3 mg by mouth Daily. On Saturday and Sunday, Disp: , Rfl:   •  gabapentin (NEURONTIN) 100 MG capsule, Take 1 capsule by mouth Take As Directed. Take 1 twice a day for 1 week, then 1 at bedtime for 1 week, then stop, Disp: 21 capsule, Rfl: 0  •  methocarbamol (ROBAXIN) 750 MG tablet, Take 1 tablet by mouth 3 (Three) Times a Day. (Patient taking differently: Take 750 mg by mouth 2 (Two) Times a Day. Take one tablet at noon and one tablet nightly), Disp: 60 tablet, Rfl: 1  •  metOLazone (ZAROXOLYN) 2.5 MG tablet, Take 1 tablet by mouth Every Other Day. Take 1 tablet with bumex QOD M, W, F, Disp: 20 tablet, Rfl: 0  •  potassium chloride (K-DUR) 10 MEQ CR tablet, Take 1 tablet by mouth Every Other Day., Disp: 60 tablet, Rfl: 6  •  rosuvastatin (CRESTOR) 20 MG tablet, Take 1 tablet by mouth Daily for 360 days., Disp: 90 tablet, Rfl: 3  •  spironolactone (ALDACTONE) 25 MG tablet, Take 0.5 tablets by mouth Daily., Disp: 30 tablet, Rfl: 6  •  tiZANidine (ZANAFLEX) 4 MG tablet, Take 1 tablet by mouth At Night As Needed for Muscle Spasms., Disp: 30 tablet, Rfl: 1  •  traMADol  (ULTRAM) 50 MG tablet, Take 1 tablet by mouth every night at bedtime., Disp: 60 tablet, Rfl: 0    The following portions of the chart were reviewed and updated as appropriate: Allergies, current medications, past family history, social history, past medical history.     Review of Systems   Constitution: Negative for chills, decreased appetite, diaphoresis, fever, weakness, malaise/fatigue, night sweats, weight gain and weight loss.   HENT: Negative for congestion, hearing loss, hoarse voice and nosebleeds.    Eyes: Negative for blurred vision, visual disturbance and visual halos.   Cardiovascular: Positive for leg swelling. Negative for chest pain, claudication, cyanosis, dyspnea on exertion, irregular heartbeat, near-syncope, orthopnea, palpitations, paroxysmal nocturnal dyspnea and syncope.   Respiratory: Negative for cough, hemoptysis, shortness of breath, sleep disturbances due to breathing, snoring, sputum production and wheezing.    Hematologic/Lymphatic: Negative for bleeding problem. Does not bruise/bleed easily.   Skin: Negative for dry skin, itching and rash.   Musculoskeletal: Negative for arthritis, joint pain, joint swelling and myalgias.   Gastrointestinal: Negative for bloating, abdominal pain, constipation, diarrhea, flatus, heartburn, hematemesis, hematochezia, melena, nausea and vomiting.   Genitourinary: Negative for dysuria, frequency, hematuria, nocturia and urgency.   Neurological: Negative for excessive daytime sleepiness, dizziness, headaches, light-headedness and loss of balance.   Psychiatric/Behavioral: Negative for depression. The patient does not have insomnia and is not nervous/anxious.            Objective:     Vitals:    09/05/18 0801 09/05/18 0802 09/05/18 0803   BP: 121/77 119/79 123/75   BP Location: Right arm Left arm Left arm   Patient Position: Sitting Sitting Standing   Cuff Size: Adult     Pulse: 99 98 111   Resp: 19     Temp: 97.3 °F (36.3 °C)     TempSrc: Temporal Artery     "  SpO2: 97%     Weight: 119 kg (263 lb)     Height: 177.8 cm (70\")           Physical Exam   Constitutional: He is oriented to person, place, and time. He appears well-developed and well-nourished. He is active and cooperative. No distress.   HENT:   Head: Normocephalic and atraumatic.   Mouth/Throat: Oropharynx is clear and moist.   Eyes: Pupils are equal, round, and reactive to light. Conjunctivae and EOM are normal.   Neck: Normal range of motion. Neck supple. No JVD present. No tracheal deviation present. No thyromegaly present.   Cardiovascular: Normal rate, regular rhythm, normal heart sounds and intact distal pulses.    Pulmonary/Chest: Effort normal and breath sounds normal.   Abdominal: Soft. Bowel sounds are normal. He exhibits no distension. There is no tenderness.   Musculoskeletal: Normal range of motion. He exhibits edema (lymphadema noted BLEs, small blisters noted R/LE).   Neurological: He is alert and oriented to person, place, and time.   Skin: Skin is warm, dry and intact.   Psychiatric: He has a normal mood and affect. His behavior is normal.   Nursing note and vitals reviewed.      Lab and Diagnostic Review:      Lab Results   Component Value Date    GLUCOSE 56 (L) 08/03/2018    CALCIUM 9.1 08/03/2018     08/03/2018    K 3.5 08/03/2018    CO2 33.0 (H) 08/03/2018     08/03/2018    BUN 15 08/03/2018    CREATININE 0.98 08/03/2018    EGFRIFAFRI 92 02/08/2018    EGFRIFNONA 78 08/03/2018    BCR 15.3 08/03/2018    ANIONGAP 8.0 08/03/2018           Assessment and Plan:         1. Acute on chronic diastolic congestive heart failure (CMS/HCC)  Continue bumex 2 mg daily, may take prn metolazone for weight gain  Heart failure education today including signs and symptoms, the role of the heart failure center, daily weights, low sodium diet (less than 1500 mg per day), and daily physical activity. Reviewed HF Zones with patient and family.  Patient to continue current medications as previously " ordered.     2. Essential hypertension  Well controlled  HTN Education provided today including signs and symptoms, medication management, daily blood pressure monitoring. Patient encouraged to call the [facility] with any abnormal readings.     3. Chronic bilateral severe lower extremity edema  lymphedema noted BLES, will send order for lymphedema pumps     4. Persistent atrial fibrillation/flutter  CHADS-VASc Risk Assessment            4       Total Score        1 CHF    1 Hypertension    1 DM    1 Vascular Disease      Anticoagulated with eliquis and denies any s/s of bleeding    It has been a pleasure to participate in the care of this patient.  Patient was instructed to call the [facility] with any questions, concerns, or worsening symptoms.    * Please note that portions of this note were completed with a voice recognition program. Efforts were made to edit the dictation but occasionally words are transcribed.

## 2018-09-05 ENCOUNTER — OFFICE VISIT (OUTPATIENT)
Dept: CARDIOLOGY | Facility: HOSPITAL | Age: 63
End: 2018-09-05

## 2018-09-05 VITALS
HEART RATE: 111 BPM | DIASTOLIC BLOOD PRESSURE: 75 MMHG | SYSTOLIC BLOOD PRESSURE: 123 MMHG | RESPIRATION RATE: 19 BRPM | TEMPERATURE: 97.3 F | HEIGHT: 70 IN | OXYGEN SATURATION: 97 % | WEIGHT: 263 LBS | BODY MASS INDEX: 37.65 KG/M2

## 2018-09-05 DIAGNOSIS — I50.33 ACUTE ON CHRONIC DIASTOLIC CONGESTIVE HEART FAILURE (HCC): Primary | ICD-10-CM

## 2018-09-05 DIAGNOSIS — R60.0 BILATERAL LOWER EXTREMITY EDEMA: ICD-10-CM

## 2018-09-05 DIAGNOSIS — I10 ESSENTIAL HYPERTENSION: ICD-10-CM

## 2018-09-05 DIAGNOSIS — I48.19 PERSISTENT ATRIAL FIBRILLATION (HCC): ICD-10-CM

## 2018-09-05 PROCEDURE — 99214 OFFICE O/P EST MOD 30 MIN: CPT | Performed by: NURSE PRACTITIONER

## 2018-09-06 ENCOUNTER — DOCUMENTATION (OUTPATIENT)
Dept: PHYSICAL THERAPY | Facility: HOSPITAL | Age: 63
End: 2018-09-06

## 2018-09-06 DIAGNOSIS — I89.0 LYMPHEDEMA OF BOTH LOWER EXTREMITIES: ICD-10-CM

## 2018-09-06 DIAGNOSIS — R60.0 BILATERAL LEG EDEMA: Primary | ICD-10-CM

## 2018-09-06 DIAGNOSIS — I87.2 VENOUS STASIS ULCER OF RIGHT CALF LIMITED TO BREAKDOWN OF SKIN WITHOUT VARICOSE VEINS (HCC): ICD-10-CM

## 2018-09-06 DIAGNOSIS — L97.929 VENOUS STASIS ULCER OF LEFT LOWER EXTREMITY (HCC): ICD-10-CM

## 2018-09-06 DIAGNOSIS — L97.211 VENOUS STASIS ULCER OF RIGHT CALF LIMITED TO BREAKDOWN OF SKIN WITHOUT VARICOSE VEINS (HCC): ICD-10-CM

## 2018-09-06 DIAGNOSIS — I83.029 VENOUS STASIS ULCER OF LEFT LOWER EXTREMITY (HCC): ICD-10-CM

## 2018-09-06 NOTE — THERAPY DISCHARGE NOTE
Outpatient Physical Therapy Lymphedema Treatment Note/Discharge Summary       Patient Name: Akshat Winkler  : 1955  MRN: [id number]  Today's Date: 2018      Visit Date: 2018    Visit Dx:    ICD-10-CM ICD-9-CM   1. Bilateral leg edema R60.0 782.3   2. Venous stasis ulcer of right calf limited to breakdown of skin without varicose veins (CMS/[facility]) I87.2 459.81    L97.211 707.12   3. Venous stasis ulcer of left lower extremity (CMS/[facility]) I83.029 454.0   4. Lymphedema of both lower extremities I89.0 457.1       Patient Active Problem List   Diagnosis   • Chronic diastolic heart failure (CMS/[facility])   • Type 2 diabetes mellitus without complication, without long-term current use of insulin (CMS/[facility])   • Hyperlipidemia LDL goal <70   • Essential hypertension   • Coronary artery disease involving native coronary artery of native heart with angina pectoris (CMS/[facility])   • Severe obstructive sleep apnea. On home CPAP   • Class 3 obesity in adult   • Chronic anticoagulation   • Persistent atrial fibrillation/flutter   • Compression fracture of lumbar vertebra (CMS/HCC)   • Spondylosis of 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 or radiculopathy   • Lumbar stenosis with neurogenic claudication   • Lumbar disc herniation   • Myofascial pain   • Pathologic compression fracture of vertebra (CMS/HCC)   • Chronic bilateral severe lower extremity edema   • Chronically elevated bilirubin and alkaline phosphatase. Probable chronic liver congestion versus occult cirrhosis                LDA Wound     Row Name               Wound 18 0800 Right proximal;medial leg ulceration, venous    Wound - Properties Group Date first assessed: 18  -MW Time first assessed: 08  -MW Present On Admission : picture taken  -MW Side: Right  -MW Orientation: proximal;medial  -MW Location: leg  -MW Type: ulceration, venous  -MW       Wound 18 1510 Right proximal;lateral;posterior calf blisters;ulceration, venous    Wound -  Properties Group Date first assessed: 08/23/18  -MW Time first assessed: 1510  -MW Present On Admission : picture taken  -MW Side: Right  -MW Orientation: proximal;lateral;posterior  -MW Location: calf  -MW Type: blisters;ulceration, venous  -MW Additional Comments: Multiple blisters open and weeping   -MW       Wound 08/23/18 1510 Left proximal;lateral calf blisters;ulceration, venous    Wound - Properties Group Date first assessed: 08/23/18  -MW Time first assessed: 1510  -MW Side: Left  -MW Orientation: proximal;lateral  -MW Location: calf  -MW Type: blisters;ulceration, venous  -MW Additional Comments: multiple small blisters, some open some intact   -MW      User Key  (r) = Recorded By, (t) = Taken By, (c) = Cosigned By    Initials Name Provider Type    Ashley Armas, PT Physical Therapist                                                       PT OP Goals     Row Name 09/06/18 0900          STG Date to Achieve 09/11/18  -MW    STG 1 Pt able to tolerate BLE compression > 12 hours per day.   -MW    STG 1 Progress Partially Met  -MW    STG 2 Pt and spouse independent with basic LE compression (layering and or wraping) to promote edema reduction.   -MW    STG 2 Progress Not Met  -MW    STG 3 BLE circumferential measurements to decrease at least 2 cm at ankle and calf to promote improved function and decreased risk of cellulits.   -MW    STG 3 Progress Not Met  -MW          LTG 1 BLE circumferential measurements to decrease at least 4 cm at calf to promote improved function and decreased risk of cellulits.   -MW    LTG 1 Progress Not Met  -MW    LTG 2 Pt / spouse independent with compression garment or system for long term management of condition.   -MW    LTG 2 Progress Not Met  -MW    LTG 3 Pt / spouse independent with HEP to promote fluid return and LE flexibility.   -MW    LTG 3 Progress Not Met  -MW    LTG 4 Pt / spouse independent with BLE edema management (skin care, dressing changes prn, compression  garment or system, possible compression pump).   -MW    LTG 4 Progress Not Met  -MW      User Key  (r) = Recorded By, (t) = Taken By, (c) = Cosigned By    Initials Name Provider Type    Ashley Armas, PT Physical Therapist                       OP PT Discharge Summary  Date of Discharge: 09/06/18  Reason for Discharge: Per MD order  Outcomes Achieved: Unable to tolerate or actively participate in therapy  Discharge Destination: Home with caregiver assist  Discharge Instructions/Additional Comments: FAITH Garibay requested pt stop PT and use home pump instead. Will D/C from PT at this time per her request.       Ashley Nettles, ROBERT  9/6/2018

## 2018-09-12 DIAGNOSIS — Z98.890 HISTORY OF KYPHOPLASTY: Primary | ICD-10-CM

## 2018-09-12 DIAGNOSIS — M54.59 MECHANICAL LOW BACK PAIN: ICD-10-CM

## 2018-09-12 RX ORDER — GABAPENTIN 100 MG/1
100 CAPSULE ORAL 3 TIMES DAILY
Qty: 30 CAPSULE | Refills: 0 | OUTPATIENT
Start: 2018-09-12 | End: 2018-10-16

## 2018-09-12 RX ORDER — TRAMADOL HYDROCHLORIDE 50 MG/1
50 TABLET ORAL 2 TIMES DAILY
Qty: 20 TABLET | Refills: 0 | OUTPATIENT
Start: 2018-09-12 | End: 2018-10-16

## 2018-09-12 NOTE — TELEPHONE ENCOUNTER
Small amount approved, but if patient is having new pain, he should come see us with new xrays of his lumbar spine.

## 2018-09-12 NOTE — TELEPHONE ENCOUNTER
Provider:  Stuart  Caller: Akshat  Time of call:   846u  Phone #:  [phone]  Surgery:  L1-L2 Kypho  Surgery Date:  4/16/18  Last visit:   6/19/18  Next visit: LISSA    SONY:       07/16/2018 Gabapentin 100MG 1955 90 30 MD Stuart, Akshat [facility] [address] [place] 1    07/30/2018 Tramadol Hcl 50MG 1955 6 3 MD Stuart, Akshat [facility] [address] [place] [address] [place] 10 1    08/03/2018 Tramadol Hcl 50MG 1955 54 27 MD Stuart, Akshat [facility] [address] [place] [address] [place] 10 1    08/21/2018 Gabapentin 100MG 1955 21 10 MD Stuart, Akshat [facility] [address] [place] [address] [place] 1      Reason for call:       Pt states since he has been taken off his medication he is having major back pain.     -Spoke with pt, pt weaned himself off the Gabapentin and Tramadol about 3 weeks ago. Since then pt is experiencing a lot of dull/ constant aches and pains in his low back, around the belt line. No radicular pain in legs.     Pt denies B/B issue or saddle numbness.      He is still not sleeping in bed, only recliner.     When pt is in recliner, and using heat; pain seems to get some better.     Pt has not used his cane in 1 month, but now has found himself needing it.     Pt wants to know if we can re-start a low dose of Gabapentin or Tramadol.

## 2018-10-04 ENCOUNTER — TELEPHONE (OUTPATIENT)
Dept: NEUROSURGERY | Facility: CLINIC | Age: 63
End: 2018-10-04

## 2018-10-04 DIAGNOSIS — S32.040D CLOSED COMPRESSION FRACTURE OF L4 LUMBAR VERTEBRA WITH ROUTINE HEALING, SUBSEQUENT ENCOUNTER: Primary | ICD-10-CM

## 2018-10-04 DIAGNOSIS — M48.50XG PATHOLOGICAL COMPRESSION FRACTURE OF VERTEBRA WITH DELAYED HEALING, SUBSEQUENT ENCOUNTER: ICD-10-CM

## 2018-10-04 NOTE — TELEPHONE ENCOUNTER
I have spoke with pt.  He is having new back pain that has been getting progressively worse.  I have let him know what Brittani has suggested and he verbalized understanding.  I informed him a  will be giving him a call soon.

## 2018-10-04 NOTE — TELEPHONE ENCOUNTER
He had a compression fracture at L4 after a fall, with kyphoplasty done on 3/5. He had spontaneous increase in back pain in April and was found to have L1 and L2 fractures for which he had kyphoplasty on 4/16/18.    If he is having new back pain, we should get AP and lateral xrays of the thoracic and lumbar spine and have him come in for an appointment with Dr. Davidson or a PA tomorrow. I've pended the orders for the xray

## 2018-10-04 NOTE — TELEPHONE ENCOUNTER
Pt was left a message about scheduling with Dr. Davidson for tomorrow but called back and said that he was unable to make that and would need another day.  He was rescheduled for the first available that met his convenience.

## 2018-10-04 NOTE — TELEPHONE ENCOUNTER
Provider: Stuart  Caller: PT  Time of call: 10:15    Phone #:  [phone]  Surgery:  L2 Kypho  Surgery Date: 04/16/18   Last visit:  06/19/18   Next visit: LISSA CARDOZA:         Reason for call: increased LBP    When did it start: 2 days ago    Where is it located: low back, along the waistline, begins in the middle and radiates out to the flanks.     How long has this been going on/is this new or the same as before surgery: This is the same type of pain he was having before his kypho.     Characteristics of symptom/severity: Sharp shooting pain that radiates into his sides and left hip.     Timing- Is it constant or intermittent: constant, exacerbated with movement.     What makes it worse: laying flat and standing and walking     What makes it better: nothing.     What therapies/medications have you tried: Pt is out of muscle relaxants and pain medicine. He is only taking tylenol at this point.   Heating pad and ice.

## 2018-10-09 ENCOUNTER — HOSPITAL ENCOUNTER (OUTPATIENT)
Dept: GENERAL RADIOLOGY | Facility: HOSPITAL | Age: 63
Discharge: HOME | End: 2018-10-09
Admitting: PHYSICIAN ASSISTANT

## 2018-10-09 ENCOUNTER — OFFICE VISIT (OUTPATIENT)
Dept: CARDIOLOGY | Facility: HOSPITAL | Age: 63
End: 2018-10-09

## 2018-10-09 VITALS
WEIGHT: 264 LBS | TEMPERATURE: 97.7 F | DIASTOLIC BLOOD PRESSURE: 74 MMHG | HEART RATE: 105 BPM | RESPIRATION RATE: 16 BRPM | HEIGHT: 69 IN | OXYGEN SATURATION: 98 % | BODY MASS INDEX: 39.1 KG/M2 | SYSTOLIC BLOOD PRESSURE: 119 MMHG

## 2018-10-09 DIAGNOSIS — E87.6 HYPOKALEMIA: ICD-10-CM

## 2018-10-09 DIAGNOSIS — M48.50XG PATHOLOGICAL COMPRESSION FRACTURE OF VERTEBRA WITH DELAYED HEALING, SUBSEQUENT ENCOUNTER: ICD-10-CM

## 2018-10-09 DIAGNOSIS — R60.0 BILATERAL LOWER EXTREMITY EDEMA: ICD-10-CM

## 2018-10-09 DIAGNOSIS — I50.33 ACUTE ON CHRONIC DIASTOLIC CONGESTIVE HEART FAILURE (HCC): Primary | ICD-10-CM

## 2018-10-09 DIAGNOSIS — S32.040D CLOSED COMPRESSION FRACTURE OF L4 LUMBAR VERTEBRA WITH ROUTINE HEALING, SUBSEQUENT ENCOUNTER: ICD-10-CM

## 2018-10-09 DIAGNOSIS — I10 ESSENTIAL HYPERTENSION: ICD-10-CM

## 2018-10-09 LAB
ANION GAP SERPL CALCULATED.3IONS-SCNC: 8 MMOL/L (ref 3–11)
BUN BLD-MCNC: 20 MG/DL (ref 9–23)
BUN/CREAT SERPL: 19.4 (ref 7–25)
CALCIUM SPEC-SCNC: 9.1 MG/DL (ref 8.7–10.4)
CHLORIDE SERPL-SCNC: 94 MMOL/L (ref 99–109)
CO2 SERPL-SCNC: 33 MMOL/L (ref 20–31)
CREAT BLD-MCNC: 1.03 MG/DL (ref 0.6–1.3)
GFR SERPL CREATININE-BSD FRML MDRD: 73 ML/MIN/1.73
GLUCOSE BLD-MCNC: 95 MG/DL (ref 70–100)
POTASSIUM BLD-SCNC: 2.7 MMOL/L (ref 3.5–5.5)
SODIUM BLD-SCNC: 135 MMOL/L (ref 132–146)

## 2018-10-09 PROCEDURE — 72080 X-RAY EXAM THORACOLMB 2/> VW: CPT

## 2018-10-09 PROCEDURE — 80048 BASIC METABOLIC PNL TOTAL CA: CPT | Performed by: NURSE PRACTITIONER

## 2018-10-09 PROCEDURE — 99214 OFFICE O/P EST MOD 30 MIN: CPT | Performed by: NURSE PRACTITIONER

## 2018-10-09 RX ORDER — POTASSIUM CHLORIDE 750 MG/1
TABLET, FILM COATED, EXTENDED RELEASE ORAL
Qty: 42 TABLET | Refills: 0 | Status: SHIPPED | OUTPATIENT
Start: 2018-10-09 | End: 2018-12-28 | Stop reason: SDUPTHER

## 2018-10-09 RX ORDER — METOLAZONE 2.5 MG/1
2.5 TABLET ORAL EVERY OTHER DAY
Qty: 20 TABLET | Refills: 3 | Status: SHIPPED | OUTPATIENT
Start: 2018-10-09 | End: 2019-08-28 | Stop reason: SDUPTHER

## 2018-10-09 RX ORDER — BUMETANIDE 1 MG/1
TABLET ORAL
Qty: 30 TABLET | Refills: 5 | Status: SHIPPED | OUTPATIENT
Start: 2018-10-09 | End: 2019-01-16 | Stop reason: SDUPTHER

## 2018-10-09 NOTE — PROGRESS NOTES
Encounter Date:10/09/2018      Patient ID: Akshat Winkler is a 63 y.o. male.        Subjective:     Chief Complaint: Follow-up (leg swelling)     History of Present Illness patient presents to the office today for ongoing evaluation of his diastolic heart failure. He notes that he lost 8 lbs over the weekend after taking metolazone. He notes that he is using his pneumatic compression stockings daily at night. He notes compliance with his medications. He notes ongoing fatigue,abdominal fullness and pedal edema. Denies cp, tachycardia, dyspnea.    Patient Active Problem List   Diagnosis   • Chronic diastolic heart failure (CMS/HCC)   • Type 2 diabetes mellitus without complication, without long-term current use of insulin (CMS/HCC)   • Hyperlipidemia LDL goal <70   • Essential hypertension   • Coronary artery disease involving native coronary artery of native heart with angina pectoris (CMS/HCC)   • Severe obstructive sleep apnea. On home CPAP   • Class 3 obesity in adult   • Chronic anticoagulation   • Persistent atrial fibrillation/flutter   • Compression fracture of lumbar vertebra (CMS/HCC)   • Spondylosis of 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 or radiculopathy   • Lumbar stenosis with neurogenic claudication   • Lumbar disc herniation   • Myofascial pain   • Pathologic compression fracture of vertebra (CMS/HCC)   • Chronic bilateral severe lower extremity edema   • Chronically elevated bilirubin and alkaline phosphatase. Probable chronic liver congestion versus occult cirrhosis       Past Surgical History:   Procedure Laterality Date   • CARDIAC CATHETERIZATION     • CARDIOVERSION      multiple   • COLONOSCOPY      about 14 years ago   • KYPHOPLASTY N/A 3/5/2018    Procedure: KYPHOPLASTY L4;  Surgeon: Akshat Davidson MD;  [facility]: [facility];  Service:    • KYPHOPLASTY N/A 4/16/2018    Procedure: KYPHOPLASTY L1 AND L2;  Surgeon: Akshat Davidson MD;  Location:  [facility] OR;  Service: Neurosurgery   • OTHER SURGICAL  HISTORY  06/29/2015    PVA       Allergies   Allergen Reactions   • Bisoprolol Other (See Comments)     Severe Hypotension   • Flecainide Other (See Comments)     Syncope / collapse   • Metoprolol Other (See Comments)      Bradycardia, Severe Hypotension   • Tikosyn [Dofetilide] Other (See Comments)     Wide complex tachycardia         Current Outpatient Prescriptions:   •  apixaban (ELIQUIS) 5 MG tablet tablet, Take 1 tablet by mouth Every 12 (Twelve) Hours for 360 days., Disp: 180 tablet, Rfl: 3  •  aspirin 81 MG EC tablet, Take 81 mg by mouth Every Morning., Disp: , Rfl:   •  bumetanide (BUMEX) 2 MG tablet, Take 1 mg by mouth Daily. On less busy days at work, Disp: , Rfl:   •  bumetanide (BUMEX) 2 MG tablet, Take 2 mg by mouth Daily., Disp: , Rfl:   •  gabapentin (NEURONTIN) 100 MG capsule, Take 1 capsule by mouth Take As Directed. Take 1 twice a day for 1 week, then 1 at bedtime for 1 week, then stop, Disp: 21 capsule, Rfl: 0  •  gabapentin (NEURONTIN) 100 MG capsule, Take 1 capsule by mouth 3 (Three) Times a Day., Disp: 30 capsule, Rfl: 0  •  methocarbamol (ROBAXIN) 750 MG tablet, Take 1 tablet by mouth 3 (Three) Times a Day. (Patient taking differently: Take 750 mg by mouth 2 (Two) Times a Day. Take one tablet at noon and one tablet nightly), Disp: 60 tablet, Rfl: 1  •  metOLazone (ZAROXOLYN) 2.5 MG tablet, Take 1 tablet by mouth Every Other Day. Take 1 tablet with bumex QOD M, W, F, Disp: 20 tablet, Rfl: 3  •  potassium chloride (K-DUR) 10 MEQ CR tablet, Take 40 meq daily x 3days, then 10 meq daily, Disp: 42 tablet, Rfl: 0  •  rosuvastatin (CRESTOR) 20 MG tablet, Take 1 tablet by mouth Daily for 360 days., Disp: 90 tablet, Rfl: 3  •  spironolactone (ALDACTONE) 25 MG tablet, Take 0.5 tablets by mouth Daily., Disp: 30 tablet, Rfl: 6  •  tiZANidine (ZANAFLEX) 4 MG tablet, Take 1 tablet by mouth At Night As Needed for Muscle Spasms., Disp: 30 tablet, Rfl: 1  •  traMADol (ULTRAM) 50 MG tablet, Take 1 tablet by  mouth 2 (Two) Times a Day., Disp: 20 tablet, Rfl: 0  •  bumetanide (BUMEX) 1 MG tablet, 1 po qd, Disp: 30 tablet, Rfl: 5    The following portions of the chart were reviewed and updated as appropriate: Allergies, current medications, past family history, social history, past medical history.     Review of Systems   Constitution: Positive for weight gain. Negative for chills, decreased appetite, diaphoresis, fever, weakness, malaise/fatigue, night sweats and weight loss.   HENT: Negative for congestion, hearing loss, hoarse voice and nosebleeds.    Eyes: Negative for blurred vision, visual disturbance and visual halos.   Cardiovascular: Positive for leg swelling. Negative for chest pain, claudication, cyanosis, dyspnea on exertion, irregular heartbeat, near-syncope, orthopnea, palpitations, paroxysmal nocturnal dyspnea and syncope.   Respiratory: Negative for cough, hemoptysis, shortness of breath, sleep disturbances due to breathing, snoring, sputum production and wheezing.    Hematologic/Lymphatic: Negative for bleeding problem. Does not bruise/bleed easily.   Skin: Negative for dry skin, itching and rash.   Musculoskeletal: Negative for arthritis, joint pain, joint swelling and myalgias.   Gastrointestinal: Negative for bloating, abdominal pain, constipation, diarrhea, flatus, heartburn, hematemesis, hematochezia, melena, nausea and vomiting.   Genitourinary: Negative for dysuria, frequency, hematuria, nocturia and urgency.   Neurological: Negative for excessive daytime sleepiness, dizziness, headaches, light-headedness and loss of balance.   Psychiatric/Behavioral: Negative for depression. The patient does not have insomnia and is not nervous/anxious.            Objective:     Vitals:    10/09/18 0811 10/09/18 0812   BP: 124/73 119/74   BP Location: Left arm Right arm   Patient Position: Sitting Sitting   Cuff Size: Adult    Pulse: 106 105   Resp: 16    Temp: 97.7 °F (36.5 °C)    TempSrc: Temporal Artery    "  SpO2: 98%    Weight: 120 kg (264 lb)    Height: 175.3 cm (69\")          Physical Exam   Constitutional: He is oriented to person, place, and time. He appears well-developed and well-nourished. He is active and cooperative. No distress.   HENT:   Head: Normocephalic and atraumatic.   Mouth/Throat: Oropharynx is clear and moist.   Eyes: Pupils are equal, round, and reactive to light. Conjunctivae and EOM are normal.   Neck: Normal range of motion. Neck supple. No JVD present. No tracheal deviation present. No thyromegaly present.   Cardiovascular: Normal rate, regular rhythm, normal heart sounds and intact distal pulses.    Pulmonary/Chest: Effort normal and breath sounds normal.   Abdominal: Bowel sounds are normal. He exhibits distension. There is no tenderness.   Musculoskeletal: Normal range of motion. He exhibits edema (2+ pitting edema noted BLEs).   Neurological: He is alert and oriented to person, place, and time.   Skin: Skin is warm, dry and intact.   Psychiatric: He has a normal mood and affect. His behavior is normal.   Nursing note and vitals reviewed.      Lab and Diagnostic Review:      Lab Results   Component Value Date    GLUCOSE 95 10/09/2018    CALCIUM 9.1 10/09/2018     10/09/2018    K 2.7 (C) 10/09/2018    CO2 33.0 (H) 10/09/2018    CL 94 (L) 10/09/2018    BUN 20 10/09/2018    CREATININE 1.03 10/09/2018    EGFRIFAFRI 92 02/08/2018    EGFRIFNONA 73 10/09/2018    BCR 19.4 10/09/2018    ANIONGAP 8.0 10/09/2018         Assessment and Plan:         1. Acute on chronic diastolic congestive heart failure (CMS/HCC)  Continue bumex 2 mg daily, add bumex 0.5 mg in afternoon every other day starting tomorrow  Continue aldactone   Heart failure education today including signs and symptoms, the role of the heart failure center, daily weights, low sodium diet (less than 1500 mg per day), and daily physical activity. Reviewed HF Zones with patient and family.  Patient to continue current medications as " previously ordered.     2. Essential hypertension  Well controlled  HTN Education provided today including signs and symptoms, medication management, daily blood pressure monitoring. Patient encouraged to call the [facility] with any abnormal readings.   - Basic Metabolic Panel    3. Hypokalemia  Begin KCL 40 meq daily for next 3 days  Continue aldactone daily  Will need to repeat labs in 7-10 days  4. Chronic bilateral severe lower extremity edema  Continue pneumatic compression pumps    It has been a pleasure to participate in the care of this patient.  Patient was instructed to call the [facility] with any questions, concerns, or worsening symptoms.        * Please note that portions of this note were completed with a voice recognition program. Efforts were made to edit the dictation but occasionally words are transcribed.

## 2018-10-12 ENCOUNTER — TELEPHONE (OUTPATIENT)
Dept: CARDIOLOGY | Facility: HOSPITAL | Age: 63
End: 2018-10-12

## 2018-10-12 DIAGNOSIS — E87.6 HYPOKALEMIA: Primary | ICD-10-CM

## 2018-10-12 NOTE — TELEPHONE ENCOUNTER
Spoke with patient who notes that he is feeling better. He notes that his s/s of fluid overload has improved significantly. Patient did have hypokalemia and will need repeat labs at the beginning of next week. Patient verbalized understanding. He has taken 40 meq KCL x 3days and is on aldactone. He is to take KCL 20 meq daily over the weekend and have labs Monday or Tuesday.

## 2018-10-15 ENCOUNTER — LAB REQUISITION (OUTPATIENT)
Dept: LAB | Facility: HOSPITAL | Age: 63
End: 2018-10-15

## 2018-10-15 DIAGNOSIS — Z00.00 ROUTINE GENERAL MEDICAL EXAMINATION AT A HEALTH CARE FACILITY: ICD-10-CM

## 2018-10-15 PROCEDURE — 36415 COLL VENOUS BLD VENIPUNCTURE: CPT | Performed by: NURSE PRACTITIONER

## 2018-10-16 ENCOUNTER — OFFICE VISIT (OUTPATIENT)
Dept: NEUROSURGERY | Facility: CLINIC | Age: 63
End: 2018-10-16

## 2018-10-16 VITALS — HEIGHT: 69 IN | WEIGHT: 262 LBS | TEMPERATURE: 98 F | BODY MASS INDEX: 38.8 KG/M2

## 2018-10-16 DIAGNOSIS — Z98.890 HISTORY OF KYPHOPLASTY: ICD-10-CM

## 2018-10-16 DIAGNOSIS — M54.59 MECHANICAL LOW BACK PAIN: Primary | ICD-10-CM

## 2018-10-16 PROCEDURE — 99213 OFFICE O/P EST LOW 20 MIN: CPT | Performed by: PHYSICIAN ASSISTANT

## 2018-10-16 NOTE — PROGRESS NOTES
Patient: Akshat Winkler  : 1955  GENDER: male    Primary Care Provider: Oren Mark MD    Requesting Provider: As above      History    Chief Complaint: Low back pain    History of Present Illness: Mr. Winkler is a 63-year-old male who works as a .  His past medical history significant for A. fib.  Previously, patient underwent kyphoplasty's at L1 and L2 (18), and L4 (3/5/18).  Patient has done well until recently when he had a recurrence of his mid back pain.  He given his history, he was concerned that he may have a new fracture.    Symptoms are confined to his back and do not radiate.  Patient denies positional intolerances, lower extremity involvement, bowel or bladder dysfunction, or weakness.  He has been taking Tylenol for associated discomfort.  He has no other complaints at this time.    Review of Systems   Constitutional: Negative for activity change, appetite change, chills, diaphoresis, fatigue, fever and unexpected weight change.   HENT: Negative for congestion, dental problem, drooling, ear discharge, ear pain, facial swelling, hearing loss, mouth sores, nosebleeds, postnasal drip, rhinorrhea, sinus pressure, sneezing, sore throat, tinnitus, trouble swallowing and voice change.    Eyes: Negative for photophobia, pain, discharge, redness, itching and visual disturbance.   Respiratory: Negative for apnea, cough, choking, chest tightness, shortness of breath, wheezing and stridor.    Cardiovascular: Negative for chest pain, palpitations and leg swelling.   Gastrointestinal: Negative for abdominal distention, abdominal pain, anal bleeding, blood in stool, constipation, diarrhea, nausea, rectal pain and vomiting.   Endocrine: Negative for cold intolerance, heat intolerance, polydipsia, polyphagia and polyuria.   Genitourinary: Positive for flank pain. Negative for decreased urine volume, difficulty urinating, dysuria, enuresis, frequency, genital sores, hematuria  "and urgency.   Musculoskeletal: Positive for back pain. Negative for arthralgias, gait problem, joint swelling, myalgias, neck pain and neck stiffness.   Skin: Negative for color change, pallor, rash and wound.   Allergic/Immunologic: Negative for environmental allergies, food allergies and immunocompromised state.   Neurological: Negative for dizziness, tremors, seizures, syncope, facial asymmetry, speech difficulty, weakness, light-headedness, numbness and headaches.   Hematological: Negative for adenopathy. Does not bruise/bleed easily.   Psychiatric/Behavioral: Negative for agitation, behavioral problems, confusion, decreased concentration, dysphoric mood, hallucinations, self-injury, sleep disturbance and suicidal ideas. The patient is not nervous/anxious and is not hyperactive.    All other systems reviewed and are negative.      The patient's past medical history, past surgical history, family history, and social history have been reviewed at length in the electronic medical record.    Physical Exam:   Temp 98 °F (36.7 °C)   Ht 175.3 cm (69.02\")   Wt 119 kg (262 lb)   BMI 38.67 kg/m²   CONSTITUTIONAL:   - Patient is well-nourished  - Pleasant and appears stated age.  CV:   - Regular rate and rhythm on palpable radial pulse   - No murmur appreciated   PULMONARY:   - Speaking in full sentences  - No use of accessory muscles   - Breathing is non-labored   - No wheezing   MUSCULOSKELETAL:  - Neck tenderness to palpation is not observed.   - ROM in neck is normal.  NEUROLOGICAL:  - A&Ox3  - Attention span, language function, and cognition are intact.  - Strength is intact in the upper and lower extremities to direct testing.  - Muscle tone is normal throughout.  - Station and gait are normal.  - Sensation is intact to light touch testing throughout.  - Deep tendon reflexes are 1+ and symmetrical.    - Wayne's Sign is negative bilaterally.  - No clonus is elicited.  - Coordination is intact.    Medical " Decision Making    Data Review:   1. Xray of thoracic spine (10/9/18): Multiple old compression fractures of the lumbar spine, H having been treated with kyphoplasty.  There are no new compression fractures.    Diagnosis/Treatment Options:  1. Mechanical low back pain  2. History of kyphoplasty       Follow up:  Mr. Winkler is seen today in follow-up for multiple compression fractures in the lumbar spine, status post kyphoplasty.  Imaging studies today revealed no new fractures in the lumbar spine.  He has chosen to move forward with conservative treatment and will follow-up with us on an as-needed basis.     Negar Peterson PA-C   10/16/2018   4:11 PM

## 2018-10-19 ENCOUNTER — TELEPHONE (OUTPATIENT)
Dept: NEUROSURGERY | Facility: CLINIC | Age: 63
End: 2018-10-19

## 2018-10-19 RX ORDER — METHYLPREDNISOLONE 4 MG/1
TABLET ORAL
Qty: 21 TABLET | Refills: 0 | Status: SHIPPED | OUTPATIENT
Start: 2018-10-19 | End: 2018-10-30 | Stop reason: SDDI

## 2018-10-19 NOTE — TELEPHONE ENCOUNTER
Please let him know I signed for a medrol dose carolyne and he can  at pharmacy at his convenience.    Thanks,    SO

## 2018-10-19 NOTE — TELEPHONE ENCOUNTER
"Provider: Stuart   Caller: self  Time of call: 11:45    Phone #:  [phone]  Surgery:KYPHOPLASTY L1 AND L2     Surgery Date:  4/16/18  Last visit: 10/16/18    Next visit: LISSA CARDOZA:         Reason for call:       Patient called today for Negar. He states that he talked to his \"a-fib\" doctor and they said it was alright for him to have the steroids you suggested.    I don't see anything tin the note regarding this, please advise.      "

## 2018-10-23 ENCOUNTER — TELEPHONE (OUTPATIENT)
Dept: CARDIOLOGY | Facility: HOSPITAL | Age: 63
End: 2018-10-23

## 2018-10-23 NOTE — TELEPHONE ENCOUNTER
Reviewed labs with patient. Glucose 93, bun 20, creatinine 1.09, gfr 69, sodium 135, k 3.2, chloride 94, Co2 32, calcium 9.4  Patient to increase kcl to 20 meq daily. Patient verbalized understanding.

## 2018-10-29 ENCOUNTER — TELEPHONE (OUTPATIENT)
Dept: NEUROSURGERY | Facility: CLINIC | Age: 63
End: 2018-10-29

## 2018-10-29 NOTE — TELEPHONE ENCOUNTER
Provider:  Stuart  Caller: patient  Time of call:  10:01   Phone #:  [phone]  Surgery:  Kyphoplasty L1 and L2  Surgery Date:  04/16/18  Last visit:   10/16/18  Next visit: None    SONY:         Reason for call:  Patient states that after being on the Medrol-dose pack a few days he felt great.  As soon as he stopped them, his pain increased.   He has not been on a NSAID in a while.  He said there is no reason why he couldn't take one.  Can we recommend one for him?

## 2018-10-30 ENCOUNTER — OFFICE VISIT (OUTPATIENT)
Dept: CARDIOLOGY | Facility: CLINIC | Age: 63
End: 2018-10-30

## 2018-10-30 VITALS
OXYGEN SATURATION: 96 % | SYSTOLIC BLOOD PRESSURE: 132 MMHG | HEIGHT: 69 IN | HEART RATE: 113 BPM | WEIGHT: 269 LBS | BODY MASS INDEX: 39.84 KG/M2 | DIASTOLIC BLOOD PRESSURE: 78 MMHG

## 2018-10-30 DIAGNOSIS — I10 ESSENTIAL HYPERTENSION: ICD-10-CM

## 2018-10-30 DIAGNOSIS — Z79.01 CHRONIC ANTICOAGULATION: ICD-10-CM

## 2018-10-30 DIAGNOSIS — E78.5 HYPERLIPIDEMIA LDL GOAL <70: ICD-10-CM

## 2018-10-30 DIAGNOSIS — I50.32 CHRONIC DIASTOLIC HEART FAILURE (HCC): ICD-10-CM

## 2018-10-30 DIAGNOSIS — I25.119 CORONARY ARTERY DISEASE INVOLVING NATIVE CORONARY ARTERY OF NATIVE HEART WITH ANGINA PECTORIS (HCC): Primary | ICD-10-CM

## 2018-10-30 DIAGNOSIS — I48.19 PERSISTENT ATRIAL FIBRILLATION (HCC): ICD-10-CM

## 2018-10-30 PROCEDURE — 93000 ELECTROCARDIOGRAM COMPLETE: CPT | Performed by: INTERNAL MEDICINE

## 2018-10-30 PROCEDURE — 99214 OFFICE O/P EST MOD 30 MIN: CPT | Performed by: INTERNAL MEDICINE

## 2018-10-30 NOTE — PROGRESS NOTES
Encounter Date:10/30/2018    Patient ID: Akshat Winkler is a 63 y.o. male who lives in [place], [place].    CC/Reason for visit:  Persistent atrial fibrillation/flutter and Hypertension           Problem List Items Addressed This Visit        Cardiovascular and Mediastinum    Persistent atrial fibrillation/flutter    Overview     · Diagnosed 2011  · [id number] 3 (HTN, CAD, DM)  · Echo (10/11/2011): Normal LVEF, mild MR, mild LA dilation  · LOLY/ECVcardioversion, 12/21/2011, with initiation of propafenone therapy.   · Successful LOLY/ECV for recurrent atrial fibrillation, 11/15/2013  · PVA with Dr. Cary, 6/29/2015  · Cardioversion (07/17/2015) for atrial flutter occurring post ablation   · ECV for recurrent atrial flutter, 12/20/17 with reattempt at beta blocker/flecainide.  · Intolerant to beta blocker/flecanide with severe hypotension, intolerant to propafenone  · [facility] admission with development of wide complex tachycardia, 7/2018         Current Assessment & Plan     · Presently the patient remains in atrial fibrillation/flutter with RVR in the 100s.  He is completely asymptomatic. Previous attempts at antiarrhythmic therapy have led to severe side effects/arrhythmia. We will continue to monitor and anticoagulate for stroke prophylaxis.  · Continue Eliquis 5 mg twice a day         Relevant Orders    ECG 12 Lead    Chronic diastolic heart failure (CMS/HCC)    Overview     · Cardiac cath (02/20/14):  elevated LVEDP suggesting diastolic heart failure.  · Intolerant to beta blocker therapy         Current Assessment & Plan     · Stable symptoms  · Continue present medical therapy  · Follow-up with Trina Joy in CHF clinic as needed for titration of diuretics.         Coronary artery disease involving native coronary artery of native heart with angina pectoris (CMS/HCC) - Primary    Overview     · Cardiac catheterization (02/20/14): mild nonobstructive CAD. LVEF 55%, elevated LVEDP suggesting diastolic  heart failure.         Current Assessment & Plan     · Continue aspirin 81 mg daily         Essential hypertension    Current Assessment & Plan     · Hypertension is controlled  · Continue spironolactone 12.5 mg daily         Hyperlipidemia LDL goal <70    Overview     · High intensity statin therapy indicated given presence of coronary artery disease         Current Assessment & Plan     · Continue Crestor 20 mg daily            Other    Chronic anticoagulation    Overview      He does have an elevated CHADS-VASc score and will continue his Eliquis at 5 mg twice daily.                   · Continue present medical therapy  · Avoid scheduled high-dose NSAID therapy due to CHF, heavy diuretic use, and predisposition for renal insufficiency.  · Return to clinic in 6 months        Akshat Winkler returns today in follow-up of his persistent atrial flutter, chronic diastolic heart failure, and cardiac risk factors. Overall, the patient had a stable clinical course. He continues to see Akshat Davidson regarding his compression fractures and low back pain. He recently received a course of steroids which helped the pain but the pain recurred. His office recommended a course of high-dose scheduled NSAIDS but the patient is reluctant to take them due to his heart failure and history of renal insufficiency. He denies palpitation symptoms, worsening shortness of breath. Swelling of his legs continues to be problematic for him.    Review of Systems   Constitution: Negative for weakness and malaise/fatigue.   Eyes: Negative for vision loss in left eye and vision loss in right eye.   Cardiovascular: Negative for chest pain, dyspnea on exertion, near-syncope, orthopnea, palpitations, paroxysmal nocturnal dyspnea and syncope.   Musculoskeletal: Negative for myalgias.   Neurological: Negative for brief paralysis, excessive daytime sleepiness, focal weakness, numbness and paresthesias.   All other systems reviewed and are  "negative.      The patient's past medical, social, family history and ROS reviewed in the patient's electronic medical record.    Allergies  Bisoprolol; Flecainide; Metoprolol; and Tikosyn [dofetilide]    Outpatient Prescriptions Marked as Taking for the 10/30/18 encounter (Office Visit) with Lucian Alvarez IV, MD   Medication Sig Dispense Refill   • apixaban (ELIQUIS) 5 MG tablet tablet Take 1 tablet by mouth Every 12 (Twelve) Hours for 360 days. 180 tablet 3   • bumetanide (BUMEX) 1 MG tablet 1 po qd (Patient taking differently: Take 1 mg by mouth Every Evening.) 30 tablet 5   • bumetanide (BUMEX) 2 MG tablet Take 2 mg by mouth Every Morning. On less busy days at work     • metOLazone (ZAROXOLYN) 2.5 MG tablet Take 1 tablet by mouth Every Other Day. Take 1 tablet with bumex QOD M, W, F 20 tablet 3   • potassium chloride (K-DUR) 10 MEQ CR tablet Take 40 meq daily x 3days, then 10 meq daily (Patient taking differently: Take 10 mEq by mouth 2 (Two) Times a Day.) 42 tablet 0   • rosuvastatin (CRESTOR) 20 MG tablet Take 1 tablet by mouth Daily for 360 days. 90 tablet 3   • spironolactone (ALDACTONE) 25 MG tablet Take 0.5 tablets by mouth Daily. 30 tablet 6   • [DISCONTINUED] aspirin 81 MG EC tablet Take 81 mg by mouth Every Morning.             Blood pressure 132/78, pulse 113, height 175.3 cm (69\"), weight 122 kg (269 lb), SpO2 96 %.  Body mass index is 39.72 kg/m².  There were no vitals filed for this visit.    Physical Exam   Constitutional: He is oriented to person, place, and time. He appears well-developed and well-nourished.   HENT:   Head: Normocephalic and atraumatic.   Eyes: Pupils are equal, round, and reactive to light. No scleral icterus.   Neck: No JVD present. Carotid bruit is not present. No thyromegaly present.   Cardiovascular: Normal rate and regular rhythm.  Exam reveals no gallop.    No murmur heard.  Pulmonary/Chest: Effort normal and breath sounds normal.   Abdominal: Soft. He exhibits " no distension. There is no hepatosplenomegaly.   Musculoskeletal: He exhibits no edema.   Neurological: He is alert and oriented to person, place, and time.   Skin: Skin is warm and dry.   Psychiatric: He has a normal mood and affect. His behavior is normal.       Data Review:       ECG 12 Lead  Date/Time: 10/30/2018 4:33 PM  Performed by: ISMAEL DAO  Authorized by: ISMAEL DAO   Comparison: compared with previous ECG from 7/24/2018  Comparison to previous ECG: Atrial fibrillation is replaced normal sinus rhythm  Rhythm: atrial fibrillation  BPM: 105  Clinical impression: abnormal ECG  Comments: Atr          Lab Results   Component Value Date    GLUCOSE 95 10/09/2018    BUN 20 10/09/2018    CREATININE 1.03 10/09/2018    EGFRIFNONA 73 10/09/2018    EGFRIFAFRI 92 02/08/2018    BCR 19.4 10/09/2018    K 2.7 (C) 10/09/2018    CO2 33.0 (H) 10/09/2018    CALCIUM 9.1 10/09/2018    PROTENTOTREF 7.0 03/03/2018    ALBUMIN 3.84 07/20/2018    LABIL2 0.7 03/03/2018    AST 43 (H) 07/20/2018    ALT 19 07/20/2018       Lab Results   Component Value Date    TRIG 44 05/09/2017    HDL 61 (H) 05/09/2017    LDL 68 05/09/2017    AST 43 (H) 07/20/2018    ALT 19 07/20/2018       Lab Results   Component Value Date    HGBA1C 6.20 (H) 07/19/2018         Lucian Alvarez IV, MD  10/30/2018

## 2018-10-30 NOTE — ASSESSMENT & PLAN NOTE
· Presently the patient remains in atrial fibrillation/flutter with RVR in the 100s.  He is completely asymptomatic. Previous attempts at antiarrhythmic therapy have led to severe side effects/arrhythmia. We will continue to monitor and anticoagulate for stroke prophylaxis.  · Continue Eliquis 5 mg twice a day

## 2018-10-30 NOTE — ASSESSMENT & PLAN NOTE
· Stable symptoms  · Continue present medical therapy  · Follow-up with Trina Joy in CHF clinic as needed for titration of diuretics.

## 2018-11-07 ENCOUNTER — TELEPHONE (OUTPATIENT)
Dept: CARDIOLOGY | Facility: HOSPITAL | Age: 63
End: 2018-11-07

## 2018-11-07 NOTE — TELEPHONE ENCOUNTER
Patient notes a 4 lb weight loss today after taking metolazone. Patient to use lymphedema wraps tonight. If no improvement, to be seen in [facility]

## 2018-11-07 NOTE — TELEPHONE ENCOUNTER
Patient called the office noting a 8 lb weight gain and worsening pedal/leg edema. Patient to take metolazone dose 2. 5mg today.

## 2018-11-08 ENCOUNTER — OFFICE VISIT (OUTPATIENT)
Dept: CARDIOLOGY | Facility: HOSPITAL | Age: 63
End: 2018-11-08

## 2018-11-08 VITALS
TEMPERATURE: 98.8 F | SYSTOLIC BLOOD PRESSURE: 120 MMHG | OXYGEN SATURATION: 96 % | RESPIRATION RATE: 18 BRPM | DIASTOLIC BLOOD PRESSURE: 84 MMHG | WEIGHT: 271 LBS | HEIGHT: 69 IN | BODY MASS INDEX: 40.14 KG/M2

## 2018-11-08 DIAGNOSIS — I50.31 ACUTE DIASTOLIC HEART FAILURE (HCC): Primary | ICD-10-CM

## 2018-11-08 DIAGNOSIS — E87.6 HYPOKALEMIA: ICD-10-CM

## 2018-11-08 DIAGNOSIS — I10 ESSENTIAL HYPERTENSION: ICD-10-CM

## 2018-11-08 DIAGNOSIS — R60.0 LOCALIZED EDEMA: ICD-10-CM

## 2018-11-08 DIAGNOSIS — I48.19 PERSISTENT ATRIAL FIBRILLATION (HCC): ICD-10-CM

## 2018-11-08 LAB
ANION GAP SERPL CALCULATED.3IONS-SCNC: 8 MMOL/L (ref 3–11)
BUN BLD-MCNC: 19 MG/DL (ref 9–23)
BUN/CREAT SERPL: 19.6 (ref 7–25)
CALCIUM SPEC-SCNC: 8.9 MG/DL (ref 8.7–10.4)
CHLORIDE SERPL-SCNC: 100 MMOL/L (ref 99–109)
CO2 SERPL-SCNC: 29 MMOL/L (ref 20–31)
CREAT BLD-MCNC: 0.97 MG/DL (ref 0.6–1.3)
GFR SERPL CREATININE-BSD FRML MDRD: 79 ML/MIN/1.73
GLUCOSE BLD-MCNC: 94 MG/DL (ref 70–100)
POTASSIUM BLD-SCNC: 3 MMOL/L (ref 3.5–5.5)
SODIUM BLD-SCNC: 137 MMOL/L (ref 132–146)

## 2018-11-08 PROCEDURE — 80048 BASIC METABOLIC PNL TOTAL CA: CPT | Performed by: NURSE PRACTITIONER

## 2018-11-08 PROCEDURE — 99214 OFFICE O/P EST MOD 30 MIN: CPT | Performed by: NURSE PRACTITIONER

## 2018-11-08 NOTE — PROGRESS NOTES
Encounter Date:11/08/2018      Patient ID: Akshat Winkler is a 63 y.o. male.        Subjective:     Chief Complaint: Follow-up (c/o fluid overload)     History of Present Illness patient presents to the office today for ongoing evaluation of his diastolic heart failure. He notes worsening pedal edema, fatigue and abdominal fullness. He notes a weight gain of 9 lbs over the last few weeks. He notes compliance with his low sodium diet. He is wearing his lymphedema wraps at night. He notes that he has been taking his bumex as directed and has taken prn metolazone.     Patient Active Problem List   Diagnosis   • Chronic diastolic heart failure (CMS/HCC)   • Type 2 diabetes mellitus without complication, without long-term current use of insulin (CMS/HCC)   • Hyperlipidemia LDL goal <70   • Essential hypertension   • Coronary artery disease involving native coronary artery of native heart with angina pectoris (CMS/HCC)   • Severe obstructive sleep apnea. On home CPAP   • Class 3 obesity in adult   • Chronic anticoagulation   • Persistent atrial fibrillation/flutter   • Compression fracture of lumbar vertebra (CMS/HCC)   • Spondylosis of 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 or radiculopathy   • Lumbar stenosis with neurogenic claudication   • Lumbar disc herniation   • Myofascial pain   • Pathologic compression fracture of vertebra (CMS/HCC)   • Chronic bilateral severe lower extremity edema   • Chronically elevated bilirubin and alkaline phosphatase. Probable chronic liver congestion versus occult cirrhosis   • Mechanical low back pain   • History of kyphoplasty       Past Surgical History:   Procedure Laterality Date   • CARDIAC CATHETERIZATION     • CARDIOVERSION      multiple   • COLONOSCOPY      about 14 years ago   • OTHER SURGICAL HISTORY  06/29/2015    PVA       Allergies   Allergen Reactions   • Bisoprolol Other (See Comments)     Severe Hypotension   • Flecainide Other (See Comments)     Syncope / collapse   •  Metoprolol Other (See Comments)      Bradycardia, Severe Hypotension   • Tikosyn [Dofetilide] Other (See Comments)     Wide complex tachycardia         Current Outpatient Medications:   •  apixaban (ELIQUIS) 5 MG tablet tablet, Take 1 tablet by mouth Every 12 (Twelve) Hours for 360 days., Disp: 180 tablet, Rfl: 3  •  bumetanide (BUMEX) 1 MG tablet, 1 po qd (Patient taking differently: Take 1 mg by mouth Every Evening.), Disp: 30 tablet, Rfl: 5  •  metOLazone (ZAROXOLYN) 2.5 MG tablet, Take 1 tablet by mouth Every Other Day. Take 1 tablet with bumex QOD M, W, F, Disp: 20 tablet, Rfl: 3  •  potassium chloride (K-DUR) 10 MEQ CR tablet, Take 40 meq daily x 3days, then 10 meq daily (Patient taking differently: Take 10 mEq by mouth 2 (Two) Times a Day.), Disp: 42 tablet, Rfl: 0  •  rosuvastatin (CRESTOR) 20 MG tablet, Take 1 tablet by mouth Daily for 360 days., Disp: 90 tablet, Rfl: 3  •  spironolactone (ALDACTONE) 25 MG tablet, Take 0.5 tablets by mouth Daily., Disp: 30 tablet, Rfl: 6  •  bumetanide (BUMEX) 2 MG tablet, Take 1 tablet by mouth Every Morning. On less busy days at work, Disp: 30 tablet, Rfl: 5  •  tiZANidine (ZANAFLEX) 4 MG tablet, TAKE ONE TABLET BY MOUTH ONCE NIGHTLY AS NEEDED FOR MUSCLE SPASMS, Disp: 30 tablet, Rfl: 0    The following portions of the chart were reviewed and updated as appropriate: Allergies, current medications, past family history, social history, past medical history.     Review of Systems   Constitution: Positive for malaise/fatigue. Negative for chills, decreased appetite, diaphoresis, fever, weakness, night sweats, weight gain and weight loss.   HENT: Negative for congestion, hearing loss, hoarse voice and nosebleeds.    Eyes: Negative for blurred vision, visual disturbance and visual halos.   Cardiovascular: Positive for leg swelling. Negative for chest pain, claudication, cyanosis, dyspnea on exertion, irregular heartbeat, near-syncope, orthopnea, palpitations, paroxysmal  "nocturnal dyspnea and syncope.   Respiratory: Negative for cough, hemoptysis, shortness of breath, sleep disturbances due to breathing, snoring, sputum production and wheezing.    Hematologic/Lymphatic: Negative for bleeding problem. Does not bruise/bleed easily.   Skin: Negative for dry skin, itching and rash.   Musculoskeletal: Negative for arthritis, falls, joint pain, joint swelling and myalgias.   Gastrointestinal: Positive for bloating. Negative for abdominal pain, constipation, diarrhea, flatus, heartburn, hematemesis, hematochezia, melena, nausea and vomiting.   Genitourinary: Negative for dysuria, frequency, hematuria, nocturia and urgency.   Neurological: Negative for excessive daytime sleepiness, dizziness, headaches, light-headedness and loss of balance.   Psychiatric/Behavioral: Negative for depression. The patient does not have insomnia and is not nervous/anxious.            Objective:     Vitals:    11/08/18 1450   BP: 120/84   BP Location: Left arm   Patient Position: Sitting   Cuff Size: Adult   Resp: 18   Temp: 98.8 °F (37.1 °C)   TempSrc: Temporal Artery    SpO2: 96%   Weight: 123 kg (271 lb)   Height: 175.3 cm (69.02\")         Physical Exam   Constitutional: He is oriented to person, place, and time. He appears well-developed and well-nourished. He is active and cooperative. No distress.   HENT:   Head: Normocephalic and atraumatic.   Mouth/Throat: Oropharynx is clear and moist.   Eyes: Conjunctivae and EOM are normal. Pupils are equal, round, and reactive to light.   Neck: Normal range of motion. Neck supple. No JVD present. No tracheal deviation present. No thyromegaly present.   Cardiovascular: Normal rate, regular rhythm, normal heart sounds and intact distal pulses.   Pulmonary/Chest: Effort normal and breath sounds normal.   Abdominal: Soft. Bowel sounds are normal. He exhibits distension. There is no tenderness.   Musculoskeletal: Normal range of motion. He exhibits edema (2+ pitting " edema).   Neurological: He is alert and oriented to person, place, and time.   Skin: Skin is warm, dry and intact.   Multiple blisters on bilateral lower extremities weeping serous fluid  Legs tight and shiny   Psychiatric: He has a normal mood and affect. His behavior is normal.   Nursing note and vitals reviewed.      Lab and Diagnostic Review:      Lab Results   Component Value Date    GLUCOSE 94 11/08/2018    CALCIUM 8.9 11/08/2018     11/08/2018    K 3.0 (L) 11/08/2018    CO2 29.0 11/08/2018     11/08/2018    BUN 19 11/08/2018    CREATININE 0.97 11/08/2018    EGFRIFAFRI 92 02/08/2018    EGFRIFNONA 79 11/08/2018    BCR 19.6 11/08/2018    ANIONGAP 8.0 11/08/2018         Assessment and Plan:         1. Acute diastolic heart failure (CMS/HCC)  IV diuresis today in office. Patient received bumex 2 mg (NDC 7493-7917-23)today through a butterfly in the left hand over slow IV push. During IV diuresis, vitals were monitored and stable. Please see IV diuresis record for those vitals. Patient voided 375 ml in the office prior to discharge from the office. Butterfly was d/c'd and area was free of erythema, ecchymosis, or drainage.  Patient was  instructed to record urinary output for the next 24 hours. Patient will receive a follow up call from the HF center in 24 hours to evaluate urinary output and reassess signs and symptoms.   - Basic Metabolic Panel  - Ambulatory Referral to Physical Therapy Evaluate and treat for unna boots  Heart failure education today including signs and symptoms, the role of the heart failure center, daily weights, low sodium diet (less than 1500 mg per day), and daily physical activity. Reviewed HF Zones with patient and family.  Patient to continue current medications as previously ordered.   2. Localized edema  Ambulatory Referral to Physical Therapy Evaluate and treat for unna boots    3. Essential hypertension  Under good control  HTN Education provided today including signs and  symptoms, medication management, daily blood pressure monitoring. Patient encouraged to call the [facility] Val[facility] center with any abnormal readings.     4. Persistent atrial fibrillation/flutter  Maintaining nsr   CHADS-VASc Risk Assessment            4       Total Score        1 CHF    1 Hypertension    1 DM    1 Vascular Disease       anticoagulated with eliquis and denies any s/s of bleeding    5. Hypokalemia  Increase kcl to 40 meq for next two days, then 20 meq daily    It has been a pleasure to participate in the care of this patient.  Patient was instructed to call the [facility] with any questions, concerns, or worsening symptoms.        * Please note that portions of this note were completed with a voice recognition program. Efforts were made to edit the dictation but occasionally words are transcribed.

## 2018-11-11 DIAGNOSIS — I50.33 ACUTE ON CHRONIC DIASTOLIC HEART FAILURE (HCC): ICD-10-CM

## 2018-11-12 DIAGNOSIS — I87.8 VENOUS STASIS: ICD-10-CM

## 2018-11-12 DIAGNOSIS — R60.0 LOWER EXTREMITY EDEMA: ICD-10-CM

## 2018-11-12 DIAGNOSIS — I73.9 PVD (PERIPHERAL VASCULAR DISEASE) (HCC): Primary | ICD-10-CM

## 2018-11-12 RX ORDER — TIZANIDINE 4 MG/1
TABLET ORAL
Qty: 30 TABLET | Refills: 0 | Status: SHIPPED | OUTPATIENT
Start: 2018-11-12 | End: 2019-06-13

## 2018-11-12 RX ORDER — BUMETANIDE 2 MG/1
2 TABLET ORAL EVERY MORNING
Qty: 30 TABLET | Refills: 5 | Status: SHIPPED | OUTPATIENT
Start: 2018-11-12 | End: 2019-01-16 | Stop reason: SDUPTHER

## 2018-11-12 RX ORDER — BUMETANIDE 2 MG/1
TABLET ORAL
Qty: 60 TABLET | Refills: 2 | OUTPATIENT
Start: 2018-11-12

## 2018-11-12 NOTE — TELEPHONE ENCOUNTER
Provider: Stuatr   Caller: pharmacy    Phone #: [phone]   Surgery:KYPHOPLASTY L1 AND L2    Surgery Date:  4/16/18  Last visit:  10/16/18   Next visit: LISSA CARDOZA:         Reason for call:       Requested Prescriptions     Pending Prescriptions Disp Refills   • tiZANidine (ZANAFLEX) 4 MG tablet [Pharmacy Med Name: tiZANidine HCL 4MG TABLET] 30 tablet 0     Sig: TAKE ONE TABLET BY MOUTH ONCE NIGHTLY AS NEEDED FOR MUSCLE SPASMS      Called patient to verify that he does need this prescription, he states that he has started having bad muscle spasms again since his last visit with [facility].

## 2018-11-12 NOTE — TELEPHONE ENCOUNTER
Patient requesting refill, however product has changed. Will reject this prescription and Trina Benita is to send new prescription to pharmacy.    Thanks,  Sil Quinonez, PharmD  Pharmacy Resident  11/12/2018  12:37 PM

## 2018-11-14 ENCOUNTER — HOSPITAL ENCOUNTER (OUTPATIENT)
Dept: PHYSICAL THERAPY | Facility: HOSPITAL | Age: 63
Setting detail: THERAPIES SERIES
Discharge: HOME | End: 2018-11-14

## 2018-11-14 DIAGNOSIS — I87.2 VENOUS STASIS ULCER OF RIGHT CALF LIMITED TO BREAKDOWN OF SKIN WITHOUT VARICOSE VEINS (HCC): ICD-10-CM

## 2018-11-14 DIAGNOSIS — R60.0 BILATERAL LEG EDEMA: Primary | ICD-10-CM

## 2018-11-14 DIAGNOSIS — L97.211 VENOUS STASIS ULCER OF RIGHT CALF LIMITED TO BREAKDOWN OF SKIN WITHOUT VARICOSE VEINS (HCC): ICD-10-CM

## 2018-11-14 PROCEDURE — G8991 OTHER PT/OT GOAL STATUS: HCPCS

## 2018-11-14 PROCEDURE — 29580 STRAPPING UNNA BOOT: CPT

## 2018-11-14 PROCEDURE — 97162 PT EVAL MOD COMPLEX 30 MIN: CPT

## 2018-11-14 PROCEDURE — 97597 DBRDMT OPN WND 1ST 20 CM/<: CPT

## 2018-11-14 PROCEDURE — G8990 OTHER PT/OT CURRENT STATUS: HCPCS

## 2018-11-14 NOTE — THERAPY EVALUATION
Outpatient Rehabilitation - Wound/Debridement Initial Eval  DOLLY Carrasco     Patient Name: Akshat Winkler  : 1955  MRN: [id number]  Today's Date: 2018                  Admit Date: 2018    Visit Dx:    ICD-10-CM ICD-9-CM   1. Bilateral leg edema R60.0 782.3   2. Venous stasis ulcer of right calf limited to breakdown of skin without varicose veins (CMS/HCC) I87.2 459.81    L97.211 707.12       Patient Active Problem List   Diagnosis   • Chronic diastolic heart failure (CMS/HCC)   • Type 2 diabetes mellitus without complication, without long-term current use of insulin (CMS/HCC)   • Hyperlipidemia LDL goal <70   • Essential hypertension   • Coronary artery disease involving native coronary artery of native heart with angina pectoris (CMS/HCC)   • Severe obstructive sleep apnea. On home CPAP   • Class 3 obesity in adult   • Chronic anticoagulation   • Persistent atrial fibrillation/flutter   • Compression fracture of lumbar vertebra (CMS/HCC)   • Spondylosis of 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 or radiculopathy   • Lumbar stenosis with neurogenic claudication   • Lumbar disc herniation   • Myofascial pain   • Pathologic compression fracture of vertebra (CMS/HCC)   • Chronic bilateral severe lower extremity edema   • Chronically elevated bilirubin and alkaline phosphatase. Probable chronic liver congestion versus occult cirrhosis   • Mechanical low back pain   • History of kyphoplasty        Past Medical History:   Diagnosis Date   • Acute diastolic HF (heart failure) (CMS/HCC)    • Acute hypokalemia    • Arrhythmia    • Back injury     Fall on 17   • Cellulitis of leg    • Chest pain syndrome    • CHF (congestive heart failure) (CMS/HCC)    • Diabetes mellitus (CMS/HCC)     patient reports borderline    • Hyperlipidemia    • Hypertension    • Muscle pain     around back   • Obesity    • Obstructive sleep apnea     right now patient is sleeeping in recliner and does not use-when he lies  down he will have to use cpap   • Paroxysmal A-fib (CMS/HCC)    • Peripheral artery disease (CMS/[facility])    • Spondylosis of 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 or radiculopathy 2/7/2018   • Wears glasses         Past Surgical History:   Procedure Laterality Date   • CARDIAC CATHETERIZATION     • CARDIOVERSION      multiple   • COLONOSCOPY      about 14 years ago   • OTHER SURGICAL HISTORY  06/29/2015    PVA       Patient History     Row Name 11/14/18 0815             History    Chief Complaint  Ulcer, wound or other skin conditions;Swelling;Difficulty Walking;Difficulty with daily activities;Pain back pain, unrelated to wound  -      Type of Pain  Back pain  -      Brief Description of Current Complaint  Pt reports 4-5 year history of BLE edema and abdominal swelling/excess fluid. Pt with afib and related heart issues, as well as a back surgery from which the patient is still trying to recover. Pt reports open wound to the posterior RLE  -      Previous treatment for THIS PROBLEM  Other (comment);Medication diuretics, leg wrapping/unna boots  -      Patient/Caregiver Goals  Decrease swelling;Know what to do to help the symptoms;Improve mobility;Return to prior level of function  -      Patient's Rating of General Health  Fair  -      Occupation/sports/leisure activities  works full time as service and  at [employer]  -      Patient seeing anyone else for problem(s)?  PCP, cardiologist   -      How has patient tried to help current problem?  Elevation and diuretics   -         Pain     Pain Location  Back  -      Pain at Present  3  -         Services    Are you currently receiving Home Health services  No  -      Do you plan to receive Home Health services in the near future  No  -         Daily Activities    Primary Language  English  -      How does patient learn best?  Listening;Demonstration  -      Teaching needs identified  Management of Condition  -      Patient is  concerned about/has problems with  Difficulty with self care (i.e. bathing, dressing, toileting:;Performing job responsibilities/community activities (work, school,;Performing home management (household chores, shopping, care of dependents);Standing;Walking;Climbing Stairs;Bed Mobility;Other (comment) wound, edema management  -MC      Does patient have problems with the following?  None  -MC      Barriers to learning  None  -MC      Pt Participated in POC and Goals  Yes  -MC         Safety    Are you being hurt, hit, or frightened by anyone at home or in your life?  No  -MC      Are you being neglected by a caregiver  No  -MC        User Key  (r) = Recorded By, (t) = Taken By, (c) = Cosigned By    Initials Name Provider Type    Neva Schmidt PT Physical Therapist          EVALUATION  PT Ortho     Row Name 11/14/18 0815       Subjective Pain    Able to rate subjective pain?  yes  -MC    Pre-Treatment Pain Level  3  -MC    Post-Treatment Pain Level  3  -MC    Subjective Pain Comment  back pain  -MC       RLE Quick Girth (cm)    Met-heads  27.8 cm  -MC    Mid foot  28.7 cm  -MC    Smallest ankle  30.3 cm  -MC    Largest calf  52.1 cm  -MC    Tib tuberosity  48.1 cm  -MC    RLE Quick Girth Total  187  -MC       LLE Quick Girth (cm)    Met-heads  27 cm  -MC    Mid foot  28.8 cm  -MC    Smallest ankle  31.4 cm  -MC    Largest calf  53.8 cm  -MC    Tib tuberosity  52 cm  -MC       Transfers    Sit-Stand [place] (Transfers)  supervision  -    Stand-Sit [place] (Transfers)  supervision  -    Transfers, Sit-Stand-Sit, Assist Device  straight cane  -    Comment (Transfers)  seated in chair for tx  -       Gait/Stairs Assessment/Training    [place] Level (Gait)  conditional independence  -    Assistive Device (Gait)  cane, straight  -MC      User Key  (r) = Recorded By, (t) = Taken By, (c) = Cosigned By    Initials Name Provider Type    Neva Schmidt PT Physical Therapist          LDA  Wound     Row Name 11/14/18 0815             [REMOVED] Wound 08/23/18 1510 Right proximal;lateral;posterior calf blisters;ulceration, venous    Wound - Properties Group Date first assessed: 08/23/18  -MW Time first assessed: 1510  -MW Present On Admission : picture taken  -MW Side: Right  -MW Orientation: proximal;lateral;posterior  -MW Location: calf  -MW Type: blisters;ulceration, venous  -MW Additional Comments: Multiple blisters open and weeping   -MW Resolution Date: 11/14/18  -MC Resolution Time: 0815  -MC Wound Outcome: Healed  -MC       [REMOVED] Wound 08/23/18 1510 Left proximal;lateral calf blisters;ulceration, venous    Wound - Properties Group Date first assessed: 08/23/18  -MW Time first assessed: 1510  -MW Side: Left  -MW Orientation: proximal;lateral  -MW Location: calf  -MW Type: blisters;ulceration, venous  -MW Additional Comments: multiple small blisters, some open some intact   -MW Resolution Date: 11/14/18  -MC Resolution Time: 0815  -MC Wound Outcome: Healed  -MC       Wound 08/14/18 0800 Right proximal;medial leg ulceration, venous    Wound - Properties Group Date first assessed: 08/14/18  -MW Time first assessed: 0800  -MW Present On Admission : picture taken  -MW Side: Right  -MW Orientation: proximal;medial  -MW Location: leg  -MW Type: ulceration, venous  -MW    Wound Image  Images linked: 3  -      Dressing Appearance  open to air  -      Base  clean;moist;pink;red/granulating  -      Periwound  intact;dry;redness;swelling;blistered  -      Periwound Temperature  warm  -      Periwound Skin Turgor  firm  -      Edges  irregular;open  -      Wound Length (cm)  1.2 cm  -      Wound Width (cm)  1.8 cm  -      Wound Depth (cm)  0.1 cm  -      Drainage Characteristics/Odor  serosanguineous  -      Drainage Amount  small  -      Care, Wound  cleansed with;wound cleanser;debrided  -      Dressing Care, Wound  dressing applied;low-adherent;silver impregnated;foam optifoam  "Ag, unna boot  -      Periwound Care, Wound  cleansed with pH balanced cleanser;dry periwound area maintained  -        User Key  (r) = Recorded By, (t) = Taken By, (c) = Cosigned By    Initials Name Provider Type    Ashley Armas, PT Physical Therapist    Neva Schmidt, PT Physical Therapist        Lymphedema     Row Name 11/14/18 0815             Lymphedema Edema Assessment    Ptting Edema Category  By severity  -      Pitting Edema  Severe  -         Skin Changes/Observations    Lower Extremity Conditions  bilateral:;dry;shiny;crust;fragile;left:;intact;clean  -      Lower Extremity Color/Pigment  bilateral:;brawny;woody;hyperpigmented;fibrosis  -      Lower Extremity Skin Details  Scattered blisters to BLE, no open areas to the LLE  -         Lymphedema Pulses/Capillary Refill    Dorsalis Pedis Pulse  right:;left: unable to palpate through edema  -      Posterior Tibialis Pulse  right:;left: unable to palpate through edema  -      Capillary Refill  lower extremity capillary refill  -      Lower Extremity Capillary Refill  right:;left:;less than 3 seconds  -         Compression/Skin Care    Compression/Skin Care  skin care;wrapping location;bandaging  -      Skin Care  washed/dried  -      Wrapping Location  lower extremity  -      Wrapping Location LE  bilateral:;foot to knee  -      Wrapping Comments  unna boot, 3\" coban x2, size 5 spandage  -      Bandaging Technique  figure-eight;light compression  -        User Key  (r) = Recorded By, (t) = Taken By, (c) = Cosigned By    Initials Name Provider Type    Neva Schmidt, PT Physical Therapist          WOUND DEBRIDEMENT  Total area of Debridement: 4 cm2  Debridement Site 1  Location- Site 1: RLE wound  Selective Debridement- Site 1: Wound Surface <20cmsq  Instruments- Site 1: #15, scapel, tweezers  Excised Tissue Description- Site 1: maximum, slough, other (comment)(overlying scab)  Bleeding- Site 1: none    "          Therapy Education     Row Name 11/14/18 0815             Therapy Education    Education Details  Reviewed s/sx of infection and PT role in wound care. Discussed unna boot use and showering options, including using garbage bags secured with tape, saran wrap, etc.   -      Given  Edema management;Symptoms/condition management;Bandaging/dressing change  -      How Provided  Verbal;Demonstration  -MC      Provided to  Patient  -      Level of Understanding  Teach back education performed;Verbalized  -      Program  New  -        User Key  (r) = Recorded By, (t) = Taken By, (c) = Cosigned By    Initials Name Provider Type    Neva Schmidt, PT Physical Therapist          Recommendation and Plan  PT Assessment/Plan     Row Name 11/14/18 0815          PT Assessment    Functional Limitations  Performance in work activities;Performance in self-care ADL;Limitation in home management;Impaired gait;Other (comment) wound, edema management  -     Impairments  Edema;Impaired lymphatic circulation;Impaired venous circulation;Integumentary integrity;Pain  -     Assessment Comments  Pt presents with open wound to the RLE as well as severe BLE edema with turgid, brawny skin, multiple blistered areas, and edema extending into the thighs and abdomen. Pt with medical complexity that limits medical management of excess fluid. After debridement, the wound is moist, clean, and red with no necrotic tissue remaining. The patient is a good candidate for edema management with unna boots to address BLE inflammation and promote venous return. D/t the pt's evolving wound and edema symptoms, the patient will benefit from skilled PT wound care to promote healing.  -     Rehab Potential  Fair  -     Patient/caregiver participated in establishment of treatment plan and goals  Yes  -     Patient would benefit from skilled therapy intervention  Yes  -MC        PT Plan    PT Frequency  2x/week  -     Predicted  Duration of Therapy Intervention (Therapy Eval)  24 visits  -     Planned CPT's?  PT EVAL MOD COMPLELITY: 89712;PT SELF CARE/MGMT/TRAIN 15 MIN: 45053;PT NONSELECT DEBRIDE 15 MIN: 55141;PT STEFANIE DEBRIDE OPEN WOUND UP TO 20 CM: 09093;PT STEFANIE DEBRIDE OPEN WOUND EA ADD 20 CM: 19982;PT UNNA BOOT: 36622;PT MULTI LAYER COMP SYS LE  -     Physical Therapy Interventions (Optional Details)  wound care;patient/family education  -     PT Plan Comments  RLE debridement prn, BLE unna boot. Likely t/f to 1800 for CDT-MLD management once wound closed.  -       User Key  (r) = Recorded By, (t) = Taken By, (c) = Cosigned By    Initials Name Provider Type    Neva Schmidt PT Physical Therapist            Goals    PT OP Goals     Row Name 18 0815          PT Short Term Goals    STG 1  Pt will verbalize s/sx of infection.  -     STG 1 Progress  New  -     STG 2  Pt will demonstrate 25% reduction in wound area to indicate healing progress.  -     STG 2 Progress  New  -     STG 3  Pt will demonstrate 2 cm decrease in BLE girth to indicate progress with edema management.  -     STG 3 Progress  New  [facility]        Long Term Goals    LTG 1  Pt will demonstrate independence with clean home dressing changes and an appropriate compression management system.  -     LTG 1 Progress  New  -     LTG 2  Pt will demonstrate 75% reduction in wound area to indicate healing progress.  -     LTG 2 Progress  New  -     LTG 3  Pt will demonstrate 5 cm decrease in BLE girth to indicate progress with edema management.  -     LTG 3 Progress  New  -        Time Calculation    PT Goal Re-Cert Due Date  19  -       User Key  (r) = Recorded By, (t) = Taken By, (c) = Cosigned By    Initials Name Provider Type    Neva Schmidt PT Physical Therapist          Time Calculation: Start Time: 0815  Therapy Suggested Charges     Code   Minutes Charges    None           Therapy Charges for Today     Code  Description Service Date Service Provider Modifiers Qty    28358619149 HC PT EVAL MOD COMPLEXITY 4 11/14/2018 Neva Burkett, PT GP 1    73240425997 HC STEFANIE DEBRIDE OPEN WOUND UP TO 20CM 11/14/2018 Neva Burkett, PT GP 1    18521124159 HC PT STAPPING UNNA BOOT 11/14/2018 Neva Burkett, PT GP 1          PT G-Codes  Total: 14     Neva Burkett, PT  11/14/2018

## 2018-11-15 NOTE — TELEPHONE ENCOUNTER
Pt called and LVM stating that he still had not received medication and that he needed it ASAP.  Called pt back and let him know that the muscle relaxer has been sent to his pharmacy on Monday 11/12/18.  He said he would call the pharmacy and check.  Closing encounter.

## 2018-11-17 ENCOUNTER — HOSPITAL ENCOUNTER (OUTPATIENT)
Dept: PHYSICAL THERAPY | Facility: HOSPITAL | Age: 63
Setting detail: THERAPIES SERIES
Discharge: HOME | End: 2018-11-17

## 2018-11-17 DIAGNOSIS — R60.0 BILATERAL LEG EDEMA: Primary | ICD-10-CM

## 2018-11-17 DIAGNOSIS — I87.2 VENOUS STASIS ULCER OF RIGHT CALF LIMITED TO BREAKDOWN OF SKIN WITHOUT VARICOSE VEINS (HCC): ICD-10-CM

## 2018-11-17 DIAGNOSIS — L97.211 VENOUS STASIS ULCER OF RIGHT CALF LIMITED TO BREAKDOWN OF SKIN WITHOUT VARICOSE VEINS (HCC): ICD-10-CM

## 2018-11-17 PROCEDURE — 29581 APPL MULTLAYER CMPRN SYS LEG: CPT

## 2018-11-17 PROCEDURE — 97597 DBRDMT OPN WND 1ST 20 CM/<: CPT

## 2018-11-17 NOTE — THERAPY WOUND CARE TREATMENT
Outpatient Rehabilitation - Wound/Debridement Treatment Note   Danilo     Patient Name: Akshat Winkler  : 1955  MRN: [id number]  Today's Date: 2018                 Admit Date: 2018    Visit Dx:    ICD-10-CM ICD-9-CM   1. Bilateral leg edema R60.0 782.3   2. Venous stasis ulcer of right calf limited to breakdown of skin without varicose veins (CMS/HCC) I87.2 459.81    L97.211 707.12       Patient Active Problem List   Diagnosis   • Chronic diastolic heart failure (CMS/HCC)   • Type 2 diabetes mellitus without complication, without long-term current use of insulin (CMS/HCC)   • Hyperlipidemia LDL goal <70   • Essential hypertension   • Coronary artery disease involving native coronary artery of native heart with angina pectoris (CMS/HCC)   • Severe obstructive sleep apnea. On home CPAP   • Class 3 obesity in adult   • Chronic anticoagulation   • Persistent atrial fibrillation/flutter   • Compression fracture of lumbar vertebra (CMS/HCC)   • Spondylosis of 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 or radiculopathy   • Lumbar stenosis with neurogenic claudication   • Lumbar disc herniation   • Myofascial pain   • Pathologic compression fracture of vertebra (CMS/HCC)   • Chronic bilateral severe lower extremity edema   • Chronically elevated bilirubin and alkaline phosphatase. Probable chronic liver congestion versus occult cirrhosis   • Mechanical low back pain   • History of kyphoplasty        Past Medical History:   Diagnosis Date   • Acute diastolic HF (heart failure) (CMS/HCC)    • Acute hypokalemia    • Arrhythmia    • Back injury     Fall on 17   • Cellulitis of leg    • Chest pain syndrome    • CHF (congestive heart failure) (CMS/HCC)    • Diabetes mellitus (CMS/HCC)     patient reports borderline    • Hyperlipidemia    • Hypertension    • Muscle pain     around back   • Obesity    • Obstructive sleep apnea     right now patient is sleeeping in recliner and does not use-when he lies  down he will have to use cpap   • Paroxysmal A-fib (CMS/HCC)    • Peripheral artery disease (CMS/HCC)    • Spondylosis of 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 or radiculopathy 2/7/2018   • Wears glasses         Past Surgical History:   Procedure Laterality Date   • CARDIAC CATHETERIZATION     • CARDIOVERSION      multiple   • COLONOSCOPY      about 14 years ago   • OTHER SURGICAL HISTORY  06/29/2015    PVA         EVALUATION  PT Ortho     Row Name 11/17/18 0945       Subjective Comments    Subjective Comments  Pt reports he took the boots off on Friday morning, they were really tight. He took a shower and applied the issued compressogrips with gauze over the venous stasis ulcer. Pt is expecting a call any time that he has to go to [place] for his grandchild's birth.  -       Subjective Pain    Able to rate subjective pain?  yes  -    Pre-Treatment Pain Level  3  -    Post-Treatment Pain Level  3  -       Transfers    Sit-Stand [place] (Transfers)  supervision  -    Stand-Sit [place] (Transfers)  supervision  -    Transfers, Sit-Stand-Sit, Assist Device  straight cane  -    Comment (Transfers)  Seated in ARJO for tx. Min assist for doffing/donning pants.  -       Gait/Stairs Assessment/Training    [place] Level (Gait)  conditional independence  -    Assistive Device (Gait)  cane, straight  -      User Key  (r) = Recorded By, (t) = Taken By, (c) = Cosigned By    Initials Name Provider Type    Neva Schmidt PT Physical Therapist              LDA Wound     Row Name 11/17/18 0945             Wound 08/14/18 0800 Right proximal;medial leg ulceration, venous    Wound - Properties Group Date first assessed: 08/14/18  -MW Time first assessed: 0800  -MW Present On Admission : picture taken  -MW Side: Right  -MW Orientation: proximal;medial  -MW Location: leg  -MW Type: ulceration, venous  -MW    Dressing Appearance  dried drainage;moist drainage;other (see comments) folded up 4x4  gauze  -      Base  clean;moist;pink;red/granulating after debridement  -      Periwound  intact;dry;redness;swelling;blistered  -      Periwound Temperature  warm  -      Periwound Skin Turgor  firm  -      Edges  irregular;open  -      Drainage Characteristics/Odor  serosanguineous  -      Drainage Amount  small  -      Care, Wound  cleansed with;wound cleanser;debrided  -      Dressing Care, Wound  dressing applied;silver impregnated;low-adherent;foam;multi-layer wrap optifoam Ag  -      Periwound Care, Wound  cleansed with pH balanced cleanser;dry periwound area maintained  -        User Key  (r) = Recorded By, (t) = Taken By, (c) = Cosigned By    Initials Name Provider Type    Ashley Armas, PT Physical Therapist    Neva Schmidt, PT Physical Therapist        Lymphedema     Row Name 11/17/18 0955             Lymphedema Edema Assessment    Ptting Edema Category  By severity  -      Pitting Edema  Severe  -         Skin Changes/Observations    Lower Extremity Conditions  bilateral:;dry;shiny;crust;fragile;left:;clean  -      Lower Extremity Color/Pigment  bilateral:;brawny;woody;hyperpigmented;fibrosis  -      Lower Extremity Skin Details  Small area of drainage from superior/anterior LLE  -         Lymphedema Pulses/Capillary Refill    Capillary Refill  lower extremity capillary refill  -      Lower Extremity Capillary Refill  right:;left:;less than 3 seconds  -         Compression/Skin Care    Compression/Skin Care  skin care;wrapping location;bandaging  -      Skin Care  washed/dried  -      Wrapping Location  lower extremity  -      Wrapping Location LE  bilateral:;foot to knee  -      Wrapping Comments  optifoam Ag foam anterior/superior LLE and over RLE wound, size 4 MH to ankle dbl over foot, size 6 ankle to calf dbl distal half  -      Bandage Layers  cotton elastic stocking- double layer (comment size)  -      Bandaging Technique  light  compression  -        User Key  (r) = Recorded By, (t) = Taken By, (c) = Cosigned By    Initials Name Provider Type    Neva Schmidt PT Physical Therapist          WOUND DEBRIDEMENT  Total area of Debridement: 2 cm2  Debridement Site 1  Location- Site 1: RLE wound  Selective Debridement- Site 1: Wound Surface <20cmsq  Instruments- Site 1: tweezers  Excised Tissue Description- Site 1: minimum, slough  Bleeding- Site 1: none             Therapy Education     Row Name 11/17/18 0945             Therapy Education    Education Details  Pt may remove compressogrips to shower. Pt to replace Ag foam over RLE wound and any areas of drainage prior to reapplying compressogrips.  -MC      Given  Edema management;Symptoms/condition management;Bandaging/dressing change  -      How Provided  Verbal;Demonstration  -MC      Provided to  Patient  -      Level of Understanding  Teach back education performed;Verbalized  -      Program  Progressed  -        User Key  (r) = Recorded By, (t) = Taken By, (c) = Cosigned By    Initials Name Provider Type    Neva Schmidt PT Physical Therapist          Recommendation and Plan  PT Assessment/Plan     Row Name 11/17/18 0032          PT Assessment    Functional Limitations  Performance in work activities;Performance in self-care ADL;Limitation in home management;Impaired gait;Other (comment) wound, edema management  -     Impairments  Edema;Impaired lymphatic circulation;Impaired venous circulation;Integumentary integrity;Pain  -     Assessment Comments  Pt reports feeling his lower legs are less swollen, but his thighs and abdomen feel more swollen. Pt did not tolerate unna boots well, but reports the compressogrips are easy to manage and feel more comfortable. RLE wound appears improved, with small area of slough/crusting that was debrided today. Pt will continue to benefit from skilled PT wound care to promote healing.  -     Rehab Potential  Fair  -      Patient/caregiver participated in establishment of treatment plan and goals  Yes  -     Patient would benefit from skilled therapy intervention  Yes  -        PT Plan    PT Frequency  2x/week  -     Physical Therapy Interventions (Optional Details)  patient/family education  -     PT Plan Comments  RLE debridement prn, BLE MLW. T/F to 1800 once wounds resolved for CDT-MLD  -       User Key  (r) = Recorded By, (t) = Taken By, (c) = Cosigned By    Initials Name Provider Type    Neva Schmidt, PT Physical Therapist          Goals  PT OP Goals     Row Name 11/17/18 0945          Time Calculation    PT Goal Re-Cert Due Date  02/12/19  -       User Key  (r) = Recorded By, (t) = Taken By, (c) = Cosigned By    Initials Name Provider Type    Neva Schmidt, PT Physical Therapist          PT Goal Re-Cert Due Date: 02/12/19            Time Calculation: Start Time: 0945    Therapy Charges for Today     Code Description Service Date Service Provider Modifiers Qty    80197058888 HC STEFANIE DEBRIDE OPEN WOUND UP TO 20CM 11/17/2018 Neva Burkett, PT GP 1    [id number]  PT MULTI LAYER COMP SYS BELOW KNEE 11/17/2018 Neva Burkett, PT GP 1        Therapy Suggested Charges     Code   Minutes Charges    None                   Neva Burkett PT  11/17/2018

## 2018-11-21 ENCOUNTER — TELEPHONE (OUTPATIENT)
Dept: CARDIOLOGY | Facility: HOSPITAL | Age: 63
End: 2018-11-21

## 2018-11-21 ENCOUNTER — OFFICE VISIT (OUTPATIENT)
Dept: CARDIOLOGY | Facility: HOSPITAL | Age: 63
End: 2018-11-21

## 2018-11-21 VITALS
BODY MASS INDEX: 41.62 KG/M2 | TEMPERATURE: 98.5 F | SYSTOLIC BLOOD PRESSURE: 123 MMHG | DIASTOLIC BLOOD PRESSURE: 75 MMHG | WEIGHT: 281 LBS | HEART RATE: 109 BPM | OXYGEN SATURATION: 95 % | HEIGHT: 69 IN | RESPIRATION RATE: 16 BRPM

## 2018-11-21 DIAGNOSIS — E87.6 HYPOKALEMIA: ICD-10-CM

## 2018-11-21 DIAGNOSIS — I50.33 ACUTE ON CHRONIC DIASTOLIC HEART FAILURE (HCC): Primary | ICD-10-CM

## 2018-11-21 DIAGNOSIS — I10 ESSENTIAL HYPERTENSION: ICD-10-CM

## 2018-11-21 DIAGNOSIS — I87.2 CHRONIC VENOUS INSUFFICIENCY: ICD-10-CM

## 2018-11-21 LAB
ANION GAP SERPL CALCULATED.3IONS-SCNC: 7 MMOL/L (ref 3–11)
BUN BLD-MCNC: 22 MG/DL (ref 9–23)
BUN/CREAT SERPL: 21 (ref 7–25)
CALCIUM SPEC-SCNC: 8.9 MG/DL (ref 8.7–10.4)
CHLORIDE SERPL-SCNC: 97 MMOL/L (ref 99–109)
CO2 SERPL-SCNC: 34 MMOL/L (ref 20–31)
CREAT BLD-MCNC: 1.05 MG/DL (ref 0.6–1.3)
GFR SERPL CREATININE-BSD FRML MDRD: 72 ML/MIN/1.73
GLUCOSE BLD-MCNC: 90 MG/DL (ref 70–100)
POTASSIUM BLD-SCNC: 3.3 MMOL/L (ref 3.5–5.5)
SODIUM BLD-SCNC: 138 MMOL/L (ref 132–146)

## 2018-11-21 PROCEDURE — 80048 BASIC METABOLIC PNL TOTAL CA: CPT | Performed by: NURSE PRACTITIONER

## 2018-11-21 PROCEDURE — 99214 OFFICE O/P EST MOD 30 MIN: CPT | Performed by: NURSE PRACTITIONER

## 2018-11-21 NOTE — TELEPHONE ENCOUNTER
Reviewed lab results with patient. Patient to increase aldactone to a whole pill daily and take 20 meq of KCL today only.

## 2018-11-26 ENCOUNTER — TELEPHONE (OUTPATIENT)
Dept: CARDIOLOGY | Facility: HOSPITAL | Age: 63
End: 2018-11-26

## 2018-11-26 NOTE — PROGRESS NOTES
Encounter Date:11/21/2018      Patient ID: Akshat Winkler is a 63 y.o. male.        Subjective:     Chief Complaint: Follow-up   dhf exacerbation   History of Present Illness patient presents to the office today for ongoing evaluation of his diastolic heart failure. He notes a weight gain of 10 lbs over the last 2 weeks. He is currently seeing outpatient physical therapy and had unna boots placed. He notes that he removed the boots because they were too tight. He is wearing his white compression stockinettes. He is using his lymphedema pumps in the evenings. He notes worsening dyspnea, pedal edema and abdominal fullness. Denies cp, dizziness, presyncope, orthopnea, pnd.  Patient Active Problem List   Diagnosis   • Chronic diastolic heart failure (CMS/HCC)   • Type 2 diabetes mellitus without complication, without long-term current use of insulin (CMS/HCC)   • Hyperlipidemia LDL goal <70   • Essential hypertension   • Coronary artery disease involving native coronary artery of native heart with angina pectoris (CMS/HCC)   • Severe obstructive sleep apnea. On home CPAP   • Class 3 obesity in adult   • Chronic anticoagulation   • Persistent atrial fibrillation/flutter   • Compression fracture of lumbar vertebra (CMS/HCC)   • Spondylosis of 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 or radiculopathy   • Lumbar stenosis with neurogenic claudication   • Lumbar disc herniation   • Myofascial pain   • Pathologic compression fracture of vertebra (CMS/HCC)   • Chronic bilateral severe lower extremity edema   • Chronically elevated bilirubin and alkaline phosphatase. Probable chronic liver congestion versus occult cirrhosis   • Mechanical low back pain   • History of kyphoplasty       Past Surgical History:   Procedure Laterality Date   • CARDIAC CATHETERIZATION     • CARDIOVERSION      multiple   • COLONOSCOPY      about 14 years ago   • OTHER SURGICAL HISTORY  06/29/2015    PVA       Allergies   Allergen Reactions   • Bisoprolol  Other (See Comments)     Severe Hypotension   • Flecainide Other (See Comments)     Syncope / collapse   • Metoprolol Other (See Comments)      Bradycardia, Severe Hypotension   • Tikosyn [Dofetilide] Other (See Comments)     Wide complex tachycardia         Current Outpatient Medications:   •  apixaban (ELIQUIS) 5 MG tablet tablet, Take 1 tablet by mouth Every 12 (Twelve) Hours for 360 days., Disp: 180 tablet, Rfl: 3  •  bumetanide (BUMEX) 1 MG tablet, 1 po qd (Patient taking differently: Take 1 mg by mouth Every Evening.), Disp: 30 tablet, Rfl: 5  •  bumetanide (BUMEX) 2 MG tablet, Take 1 tablet by mouth Every Morning. On less busy days at work, Disp: 30 tablet, Rfl: 5  •  metOLazone (ZAROXOLYN) 2.5 MG tablet, Take 1 tablet by mouth Every Other Day. Take 1 tablet with bumex QOD M, W, F, Disp: 20 tablet, Rfl: 3  •  potassium chloride (K-DUR) 10 MEQ CR tablet, Take 40 meq daily x 3days, then 10 meq daily (Patient taking differently: Take 10 mEq by mouth 2 (Two) Times a Day.), Disp: 42 tablet, Rfl: 0  •  spironolactone (ALDACTONE) 25 MG tablet, Take 0.5 tablets by mouth Daily., Disp: 30 tablet, Rfl: 6  •  tiZANidine (ZANAFLEX) 4 MG tablet, TAKE ONE TABLET BY MOUTH ONCE NIGHTLY AS NEEDED FOR MUSCLE SPASMS, Disp: 30 tablet, Rfl: 0    The following portions of the chart were reviewed and updated as appropriate: Allergies, current medications, past family history, social history, past medical history.     Review of Systems   Constitution: Positive for malaise/fatigue. Negative for chills, decreased appetite, diaphoresis, fever, weakness, night sweats, weight gain and weight loss.   HENT: Negative for congestion, hearing loss, hoarse voice and nosebleeds.    Eyes: Negative for blurred vision, visual disturbance and visual halos.   Cardiovascular: Positive for dyspnea on exertion and leg swelling. Negative for chest pain, claudication, cyanosis, irregular heartbeat, near-syncope, orthopnea, palpitations, paroxysmal  "nocturnal dyspnea and syncope.   Respiratory: Positive for shortness of breath. Negative for cough, hemoptysis, sleep disturbances due to breathing, snoring, sputum production and wheezing.    Endocrine: Negative.    Hematologic/Lymphatic: Negative for bleeding problem. Does not bruise/bleed easily.   Skin: Negative for dry skin, itching and rash.   Musculoskeletal: Positive for joint pain. Negative for arthritis, falls, joint swelling and myalgias.   Gastrointestinal: Positive for bloating. Negative for abdominal pain, constipation, diarrhea, flatus, heartburn, hematemesis, hematochezia, melena, nausea and vomiting.   Genitourinary: Negative for dysuria, frequency, hematuria, nocturia and urgency.   Neurological: Negative for excessive daytime sleepiness, dizziness, headaches, light-headedness and loss of balance.   Psychiatric/Behavioral: Negative for depression. The patient does not have insomnia and is not nervous/anxious.            Objective:     Vitals:    11/21/18 1105   BP: 123/75   BP Location: Right arm   Patient Position: Sitting   Pulse: 109   Resp: 16   Temp: 98.5 °F (36.9 °C)   TempSrc: Temporal   SpO2: 95%   Weight: 127 kg (281 lb)   Height: 175.3 cm (69.02\")         Physical Exam   Constitutional: He is oriented to person, place, and time. He appears well-developed and well-nourished. He is active and cooperative. No distress.   HENT:   Head: Normocephalic and atraumatic.   Mouth/Throat: Oropharynx is clear and moist.   Eyes: Conjunctivae and EOM are normal. Pupils are equal, round, and reactive to light.   Neck: Normal range of motion. Neck supple. No JVD present. No tracheal deviation present. No thyromegaly present.   Cardiovascular: Normal rate, regular rhythm, normal heart sounds and intact distal pulses.   Pulmonary/Chest: Effort normal. He has rales.   Abdominal: Bowel sounds are normal. He exhibits distension. There is tenderness.   Musculoskeletal: Normal range of motion. He exhibits edema " (2+ pitting edema noted BLEs).   Neurological: He is alert and oriented to person, place, and time.   Skin: Skin is warm, dry and intact.   Venous stasis changes noted BLEs   Psychiatric: He has a normal mood and affect. His behavior is normal.   Nursing note and vitals reviewed.      Lab and Diagnostic Review:      Lab Results   Component Value Date    GLUCOSE 90 11/21/2018    CALCIUM 8.9 11/21/2018     11/21/2018    K 3.3 (L) 11/21/2018    CO2 34.0 (H) 11/21/2018    CL 97 (L) 11/21/2018    BUN 22 11/21/2018    CREATININE 1.05 11/21/2018    EGFRIFAFRI 92 02/08/2018    EGFRIFNONA 72 11/21/2018    BCR 21.0 11/21/2018    ANIONGAP 7.0 11/21/2018         Assessment and Plan:         1. Acute on chronic diastolic heart failure (CMS/HCC)  IV diuresis today in office. Patient received Bumex 2 mg (NDC 3471-3681-08)  today through a butterfly in the left AC over slow IV push. During IV diuresis, vitals were monitored and stable. Please see IV diuresis record for those vitals. Patient voided 300 ml in the office prior to discharge from the office. Butterfly was d/c'd and area was free of erythema, ecchymosis, or drainage.  Patient was  instructed to record urinary output for the next 24 hours. Patient will receive a follow up call from the HF center in 24 hours to evaluate urinary output and reassess signs and symptoms.   Heart failure education today including signs and symptoms, the role of the heart failure center, daily weights, low sodium diet (less than 1500 mg per day), and daily physical activity. Reviewed HF Zones with patient and family.  Patient to continue current medications as previously ordered.   2. Hypokalemia    - Basic Metabolic Panel    3. Essential hypertension  Under good control  HTN Education provided today including signs and symptoms, medication management, daily blood pressure monitoring. Patient encouraged to call the [facility] Val[facility] center with any abnormal readings.     4. Chronic venous  insufficiency  Continue compression socks  Patient is scheduled to establish with DR Hamilton in future    It has been a pleasure to participate in the care of this patient.  Patient was instructed to call the [facility] with any questions, concerns, or worsening symptoms.        * Please note that portions of this note were completed with a voice recognition program. Efforts were made to edit the dictation but occasionally words are transcribed.

## 2018-11-26 NOTE — TELEPHONE ENCOUNTER
Patient notes moderate improvement in his symptoms of dyspnea, abdominal fullness and pedal edema. Continue medications as previously ordered, continue compression socks.

## 2018-12-20 ENCOUNTER — DOCUMENTATION (OUTPATIENT)
Dept: PHYSICAL THERAPY | Facility: HOSPITAL | Age: 63
End: 2018-12-20

## 2018-12-20 NOTE — THERAPY DISCHARGE NOTE
Outpatient Rehabilitation - Wound/Debridement D/C Summary        Patient Name: Akshat Winkler  : 1955  MRN: [id number]  Today's Date: 2018                  Admit Date: (Not on file)    Visit Dx:  No diagnosis found.    Patient Active Problem List   Diagnosis   • Chronic diastolic heart failure (CMS/HCC)   • Type 2 diabetes mellitus without complication, without long-term current use of insulin (CMS/HCC)   • Hyperlipidemia LDL goal <70   • Essential hypertension   • Coronary artery disease involving native coronary artery of native heart with angina pectoris (CMS/HCC)   • Severe obstructive sleep apnea. On home CPAP   • Class 3 obesity in adult   • Chronic anticoagulation   • Persistent atrial fibrillation/flutter   • Compression fracture of lumbar vertebra (CMS/HCC)   • Spondylosis of 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 or radiculopathy   • Lumbar stenosis with neurogenic claudication   • Lumbar disc herniation   • Myofascial pain   • Pathologic compression fracture of vertebra (CMS/HCC)   • Chronic bilateral severe lower extremity edema   • Chronically elevated bilirubin and alkaline phosphatase. Probable chronic liver congestion versus occult cirrhosis   • Mechanical low back pain   • History of kyphoplasty        Past Medical History:   Diagnosis Date   • Acute diastolic HF (heart failure) (CMS/HCC)    • Acute hypokalemia    • Arrhythmia    • Back injury     Fall on 17   • Cellulitis of leg    • Chest pain syndrome    • CHF (congestive heart failure) (CMS/HCC)    • Diabetes mellitus (CMS/HCC)     patient reports borderline    • Hyperlipidemia    • Hypertension    • Muscle pain     around back   • Obesity    • Obstructive sleep apnea     right now patient is sleeeping in recliner and does not use-when he lies down he will have to use cpap   • Paroxysmal A-fib (CMS/HCC)    • Peripheral artery disease (CMS/HCC)    • Spondylosis of 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 or radiculopathy 2018   •  Wears glasses         Past Surgical History:   Procedure Laterality Date   • CARDIAC CATHETERIZATION     • CARDIOVERSION      multiple   • COLONOSCOPY      about 14 years ago   • KYPHOPLASTY N/A 3/5/2018    Procedure: KYPHOPLASTY L4;  Surgeon: Akshat Davidson MD;  Location:  [facility];  Service:    • KYPHOPLASTY N/A 4/16/2018    Procedure: KYPHOPLASTY L1 AND L2;  Surgeon: Akshat Davidson MD;  Location:  [facility];  Service: Neurosurgery   • OTHER SURGICAL HISTORY  06/29/2015    PVA         EVALUATION          LDA Wound     Row Name               Wound 08/14/18 0800 Right proximal;medial leg ulceration, venous    Wound - Properties Group Date first assessed: 08/14/18  -MW Time first assessed: 0800  -MW Present On Admission : picture taken  -MW Side: Right  -MW Orientation: proximal;medial  -MW Location: leg  -MW Type: ulceration, venous  -MW      User Key  (r) = Recorded By, (t) = Taken By, (c) = Cosigned By    Initials Name Provider Type    MW Ashley Nettles, PT Physical Therapist            WOUND DEBRIDEMENT                            Recommendation and Plan      Goals  PT OP Goals     Row Name 12/20/18 1007          PT Short Term Goals    STG Date to Achieve  09/11/18  -     STG 1  Pt will verbalize s/sx of infection.  -     STG 1 Progress  Not Met  -     STG 2  Pt will demonstrate 25% reduction in wound area to indicate healing progress.  -     STG 2 Progress  Not Met  -     STG 3  Pt will demonstrate 2 cm decrease in BLE girth to indicate progress with edema management.  -     STG 3 Progress  Not Met  -        Long Term Goals    LTG 1  Pt will demonstrate independence with clean home dressing changes and an appropriate compression management system.  -     LTG 1 Progress  Not Met  -     LTG 2  Pt will demonstrate 75% reduction in wound area to indicate healing progress.  -     LTG 2 Progress  Not Met  -     LTG 3  Pt will demonstrate 5 cm decrease in BLE girth to indicate progress with  edema management.  -     LTG 3 Progress  Not Met  -       User Key  (r) = Recorded By, (t) = Taken By, (c) = Cosigned By    Initials Name Provider Type    Neva Schmidt, PT Physical Therapist          Time Calculation:        Therapy Suggested Charges     Code   Minutes Charges    None                   OP Discharge Summary     Row Name 12/20/18 1008             OP PT Discharge Summary    Date of Discharge  12/20/18  -      Reason for Discharge  other (comment) pt cancelled his last appt d/t travelling for grandchild's birth, but never called for another follow up. Would now require a new MD order as it has been over 30 days since the pt was seen.  -      Outcomes Achieved  Other pt did not return for goals assessment, assume not met  -      Discharge Destination  Unknown  -        User Key  (r) = Recorded By, (t) = Taken By, (c) = Cosigned By    Initials Name Provider Type    Neva Schmidt, PT Physical Therapist          Neva Burkett, PT  12/20/2018

## 2018-12-28 RX ORDER — POTASSIUM CHLORIDE 750 MG/1
TABLET, FILM COATED, EXTENDED RELEASE ORAL
Qty: 90 TABLET | Refills: 5 | Status: SHIPPED | OUTPATIENT
Start: 2018-12-28 | End: 2019-06-25 | Stop reason: SDUPTHER

## 2019-01-16 RX ORDER — BUMETANIDE 2 MG/1
2 TABLET ORAL EVERY MORNING
Qty: 30 TABLET | Refills: 5 | Status: SHIPPED | OUTPATIENT
Start: 2019-01-16 | End: 2019-08-06 | Stop reason: SDUPTHER

## 2019-01-16 RX ORDER — BUMETANIDE 1 MG/1
TABLET ORAL
Qty: 30 TABLET | Refills: 5 | Status: SHIPPED | OUTPATIENT
Start: 2019-01-16 | End: 2019-08-06 | Stop reason: SDUPTHER

## 2019-01-20 DIAGNOSIS — E78.5 HYPERLIPIDEMIA LDL GOAL <70: ICD-10-CM

## 2019-01-21 RX ORDER — ROSUVASTATIN CALCIUM 20 MG/1
TABLET, COATED ORAL
Qty: 90 TABLET | Refills: 2 | Status: SHIPPED | OUTPATIENT
Start: 2019-01-21 | End: 2019-11-10 | Stop reason: SDUPTHER

## 2019-03-13 ENCOUNTER — OFFICE VISIT (OUTPATIENT)
Dept: CARDIOLOGY | Facility: HOSPITAL | Age: 64
End: 2019-03-13

## 2019-03-13 VITALS
RESPIRATION RATE: 18 BRPM | SYSTOLIC BLOOD PRESSURE: 120 MMHG | BODY MASS INDEX: 40.14 KG/M2 | OXYGEN SATURATION: 96 % | WEIGHT: 271 LBS | HEIGHT: 69 IN | DIASTOLIC BLOOD PRESSURE: 84 MMHG | HEART RATE: 101 BPM | TEMPERATURE: 97.6 F

## 2019-03-13 DIAGNOSIS — I50.31 ACUTE DIASTOLIC HEART FAILURE (HCC): Primary | ICD-10-CM

## 2019-03-13 DIAGNOSIS — E87.6 HYPOKALEMIA: ICD-10-CM

## 2019-03-13 DIAGNOSIS — I87.2 CHRONIC VENOUS INSUFFICIENCY: ICD-10-CM

## 2019-03-13 DIAGNOSIS — I10 ESSENTIAL HYPERTENSION: ICD-10-CM

## 2019-03-13 LAB
ANION GAP SERPL CALCULATED.3IONS-SCNC: 12 MMOL/L (ref 3–11)
BUN BLD-MCNC: 22 MG/DL (ref 9–23)
BUN/CREAT SERPL: 17.7 (ref 7–25)
CALCIUM SPEC-SCNC: 9.8 MG/DL (ref 8.7–10.4)
CHLORIDE SERPL-SCNC: 96 MMOL/L (ref 99–109)
CO2 SERPL-SCNC: 31 MMOL/L (ref 20–31)
CREAT BLD-MCNC: 1.24 MG/DL (ref 0.6–1.3)
GFR SERPL CREATININE-BSD FRML MDRD: 59 ML/MIN/1.73
GLUCOSE BLD-MCNC: 121 MG/DL (ref 70–100)
POTASSIUM BLD-SCNC: 2.8 MMOL/L (ref 3.5–5.5)
SODIUM BLD-SCNC: 139 MMOL/L (ref 132–146)

## 2019-03-13 PROCEDURE — 80048 BASIC METABOLIC PNL TOTAL CA: CPT | Performed by: NURSE PRACTITIONER

## 2019-03-13 PROCEDURE — 99214 OFFICE O/P EST MOD 30 MIN: CPT | Performed by: NURSE PRACTITIONER

## 2019-03-13 NOTE — PROGRESS NOTES
Encounter Date:03/13/2019      Patient ID: Akshat Winkler is a 64 y.o. male.        Subjective:     Chief Complaint: Follow-up   [facility]  History of Present Illness patient presents to the office today for ongoing evaluation of his diastolic heart failure. He notes worsening abdominal fullness, pedal edema, fatigue and dyspnea. He notes orthopnea for the last 2 nights. He has been evaluated by Dr Hamilton and compression socks have been ordered. He denies cp, dizziness, syncope, pnd.   Patient Active Problem List   Diagnosis   • Chronic diastolic heart failure (CMS/HCC)   • Type 2 diabetes mellitus without complication, without long-term current use of insulin (CMS/HCC)   • Hyperlipidemia LDL goal <70   • Essential hypertension   • Coronary artery disease involving native coronary artery of native heart with angina pectoris (CMS/HCC)   • Severe obstructive sleep apnea. On home CPAP   • Class 3 obesity in adult   • Chronic anticoagulation   • Persistent atrial fibrillation/flutter   • Compression fracture of lumbar vertebra (CMS/HCC)   • Spondylosis of 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 or radiculopathy   • Lumbar stenosis with neurogenic claudication   • Lumbar disc herniation   • Myofascial pain   • Pathologic compression fracture of vertebra (CMS/HCC)   • Chronic bilateral severe lower extremity edema   • Chronically elevated bilirubin and alkaline phosphatase. Probable chronic liver congestion versus occult cirrhosis   • Mechanical low back pain   • History of kyphoplasty       Past Surgical History:   Procedure Laterality Date   • CARDIAC CATHETERIZATION     • CARDIOVERSION      multiple   • COLONOSCOPY      about 14 years ago   • KYPHOPLASTY N/A 3/5/2018    Procedure: KYPHOPLASTY L4;  Surgeon: Akshat Davidson MD;  Location: [facility];  Service:    • KYPHOPLASTY N/A 4/16/2018    Procedure: KYPHOPLASTY L1 AND L2;  Surgeon: Akshat Davidson MD;  Location: [facility] OR;  Service: Neurosurgery   • OTHER SURGICAL HISTORY   06/29/2015    PVA       Allergies   Allergen Reactions   • Bisoprolol Other (See Comments)     Severe Hypotension   • Flecainide Other (See Comments)     Syncope / collapse   • Metoprolol Other (See Comments)      Bradycardia, Severe Hypotension   • Tikosyn [Dofetilide] Other (See Comments)     Wide complex tachycardia         Current Outpatient Medications:   •  apixaban (ELIQUIS) 5 MG tablet tablet, Take 1 tablet by mouth Every 12 (Twelve) Hours for 360 days., Disp: 180 tablet, Rfl: 3  •  bumetanide (BUMEX) 1 MG tablet, 1 po qd, Disp: 30 tablet, Rfl: 5  •  bumetanide (BUMEX) 2 MG tablet, Take 1 tablet by mouth Every Morning. On less busy days at work, Disp: 30 tablet, Rfl: 5  •  metOLazone (ZAROXOLYN) 2.5 MG tablet, Take 1 tablet by mouth Every Other Day. Take 1 tablet with bumex QOD M, W, F, Disp: 20 tablet, Rfl: 3  •  potassium chloride (K-DUR) 10 MEQ CR tablet, Take 3 tablets daily, Disp: 90 tablet, Rfl: 5  •  rosuvastatin (CRESTOR) 20 MG tablet, TAKE ONE TABLET BY MOUTH DAILY, Disp: 90 tablet, Rfl: 2  •  spironolactone (ALDACTONE) 25 MG tablet, Take 0.5 tablets by mouth Daily., Disp: 30 tablet, Rfl: 6  •  tiZANidine (ZANAFLEX) 4 MG tablet, TAKE ONE TABLET BY MOUTH ONCE NIGHTLY AS NEEDED FOR MUSCLE SPASMS, Disp: 30 tablet, Rfl: 0    The following portions of the chart were reviewed and updated as appropriate: Allergies, current medications, past family history, social history, past medical history.     Review of Systems   Constitution: Positive for malaise/fatigue and weight gain. Negative for chills, decreased appetite, diaphoresis, fever, weakness, night sweats and weight loss.   HENT: Negative for congestion, hearing loss, hoarse voice and nosebleeds.    Eyes: Negative for blurred vision, visual disturbance and visual halos.   Cardiovascular: Positive for dyspnea on exertion, leg swelling and orthopnea. Negative for chest pain, claudication, cyanosis, irregular heartbeat, near-syncope, palpitations,  "paroxysmal nocturnal dyspnea and syncope.   Respiratory: Negative for cough, hemoptysis, shortness of breath, sleep disturbances due to breathing, snoring, sputum production and wheezing.    Hematologic/Lymphatic: Negative for bleeding problem. Does not bruise/bleed easily.   Skin: Negative for dry skin, itching and rash.   Musculoskeletal: Negative for arthritis, falls, joint pain, joint swelling and myalgias.   Gastrointestinal: Positive for bloating. Negative for abdominal pain, constipation, diarrhea, flatus, heartburn, hematemesis, hematochezia, melena, nausea and vomiting.   Genitourinary: Negative for dysuria, frequency, hematuria, nocturia and urgency.   Neurological: Negative for excessive daytime sleepiness, dizziness, headaches, light-headedness and loss of balance.   Psychiatric/Behavioral: Negative for depression. The patient does not have insomnia and is not nervous/anxious.            Objective:     Vitals:    03/13/19 1443   BP: 120/84   BP Location: Left arm   Patient Position: Sitting   Pulse: 101   Resp: 18   Temp: 97.6 °F (36.4 °C)   TempSrc: Temporal   SpO2: 96%   Weight: 123 kg (271 lb)   Height: 175.3 cm (69.02\")         Physical Exam   Constitutional: He is oriented to person, place, and time. He appears well-developed and well-nourished. He is active and cooperative. No distress.   HENT:   Head: Normocephalic and atraumatic.   Mouth/Throat: Oropharynx is clear and moist.   Eyes: Conjunctivae and EOM are normal. Pupils are equal, round, and reactive to light.   Neck: Normal range of motion. Neck supple. No JVD present. No tracheal deviation present. No thyromegaly present.   Cardiovascular: Normal rate, regular rhythm, normal heart sounds and intact distal pulses.   Pulmonary/Chest: Effort normal. He has rales.   Abdominal: Bowel sounds are normal. He exhibits distension. There is tenderness.   Musculoskeletal: Normal range of motion. He exhibits edema (2+ pitting edema noted BLES). "   Neurological: He is alert and oriented to person, place, and time.   Skin: Skin is warm, dry and intact.   Psychiatric: He has a normal mood and affect. His behavior is normal.   Nursing note and vitals reviewed.      Lab and Diagnostic Review:      Lab Results   Component Value Date    GLUCOSE 121 (H) 03/13/2019    CALCIUM 9.8 03/13/2019     03/13/2019    K 2.8 (L) 03/13/2019    CO2 31.0 03/13/2019    CL 96 (L) 03/13/2019    BUN 22 03/13/2019    CREATININE 1.24 03/13/2019    EGFRIFAFRI 92 02/08/2018    EGFRIFNONA 59 (L) 03/13/2019    BCR 17.7 03/13/2019    ANIONGAP 12.0 (H) 03/13/2019           Assessment and Plan:         1. Acute diastolic heart failure (CMS/HCC)  IV diuresis today in office. Patient received 2 mg bumex (NDC 8055-7584-58)  today through a butterfly  in the left ac over slow IV push. During IV diuresis, vitals were monitored and stable. Please see IV diuresis record for those vitals. Patient voided 575 ml in the office prior to discharge from the office. Butterfly was d/c'd and area was free of erythema, ecchymosis, or drainage.  Patient was  instructed to record urinary output for the next 24 hours. Patient will receive a follow up call from the HF center in 24 hours to evaluate urinary output and reassess signs and symptoms.   - Basic Metabolic Panel; Future  Heart failure education today including signs and symptoms, the role of the heart failure center, daily weights, low sodium diet (less than 1500 mg per day), and daily physical activity. Reviewed HF Zones with patient and family.  Patient to continue current medications as previously ordered.     2. Essential hypertension  Well controlled  HTN Education provided today including signs and symptoms, medication management, daily blood pressure monitoring. Patient encouraged to call the Heart and Valve center with any abnormal readings.     - Basic Metabolic Panel    3. Chronic venous insufficiency  Has been evaluated by   Hamilton  Compression socks ordered    4. Hypokalemia  Increase aldactone to 25 mg daily  Increase KCL to 60 today, continue KCL 40 meq daily    It has been a pleasure to participate in the care of this patient.  Patient was instructed to call the [facility] with any questions, concerns, or worsening symptoms.        * Please note that portions of this note were completed with a voice recognition program. Efforts were made to edit the dictation but occasionally words are transcribed.

## 2019-03-14 ENCOUNTER — TELEPHONE (OUTPATIENT)
Dept: CARDIOLOGY | Facility: HOSPITAL | Age: 64
End: 2019-03-14

## 2019-03-14 DIAGNOSIS — E87.6 HYPOKALEMIA: Primary | ICD-10-CM

## 2019-03-14 RX ORDER — SPIRONOLACTONE 25 MG/1
25 TABLET ORAL DAILY
Qty: 30 TABLET | Refills: 6 | Status: SHIPPED | OUTPATIENT
Start: 2019-03-14 | End: 2019-08-29 | Stop reason: DRUGHIGH

## 2019-03-14 NOTE — TELEPHONE ENCOUNTER
Follow up call s/p Iv diuresis: patient notes he is feeling better today. Abdominal fullness has improved. He notes he voided frequently throughout the night. He is to continue KCL 40 meq per day and increase aldactone to 25 mg daily. He will have repeat labs at outpatient lab next Monday or Tuesday. Patient verbalized understanding.

## 2019-03-25 ENCOUNTER — LAB REQUISITION (OUTPATIENT)
Dept: LAB | Facility: HOSPITAL | Age: 64
End: 2019-03-25

## 2019-03-25 DIAGNOSIS — Z00.00 ROUTINE GENERAL MEDICAL EXAMINATION AT A HEALTH CARE FACILITY: ICD-10-CM

## 2019-03-25 PROCEDURE — 36415 COLL VENOUS BLD VENIPUNCTURE: CPT | Performed by: NURSE PRACTITIONER

## 2019-05-14 ENCOUNTER — OFFICE VISIT (OUTPATIENT)
Dept: CARDIOLOGY | Facility: CLINIC | Age: 64
End: 2019-05-14

## 2019-05-14 VITALS
WEIGHT: 263.6 LBS | DIASTOLIC BLOOD PRESSURE: 62 MMHG | BODY MASS INDEX: 39.04 KG/M2 | SYSTOLIC BLOOD PRESSURE: 108 MMHG | HEIGHT: 69 IN | HEART RATE: 88 BPM

## 2019-05-14 DIAGNOSIS — I48.19 PERSISTENT ATRIAL FIBRILLATION (HCC): Primary | ICD-10-CM

## 2019-05-14 DIAGNOSIS — I10 ESSENTIAL HYPERTENSION: ICD-10-CM

## 2019-05-14 DIAGNOSIS — I50.32 CHRONIC DIASTOLIC HEART FAILURE (HCC): ICD-10-CM

## 2019-05-14 DIAGNOSIS — I25.119 CORONARY ARTERY DISEASE INVOLVING NATIVE CORONARY ARTERY OF NATIVE HEART WITH ANGINA PECTORIS (HCC): ICD-10-CM

## 2019-05-14 DIAGNOSIS — E78.5 HYPERLIPIDEMIA LDL GOAL <70: ICD-10-CM

## 2019-05-14 DIAGNOSIS — E11.9 TYPE 2 DIABETES MELLITUS WITHOUT COMPLICATION, WITHOUT LONG-TERM CURRENT USE OF INSULIN (HCC): ICD-10-CM

## 2019-05-14 PROCEDURE — 99214 OFFICE O/P EST MOD 30 MIN: CPT | Performed by: INTERNAL MEDICINE

## 2019-05-14 NOTE — PROGRESS NOTES
[facility] at [facility]  Outpatient Follow-up Visit    Akshat Winkler  1955  PCP: Oren Mark MD      ID:  Akshat Winkler is a 64 y.o. male who resides in [place], [place].     Chief Complaint   Patient presents with   • Coronary Artery Disease   • Atrial Flutter   • Congestive Heart Failure            The patient returns today for his 6 month follow up of his persistent atrial fibrillation, chronic diastolic heart failure, coronary artery disease and hypertension.  He has been doing well overall.  In March, he went to the heart failure clinic for IV diuresis but has not needed any further IV diuresis since.  He saw Dr. Hamilton regarding his lower extremity edema and Dr. Hamilton recommended compression stockings.  The patient states that he and his wife are doing a low-carb diet to attempt to lose weight.  He states he is lost 7 pounds in the last 3 weeks.  He sleeps in a recliner due to back pain.            Allergies   Allergen Reactions   • Bisoprolol Other (See Comments)     Severe Hypotension   • Flecainide Other (See Comments)     Syncope / collapse   • Metoprolol Other (See Comments)      Bradycardia, Severe Hypotension   • Tikosyn [Dofetilide] Other (See Comments)     Wide complex tachycardia       Current Outpatient Medications:   •  apixaban (ELIQUIS) 5 MG tablet tablet, Take 1 tablet by mouth Every 12 (Twelve) Hours for 360 days., Disp: 180 tablet, Rfl: 3  •  bumetanide (BUMEX) 1 MG tablet, 1 po qd, Disp: 30 tablet, Rfl: 5  •  bumetanide (BUMEX) 2 MG tablet, Take 1 tablet by mouth Every Morning. On less busy days at work, Disp: 30 tablet, Rfl: 5  •  metOLazone (ZAROXOLYN) 2.5 MG tablet, Take 1 tablet by mouth Every Other Day. Take 1 tablet with bumex QOD M, W, F, Disp: 20 tablet, Rfl: 3  •  potassium chloride (K-DUR) 10 MEQ CR tablet, Take 3 tablets daily, Disp: 90 tablet, Rfl: 5  •  rosuvastatin (CRESTOR) 20 MG tablet, TAKE ONE TABLET BY MOUTH DAILY,  Disp: 90 tablet, Rfl: 2  •  spironolactone (ALDACTONE) 25 MG tablet, Take 1 tablet by mouth Daily., Disp: 30 tablet, Rfl: 6  •  tiZANidine (ZANAFLEX) 4 MG tablet, TAKE ONE TABLET BY MOUTH ONCE NIGHTLY AS NEEDED FOR MUSCLE SPASMS, Disp: 30 tablet, Rfl: 0    Past Medical History, Past Surgical History, Family history, Social History, and Medications were all reviewed with the patient today and updated as necessary.     Past Medical History:   Diagnosis Date   • Acute diastolic HF (heart failure) (CMS/[facility])    • Acute hypokalemia    • Arrhythmia    • Back injury     Fall on 12/20/17   • Cellulitis of leg    • Chest pain syndrome    • CHF (congestive heart failure) (CMS/[facility])    • Diabetes mellitus (CMS/[facility])     patient reports borderline    • Hyperlipidemia    • Hypertension    • Muscle pain     around back   • Obesity    • Obstructive sleep apnea     right now patient is sleeeping in recliner and does not use-when he lies down he will have to use cpap   • Paroxysmal A-fib (CMS/[facility])    • Peripheral artery disease (CMS/[facility])    • Spondylosis of 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 or radiculopathy 2/7/2018   • Wears glasses      Past Surgical History:   Procedure Laterality Date   • CARDIAC CATHETERIZATION     • CARDIOVERSION      multiple   • COLONOSCOPY      about 14 years ago   • KYPHOPLASTY N/A 3/5/2018    Procedure: KYPHOPLASTY L4;  Surgeon: Akshat Davidson MD;  Location:  [facility];  Service:    • KYPHOPLASTY N/A 4/16/2018    Procedure: KYPHOPLASTY L1 AND L2;  Surgeon: Akshat Davidson MD;  Location:  [facility];  Service: Neurosurgery   • OTHER SURGICAL HISTORY  06/29/2015    PVA     Family History   Problem Relation Age of Onset   • Hypertension Mother    • Stroke Mother    • Stroke Father    • Sleep disorder Father    • Alzheimer's disease Father    • Prostate cancer Brother      Social History     Tobacco Use   • Smoking status: Never Smoker   • Smokeless tobacco: Never Used   Substance Use Topics   • Alcohol use: No  "      Review of Systems   Constitution: Negative for weakness and malaise/fatigue.   Eyes: Negative for vision loss in left eye and vision loss in right eye.   Cardiovascular: Positive for leg swelling. Negative for chest pain, dyspnea on exertion, near-syncope, orthopnea, palpitations, paroxysmal nocturnal dyspnea and syncope.   Musculoskeletal: Positive for back pain. Negative for myalgias.   Neurological: Negative for brief paralysis, excessive daytime sleepiness, focal weakness, numbness and paresthesias.   All other systems reviewed and are negative.              /62 (BP Location: Right arm, Patient Position: Sitting)   Pulse 88   Ht 175.3 cm (69\")   Wt 120 kg (263 lb 9.6 oz)   BMI 38.93 kg/m²          Physical Exam   Constitutional: He is oriented to person, place, and time. He appears well-developed and well-nourished.   HENT:   Head: Normocephalic and atraumatic.   Eyes: Pupils are equal, round, and reactive to light. No scleral icterus.   Neck: No JVD present. Carotid bruit is not present. No thyromegaly present.   Cardiovascular: Normal rate, regular rhythm, S1 normal and S2 normal. Exam reveals no gallop.   No murmur heard.  Lower extremity edema 3+.  Wearing compression stockings.   Pulmonary/Chest: Effort normal and breath sounds normal.   Abdominal: Soft. There is no hepatosplenomegaly. There is no tenderness.   Neurological: He is alert and oriented to person, place, and time.   Skin: Skin is warm and dry. No cyanosis. Nails show no clubbing.   Psychiatric: He has a normal mood and affect. His behavior is normal.         Procedures   Lab Results   Component Value Date    GLUCOSE 121 (H) 03/13/2019    CALCIUM 9.8 03/13/2019     03/13/2019    K 2.8 (L) 03/13/2019    CO2 31.0 03/13/2019    CL 96 (L) 03/13/2019    BUN 22 03/13/2019    CREATININE 1.24 03/13/2019    EGFRIFAFRI 92 02/08/2018    EGFRIFNONA 59 (L) 03/13/2019    BCR 17.7 03/13/2019    ANIONGAP 12.0 (H) 03/13/2019          "     Problem List Items Addressed This Visit        Cardiology Problems    Persistent atrial fibrillation/flutter - Primary    Overview     · Diagnosed 2011  · [id number] 3 (HTN, CAD, DM)  · Echo (10/11/2011): Normal LVEF, mild MR, mild LA dilation  · LOLY/ECVcardioversion, 12/21/2011, with initiation of propafenone therapy.   · Successful LOLY/ECV for recurrent atrial fibrillation, 11/15/2013  · PVA with Dr. Cary, 6/29/2015  · Cardioversion (07/17/2015) for atrial flutter occurring post ablation   · ECV for recurrent atrial flutter, 12/20/17 with reattempt at beta blocker/flecainide.  · Intolerant to beta blocker/flecanide with severe hypotension, intolerant to propafenone  · Tikosyn admission with development of wide complex tachycardia, 7/2018         Current Assessment & Plan     · Asymptomatic  · Continue Eliquis for stroke prophylaxis         Chronic diastolic heart failure (CMS/[facility])    Overview     · Cardiac cath (02/20/14):  elevated LVEDP suggesting diastolic heart failure.  · Intolerant to beta blocker therapy         Current Assessment & Plan     · Stable functional class III symptoms  · No ACE inhibitor due to renal insufficiency  · Continue Bumex, Zaroxolyn as needed, and spironolactone         Coronary artery disease involving native coronary artery of native heart with angina pectoris (CMS/[facility])    Overview     · Cardiac catheterization (02/20/14): mild nonobstructive CAD. LVEF 55%, elevated LVEDP suggesting diastolic heart failure.         Current Assessment & Plan     · No angina         Hyperlipidemia LDL goal <70    Overview     · High intensity statin therapy indicated given presence of coronary artery disease         Current Assessment & Plan     · Continue rosuvastatin         Essential hypertension    Current Assessment & Plan     · Controlled  · Continue diuretic therapy.            Other    Type 2 diabetes mellitus without complication, without long-term current use of insulin (CMS/[facility])     Overview     · Statin therapy indicated given diabetic status                    · Continue present medical therapy  Return in about 6 months (around 11/14/2019) for Follow-up with [facility] only.          BERNARDO Alvarez M.D., [facility], [facility]  Interventional Cardiology  5/14/2019  9:18 AM

## 2019-06-10 ENCOUNTER — LAB REQUISITION (OUTPATIENT)
Dept: LAB | Facility: HOSPITAL | Age: 64
End: 2019-06-10

## 2019-06-10 DIAGNOSIS — Z00.00 ROUTINE GENERAL MEDICAL EXAMINATION AT A HEALTH CARE FACILITY: ICD-10-CM

## 2019-06-10 PROCEDURE — 36415 COLL VENOUS BLD VENIPUNCTURE: CPT | Performed by: INTERNAL MEDICINE

## 2019-06-13 ENCOUNTER — OFFICE VISIT (OUTPATIENT)
Dept: CARDIOLOGY | Facility: HOSPITAL | Age: 64
End: 2019-06-13

## 2019-06-13 VITALS
WEIGHT: 269.25 LBS | OXYGEN SATURATION: 97 % | HEART RATE: 100 BPM | TEMPERATURE: 97.2 F | RESPIRATION RATE: 18 BRPM | HEIGHT: 69 IN | DIASTOLIC BLOOD PRESSURE: 69 MMHG | SYSTOLIC BLOOD PRESSURE: 121 MMHG | BODY MASS INDEX: 39.88 KG/M2

## 2019-06-13 DIAGNOSIS — I48.19 PERSISTENT ATRIAL FIBRILLATION (HCC): ICD-10-CM

## 2019-06-13 DIAGNOSIS — I50.33 ACUTE ON CHRONIC DIASTOLIC HEART FAILURE (HCC): Primary | ICD-10-CM

## 2019-06-13 DIAGNOSIS — I10 ESSENTIAL HYPERTENSION: ICD-10-CM

## 2019-06-13 DIAGNOSIS — I87.2 CHRONIC VENOUS INSUFFICIENCY: ICD-10-CM

## 2019-06-13 PROCEDURE — 99214 OFFICE O/P EST MOD 30 MIN: CPT | Performed by: NURSE PRACTITIONER

## 2019-06-14 ENCOUNTER — DOCUMENTATION (OUTPATIENT)
Dept: CARDIOLOGY | Facility: HOSPITAL | Age: 64
End: 2019-06-14

## 2019-06-17 ENCOUNTER — DOCUMENTATION (OUTPATIENT)
Dept: CARDIOLOGY | Facility: HOSPITAL | Age: 64
End: 2019-06-17

## 2019-06-17 NOTE — PROGRESS NOTES
Labs 6/10/2019: WBC 5.9, RBC 4.72, hemoglobin 16, hematocrit 45.1, platelets 175, MCV 96, MCH 33.9, MCHC 35.5  Glucose 114, BUN 20, creatinine 1.17, estimated GFR 66, sodium 137, potassium 3.7, chloride 92, carbon dioxide 23, calcium 10.3, phosphorus 3.9, albumin 4.2, vitamin D 25 hydroxy 14

## 2019-06-25 RX ORDER — POTASSIUM CHLORIDE 750 MG/1
TABLET, FILM COATED, EXTENDED RELEASE ORAL
Qty: 90 TABLET | Refills: 5 | Status: SHIPPED | OUTPATIENT
Start: 2019-06-25 | End: 2019-11-19 | Stop reason: SDUPTHER

## 2019-07-10 ENCOUNTER — TELEPHONE (OUTPATIENT)
Dept: CARDIOLOGY | Facility: HOSPITAL | Age: 64
End: 2019-07-10

## 2019-07-10 NOTE — TELEPHONE ENCOUNTER
Patient called the office and noted a weight gain of 9lbs in 13 days. He notes that he has been following a low sodium diet. Patient to increase bumex to 2 mg bid for the next 2 days. Patient verbalized understanding.

## 2019-08-06 RX ORDER — BUMETANIDE 1 MG/1
TABLET ORAL
Qty: 30 TABLET | Refills: 5 | Status: SHIPPED | OUTPATIENT
Start: 2019-08-06 | End: 2019-11-19

## 2019-08-06 RX ORDER — BUMETANIDE 2 MG/1
2 TABLET ORAL EVERY MORNING
Qty: 30 TABLET | Refills: 5 | Status: SHIPPED | OUTPATIENT
Start: 2019-08-06 | End: 2019-11-19

## 2019-08-28 ENCOUNTER — OFFICE VISIT (OUTPATIENT)
Dept: CARDIOLOGY | Facility: HOSPITAL | Age: 64
End: 2019-08-28

## 2019-08-28 VITALS
HEART RATE: 102 BPM | RESPIRATION RATE: 16 BRPM | OXYGEN SATURATION: 98 % | BODY MASS INDEX: 39.45 KG/M2 | WEIGHT: 266.38 LBS | SYSTOLIC BLOOD PRESSURE: 121 MMHG | DIASTOLIC BLOOD PRESSURE: 76 MMHG | HEIGHT: 69 IN | TEMPERATURE: 97.2 F

## 2019-08-28 DIAGNOSIS — I10 ESSENTIAL HYPERTENSION: ICD-10-CM

## 2019-08-28 DIAGNOSIS — E87.6 HYPOKALEMIA: ICD-10-CM

## 2019-08-28 DIAGNOSIS — I50.33 ACUTE ON CHRONIC DIASTOLIC HEART FAILURE (HCC): Primary | ICD-10-CM

## 2019-08-28 DIAGNOSIS — I87.2 CHRONIC VENOUS INSUFFICIENCY: ICD-10-CM

## 2019-08-28 LAB
ANION GAP SERPL CALCULATED.3IONS-SCNC: 16.6 MMOL/L (ref 5–15)
BUN BLD-MCNC: 21 MG/DL (ref 8–23)
BUN/CREAT SERPL: 15.7 (ref 7–25)
CALCIUM SPEC-SCNC: 10.6 MG/DL (ref 8.6–10.5)
CHLORIDE SERPL-SCNC: 93 MMOL/L (ref 98–107)
CO2 SERPL-SCNC: 27.4 MMOL/L (ref 22–29)
CREAT BLD-MCNC: 1.34 MG/DL (ref 0.76–1.27)
GFR SERPL CREATININE-BSD FRML MDRD: 54 ML/MIN/1.73
GLUCOSE BLD-MCNC: 94 MG/DL (ref 65–99)
POTASSIUM BLD-SCNC: 3.2 MMOL/L (ref 3.5–5.2)
SODIUM BLD-SCNC: 137 MMOL/L (ref 136–145)

## 2019-08-28 PROCEDURE — 80048 BASIC METABOLIC PNL TOTAL CA: CPT | Performed by: NURSE PRACTITIONER

## 2019-08-28 PROCEDURE — 99214 OFFICE O/P EST MOD 30 MIN: CPT | Performed by: NURSE PRACTITIONER

## 2019-08-28 RX ORDER — METOLAZONE 2.5 MG/1
2.5 TABLET ORAL 2 TIMES WEEKLY
Qty: 20 TABLET | Refills: 2 | Status: SHIPPED | OUTPATIENT
Start: 2019-08-29 | End: 2019-11-19 | Stop reason: SDUPTHER

## 2019-08-28 NOTE — PROGRESS NOTES
[facility]  [facility]      Encounter Date:08/28/2019     Akshat Winkler  [address] [address]  [unfilled]    1955    Oren Mark MD    Akshat Winkler is a 64 y.o. male.      Subjective:     Chief Complaint:  Edema       HPI patient presents to the office today for ongoing evaluation of his chronic diastolic heart failure. He notes he has been following a low sodium diet and wearing his compression socks daily. He notes worsening pedal edema, abdominal fullness over the last week. Denies chest pain. Notes mild BILLS. Notes compliance with his medications. Notes muscle cramps intermittently.       Patient Active Problem List   Diagnosis   • Chronic diastolic heart failure (CMS/HCC)   • Type 2 diabetes mellitus without complication, without long-term current use of insulin (CMS/HCC)   • Hyperlipidemia LDL goal <70   • Essential hypertension   • Coronary artery disease involving native coronary artery of native heart with angina pectoris (CMS/HCC)   • Severe obstructive sleep apnea. On home CPAP   • Class 3 obesity in adult   • Chronic anticoagulation   • Persistent atrial fibrillation/flutter   • Compression fracture of lumbar vertebra (CMS/HCC)   • Spondylosis of 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 or radiculopathy   • Lumbar stenosis with neurogenic claudication   • Lumbar disc herniation   • Myofascial pain   • Pathologic compression fracture of vertebra (CMS/HCC)   • Chronic bilateral severe lower extremity edema   • Chronically elevated bilirubin and alkaline phosphatase. Probable chronic liver congestion versus occult cirrhosis   • Mechanical low back pain   • History of kyphoplasty       Past Medical History:   Diagnosis Date   • Acute diastolic HF (heart failure) (CMS/HCC)    • Acute hypokalemia    • Arrhythmia    • Back injury     Fall on 12/20/17   • Cellulitis of leg    • Chest pain syndrome    • CHF (congestive heart failure) (CMS/HCC)    •  Diabetes mellitus (CMS/[facility])     patient reports borderline    • Hyperlipidemia    • Hypertension    • Muscle pain     around back   • Obesity    • Obstructive sleep apnea     right now patient is sleeeping in recliner and does not use-when he lies down he will have to use cpap   • Paroxysmal A-fib (CMS/[facility])    • Peripheral artery disease (CMS/[facility])    • Spondylosis of 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 or radiculopathy 2/7/2018   • Wears glasses        Past Surgical History:   Procedure Laterality Date   • CARDIAC CATHETERIZATION     • CARDIOVERSION      multiple   • COLONOSCOPY      about 14 years ago   • KYPHOPLASTY N/A 3/5/2018    Procedure: KYPHOPLASTY L4;  Surgeon: Aksaht Davidson MD;  Location:  [facility];  Service:    • KYPHOPLASTY N/A 4/16/2018    Procedure: KYPHOPLASTY L1 AND L2;  Surgeon: Akshat Davidson MD;  Location:  [facility];  Service: Neurosurgery   • OTHER SURGICAL HISTORY  06/29/2015    PVA       Family History   Problem Relation Age of Onset   • Hypertension Mother    • Stroke Mother    • Stroke Father    • Sleep disorder Father    • Alzheimer's disease Father    • Prostate cancer Brother        Social History     Socioeconomic History   • Marital status:      Spouse name: Not on file   • Number of children: Not on file   • Years of education: Not on file   • Highest education level: Not on file   Tobacco Use   • Smoking status: Never Smoker   • Smokeless tobacco: Never Used   Substance and Sexual Activity   • Alcohol use: No   • Drug use: No   • Sexual activity: Defer   Social History Narrative    Patient lives at home with his wife and denies caffeine intake.       Allergies   Allergen Reactions   • Bisoprolol Other (See Comments)     Severe Hypotension   • Flecainide Other (See Comments)     Syncope / collapse   • Metoprolol Other (See Comments)      Bradycardia, Severe Hypotension   • Tikosyn [Dofetilide] Other (See Comments)     Wide complex tachycardia         Current Outpatient  Medications:   •  bumetanide (BUMEX) 1 MG tablet, 1 po qd, Disp: 30 tablet, Rfl: 5  •  bumetanide (BUMEX) 2 MG tablet, Take 1 tablet by mouth Every Morning. On less busy days at work, Disp: 30 tablet, Rfl: 5  •  metOLazone (ZAROXOLYN) 2.5 MG tablet, Take 1 tablet by mouth 2 (Two) Times a Week. WEEKENDS, Disp: 20 tablet, Rfl: 2  •  potassium chloride (K-DUR) 10 MEQ CR tablet, Take 3 tablets daily, Disp: 90 tablet, Rfl: 5  •  rosuvastatin (CRESTOR) 20 MG tablet, TAKE ONE TABLET BY MOUTH DAILY, Disp: 90 tablet, Rfl: 2  •  spironolactone (ALDACTONE) 25 MG tablet, Take 1 tablet by mouth Daily., Disp: 30 tablet, Rfl: 3    The following portions of the patient's history were reviewed today and updated as appropriate: allergies, current medications, past family history, past medical history, past social history, past surgical history and problem list     Review of Systems   Constitution: Negative for chills, decreased appetite, diaphoresis, fever, weakness, malaise/fatigue, night sweats, weight gain and weight loss.   HENT: Negative for congestion, hearing loss, hoarse voice and nosebleeds.    Eyes: Negative for blurred vision, visual disturbance and visual halos.   Cardiovascular: Positive for dyspnea on exertion (mild) and leg swelling. Negative for chest pain, claudication, cyanosis, irregular heartbeat, near-syncope, orthopnea, palpitations, paroxysmal nocturnal dyspnea and syncope.   Respiratory: Negative for cough, hemoptysis, shortness of breath, sleep disturbances due to breathing, snoring, sputum production and wheezing.    Hematologic/Lymphatic: Negative for bleeding problem. Does not bruise/bleed easily.   Skin: Negative for dry skin, itching and rash.   Musculoskeletal: Negative for arthritis, falls, joint pain, joint swelling and myalgias.   Gastrointestinal: Positive for bloating. Negative for abdominal pain, constipation, diarrhea, flatus, heartburn, hematemesis, hematochezia, melena, nausea and vomiting.  "  Genitourinary: Negative for dysuria, frequency, hematuria, nocturia and urgency.   Neurological: Negative for excessive daytime sleepiness, dizziness, headaches, light-headedness and loss of balance.   Psychiatric/Behavioral: Negative for depression. The patient does not have insomnia and is not nervous/anxious.        Objective:     Vitals:    08/28/19 1417   BP: 121/76   BP Location: Right arm   Patient Position: Sitting   Cuff Size: Large Adult   Pulse: 102   Resp: 16   Temp: 97.2 °F (36.2 °C)   TempSrc: Temporal   SpO2: 98%   Weight: 121 kg (266 lb 6 oz)   Height: 175.3 cm (69\")       Physical Exam   Constitutional: He is oriented to person, place, and time. He appears well-developed and well-nourished. He is active and cooperative. No distress.   HENT:   Head: Normocephalic and atraumatic.   Mouth/Throat: Oropharynx is clear and moist.   Eyes: Conjunctivae and EOM are normal. Pupils are equal, round, and reactive to light.   Neck: Normal range of motion. Neck supple. No JVD present. No tracheal deviation present. No thyromegaly present.   Cardiovascular: Normal rate, regular rhythm, normal heart sounds and intact distal pulses.   Pulmonary/Chest: Effort normal and breath sounds normal.   Abdominal: Soft. Bowel sounds are normal. He exhibits distension. There is no tenderness.   Musculoskeletal: Normal range of motion. He exhibits edema (2+ pitting edema noted BLEs).   Neurological: He is alert and oriented to person, place, and time.   Skin: Skin is warm, dry and intact.   Psychiatric: He has a normal mood and affect. His behavior is normal.   Nursing note and vitals reviewed.      Lab and Diagnostic Review:  Results for orders placed or performed in visit on 08/28/19   Basic Metabolic Panel   Result Value Ref Range    Glucose 94 65 - 99 mg/dL    BUN 21 8 - 23 mg/dL    Creatinine 1.34 (H) 0.76 - 1.27 mg/dL    Sodium 137 136 - 145 mmol/L    Potassium 3.2 (L) 3.5 - 5.2 mmol/L    Chloride 93 (L) 98 - 107 mmol/L "    CO2 27.4 22.0 - 29.0 mmol/L    Calcium 10.6 (H) 8.6 - 10.5 mg/dL    eGFR Non African Amer 54 (L) >60 mL/min/1.73    BUN/Creatinine Ratio 15.7 7.0 - 25.0    Anion Gap 16.6 (H) 5.0 - 15.0 mmol/L         Assessment and Plan:   1. Acute on chronic diastolic heart failure (CMS/HCC)  IV diuresis today in office. Patient received 2 mg (NDC 9882-1830-39)  today through a butterfly  in the left AC over slow IV push. During IV diuresis, vitals were monitored and stable. Please see IV diuresis record for those vitals. Patient voided 400 ml in the office prior to discharge from the office. Butterfly was d/c'd and area was free of erythema, ecchymosis, or drainage.  Patient was  instructed to record urinary output for the next 24 hours. Patient will receive a follow up call from the HF center in 24 hours to evaluate urinary output and reassess signs and symptoms.     - Basic Metabolic Panel  Heart failure education today including signs and symptoms, the role of the heart failure center, daily weights, low sodium diet (less than 1500 mg per day), and daily physical activity. Reviewed HF Zones with patient and family.  Patient to continue current medications as previously ordered.   2. Essential hypertension  HTN Education provided today including signs and symptoms, medication management, daily blood pressure monitoring. Patient encouraged to call the Heart and Valve center with any abnormal readings. - Basic Metabolic Panel  Well controlled on Aldactone, Bumex  3. Hypokalemia  Increase Aldactone to 50 mg daily  Increase potassium supplementation of 40 mEq daily for the next 3 days  Repeat BMP in 1 week    4. Chronic venous insufficiency  Wear compression socks daily          It has been a pleasure to participate in the care of this patient.  Patient was instructed to call the [facility] Center with any questions, concerns, or worsening symptoms.    *Please note that portions of this note were completed with a voice  recognition program. Efforts were made to edit the dictations, but occasionally words are mistranscribed.

## 2019-08-29 ENCOUNTER — TELEPHONE (OUTPATIENT)
Dept: CARDIOLOGY | Facility: HOSPITAL | Age: 64
End: 2019-08-29

## 2019-08-29 DIAGNOSIS — I50.33 ACUTE ON CHRONIC DIASTOLIC HEART FAILURE (HCC): Primary | ICD-10-CM

## 2019-08-29 DIAGNOSIS — E11.9 TYPE 2 DIABETES MELLITUS WITHOUT COMPLICATION, WITHOUT LONG-TERM CURRENT USE OF INSULIN (HCC): ICD-10-CM

## 2019-08-29 DIAGNOSIS — E87.6 HYPOKALEMIA: ICD-10-CM

## 2019-08-29 RX ORDER — SPIRONOLACTONE 50 MG/1
50 TABLET, FILM COATED ORAL DAILY
Qty: 30 TABLET | Refills: 3 | Status: SHIPPED | OUTPATIENT
Start: 2019-08-29 | End: 2019-11-19 | Stop reason: SDUPTHER

## 2019-08-29 NOTE — TELEPHONE ENCOUNTER
Reviewed labs with patient. Increase KCL to 40 meq daily for next 3 days. Increase aldactone to 50 mg daily. Repeat BMP in one week.

## 2019-09-06 ENCOUNTER — LAB REQUISITION (OUTPATIENT)
Dept: LAB | Facility: HOSPITAL | Age: 64
End: 2019-09-06

## 2019-09-06 DIAGNOSIS — Z00.00 ROUTINE GENERAL MEDICAL EXAMINATION AT A HEALTH CARE FACILITY: ICD-10-CM

## 2019-09-06 PROCEDURE — 36415 COLL VENOUS BLD VENIPUNCTURE: CPT | Performed by: NURSE PRACTITIONER

## 2019-09-08 DIAGNOSIS — I48.19 PERSISTENT ATRIAL FIBRILLATION (HCC): ICD-10-CM

## 2019-09-09 RX ORDER — APIXABAN 5 MG/1
TABLET, FILM COATED ORAL
Qty: 180 TABLET | Refills: 2 | Status: SHIPPED | OUTPATIENT
Start: 2019-09-09 | End: 2020-06-09 | Stop reason: SDUPTHER

## 2019-09-13 ENCOUNTER — DOCUMENTATION (OUTPATIENT)
Dept: CARDIOLOGY | Facility: HOSPITAL | Age: 64
End: 2019-09-13

## 2019-09-13 NOTE — PROGRESS NOTES
Reviewed labs with patient from 9/6/19:  Glucose 90, BUN 24, creatinine 1.14, estimated GFR 65, sodium 139, potassium 3.9, chloride 97, carbon dioxide 25.2    Patient reports that his weight is down to 249.8 and notes significant improvement in his pedal edema.  Patient to continue medications as directed.

## 2019-11-04 ENCOUNTER — LAB REQUISITION (OUTPATIENT)
Dept: LAB | Facility: HOSPITAL | Age: 64
End: 2019-11-04

## 2019-11-04 DIAGNOSIS — Z00.00 ROUTINE GENERAL MEDICAL EXAMINATION AT A HEALTH CARE FACILITY: ICD-10-CM

## 2019-11-04 PROCEDURE — 36415 COLL VENOUS BLD VENIPUNCTURE: CPT | Performed by: INTERNAL MEDICINE

## 2019-11-10 DIAGNOSIS — E78.5 HYPERLIPIDEMIA LDL GOAL <70: ICD-10-CM

## 2019-11-11 RX ORDER — ROSUVASTATIN CALCIUM 20 MG/1
TABLET, COATED ORAL
Qty: 90 TABLET | Refills: 1 | Status: SHIPPED | OUTPATIENT
Start: 2019-11-11 | End: 2020-05-21 | Stop reason: SDUPTHER

## 2019-11-19 ENCOUNTER — OFFICE VISIT (OUTPATIENT)
Dept: CARDIOLOGY | Facility: CLINIC | Age: 64
End: 2019-11-19

## 2019-11-19 VITALS
HEIGHT: 69 IN | BODY MASS INDEX: 37.77 KG/M2 | WEIGHT: 255 LBS | OXYGEN SATURATION: 98 % | DIASTOLIC BLOOD PRESSURE: 70 MMHG | SYSTOLIC BLOOD PRESSURE: 116 MMHG | HEART RATE: 104 BPM

## 2019-11-19 DIAGNOSIS — E78.5 HYPERLIPIDEMIA LDL GOAL <70: ICD-10-CM

## 2019-11-19 DIAGNOSIS — I25.119 CORONARY ARTERY DISEASE INVOLVING NATIVE CORONARY ARTERY OF NATIVE HEART WITH ANGINA PECTORIS (HCC): ICD-10-CM

## 2019-11-19 DIAGNOSIS — I50.32 CHRONIC DIASTOLIC HEART FAILURE (HCC): ICD-10-CM

## 2019-11-19 DIAGNOSIS — I48.19 PERSISTENT ATRIAL FIBRILLATION (HCC): Primary | ICD-10-CM

## 2019-11-19 DIAGNOSIS — E11.9 TYPE 2 DIABETES MELLITUS WITHOUT COMPLICATION, WITHOUT LONG-TERM CURRENT USE OF INSULIN (HCC): ICD-10-CM

## 2019-11-19 DIAGNOSIS — I10 ESSENTIAL HYPERTENSION: ICD-10-CM

## 2019-11-19 PROBLEM — E80.6 HYPERBILIRUBINEMIA: Status: RESOLVED | Noted: 2018-07-19 | Resolved: 2019-11-19

## 2019-11-19 PROCEDURE — 99213 OFFICE O/P EST LOW 20 MIN: CPT | Performed by: INTERNAL MEDICINE

## 2019-11-19 RX ORDER — METOLAZONE 2.5 MG/1
2.5 TABLET ORAL 2 TIMES WEEKLY
Qty: 20 TABLET | Refills: 2 | Status: SHIPPED | OUTPATIENT
Start: 2019-11-21 | End: 2021-01-01 | Stop reason: HOSPADM

## 2019-11-19 RX ORDER — SPIRONOLACTONE 50 MG/1
50 TABLET, FILM COATED ORAL DAILY
Qty: 90 TABLET | Refills: 3 | Status: SHIPPED | OUTPATIENT
Start: 2019-11-19 | End: 2020-11-06 | Stop reason: DRUGHIGH

## 2019-11-19 RX ORDER — POTASSIUM CHLORIDE 750 MG/1
30 TABLET, FILM COATED, EXTENDED RELEASE ORAL DAILY
Qty: 270 TABLET | Refills: 3 | Status: SHIPPED | OUTPATIENT
Start: 2019-11-19 | End: 2020-12-11 | Stop reason: SDUPTHER

## 2019-11-19 RX ORDER — BUMETANIDE 2 MG/1
2 TABLET ORAL EVERY MORNING
Qty: 90 TABLET | Refills: 3 | Status: SHIPPED | OUTPATIENT
Start: 2019-11-19 | End: 2020-06-09 | Stop reason: SDUPTHER

## 2019-11-19 NOTE — PROGRESS NOTES
[facility]  Follow-up note    Encounter Date:11/19/2019    Patient ID: Akshat Winkler is a  64 y.o. male who resides in [place], [place].    CC/Reason for visit:    · Atrial flutter  · Mild CAD  · Cardiac risk factors         Problem List Items Addressed This Visit        Cardiology Problems    Persistent atrial fibrillation/flutter - Primary    Overview     · Diagnosed 2011  · [id number] 3 (HTN, CAD, DM)  · Echo (10/11/2011): Normal LVEF, mild MR, mild LA dilation  · LOLY/ECVcardioversion, 12/21/2011, with initiation of propafenone therapy.   · Successful LOLY/ECV for recurrent atrial fibrillation, 11/15/2013  · PVA with Dr. Cary, 6/29/2015  · Cardioversion (07/17/2015) for atrial flutter occurring post ablation   · ECV for recurrent atrial flutter, 12/20/17 with reattempt at beta blocker/flecainide.  · Intolerant to beta blocker/flecanide with severe hypotension, intolerant to propafenone  · Tikosyn admission with development of wide complex tachycardia, 7/2018         Current Assessment & Plan     · Asymptomatic  · Continue Eliquis         Chronic diastolic heart failure (CMS/HCC)    Overview     · Cardiac cath (02/20/14):  elevated LVEDP suggesting diastolic heart failure.  · Intolerant to beta blocker therapy         Relevant Medications    spironolactone (ALDACTONE) 50 MG tablet    potassium chloride (K-DUR) 10 MEQ CR tablet    metOLazone (ZAROXOLYN) 2.5 MG tablet (Start on 11/21/2019)    bumetanide (BUMEX) 2 MG tablet    Coronary artery disease involving native coronary artery of native heart with angina pectoris (CMS/HCC)    Overview     · Cardiac catheterization (02/20/14): mild nonobstructive CAD. LVEF 55%, elevated LVEDP suggesting diastolic heart failure.         Current Assessment & Plan     · No anginal symptoms         Hyperlipidemia LDL goal <70    Overview     · High intensity statin therapy indicated given presence of coronary artery disease          Current Assessment & Plan     · Continue rosuvastatin  · Obtain CMP and lipids         Relevant Orders    Lipid Panel    Essential hypertension    Current Assessment & Plan     · Controlled  · Continue present medical therapy         Relevant Medications    spironolactone (ALDACTONE) 50 MG tablet    metOLazone (ZAROXOLYN) 2.5 MG tablet (Start on 11/21/2019)    bumetanide (BUMEX) 2 MG tablet       Other    Type 2 diabetes mellitus without complication, without long-term current use of insulin (CMS/[facility])    Overview     · Statin therapy indicated given diabetic status         Current Assessment & Plan     · Trial of Jardiance 10 mg daily for diabetes and CV risk reduction  · Obtain hemoglobin A1c         Relevant Orders    Hemoglobin A1c               · Trial of Jardiance 10 mg daily for diabetes and CV risk reduction  · Obtain lipids, hemoglobin A1c  Return in about 12 months (around 11/19/2020).            Akshat Winkler is a 64 y.o. male who returns to my office for follow-up of his paroxysmal atrial fibrillation/flutter, diastolic heart failure, mild coronary disease and cardiac risk factors.  The patient has been doing well from a cardiovascular standpoint.  He denies any anginal heart failure symptoms.  No TIA or stroke symptoms.  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  He plans to take another job after the holidays.  He has not establish a primary care physician since Dr. Mark retired.  He continues to follow with nephrology and had recent lab work performed.    Review of Systems   Constitution: Negative for weakness and malaise/fatigue.   Eyes: Negative for vision loss in left eye and vision loss in right eye.   Cardiovascular: Positive for leg swelling. Negative for chest pain, dyspnea on exertion, near-syncope, orthopnea, palpitations, paroxysmal nocturnal dyspnea and syncope.   Respiratory: Positive for shortness of breath.    Musculoskeletal: Negative for myalgias.  "  Neurological: Negative for brief paralysis, excessive daytime sleepiness, focal weakness, numbness and paresthesias.   All other systems reviewed and are negative.      The patient's past medical, social, family history and ROS reviewed in the patient's electronic medical record.    Allergies  Bisoprolol; Flecainide; Metoprolol; and Tikosyn [dofetilide]    Outpatient Medications Marked as Taking for the 11/19/19 encounter (Office Visit) with Lucian Alvarez IV, MD   Medication Sig Dispense Refill   • bumetanide (BUMEX) 2 MG tablet Take 1 tablet by mouth Every Morning. On less busy days at work 90 tablet 3   • ELIQUIS 5 MG tablet tablet TAKE ONE TABLET BY MOUTH EVERY 12 HOURS  DAYS 180 tablet 2   • [START ON 11/21/2019] metOLazone (ZAROXOLYN) 2.5 MG tablet Take 1 tablet by mouth 2 (Two) Times a Week. WEEKENDS 20 tablet 2   • potassium chloride (K-DUR) 10 MEQ CR tablet Take 3 tablets by mouth Daily. 270 tablet 3   • rosuvastatin (CRESTOR) 20 MG tablet TAKE ONE TABLET BY MOUTH DAILY 90 tablet 1   • spironolactone (ALDACTONE) 50 MG tablet Take 1 tablet by mouth Daily. 90 tablet 3   • [DISCONTINUED] bumetanide (BUMEX) 1 MG tablet 1 po qd (Patient taking differently: Take 1 mg by mouth As Needed.) 30 tablet 5   • [DISCONTINUED] bumetanide (BUMEX) 2 MG tablet Take 1 tablet by mouth Every Morning. On less busy days at work 30 tablet 5   • [DISCONTINUED] metOLazone (ZAROXOLYN) 2.5 MG tablet Take 1 tablet by mouth 2 (Two) Times a Week. WEEKENDS (Patient taking differently: Take 2.5 mg by mouth 2 (Two) Times a Week. As needed maybe 2 weekends out of the month) 20 tablet 2   • [DISCONTINUED] potassium chloride (K-DUR) 10 MEQ CR tablet Take 3 tablets daily 90 tablet 5   • [DISCONTINUED] spironolactone (ALDACTONE) 50 MG tablet Take 1 tablet by mouth Daily. 30 tablet 3           Blood pressure 116/70, pulse 104, height 175.3 cm (69\"), weight 116 kg (255 lb), SpO2 98 %.  Body mass index is 37.66 kg/m².  Vitals: "    11/19/19 1012   Patient Position: Sitting       Physical Exam   Constitutional: He is oriented to person, place, and time. He appears well-developed and well-nourished.   HENT:   Head: Normocephalic and atraumatic.   Eyes: Pupils are equal, round, and reactive to light. No scleral icterus.   Neck: No JVD present. Carotid bruit is not present. No thyromegaly present.   Cardiovascular: Normal rate, regular rhythm, S1 normal and S2 normal. Exam reveals no gallop.   No murmur heard.  Pulmonary/Chest: Effort normal and breath sounds normal.   Abdominal: Soft. There is no hepatosplenomegaly. There is no tenderness.   Neurological: He is alert and oriented to person, place, and time.   Skin: Skin is warm and dry. No cyanosis. Nails show no clubbing.   Psychiatric: He has a normal mood and affect. His behavior is normal.       Data Review (reviewed with patient):     Labs (11/4/2019): BUN 16, creatinine 1.16, sodium 141, potassium 4.1    Procedures    Lab Results   Component Value Date    TRIG 44 05/09/2017    HDL 61 (H) 05/09/2017    LDL 68 05/09/2017    AST 43 (H) 07/20/2018    ALT 19 07/20/2018       Lab Results   Component Value Date    HGBA1C 6.20 (H) 07/19/2018           BERNARDO Alvarez MD [facility] [place]  Interventional and General Cardiology

## 2019-11-24 ENCOUNTER — RESULTS ENCOUNTER (OUTPATIENT)
Dept: CARDIOLOGY | Facility: CLINIC | Age: 64
End: 2019-11-24

## 2019-11-24 DIAGNOSIS — E11.9 TYPE 2 DIABETES MELLITUS WITHOUT COMPLICATION, WITHOUT LONG-TERM CURRENT USE OF INSULIN (HCC): ICD-10-CM

## 2019-12-13 ENCOUNTER — TELEPHONE (OUTPATIENT)
Dept: CARDIOLOGY | Facility: CLINIC | Age: 64
End: 2019-12-13

## 2019-12-13 ENCOUNTER — LAB (OUTPATIENT)
Dept: LAB | Facility: HOSPITAL | Age: 64
End: 2019-12-13

## 2019-12-13 DIAGNOSIS — E78.5 HYPERLIPIDEMIA LDL GOAL <70: ICD-10-CM

## 2019-12-13 DIAGNOSIS — I50.33 ACUTE ON CHRONIC DIASTOLIC HEART FAILURE (HCC): ICD-10-CM

## 2019-12-13 DIAGNOSIS — E87.6 HYPOKALEMIA: ICD-10-CM

## 2019-12-13 LAB
ANION GAP SERPL CALCULATED.3IONS-SCNC: 13.2 MMOL/L (ref 5–15)
BUN BLD-MCNC: 17 MG/DL (ref 8–23)
BUN/CREAT SERPL: 15.7 (ref 7–25)
CALCIUM SPEC-SCNC: 10.4 MG/DL (ref 8.6–10.5)
CHLORIDE SERPL-SCNC: 97 MMOL/L (ref 98–107)
CHOLEST SERPL-MCNC: 143 MG/DL (ref 0–200)
CO2 SERPL-SCNC: 26.8 MMOL/L (ref 22–29)
CREAT BLD-MCNC: 1.08 MG/DL (ref 0.76–1.27)
GFR SERPL CREATININE-BSD FRML MDRD: 69 ML/MIN/1.73
GLUCOSE BLD-MCNC: 99 MG/DL (ref 65–99)
HBA1C MFR BLD: 6.07 % (ref 4.8–5.6)
HDLC SERPL-MCNC: 75 MG/DL (ref 40–60)
LDLC SERPL CALC-MCNC: 56 MG/DL (ref 0–100)
LDLC/HDLC SERPL: 0.75 {RATIO}
POTASSIUM BLD-SCNC: 4.4 MMOL/L (ref 3.5–5.2)
SODIUM BLD-SCNC: 137 MMOL/L (ref 136–145)
TRIGL SERPL-MCNC: 60 MG/DL (ref 0–150)
VLDLC SERPL-MCNC: 12 MG/DL (ref 5–40)

## 2019-12-13 PROCEDURE — 80061 LIPID PANEL: CPT

## 2019-12-13 PROCEDURE — 80048 BASIC METABOLIC PNL TOTAL CA: CPT

## 2019-12-13 PROCEDURE — 36415 COLL VENOUS BLD VENIPUNCTURE: CPT | Performed by: INTERNAL MEDICINE

## 2019-12-13 PROCEDURE — 83036 HEMOGLOBIN GLYCOSYLATED A1C: CPT | Performed by: INTERNAL MEDICINE

## 2019-12-13 NOTE — TELEPHONE ENCOUNTER
Patient called to ask about lab orders. He will go to [facility] today to have this preformed.

## 2020-04-01 ENCOUNTER — OFFICE VISIT (OUTPATIENT)
Dept: CARDIOLOGY | Facility: HOSPITAL | Age: 65
End: 2020-04-01

## 2020-04-01 VITALS
OXYGEN SATURATION: 96 % | DIASTOLIC BLOOD PRESSURE: 76 MMHG | SYSTOLIC BLOOD PRESSURE: 136 MMHG | TEMPERATURE: 98.4 F | RESPIRATION RATE: 18 BRPM | HEIGHT: 69 IN | BODY MASS INDEX: 39.77 KG/M2 | HEART RATE: 103 BPM | WEIGHT: 268.5 LBS

## 2020-04-01 DIAGNOSIS — I48.19 PERSISTENT ATRIAL FIBRILLATION (HCC): ICD-10-CM

## 2020-04-01 DIAGNOSIS — I10 ESSENTIAL HYPERTENSION: ICD-10-CM

## 2020-04-01 DIAGNOSIS — I50.33 ACUTE ON CHRONIC DIASTOLIC HEART FAILURE (HCC): Primary | ICD-10-CM

## 2020-04-01 DIAGNOSIS — I87.2 CHRONIC VENOUS INSUFFICIENCY: ICD-10-CM

## 2020-04-01 LAB
ANION GAP SERPL CALCULATED.3IONS-SCNC: 15.1 MMOL/L (ref 5–15)
BUN BLD-MCNC: 16 MG/DL (ref 8–23)
BUN/CREAT SERPL: 14.3 (ref 7–25)
CALCIUM SPEC-SCNC: 9.7 MG/DL (ref 8.6–10.5)
CHLORIDE SERPL-SCNC: 96 MMOL/L (ref 98–107)
CO2 SERPL-SCNC: 23.9 MMOL/L (ref 22–29)
CREAT BLD-MCNC: 1.12 MG/DL (ref 0.76–1.27)
GFR SERPL CREATININE-BSD FRML MDRD: 66 ML/MIN/1.73
GLUCOSE BLD-MCNC: 85 MG/DL (ref 65–99)
POTASSIUM BLD-SCNC: 3.3 MMOL/L (ref 3.5–5.2)
SODIUM BLD-SCNC: 135 MMOL/L (ref 136–145)

## 2020-04-01 PROCEDURE — 80048 BASIC METABOLIC PNL TOTAL CA: CPT | Performed by: NURSE PRACTITIONER

## 2020-04-01 PROCEDURE — 99214 OFFICE O/P EST MOD 30 MIN: CPT | Performed by: NURSE PRACTITIONER

## 2020-04-01 RX ORDER — ACETAMINOPHEN 325 MG/1
650 TABLET ORAL EVERY 6 HOURS PRN
COMMUNITY

## 2020-04-01 RX ORDER — ACETAMINOPHEN,DIPHENHYDRAMINE HCL 500; 25 MG/1; MG/1
1 TABLET, FILM COATED ORAL NIGHTLY PRN
COMMUNITY

## 2020-04-01 NOTE — PROGRESS NOTES
[facility]      04/01/2020      Akshat Winkler  [address] [address]  [unfilled]    1955    Oren Mark MD    Akshat Winkler is a 65 y.o. male.      Subjective:     Chief Complaint:  Acute on chronic diastolic heart failure      HPI 65 year old male presents to the office today for ongoing evaluation of his acute on chronic diastolic heart failure. He notes that he has been experiencing worsening pedal edema and abdominal fullness over the last week or so. Notes that he has been wearing his compression socks daily. He notes that he is sitting more at his job. He notes compliance with his medications. Denies chest pain, dizziness, presyncope, syncope. Notes BILLS that improves with rest. He is anticoagulated with eliquis and denies any s/s of bleeding.     Patient Active Problem List   Diagnosis   • Chronic diastolic heart failure (CMS/HCC)   • Type 2 diabetes mellitus without complication, without long-term current use of insulin (CMS/HCC)   • Hyperlipidemia LDL goal <70   • Essential hypertension   • Coronary artery disease involving native coronary artery of native heart with angina pectoris (CMS/HCC)   • Severe obstructive sleep apnea. On home CPAP   • Class 3 obesity in adult   • Chronic anticoagulation   • Persistent atrial fibrillation/flutter   • Compression fracture of lumbar vertebra (CMS/HCC)   • Spondylosis of 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 or radiculopathy   • Lumbar stenosis with neurogenic claudication   • Lumbar disc herniation   • Myofascial pain   • Pathologic compression fracture of vertebra (CMS/HCC)   • Chronic bilateral severe lower extremity edema   • Mechanical low back pain   • History of kyphoplasty       Past Medical History:   Diagnosis Date   • Acute diastolic HF (heart failure) (CMS/HCC)    • Acute hypokalemia    • Arrhythmia    • Back injury     Fall on 12/20/17   • Cellulitis of leg    • Chest pain syndrome     • CHF (congestive heart failure) (CMS/[facility])    • Diabetes mellitus (CMS/[facility])     patient reports borderline    • Hyperlipidemia    • Hypertension    • Muscle pain     around back   • Obesity    • Obstructive sleep apnea     right now patient is sleeeping in recliner and does not use-when he lies down he will have to use cpap   • Paroxysmal A-fib (CMS/[facility])    • Peripheral artery disease (CMS/[facility])    • Spondylosis of 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 or radiculopathy 2/7/2018   • Wears glasses        Past Surgical History:   Procedure Laterality Date   • CARDIAC CATHETERIZATION     • CARDIOVERSION      multiple   • COLONOSCOPY      about 14 years ago   • KYPHOPLASTY N/A 3/5/2018    Procedure: KYPHOPLASTY L4;  Surgeon: Akshat Davidson MD;  Location:  [facility];  Service:    • KYPHOPLASTY N/A 4/16/2018    Procedure: KYPHOPLASTY L1 AND L2;  Surgeon: Akshat Davidson MD;  Location:  [facility];  Service: Neurosurgery   • OTHER SURGICAL HISTORY  06/29/2015    PVA       Family History   Problem Relation Age of Onset   • Hypertension Mother    • Stroke Mother    • Stroke Father    • Sleep disorder Father    • Alzheimer's disease Father    • Prostate cancer Brother        Social History     Socioeconomic History   • Marital status:      Spouse name: Not on file   • Number of children: Not on file   • Years of education: Not on file   • Highest education level: Not on file   Tobacco Use   • Smoking status: Never Smoker   • Smokeless tobacco: Never Used   Substance and Sexual Activity   • Alcohol use: No   • Drug use: No   • Sexual activity: Defer   Social History Narrative    Patient lives at home with his wife and denies caffeine intake.       Allergies   Allergen Reactions   • Bisoprolol Other (See Comments)     Severe Hypotension   • Flecainide Other (See Comments)     Syncope / collapse   • Metoprolol Other (See Comments)      Bradycardia, Severe Hypotension   • Tikosyn [Dofetilide] Other (See Comments)     Wide  complex tachycardia         Current Outpatient Medications:   •  bumetanide (BUMEX) 2 MG tablet, Take 1 tablet by mouth Every Morning. On less busy days at work, Disp: 90 tablet, Rfl: 3  •  ELIQUIS 5 MG tablet tablet, TAKE ONE TABLET BY MOUTH EVERY 12 HOURS  DAYS, Disp: 180 tablet, Rfl: 2  •  metOLazone (ZAROXOLYN) 2.5 MG tablet, Take 1 tablet by mouth 2 (Two) Times a Week. WEEKENDS, Disp: 20 tablet, Rfl: 2  •  potassium chloride (K-DUR) 10 MEQ CR tablet, Take 3 tablets by mouth Daily., Disp: 270 tablet, Rfl: 3  •  rosuvastatin (CRESTOR) 20 MG tablet, TAKE ONE TABLET BY MOUTH DAILY, Disp: 90 tablet, Rfl: 1  •  spironolactone (ALDACTONE) 50 MG tablet, Take 1 tablet by mouth Daily., Disp: 90 tablet, Rfl: 3    The following portions of the patient's history were reviewed today and updated as appropriate: allergies, current medications, past family history, past medical history, past social history, past surgical history and problem list     Review of Systems   Constitution: Positive for weight gain. Negative for chills, decreased appetite, diaphoresis, fever, malaise/fatigue, night sweats and weight loss.   HENT: Negative for congestion, hearing loss, hoarse voice and nosebleeds.    Eyes: Negative for blurred vision, visual disturbance and visual halos.   Cardiovascular: Positive for dyspnea on exertion, leg swelling and orthopnea. Negative for chest pain, claudication, cyanosis, irregular heartbeat, near-syncope, palpitations, paroxysmal nocturnal dyspnea and syncope.   Respiratory: Positive for snoring. Negative for cough, hemoptysis, shortness of breath, sleep disturbances due to breathing, sputum production and wheezing.    Hematologic/Lymphatic: Negative for bleeding problem. Does not bruise/bleed easily.   Skin: Negative for dry skin, itching and rash.   Musculoskeletal: Positive for muscle cramps. Negative for arthritis, falls, joint pain, joint swelling and myalgias.   Gastrointestinal: Positive for  "bloating. Negative for abdominal pain, constipation, diarrhea, flatus, heartburn, hematemesis, hematochezia, melena, nausea and vomiting.   Genitourinary: Negative for dysuria, frequency, hematuria, nocturia and urgency.   Neurological: Negative for excessive daytime sleepiness, dizziness, headaches, light-headedness, loss of balance and weakness.   Psychiatric/Behavioral: Negative for depression. The patient has insomnia. The patient is not nervous/anxious.        Objective:     Vitals:    04/01/20 1118   BP: 136/76   BP Location: Right arm   Patient Position: Sitting   Cuff Size: Adult   Pulse: 103   Resp: 18   Temp: 98.4 °F (36.9 °C)   TempSrc: Temporal   SpO2: 96%   Weight: 122 kg (268 lb 8 oz)   Height: 175.3 cm (69\")     Body mass index is 39.65 kg/m².  Physical Exam   Constitutional: He is oriented to person, place, and time. He appears well-developed and well-nourished. He is active and cooperative. No distress.   HENT:   Head: Normocephalic and atraumatic.   Mouth/Throat: Oropharynx is clear and moist.   Eyes: Pupils are equal, round, and reactive to light. Conjunctivae and EOM are normal.   Neck: Normal range of motion. Neck supple. No JVD present. No tracheal deviation present. No thyromegaly present.   Cardiovascular: Normal rate, regular rhythm, normal heart sounds and intact distal pulses.   Pulmonary/Chest: Effort normal. He has rales (faint ).   Abdominal: Soft. Bowel sounds are normal. He exhibits distension. There is no tenderness.   Musculoskeletal: Normal range of motion. He exhibits edema (2+ pitting edema noted BLES, compression socks in place).   Neurological: He is alert and oriented to person, place, and time.   Skin: Skin is warm, dry and intact.   Psychiatric: He has a normal mood and affect. His behavior is normal.   Nursing note and vitals reviewed.      Lab and Diagnostic Review:  Results for orders placed or performed in visit on 12/13/19   Basic Metabolic Panel   Result Value Ref " Range    Glucose 99 65 - 99 mg/dL    BUN 17 8 - 23 mg/dL    Creatinine 1.08 0.76 - 1.27 mg/dL    Sodium 137 136 - 145 mmol/L    Potassium 4.4 3.5 - 5.2 mmol/L    Chloride 97 (L) 98 - 107 mmol/L    CO2 26.8 22.0 - 29.0 mmol/L    Calcium 10.4 8.6 - 10.5 mg/dL    eGFR Non African Amer 69 >60 mL/min/1.73    BUN/Creatinine Ratio 15.7 7.0 - 25.0    Anion Gap 13.2 5.0 - 15.0 mmol/L   Lipid Panel   Result Value Ref Range    Total Cholesterol 143 0 - 200 mg/dL    Triglycerides 60 0 - 150 mg/dL    HDL Cholesterol 75 (H) 40 - 60 mg/dL    LDL Cholesterol  56 0 - 100 mg/dL    VLDL Cholesterol 12 5 - 40 mg/dL    LDL/HDL Ratio 0.75        BMP pending from today   Assessment and Plan:   1. Acute on chronic diastolic heart failure (CMS/HCC)  IV diuresis today in office. Patient received 2 mg bumex (NDC 2001-2234-17)  today through a butterfly in the left AC over slow IV push. During IV diuresis, vitals were monitored and stable. Please see IV diuresis record for those vitals. Patient voided 150 ml in the office prior to discharge from the office. Butterfly was d/c'd and area was free of erythema, ecchymosis, or drainage. Patient will receive a follow up call from the HF center in 24 hours to evaluate urinary output and reassess signs and symptoms. Heart failure education today including signs and symptoms, the role of the heart failure center, daily weights, low sodium diet (less than 1500 mg per day), and daily physical activity. Reviewed HF Zones with patient and family.  Patient to continue current medications as previously ordered.   - Basic Metabolic Panel; Future    2. Essential hypertension  HTN Education provided today including signs and symptoms, medication management, daily blood pressure monitoring. Patient encouraged to call the [facility] center with any abnormal readings.   - Basic Metabolic Panel; Future    3. Persistent atrial fibrillation/flutter  CHADS-VASc Risk Assessment            4       Total Score         1 CHF    1 Hypertension    1 DM    1 Vascular Disease        Criteria that do not apply:    Age >/= 75    PRIOR STROKE/TIA/THROMBO    Age 65-74    Sex: Female        Continue eliquis and denies any s/s of bleeding    4. Chronic venous insufficiency  Continue wearing compression socks          It has been a pleasure to participate in the care of this patient.  Patient was instructed to call the [facility] with any questions, concerns, or worsening symptoms.    *Please note that portions of this note were completed with a voice recognition program. Efforts were made to edit the dictations, but occasionally words are mistranscribed.

## 2020-04-02 ENCOUNTER — TELEPHONE (OUTPATIENT)
Dept: CARDIOLOGY | Facility: HOSPITAL | Age: 65
End: 2020-04-02

## 2020-04-02 NOTE — TELEPHONE ENCOUNTER
Patient returned my call. Reviewed labs with patient. Patient notes that he has not been taking his potassium supplementation regularly. Patient to resume potassium supplementation 30 meq daily, continue aldactone 50 mg daily. Patient notes that he is feeling significantly better after IV diuresis yesterday.

## 2020-05-21 DIAGNOSIS — E78.5 HYPERLIPIDEMIA LDL GOAL <70: ICD-10-CM

## 2020-05-21 RX ORDER — ROSUVASTATIN CALCIUM 20 MG/1
20 TABLET, COATED ORAL DAILY
Qty: 90 TABLET | Refills: 3 | Status: SHIPPED | OUTPATIENT
Start: 2020-05-21 | End: 2021-01-01 | Stop reason: SDUPTHER

## 2020-06-09 DIAGNOSIS — I48.19 PERSISTENT ATRIAL FIBRILLATION (HCC): ICD-10-CM

## 2020-06-09 DIAGNOSIS — I50.32 CHRONIC DIASTOLIC HEART FAILURE (HCC): ICD-10-CM

## 2020-06-09 RX ORDER — BUMETANIDE 2 MG/1
2 TABLET ORAL EVERY MORNING
Qty: 90 TABLET | Refills: 1 | Status: SHIPPED | OUTPATIENT
Start: 2020-06-09 | End: 2021-01-01

## 2020-08-06 NOTE — PROGRESS NOTES
[facility]  [facility]      08/07/2020         Akshat Winkler  [address] [address]  [unfilled]    1955    Oren Mark MD    Akshat Winkler is a 65 y.o. male.      Subjective:     Chief Complaint:  Edema and Follow-up       HPI   65-year-old male with history of chronic diastolic heart failure, type 2 diabetes, hyperlipidemia, hypertension, coronary artery disease, sleep apnea, persistent atrial fibrillation and chronic venous insufficiency who presents today for worsening shortness of breath and lower extremity edema.  He reports at least a 10 pound weight gain over the past few months.  He denies any sudden weight gain.  He has not had to take any extra Bumex.  He takes metolazone maybe once or twice a week.  He denies any orthopnea.  He notes exertional dyspnea that resolves with rest.  Denies palpitations or chest pain    Patient Active Problem List   Diagnosis   • Chronic diastolic heart failure (CMS/HCC)   • Type 2 diabetes mellitus without complication, without long-term current use of insulin (CMS/HCC)   • Hyperlipidemia LDL goal <70   • Essential hypertension   • Coronary artery disease involving native coronary artery of native heart with angina pectoris (CMS/HCC)   • Severe obstructive sleep apnea. On home CPAP   • Class 3 obesity in adult   • Chronic anticoagulation   • Persistent atrial fibrillation/flutter   • Compression fracture of lumbar vertebra (CMS/HCC)   • Spondylosis of 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 or radiculopathy   • Lumbar stenosis with neurogenic claudication   • Lumbar disc herniation   • Myofascial pain   • Pathologic compression fracture of vertebra (CMS/HCC)   • Chronic bilateral severe lower extremity edema   • Mechanical low back pain   • History of kyphoplasty       Past Medical History:   Diagnosis Date   • Acute diastolic HF (heart failure) (CMS/HCC)    • Acute hypokalemia    • Arrhythmia    • Back injury      Fall on 12/20/17   • Cellulitis of leg    • Chest pain syndrome    • CHF (congestive heart failure) (CMS/[facility])    • Diabetes mellitus (CMS/[facility])     patient reports borderline    • Hyperlipidemia    • Hypertension    • Muscle pain     around back   • Obesity    • Obstructive sleep apnea     right now patient is sleeeping in recliner and does not use-when he lies down he will have to use cpap   • Paroxysmal A-fib (CMS/[facility])    • Peripheral artery disease (CMS/[facility])    • Spondylosis of 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 or radiculopathy 2/7/2018   • Wears glasses        Past Surgical History:   Procedure Laterality Date   • CARDIAC CATHETERIZATION     • CARDIOVERSION      multiple   • COLONOSCOPY      about 14 years ago   • KYPHOPLASTY N/A 3/5/2018    Procedure: KYPHOPLASTY L4;  Surgeon: Akshat Davidson MD;  Location:  [facility];  Service:    • KYPHOPLASTY N/A 4/16/2018    Procedure: KYPHOPLASTY L1 AND L2;  Surgeon: Akshat Davidson MD;  Location:  [facility];  Service: Neurosurgery   • OTHER SURGICAL HISTORY  06/29/2015    PVA       Family History   Problem Relation Age of Onset   • Hypertension Mother    • Stroke Mother    • Stroke Father    • Sleep disorder Father    • Alzheimer's disease Father    • Prostate cancer Brother        Social History     Socioeconomic History   • Marital status:      Spouse name: Not on file   • Number of children: Not on file   • Years of education: Not on file   • Highest education level: Not on file   Tobacco Use   • Smoking status: Never Smoker   • Smokeless tobacco: Never Used   Substance and Sexual Activity   • Alcohol use: No   • Drug use: No   • Sexual activity: Defer   Social History Narrative    Patient lives at home with his wife and denies caffeine intake.       Allergies   Allergen Reactions   • Bisoprolol Other (See Comments)     Severe Hypotension   • Flecainide Other (See Comments)     Syncope / collapse   • Metoprolol Other (See Comments)      Bradycardia, Severe  Hypotension   • Tikosyn [Dofetilide] Other (See Comments)     Wide complex tachycardia         Current Outpatient Medications:   •  acetaminophen (TYLENOL) 325 MG tablet, Take 650 mg by mouth Daily., Disp: , Rfl:   •  apixaban (Eliquis) 5 MG tablet tablet, Take 1 tablet by mouth Every 12 (Twelve) Hours., Disp: 180 tablet, Rfl: 3  •  bumetanide (BUMEX) 2 MG tablet, Take 1 tablet by mouth Every Morning. On less busy days at work, Disp: 90 tablet, Rfl: 1  •  diphenhydrAMINE-acetaminophen (TYLENOL PM)  MG tablet per tablet, Take 1 tablet by mouth Every Night., Disp: , Rfl:   •  metOLazone (ZAROXOLYN) 2.5 MG tablet, Take 1 tablet by mouth 2 (Two) Times a Week. WEEKENDS, Disp: 20 tablet, Rfl: 2  •  potassium chloride (K-DUR) 10 MEQ CR tablet, Take 3 tablets by mouth Daily., Disp: 270 tablet, Rfl: 3  •  rosuvastatin (CRESTOR) 20 MG tablet, Take 1 tablet by mouth Daily., Disp: 90 tablet, Rfl: 3  •  spironolactone (ALDACTONE) 50 MG tablet, Take 1 tablet by mouth Daily., Disp: 90 tablet, Rfl: 3    The following portions of the patient's history were reviewed today and updated as appropriate: allergies, current medications, past family history, past medical history, past social history, past surgical history and problem list     Review of Systems   Constitution: Positive for weight gain. Negative for chills, decreased appetite, diaphoresis, fever, malaise/fatigue, night sweats and weight loss.   HENT: Negative for congestion, hearing loss, hoarse voice and nosebleeds.    Eyes: Negative for blurred vision, visual disturbance and visual halos.   Cardiovascular: Positive for dyspnea on exertion and leg swelling. Negative for chest pain, claudication, cyanosis, irregular heartbeat, near-syncope, orthopnea, palpitations, paroxysmal nocturnal dyspnea and syncope.   Respiratory: Positive for snoring. Negative for cough, hemoptysis, shortness of breath, sleep disturbances due to breathing, sputum production and wheezing.   "  Hematologic/Lymphatic: Negative for bleeding problem. Does not bruise/bleed easily.   Skin: Negative for dry skin, itching and rash.   Musculoskeletal: Positive for muscle cramps. Negative for arthritis, falls, joint pain, joint swelling and myalgias.   Gastrointestinal: Positive for bloating. Negative for abdominal pain, constipation, diarrhea, flatus, heartburn, hematemesis, hematochezia, melena, nausea and vomiting.   Genitourinary: Negative for dysuria, frequency, hematuria, nocturia and urgency.   Neurological: Negative for excessive daytime sleepiness, dizziness, headaches, light-headedness, loss of balance and weakness.   Psychiatric/Behavioral: Negative for depression. The patient has insomnia. The patient is not nervous/anxious.        Objective:     Vitals:    08/07/20 1432   BP: 127/82   BP Location: Left arm   Patient Position: Sitting   Cuff Size: Adult   Pulse: 104   Resp: 23   Temp: 97.4 °F (36.3 °C)   TempSrc: Temporal   SpO2: 98%   Weight: 132 kg (290 lb 4 oz)   Height: 175.3 cm (69\")       Body mass index is 42.86 kg/m².    Physical Exam   Constitutional: He is oriented to person, place, and time. He appears well-developed and well-nourished. No distress.   HENT:   Head: Normocephalic.   Eyes: Pupils are equal, round, and reactive to light. Conjunctivae are normal.   Neck: Neck supple. No JVD present. No thyromegaly present.   Cardiovascular: Normal heart sounds and intact distal pulses. An irregularly irregular rhythm present. Tachycardia present. Exam reveals no gallop and no friction rub.   No murmur heard.  Pulmonary/Chest: Effort normal. No respiratory distress. He has decreased breath sounds. He has no wheezes. He has no rales. He exhibits no tenderness.   Abdominal: Soft. Bowel sounds are normal. He exhibits distension.   Musculoskeletal: Normal range of motion. He exhibits edema (3-4+ edema BLE).   Neurological: He is alert and oriented to person, place, and time.   Skin: Skin is warm and " dry.   Psychiatric: He has a normal mood and affect. His behavior is normal. Thought content normal.   Vitals reviewed.      Lab and Diagnostic Review:  Echo 2/2018  · Technically difficult study due to body habitus  · Left ventricular systolic function is normal. Estimated EF = 60%.  · The cardiac valves are poorly visualized but there does not appear to be significant stenotic or regurgitant lesions.  · Right ventricular cavity is mild-to-moderately dilated.  · Atrial flutter noted throughout the examination.       Results for orders placed or performed in visit on 04/01/20   Basic Metabolic Panel   Result Value Ref Range    Glucose 85 65 - 99 mg/dL    BUN 16 8 - 23 mg/dL    Creatinine 1.12 0.76 - 1.27 mg/dL    Sodium 135 (L) 136 - 145 mmol/L    Potassium 3.3 (L) 3.5 - 5.2 mmol/L    Chloride 96 (L) 98 - 107 mmol/L    CO2 23.9 22.0 - 29.0 mmol/L    Calcium 9.7 8.6 - 10.5 mg/dL    eGFR Non African Amer 66 >60 mL/min/1.73    BUN/Creatinine Ratio 14.3 7.0 - 25.0    Anion Gap 15.1 (H) 5.0 - 15.0 mmol/L       Assessment and Plan:   1. Acute on chronic diastolic heart failure (CMS/HCC)  IV diuresis today in office. Patient received 2mg bumex today through a butterfly in the RAC over slow IV push. During IV diuresis, vitals were monitored and stable. Please see IV diuresis record for those vitals. Patient voided 350 ml in the office prior to discharge from the office. Butterfly was d/c'd and area was free of erythema, ecchymosis, or drainage.     Advised to take an extra Bumex dose on Saturday and Sunday if not back to his baseline weight and then resume to Bumex 2 mg daily  Continue spironolactone and metolazone as needed  - Basic Metabolic Panel; Future    2. Essential hypertension  Controlled on diuretics  Prone to hypotension    3. Persistent atrial fibrillation (CMS/HCC)  Asymptomatic  Rate slightly elevated today but patient is significantly fluid overloaded.  Continue to monitor.  Intolerant to  beta-blockers  Continue Eliquis        It has been a pleasure to participate in the care of this patient.  Patient was instructed to call the [facility] with any questions, concerns, or worsening symptoms.    *Please note that portions of this note were completed with a voice recognition program. Efforts were made to edit the dictations, but occasionally words are mistranscribed.

## 2020-08-07 ENCOUNTER — OFFICE VISIT (OUTPATIENT)
Dept: CARDIOLOGY | Facility: HOSPITAL | Age: 65
End: 2020-08-07

## 2020-08-07 VITALS
BODY MASS INDEX: 42.99 KG/M2 | HEIGHT: 69 IN | OXYGEN SATURATION: 98 % | HEART RATE: 104 BPM | DIASTOLIC BLOOD PRESSURE: 82 MMHG | WEIGHT: 290.25 LBS | SYSTOLIC BLOOD PRESSURE: 127 MMHG | TEMPERATURE: 97.4 F | RESPIRATION RATE: 23 BRPM

## 2020-08-07 DIAGNOSIS — I48.19 PERSISTENT ATRIAL FIBRILLATION (HCC): ICD-10-CM

## 2020-08-07 DIAGNOSIS — I50.33 ACUTE ON CHRONIC DIASTOLIC HEART FAILURE (HCC): Primary | ICD-10-CM

## 2020-08-07 DIAGNOSIS — I10 ESSENTIAL HYPERTENSION: ICD-10-CM

## 2020-08-07 LAB
ANION GAP SERPL CALCULATED.3IONS-SCNC: 9.7 MMOL/L (ref 5–15)
BUN SERPL-MCNC: 14 MG/DL (ref 8–23)
BUN/CREAT SERPL: 11.6 (ref 7–25)
CALCIUM SPEC-SCNC: 9.6 MG/DL (ref 8.6–10.5)
CHLORIDE SERPL-SCNC: 100 MMOL/L (ref 98–107)
CO2 SERPL-SCNC: 25.3 MMOL/L (ref 22–29)
CREAT SERPL-MCNC: 1.21 MG/DL (ref 0.76–1.27)
GFR SERPL CREATININE-BSD FRML MDRD: 61 ML/MIN/1.73
GLUCOSE SERPL-MCNC: 103 MG/DL (ref 65–99)
POTASSIUM SERPL-SCNC: 3.9 MMOL/L (ref 3.5–5.2)
SODIUM SERPL-SCNC: 135 MMOL/L (ref 136–145)

## 2020-08-07 PROCEDURE — 99214 OFFICE O/P EST MOD 30 MIN: CPT | Performed by: NURSE PRACTITIONER

## 2020-08-07 PROCEDURE — 80048 BASIC METABOLIC PNL TOTAL CA: CPT | Performed by: NURSE PRACTITIONER

## 2020-08-10 ENCOUNTER — TELEPHONE (OUTPATIENT)
Dept: CARDIOLOGY | Facility: HOSPITAL | Age: 65
End: 2020-08-10

## 2020-08-10 NOTE — TELEPHONE ENCOUNTER
Results for orders placed or performed in visit on 08/07/20   Basic Metabolic Panel   Result Value Ref Range    Glucose 103 (H) 65 - 99 mg/dL    BUN 14 8 - 23 mg/dL    Creatinine 1.21 0.76 - 1.27 mg/dL    Sodium 135 (L) 136 - 145 mmol/L    Potassium 3.9 3.5 - 5.2 mmol/L    Chloride 100 98 - 107 mmol/L    CO2 25.3 22.0 - 29.0 mmol/L    Calcium 9.6 8.6 - 10.5 mg/dL    eGFR Non African Amer 61 >60 mL/min/1.73    BUN/Creatinine Ratio 11.6 7.0 - 25.0    Anion Gap 9.7 5.0 - 15.0 mmol/L     Called patient and he reports he is feeling good. He says he has lost about 7lbs and feels his breathing is back to his baseline. Advised to continue bumex 2mg daily and may take extra PRN 3lb wt gain, increased SOB or swelling. Patient to call with any new or worsening symptoms

## 2020-09-21 ENCOUNTER — LAB REQUISITION (OUTPATIENT)
Dept: LAB | Facility: HOSPITAL | Age: 65
End: 2020-09-21

## 2020-09-21 DIAGNOSIS — Z00.00 ROUTINE GENERAL MEDICAL EXAMINATION AT A HEALTH CARE FACILITY: ICD-10-CM

## 2020-09-21 PROCEDURE — 36415 COLL VENOUS BLD VENIPUNCTURE: CPT

## 2020-11-02 ENCOUNTER — TELEPHONE (OUTPATIENT)
Dept: CARDIOLOGY | Facility: HOSPITAL | Age: 65
End: 2020-11-02

## 2020-11-02 DIAGNOSIS — I50.33 ACUTE ON CHRONIC DIASTOLIC HEART FAILURE (HCC): Primary | ICD-10-CM

## 2020-11-02 DIAGNOSIS — E87.6 HYPOKALEMIA: ICD-10-CM

## 2020-11-05 ENCOUNTER — LAB (OUTPATIENT)
Dept: LAB | Facility: HOSPITAL | Age: 65
End: 2020-11-05

## 2020-11-05 DIAGNOSIS — E87.6 HYPOKALEMIA: ICD-10-CM

## 2020-11-05 DIAGNOSIS — E87.70 HYPERVOLEMIA, UNSPECIFIED HYPERVOLEMIA TYPE: Primary | ICD-10-CM

## 2020-11-05 DIAGNOSIS — I50.33 ACUTE ON CHRONIC DIASTOLIC HEART FAILURE (HCC): ICD-10-CM

## 2020-11-05 LAB
ALBUMIN SERPL-MCNC: 4.1 G/DL (ref 3.5–5.2)
ANION GAP SERPL CALCULATED.3IONS-SCNC: 10 MMOL/L (ref 5–15)
ANION GAP SERPL CALCULATED.3IONS-SCNC: 8.1 MMOL/L (ref 5–15)
BUN SERPL-MCNC: 17 MG/DL (ref 8–23)
BUN SERPL-MCNC: 18 MG/DL (ref 8–23)
BUN/CREAT SERPL: 13.9 (ref 7–25)
BUN/CREAT SERPL: 14.9 (ref 7–25)
CALCIUM SPEC-SCNC: 9.5 MG/DL (ref 8.6–10.5)
CALCIUM SPEC-SCNC: 9.7 MG/DL (ref 8.6–10.5)
CHLORIDE SERPL-SCNC: 102 MMOL/L (ref 98–107)
CHLORIDE SERPL-SCNC: 99 MMOL/L (ref 98–107)
CO2 SERPL-SCNC: 26 MMOL/L (ref 22–29)
CO2 SERPL-SCNC: 27.9 MMOL/L (ref 22–29)
CREAT SERPL-MCNC: 1.21 MG/DL (ref 0.76–1.27)
CREAT SERPL-MCNC: 1.22 MG/DL (ref 0.76–1.27)
GFR SERPL CREATININE-BSD FRML MDRD: 60 ML/MIN/1.73
GFR SERPL CREATININE-BSD FRML MDRD: 61 ML/MIN/1.73
GLUCOSE SERPL-MCNC: 107 MG/DL (ref 65–99)
GLUCOSE SERPL-MCNC: 107 MG/DL (ref 65–99)
MAGNESIUM SERPL-MCNC: 2 MG/DL (ref 1.6–2.4)
PHOSPHATE SERPL-MCNC: 2.8 MG/DL (ref 2.5–4.5)
POTASSIUM SERPL-SCNC: 4.2 MMOL/L (ref 3.5–5.2)
POTASSIUM SERPL-SCNC: 4.5 MMOL/L (ref 3.5–5.2)
SODIUM SERPL-SCNC: 135 MMOL/L (ref 136–145)
SODIUM SERPL-SCNC: 138 MMOL/L (ref 136–145)

## 2020-11-05 PROCEDURE — 80069 RENAL FUNCTION PANEL: CPT

## 2020-11-05 PROCEDURE — 84100 ASSAY OF PHOSPHORUS: CPT

## 2020-11-05 PROCEDURE — 80048 BASIC METABOLIC PNL TOTAL CA: CPT

## 2020-11-05 PROCEDURE — 83735 ASSAY OF MAGNESIUM: CPT

## 2020-11-05 PROCEDURE — 36415 COLL VENOUS BLD VENIPUNCTURE: CPT

## 2020-11-06 RX ORDER — SPIRONOLACTONE 100 MG/1
100 TABLET, FILM COATED ORAL DAILY
Qty: 30 TABLET | Refills: 5 | Status: SHIPPED | OUTPATIENT
Start: 2020-11-06 | End: 2021-01-01 | Stop reason: SDUPTHER

## 2020-11-12 ENCOUNTER — OFFICE VISIT (OUTPATIENT)
Dept: CARDIOLOGY | Facility: HOSPITAL | Age: 65
End: 2020-11-12

## 2020-11-12 VITALS
HEART RATE: 100 BPM | DIASTOLIC BLOOD PRESSURE: 78 MMHG | HEIGHT: 70 IN | SYSTOLIC BLOOD PRESSURE: 132 MMHG | OXYGEN SATURATION: 98 % | WEIGHT: 280 LBS | TEMPERATURE: 97.5 F | BODY MASS INDEX: 40.09 KG/M2 | RESPIRATION RATE: 20 BRPM

## 2020-11-12 DIAGNOSIS — I10 ESSENTIAL HYPERTENSION: ICD-10-CM

## 2020-11-12 DIAGNOSIS — I50.33 ACUTE ON CHRONIC DIASTOLIC HEART FAILURE (HCC): Primary | ICD-10-CM

## 2020-11-12 DIAGNOSIS — E66.01 MORBID OBESITY WITH BMI OF 40.0-44.9, ADULT (HCC): ICD-10-CM

## 2020-11-12 PROCEDURE — 99214 OFFICE O/P EST MOD 30 MIN: CPT | Performed by: NURSE PRACTITIONER

## 2020-11-12 NOTE — PROGRESS NOTES
[facility]  [facility]      11/12/2020         Akshat Winkler  [address] [facility] [address]  [unfilled]    1955    Oren Mark MD    Akshat Winkler is a 65 y.o. male.      Subjective:     Chief Complaint:  Edema and Follow-up        HPI 65 year old male presents to the office today for ongoing evaluation of his acute on chronic diastolic heart failure. He notes that he has been experiencing worsening dyspnea, abdominal fullness and pedal edema over the past 3-4 weeks. He notes compliance with his medications and his compression socks. Denies chest pain, presyncope, syncope, orthopnea, pnd. Patient is inquiring about weight loss medications.       Patient Active Problem List   Diagnosis   • Chronic diastolic heart failure (CMS/HCC)   • Type 2 diabetes mellitus without complication, without long-term current use of insulin (CMS/HCC)   • Hyperlipidemia LDL goal <70   • Essential hypertension   • Coronary artery disease involving native coronary artery of native heart with angina pectoris (CMS/HCC)   • Severe obstructive sleep apnea. On home CPAP   • Class 3 obesity in adult   • Chronic anticoagulation   • Persistent atrial fibrillation/flutter   • Compression fracture of lumbar vertebra (CMS/HCC)   • Spondylosis of 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 or radiculopathy   • Lumbar stenosis with neurogenic claudication   • Lumbar disc herniation   • Myofascial pain   • Pathologic compression fracture of vertebra (CMS/HCC)   • Chronic bilateral severe lower extremity edema   • Mechanical low back pain   • History of kyphoplasty       Past Medical History:   Diagnosis Date   • Acute diastolic HF (heart failure) (CMS/HCC)    • Acute hypokalemia    • Arrhythmia    • Back injury     Fall on 12/20/17   • Cellulitis of leg    • Chest pain syndrome    • CHF (congestive heart failure) (CMS/HCC)    • Diabetes mellitus (CMS/HCC)     patient reports borderline    • Hyperlipidemia     • Hypertension    • Muscle pain     around back   • Obesity    • Obstructive sleep apnea     right now patient is sleeeping in recliner and does not use-when he lies down he will have to use cpap   • Paroxysmal A-fib (CMS/HCC)    • Peripheral artery disease (CMS/HCC)    • Spondylosis of 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 or radiculopathy 2/7/2018   • Wears glasses        Past Surgical History:   Procedure Laterality Date   • CARDIAC CATHETERIZATION     • CARDIOVERSION      multiple   • COLONOSCOPY      about 14 years ago   • KYPHOPLASTY N/A 3/5/2018    Procedure: KYPHOPLASTY L4;  Surgeon: Akshat Davidson MD;  Location:  [facility];  Service:    • KYPHOPLASTY N/A 4/16/2018    Procedure: KYPHOPLASTY L1 AND L2;  Surgeon: Akshat Davidson MD;  Location:  [facility];  Service: Neurosurgery   • OTHER SURGICAL HISTORY  06/29/2015    PVA       Family History   Problem Relation Age of Onset   • Hypertension Mother    • Stroke Mother    • Stroke Father    • Sleep disorder Father    • Alzheimer's disease Father    • Prostate cancer Brother        Social History     Socioeconomic History   • Marital status:      Spouse name: Not on file   • Number of children: Not on file   • Years of education: Not on file   • Highest education level: Not on file   Tobacco Use   • Smoking status: Never Smoker   • Smokeless tobacco: Never Used   Substance and Sexual Activity   • Alcohol use: No   • Drug use: No   • Sexual activity: Defer   Social History Narrative    Patient lives at home with his wife and denies caffeine intake.       Allergies   Allergen Reactions   • Bisoprolol Other (See Comments)     Severe Hypotension   • Flecainide Other (See Comments)     Syncope / collapse   • Metoprolol Other (See Comments)      Bradycardia, Severe Hypotension   • Tikosyn [Dofetilide] Other (See Comments)     Wide complex tachycardia         Current Outpatient Medications:   •  acetaminophen (TYLENOL) 325 MG tablet, Take 650 mg by mouth Daily.,  Disp: , Rfl:   •  apixaban (Eliquis) 5 MG tablet tablet, Take 1 tablet by mouth Every 12 (Twelve) Hours., Disp: 180 tablet, Rfl: 3  •  bumetanide (BUMEX) 2 MG tablet, Take 1 tablet by mouth Every Morning. On less busy days at work, Disp: 90 tablet, Rfl: 1  •  diphenhydrAMINE-acetaminophen (TYLENOL PM)  MG tablet per tablet, Take 1 tablet by mouth Every Night., Disp: , Rfl:   •  metOLazone (ZAROXOLYN) 2.5 MG tablet, Take 1 tablet by mouth 2 (Two) Times a Week. WEEKENDS (Patient taking differently: Take 2.5 mg by mouth Daily. WEEKENDS), Disp: 20 tablet, Rfl: 2  •  potassium chloride (K-DUR) 10 MEQ CR tablet, Take 3 tablets by mouth Daily., Disp: 270 tablet, Rfl: 3  •  rosuvastatin (CRESTOR) 20 MG tablet, Take 1 tablet by mouth Daily., Disp: 90 tablet, Rfl: 3  •  spironolactone (Aldactone) 100 MG tablet, Take 1 tablet by mouth Daily., Disp: 30 tablet, Rfl: 5    The following portions of the patient's history were reviewed today and updated as appropriate: allergies, current medications, past family history, past medical history, past social history, past surgical history and problem list     Review of Systems   Constitution: Positive for malaise/fatigue. Negative for chills, decreased appetite, diaphoresis, fever, night sweats, weight gain and weight loss.   HENT: Negative for congestion, hearing loss, hoarse voice and nosebleeds.    Eyes: Negative for blurred vision, visual disturbance and visual halos.   Cardiovascular: Positive for dyspnea on exertion and leg swelling. Negative for chest pain, claudication, cyanosis, irregular heartbeat, near-syncope, orthopnea, palpitations, paroxysmal nocturnal dyspnea and syncope.   Respiratory: Negative for cough, hemoptysis, shortness of breath, sleep disturbances due to breathing, snoring, sputum production and wheezing.    Hematologic/Lymphatic: Negative for bleeding problem. Does not bruise/bleed easily.   Skin: Negative for dry skin, itching and rash.  "  Musculoskeletal: Negative for arthritis, falls, joint pain, joint swelling and myalgias.   Gastrointestinal: Positive for bloating. Negative for abdominal pain, constipation, diarrhea, flatus, heartburn, hematemesis, hematochezia, melena, nausea and vomiting.   Genitourinary: Negative for dysuria, frequency, hematuria, nocturia and urgency.   Neurological: Negative for excessive daytime sleepiness, dizziness, headaches, light-headedness, loss of balance and weakness.   Psychiatric/Behavioral: Negative for depression. The patient does not have insomnia and is not nervous/anxious.        Objective:     Vitals:    11/12/20 1225 11/12/20 1250   BP: 135/89 132/78   BP Location: Left arm Left arm   Patient Position: Sitting Sitting   Cuff Size: Large Adult    Pulse: 111 100   Resp: 20    Temp: 97.5 °F (36.4 °C)    TempSrc: Temporal    SpO2: 98%    Weight: 127 kg (280 lb)    Height: 177.8 cm (70\")        Body mass index is 40.18 kg/m².    Vitals signs and nursing note reviewed.   Constitutional:       General: Not in acute distress.     Appearance: Well-developed.   Eyes:      Conjunctiva/sclera: Conjunctivae normal.      Pupils: Pupils are equal, round, and reactive to light.   HENT:      Head: Normocephalic and atraumatic.   Neck:      Musculoskeletal: Normal range of motion and neck supple.      Thyroid: No thyromegaly.      Vascular: No JVD.      Trachea: No tracheal deviation.   Pulmonary:      Effort: Pulmonary effort is normal.      Breath sounds: Bilateral to the midchest Rales present.   Cardiovascular:      PMI at left midclavicular line. Normal rate. Regular rhythm. Normal S1. Normal S2.      Murmurs: There is no murmur.      No gallop. No click. No rub.   Pulses:     Intact distal pulses.   Edema:     Peripheral edema present.     Pretibial: bilateral 2+ edema of the pretibial area.     Ankle: bilateral 2+ edema of the ankle.     Feet: bilateral 2+ edema of the feet.  Abdominal:      General: Bowel sounds are " normal. There is no distension.      Palpations: Abdomen is soft.      Tenderness: There is no abdominal tenderness.   Musculoskeletal: Normal range of motion.   Skin:     General: Skin is warm and dry.   Neurological:      Mental Status: Alert, oriented to person, place, and time and oriented to person, place and time.   Psychiatric:         Behavior: Behavior normal. Behavior is cooperative.         Lab and Diagnostic Review:  Results for orders placed or performed in visit on 11/05/20   Basic Metabolic Panel    Specimen: Blood   Result Value Ref Range    Glucose 107 (H) 65 - 99 mg/dL    BUN 18 8 - 23 mg/dL    Creatinine 1.21 0.76 - 1.27 mg/dL    Sodium 138 136 - 145 mmol/L    Potassium 4.5 3.5 - 5.2 mmol/L    Chloride 102 98 - 107 mmol/L    CO2 27.9 22.0 - 29.0 mmol/L    Calcium 9.7 8.6 - 10.5 mg/dL    eGFR Non African Amer 61 >60 mL/min/1.73    BUN/Creatinine Ratio 14.9 7.0 - 25.0    Anion Gap 8.1 5.0 - 15.0 mmol/L   Renal Function Panel    Specimen: Blood   Result Value Ref Range    Glucose 107 (H) 65 - 99 mg/dL    BUN 17 8 - 23 mg/dL    Creatinine 1.22 0.76 - 1.27 mg/dL    Sodium 135 (L) 136 - 145 mmol/L    Potassium 4.2 3.5 - 5.2 mmol/L    Chloride 99 98 - 107 mmol/L    CO2 26.0 22.0 - 29.0 mmol/L    Calcium 9.5 8.6 - 10.5 mg/dL    Albumin 4.10 3.50 - 5.20 g/dL    Phosphorus 2.8 2.5 - 4.5 mg/dL    Anion Gap 10.0 5.0 - 15.0 mmol/L    BUN/Creatinine Ratio 13.9 7.0 - 25.0    eGFR Non African Amer 60 (L) >60 mL/min/1.73   Magnesium    Specimen: Blood   Result Value Ref Range    Magnesium 2.0 1.6 - 2.4 mg/dL         Assessment and Plan:   1. Acute on chronic diastolic heart failure (CMS/HCC)  IV diuresis today in office. Patient received 2mg bumex today through a butterfly  in the right wrist  over slow IV push. During IV diuresis, vitals were monitored and stable. Please see IV diuresis record for those vitals. Patient voided x1  in the office prior to discharge from the office. Butterfly  was d/c'd and area was  free of erythema, ecchymosis, or drainage.  Patient was  instructed to record urinary output for the next 24 hours. Patient will receive a follow up call from the HF center in 24 hours to evaluate urinary output and reassess signs and symptoms. Heart failure education today including signs and symptoms, the role of the heart failure center, daily weights, low sodium diet (less than 1500 mg per day), and daily physical activity. Reviewed HF Zones with patient and family.  Patient to continue current medications as previously ordered.   - Ambulatory Referral to Weight Management Program    2. Essential hypertension    - Ambulatory Referral to Weight Management Program    3. Morbid BMI 40.0-44.9, adult (CMS/[facility])    - Ambulatory Referral to Weight Management Program        It has been a pleasure to participate in the care of this patient.  Patient was instructed to call the [facility] with any questions, concerns, or worsening symptoms.    *Please note that portions of this note were completed with a voice recognition program. Efforts were made to edit the dictations, but occasionally words are mistranscribed.

## 2020-11-13 ENCOUNTER — TELEPHONE (OUTPATIENT)
Dept: CARDIOLOGY | Facility: HOSPITAL | Age: 65
End: 2020-11-13

## 2020-11-13 NOTE — TELEPHONE ENCOUNTER
S/p follow up call after iv diuresis:  Patient notes that he is feeling significantly better. Continue current plan of care.

## 2020-11-17 NOTE — PROGRESS NOTES
"[facility]  Office Visit Note    DATE: 11/24/2020    IDENTIFICATION: Akshat Winkler is a 65 y.o. male who is  and resides in [place], [place]    REASON FOR VISIT:  • Coronary artery disease  • Heart failure  • Atrial fibrillation/flutter  • Hypertension            Akshat Winkler returns today for follow-up of his coronary artery disease, chronic diastolic heart failure, persistent atrial fibrillation/flutter and cardiac risk factors.  Patient was recently evaluated at the heart failure clinic and reported worsening heart failure symptoms.  He received IV diuresis with 2 mg of Bumex.  The patient tells me he usually does well for about 3 months but then will require a dose of IV Bumex and then does well for 3 more months.  Overall he feels he is staying steady and is doing as well as he was last year.  He he reports occasional periodic episodes of atrial fibrillation flutter but they are usually brief and he is relatively asymptomatic when they occur.  He has been intolerant to multiple antiarrhythmics including Tikosyn and flecainide.  He is also been tolerant with beta-blockers.  He takes daily Bumex along with potassium and uses his metolazone only on weekends.  At his last visit he was asked to trial Jardiance.  He has borderline diabetes mellitus with a hemoglobin A1c of 6.07 and has not required medications in the past.  Unfortunately he did not start the Jardiance as he was very hesitant to do so given his intolerance to medications in the past.  He denies chest pain, worsening dyspnea, orthopnea, palpitations or syncope.  He does have lower extremity edema which he reports is in \"good shape\" for him.    Review of Systems   Constitution: Negative for malaise/fatigue.   Eyes: Negative for vision loss in left eye and vision loss in right eye.   Cardiovascular: Negative for chest pain, dyspnea on exertion, near-syncope, orthopnea, palpitations, paroxysmal " nocturnal dyspnea and syncope.   Musculoskeletal: Negative for myalgias.   Neurological: Negative for brief paralysis, excessive daytime sleepiness, focal weakness, numbness, paresthesias and weakness.   All other systems reviewed and are negative.      The patient's past medical, social, family history and ROS reviewed in the patient's electronic medical record.    Allergies   Allergen Reactions   • Bisoprolol Other (See Comments)     Severe Hypotension   • Flecainide Other (See Comments)     Syncope / collapse   • Metoprolol Other (See Comments)      Bradycardia, Severe Hypotension   • Tikosyn [Dofetilide] Other (See Comments)     Wide complex tachycardia         Current Outpatient Medications:   •  acetaminophen (TYLENOL) 325 MG tablet, Take 650 mg by mouth Daily., Disp: , Rfl:   •  apixaban (Eliquis) 5 MG tablet tablet, Take 1 tablet by mouth Every 12 (Twelve) Hours., Disp: 180 tablet, Rfl: 3  •  bumetanide (BUMEX) 2 MG tablet, Take 1 tablet by mouth Every Morning. On less busy days at work, Disp: 90 tablet, Rfl: 1  •  diphenhydrAMINE-acetaminophen (TYLENOL PM)  MG tablet per tablet, Take 1 tablet by mouth Every Night., Disp: , Rfl:   •  metOLazone (ZAROXOLYN) 2.5 MG tablet, Take 1 tablet by mouth 2 (Two) Times a Week. WEEKENDS (Patient taking differently: Take 2.5 mg by mouth. WEEKENDS), Disp: 20 tablet, Rfl: 2  •  potassium chloride (K-DUR) 10 MEQ CR tablet, Take 3 tablets by mouth Daily., Disp: 270 tablet, Rfl: 3  •  rosuvastatin (CRESTOR) 20 MG tablet, Take 1 tablet by mouth Daily., Disp: 90 tablet, Rfl: 3  •  spironolactone (Aldactone) 100 MG tablet, Take 1 tablet by mouth Daily., Disp: 30 tablet, Rfl: 5    Past Medical History:   Diagnosis Date   • Acute diastolic HF (heart failure) (CMS/HCC)    • Acute hypokalemia    • Arrhythmia    • Back injury     Fall on 12/20/17   • Cellulitis of leg    • Chest pain syndrome    • CHF (congestive heart failure) (CMS/HCC)    • Diabetes mellitus (CMS/[facility])      "patient reports borderline    • Hyperlipidemia    • Hypertension    • Muscle pain     around back   • Obesity    • Obstructive sleep apnea     right now patient is sleeeping in recliner and does not use-when he lies down he will have to use cpap   • Paroxysmal A-fib (CMS/HCC)    • Peripheral artery disease (CMS/HCC)    • Spondylosis of 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 or radiculopathy 2/7/2018   • Wears glasses        Past Surgical History:   Procedure Laterality Date   • CARDIAC CATHETERIZATION     • CARDIOVERSION      multiple   • COLONOSCOPY      about 14 years ago   • KYPHOPLASTY N/A 3/5/2018    Procedure: KYPHOPLASTY L4;  Surgeon: Akshat Davidson MD;  Location:  [facility];  Service:    • KYPHOPLASTY N/A 4/16/2018    Procedure: KYPHOPLASTY L1 AND L2;  Surgeon: Akshat Davidson MD;  Location:  [facility];  Service: Neurosurgery   • OTHER SURGICAL HISTORY  06/29/2015    PVA       Family History   Problem Relation Age of Onset   • Hypertension Mother    • Stroke Mother    • Stroke Father    • Sleep disorder Father    • Alzheimer's disease Father    • Prostate cancer Brother        Social History     Tobacco Use   • Smoking status: Never Smoker   • Smokeless tobacco: Never Used   Substance Use Topics   • Alcohol use: No           Blood pressure 122/84, pulse 89, height 177.8 cm (70\"), weight 129 kg (285 lb), SpO2 96 %.  Body mass index is 40.89 kg/m².  Vitals:    11/24/20 0903   Patient Position: Sitting       Constitutional:       Appearance: Healthy appearance. Well-developed.   Eyes:      General: Lids are normal. No scleral icterus.     Conjunctiva/sclera: Conjunctivae normal.   HENT:      Head: Normocephalic and atraumatic.   Neck:      Musculoskeletal: Normal range of motion.      Thyroid: No thyromegaly.      Vascular: No carotid bruit or JVD.   Pulmonary:      Effort: Pulmonary effort is normal.      Breath sounds: Normal breath sounds. No wheezing. No rhonchi. No rales.   Cardiovascular:      Normal rate. " Regular rhythm.      Murmurs: There is no murmur.      No gallop. No rub.   Pulses:     Intact distal pulses.   Edema:     Peripheral edema absent.   Abdominal:      General: There is no distension.      Palpations: Abdomen is soft. There is no abdominal mass.   Skin:     General: Skin is warm and dry.      Findings: No rash.   Neurological:      General: No focal deficit present.      Mental Status: Alert and oriented to person, place, and time.      Gait: Gait is intact.   Psychiatric:         Attention and Perception: Attention normal.         Mood and Affect: Mood normal.         Behavior: Behavior normal.         Data Review (reviewed with patient):     Procedures    Lab Results   Component Value Date    GLUCOSE 107 (H) 11/05/2020    BUN 18 11/05/2020    CREATININE 1.21 11/05/2020    EGFRIFNONA 61 11/05/2020    EGFRIFAFRI 92 02/08/2018    BCR 14.9 11/05/2020    K 4.5 11/05/2020    CO2 27.9 11/05/2020    CALCIUM 9.7 11/05/2020    ALBUMIN 4.10 11/05/2020    ALKPHOS 250 (H) 07/20/2018    AST 43 (H) 07/20/2018    ALT 19 07/20/2018      Lab Results   Component Value Date    CHOL 143 12/13/2019    CHLPL 138 05/09/2017    TRIG 60 12/13/2019    HDL 75 (H) 12/13/2019    LDL 56 12/13/2019     Lab Results   Component Value Date    HGBA1C 6.07 (H) 12/13/2019     Lab Results   Component Value Date    WBC 5.38 07/19/2018    HGB 13.6 07/19/2018    HCT 42.4 07/19/2018    MCV 98.8 07/19/2018     07/19/2018     Lab Results   Component Value Date    TSH 5.612 (H) 07/20/2018            Problem List Items Addressed This Visit        Cardiovascular and Mediastinum    Chronic diastolic heart failure (CMS/HCC)    Overview     · Cardiac cath (02/20/14):  elevated LVEDP suggesting diastolic heart failure.  · Intolerant to beta blocker therapy         Current Assessment & Plan     · Stable NYHA class II  · Continue Bumex 2 mg daily  · Continue metolazone 2.5 mg 2 times weekly         Coronary artery disease involving native  coronary artery of native heart with angina pectoris (CMS/[facility])    Overview     · Cardiac catheterization (02/20/14): mild nonobstructive CAD. LVEF 55%, elevated LVEDP suggesting diastolic heart failure.         Current Assessment & Plan     · No signs or symptoms of angina  · Defer aspirin due to chronic anticoagulation with Eliquis         Essential hypertension    Current Assessment & Plan     · Hypertension is controlled  · Continue spironolactone 100 mg daily         Hyperlipidemia LDL goal <70    Overview     · High intensity statin therapy indicated given presence of coronary artery disease         Current Assessment & Plan     · Continue Crestor 20 mg daily.         Persistent atrial fibrillation/flutter - Primary    Overview     · Diagnosed 2011  · [id number] 3 (HTN, CAD, DM)  · Echo (10/11/2011): Normal LVEF, mild MR, mild LA dilation  · LOLY/ECVcardioversion, 12/21/2011, with initiation of propafenone therapy.   · Successful LOLY/ECV for recurrent atrial fibrillation, 11/15/2013  · PVA with Dr. Cary, 6/29/2015  · Cardioversion (07/17/2015) for atrial flutter occurring post ablation   · ECV for recurrent atrial flutter, 12/20/17 with reattempt at beta blocker/flecainide.  · Intolerant to beta blocker/flecanide with severe hypotension, intolerant to propafenone  · Tikosyn admission with development of wide complex tachycardia, 7/2018         Current Assessment & Plan     · Patient intolerant to beta-blocker/flecainide due to severe hypotension.  Also intolerant to Tikosyn  · Continue Eliquis 5 mg twice daily  · No signs or symptoms TIA or CVA             Patient has stable NYHA class II heart failure symptoms.  I have suggested he weigh himself daily as he has not been doing that.  For weight gain of 1 to 2 pounds in 24 to 48 hours he should take a extra dose of metolazone.  We will continue his current medications.  He will need a lipid profile and he can get that at his follow-up visit with his PCP.  He  will follow-up in 12 months or sooner if needed.       · Daily weights and take extra dose of metolazone if gains 1 to 2 pounds in 24 to 48 hours  · Obtain lipid panel at next follow-up with PCP  Return in about 1 year (around 11/24/2021), or if symptoms worsen or fail to improve.      Courtney Carmichael, APRN  11/24/2020

## 2020-11-24 ENCOUNTER — OFFICE VISIT (OUTPATIENT)
Dept: CARDIOLOGY | Facility: CLINIC | Age: 65
End: 2020-11-24

## 2020-11-24 VITALS
OXYGEN SATURATION: 96 % | WEIGHT: 285 LBS | BODY MASS INDEX: 40.8 KG/M2 | DIASTOLIC BLOOD PRESSURE: 84 MMHG | SYSTOLIC BLOOD PRESSURE: 122 MMHG | HEIGHT: 70 IN | HEART RATE: 89 BPM

## 2020-11-24 DIAGNOSIS — I10 ESSENTIAL HYPERTENSION: ICD-10-CM

## 2020-11-24 DIAGNOSIS — E78.5 HYPERLIPIDEMIA LDL GOAL <70: ICD-10-CM

## 2020-11-24 DIAGNOSIS — I25.119 CORONARY ARTERY DISEASE INVOLVING NATIVE CORONARY ARTERY OF NATIVE HEART WITH ANGINA PECTORIS (HCC): ICD-10-CM

## 2020-11-24 DIAGNOSIS — I50.32 CHRONIC DIASTOLIC HEART FAILURE (HCC): ICD-10-CM

## 2020-11-24 DIAGNOSIS — I48.19 PERSISTENT ATRIAL FIBRILLATION (HCC): Primary | ICD-10-CM

## 2020-11-24 PROCEDURE — 99214 OFFICE O/P EST MOD 30 MIN: CPT | Performed by: NURSE PRACTITIONER

## 2020-11-24 NOTE — ASSESSMENT & PLAN NOTE
· No signs or symptoms of angina  · Defer aspirin due to chronic anticoagulation with Eliquis   [facility]

## 2020-11-24 NOTE — ASSESSMENT & PLAN NOTE
· Patient intolerant to beta-blocker/flecainide due to severe hypotension.  Also intolerant to Tikosyn  · Continue Eliquis 5 mg twice daily  · No signs or symptoms TIA or CVA

## 2020-12-11 DIAGNOSIS — I50.32 CHRONIC DIASTOLIC HEART FAILURE (HCC): ICD-10-CM

## 2020-12-11 RX ORDER — POTASSIUM CHLORIDE 750 MG/1
30 TABLET, FILM COATED, EXTENDED RELEASE ORAL DAILY
Qty: 90 TABLET | Refills: 3 | Status: SHIPPED | OUTPATIENT
Start: 2020-12-11 | End: 2021-01-01 | Stop reason: SDUPTHER

## 2021-01-01 ENCOUNTER — HOSPITAL ENCOUNTER (OUTPATIENT)
Dept: PHYSICAL THERAPY | Facility: HOSPITAL | Age: 66
Setting detail: THERAPIES SERIES
Discharge: HOME | End: 2021-03-27

## 2021-01-01 ENCOUNTER — OFFICE VISIT (OUTPATIENT)
Dept: CARDIOLOGY | Facility: HOSPITAL | Age: 66
End: 2021-01-01

## 2021-01-01 ENCOUNTER — HOSPITAL ENCOUNTER (INPATIENT)
Facility: HOSPITAL | Age: 66
LOS: 5 days | Discharge: HOME | End: 2021-12-21
Attending: EMERGENCY MEDICINE | Admitting: INTERNAL MEDICINE

## 2021-01-01 ENCOUNTER — HOSPITAL ENCOUNTER (OUTPATIENT)
Dept: PHYSICAL THERAPY | Facility: HOSPITAL | Age: 66
Setting detail: THERAPIES SERIES
Discharge: HOME | End: 2021-04-10

## 2021-01-01 ENCOUNTER — HOSPITAL ENCOUNTER (OUTPATIENT)
Dept: PHYSICAL THERAPY | Facility: HOSPITAL | Age: 66
Setting detail: THERAPIES SERIES
Discharge: HOME | End: 2021-04-24

## 2021-01-01 ENCOUNTER — OFFICE VISIT (OUTPATIENT)
Dept: FAMILY MEDICINE CLINIC | Facility: CLINIC | Age: 66
End: 2021-01-01

## 2021-01-01 ENCOUNTER — TRANSITIONAL CARE MANAGEMENT TELEPHONE ENCOUNTER (OUTPATIENT)
Dept: CALL CENTER | Facility: HOSPITAL | Age: 66
End: 2021-01-01

## 2021-01-01 ENCOUNTER — HOSPITAL ENCOUNTER (OUTPATIENT)
Dept: PHYSICAL THERAPY | Facility: HOSPITAL | Age: 66
Setting detail: THERAPIES SERIES
Discharge: HOME | End: 2021-05-22

## 2021-01-01 ENCOUNTER — TELEPHONE (OUTPATIENT)
Dept: CARDIOLOGY | Facility: HOSPITAL | Age: 66
End: 2021-01-01

## 2021-01-01 ENCOUNTER — HOSPITAL ENCOUNTER (OUTPATIENT)
Dept: PHYSICAL THERAPY | Facility: HOSPITAL | Age: 66
Setting detail: THERAPIES SERIES
Discharge: HOME | End: 2021-03-04

## 2021-01-01 ENCOUNTER — APPOINTMENT (OUTPATIENT)
Dept: GENERAL RADIOLOGY | Facility: HOSPITAL | Age: 66
End: 2021-01-01

## 2021-01-01 ENCOUNTER — HOSPITAL ENCOUNTER (OUTPATIENT)
Dept: CARDIOLOGY | Facility: HOSPITAL | Age: 66
Setting detail: HOSPITAL OUTPATIENT SURGERY
Discharge: HOME | End: 2021-11-18
Attending: INTERNAL MEDICINE | Admitting: NURSE PRACTITIONER

## 2021-01-01 ENCOUNTER — TELEPHONE (OUTPATIENT)
Dept: FAMILY MEDICINE CLINIC | Facility: CLINIC | Age: 66
End: 2021-01-01

## 2021-01-01 ENCOUNTER — LAB (OUTPATIENT)
Dept: LAB | Facility: HOSPITAL | Age: 66
End: 2021-01-01

## 2021-01-01 ENCOUNTER — HOSPITAL ENCOUNTER (OUTPATIENT)
Dept: PHYSICAL THERAPY | Facility: HOSPITAL | Age: 66
Setting detail: THERAPIES SERIES
Discharge: HOME | End: 2021-03-13

## 2021-01-01 ENCOUNTER — OFFICE VISIT (OUTPATIENT)
Dept: BARIATRICS/WEIGHT MGMT | Facility: CLINIC | Age: 66
End: 2021-01-01

## 2021-01-01 ENCOUNTER — APPOINTMENT (OUTPATIENT)
Dept: CARDIOLOGY | Facility: HOSPITAL | Age: 66
End: 2021-01-01

## 2021-01-01 ENCOUNTER — DOCUMENTATION (OUTPATIENT)
Dept: PHYSICAL THERAPY | Facility: HOSPITAL | Age: 66
End: 2021-01-01

## 2021-01-01 ENCOUNTER — APPOINTMENT (OUTPATIENT)
Dept: PHYSICAL THERAPY | Facility: HOSPITAL | Age: 66
End: 2021-01-01

## 2021-01-01 ENCOUNTER — TRANSCRIBE ORDERS (OUTPATIENT)
Dept: LAB | Facility: HOSPITAL | Age: 66
End: 2021-01-01

## 2021-01-01 ENCOUNTER — HOSPITAL ENCOUNTER (OUTPATIENT)
Dept: PHYSICAL THERAPY | Facility: HOSPITAL | Age: 66
Setting detail: THERAPIES SERIES
Discharge: HOME | End: 2021-03-20

## 2021-01-01 ENCOUNTER — OFFICE VISIT (OUTPATIENT)
Dept: CARDIOLOGY | Facility: CLINIC | Age: 66
End: 2021-01-01

## 2021-01-01 ENCOUNTER — HOSPITAL ENCOUNTER (OUTPATIENT)
Dept: PHYSICAL THERAPY | Facility: HOSPITAL | Age: 66
Setting detail: THERAPIES SERIES
Discharge: HOME | End: 2021-04-03

## 2021-01-01 ENCOUNTER — HOSPITAL ENCOUNTER (OUTPATIENT)
Dept: PHYSICAL THERAPY | Facility: HOSPITAL | Age: 66
Setting detail: THERAPIES SERIES
Discharge: HOME | End: 2021-05-08

## 2021-01-01 ENCOUNTER — PREP FOR SURGERY (OUTPATIENT)
Dept: OTHER | Facility: HOSPITAL | Age: 66
End: 2021-01-01

## 2021-01-01 ENCOUNTER — READMISSION MANAGEMENT (OUTPATIENT)
Dept: CALL CENTER | Facility: HOSPITAL | Age: 66
End: 2021-01-01

## 2021-01-01 ENCOUNTER — HOSPITAL ENCOUNTER (OUTPATIENT)
Dept: PHYSICAL THERAPY | Facility: HOSPITAL | Age: 66
Setting detail: THERAPIES SERIES
Discharge: HOME | End: 2021-06-05

## 2021-01-01 ENCOUNTER — CONSULT (OUTPATIENT)
Dept: CARDIOLOGY | Facility: CLINIC | Age: 66
End: 2021-01-01

## 2021-01-01 ENCOUNTER — TELEPHONE (OUTPATIENT)
Dept: CARDIOLOGY | Facility: CLINIC | Age: 66
End: 2021-01-01

## 2021-01-01 VITALS
OXYGEN SATURATION: 98 % | BODY MASS INDEX: 44.29 KG/M2 | SYSTOLIC BLOOD PRESSURE: 132 MMHG | HEART RATE: 97 BPM | HEIGHT: 66 IN | DIASTOLIC BLOOD PRESSURE: 72 MMHG | WEIGHT: 275.6 LBS

## 2021-01-01 VITALS
BODY MASS INDEX: 44.36 KG/M2 | HEART RATE: 129 BPM | DIASTOLIC BLOOD PRESSURE: 83 MMHG | WEIGHT: 299.5 LBS | TEMPERATURE: 98 F | SYSTOLIC BLOOD PRESSURE: 136 MMHG | OXYGEN SATURATION: 95 % | HEIGHT: 69 IN | RESPIRATION RATE: 20 BRPM

## 2021-01-01 VITALS
HEIGHT: 69 IN | HEART RATE: 81 BPM | WEIGHT: 289.25 LBS | DIASTOLIC BLOOD PRESSURE: 75 MMHG | TEMPERATURE: 97 F | BODY MASS INDEX: 42.84 KG/M2 | OXYGEN SATURATION: 91 % | SYSTOLIC BLOOD PRESSURE: 129 MMHG | RESPIRATION RATE: 22 BRPM

## 2021-01-01 VITALS
SYSTOLIC BLOOD PRESSURE: 125 MMHG | BODY MASS INDEX: 44.86 KG/M2 | RESPIRATION RATE: 20 BRPM | WEIGHT: 279.13 LBS | OXYGEN SATURATION: 97 % | HEIGHT: 66 IN | DIASTOLIC BLOOD PRESSURE: 87 MMHG | HEART RATE: 120 BPM | TEMPERATURE: 97.9 F

## 2021-01-01 VITALS
BODY MASS INDEX: 43.78 KG/M2 | WEIGHT: 295.6 LBS | HEART RATE: 88 BPM | DIASTOLIC BLOOD PRESSURE: 82 MMHG | HEIGHT: 69 IN | SYSTOLIC BLOOD PRESSURE: 127 MMHG | OXYGEN SATURATION: 96 %

## 2021-01-01 VITALS
HEART RATE: 130 BPM | BODY MASS INDEX: 48.06 KG/M2 | OXYGEN SATURATION: 98 % | DIASTOLIC BLOOD PRESSURE: 74 MMHG | SYSTOLIC BLOOD PRESSURE: 124 MMHG | HEIGHT: 66 IN

## 2021-01-01 VITALS
HEART RATE: 96 BPM | BODY MASS INDEX: 43.55 KG/M2 | SYSTOLIC BLOOD PRESSURE: 124 MMHG | HEIGHT: 69 IN | WEIGHT: 294 LBS | OXYGEN SATURATION: 97 % | DIASTOLIC BLOOD PRESSURE: 82 MMHG

## 2021-01-01 VITALS
SYSTOLIC BLOOD PRESSURE: 110 MMHG | BODY MASS INDEX: 43.01 KG/M2 | HEIGHT: 69 IN | TEMPERATURE: 97.7 F | OXYGEN SATURATION: 93 % | HEART RATE: 84 BPM | WEIGHT: 290.4 LBS | RESPIRATION RATE: 18 BRPM | DIASTOLIC BLOOD PRESSURE: 78 MMHG

## 2021-01-01 VITALS
SYSTOLIC BLOOD PRESSURE: 115 MMHG | HEART RATE: 126 BPM | TEMPERATURE: 98 F | WEIGHT: 303.6 LBS | BODY MASS INDEX: 44.97 KG/M2 | OXYGEN SATURATION: 95 % | DIASTOLIC BLOOD PRESSURE: 80 MMHG | HEIGHT: 69 IN

## 2021-01-01 VITALS
HEART RATE: 125 BPM | TEMPERATURE: 97.5 F | DIASTOLIC BLOOD PRESSURE: 91 MMHG | BODY MASS INDEX: 46.95 KG/M2 | RESPIRATION RATE: 20 BRPM | WEIGHT: 292.13 LBS | HEIGHT: 66 IN | SYSTOLIC BLOOD PRESSURE: 141 MMHG | OXYGEN SATURATION: 96 %

## 2021-01-01 VITALS
SYSTOLIC BLOOD PRESSURE: 128 MMHG | BODY MASS INDEX: 44.84 KG/M2 | HEIGHT: 66 IN | HEART RATE: 104 BPM | DIASTOLIC BLOOD PRESSURE: 78 MMHG | OXYGEN SATURATION: 96 % | WEIGHT: 279 LBS

## 2021-01-01 VITALS
TEMPERATURE: 98.2 F | WEIGHT: 315 LBS | HEART RATE: 116 BPM | SYSTOLIC BLOOD PRESSURE: 124 MMHG | OXYGEN SATURATION: 94 % | DIASTOLIC BLOOD PRESSURE: 75 MMHG | RESPIRATION RATE: 20 BRPM | BODY MASS INDEX: 46.65 KG/M2 | HEIGHT: 69 IN

## 2021-01-01 VITALS
WEIGHT: 302 LBS | BODY MASS INDEX: 44.73 KG/M2 | SYSTOLIC BLOOD PRESSURE: 114 MMHG | OXYGEN SATURATION: 95 % | DIASTOLIC BLOOD PRESSURE: 84 MMHG | HEART RATE: 96 BPM | HEIGHT: 69 IN

## 2021-01-01 VITALS
WEIGHT: 277.5 LBS | DIASTOLIC BLOOD PRESSURE: 75 MMHG | HEART RATE: 122 BPM | BODY MASS INDEX: 44.6 KG/M2 | HEIGHT: 66 IN | SYSTOLIC BLOOD PRESSURE: 111 MMHG | TEMPERATURE: 97.5 F | OXYGEN SATURATION: 98 % | RESPIRATION RATE: 16 BRPM

## 2021-01-01 VITALS
HEART RATE: 104 BPM | DIASTOLIC BLOOD PRESSURE: 84 MMHG | OXYGEN SATURATION: 97 % | RESPIRATION RATE: 20 BRPM | WEIGHT: 293.38 LBS | TEMPERATURE: 97.7 F | SYSTOLIC BLOOD PRESSURE: 125 MMHG | BODY MASS INDEX: 47.15 KG/M2 | HEIGHT: 66 IN

## 2021-01-01 VITALS
HEIGHT: 66 IN | SYSTOLIC BLOOD PRESSURE: 129 MMHG | OXYGEN SATURATION: 98 % | DIASTOLIC BLOOD PRESSURE: 94 MMHG | BODY MASS INDEX: 43.79 KG/M2 | TEMPERATURE: 97.5 F | HEART RATE: 131 BPM | RESPIRATION RATE: 16 BRPM | WEIGHT: 272.5 LBS

## 2021-01-01 DIAGNOSIS — I48.92 ATRIAL FLUTTER WITH RAPID VENTRICULAR RESPONSE (HCC): Primary | ICD-10-CM

## 2021-01-01 DIAGNOSIS — E78.5 HYPERLIPIDEMIA LDL GOAL <70: ICD-10-CM

## 2021-01-01 DIAGNOSIS — I50.32 CHRONIC DIASTOLIC HEART FAILURE (HCC): ICD-10-CM

## 2021-01-01 DIAGNOSIS — S81.802D OPEN WOUND OF LEFT LOWER EXTREMITY, SUBSEQUENT ENCOUNTER: ICD-10-CM

## 2021-01-01 DIAGNOSIS — I10 ESSENTIAL HYPERTENSION: ICD-10-CM

## 2021-01-01 DIAGNOSIS — I83.012 VENOUS STASIS ULCER OF RIGHT CALF WITH VARICOSE VEINS, UNSPECIFIED ULCER STAGE (HCC): ICD-10-CM

## 2021-01-01 DIAGNOSIS — R73.03 PREDIABETES: ICD-10-CM

## 2021-01-01 DIAGNOSIS — M79.10 MYALGIA: ICD-10-CM

## 2021-01-01 DIAGNOSIS — S81.801D OPEN WOUND OF RIGHT LOWER EXTREMITY, SUBSEQUENT ENCOUNTER: Primary | ICD-10-CM

## 2021-01-01 DIAGNOSIS — E11.9 TYPE 2 DIABETES MELLITUS WITHOUT COMPLICATION, WITHOUT LONG-TERM CURRENT USE OF INSULIN (HCC): ICD-10-CM

## 2021-01-01 DIAGNOSIS — S81.801D OPEN WOUND OF RIGHT LOWER EXTREMITY, SUBSEQUENT ENCOUNTER: ICD-10-CM

## 2021-01-01 DIAGNOSIS — I48.11 LONGSTANDING PERSISTENT ATRIAL FIBRILLATION (HCC): ICD-10-CM

## 2021-01-01 DIAGNOSIS — I87.2 CHRONIC VENOUS INSUFFICIENCY: ICD-10-CM

## 2021-01-01 DIAGNOSIS — R60.0 BILATERAL LOWER EXTREMITY EDEMA: ICD-10-CM

## 2021-01-01 DIAGNOSIS — E66.01 CLASS 3 SEVERE OBESITY DUE TO EXCESS CALORIES WITH SERIOUS COMORBIDITY AND BODY MASS INDEX (BMI) OF 40.0 TO 44.9 IN ADULT (HCC): Primary | ICD-10-CM

## 2021-01-01 DIAGNOSIS — I50.9 ACUTE ON CHRONIC CONGESTIVE HEART FAILURE, UNSPECIFIED HEART FAILURE TYPE (HCC): ICD-10-CM

## 2021-01-01 DIAGNOSIS — E87.70 HYPERVOLEMIA, UNSPECIFIED HYPERVOLEMIA TYPE: ICD-10-CM

## 2021-01-01 DIAGNOSIS — N18.30 STAGE 3 CHRONIC KIDNEY DISEASE, UNSPECIFIED WHETHER STAGE 3A OR 3B CKD (HCC): ICD-10-CM

## 2021-01-01 DIAGNOSIS — I50.33 ACUTE ON CHRONIC DIASTOLIC CONGESTIVE HEART FAILURE (HCC): ICD-10-CM

## 2021-01-01 DIAGNOSIS — I48.19 PERSISTENT ATRIAL FIBRILLATION (HCC): ICD-10-CM

## 2021-01-01 DIAGNOSIS — N18.30 STAGE 3 CHRONIC KIDNEY DISEASE, UNSPECIFIED WHETHER STAGE 3A OR 3B CKD (HCC): Primary | ICD-10-CM

## 2021-01-01 DIAGNOSIS — L97.219 VENOUS STASIS ULCER OF RIGHT CALF WITH VARICOSE VEINS, UNSPECIFIED ULCER STAGE (HCC): ICD-10-CM

## 2021-01-01 DIAGNOSIS — I83.012 VENOUS STASIS ULCER OF RIGHT CALF LIMITED TO BREAKDOWN OF SKIN, UNSPECIFIED WHETHER VARICOSE VEINS PRESENT (HCC): Primary | ICD-10-CM

## 2021-01-01 DIAGNOSIS — I48.19 PERSISTENT ATRIAL FIBRILLATION (HCC): Primary | Chronic | ICD-10-CM

## 2021-01-01 DIAGNOSIS — E55.9 AVITAMINOSIS D: ICD-10-CM

## 2021-01-01 DIAGNOSIS — L97.819 VENOUS STASIS ULCER OF OTHER PART OF RIGHT LOWER LEG, UNSPECIFIED ULCER STAGE, UNSPECIFIED WHETHER VARICOSE VEINS PRESENT (HCC): ICD-10-CM

## 2021-01-01 DIAGNOSIS — I50.33 ACUTE ON CHRONIC DIASTOLIC HEART FAILURE (HCC): Primary | ICD-10-CM

## 2021-01-01 DIAGNOSIS — R30.0 DYSURIA: ICD-10-CM

## 2021-01-01 DIAGNOSIS — J90 PLEURAL EFFUSION ON RIGHT: ICD-10-CM

## 2021-01-01 DIAGNOSIS — Z51.81 ENCOUNTER FOR THERAPEUTIC DRUG MONITORING: ICD-10-CM

## 2021-01-01 DIAGNOSIS — N28.9 ACUTE RENAL INSUFFICIENCY: ICD-10-CM

## 2021-01-01 DIAGNOSIS — J81.0 ACUTE PULMONARY EDEMA (HCC): ICD-10-CM

## 2021-01-01 DIAGNOSIS — I48.19 PERSISTENT ATRIAL FIBRILLATION (HCC): Primary | ICD-10-CM

## 2021-01-01 DIAGNOSIS — I83.018 VENOUS STASIS ULCER OF OTHER PART OF RIGHT LOWER LEG, UNSPECIFIED ULCER STAGE, UNSPECIFIED WHETHER VARICOSE VEINS PRESENT (HCC): ICD-10-CM

## 2021-01-01 DIAGNOSIS — R06.00 DYSPNEA, UNSPECIFIED TYPE: ICD-10-CM

## 2021-01-01 DIAGNOSIS — I51.7 CARDIOMEGALY: ICD-10-CM

## 2021-01-01 DIAGNOSIS — I48.92 ATRIAL FLUTTER WITH RAPID VENTRICULAR RESPONSE (HCC): ICD-10-CM

## 2021-01-01 DIAGNOSIS — I50.32 CHRONIC DIASTOLIC HEART FAILURE (HCC): Primary | ICD-10-CM

## 2021-01-01 DIAGNOSIS — S81.801D MULTIPLE OPEN WOUNDS OF RIGHT LOWER EXTREMITY, SUBSEQUENT ENCOUNTER: ICD-10-CM

## 2021-01-01 DIAGNOSIS — I87.2 VENOUS STASIS DERMATITIS OF BOTH LOWER EXTREMITIES: Primary | ICD-10-CM

## 2021-01-01 DIAGNOSIS — M79.10 MYALGIA: Primary | ICD-10-CM

## 2021-01-01 DIAGNOSIS — R60.0 BILATERAL LOWER EXTREMITY EDEMA: Primary | ICD-10-CM

## 2021-01-01 DIAGNOSIS — I48.11 LONGSTANDING PERSISTENT ATRIAL FIBRILLATION (HCC): Primary | ICD-10-CM

## 2021-01-01 DIAGNOSIS — L97.211 VENOUS STASIS ULCER OF RIGHT CALF LIMITED TO BREAKDOWN OF SKIN, UNSPECIFIED WHETHER VARICOSE VEINS PRESENT (HCC): Primary | ICD-10-CM

## 2021-01-01 LAB
25(OH)D3 SERPL-MCNC: 10.5 NG/ML (ref 30–100)
ALBUMIN SERPL-MCNC: 3.3 G/DL (ref 3.5–5.2)
ALBUMIN SERPL-MCNC: 3.5 G/DL (ref 3.5–5.2)
ALBUMIN SERPL-MCNC: 3.7 G/DL (ref 3.5–5.2)
ALBUMIN/GLOB SERPL: 0.7 G/DL
ALBUMIN/GLOB SERPL: 0.8 G/DL
ALP SERPL-CCNC: 166 U/L (ref 39–117)
ALP SERPL-CCNC: 171 U/L (ref 39–117)
ALT SERPL W P-5'-P-CCNC: 18 U/L (ref 1–41)
ALT SERPL W P-5'-P-CCNC: 20 U/L (ref 1–41)
ANION GAP SERPL CALCULATED.3IONS-SCNC: 10 MMOL/L (ref 5–15)
ANION GAP SERPL CALCULATED.3IONS-SCNC: 12 MMOL/L (ref 5–15)
ANION GAP SERPL CALCULATED.3IONS-SCNC: 12.4 MMOL/L (ref 5–15)
ANION GAP SERPL CALCULATED.3IONS-SCNC: 13 MMOL/L (ref 5–15)
ANION GAP SERPL CALCULATED.3IONS-SCNC: 13.4 MMOL/L (ref 5–15)
ANION GAP SERPL CALCULATED.3IONS-SCNC: 14 MMOL/L (ref 5–15)
ANION GAP SERPL CALCULATED.3IONS-SCNC: 19 MMOL/L (ref 5–15)
AST SERPL-CCNC: 39 U/L (ref 1–40)
AST SERPL-CCNC: 43 U/L (ref 1–40)
BACTERIA SPEC AEROBE CULT: ABNORMAL
BACTERIA UR QL AUTO: ABNORMAL /HPF
BACTERIA UR QL AUTO: ABNORMAL /HPF
BASOPHILS # BLD AUTO: 0.04 10*3/MM3 (ref 0–0.2)
BASOPHILS # BLD AUTO: 0.05 10*3/MM3 (ref 0–0.2)
BASOPHILS NFR BLD AUTO: 0.8 % (ref 0–1.5)
BASOPHILS NFR BLD AUTO: 1 % (ref 0–1.5)
BILIRUB SERPL-MCNC: 5 MG/DL (ref 0–1.2)
BILIRUB SERPL-MCNC: 5.5 MG/DL (ref 0–1.2)
BILIRUB UR QL STRIP: NEGATIVE
BILIRUB UR QL STRIP: NEGATIVE
BUN SERPL-MCNC: 12 MG/DL (ref 8–23)
BUN SERPL-MCNC: 13 MG/DL (ref 8–23)
BUN SERPL-MCNC: 14 MG/DL (ref 8–23)
BUN SERPL-MCNC: 15 MG/DL (ref 8–23)
BUN SERPL-MCNC: 15 MG/DL (ref 8–23)
BUN SERPL-MCNC: 24 MG/DL (ref 8–23)
BUN SERPL-MCNC: 25 MG/DL (ref 8–23)
BUN SERPL-MCNC: 25 MG/DL (ref 8–23)
BUN SERPL-MCNC: 27 MG/DL (ref 8–23)
BUN SERPL-MCNC: 28 MG/DL (ref 8–23)
BUN SERPL-MCNC: 30 MG/DL (ref 8–23)
BUN SERPL-MCNC: 44 MG/DL (ref 8–23)
BUN/CREAT SERPL: 10.9 (ref 7–25)
BUN/CREAT SERPL: 11.4 (ref 7–25)
BUN/CREAT SERPL: 14 (ref 7–25)
BUN/CREAT SERPL: 14.9 (ref 7–25)
BUN/CREAT SERPL: 16.5 (ref 7–25)
BUN/CREAT SERPL: 18.4 (ref 7–25)
BUN/CREAT SERPL: 18.4 (ref 7–25)
BUN/CREAT SERPL: 18.6 (ref 7–25)
BUN/CREAT SERPL: 19.4 (ref 7–25)
BUN/CREAT SERPL: 20.7 (ref 7–25)
BUN/CREAT SERPL: 22.5 (ref 7–25)
BUN/CREAT SERPL: 23.1 (ref 7–25)
BUN/CREAT SERPL: 26.7 (ref 7–25)
BUN/CREAT SERPL: 29.1 (ref 7–25)
CALCIUM SPEC-SCNC: 10 MG/DL (ref 8.6–10.5)
CALCIUM SPEC-SCNC: 10 MG/DL (ref 8.6–10.5)
CALCIUM SPEC-SCNC: 10.3 MG/DL (ref 8.6–10.5)
CALCIUM SPEC-SCNC: 10.4 MG/DL (ref 8.6–10.5)
CALCIUM SPEC-SCNC: 10.7 MG/DL (ref 8.6–10.5)
CALCIUM SPEC-SCNC: 8.7 MG/DL (ref 8.6–10.5)
CALCIUM SPEC-SCNC: 9.1 MG/DL (ref 8.6–10.5)
CALCIUM SPEC-SCNC: 9.4 MG/DL (ref 8.6–10.5)
CALCIUM SPEC-SCNC: 9.7 MG/DL (ref 8.6–10.5)
CALCIUM SPEC-SCNC: 9.8 MG/DL (ref 8.6–10.5)
CHLORIDE SERPL-SCNC: 100 MMOL/L (ref 98–107)
CHLORIDE SERPL-SCNC: 101 MMOL/L (ref 98–107)
CHLORIDE SERPL-SCNC: 101 MMOL/L (ref 98–107)
CHLORIDE SERPL-SCNC: 102 MMOL/L (ref 98–107)
CHLORIDE SERPL-SCNC: 85 MMOL/L (ref 98–107)
CHLORIDE SERPL-SCNC: 90 MMOL/L (ref 98–107)
CHLORIDE SERPL-SCNC: 92 MMOL/L (ref 98–107)
CHLORIDE SERPL-SCNC: 94 MMOL/L (ref 98–107)
CHLORIDE SERPL-SCNC: 95 MMOL/L (ref 98–107)
CHLORIDE SERPL-SCNC: 96 MMOL/L (ref 98–107)
CHLORIDE SERPL-SCNC: 96 MMOL/L (ref 98–107)
CHLORIDE SERPL-SCNC: 97 MMOL/L (ref 98–107)
CHLORIDE SERPL-SCNC: 97 MMOL/L (ref 98–107)
CHLORIDE SERPL-SCNC: 99 MMOL/L (ref 98–107)
CHOLEST SERPL-MCNC: 103 MG/DL (ref 0–200)
CLARITY UR: CLEAR
CLARITY UR: CLEAR
CO2 SERPL-SCNC: 21 MMOL/L (ref 22–29)
CO2 SERPL-SCNC: 21.6 MMOL/L (ref 22–29)
CO2 SERPL-SCNC: 22 MMOL/L (ref 22–29)
CO2 SERPL-SCNC: 22 MMOL/L (ref 22–29)
CO2 SERPL-SCNC: 22.6 MMOL/L (ref 22–29)
CO2 SERPL-SCNC: 23 MMOL/L (ref 22–29)
CO2 SERPL-SCNC: 24 MMOL/L (ref 22–29)
CO2 SERPL-SCNC: 25 MMOL/L (ref 22–29)
CO2 SERPL-SCNC: 25 MMOL/L (ref 22–29)
CO2 SERPL-SCNC: 26 MMOL/L (ref 22–29)
CO2 SERPL-SCNC: 28 MMOL/L (ref 22–29)
COLOR UR: ABNORMAL
COLOR UR: YELLOW
CREAT SERPL-MCNC: 0.86 MG/DL (ref 0.76–1.27)
CREAT SERPL-MCNC: 0.91 MG/DL (ref 0.76–1.27)
CREAT SERPL-MCNC: 0.94 MG/DL (ref 0.76–1.27)
CREAT SERPL-MCNC: 1.05 MG/DL (ref 0.76–1.27)
CREAT SERPL-MCNC: 1.07 MG/DL (ref 0.76–1.27)
CREAT SERPL-MCNC: 1.11 MG/DL (ref 0.76–1.27)
CREAT SERPL-MCNC: 1.16 MG/DL (ref 0.76–1.27)
CREAT SERPL-MCNC: 1.19 MG/DL (ref 0.76–1.27)
CREAT SERPL-MCNC: 1.3 MG/DL (ref 0.76–1.27)
CREAT SERPL-MCNC: 1.39 MG/DL (ref 0.76–1.27)
CREAT SERPL-MCNC: 1.45 MG/DL (ref 0.76–1.27)
CREAT SERPL-MCNC: 1.47 MG/DL (ref 0.76–1.27)
CREAT SERPL-MCNC: 1.52 MG/DL (ref 0.76–1.27)
CREAT SERPL-MCNC: 1.65 MG/DL (ref 0.76–1.27)
DEPRECATED RDW RBC AUTO: 47.1 FL (ref 37–54)
DEPRECATED RDW RBC AUTO: 66.1 FL (ref 37–54)
DEPRECATED RDW RBC AUTO: 66.8 FL (ref 37–54)
EOSINOPHIL # BLD AUTO: 0.12 10*3/MM3 (ref 0–0.4)
EOSINOPHIL # BLD AUTO: 0.15 10*3/MM3 (ref 0–0.4)
EOSINOPHIL NFR BLD AUTO: 2.3 % (ref 0.3–6.2)
EOSINOPHIL NFR BLD AUTO: 3.2 % (ref 0.3–6.2)
ERYTHROCYTE [DISTWIDTH] IN BLOOD BY AUTOMATED COUNT: 13.3 % (ref 12.3–15.4)
ERYTHROCYTE [DISTWIDTH] IN BLOOD BY AUTOMATED COUNT: 17.9 % (ref 12.3–15.4)
ERYTHROCYTE [DISTWIDTH] IN BLOOD BY AUTOMATED COUNT: 18.4 % (ref 12.3–15.4)
EXPIRATION DATE: NORMAL
GFR SERPL CREATININE-BSD FRML MDRD: 42 ML/MIN/1.73
GFR SERPL CREATININE-BSD FRML MDRD: 46 ML/MIN/1.73
GFR SERPL CREATININE-BSD FRML MDRD: 48 ML/MIN/1.73
GFR SERPL CREATININE-BSD FRML MDRD: 49 ML/MIN/1.73
GFR SERPL CREATININE-BSD FRML MDRD: 51 ML/MIN/1.73
GFR SERPL CREATININE-BSD FRML MDRD: 56 ML/MIN/1.73
GFR SERPL CREATININE-BSD FRML MDRD: 62 ML/MIN/1.73
GFR SERPL CREATININE-BSD FRML MDRD: 63 ML/MIN/1.73
GFR SERPL CREATININE-BSD FRML MDRD: 67 ML/MIN/1.73
GFR SERPL CREATININE-BSD FRML MDRD: 70 ML/MIN/1.73
GFR SERPL CREATININE-BSD FRML MDRD: 71 ML/MIN/1.73
GFR SERPL CREATININE-BSD FRML MDRD: 81 ML/MIN/1.73
GFR SERPL CREATININE-BSD FRML MDRD: 84 ML/MIN/1.73
GFR SERPL CREATININE-BSD FRML MDRD: 90 ML/MIN/1.73
GLOBULIN UR ELPH-MCNC: 4.3 GM/DL
GLOBULIN UR ELPH-MCNC: 4.7 GM/DL
GLUCOSE BLDC GLUCOMTR-MCNC: 98 MG/DL (ref 70–130)
GLUCOSE SERPL-MCNC: 112 MG/DL (ref 65–99)
GLUCOSE SERPL-MCNC: 113 MG/DL (ref 65–99)
GLUCOSE SERPL-MCNC: 118 MG/DL (ref 65–99)
GLUCOSE SERPL-MCNC: 119 MG/DL (ref 65–99)
GLUCOSE SERPL-MCNC: 121 MG/DL (ref 65–99)
GLUCOSE SERPL-MCNC: 123 MG/DL (ref 65–99)
GLUCOSE SERPL-MCNC: 123 MG/DL (ref 65–99)
GLUCOSE SERPL-MCNC: 124 MG/DL (ref 65–99)
GLUCOSE SERPL-MCNC: 134 MG/DL (ref 65–99)
GLUCOSE SERPL-MCNC: 136 MG/DL (ref 65–99)
GLUCOSE SERPL-MCNC: 145 MG/DL (ref 65–99)
GLUCOSE SERPL-MCNC: 184 MG/DL (ref 65–99)
GLUCOSE SERPL-MCNC: 95 MG/DL (ref 65–99)
GLUCOSE SERPL-MCNC: 95 MG/DL (ref 65–99)
GLUCOSE UR STRIP-MCNC: NEGATIVE MG/DL
GLUCOSE UR STRIP-MCNC: NEGATIVE MG/DL
GRAM STN SPEC: ABNORMAL
HBA1C MFR BLD: 6 %
HBA1C MFR BLD: 6 % (ref 4.8–5.6)
HCT VFR BLD AUTO: 44.1 % (ref 37.5–51)
HCT VFR BLD AUTO: 44.6 % (ref 37.5–51)
HCT VFR BLD AUTO: 44.8 % (ref 37.5–51)
HDLC SERPL-MCNC: 31 MG/DL (ref 40–60)
HGB BLD-MCNC: 14.3 G/DL (ref 13–17.7)
HGB BLD-MCNC: 14.3 G/DL (ref 13–17.7)
HGB BLD-MCNC: 15.2 G/DL (ref 13–17.7)
HGB UR QL STRIP.AUTO: ABNORMAL
HGB UR QL STRIP.AUTO: NEGATIVE
HOLD SPECIMEN: NORMAL
HYALINE CASTS UR QL AUTO: ABNORMAL /LPF
HYALINE CASTS UR QL AUTO: ABNORMAL /LPF
IMM GRANULOCYTES # BLD AUTO: 0.01 10*3/MM3 (ref 0–0.05)
IMM GRANULOCYTES # BLD AUTO: 0.02 10*3/MM3 (ref 0–0.05)
IMM GRANULOCYTES NFR BLD AUTO: 0.2 % (ref 0–0.5)
IMM GRANULOCYTES NFR BLD AUTO: 0.4 % (ref 0–0.5)
INR PPP: 2.11 (ref 0.85–1.16)
KETONES UR QL STRIP: NEGATIVE
KETONES UR QL STRIP: NEGATIVE
LDLC SERPL CALC-MCNC: 56 MG/DL (ref 0–100)
LDLC/HDLC SERPL: 1.8 {RATIO}
LEUKOCYTE ESTERASE UR QL STRIP.AUTO: ABNORMAL
LEUKOCYTE ESTERASE UR QL STRIP.AUTO: ABNORMAL
LYMPHOCYTES # BLD AUTO: 0.39 10*3/MM3 (ref 0.7–3.1)
LYMPHOCYTES # BLD AUTO: 0.63 10*3/MM3 (ref 0.7–3.1)
LYMPHOCYTES NFR BLD AUTO: 12.2 % (ref 19.6–45.3)
LYMPHOCYTES NFR BLD AUTO: 8.3 % (ref 19.6–45.3)
Lab: NORMAL
MAGNESIUM SERPL-MCNC: 1.6 MG/DL (ref 1.6–2.4)
MAGNESIUM SERPL-MCNC: 1.7 MG/DL (ref 1.6–2.4)
MAGNESIUM SERPL-MCNC: 1.8 MG/DL (ref 1.6–2.4)
MAGNESIUM SERPL-MCNC: 2 MG/DL (ref 1.6–2.4)
MAGNESIUM SERPL-MCNC: 2.3 MG/DL (ref 1.6–2.4)
MAXIMAL PREDICTED HEART RATE: 156 BPM
MCH RBC QN AUTO: 32.9 PG (ref 26.6–33)
MCH RBC QN AUTO: 33 PG (ref 26.6–33)
MCH RBC QN AUTO: 33 PG (ref 26.6–33)
MCHC RBC AUTO-ENTMCNC: 31.9 G/DL (ref 31.5–35.7)
MCHC RBC AUTO-ENTMCNC: 32.4 G/DL (ref 31.5–35.7)
MCHC RBC AUTO-ENTMCNC: 34.1 G/DL (ref 31.5–35.7)
MCV RBC AUTO: 101.8 FL (ref 79–97)
MCV RBC AUTO: 103.5 FL (ref 79–97)
MCV RBC AUTO: 96.5 FL (ref 79–97)
MONOCYTES # BLD AUTO: 0.55 10*3/MM3 (ref 0.1–0.9)
MONOCYTES # BLD AUTO: 0.59 10*3/MM3 (ref 0.1–0.9)
MONOCYTES NFR BLD AUTO: 11.4 % (ref 5–12)
MONOCYTES NFR BLD AUTO: 11.7 % (ref 5–12)
NEUTROPHILS NFR BLD AUTO: 3.56 10*3/MM3 (ref 1.7–7)
NEUTROPHILS NFR BLD AUTO: 3.77 10*3/MM3 (ref 1.7–7)
NEUTROPHILS NFR BLD AUTO: 72.9 % (ref 42.7–76)
NEUTROPHILS NFR BLD AUTO: 75.6 % (ref 42.7–76)
NITRITE UR QL STRIP: NEGATIVE
NITRITE UR QL STRIP: NEGATIVE
NRBC BLD AUTO-RTO: 0 /100 WBC (ref 0–0.2)
NRBC BLD AUTO-RTO: 0 /100 WBC (ref 0–0.2)
NT-PROBNP SERPL-MCNC: 808.9 PG/ML (ref 0–900)
NT-PROBNP SERPL-MCNC: 926.5 PG/ML (ref 0–900)
PH UR STRIP.AUTO: 6 [PH] (ref 5–8)
PH UR STRIP.AUTO: 6.5 [PH] (ref 5–8)
PHOSPHATE SERPL-MCNC: 3.7 MG/DL (ref 2.5–4.5)
PLATELET # BLD AUTO: 143 10*3/MM3 (ref 140–450)
PLATELET # BLD AUTO: 159 10*3/MM3 (ref 140–450)
PLATELET # BLD AUTO: 159 10*3/MM3 (ref 140–450)
PMV BLD AUTO: 10.9 FL (ref 6–12)
PMV BLD AUTO: 11 FL (ref 6–12)
PMV BLD AUTO: 11.5 FL (ref 6–12)
POTASSIUM SERPL-SCNC: 3.6 MMOL/L (ref 3.5–5.2)
POTASSIUM SERPL-SCNC: 3.8 MMOL/L (ref 3.5–5.2)
POTASSIUM SERPL-SCNC: 3.8 MMOL/L (ref 3.5–5.2)
POTASSIUM SERPL-SCNC: 3.9 MMOL/L (ref 3.5–5.2)
POTASSIUM SERPL-SCNC: 3.9 MMOL/L (ref 3.5–5.2)
POTASSIUM SERPL-SCNC: 4 MMOL/L (ref 3.5–5.2)
POTASSIUM SERPL-SCNC: 4.1 MMOL/L (ref 3.5–5.2)
POTASSIUM SERPL-SCNC: 4.1 MMOL/L (ref 3.5–5.2)
POTASSIUM SERPL-SCNC: 4.2 MMOL/L (ref 3.5–5.2)
POTASSIUM SERPL-SCNC: 4.3 MMOL/L (ref 3.5–5.2)
POTASSIUM SERPL-SCNC: 4.4 MMOL/L (ref 3.5–5.2)
POTASSIUM SERPL-SCNC: 4.5 MMOL/L (ref 3.5–5.2)
POTASSIUM SERPL-SCNC: 5.2 MMOL/L (ref 3.5–5.2)
POTASSIUM SERPL-SCNC: 5.7 MMOL/L (ref 3.5–5.2)
PROT SERPL-MCNC: 7.8 G/DL (ref 6–8.5)
PROT SERPL-MCNC: 8 G/DL (ref 6–8.5)
PROT UR QL STRIP: ABNORMAL
PROT UR QL STRIP: ABNORMAL
PROTHROMBIN TIME: 22.8 SECONDS (ref 11.4–14.4)
PTH-INTACT SERPL-MCNC: 49.8 PG/ML (ref 15–65)
QT INTERVAL: 302 MS
QT INTERVAL: 338 MS
QT INTERVAL: 338 MS
QTC INTERVAL: 389 MS
QTC INTERVAL: 392 MS
QTC INTERVAL: 423 MS
RBC # BLD AUTO: 4.33 10*6/MM3 (ref 4.14–5.8)
RBC # BLD AUTO: 4.33 10*6/MM3 (ref 4.14–5.8)
RBC # BLD AUTO: 4.62 10*6/MM3 (ref 4.14–5.8)
RBC # UR: ABNORMAL /HPF
RBC # UR: ABNORMAL /HPF
REF LAB TEST METHOD: ABNORMAL
REF LAB TEST METHOD: ABNORMAL
SARS-COV-2 RNA RESP QL NAA+PROBE: NOT DETECTED
SODIUM SERPL-SCNC: 126 MMOL/L (ref 136–145)
SODIUM SERPL-SCNC: 129 MMOL/L (ref 136–145)
SODIUM SERPL-SCNC: 132 MMOL/L (ref 136–145)
SODIUM SERPL-SCNC: 133 MMOL/L (ref 136–145)
SODIUM SERPL-SCNC: 134 MMOL/L (ref 136–145)
SODIUM SERPL-SCNC: 134 MMOL/L (ref 136–145)
SODIUM SERPL-SCNC: 135 MMOL/L (ref 136–145)
SODIUM SERPL-SCNC: 135 MMOL/L (ref 136–145)
SODIUM SERPL-SCNC: 136 MMOL/L (ref 136–145)
SODIUM SERPL-SCNC: 136 MMOL/L (ref 136–145)
SODIUM SERPL-SCNC: 137 MMOL/L (ref 136–145)
SODIUM SERPL-SCNC: 137 MMOL/L (ref 136–145)
SP GR UR STRIP: 1.01 (ref 1–1.03)
SP GR UR STRIP: 1.02 (ref 1–1.03)
SQUAMOUS #/AREA URNS HPF: ABNORMAL /HPF
SQUAMOUS #/AREA URNS HPF: ABNORMAL /HPF
STRESS TARGET HR: 133 BPM
TRIGL SERPL-MCNC: 81 MG/DL (ref 0–150)
TROPONIN T SERPL-MCNC: 0.01 NG/ML (ref 0–0.03)
TSH SERPL DL<=0.05 MIU/L-ACNC: 5.05 UIU/ML (ref 0.27–4.2)
UROBILINOGEN UR QL STRIP: ABNORMAL
UROBILINOGEN UR QL STRIP: ABNORMAL
VLDLC SERPL-MCNC: 16 MG/DL (ref 5–40)
WBC # BLD AUTO: 5.17 10*3/MM3 (ref 3.4–10.8)
WBC NRBC COR # BLD: 4.59 10*3/MM3 (ref 3.4–10.8)
WBC NRBC COR # BLD: 4.71 10*3/MM3 (ref 3.4–10.8)
WBC UR QL AUTO: ABNORMAL /HPF
WBC UR QL AUTO: ABNORMAL /HPF
WHOLE BLOOD HOLD SPECIMEN: NORMAL
WHOLE BLOOD HOLD SPECIMEN: NORMAL

## 2021-01-01 PROCEDURE — 83036 HEMOGLOBIN GLYCOSYLATED A1C: CPT | Performed by: PHYSICIAN ASSISTANT

## 2021-01-01 PROCEDURE — 0 MAGNESIUM SULFATE 4 GM/100ML SOLUTION: Performed by: INTERNAL MEDICINE

## 2021-01-01 PROCEDURE — 80048 BASIC METABOLIC PNL TOTAL CA: CPT | Performed by: NURSE PRACTITIONER

## 2021-01-01 PROCEDURE — 97597 DBRDMT OPN WND 1ST 20 CM/<: CPT

## 2021-01-01 PROCEDURE — 83880 ASSAY OF NATRIURETIC PEPTIDE: CPT | Performed by: NURSE PRACTITIONER

## 2021-01-01 PROCEDURE — 92960 CARDIOVERSION ELECTRIC EXT: CPT | Performed by: INTERNAL MEDICINE

## 2021-01-01 PROCEDURE — 93650 ICAR CATH ABLTJ AV NODE FUNC: CPT | Performed by: INTERNAL MEDICINE

## 2021-01-01 PROCEDURE — 83735 ASSAY OF MAGNESIUM: CPT | Performed by: INTERNAL MEDICINE

## 2021-01-01 PROCEDURE — 80053 COMPREHEN METABOLIC PANEL: CPT | Performed by: NURSE PRACTITIONER

## 2021-01-01 PROCEDURE — 25010000002 MIDAZOLAM PER 1 MG: Performed by: INTERNAL MEDICINE

## 2021-01-01 PROCEDURE — 29581 APPL MULTLAYER CMPRN SYS LEG: CPT

## 2021-01-01 PROCEDURE — 92960 CARDIOVERSION ELECTRIC EXT: CPT

## 2021-01-01 PROCEDURE — 94660 CPAP INITIATION&MGMT: CPT

## 2021-01-01 PROCEDURE — 87205 SMEAR GRAM STAIN: CPT | Performed by: PHYSICIAN ASSISTANT

## 2021-01-01 PROCEDURE — C1894 INTRO/SHEATH, NON-LASER: HCPCS | Performed by: INTERNAL MEDICINE

## 2021-01-01 PROCEDURE — 93000 ELECTROCARDIOGRAM COMPLETE: CPT | Performed by: PHYSICIAN ASSISTANT

## 2021-01-01 PROCEDURE — 99152 MOD SED SAME PHYS/QHP 5/>YRS: CPT | Performed by: INTERNAL MEDICINE

## 2021-01-01 PROCEDURE — 36415 COLL VENOUS BLD VENIPUNCTURE: CPT

## 2021-01-01 PROCEDURE — C1786 PMKR, SINGLE, RATE-RESP: HCPCS | Performed by: INTERNAL MEDICINE

## 2021-01-01 PROCEDURE — 97164 PT RE-EVAL EST PLAN CARE: CPT

## 2021-01-01 PROCEDURE — 93010 ELECTROCARDIOGRAM REPORT: CPT | Performed by: INTERNAL MEDICINE

## 2021-01-01 PROCEDURE — 25010000002 ONDANSETRON PER 1 MG: Performed by: INTERNAL MEDICINE

## 2021-01-01 PROCEDURE — 80048 BASIC METABOLIC PNL TOTAL CA: CPT | Performed by: INTERNAL MEDICINE

## 2021-01-01 PROCEDURE — 02583ZZ DESTRUCTION OF CONDUCTION MECHANISM, PERCUTANEOUS APPROACH: ICD-10-PCS | Performed by: INTERNAL MEDICINE

## 2021-01-01 PROCEDURE — 25010000002 FUROSEMIDE PER 20 MG: Performed by: EMERGENCY MEDICINE

## 2021-01-01 PROCEDURE — 99284 EMERGENCY DEPT VISIT MOD MDM: CPT

## 2021-01-01 PROCEDURE — 85027 COMPLETE CBC AUTOMATED: CPT | Performed by: INTERNAL MEDICINE

## 2021-01-01 PROCEDURE — 99214 OFFICE O/P EST MOD 30 MIN: CPT | Performed by: NURSE PRACTITIONER

## 2021-01-01 PROCEDURE — 85025 COMPLETE CBC W/AUTO DIFF WBC: CPT

## 2021-01-01 PROCEDURE — 99153 MOD SED SAME PHYS/QHP EA: CPT | Performed by: INTERNAL MEDICINE

## 2021-01-01 PROCEDURE — 82306 VITAMIN D 25 HYDROXY: CPT

## 2021-01-01 PROCEDURE — 99213 OFFICE O/P EST LOW 20 MIN: CPT | Performed by: PHYSICIAN ASSISTANT

## 2021-01-01 PROCEDURE — 94799 UNLISTED PULMONARY SVC/PX: CPT

## 2021-01-01 PROCEDURE — 97110 THERAPEUTIC EXERCISES: CPT

## 2021-01-01 PROCEDURE — C1760 CLOSURE DEV, VASC: HCPCS | Performed by: INTERNAL MEDICINE

## 2021-01-01 PROCEDURE — 97530 THERAPEUTIC ACTIVITIES: CPT

## 2021-01-01 PROCEDURE — 87070 CULTURE OTHR SPECIMN AEROBIC: CPT | Performed by: PHYSICIAN ASSISTANT

## 2021-01-01 PROCEDURE — 83735 ASSAY OF MAGNESIUM: CPT | Performed by: NURSE PRACTITIONER

## 2021-01-01 PROCEDURE — 93000 ELECTROCARDIOGRAM COMPLETE: CPT | Performed by: INTERNAL MEDICINE

## 2021-01-01 PROCEDURE — 84484 ASSAY OF TROPONIN QUANT: CPT | Performed by: EMERGENCY MEDICINE

## 2021-01-01 PROCEDURE — 99214 OFFICE O/P EST MOD 30 MIN: CPT | Performed by: PHYSICIAN ASSISTANT

## 2021-01-01 PROCEDURE — 02HK3JZ INSERTION OF PACEMAKER LEAD INTO RIGHT VENTRICLE, PERCUTANEOUS APPROACH: ICD-10-PCS | Performed by: INTERNAL MEDICINE

## 2021-01-01 PROCEDURE — 93620 COMP EP EVL R AT VEN PAC&REC: CPT | Performed by: INTERNAL MEDICINE

## 2021-01-01 PROCEDURE — 97161 PT EVAL LOW COMPLEX 20 MIN: CPT

## 2021-01-01 PROCEDURE — 0 MILRINONE LACTATE IN DEXTROSE 20-5 MG/100ML-% SOLUTION: Performed by: INTERNAL MEDICINE

## 2021-01-01 PROCEDURE — C1898 LEAD, PMKR, OTHER THAN TRANS: HCPCS | Performed by: INTERNAL MEDICINE

## 2021-01-01 PROCEDURE — 71045 X-RAY EXAM CHEST 1 VIEW: CPT

## 2021-01-01 PROCEDURE — 83036 HEMOGLOBIN GLYCOSYLATED A1C: CPT | Performed by: INTERNAL MEDICINE

## 2021-01-01 PROCEDURE — 80069 RENAL FUNCTION PANEL: CPT

## 2021-01-01 PROCEDURE — 99232 SBSQ HOSP IP/OBS MODERATE 35: CPT | Performed by: NURSE PRACTITIONER

## 2021-01-01 PROCEDURE — C1732 CATH, EP, DIAG/ABL, 3D/VECT: HCPCS | Performed by: INTERNAL MEDICINE

## 2021-01-01 PROCEDURE — 0 MILRINONE LACTATE IN DEXTROSE 20-5 MG/100ML-% SOLUTION: Performed by: PHYSICIAN ASSISTANT

## 2021-01-01 PROCEDURE — 84443 ASSAY THYROID STIM HORMONE: CPT | Performed by: INTERNAL MEDICINE

## 2021-01-01 PROCEDURE — MEDWTNEWPT: Performed by: NURSE PRACTITIONER

## 2021-01-01 PROCEDURE — 0 CEFAZOLIN IN DEXTROSE 2-4 GM/100ML-% SOLUTION: Performed by: PHYSICIAN ASSISTANT

## 2021-01-01 PROCEDURE — 99024 POSTOP FOLLOW-UP VISIT: CPT | Performed by: NURSE PRACTITIONER

## 2021-01-01 PROCEDURE — 99024 POSTOP FOLLOW-UP VISIT: CPT | Performed by: INTERNAL MEDICINE

## 2021-01-01 PROCEDURE — 33207 INSERT HEART PM VENTRICULAR: CPT | Performed by: INTERNAL MEDICINE

## 2021-01-01 PROCEDURE — 93005 ELECTROCARDIOGRAM TRACING: CPT | Performed by: NURSE PRACTITIONER

## 2021-01-01 PROCEDURE — C1892 INTRO/SHEATH,FIXED,PEEL-AWAY: HCPCS | Performed by: INTERNAL MEDICINE

## 2021-01-01 PROCEDURE — 97602 WOUND(S) CARE NON-SELECTIVE: CPT

## 2021-01-01 PROCEDURE — 83970 ASSAY OF PARATHORMONE: CPT

## 2021-01-01 PROCEDURE — 80061 LIPID PANEL: CPT | Performed by: INTERNAL MEDICINE

## 2021-01-01 PROCEDURE — 80053 COMPREHEN METABOLIC PANEL: CPT | Performed by: EMERGENCY MEDICINE

## 2021-01-01 PROCEDURE — 99221 1ST HOSP IP/OBS SF/LOW 40: CPT | Performed by: INTERNAL MEDICINE

## 2021-01-01 PROCEDURE — 0 LIDOCAINE 1 % SOLUTION: Performed by: INTERNAL MEDICINE

## 2021-01-01 PROCEDURE — 93005 ELECTROCARDIOGRAM TRACING: CPT | Performed by: INTERNAL MEDICINE

## 2021-01-01 PROCEDURE — 97597 DBRDMT OPN WND 1ST 20 CM/<: CPT | Performed by: PHYSICAL THERAPIST

## 2021-01-01 PROCEDURE — S0260 H&P FOR SURGERY: HCPCS | Performed by: NURSE PRACTITIONER

## 2021-01-01 PROCEDURE — 99214 OFFICE O/P EST MOD 30 MIN: CPT | Performed by: INTERNAL MEDICINE

## 2021-01-01 PROCEDURE — 0JH604Z INSERTION OF PACEMAKER, SINGLE CHAMBER INTO CHEST SUBCUTANEOUS TISSUE AND FASCIA, OPEN APPROACH: ICD-10-PCS | Performed by: INTERNAL MEDICINE

## 2021-01-01 PROCEDURE — 71046 X-RAY EXAM CHEST 2 VIEWS: CPT

## 2021-01-01 PROCEDURE — 87077 CULTURE AEROBIC IDENTIFY: CPT | Performed by: PHYSICIAN ASSISTANT

## 2021-01-01 PROCEDURE — 82962 GLUCOSE BLOOD TEST: CPT

## 2021-01-01 PROCEDURE — 83880 ASSAY OF NATRIURETIC PEPTIDE: CPT | Performed by: EMERGENCY MEDICINE

## 2021-01-01 PROCEDURE — 93005 ELECTROCARDIOGRAM TRACING: CPT | Performed by: EMERGENCY MEDICINE

## 2021-01-01 PROCEDURE — 0 MILRINONE LACTATE IN DEXTROSE 20-5 MG/100ML-% SOLUTION: Performed by: NURSE PRACTITIONER

## 2021-01-01 PROCEDURE — C1733 CATH, EP, OTHR THAN COOL-TIP: HCPCS | Performed by: INTERNAL MEDICINE

## 2021-01-01 PROCEDURE — U0003 INFECTIOUS AGENT DETECTION BY NUCLEIC ACID (DNA OR RNA); SEVERE ACUTE RESPIRATORY SYNDROME CORONAVIRUS 2 (SARS-COV-2) (CORONAVIRUS DISEASE [COVID-19]), AMPLIFIED PROBE TECHNIQUE, MAKING USE OF HIGH THROUGHPUT TECHNOLOGIES AS DESCRIBED BY CMS-2020-01-R: HCPCS | Performed by: NURSE PRACTITIONER

## 2021-01-01 PROCEDURE — 85025 COMPLETE CBC W/AUTO DIFF WBC: CPT | Performed by: EMERGENCY MEDICINE

## 2021-01-01 PROCEDURE — 81001 URINALYSIS AUTO W/SCOPE: CPT | Performed by: NURSE PRACTITIONER

## 2021-01-01 PROCEDURE — 25010000002 FENTANYL CITRATE (PF) 50 MCG/ML SOLUTION: Performed by: INTERNAL MEDICINE

## 2021-01-01 PROCEDURE — 29581 APPL MULTLAYER CMPRN SYS LEG: CPT | Performed by: PHYSICAL THERAPIST

## 2021-01-01 PROCEDURE — 85610 PROTHROMBIN TIME: CPT | Performed by: NURSE PRACTITIONER

## 2021-01-01 PROCEDURE — 81001 URINALYSIS AUTO W/SCOPE: CPT

## 2021-01-01 PROCEDURE — 87186 SC STD MICRODIL/AGAR DIL: CPT | Performed by: PHYSICIAN ASSISTANT

## 2021-01-01 PROCEDURE — 99232 SBSQ HOSP IP/OBS MODERATE 35: CPT | Performed by: INTERNAL MEDICINE

## 2021-01-01 PROCEDURE — MEDWTESTPT: Performed by: NURSE PRACTITIONER

## 2021-01-01 DEVICE — STEROX BIPOLAR IS-1 ATRIAL/VENTRICULAR
Type: IMPLANTABLE DEVICE | Status: FUNCTIONAL
Brand: FINELINE® II EZ STEROX

## 2021-01-01 DEVICE — PACEMAKER
Type: IMPLANTABLE DEVICE | Status: FUNCTIONAL
Brand: ACCOLADE™ MRI SR

## 2021-01-01 RX ORDER — DIOSMIN COMPLEX NO.1 630 MG
1 TABLET ORAL DAILY
Qty: 30 TABLET | Refills: 3 | Status: SHIPPED | OUTPATIENT
Start: 2021-01-01 | End: 2021-01-01 | Stop reason: SDUPTHER

## 2021-01-01 RX ORDER — DOXYCYCLINE HYCLATE 100 MG
100 TABLET ORAL 2 TIMES DAILY
Qty: 14 TABLET | Refills: 0 | Status: SHIPPED | OUTPATIENT
Start: 2021-01-01 | End: 2021-01-01

## 2021-01-01 RX ORDER — SPIRONOLACTONE 100 MG/1
100 TABLET, FILM COATED ORAL DAILY
Qty: 30 TABLET | Refills: 5 | Status: SHIPPED | OUTPATIENT
Start: 2021-01-01 | End: 2021-01-01 | Stop reason: SDUPTHER

## 2021-01-01 RX ORDER — SPIRONOLACTONE 100 MG/1
TABLET, FILM COATED ORAL
Qty: 30 TABLET | Refills: 4 | OUTPATIENT
Start: 2021-01-01

## 2021-01-01 RX ORDER — MULTIPLE VITAMINS W/ MINERALS TAB 9MG-400MCG
1 TAB ORAL DAILY
Status: DISCONTINUED | OUTPATIENT
Start: 2021-01-01 | End: 2021-01-01 | Stop reason: HOSPADM

## 2021-01-01 RX ORDER — FENTANYL CITRATE 50 UG/ML
INJECTION, SOLUTION INTRAMUSCULAR; INTRAVENOUS AS NEEDED
Status: DISCONTINUED | OUTPATIENT
Start: 2021-01-01 | End: 2021-01-01 | Stop reason: HOSPADM

## 2021-01-01 RX ORDER — MIDAZOLAM HYDROCHLORIDE 1 MG/ML
INJECTION INTRAMUSCULAR; INTRAVENOUS
Status: DISCONTINUED
Start: 2021-01-01 | End: 2021-01-01 | Stop reason: HOSPADM

## 2021-01-01 RX ORDER — FENTANYL CITRATE 50 UG/ML
INJECTION, SOLUTION INTRAMUSCULAR; INTRAVENOUS
Status: DISCONTINUED
Start: 2021-01-01 | End: 2021-01-01 | Stop reason: WASHOUT

## 2021-01-01 RX ORDER — SODIUM CHLORIDE 0.9 % (FLUSH) 0.9 %
10 SYRINGE (ML) INJECTION AS NEEDED
Status: DISCONTINUED | OUTPATIENT
Start: 2021-01-01 | End: 2021-01-01 | Stop reason: SDUPTHER

## 2021-01-01 RX ORDER — SODIUM CHLORIDE 0.9 % (FLUSH) 0.9 %
3 SYRINGE (ML) INJECTION EVERY 12 HOURS SCHEDULED
Status: DISCONTINUED | OUTPATIENT
Start: 2021-01-01 | End: 2021-01-01 | Stop reason: HOSPADM

## 2021-01-01 RX ORDER — BUMETANIDE 2 MG/1
2 TABLET ORAL DAILY
Qty: 90 TABLET | Refills: 0 | Status: SHIPPED | OUTPATIENT
Start: 2021-01-01

## 2021-01-01 RX ORDER — SIMETHICONE 80 MG
80 TABLET,CHEWABLE ORAL 4 TIMES DAILY PRN
Status: DISCONTINUED | OUTPATIENT
Start: 2021-01-01 | End: 2021-01-01 | Stop reason: HOSPADM

## 2021-01-01 RX ORDER — BUMETANIDE 2 MG/1
2 TABLET ORAL DAILY
Qty: 90 TABLET | Refills: 0 | Status: SHIPPED | OUTPATIENT
Start: 2021-01-01 | End: 2021-01-01

## 2021-01-01 RX ORDER — POTASSIUM CHLORIDE 750 MG/1
40 CAPSULE, EXTENDED RELEASE ORAL AS NEEDED
Status: DISCONTINUED | OUTPATIENT
Start: 2021-01-01 | End: 2021-01-01 | Stop reason: HOSPADM

## 2021-01-01 RX ORDER — CEPHALEXIN 500 MG/1
500 CAPSULE ORAL 2 TIMES DAILY
Qty: 14 CAPSULE | Refills: 0 | Status: SHIPPED | OUTPATIENT
Start: 2021-01-01 | End: 2021-01-01

## 2021-01-01 RX ORDER — ONDANSETRON 2 MG/ML
4 INJECTION INTRAMUSCULAR; INTRAVENOUS EVERY 6 HOURS PRN
Status: DISCONTINUED | OUTPATIENT
Start: 2021-01-01 | End: 2021-01-01 | Stop reason: HOSPADM

## 2021-01-01 RX ORDER — ROSUVASTATIN CALCIUM 20 MG/1
20 TABLET, COATED ORAL DAILY
Status: DISCONTINUED | OUTPATIENT
Start: 2021-01-01 | End: 2021-01-01 | Stop reason: HOSPADM

## 2021-01-01 RX ORDER — MULTIPLE VITAMINS W/ MINERALS TAB 9MG-400MCG
1 TAB ORAL DAILY
Qty: 30 TABLET | Refills: 2
Start: 2021-01-01

## 2021-01-01 RX ORDER — FUROSEMIDE 10 MG/ML
80 INJECTION INTRAMUSCULAR; INTRAVENOUS ONCE
Status: COMPLETED | OUTPATIENT
Start: 2021-01-01 | End: 2021-01-01

## 2021-01-01 RX ORDER — POTASSIUM CHLORIDE 750 MG/1
30 TABLET, FILM COATED, EXTENDED RELEASE ORAL DAILY
Qty: 90 TABLET | Refills: 3 | Status: SHIPPED | OUTPATIENT
Start: 2021-01-01

## 2021-01-01 RX ORDER — MILRINONE LACTATE 0.2 MG/ML
0.35 INJECTION, SOLUTION INTRAVENOUS CONTINUOUS
Status: DISCONTINUED | OUTPATIENT
Start: 2021-01-01 | End: 2021-01-01

## 2021-01-01 RX ORDER — ROSUVASTATIN CALCIUM 20 MG/1
20 TABLET, COATED ORAL DAILY
Qty: 90 TABLET | Refills: 0 | Status: SHIPPED | OUTPATIENT
Start: 2021-01-01

## 2021-01-01 RX ORDER — SODIUM CHLORIDE 9 MG/ML
INJECTION, SOLUTION INTRAVENOUS CONTINUOUS PRN
Status: COMPLETED | OUTPATIENT
Start: 2021-01-01 | End: 2021-01-01

## 2021-01-01 RX ORDER — POTASSIUM CHLORIDE 750 MG/1
30 TABLET, FILM COATED, EXTENDED RELEASE ORAL DAILY
Qty: 90 TABLET | Refills: 3 | Status: SHIPPED | OUTPATIENT
Start: 2021-01-01 | End: 2021-01-01 | Stop reason: SDUPTHER

## 2021-01-01 RX ORDER — DIOSMIN COMPLEX NO.1 630 MG
1 TABLET ORAL DAILY
Qty: 30 TABLET | Refills: 3 | Status: SHIPPED | OUTPATIENT
Start: 2021-01-01 | End: 2021-01-01

## 2021-01-01 RX ORDER — MAGNESIUM SULFATE HEPTAHYDRATE 40 MG/ML
4 INJECTION, SOLUTION INTRAVENOUS AS NEEDED
Status: DISCONTINUED | OUTPATIENT
Start: 2021-01-01 | End: 2021-01-01 | Stop reason: HOSPADM

## 2021-01-01 RX ORDER — ACETAMINOPHEN 325 MG/1
650 TABLET ORAL EVERY 4 HOURS PRN
Status: DISCONTINUED | OUTPATIENT
Start: 2021-01-01 | End: 2021-01-01 | Stop reason: HOSPADM

## 2021-01-01 RX ORDER — APIXABAN 5 MG/1
TABLET, FILM COATED ORAL
Qty: 180 TABLET | Refills: 2 | Status: SHIPPED | OUTPATIENT
Start: 2021-01-01 | End: 2021-01-01 | Stop reason: SDUPTHER

## 2021-01-01 RX ORDER — SPIRONOLACTONE 25 MG/1
25 TABLET ORAL DAILY
Status: DISCONTINUED | OUTPATIENT
Start: 2021-01-01 | End: 2021-01-01 | Stop reason: HOSPADM

## 2021-01-01 RX ORDER — CEFAZOLIN SODIUM 2 G/100ML
2 INJECTION, SOLUTION INTRAVENOUS
Status: COMPLETED | OUTPATIENT
Start: 2021-01-01 | End: 2021-01-01

## 2021-01-01 RX ORDER — MIDAZOLAM HYDROCHLORIDE 1 MG/ML
INJECTION INTRAMUSCULAR; INTRAVENOUS AS NEEDED
Status: DISCONTINUED | OUTPATIENT
Start: 2021-01-01 | End: 2021-01-01 | Stop reason: HOSPADM

## 2021-01-01 RX ORDER — MAGNESIUM OXIDE 400 MG/1
400 TABLET ORAL DAILY
Qty: 30 TABLET | Refills: 3 | Status: SHIPPED | OUTPATIENT
Start: 2021-01-01

## 2021-01-01 RX ORDER — SODIUM CHLORIDE 0.9 % (FLUSH) 0.9 %
10 SYRINGE (ML) INJECTION AS NEEDED
Status: DISCONTINUED | OUTPATIENT
Start: 2021-01-01 | End: 2021-01-01 | Stop reason: HOSPADM

## 2021-01-01 RX ORDER — MIDAZOLAM HYDROCHLORIDE 1 MG/ML
INJECTION INTRAMUSCULAR; INTRAVENOUS
Status: COMPLETED | OUTPATIENT
Start: 2021-01-01 | End: 2021-01-01

## 2021-01-01 RX ORDER — BUMETANIDE 2 MG/1
2 TABLET ORAL DAILY
Status: DISCONTINUED | OUTPATIENT
Start: 2021-01-01 | End: 2021-01-01 | Stop reason: HOSPADM

## 2021-01-01 RX ORDER — SODIUM CHLORIDE 0.9 % (FLUSH) 0.9 %
10 SYRINGE (ML) INJECTION EVERY 12 HOURS SCHEDULED
Status: DISCONTINUED | OUTPATIENT
Start: 2021-01-01 | End: 2021-01-01 | Stop reason: HOSPADM

## 2021-01-01 RX ORDER — BUPIVACAINE HYDROCHLORIDE 5 MG/ML
INJECTION, SOLUTION PERINEURAL AS NEEDED
Status: DISCONTINUED | OUTPATIENT
Start: 2021-01-01 | End: 2021-01-01 | Stop reason: HOSPADM

## 2021-01-01 RX ORDER — POTASSIUM CHLORIDE 1.5 G/1.77G
40 POWDER, FOR SOLUTION ORAL AS NEEDED
Status: DISCONTINUED | OUTPATIENT
Start: 2021-01-01 | End: 2021-01-01 | Stop reason: HOSPADM

## 2021-01-01 RX ORDER — DIOSMIN COMPLEX NO.1 630 MG
1 TABLET ORAL DAILY
Qty: 30 TABLET | Refills: 3 | Status: SHIPPED | OUTPATIENT
Start: 2021-01-01

## 2021-01-01 RX ORDER — MILRINONE LACTATE 0.2 MG/ML
0.35 INJECTION, SOLUTION INTRAVENOUS
Status: DISCONTINUED | OUTPATIENT
Start: 2021-01-01 | End: 2021-01-01

## 2021-01-01 RX ORDER — BUMETANIDE 2 MG/1
TABLET ORAL
Qty: 90 TABLET | Refills: 0 | Status: SHIPPED | OUTPATIENT
Start: 2021-01-01 | End: 2021-01-01 | Stop reason: HOSPADM

## 2021-01-01 RX ORDER — FLUMAZENIL 0.1 MG/ML
INJECTION INTRAVENOUS
Status: DISCONTINUED
Start: 2021-01-01 | End: 2021-01-01 | Stop reason: WASHOUT

## 2021-01-01 RX ORDER — ACETAMINOPHEN 500 MG
1000 TABLET ORAL ONCE
Status: COMPLETED | OUTPATIENT
Start: 2021-01-01 | End: 2021-01-01

## 2021-01-01 RX ORDER — SPIRONOLACTONE 100 MG/1
50 TABLET, FILM COATED ORAL DAILY
Qty: 45 TABLET | Refills: 3 | Status: SHIPPED | OUTPATIENT
Start: 2021-01-01

## 2021-01-01 RX ORDER — SPIRONOLACTONE 25 MG/1
25 TABLET ORAL DAILY
Qty: 60 TABLET | Refills: 1 | Status: CANCELLED | OUTPATIENT
Start: 2021-01-01

## 2021-01-01 RX ORDER — MAGNESIUM SULFATE HEPTAHYDRATE 40 MG/ML
2 INJECTION, SOLUTION INTRAVENOUS AS NEEDED
Status: DISCONTINUED | OUTPATIENT
Start: 2021-01-01 | End: 2021-01-01 | Stop reason: HOSPADM

## 2021-01-01 RX ORDER — ROSUVASTATIN CALCIUM 20 MG/1
20 TABLET, COATED ORAL DAILY
Qty: 90 TABLET | Refills: 0 | Status: SHIPPED | OUTPATIENT
Start: 2021-01-01 | End: 2021-01-01 | Stop reason: SDUPTHER

## 2021-01-01 RX ORDER — ONDANSETRON 2 MG/ML
INJECTION INTRAMUSCULAR; INTRAVENOUS AS NEEDED
Status: DISCONTINUED | OUTPATIENT
Start: 2021-01-01 | End: 2021-01-01 | Stop reason: HOSPADM

## 2021-01-01 RX ORDER — BUMETANIDE 0.25 MG/ML
2 INJECTION INTRAMUSCULAR; INTRAVENOUS EVERY 12 HOURS
Status: DISCONTINUED | OUTPATIENT
Start: 2021-01-01 | End: 2021-01-01

## 2021-01-01 RX ORDER — NALOXONE HYDROCHLORIDE 0.4 MG/ML
INJECTION, SOLUTION INTRAMUSCULAR; INTRAVENOUS; SUBCUTANEOUS
Status: DISCONTINUED
Start: 2021-01-01 | End: 2021-01-01 | Stop reason: WASHOUT

## 2021-01-01 RX ORDER — POTASSIUM CHLORIDE 7.45 MG/ML
10 INJECTION INTRAVENOUS
Status: DISCONTINUED | OUTPATIENT
Start: 2021-01-01 | End: 2021-01-01 | Stop reason: HOSPADM

## 2021-01-01 RX ORDER — SPIRONOLACTONE 100 MG/1
50 TABLET, FILM COATED ORAL DAILY
Qty: 45 TABLET | Refills: 3 | Status: SHIPPED | OUTPATIENT
Start: 2021-01-01 | End: 2021-01-01 | Stop reason: SDUPTHER

## 2021-01-01 RX ORDER — SPIRONOLACTONE 100 MG/1
100 TABLET, FILM COATED ORAL DAILY
Qty: 90 TABLET | Refills: 3 | Status: ON HOLD | OUTPATIENT
Start: 2021-01-01 | End: 2021-01-01 | Stop reason: SDUPTHER

## 2021-01-01 RX ORDER — LIDOCAINE HYDROCHLORIDE 10 MG/ML
INJECTION, SOLUTION INFILTRATION; PERINEURAL AS NEEDED
Status: DISCONTINUED | OUTPATIENT
Start: 2021-01-01 | End: 2021-01-01 | Stop reason: HOSPADM

## 2021-01-01 RX ORDER — ACETAMINOPHEN 325 MG/1
650 TABLET ORAL DAILY
Status: DISCONTINUED | OUTPATIENT
Start: 2021-01-01 | End: 2021-01-01 | Stop reason: HOSPADM

## 2021-01-01 RX ORDER — SODIUM CHLORIDE 0.9 % (FLUSH) 0.9 %
10 SYRINGE (ML) INJECTION EVERY 12 HOURS SCHEDULED
Status: DISCONTINUED | OUTPATIENT
Start: 2021-01-01 | End: 2021-01-01 | Stop reason: SDUPTHER

## 2021-01-01 RX ORDER — METFORMIN HYDROCHLORIDE 500 MG/1
500 TABLET, EXTENDED RELEASE ORAL 2 TIMES DAILY WITH MEALS
Qty: 60 TABLET | Refills: 2 | Status: ON HOLD | OUTPATIENT
Start: 2021-01-01 | End: 2021-01-01

## 2021-01-01 RX ORDER — ACETAMINOPHEN 650 MG/1
650 SUPPOSITORY RECTAL EVERY 4 HOURS PRN
Status: DISCONTINUED | OUTPATIENT
Start: 2021-01-01 | End: 2021-01-01 | Stop reason: HOSPADM

## 2021-01-01 RX ADMIN — ROSUVASTATIN CALCIUM 20 MG: 20 TABLET, FILM COATED ORAL at 09:32

## 2021-01-01 RX ADMIN — MILRINONE LACTATE IN DEXTROSE 0.35 MCG/KG/MIN: 200 INJECTION, SOLUTION INTRAVENOUS at 08:22

## 2021-01-01 RX ADMIN — METHOHEXITAL SODIUM 20 MG: 500 INJECTION, POWDER, LYOPHILIZED, FOR SOLUTION INTRAMUSCULAR; INTRAVENOUS; RECTAL at 12:08

## 2021-01-01 RX ADMIN — ROSUVASTATIN CALCIUM 20 MG: 20 TABLET, FILM COATED ORAL at 09:35

## 2021-01-01 RX ADMIN — SODIUM CHLORIDE, PRESERVATIVE FREE 10 ML: 5 INJECTION INTRAVENOUS at 09:33

## 2021-01-01 RX ADMIN — MULTIPLE VITAMINS W/ MINERALS TAB 1 TABLET: TAB at 09:35

## 2021-01-01 RX ADMIN — BUMETANIDE 2 MG: 0.25 INJECTION, SOLUTION INTRAMUSCULAR; INTRAVENOUS at 02:55

## 2021-01-01 RX ADMIN — MAGNESIUM SULFATE HEPTAHYDRATE 4 G: 40 INJECTION, SOLUTION INTRAVENOUS at 09:35

## 2021-01-01 RX ADMIN — METHOHEXITAL SODIUM 20 MG: 500 INJECTION, POWDER, LYOPHILIZED, FOR SOLUTION INTRAMUSCULAR; INTRAVENOUS; RECTAL at 12:12

## 2021-01-01 RX ADMIN — MULTIPLE VITAMINS W/ MINERALS TAB 1 TABLET: TAB at 08:10

## 2021-01-01 RX ADMIN — SODIUM CHLORIDE, PRESERVATIVE FREE 10 ML: 5 INJECTION INTRAVENOUS at 20:32

## 2021-01-01 RX ADMIN — SPIRONOLACTONE 25 MG: 25 TABLET ORAL at 09:34

## 2021-01-01 RX ADMIN — APIXABAN 5 MG: 5 TABLET, FILM COATED ORAL at 08:23

## 2021-01-01 RX ADMIN — MILRINONE LACTATE 0.35 MCG/KG/MIN: 0.2 INJECTION, SOLUTION INTRAVENOUS at 00:29

## 2021-01-01 RX ADMIN — SPIRONOLACTONE 25 MG: 25 TABLET ORAL at 15:53

## 2021-01-01 RX ADMIN — METHOHEXITAL SODIUM 10 MG: 500 INJECTION, POWDER, LYOPHILIZED, FOR SOLUTION INTRAMUSCULAR; INTRAVENOUS; RECTAL at 12:09

## 2021-01-01 RX ADMIN — APIXABAN 5 MG: 5 TABLET, FILM COATED ORAL at 15:53

## 2021-01-01 RX ADMIN — MILRINONE LACTATE 0.35 MCG/KG/MIN: 0.2 INJECTION, SOLUTION INTRAVENOUS at 14:40

## 2021-01-01 RX ADMIN — ROSUVASTATIN CALCIUM 20 MG: 20 TABLET, FILM COATED ORAL at 08:38

## 2021-01-01 RX ADMIN — MAGNESIUM SULFATE HEPTAHYDRATE 4 G: 40 INJECTION, SOLUTION INTRAVENOUS at 09:31

## 2021-01-01 RX ADMIN — MAGNESIUM SULFATE HEPTAHYDRATE 4 G: 40 INJECTION, SOLUTION INTRAVENOUS at 15:54

## 2021-01-01 RX ADMIN — ROSUVASTATIN CALCIUM 20 MG: 20 TABLET, FILM COATED ORAL at 08:23

## 2021-01-01 RX ADMIN — ROSUVASTATIN CALCIUM 20 MG: 20 TABLET, FILM COATED ORAL at 08:10

## 2021-01-01 RX ADMIN — MULTIPLE VITAMINS W/ MINERALS TAB 1 TABLET: TAB at 09:32

## 2021-01-01 RX ADMIN — SPIRONOLACTONE 25 MG: 25 TABLET ORAL at 08:23

## 2021-01-01 RX ADMIN — APIXABAN 5 MG: 5 TABLET, FILM COATED ORAL at 20:26

## 2021-01-01 RX ADMIN — MULTIPLE VITAMINS W/ MINERALS TAB 1 TABLET: TAB at 15:53

## 2021-01-01 RX ADMIN — APIXABAN 5 MG: 5 TABLET, FILM COATED ORAL at 20:44

## 2021-01-01 RX ADMIN — SODIUM CHLORIDE, PRESERVATIVE FREE 10 ML: 5 INJECTION INTRAVENOUS at 15:54

## 2021-01-01 RX ADMIN — BUMETANIDE 2 MG: 0.25 INJECTION, SOLUTION INTRAMUSCULAR; INTRAVENOUS at 13:09

## 2021-01-01 RX ADMIN — MILRINONE LACTATE 0.35 MCG/KG/MIN: 0.2 INJECTION, SOLUTION INTRAVENOUS at 01:27

## 2021-01-01 RX ADMIN — ROSUVASTATIN CALCIUM 20 MG: 20 TABLET, FILM COATED ORAL at 15:53

## 2021-01-01 RX ADMIN — SPIRONOLACTONE 25 MG: 25 TABLET ORAL at 08:10

## 2021-01-01 RX ADMIN — MULTIPLE VITAMINS W/ MINERALS TAB 1 TABLET: TAB at 08:38

## 2021-01-01 RX ADMIN — SODIUM CHLORIDE, PRESERVATIVE FREE 10 ML: 5 INJECTION INTRAVENOUS at 20:22

## 2021-01-01 RX ADMIN — SODIUM CHLORIDE, PRESERVATIVE FREE 10 ML: 5 INJECTION INTRAVENOUS at 20:44

## 2021-01-01 RX ADMIN — ACETAMINOPHEN 1000 MG: 500 TABLET ORAL at 13:18

## 2021-01-01 RX ADMIN — SODIUM CHLORIDE, PRESERVATIVE FREE 10 ML: 5 INJECTION INTRAVENOUS at 08:12

## 2021-01-01 RX ADMIN — SODIUM CHLORIDE, PRESERVATIVE FREE 10 ML: 5 INJECTION INTRAVENOUS at 20:26

## 2021-01-01 RX ADMIN — BUMETANIDE 2 MG: 2 TABLET ORAL at 08:38

## 2021-01-01 RX ADMIN — APIXABAN 5 MG: 5 TABLET, FILM COATED ORAL at 09:35

## 2021-01-01 RX ADMIN — ACETAMINOPHEN 650 MG: 325 TABLET, FILM COATED ORAL at 09:32

## 2021-01-01 RX ADMIN — ACETAMINOPHEN 650 MG: 325 TABLET, FILM COATED ORAL at 08:38

## 2021-01-01 RX ADMIN — APIXABAN 5 MG: 5 TABLET, FILM COATED ORAL at 20:32

## 2021-01-01 RX ADMIN — SPIRONOLACTONE 25 MG: 25 TABLET ORAL at 09:32

## 2021-01-01 RX ADMIN — BUMETANIDE 2 MG: 0.25 INJECTION, SOLUTION INTRAMUSCULAR; INTRAVENOUS at 18:40

## 2021-01-01 RX ADMIN — BUMETANIDE 2 MG: 0.25 INJECTION INTRAMUSCULAR; INTRAVENOUS at 02:34

## 2021-01-01 RX ADMIN — ACETAMINOPHEN 650 MG: 325 TABLET, FILM COATED ORAL at 08:10

## 2021-01-01 RX ADMIN — POTASSIUM CHLORIDE 10 MEQ: 750 CAPSULE, EXTENDED RELEASE ORAL at 10:18

## 2021-01-01 RX ADMIN — SODIUM CHLORIDE, PRESERVATIVE FREE 10 ML: 5 INJECTION INTRAVENOUS at 09:35

## 2021-01-01 RX ADMIN — BUMETANIDE 2 MG: 0.25 INJECTION INTRAMUSCULAR; INTRAVENOUS at 15:32

## 2021-01-01 RX ADMIN — BUMETANIDE 2 MG: 2 TABLET ORAL at 08:10

## 2021-01-01 RX ADMIN — METHOHEXITAL SODIUM 30 MG: 500 INJECTION, POWDER, LYOPHILIZED, FOR SOLUTION INTRAMUSCULAR; INTRAVENOUS; RECTAL at 12:07

## 2021-01-01 RX ADMIN — FUROSEMIDE 80 MG: 10 INJECTION, SOLUTION INTRAMUSCULAR; INTRAVENOUS at 10:25

## 2021-01-01 RX ADMIN — SODIUM CHLORIDE, PRESERVATIVE FREE 10 ML: 5 INJECTION INTRAVENOUS at 20:00

## 2021-01-01 RX ADMIN — MILRINONE LACTATE 0.35 MCG/KG/MIN: 0.2 INJECTION, SOLUTION INTRAVENOUS at 09:31

## 2021-01-01 RX ADMIN — SODIUM CHLORIDE, PRESERVATIVE FREE 10 ML: 5 INJECTION INTRAVENOUS at 08:23

## 2021-01-01 RX ADMIN — MILRINONE LACTATE 0.35 MCG/KG/MIN: 0.2 INJECTION, SOLUTION INTRAVENOUS at 19:28

## 2021-01-01 RX ADMIN — ACETAMINOPHEN 650 MG: 325 TABLET, FILM COATED ORAL at 15:53

## 2021-01-01 RX ADMIN — MIDAZOLAM 2 MG: 1 INJECTION INTRAMUSCULAR; INTRAVENOUS at 12:10

## 2021-01-01 RX ADMIN — MILRINONE LACTATE 0.35 MCG/KG/MIN: 0.2 INJECTION, SOLUTION INTRAVENOUS at 12:48

## 2021-01-01 RX ADMIN — ACETAMINOPHEN 650 MG: 325 TABLET, FILM COATED ORAL at 09:34

## 2021-01-01 RX ADMIN — MILRINONE LACTATE 0.35 MCG/KG/MIN: 0.2 INJECTION, SOLUTION INTRAVENOUS at 01:36

## 2021-01-01 RX ADMIN — CEFAZOLIN SODIUM 2 G: 2 INJECTION, SOLUTION INTRAVENOUS at 09:23

## 2021-01-01 RX ADMIN — BUMETANIDE 2 MG: 2 TABLET ORAL at 09:34

## 2021-01-01 RX ADMIN — APIXABAN 5 MG: 5 TABLET, FILM COATED ORAL at 09:32

## 2021-01-01 RX ADMIN — MILRINONE LACTATE 0.35 MCG/KG/MIN: 0.2 INJECTION, SOLUTION INTRAVENOUS at 08:51

## 2021-01-01 RX ADMIN — SODIUM CHLORIDE, PRESERVATIVE FREE 3 ML: 5 INJECTION INTRAVENOUS at 20:00

## 2021-01-01 RX ADMIN — SPIRONOLACTONE 25 MG: 25 TABLET ORAL at 08:38

## 2021-01-01 RX ADMIN — ACETAMINOPHEN 650 MG: 325 TABLET, FILM COATED ORAL at 08:22

## 2021-01-01 RX ADMIN — MULTIPLE VITAMINS W/ MINERALS TAB 1 TABLET: TAB at 08:23

## 2021-01-18 PROBLEM — R73.03 PREDIABETES: Status: ACTIVE | Noted: 2021-01-01

## 2021-01-26 NOTE — PROGRESS NOTES
[facility]    [facility] [address]  [place] [address]    Name: Akshat Winkler  : 1955  Patient Care Team:  Provider, No Known as PCP - Lucian Day IV, MD as Consulting Physician (Cardiology)  Mandy Clark PA-C as Physician Assistant (Physician Assistant)  Josh Moss MD as Consulting Physician (Pain Medicine)  Hannah Leonard APRN as Nurse Practitioner (Cardiology)  Akshat Davidson MD as Surgeon (Neurosurgery)    Chief Complaint:   Chief Complaint   Patient presents with   • Obesity      HPI:   Mr. Akshat Winkler is a 66 y.o. male presents for initiation of medically supervised weight loss as recommended by Ethel Joy, cardiology APRN. He would like to lose about 30lb and get rid of his mid-section which causes shortness of breath and fluid problems. He wants to improve mobility and do simple things like shop at [employer] without discomfort. He has two new grandbabies that he wants to spend time with as they grow up.      Weight Histroy   he has not tried any other methods/programs to lose weight. The last time he was at a comfortable weight was 20 years ago when his weight was 230lb.    Lifetime non-pregnant maximum weight:   Weight 1 year ago: 260lb  Weight 5 years ago: 240lb  The following have contributed to weight gain: decreased mobility due to compression fractures in his vertebrae, fast food, wife is a very good cook    The following seem to sabotage weight loss efforts:Lack of time for planning & self, comfort/stress eating, enjoyment of food, boredom eating and specific cravings like carbohydrates    Review of Systems:   Review of Systems   Constitutional: Positive for fatigue and unexpected weight gain.   Respiratory: Positive for shortness of breath. Negative for chest tightness.    Cardiovascular: Positive for leg swelling. Negative for chest pain and palpitations.   Endocrine: Positive for cold intolerance. Negative  for polydipsia, polyphagia and polyuria.   Neurological: Negative for memory problem.   Psychiatric/Behavioral: Negative for sleep disturbance and depressed mood.       History:    Past Medical History:   Diagnosis Date   • Acute diastolic HF (heart failure) (CMS/[facility])    • Acute hypokalemia    • Arrhythmia    • Back injury     Fall on 12/20/17   • Cellulitis of leg    • Chest pain syndrome    • CHF (congestive heart failure) (CMS/[facility])    • Diabetes mellitus (CMS/[facility])     patient reports borderline    • Hyperlipidemia    • Hypertension    • Muscle pain     around back   • Obesity    • Obstructive sleep apnea     right now patient is sleeeping in recliner and does not use-when he lies down he will have to use cpap   • Paroxysmal A-fib (CMS/[facility])    • Peripheral artery disease (CMS/[facility])    • Spondylosis of 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 or radiculopathy 2/7/2018   • Wears glasses        Past Surgical History:   Procedure Laterality Date   • CARDIAC CATHETERIZATION     • CARDIOVERSION      multiple   • COLONOSCOPY      about 14 years ago   • KYPHOPLASTY N/A 3/5/2018    Procedure: KYPHOPLASTY L4;  Surgeon: Akshat Davidson MD;  Location:  [facility];  Service:    • KYPHOPLASTY N/A 4/16/2018    Procedure: KYPHOPLASTY L1 AND L2;  Surgeon: Akshat Davidson MD;  Location:  [facility];  Service: Neurosurgery   • OTHER SURGICAL HISTORY  06/29/2015    PVA       Family History   Problem Relation Age of Onset   • Hypertension Mother    • Stroke Mother    • Stroke Father    • Sleep disorder Father    • Alzheimer's disease Father    • Hyperlipidemia Father    • Hypertension Father    • Prostate cancer Brother        Social History     Socioeconomic History   • Marital status:      Spouse name: Not on file   • Number of children: Not on file   • Years of education: Not on file   • Highest education level: Not on file   Tobacco Use   • Smoking status: Never Smoker   • Smokeless tobacco: Never Used   Substance and Sexual Activity    • Alcohol use: No   • Drug use: No   • Sexual activity: Defer   Social History Narrative    Patient lives at home with his wife and denies caffeine intake.       Allergies   Allergen Reactions   • Bisoprolol Other (See Comments)     Severe Hypotension   • Flecainide Other (See Comments)     Syncope / collapse   • Metoprolol Other (See Comments)      Bradycardia, Severe Hypotension   • Tikosyn [Dofetilide] Other (See Comments)     Wide complex tachycardia         Current Outpatient Medications:   •  acetaminophen (TYLENOL) 325 MG tablet, Take 650 mg by mouth Daily., Disp: , Rfl:   •  apixaban (Eliquis) 5 MG tablet tablet, Take 1 tablet by mouth Every 12 (Twelve) Hours., Disp: 180 tablet, Rfl: 3  •  bumetanide (BUMEX) 2 MG tablet, Take 1 tablet by mouth Every Morning. On less busy days at work, Disp: 90 tablet, Rfl: 1  •  diphenhydrAMINE-acetaminophen (TYLENOL PM)  MG tablet per tablet, Take 1 tablet by mouth Every Night., Disp: , Rfl:   •  metOLazone (ZAROXOLYN) 2.5 MG tablet, Take 1 tablet by mouth 2 (Two) Times a Week. WEEKENDS (Patient taking differently: Take 2.5 mg by mouth. WEEKENDS), Disp: 20 tablet, Rfl: 2  •  potassium chloride 10 MEQ CR tablet, Take 3 tablets by mouth Daily., Disp: 90 tablet, Rfl: 3  •  rosuvastatin (CRESTOR) 20 MG tablet, Take 1 tablet by mouth Daily., Disp: 90 tablet, Rfl: 3  •  spironolactone (Aldactone) 100 MG tablet, Take 1 tablet by mouth Daily., Disp: 30 tablet, Rfl: 5      Exercise:      Current FITT Score      Frequency   Intensity Time Strength Training   []   0 None  []   0 None  []   0 None  []   0 None    []   1 (1-2x/week) [x]   1 (light) []   1 (<10 min) []   1 (1x/week)   [x]   2 (3-5x/week) []   2 (moderate) []   2 (10-20 min) [x]   2 (2x/week)   []   3 (daily)   []   3 (moderately hard)  []   4 (very hard) []   3 (20-30 min)  [x]   4 (>30 min) []   3 (3-4x/week)         Water: 100 oz/day  Alcohol: CAGE is negative   Other beverages:  diet soda or sugar free  "gatorade occasionally    PHQ-9 Total Score: 3      VS   Vitals:    01/26/21 1318   BP: 129/94   Pulse: (!) 131   Resp: 16   Temp: 97.5 °F (36.4 °C)   TempSrc: Infrared   SpO2: 98%   Weight: 124 kg (272 lb 8 oz)   Height: 166.4 cm (65.5\")       Start Weight/BMI:  Patient's Weight Loss Goal:     Body composition analysis completed and showed:   %body fat:  Total fat mass (in lbs):  Fat free mass (in lbs):  Total body water (in lb):  BMR:     Measurements (in inches)  Neck: 17.5  Chest: 54\"  Waist: 58.5\"  Hips: 51\"  Thighs: 41.5\"    Physical Exam:  General:  well developed; well nourished  no acute distress  central and gluteal obesity   Somewhat unsteady gait, slight shortness of air with exertion   Skin:  nailbeds cyaotic, skin ashy   Thyroid: normal to inspection and palpation   Lungs:  breathing is unlabored  clear to auscultation bilaterally   Heart:  regular rate and rhythm, S1, S2 normal, no murmur, click, rub or gallop  tachycardia   Abdomen: Severe central obesity   Neuro: normal without focal findings, mental status, speech normal, alert and oriented x3 and ALEXANDRU   Extremeties 2+ pitting edema lower extremities   Psych: oriented to time, place and person, mood and affect are within normal limits, pt is a good historian; no memory problems were noted        Results Review:    I have reviewed and discussed with the patient the most recent labs.    ASSESSMENT/PLAN:   Patient viewed educational videos. Topics included obesity as a disease, nutritional education on food groups, exercise, and medications. Patient was instructed in adequate protein, controlled carb and controlled fat intake.   Patient received instructions on using the medicines as a tool in controlling their weight with nutritional and behavioral changes. Risks and benefits were discussed. I believe the potential benefits of medication helping to decrease weight outweighs the risks. Patient is to try nutritonal/behavioral changes only first. "   Patient received our clinic education booklet.   Our patient consent form was reviewed including potential risks of weight loss. We also reviewed our confidentiality and HIPPA statements. Patients current FITT score was reviewed along with current capability for exercise tolerance and a patient will work towards a FITT score of:     Frequency   Intensity Time Strength Training   []   0 None  []   0 None  []   0 None  [x]   0 None    []   1 (1-2x/week) [x]   1 (light) []   1 (<10 min) []   1 (1x/week)   [x]   2 (3-5x/week) []   2 (moderate) [x]   2 (10-20 min) []   2 (2x/week)   []   3 (daily)   []   3 (moderately hard)  []   4 (very hard) []   3 (20-30 min)  []   4 (>30 min) []   3 (3-4x/week)       Patient's past medical history was reviewed in detail and barriers to weight loss were identified and discussed. Past efforts at weight reduction on their own as well as under physician supervision were documented and discussed.  I advised patient to continue routine care with their Primary Care Provider.     Nutritional recommendations and goals were reviewed including Calories:4014-0949 daily adjusted for exercise calories burnt, Protein: g daily, Net carbs (total carb - fiber) of 50-75g per day.    Diagnoses and all orders for this visit:    1. Class 3 severe obesity due to excess calories with serious comorbidity and body mass index (BMI) of 40.0 to 44.9 in adult (CMS/[facility]) (Primary)    2. Prediabetes  Comments:  5-10% weight loss should help. Consider metformin.     3. Chronic diastolic heart failure (CMS/[facility])  Comments:  Consider cardiac rehab if intolerant to exercise     Treatment Plan  Non-Weight Loss Goals: Improved blood glucose: has been addressed., Improved blood pressure: has been addressed., Improved mobility: has been addressed. , Improved energy: has been addressed.  and Improved sleep apnea: has been addressed.   Initial goal of 5-10% loss of beginning body weight  1.) trade CHO-rich breakfast  for protein-rich breakfast  2.) MR shake and nuts 3-5 days a week for lunch, set boundaries around dining out  3.) Set boundaries around sweets and chips  4.) 5.5 bottles of water a day  5.) set up bicycle and start exercise. Advised to stop with any significant exercise intolerance, may need to consider cardiac rehab.  6.) Be mindful of evening portions- using smaller plate  7.) Get fasting labs next visit if needed, waiting on records from nephrology     Goals:  1. Personal Goals: Lose 30lb, be around for granddaughters to grow up, improve mobility  2. Start changing nutrition to examples given to meet nutritional macronutrient individual ranges discussed. Titrate down 500 calories every 5 days as tolerated until range met. Titrate down 50g carbohydrates every 5 days as tolerated until range met. Inc exercise to the individualized FITT score discussed. Start to keep a food journal and bring into next visit in 2 weeks for review. Practice the behavioral modification technique of mindful eating. Take one MVI daily. Take other medications and supplements as directed.    Return in about 2 weeks (around 2/9/2021) for Labs next visit.      FAITH Squires

## 2021-02-02 NOTE — PROGRESS NOTES
[facility] [place]   [facility] [address]  [place] [address]    Name: Akshat Winkler  : 1955  Patient Care Team:  Provider, No Known as PCP - General  Lucian Alvarez IV, MD as Consulting Physician (Cardiology)  Mandy Clark PA-C as Physician Assistant (Physician Assistant)  Josh Moss MD as Consulting Physician (Pain Medicine)  Hannah Leonard APRN as Nurse Practitioner (Cardiology)  Akshat Davidson MD as Surgeon (Neurosurgery)    Chief Complaint;:   Chief Complaint   Patient presents with   • Obesity     Class 3 severe obesity due to excess calories with serious comorbidity and body mass index (BMI) of 40.0 to 44.9 in adult       HPI      Office visit for Akshat Winkler, a 66 y.o. male, established patient with class 3 obesity, for medical weight loss. Patient is unsure with weight loss progress. Hunger control has remain unchanged. He has been working on changes we discussed at his first visit. Set up his stationary bike, got on it once for 5-10 minutes. Tolerated well but states he was sore the next day.     His wife is helping him write down his food, he has been unable to figure out how to use the [employer] iggy and requests help today. We entered the following from Saturday's food log:  B: [employer] 2 eggs and 1 sausage shaun (skipped the biscuit), 8oz orange juice  L: grilled chicken and roasted vegetables (squash, peppers, potatoes)  D: 2 beef enchiladas, rice, refried beans, about 10 chips with salsa, 2 margaritas  S: apple cobbler and ice cream  Calories were estimated at about 2500. Protein accounted for <20% of his calories.  He is eating small salad with chicken at [employer] for lunch most days. Did make egg burritos for breakfast and enjoys those, but had a bagel with cream cheese and orange juice most days. His wife works at [employer] and brings home extra that is going to be thrown away at the store. He  "states that he was more mindful of portions- limited himself to 1-2 chocolate covered grapes instead of 1/2 the box (about 20 grapes). Did increase water to 4-5 bottles a day and is watching his portions.    He was less active than usual yesterday and did not get to take his PRN bumex. He states he is retaining fluid and plans on using that today. Reports no side effects of prescribed medications today.      Body mass index is 45.48 kg/m².     The patient is exercising with a FITT score of:    Frequency   Intensity Time Strength Training   []   0 None  []   0 None  []   0 None  [x]   0 None    [x]   1 (1-2x/week) [x]   1 (light) [x]   1 (<10 min) []   1 (1x/week)   []   2 (3-5x/week) []   2 (moderate) []   2 (10-20 min) []   2 (2x/week)   []   3 (daily)   []   3 (moderately hard)  []   4 (very hard) []   3 (20-30 min)  []   4 (>30 min) []   3 (3-4x/week)       Change in weight since last visit: +5lb    Recent Weight History:   Wt Readings from Last 3 Encounters:   02/02/21 126 kg (277 lb 8 oz)   01/26/21 124 kg (272 lb 8 oz)   11/24/20 129 kg (285 lb)     Start Weight: 272lb  Total Loss/%Loss of BBW: +5lb    Body composition analysis completed and showed:   BMR: 2203  %body fat: 36.7%  Total fat mass (in lbs): 102  Fat free mass (in lbs): 175.5  Total body water (in lb):128.5lb    VS   Vitals:    02/02/21 0734   BP: 111/75   Pulse: (!) 122   Resp: 16   Temp: 97.5 °F (36.4 °C)   TempSrc: Infrared   SpO2: 98%   Weight: 126 kg (277 lb 8 oz)   Height: 166.4 cm (65.5\")       Measurements (in inches)  Waist: 59.25\"    Past Medical History:   Diagnosis Date   • Acute diastolic HF (heart failure) (CMS/HCC)    • Acute hypokalemia    • Arrhythmia    • Back injury     Fall on 12/20/17   • Cellulitis of leg    • Chest pain syndrome    • CHF (congestive heart failure) (CMS/HCC)    • Diabetes mellitus (CMS/HCC)     patient reports borderline    • Hyperlipidemia    • Hypertension    • Muscle pain     around back   • Obesity    • " Obstructive sleep apnea     right now patient is sleeeping in recliner and does not use-when he lies down he will have to use cpap   • Paroxysmal A-fib (CMS/[facility])    • Peripheral artery disease (CMS/[facility])    • Spondylosis of 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 or radiculopathy 2/7/2018   • Wears glasses        Patient Active Problem List   Diagnosis   • Chronic diastolic heart failure (CMS/[facility])   • Type 2 diabetes mellitus without complication, without long-term current use of insulin (CMS/[facility])   • Hyperlipidemia LDL goal <70   • Essential hypertension   • Coronary artery disease involving native coronary artery of native heart with angina pectoris (CMS/[facility])   • Severe obstructive sleep apnea, no CPAP   • Class 3 severe obesity due to excess calories with serious comorbidity and body mass index (BMI) of 40.0 to 44.9 in adult (CMS/[facility])   • Chronic anticoagulation   • Persistent atrial fibrillation/flutter   • Compression fracture of lumbar vertebra (CMS/[facility])   • Spondylosis of 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 or radiculopathy   • Lumbar stenosis with neurogenic claudication   • Lumbar disc herniation   • Myofascial pain   • Pathologic compression fracture of vertebra (CMS/[facility])   • Chronic bilateral severe lower extremity edema   • Mechanical low back pain   • History of kyphoplasty   • Prediabetes       Allergies   Allergen Reactions   • Bisoprolol Other (See Comments)     Severe Hypotension   • Flecainide Other (See Comments)     Syncope / collapse   • Metoprolol Other (See Comments)      Bradycardia, Severe Hypotension   • Tikosyn [Dofetilide] Other (See Comments)     Wide complex tachycardia         Current Outpatient Medications:   •  acetaminophen (TYLENOL) 325 MG tablet, Take 650 mg by mouth Daily., Disp: , Rfl:   •  apixaban (Eliquis) 5 MG tablet tablet, Take 1 tablet by mouth Every 12 (Twelve) Hours., Disp: 180 tablet, Rfl: 3  •  bumetanide (BUMEX) 2 MG tablet, Take 1 tablet by mouth Every Morning. On less busy  days at work, Disp: 90 tablet, Rfl: 1  •  diphenhydrAMINE-acetaminophen (TYLENOL PM)  MG tablet per tablet, Take 1 tablet by mouth Every Night., Disp: , Rfl:   •  metOLazone (ZAROXOLYN) 2.5 MG tablet, Take 1 tablet by mouth 2 (Two) Times a Week. WEEKENDS (Patient taking differently: Take 2.5 mg by mouth. WEEKENDS), Disp: 20 tablet, Rfl: 2  •  potassium chloride 10 MEQ CR tablet, Take 3 tablets by mouth Daily., Disp: 90 tablet, Rfl: 3  •  rosuvastatin (CRESTOR) 20 MG tablet, Take 1 tablet by mouth Daily., Disp: 90 tablet, Rfl: 3  •  spironolactone (Aldactone) 100 MG tablet, Take 1 tablet by mouth Daily., Disp: 30 tablet, Rfl: 5  •  metFORMIN ER (GLUCOPHAGE-XR) 500 MG 24 hr tablet, Take 1 tablet by mouth 2 (Two) Times a Day With Meals. Take 1 daily with dinner or bedtime for one week, then increase to 2 daily., Disp: 60 tablet, Rfl: 2  •  multivitamin with minerals (Multivitamin Adults) tablet tablet, Take 1 tablet by mouth Daily., Disp: 30 tablet, Rfl: 2    Physical Exam:    General:  .well developed; well nourished  no acute distress  obese    Lungs:  breathing is unlabored  clear to auscultation bilaterally   Heart:  regular rate and rhythm, S1, S2 normal, no murmur, click, rub or gallop     ASSESSMENT/PLAN:   Diagnoses and all orders for this visit:    1. Class 3 severe obesity due to excess calories with serious comorbidity and body mass index (BMI) of 40.0 to 44.9 in adult (CMS/HCC) (Primary)  -     multivitamin with minerals (Multivitamin Adults) tablet tablet; Take 1 tablet by mouth Daily.  Dispense: 30 tablet; Refill: 2    2. Prediabetes  -     metFORMIN ER (GLUCOPHAGE-XR) 500 MG 24 hr tablet; Take 1 tablet by mouth 2 (Two) Times a Day With Meals. Take 1 daily with dinner or bedtime for one week, then increase to 2 daily.  Dispense: 60 tablet; Refill: 2    I have instructed the patient to continue with pursuit of medical weight loss as a part of this program. Patient does not meet criteria for use of  anorectics at this time as CHF. Continue nutritional focus and work towards new exercise FITT goal of:     Frequency   Intensity Time Strength Training   []   0 None  []   0 None  []   0 None  []   0 None    []   1 (1-2x/week) [x]   1 (light) []   1 (<10 min) []   1 (1x/week)   [x]   2 (3-5x/week) []   2 (moderate) [x]   2 (10-20 min) [x]   2 (2x/week)   []   3 (daily)   []   3 (moderately hard)  []   4 (very hard) []   3 (20-30 min)  []   4 (>30 min) []   3 (3-4x/week)       The current plan for this month includes: Continue to be mindful of portions, carbs, and sweets. Protein for breakfast. Goal of frozen meal or protein shake for lunch most days of the week. Set boundaries on sweets, limit to weekends. Avoid alcohol. Continue food journal. Start metformin to assist with carb cravings and weight loss. Suggest 2 week follow up but patient prefers one month.    I spent 20 minutes face to face with patient and 15 minutes were spent counseling the patient on obesity and behavior change.     Plan of care reviewed with the patient at the conclusion of today's visit. We discussed the risks, benefits, and limitations of treatments. Continue medications and OTC supplements as discussed. Patient verbalizes understanding of and agreement with management plan.      Return in about 1 month (around 3/2/2021) for Next scheduled follow up.      FAITH Squires

## 2021-03-04 NOTE — THERAPY EVALUATION
Outpatient Rehabilitation - Wound/Debridement Initial Eval   [place]     Patient Name: Akshat Winkler  : 1955  MRN: [id number]  Today's Date: 3/4/2021                  Admit Date: 3/4/2021    Visit Dx:    ICD-10-CM ICD-9-CM   1. Open wound of right lower extremity, subsequent encounter  S81.801D V58.89     891.0   2. Bilateral lower extremity edema  R60.0 782.3       Patient Active Problem List   Diagnosis   • Chronic diastolic heart failure (CMS/HCC)   • Type 2 diabetes mellitus without complication, without long-term current use of insulin (CMS/HCC)   • Hyperlipidemia LDL goal <70   • Essential hypertension   • Coronary artery disease involving native coronary artery of native heart with angina pectoris (CMS/HCC)   • Severe obstructive sleep apnea, no CPAP   • Class 3 severe obesity due to excess calories with serious comorbidity and body mass index (BMI) of 40.0 to 44.9 in adult (CMS/[facility])   • Chronic anticoagulation   • Persistent atrial fibrillation/flutter   • Compression fracture of lumbar vertebra (CMS/[facility])   • Spondylosis of 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 or radiculopathy   • Lumbar stenosis with neurogenic claudication   • Lumbar disc herniation   • Myofascial pain   • Pathologic compression fracture of vertebra (CMS/HCC)   • Chronic bilateral severe lower extremity edema   • Mechanical low back pain   • History of kyphoplasty   • Prediabetes        Past Medical History:   Diagnosis Date   • Acute diastolic HF (heart failure) (CMS/[facility])    • Acute hypokalemia    • Arrhythmia    • Back injury     Fall on 17   • Cellulitis of leg    • Chest pain syndrome    • CHF (congestive heart failure) (CMS/[facility])    • Diabetes mellitus (CMS/[facility])     patient reports borderline    • Hyperlipidemia    • Hypertension    • Muscle pain     around back   • Obesity    • Obstructive sleep apnea     right now patient is sleeeping in recliner and does not use-when he lies down he will have to use cpap   •  Paroxysmal A-fib (CMS/[facility])    • Peripheral artery disease (CMS/[facility])    • Spondylosis of 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 or radiculopathy 2/7/2018   • Wears glasses         Past Surgical History:   Procedure Laterality Date   • CARDIAC CATHETERIZATION     • CARDIOVERSION      multiple   • COLONOSCOPY      about 14 years ago   • KYPHOPLASTY N/A 3/5/2018    Procedure: KYPHOPLASTY L4;  Surgeon: Akshat Davidson MD;  Location:  [facility];  Service:    • KYPHOPLASTY N/A 4/16/2018    Procedure: KYPHOPLASTY L1 AND L2;  Surgeon: Akshat Davidson MD;  Location:  [facility];  Service: Neurosurgery   • OTHER SURGICAL HISTORY  06/29/2015    PVA       Patient History     Row Name 03/04/21 0845             History    Chief Complaint  Ulcer, wound or other skin conditions;Swelling;Pain  -      Type of Pain  Lower Extremity / Leg  -      Brief Description of Current Complaint  Pt with open wound to the posterior RLE that he reports started about the size of a quarter about two weeks ago, and has gotten much larger. He has baseline BLE edema d/t heart issues. Pt is not on abx. Has been wearing compressogrips since his previous episode with wound care.   -      Previous treatment for THIS PROBLEM  Other (comment);Medication diuretics, blood thinners, compressogrips  -      Patient/Caregiver Goals  Decrease swelling;Heal wound;Relieve pain  -      Patient's Rating of General Health  Fair  -      Occupation/sports/leisure activities  Works FT  -      Patient seeing anyone else for problem(s)?  PCP, cardiologist   -      How has patient tried to help current problem?  Elevation and diuretics for BLE edema, has been keeping the RLE wound dry  -      Related/Recent Hospitalizations  No  -         Pain     Pain Location  Leg  -      Pain at Present  5  -         Services    Are you currently receiving Home Health services  No  -      Do you plan to receive Home Health services in the near future  No  -          Daily Activities    Primary Language  English  -      How does patient learn best?  Listening;Demonstration  -      Teaching needs identified  Management of Condition  -      Patient is concerned about/has problems with  Other (comment) wound, edema management  -      Does patient have problems with the following?  None  -      Barriers to learning  None  -      Pt Participated in POC and Goals  Yes  -         Safety    Are you being hurt, hit, or frightened by anyone at home or in your life?  No  -MC      Are you being neglected by a caregiver  No  -        User Key  (r) = Recorded By, (t) = Taken By, (c) = Cosigned By    Initials Name Provider Type    Neva Schmidt PT Physical Therapist          EVALUATION  PT Ortho     Row Name 03/04/21 0845       Subjective Pain    Able to rate subjective pain?  yes  -MC    Pre-Treatment Pain Level  5  -MC    Post-Treatment Pain Level  5  -MC       RLE Quick Girth (cm)    Smallest ankle  25.8 cm  -MC    Tib tuberosity  49.5 cm  -MC    RLE Quick Girth Total  75.3  -MC       Transfers    Sit-Stand [place] (Transfers)  independent  -MC    Stand-Sit [place] (Transfers)  independent  -    Comment (Transfers)  seated for tx  -       Gait/Stairs (Locomotion)    [place] Level (Gait)  independent  -      User Key  (r) = Recorded By, (t) = Taken By, (c) = Cosigned By    Initials Name Provider Type    Neva Schmidt PT Physical Therapist        LDA Wound     Row Name 03/04/21 0845             [REMOVED] Wound 08/14/18 0800 Right leg    Wound - Properties Group Placement Date: 08/14/18  -MW Placement Time: 0800  -MW Side: Right  -MW Location: leg  -MW Removal Date: 03/04/21  -MC Removal Time: 0845  -MC Wound Outcome: Healed  -MC Type: ulceration, venous  -MW    Retired Wound - Properties Group Date first assessed: 08/14/18  -MW Time first assessed: 0800  -MW Side: Right  -MW Retired Orientation: proximal;medial  -MW Location: leg  -MW  "Resolution Date: 03/04/21  - Resolution Time: 0845 - Wound Outcome: Healed  -MC Present On Admission : picture taken  - Type: ulceration, venous  -       Wound 03/04/21 0845 Right posterior calf Venous Ulcer    Wound - Properties Group Placement Date: 03/04/21  - Placement Time: 0845  - Present on Hospital Admission: Y  - Side: Right  - Orientation: posterior  - Location: calf  - Primary Wound Type: Venous ulcer  -    Dressing Appearance  open to air  -      Base  dry;scab;yellow;red  -      Periwound  intact;swelling;edematous  -      Periwound Temperature  cool  -      Periwound Skin Turgor  firm  -      Edges  irregular;open  -      Wound Length (cm)  2.8 cm  -      Wound Width (cm)  5.2 cm  -      Wound Depth (cm)  -- obscured by slough/scab  -      Drainage Characteristics/Odor  sanguineous  -      Drainage Amount  scant  -      Care, Wound  cleansed with;wound cleanser;debrided;honey applied  -      Dressing Care  dressing applied;silver impregnated;low-adherent;foam;border dressing;multi-layer wrap honey, mepilex Ag, 6\" optifoam, MLW  -      Periwound Care  cleansed with pH balanced cleanser;dry periwound area maintained  -      Retired Wound - Properties Group Date first assessed: 03/04/21  - Time first assessed: 0845  - Present on Hospital Admission: Y  - Side: Right  - Location: calf  - Primary Wound Type: Venous ulcer  -      User Key  (r) = Recorded By, (t) = Taken By, (c) = Cosigned By    Initials Name Provider Type    Ashley Armas, PT Physical Therapist     Neva Burkett, PT Physical Therapist     Neva Burkett, PT Physical Therapist        Lymphedema     Row Name 03/04/21 0845             Lymphedema Edema Assessment    Ptting Edema Category  By severity  -      Pitting Edema  Moderate  -         Skin Changes/Observations    Lower Extremity Conditions  right:;clean;dry;shiny;hairless;fragile  -      Lower " Extremity Color/Pigment  right:;purple;hyperpigmented  -         Lymphedema Pulses/Capillary Refill    Lower Extremity Capillary Refill  right:;less than 3 seconds  -         Compression/Skin Care    Compression/Skin Care  skin care;wrapping location;bandaging  -      Skin Care  washed/dried  -      Wrapping Location  lower extremity  -      Wrapping Location LE  right:;foot to knee  -      Wrapping Comments  wound dressings, compressogrip size 4/6 doubled and overlapping to create gradient compression.  -      Bandage Layers  cotton elastic stocking- double layer (comment size)  -      Bandaging Technique  light compression  -        User Key  (r) = Recorded By, (t) = Taken By, (c) = Cosigned By    Initials Name Provider Type    Neva Schmidt, PT Physical Therapist          WOUND DEBRIDEMENT  Total area of Debridement: 1 cm2  Debridement Site 1  Location- Site 1: RLE wound  Selective Debridement- Site 1: Wound Surface <20cmsq  Instruments- Site 1: #15, scapel, tweezers  Excised Tissue Description- Site 1: scant, slough, necrotic, other (comment)(scab)  Bleeding- Site 1: scant, held pressure, 1 minute             Therapy Education     Row Name 03/04/21 5592             Therapy Education    Education Details  Reviewed s/sx of infection and PT role in wound care. Change dressings every 2-3 days. Clean with theraworx foam and gauze, apply honey, silver foam, and optifoam. Continue use of compressogrip for edema.  -      Given  Edema management;Symptoms/condition management;Bandaging/dressing change  -      Program  New  -      How Provided  Verbal;Demonstration  -MC      Provided to  Patient  -      Level of Understanding  Teach back education performed;Verbalized  -        User Key  (r) = Recorded By, (t) = Taken By, (c) = Cosigned By    Initials Name Provider Type    Neva Schmidt, PT Physical Therapist          Recommendation and Plan  PT Assessment/Plan     Row Name  21 0845          PT Assessment    Functional Limitations  Performance in work activities;Performance in self-care ADL;Limitation in home management;Impaired gait;Other (comment) wound mgmt, edema management  -     Impairments  Edema;Impaired lymphatic circulation;Impaired venous circulation;Integumentary integrity;Pain  -     Assessment Comments  Pt returns to PT wound care with new open wound to the posterior R calf x2-3 weeks. Etiology unknown, but pt has baseline BLE edema with venous stasis, moderately-well controlled with MLW patient maintains at home. PT unable to debride much necrotic tissue today d/t extremely dry wound bed and patient c/o pain. Added therahoney to advanced wound dressings to promote a moist, antimicrobial healing environment. Pt will benefit from skilled PT wound care for debridement, advanced dressings management, and other appropriate interventions to promote healing.  -     Rehab Potential  Good  -     Patient/caregiver participated in establishment of treatment plan and goals  Yes  -     Patient would benefit from skilled therapy intervention  Yes  -        PT Plan    PT Frequency  1x/week;2x/week  -     Predicted Duration of Therapy Intervention (PT)  12 visits  -     Planned CPT's?  PT EVAL LOW COMPLEXITY: 65845;PT SELF CARE/HOME MGMT/TRAIN EA 15: 77789;PT NONSELECT DEBRIDE 15 MIN: 13008;PT STEFANEI DEBRIDE OPEN WOUND UP TO 20 CM: 54933;PT NLFU MIST: 03160;PT UNNA BOOT: 95869;PT MULTI LAYER COMP SYS LE;PT THER SUPP EA 15 MIN  -     Physical Therapy Interventions (Optional Details)  wound care;patient/family education  -     PT Plan Comments  RLE wound debridement, RLE MLW.  -       User Key  (r) = Recorded By, (t) = Taken By, (c) = Cosigned By    Initials Name Provider Type     Neva Burkett, PT Physical Therapist            Goals  PT OP Goals     Row Name 21 0845          PT Short Term Goals    STG 1  Pt will verbalize s/sx of infection.   -     STG 1 Progress  New  -     STG 2  Pt will demonstrate 25% reduction in wound area to indicate healing progress.  -     STG 2 Progress  New  -     STG 3  Pt will demonstrate 1 cm decrease in BLE girth to indicate progress with edema management.  -     STG 3 Progress  New  -        Long Term Goals    LTG 1  Pt will demonstrate independence with clean home dressing changes and an appropriate compression management system.  -     LTG 1 Progress  New  -     LTG 2  Pt will demonstrate 75% reduction in wound area to indicate healing progress.  -     LTG 2 Progress  New  -     LTG 3  Pt will demonstrate 3 cm decrease in BLE girth to indicate progress with edema management.  -     LTG 3 Progress  New  -        Time Calculation    PT Goal Re-Cert Due Date  06/02/21  -       User Key  (r) = Recorded By, (t) = Taken By, (c) = Cosigned By    Initials Name Provider Type    Neva Schmidt, PT Physical Therapist          Time Calculation: Start Time: 0845  Therapy Charges for Today     Code Description Service Date Service Provider Modifiers Qty    04449620638 HC PT EVAL LOW COMPLEXITY 4 3/4/2021 Neva Burkett, PT GP 1    11585205156 HC STEFANIE DEBRIDE OPEN WOUND UP TO 20CM 3/4/2021 Neva Burkett, PT GP 1    97625367241 HC PT MULTI LAYER COMP SYS BELOW KNEE 3/4/2021 Neva Burkett, PT GP 1                Neva Burkett, PT  3/4/2021

## 2021-03-13 NOTE — THERAPY WOUND CARE TREATMENT
Outpatient Rehabilitation - Wound/Debridement Treatment Note   Danilo     Patient Name: Akshat Winkler  : 1955  MRN: [id number]  Today's Date: 3/13/2021                 Admit Date: 3/13/2021    Visit Dx:    ICD-10-CM ICD-9-CM   1. Open wound of right lower extremity, subsequent encounter  S81.801D V58.89     891.0   2. Bilateral lower extremity edema  R60.0 782.3       Patient Active Problem List   Diagnosis   • Chronic diastolic heart failure (CMS/HCC)   • Type 2 diabetes mellitus without complication, without long-term current use of insulin (CMS/[facility])   • Hyperlipidemia LDL goal <70   • Essential hypertension   • Coronary artery disease involving native coronary artery of native heart with angina pectoris (CMS/[facility])   • Severe obstructive sleep apnea, no CPAP   • Class 3 severe obesity due to excess calories with serious comorbidity and body mass index (BMI) of 40.0 to 44.9 in adult (CMS/[facility])   • Chronic anticoagulation   • Persistent atrial fibrillation/flutter   • Compression fracture of lumbar vertebra (CMS/[facility])   • Spondylosis of 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 or radiculopathy   • Lumbar stenosis with neurogenic claudication   • Lumbar disc herniation   • Myofascial pain   • Pathologic compression fracture of vertebra (CMS/[facility])   • Chronic bilateral severe lower extremity edema   • Mechanical low back pain   • History of kyphoplasty   • Prediabetes        Past Medical History:   Diagnosis Date   • Acute diastolic HF (heart failure) (CMS/[facility])    • Acute hypokalemia    • Arrhythmia    • Back injury     Fall on 17   • Cellulitis of leg    • Chest pain syndrome    • CHF (congestive heart failure) (CMS/[facility])    • Diabetes mellitus (CMS/[facility])     patient reports borderline    • Hyperlipidemia    • Hypertension    • Muscle pain     around back   • Obesity    • Obstructive sleep apnea     right now patient is sleeeping in recliner and does not use-when he lies down he will have to use cpap   •  Paroxysmal A-fib (CMS/HCC)    • Peripheral artery disease (CMS/HCC)    • Spondylosis of 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 or radiculopathy 2/7/2018   • Wears glasses         Past Surgical History:   Procedure Laterality Date   • CARDIAC CATHETERIZATION     • CARDIOVERSION      multiple   • COLONOSCOPY      about 14 years ago   • KYPHOPLASTY N/A 3/5/2018    Procedure: KYPHOPLASTY L4;  Surgeon: Akshat Davidson MD;  Location:  [facility];  Service:    • KYPHOPLASTY N/A 4/16/2018    Procedure: KYPHOPLASTY L1 AND L2;  Surgeon: Akshat Davidson MD;  Location:  [facility];  Service: Neurosurgery   • OTHER SURGICAL HISTORY  06/29/2015    PVA         EVALUATION  PT Ortho     Row Name 03/13/21 1100       Subjective Comments    Subjective Comments  Pt states the wrap put on last tx got really tight at the top of his calf.  Reports he showered this AM and left wound JANNY and left compression off due to coming to tx.  -JM       Subjective Pain    Able to rate subjective pain?  yes  -    Pre-Treatment Pain Level  3  -JM    Post-Treatment Pain Level  3  -JM       Transfers    Sit-Stand [place] (Transfers)  independent  -JM    Stand-Sit [place] (Transfers)  independent  -JM    Comment (Transfers)  seated for tx  -JM       Gait/Stairs (Locomotion)    [place] Level (Gait)  independent  -      User Key  (r) = Recorded By, (t) = Taken By, (c) = Cosigned By    Initials Name Provider Type    Zeinab Lopez, PT Physical Therapist          LDA Wound     Row Name 03/13/21 1100             Wound 03/04/21 0845 Right posterior calf Venous Ulcer    Wound - Properties Group Placement Date: 03/04/21  - Placement Time: 0845  - Present on Hospital Admission: Y  - Side: Right  - Orientation: posterior  - Location: calf  - Primary Wound Type: Venous ulcer  -    Wound Image  Images linked: 1  -JM      Dressing Appearance  open to air  -      Base  dry;scab;yellow;red  -      Periwound  intact;swelling;edematous   "-      Periwound Temperature  cool  -      Periwound Skin Turgor  firm  -      Edges  irregular;open  -      Wound Length (cm)  3 cm  -      Wound Width (cm)  5.2 cm  -      Wound Depth (cm)  -- obscured  -      Drainage Characteristics/Odor  serosanguineous  -      Drainage Amount  scant  -      Care, Wound  cleansed with;wound cleanser;debrided;honey applied  -      Dressing Care  dressing applied;collagen;silver impregnated;foam;low-adherent;border dressing;multi-layer wrap honey, diane, mepilex ag, 6\" optifoam, MLW  -      Periwound Care  cleansed with pH balanced cleanser;dry periwound area maintained  -      Retired Wound - Properties Group Date first assessed: 03/04/21  - Time first assessed: 0845  - Present on Hospital Admission: Y  - Side: Right  - Location: calf  - Primary Wound Type: Venous ulcer  -      User Key  (r) = Recorded By, (t) = Taken By, (c) = Cosigned By    Initials Name Provider Type     Neva Burkett, PT Physical Therapist     Zeinab Cleveland, PT Physical Therapist        Lymphedema     Row Name 03/13/21 1100             Lymphedema Edema Assessment    Ptting Edema Category  By severity  -      Pitting Edema  Moderate  -         Skin Changes/Observations    Lower Extremity Conditions  right:;clean;dry;shiny;hairless;fragile  -      Lower Extremity Color/Pigment  right:;purple;hyperpigmented  -         Lymphedema Pulses/Capillary Refill    Lower Extremity Capillary Refill  right:;less than 3 seconds  -         RLE Quick Girth (cm)    Mid foot  26 cm  -      Smallest ankle  28.5 cm  -      Tib tuberosity  49 cm  -      RLE Quick Girth Total  103.5  -         Compression/Skin Care    Compression/Skin Care  skin care;wrapping location;bandaging  -      Skin Care  washed/dried  -      Wrapping Location  lower extremity  -      Wrapping Location LE  right:;foot to knee  -      Wrapping Comments  wound dressings, " compressogrip size 4/6 doubled and overlapping to create gradient compression  -      Bandage Layers  cotton elastic stocking- double layer (comment size)  -TOÑO      Bandaging Technique  light compression  -        User Key  (r) = Recorded By, (t) = Taken By, (c) = Cosigned By    Initials Name Provider Type    Zeinab Lopez, PT Physical Therapist          WOUND DEBRIDEMENT  Total area of Debridement: 2cmsq  Debridement Site 1  Location- Site 1: RLE wound  Selective Debridement- Site 1: Wound Surface <20cmsq  Instruments- Site 1: #15, scapel, tweezers  Excised Tissue Description- Site 1: scant, slough (limited by dry adherence of tissues)  Bleeding- Site 1: scant             Therapy Education     Row Name 03/13/21 1100             Therapy Education    Education Details  Reinforced need to keep dressings in place and not leave wound JANNY, also to consistently use compression to maintain reduction in edema and assist with wound healing.  Addition of diane to wound, continue with therahoney, ag foam, and optifoam dressings.  -JM      Given  Edema management;Symptoms/condition management;Bandaging/dressing change  -      Program  Reinforced;Modified  -TOÑO      How Provided  Verbal;Demonstration  -JM      Provided to  Patient  -      Level of Understanding  Teach back education performed;Verbalized  -        User Key  (r) = Recorded By, (t) = Taken By, (c) = Cosigned By    Initials Name Provider Type    Zeinab Lopez, PT Physical Therapist          Recommendation and Plan  PT Assessment/Plan     Row Name 03/13/21 1100          PT Assessment    Functional Limitations  Performance in work activities;Performance in self-care ADL;Limitation in home management;Impaired gait;Other (comment) wound mgmt, edema management  -     Impairments  Edema;Impaired lymphatic circulation;Impaired venous circulation;Integumentary integrity;Pain  -     Assessment Comments  Wound dimensions stable, but wound bed  with increase in beefy red color, although wound bed dry this tx due to pt leaving wound JANNY, limiting debridement.  PT reinforced need to keep wound moist and covered.  Continued therahoney to assist with debridement and maintain moisture of tissues, but added diane to assist with wound healing.  Continued MLW for edema management.  -        PT Plan    PT Frequency  1x/week  -     Physical Therapy Interventions (Optional Details)  patient/family education;wound care  -     PT Plan Comments  debridement, MLW RLE  -       User Key  (r) = Recorded By, (t) = Taken By, (c) = Cosigned By    Initials Name Provider Type    Zeinab Lopez, PT Physical Therapist          Goals  PT OP Goals     Row Name 03/13/21 1100          Time Calculation    PT Goal Re-Cert Due Date  06/02/21  -       User Key  (r) = Recorded By, (t) = Taken By, (c) = Cosigned By    Initials Name Provider Type    Zeinab Lopez, PT Physical Therapist          PT Goal Re-Cert Due Date: 06/02/21            Time Calculation: Start Time: 1100  Therapy Charges for Today     Code Description Service Date Service Provider Modifiers Qty    59802340219 HC STEFANIE DEBRIDE OPEN WOUND UP TO 20CM 3/13/2021 Zeinab Cleveland, PT GP 1    44016458226 HC PT MULTI LAYER COMP SYS BELOW KNEE 3/13/2021 Zeinab Cleveland, PT GP 1                  Zeinab Cleveland, PT  3/13/2021

## 2021-03-20 NOTE — THERAPY WOUND CARE TREATMENT
Outpatient Rehabilitation - Wound/Debridement Treatment Note   Danilo     Patient Name: Akshat Winkler  : 1955  MRN: [id number]  Today's Date: 3/20/2021                 Admit Date: 3/20/2021    Visit Dx:    ICD-10-CM ICD-9-CM   1. Open wound of right lower extremity, subsequent encounter  S81.801D V58.89     891.0   2. Bilateral lower extremity edema  R60.0 782.3       Patient Active Problem List   Diagnosis   • Chronic diastolic heart failure (CMS/HCC)   • Type 2 diabetes mellitus without complication, without long-term current use of insulin (CMS/[facility])   • Hyperlipidemia LDL goal <70   • Essential hypertension   • Coronary artery disease involving native coronary artery of native heart with angina pectoris (CMS/[facility])   • Severe obstructive sleep apnea, no CPAP   • Class 3 severe obesity due to excess calories with serious comorbidity and body mass index (BMI) of 40.0 to 44.9 in adult (CMS/[facility])   • Chronic anticoagulation   • Persistent atrial fibrillation/flutter   • Compression fracture of lumbar vertebra (CMS/[facility])   • Spondylosis of 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 or radiculopathy   • Lumbar stenosis with neurogenic claudication   • Lumbar disc herniation   • Myofascial pain   • Pathologic compression fracture of vertebra (CMS/[facility])   • Chronic bilateral severe lower extremity edema   • Mechanical low back pain   • History of kyphoplasty   • Prediabetes        Past Medical History:   Diagnosis Date   • Acute diastolic HF (heart failure) (CMS/[facility])    • Acute hypokalemia    • Arrhythmia    • Back injury     Fall on 17   • Cellulitis of leg    • Chest pain syndrome    • CHF (congestive heart failure) (CMS/[facility])    • Diabetes mellitus (CMS/[facility])     patient reports borderline    • Hyperlipidemia    • Hypertension    • Muscle pain     around back   • Obesity    • Obstructive sleep apnea     right now patient is sleeeping in recliner and does not use-when he lies down he will have to use cpap   •  Paroxysmal A-fib (CMS/HCC)    • Peripheral artery disease (CMS/HCC)    • Spondylosis of 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 or radiculopathy 2/7/2018   • Wears glasses         Past Surgical History:   Procedure Laterality Date   • CARDIAC CATHETERIZATION     • CARDIOVERSION      multiple   • COLONOSCOPY      about 14 years ago   • KYPHOPLASTY N/A 3/5/2018    Procedure: KYPHOPLASTY L4;  Surgeon: Akshat Davidson MD;  Location:  [facility];  Service:    • KYPHOPLASTY N/A 4/16/2018    Procedure: KYPHOPLASTY L1 AND L2;  Surgeon: Akshat Davidson MD;  Location:  [facility];  Service: Neurosurgery   • OTHER SURGICAL HISTORY  06/29/2015    PVA         EVALUATION  PT Ortho     Row Name 03/20/21 1100       Subjective Comments    Subjective Comments  Pt reports his wife helps him change the dressing; it usually leaks when he showers so it gets changed at least every other day.   -MW       Subjective Pain    Able to rate subjective pain?  yes  -MW    Pre-Treatment Pain Level  3  -MW    Post-Treatment Pain Level  7  -MW    Subjective Pain Comment  Increased pain with debridement and fresh Therahoney   -MW       Transfers    Sit-Stand [place] (Transfers)  independent  -MW    Stand-Sit [place] (Transfers)  independent  -MW    Comment (Transfers)  seated for tx  -MW       Gait/Stairs (Locomotion)    [place] Level (Gait)  independent  -MW      User Key  (r) = Recorded By, (t) = Taken By, (c) = Cosigned By    Initials Name Provider Type    Ashley Armas, PT Physical Therapist          LDA Wound     Row Name 03/20/21 1100             Wound 03/04/21 0845 Right posterior calf Venous Ulcer    Wound - Properties Group Placement Date: 03/04/21  - Placement Time: 0845  - Present on Hospital Admission: Y  - Side: Right  - Orientation: posterior  - Location: calf  - Primary Wound Type: Venous ulcer  -MC    Dressing Appearance  intact;moist drainage  -MW      Base  moist;yellow;slough;pink;red;subcutaneous  -MW       "Periwound  intact;swelling;edematous  -MW      Periwound Temperature  cool  -MW      Periwound Skin Turgor  firm  -MW      Edges  irregular;open  -MW      Drainage Characteristics/Odor  serosanguineous;creamy  -MW      Drainage Amount  moderate  -MW      Care, Wound  cleansed with;wound cleanser;debrided;honey applied  -MW      Dressing Care  dressing applied;collagen;silver impregnated;foam;low-adherent;border dressing;multi-layer wrap Therahoney, [employer]ili [employer], Mepilex AG, 6\" optifoam   -MW      Periwound Care  cleansed with pH balanced cleanser;dry periwound area maintained  -MW      Retired Wound - Properties Group Date first assessed: 03/04/21  - Time first assessed: 0845  - Present on Hospital Admission: Y  - Side: Right  - Location: calf  - Primary Wound Type: Venous ulcer  -      User Key  (r) = Recorded By, (t) = Taken By, (c) = Cosigned By    Initials Name Provider Type     Ashley Nettles, PT Physical Therapist     Neva Burkett, PT Physical Therapist        Lymphedema     Row Name 03/20/21 1100             Lymphedema Edema Assessment    Ptting Edema Category  By severity  -MW      Pitting Edema  Moderate  -MW         Skin Changes/Observations    Lower Extremity Conditions  right:;clean;dry;shiny;hairless;fragile  -MW      Lower Extremity Color/Pigment  right:;purple;hyperpigmented  -MW         Lymphedema Pulses/Capillary Refill    Lower Extremity Capillary Refill  right:;less than 3 seconds  -MW         Compression/Skin Care    Compression/Skin Care  skin care;wrapping location;bandaging  -MW      Skin Care  washed/dried;moisturizing lotion applied Eucerin skin calming   -MW      Wrapping Location  lower extremity  -MW      Wrapping Location LE  right:;foot to knee  -MW      Wrapping Comments  wound dressings, compressogrip size 4/6 doubled and overlapping to create gradient compression  -MW      Bandage Layers  cotton elastic stocking- double layer (comment size)  -MW      Bandaging " Technique  light compression  -MW        User Key  (r) = Recorded By, (t) = Taken By, (c) = Cosigned By    Initials Name Provider Type    Ashley Armas, PT Physical Therapist          WOUND DEBRIDEMENT  Total area of Debridement: ~4 cm2  Debridement Site 1  Location- Site 1: RLE wound  Selective Debridement- Site 1: Wound Surface <20cmsq  Instruments- Site 1: tweezers  Excised Tissue Description- Site 1: moderate, slough  Bleeding- Site 1: seeping, scant             Therapy Education     Row Name 03/20/21 1100             Therapy Education    Education Details  Reviewed dressings as Mepilex AG was wrong side to pt (nonabsorptive side on wound and absorptive side to optifoam). Reviewed benefits of compression and elevation to promote improved skin integrity and wound healing. Pt to don compression sock to LLE daily. Compressogrips to Rt daily.   -MW      Given  Bandaging/dressing change;Edema management;Symptoms/condition management  -MW      Program  Modified;Reinforced  -MW      How Provided  Verbal;Demonstration  -MW      Provided to  Patient  -MW      Level of Understanding  Teach back education performed;Verbalized  -MW        User Key  (r) = Recorded By, (t) = Taken By, (c) = Cosigned By    Initials Name Provider Type    Ashley Armas, PT Physical Therapist          Recommendation and Plan  PT Assessment/Plan     Row Name 03/20/21 1100          PT Assessment    Functional Limitations  Performance in work activities;Performance in self-care ADL;Limitation in home management;Impaired gait;Other (comment) wound mgmt, edema management  -MW     Impairments  Edema;Impaired lymphatic circulation;Impaired venous circulation;Integumentary integrity;Pain  -MW     Assessment Comments  Pt returns with dressing in place, however, Mepilex AG was nonabsoptive side to pt; reviewed proper placement / use of Mepilex AG. Wound moist with creamy red/ yellow drainage from Therahoney debridement. PT able to debride min  to mod amount of yellow slough, though somewhat limited by pain. Pt agreeable to continue use of Therahoney eventhough it burns as it is helping to loosen unhealthy tissues from the wound to allow wound healing; pt also agreeable to continued use of compression to support improved skin integrity and wound healing. Cont PT POC.  -MW     Rehab Potential  Good  -MW     Patient/caregiver participated in establishment of treatment plan and goals  Yes  -MW     Patient would benefit from skilled therapy intervention  Yes  -MW        PT Plan    PT Frequency  1x/week  -     Physical Therapy Interventions (Optional Details)  wound care;patient/family education  -     PT Plan Comments  RLE debridement, dressing management, MLW; compression sock to LLE  -       User Key  (r) = Recorded By, (t) = Taken By, (c) = Cosigned By    Initials Name Provider Type    MW Ashley Nettles, PT Physical Therapist          Goals                   Time Calculation: Start Time: 1100  Therapy Charges for Today     Code Description Service Date Service Provider Modifiers Qty    64228813025 HC STEFANIE DEBRIDE OPEN WOUND UP TO 20CM 3/20/2021 Ashley Nettles, PT GP 1    [id number] HC PT MULTI LAYER COMP SYS BELOW KNEE 3/20/2021 Ashley Nettles, PT GP 1                  Ashley Nettles PT  3/20/2021

## 2021-03-27 NOTE — THERAPY WOUND CARE TREATMENT
Outpatient Rehabilitation - Wound/Debridement Treatment Note   Danilo     Patient Name: Akshat Winkler  : 1955  MRN: [id number]  Today's Date: 3/27/2021                 Admit Date: 3/27/2021    Visit Dx:    ICD-10-CM ICD-9-CM   1. Open wound of right lower extremity, subsequent encounter  S81.801D V58.89     891.0   2. Bilateral lower extremity edema  R60.0 782.3       Patient Active Problem List   Diagnosis   • Chronic diastolic heart failure (CMS/HCC)   • Type 2 diabetes mellitus without complication, without long-term current use of insulin (CMS/[facility])   • Hyperlipidemia LDL goal <70   • Essential hypertension   • Coronary artery disease involving native coronary artery of native heart with angina pectoris (CMS/[facility])   • Severe obstructive sleep apnea, no CPAP   • Class 3 severe obesity due to excess calories with serious comorbidity and body mass index (BMI) of 40.0 to 44.9 in adult (CMS/[facility])   • Chronic anticoagulation   • Persistent atrial fibrillation/flutter   • Compression fracture of lumbar vertebra (CMS/[facility])   • Spondylosis of 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 or radiculopathy   • Lumbar stenosis with neurogenic claudication   • Lumbar disc herniation   • Myofascial pain   • Pathologic compression fracture of vertebra (CMS/[facility])   • Chronic bilateral severe lower extremity edema   • Mechanical low back pain   • History of kyphoplasty   • Prediabetes        Past Medical History:   Diagnosis Date   • Acute diastolic HF (heart failure) (CMS/[facility])    • Acute hypokalemia    • Arrhythmia    • Back injury     Fall on 17   • Cellulitis of leg    • Chest pain syndrome    • CHF (congestive heart failure) (CMS/[facility])    • Diabetes mellitus (CMS/[facility])     patient reports borderline    • Hyperlipidemia    • Hypertension    • Muscle pain     around back   • Obesity    • Obstructive sleep apnea     right now patient is sleeeping in recliner and does not use-when he lies down he will have to use cpap   •  Paroxysmal A-fib (CMS/HCC)    • Peripheral artery disease (CMS/HCC)    • Spondylosis of 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 or radiculopathy 2/7/2018   • Wears glasses         Past Surgical History:   Procedure Laterality Date   • CARDIAC CATHETERIZATION     • CARDIOVERSION      multiple   • COLONOSCOPY      about 14 years ago   • KYPHOPLASTY N/A 3/5/2018    Procedure: KYPHOPLASTY L4;  Surgeon: Akshat Davidson MD;  Location:  [facility];  Service:    • KYPHOPLASTY N/A 4/16/2018    Procedure: KYPHOPLASTY L1 AND L2;  Surgeon: Akshat Davidson MD;  Location:  [facility];  Service: Neurosurgery   • OTHER SURGICAL HISTORY  06/29/2015    PVA         EVALUATION  PT Ortho     Row Name 03/27/21 1100       Subjective Pain    Able to rate subjective pain?  yes  -MC    Pre-Treatment Pain Level  5  -MC    Post-Treatment Pain Level  8  -MC    Subjective Pain Comment  increased periwound pain, especially after application of new dressing  -MC       Transfers    Sit-Stand [place] (Transfers)  independent  -MC    Stand-Sit [place] (Transfers)  independent  -MC    Comment (Transfers)  seated for tx  -       Gait/Stairs (Locomotion)    [place] Level (Gait)  independent  -MC      User Key  (r) = Recorded By, (t) = Taken By, (c) = Cosigned By    Initials Name Provider Type    Neva Schmidt PT Physical Therapist          [facility] Wound     Row Name 03/27/21 1100             Wound 03/04/21 0845 Right posterior calf Venous Ulcer    Wound - Properties Group Placement Date: 03/04/21  -MC Placement Time: 0845  - Present on Hospital Admission: Y  - Side: Right  - Orientation: posterior  - Location: calf  - Primary Wound Type: Venous ulcer  -    Dressing Appearance  intact;moist drainage  -      Base  moist;yellow;slough;pink;red;subcutaneous  -MC      Periwound  swelling;edematous;excoriated  -      Periwound Temperature  cool  -      Periwound Skin Turgor  firm  -      Edges  irregular;open  -MC      Wound  "Length (cm)  3.2 cm  -      Wound Width (cm)  6.2 cm  -      Wound Depth (cm)  0.1 cm  -      Drainage Characteristics/Odor  serosanguineous;serous  -      Drainage Amount  moderate  -      Care, Wound  cleansed with;wound cleanser;debrided;honey applied  -      Dressing Care  dressing applied;silver impregnated;collagen;low-adherent;foam;border dressing;multi-layer wrap honey, diane, mepilex Ag, 6\" optifoam, MLW  -      Periwound Care  cleansed with pH balanced cleanser;barrier ointment applied zguard  -      Retired Wound - Properties Group Date first assessed: 03/04/21  - Time first assessed: 0845  - Present on Hospital Admission: Y  - Side: Right  - Location: calf  - Primary Wound Type: Venous ulcer  -      User Key  (r) = Recorded By, (t) = Taken By, (c) = Cosigned By    Initials Name Provider Type    Neva Schmidt, PT Physical Therapist    Neva Schmidt, PT Physical Therapist        Lymphedema     Row Name 03/27/21 1100             Subjective Comments    Subjective Comments  Reports additional pain - not in the wound bed, but to the periwound, especially after new bandage/honey placed. Reports he doesn't even really know who his PCP is, so if he has s/sx of infection he doesn't know what to do.  -         Lymphedema Edema Assessment    Ptting Edema Category  By severity  -      Pitting Edema  Moderate;Severe severe in dorsum of foot  -         Skin Changes/Observations    Lower Extremity Conditions  right:;clean;dry;shiny;hairless;fragile  -      Lower Extremity Color/Pigment  right:;purple;hyperpigmented  -         Lymphedema Pulses/Capillary Refill    Lower Extremity Capillary Refill  right:;less than 3 seconds  -         Compression/Skin Care    Compression/Skin Care  skin care;wrapping location;bandaging  -      Skin Care  washed/dried;moisturizing lotion applied  -      Wrapping Location  lower extremity  -      Wrapping Location LE  " right:;foot to knee  -MC      Wrapping Comments  wound dressings, compressogrip size 4/6 doubled and overlapping to create gradient compression  -MC      Bandage Layers  cotton elastic stocking- double layer (comment size)  -MC      Bandaging Technique  light compression  -MC        User Key  (r) = Recorded By, (t) = Taken By, (c) = Cosigned By    Initials Name Provider Type    Neva Schmidt PT Physical Therapist          WOUND DEBRIDEMENT  Total area of Debridement: 18 cm2  Debridement Site 1  Location- Site 1: RLE wound  Selective Debridement- Site 1: Wound Surface <20cmsq  Instruments- Site 1: #15, scapel, tweezers  Excised Tissue Description- Site 1: maximum, slough, other (comment) (biofilm)  Bleeding- Site 1: scant, held pressure, 1 minute             Therapy Education     Row Name 03/27/21 1100             Therapy Education    Education Details  Continue with current POC for dressing changes, with addition of zguard to immediate periwound to protect it from excess moisture. If s/sx of infection increase (redness, pain, edema, colorful/odorous drainage), present to UC be assessed for infection. Highly recommended establishing care with a PCP for ongoing medical care, including abx therapy and referrals to specialists if needed. Reiterated that PT cannot manage abx.  -MC      Given  Bandaging/dressing change;Edema management;Symptoms/condition management  -      Program  Reinforced;Progressed  -MC      How Provided  Verbal;Demonstration  -MC      Provided to  Patient  -MC      Level of Understanding  Teach back education performed;Verbalized  -MC        User Key  (r) = Recorded By, (t) = Taken By, (c) = Cosigned By    Initials Name Provider Type    Neva Schmidt PT Physical Therapist          Recommendation and Plan  PT Assessment/Plan     Row Name 03/27/21 1100          PT Assessment    Functional Limitations  Performance in work activities;Performance in self-care ADL;Limitation in home  management;Impaired gait;Other (comment) wound, edema management  -     Impairments  Edema;Impaired lymphatic circulation;Impaired venous circulation;Integumentary integrity;Pain  -     Assessment Comments  Pt with increased wound width since last assessment, likely from periwound breakdown from maceration/increased drainage. The drainage is serous/serosanguinous and has no odor. PT able to debride almost all biofilm/slough present, with some fibrous material/connective tissue remaining in the center. Pt with no notable periwound warmth, redness, or edema over baseline. PT discussed referral patterns of pain and nerve-related pain, which could explain the increased pain he's experiencing. However, recommended patient contact his PCP or present to urgent care if the symptoms worsen or s/sx of infection appear. Pt may benefit from wound culture if dimensions continue to increase. However, unknown if the patient's referring provider would be willing to sign this since the patient reports he's not a current patient. Pt will continue to benefit from skilled PT wound care to promote healing.  -     Rehab Potential  Good  -     Patient/caregiver participated in establishment of treatment plan and goals  Yes  -     Patient would benefit from skilled therapy intervention  Yes  -        PT Plan    PT Frequency  1x/week  -     Physical Therapy Interventions (Optional Details)  wound care;patient/family education  -     PT Plan Comments  debridement, dressings management, MLW. ?culture vs recommendation to go to  if area is worse  -       User Key  (r) = Recorded By, (t) = Taken By, (c) = Cosigned By    Initials Name Provider Type     Neva Burkett PT Physical Therapist          Goals  PT OP Goals     Row Name 03/27/21 1100          Time Calculation    PT Goal Re-Cert Due Date  06/02/21  -       User Key  (r) = Recorded By, (t) = Taken By, (c) = Cosigned By    Initials Name Provider Type      Neva Burkett, PT Physical Therapist          PT Goal Re-Cert Due Date: 06/02/21            Time Calculation: Start Time: 1100  Therapy Charges for Today     Code Description Service Date Service Provider Modifiers Qty    89346837539 HC STEFANIE DEBRIDE OPEN WOUND UP TO 20CM 3/27/2021 Neva Burkett, PT GP 1    77039879308 HC PT MULTI LAYER COMP SYS BELOW KNEE 3/27/2021 Neva Burkett, PT GP 1                  Neva Burkett, PT  3/27/2021

## 2021-04-03 NOTE — THERAPY PROGRESS REPORT/RE-CERT
Outpatient Rehabilitation - Wound/Debridement Progress Note   Danilo     Patient Name: Akshat Winkler  : 1955  MRN: [id number]  Today's Date: 4/3/2021                 Admit Date: 4/3/2021    Visit Dx:    ICD-10-CM ICD-9-CM   1. Open wound of right lower extremity, subsequent encounter  S81.801D V58.89     891.0   2. Bilateral lower extremity edema  R60.0 782.3       Patient Active Problem List   Diagnosis   • Chronic diastolic heart failure (CMS/HCC)   • Type 2 diabetes mellitus without complication, without long-term current use of insulin (CMS/[facility])   • Hyperlipidemia LDL goal <70   • Essential hypertension   • Coronary artery disease involving native coronary artery of native heart with angina pectoris (CMS/[facility])   • Severe obstructive sleep apnea, no CPAP   • Class 3 severe obesity due to excess calories with serious comorbidity and body mass index (BMI) of 40.0 to 44.9 in adult (CMS/[facility])   • Chronic anticoagulation   • Persistent atrial fibrillation/flutter   • Compression fracture of lumbar vertebra (CMS/[facility])   • Spondylosis of 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 or radiculopathy   • Lumbar stenosis with neurogenic claudication   • Lumbar disc herniation   • Myofascial pain   • Pathologic compression fracture of vertebra (CMS/[facility])   • Chronic bilateral severe lower extremity edema   • Mechanical low back pain   • History of kyphoplasty   • Prediabetes        Past Medical History:   Diagnosis Date   • Acute diastolic HF (heart failure) (CMS/[facility])    • Acute hypokalemia    • Arrhythmia    • Back injury     Fall on 17   • Cellulitis of leg    • Chest pain syndrome    • CHF (congestive heart failure) (CMS/[facility])    • Diabetes mellitus (CMS/[facility])     patient reports borderline    • Hyperlipidemia    • Hypertension    • Muscle pain     around back   • Obesity    • Obstructive sleep apnea     right now patient is sleeeping in recliner and does not use-when he lies down he will have to use cpap   •  Paroxysmal A-fib (CMS/HCC)    • Peripheral artery disease (CMS/HCC)    • Spondylosis of 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 or radiculopathy 2/7/2018   • Wears glasses         Past Surgical History:   Procedure Laterality Date   • CARDIAC CATHETERIZATION     • CARDIOVERSION      multiple   • COLONOSCOPY      about 14 years ago   • KYPHOPLASTY N/A 3/5/2018    Procedure: KYPHOPLASTY L4;  Surgeon: Akshat Davidson MD;  Location:  [facility];  Service:    • KYPHOPLASTY N/A 4/16/2018    Procedure: KYPHOPLASTY L1 AND L2;  Surgeon: Akshat Davidson MD;  Location:  [facility];  Service: Neurosurgery   • OTHER SURGICAL HISTORY  06/29/2015    PVA         EVALUATION  PT Ortho     Row Name 04/03/21 1100       Subjective Comments    Subjective Comments  Pt with no new complaints, stated wound felt better at end of therapy without use of honey.   -MF       Subjective Pain    Able to rate subjective pain?  yes  -MF    Pre-Treatment Pain Level  4  -MF    Post-Treatment Pain Level  3  -MF       Transfers    Sit-Stand [place] (Transfers)  independent  -MF    Stand-Sit [place] (Transfers)  independent  -MF    Comment (Transfers)  seated for tx  -MF       Gait/Stairs (Locomotion)    [place] Level (Gait)  independent  -MF      User Key  (r) = Recorded By, (t) = Taken By, (c) = Cosigned By    Initials Name Provider Type    Juni Good, PT Physical Therapist          [facility] Wound     Row Name 04/03/21 1100             Wound 03/04/21 0845 Right posterior calf Venous Ulcer    Wound - Properties Group Placement Date: 03/04/21  - Placement Time: 0845  - Present on Hospital Admission: Y  - Side: Right  - Orientation: posterior  - Location: calf  - Primary Wound Type: Venous ulcer  -MC    Dressing Appearance  intact;moist drainage  -MF      Base  moist;yellow;slough;pink;red;subcutaneous  -MF      Periwound  swelling;edematous;excoriated  -MF      Periwound Temperature  cool  -MF      Periwound Skin Turgor  firm  -MF       Edges  irregular;open  -      Wound Length (cm)  3.1 cm  -      Wound Width (cm)  5 cm  -      Wound Depth (cm)  0.1 cm  -      Drainage Characteristics/Odor  serosanguineous;serous  -      Drainage Amount  moderate  -      Care, Wound  irrigated with;sterile normal saline;debrided nonsel debridement  -      Dressing Care  foam;low-adherent HFBt with optifoam to cover  -      Periwound Care  cleansed with pH balanced cleanser  -      Retired Wound - Properties Group Date first assessed: 03/04/21  - Time first assessed: 0845  - Present on Hospital Admission: Y  - Side: Right  - Location: calf  - Primary Wound Type: Venous ulcer  -      User Key  (r) = Recorded By, (t) = Taken By, (c) = Cosigned By    Initials Name Provider Type    Juni Good, PT Physical Therapist     Neva Burkett, PT Physical Therapist        Lymphedema     Row Name 04/03/21 1100             Lymphedema Edema Assessment    Ptting Edema Category  By severity  -      Pitting Edema  Severe  -         Compression/Skin Care    Compression/Skin Care  skin care;wrapping location;bandaging  -      Skin Care  washed/dried;moisturizing lotion applied  -      Wrapping Location  lower extremity  -      Wrapping Location LE  right:;foot to knee  -      Wrapping Comments  diane Ag with HFBt and optifoam to cover with size 4/5 compressogrip doubled and overlapping for gradient compression.   -        User Key  (r) = Recorded By, (t) = Taken By, (c) = Cosigned By    Initials Name Provider Type    Juni Good, PT Physical Therapist          WOUND DEBRIDEMENT                   Therapy Education     Row Name 04/03/21 1100             Therapy Education    Given  Bandaging/dressing change;Edema management;Symptoms/condition management  -      Program  Reinforced;Progressed  -      How Provided  Verbal;Demonstration  -MF      Provided to  Patient  -      Level of Understanding  Teach back  education performed;Verbalized  -        User Key  (r) = Recorded By, (t) = Taken By, (c) = Cosigned By    Initials Name Provider Type    MF Juni Samuel, PT Physical Therapist          Recommendation and Plan  PT Assessment/Plan     Row Name 04/03/21 1100          PT Assessment    Functional Limitations  Performance in work activities;Performance in self-care ADL;Limitation in home management;Impaired gait;Other (comment) wound care  -     Impairments  Edema;Impaired lymphatic circulation;Impaired venous circulation;Integumentary integrity;Pain  -     Assessment Comments  Pt with increased edema in LEs noted today as pt stated he was unable to take diuretics yesterday.  some decrease in wounds size noted today, but mostly appears back to basline measurements.  PT discussed with pt he might benefit from calling his PCP to request antibiotics.  with limited wound healing noted, Pt may have a bacterial colonization limiting wound healing which would not show signs of an active infection.  Pt verb understanding and will call MD.  PT encouraged futher LE elevation and compliance with diuretics to help further reduce LE edema to decrease stress to wound and increase venous return to improve skin integrity.  Pt verb understanding.  PT also changed to hydrofera blue transfer dressing with d/c of honey to help decrease c/o burning and better manage exudate to improve periwound skin.   -     Rehab Potential  Good  -     Patient/caregiver participated in establishment of treatment plan and goals  Yes  -     Patient would benefit from skilled therapy intervention  Yes  -        PT Plan    PT Frequency  1x/week;2x/week  -     Predicted Duration of Therapy Intervention (PT)  10-12 weeks  -     Physical Therapy Interventions (Optional Details)  wound care;patient/family education  -     PT Plan Comments  debridement, dressings management, MLW. possible antibiotics if MD approves.   -       User Key   (r) = Recorded By, (t) = Taken By, (c) = Cosigned By    Initials Name Provider Type    Juni Good, PT Physical Therapist          Goals  PT OP Goals     Row Name 04/03/21 1100          PT Short Term Goals    STG Date to Achieve  09/11/18  -MF     STG 1  Pt will verbalize s/sx of infection.  -MF     STG 1 Progress  Ongoing  -MF     STG 2  Pt will demonstrate 25% reduction in wound area to indicate healing progress.  -MF     STG 2 Progress  Ongoing  -MF     STG 3  Pt will demonstrate 1 cm decrease in BLE girth to indicate progress with edema management.  -MF     STG 3 Progress  Ongoing  -MF        Long Term Goals    LTG 1  Pt will demonstrate independence with clean home dressing changes and an appropriate compression management system.  -     LTG 1 Progress  Partially Met  -MF     LTG 2  Pt will demonstrate 75% reduction in wound area to indicate healing progress.  -MF     LTG 2 Progress  Ongoing  -MF     LTG 3  Pt will demonstrate 3 cm decrease in BLE girth to indicate progress with edema management.  -     LTG 3 Progress  Ongoing  -     LTG 4  Pt / spouse independent with BLE edema management (skin care, dressing changes prn, compression garment or system, possible compression pump).   -     LTG 4 Progress  Ongoing  -        Time Calculation    PT Goal Re-Cert Due Date  06/02/21  -       User Key  (r) = Recorded By, (t) = Taken By, (c) = Cosigned By    Initials Name Provider Type    Juni Good, PT Physical Therapist          PT Goal Re-Cert Due Date: 06/02/21  PT Short Term Goals  STG Date to Achieve: 09/11/18  STG 1: Pt will verbalize s/sx of infection.  STG 1 Progress: Ongoing  STG 2: Pt will demonstrate 25% reduction in wound area to indicate healing progress.  STG 2 Progress: Ongoing  STG 3: Pt will demonstrate 1 cm decrease in BLE girth to indicate progress with edema management.  STG 3 Progress: Ongoing  Long Term Goals  LTG 1: Pt will demonstrate independence with clean home  dressing changes and an appropriate compression management system.  LTG 1 Progress: Partially Met  LTG 2: Pt will demonstrate 75% reduction in wound area to indicate healing progress.  LTG 2 Progress: Ongoing  LTG 3: Pt will demonstrate 3 cm decrease in BLE girth to indicate progress with edema management.  LTG 3 Progress: Ongoing  LTG 4: Pt / spouse independent with BLE edema management (skin care, dressing changes prn, compression garment or system, possible compression pump).   LTG 4 Progress: Ongoing      Time Calculation: Start Time: 1100  Therapy Charges for Today     Code Description Service Date Service Provider Modifiers Qty    [id number] HC PT MULTI LAYER COMP SYS BELOW KNEE 4/3/2021 Juni Samuel, PT GP 1                  Juni Samuel, PT  4/3/2021

## 2021-04-05 NOTE — TELEPHONE ENCOUNTER
Attempted to contact, no answer LVM relaying PCP's message.     Hub can relay and schedule 1 week follow up

## 2021-04-05 NOTE — TELEPHONE ENCOUNTER
Caller: Akshat Winkler    Relationship: Self    Best call back number: [phone]    What is the best time to reach you: ANYTIME    Who are you requesting to speak with (clinical staff, provider,  specific staff member): CLINICAL    Do you know the name of the person who called:     What was the call regarding: PT CALLED REQUESTING A CALL BACK    PT STATED WOUND CARE TOLD HIM TO REQUEST A SCRIPT FOR ANTIBIOTICS      Do you require a callback: YES      MALIA TORRES[address] [phone] [facility] [phone]

## 2021-04-05 NOTE — TELEPHONE ENCOUNTER
I sent keflex to his pharmacy. He needs an appointment with me for a wound check in 1 week. Thanks.

## 2021-04-09 NOTE — PROGRESS NOTES
Chief Complaint   Patient presents with   • Wound Check     1 week f/u. Open wound of right lower extremity. Pt states cant tell a difference and his leg is hurting more. Pt states seems to be getting bigger.        HPI     Akshat Winkler is a 66 y.o. male with PMH CAD, afib, and chronic diastolic heart failure who is here for follow-up of right leg wound.     Patient states he states his right leg is not significantly better and believes his calf wound has increased in size. He has pain over the area that radiates up and down his leg. He is following with wound care who recommended antibiotics recently. He was started on keflex 4 days ago. He reports compliance with diuretics but states his legs have been this swollen for the past 2 years. He is alternating 1 mg and 2 mg of bumex daily and takes aldactone 100 mg qd. He was evaluated by vascular surgery in the past who recommended compression stockings. He does not have them on today so we could evaluate his leg. He is elevating his legs when he can. He denies increased dyspnea or chest pain. He is down 4 pounds in 6 weeks. He has not taken his diuretics yet today but plans to after he leaves. He denies fever, chills.     Past Medical History:   Diagnosis Date   • Acute diastolic HF (heart failure) (CMS/[facility])    • Acute hypokalemia    • Arrhythmia    • Back injury     Fall on 12/20/17   • Cellulitis of leg    • Chest pain syndrome    • CHF (congestive heart failure) (CMS/HCC)    • Diabetes mellitus (CMS/HCC)     patient reports borderline    • Hyperlipidemia    • Hypertension    • Muscle pain     around back   • Obesity    • Obstructive sleep apnea     right now patient is sleeeping in recliner and does not use-when he lies down he will have to use cpap   • Paroxysmal A-fib (CMS/HCC)    • Peripheral artery disease (CMS/HCC)    • Spondylosis of 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 or radiculopathy 2/7/2018   • Wears glasses        Past Surgical History:   Procedure  Laterality Date   • CARDIAC CATHETERIZATION     • CARDIOVERSION      multiple   • COLONOSCOPY      about 14 years ago   • KYPHOPLASTY N/A 3/5/2018    Procedure: KYPHOPLASTY L4;  Surgeon: Akshat Davidson MD;  Location:  [facility];  Service:    • KYPHOPLASTY N/A 4/16/2018    Procedure: KYPHOPLASTY L1 AND L2;  Surgeon: Akshat Davidson MD;  Location:  [facility];  Service: Neurosurgery   • OTHER SURGICAL HISTORY  06/29/2015    PVA       Family History   Problem Relation Age of Onset   • Hypertension Mother    • Stroke Mother    • Stroke Father    • Sleep disorder Father    • Alzheimer's disease Father    • Hyperlipidemia Father    • Hypertension Father    • Prostate cancer Brother        Social History     Socioeconomic History   • Marital status:      Spouse name: Not on file   • Number of children: Not on file   • Years of education: Not on file   • Highest education level: Not on file   Tobacco Use   • Smoking status: Never Smoker   • Smokeless tobacco: Never Used   Substance and Sexual Activity   • Alcohol use: No   • Drug use: No   • Sexual activity: Defer       Allergies   Allergen Reactions   • Bisoprolol Other (See Comments)     Severe Hypotension   • Flecainide Other (See Comments)     Syncope / collapse   • Metoprolol Other (See Comments)      Bradycardia, Severe Hypotension   • Tikosyn [Dofetilide] Other (See Comments)     Wide complex tachycardia       ROS    Review of Systems   Constitutional: Negative for chills and fever.   Respiratory: Negative for shortness of breath.    Cardiovascular: Positive for leg swelling. Negative for chest pain.   Skin: Positive for skin lesions.       Vitals:    04/09/21 0832   BP: 132/72   Pulse: 97   SpO2: 98%     Body mass index is 45.15 kg/m².      Current Outpatient Medications:   •  acetaminophen (TYLENOL) 325 MG tablet, Take 650 mg by mouth Daily., Disp: , Rfl:   •  apixaban (Eliquis) 5 MG tablet tablet, Take 1 tablet by mouth Every 12 (Twelve) Hours., Disp: 180  tablet, Rfl: 3  •  bumetanide (BUMEX) 2 MG tablet, Take 1 tablet by mouth Every Morning. On less busy days at work, Disp: 90 tablet, Rfl: 1  •  cephalexin (Keflex) 500 MG capsule, Take 1 capsule by mouth 2 (Two) Times a Day for 7 days., Disp: 14 capsule, Rfl: 0  •  diphenhydrAMINE-acetaminophen (TYLENOL PM)  MG tablet per tablet, Take 1 tablet by mouth Every Night., Disp: , Rfl:   •  metFORMIN ER (GLUCOPHAGE-XR) 500 MG 24 hr tablet, Take 1 tablet by mouth 2 (Two) Times a Day With Meals. Take 1 daily with dinner or bedtime for one week, then increase to 2 daily., Disp: 60 tablet, Rfl: 2  •  metOLazone (ZAROXOLYN) 2.5 MG tablet, Take 1 tablet by mouth 2 (Two) Times a Week. WEEKENDS (Patient taking differently: Take 2.5 mg by mouth. WEEKENDS), Disp: 20 tablet, Rfl: 2  •  multivitamin with minerals (Multivitamin Adults) tablet tablet, Take 1 tablet by mouth Daily., Disp: 30 tablet, Rfl: 2  •  potassium chloride 10 MEQ CR tablet, Take 3 tablets by mouth Daily., Disp: 90 tablet, Rfl: 3  •  rosuvastatin (CRESTOR) 20 MG tablet, Take 1 tablet by mouth Daily., Disp: 90 tablet, Rfl: 3  •  spironolactone (Aldactone) 100 MG tablet, Take 1 tablet by mouth Daily., Disp: 30 tablet, Rfl: 5  •  Dietary Management Product (Vasculera) tablet, Take 1 tablet by mouth Daily., Disp: 30 tablet, Rfl: 3    PE    Physical Exam  Vitals reviewed.   Constitutional:       General: He is not in acute distress.     Appearance: He is well-developed.   HENT:      Head: Normocephalic and atraumatic.   Eyes:      Conjunctiva/sclera: Conjunctivae normal.   Cardiovascular:      Rate and Rhythm: Normal rate and regular rhythm.      Heart sounds: Normal heart sounds. No murmur heard.     Pulmonary:      Effort: Pulmonary effort is normal.      Breath sounds: Normal breath sounds.   Musculoskeletal:      Cervical back: Normal range of motion.      Right lower leg: Edema present.      Left lower leg: Edema present.      Comments: Tense BLE leg edema  with darkened skin c/w stasis dermatitis changes.    Skin:     General: Skin is warm and dry.          Neurological:      Mental Status: He is alert.      Gait: Gait normal.   Psychiatric:         Speech: Speech normal.         Behavior: Behavior normal.         Results    Results for orders placed or performed in visit on 11/05/20   Basic Metabolic Panel    Specimen: Blood   Result Value Ref Range    Glucose 107 (H) 65 - 99 mg/dL    BUN 18 8 - 23 mg/dL    Creatinine 1.21 0.76 - 1.27 mg/dL    Sodium 138 136 - 145 mmol/L    Potassium 4.5 3.5 - 5.2 mmol/L    Chloride 102 98 - 107 mmol/L    CO2 27.9 22.0 - 29.0 mmol/L    Calcium 9.7 8.6 - 10.5 mg/dL    eGFR Non African Amer 61 >60 mL/min/1.73    BUN/Creatinine Ratio 14.9 7.0 - 25.0    Anion Gap 8.1 5.0 - 15.0 mmol/L   Renal Function Panel    Specimen: Blood   Result Value Ref Range    Glucose 107 (H) 65 - 99 mg/dL    BUN 17 8 - 23 mg/dL    Creatinine 1.22 0.76 - 1.27 mg/dL    Sodium 135 (L) 136 - 145 mmol/L    Potassium 4.2 3.5 - 5.2 mmol/L    Chloride 99 98 - 107 mmol/L    CO2 26.0 22.0 - 29.0 mmol/L    Calcium 9.5 8.6 - 10.5 mg/dL    Albumin 4.10 3.50 - 5.20 g/dL    Phosphorus 2.8 2.5 - 4.5 mg/dL    Anion Gap 10.0 5.0 - 15.0 mmol/L    BUN/Creatinine Ratio 13.9 7.0 - 25.0    eGFR Non African Amer 60 (L) >60 mL/min/1.73   Magnesium    Specimen: Blood   Result Value Ref Range    Magnesium 2.0 1.6 - 2.4 mg/dL       A/P    Problem List Items Addressed This Visit        Symptoms and Signs    Chronic bilateral severe lower extremity edema  -Patient is resistant to have diuretics adjusted today. He gets leg cramping when his dose is increased per his report. Will contact cardiology for assistance  -Encouraged compliance with diuretics daily, regular leg elevation, using compression stockings      Other Visit Diagnoses     Venous stasis dermatitis of both lower extremities    -  Primary  -Chronic  -Hopefully he will allow his diuretics to be adjusted  -Will start vasculera  and see if we can get covered      Open wound of right lower extremity, subsequent encounter     -Larger compared to previous exam 2/20  -Continue keflex and management per wound care  -Order labs and return for physical in 1 month or sooner      Venous stasis ulcer of right calf with varicose veins, unspecified ulcer stage (CMS/[facility])              Plan of care was reviewed with patient at the conclusion of today's visit. Education was provided regarding diagnoses, management, and the importance of keeping follow-up appointments. The patient was counseled regarding the risks, benefits, and possible side-effects of treatment. Patient and/or family express understanding and agreement with the management plan.        RUPESH Díaz

## 2021-04-09 NOTE — PATIENT INSTRUCTIONS
Please complete blood work within the next week. You may go to the lab at the [facility] at [address] for blood work on the weekend.

## 2021-04-10 NOTE — THERAPY WOUND CARE TREATMENT
Outpatient Rehabilitation - Wound/Debridement Treatment Note   Danilo     Patient Name: Akshat Winkler  : 1955  MRN: [id number]  Today's Date: 4/10/2021                 Admit Date: 4/10/2021    Visit Dx:    ICD-10-CM ICD-9-CM   1. Open wound of right lower extremity, subsequent encounter  S81.801D V58.89     891.0   2. Bilateral lower extremity edema  R60.0 782.3   Post R calf wound:      Patient Active Problem List   Diagnosis   • Chronic diastolic heart failure (CMS/HCC)   • Type 2 diabetes mellitus without complication, without long-term current use of insulin (CMS/[facility])   • Hyperlipidemia LDL goal <70   • Essential hypertension   • Coronary artery disease involving native coronary artery of native heart with angina pectoris (CMS/[facility])   • Severe obstructive sleep apnea, no CPAP   • Class 3 severe obesity due to excess calories with serious comorbidity and body mass index (BMI) of 40.0 to 44.9 in adult (CMS/[facility])   • Chronic anticoagulation   • Persistent atrial fibrillation/flutter   • Compression fracture of lumbar vertebra (CMS/[facility])   • Spondylosis of 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 or radiculopathy   • Lumbar stenosis with neurogenic claudication   • Lumbar disc herniation   • Myofascial pain   • Pathologic compression fracture of vertebra (CMS/[facility])   • Chronic bilateral severe lower extremity edema   • Mechanical low back pain   • History of kyphoplasty   • Prediabetes        Past Medical History:   Diagnosis Date   • Acute diastolic HF (heart failure) (CMS/[facility])    • Acute hypokalemia    • Arrhythmia    • Back injury     Fall on 17   • Cellulitis of leg    • Chest pain syndrome    • CHF (congestive heart failure) (CMS/[facility])    • Diabetes mellitus (CMS/[facility])     patient reports borderline    • Hyperlipidemia    • Hypertension    • Muscle pain     around back   • Obesity    • Obstructive sleep apnea     right now patient is sleeeping in recliner and does not use-when he lies down he will  have to use cpap   • Paroxysmal A-fib (CMS/HCC)    • Peripheral artery disease (CMS/HCC)    • Spondylosis of 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 or radiculopathy 2/7/2018   • Wears glasses         Past Surgical History:   Procedure Laterality Date   • CARDIAC CATHETERIZATION     • CARDIOVERSION      multiple   • COLONOSCOPY      about 14 years ago   • KYPHOPLASTY N/A 3/5/2018    Procedure: KYPHOPLASTY L4;  Surgeon: Akshat Davidson MD;  Location:  [facility];  Service:    • KYPHOPLASTY N/A 4/16/2018    Procedure: KYPHOPLASTY L1 AND L2;  Surgeon: Akshat Davidson MD;  Location:  [facility];  Service: Neurosurgery   • OTHER SURGICAL HISTORY  06/29/2015    PVA         EVALUATION  PT Ortho     Row Name 04/10/21 1100       Subjective Comments    Subjective Comments  Pt frustrated due to feeling like wound is not improving.  States pain was better without use of therahoney.  Pt still on oral abx.  Reports he will be out-of-town for the next two weeks.  -       Subjective Pain    Able to rate subjective pain?  yes  -    Pre-Treatment Pain Level  7  -    Post-Treatment Pain Level  6  -    Subjective Pain Comment  reports leg feels better once compression applied  -       Transfers    Sit-Stand [place] (Transfers)  independent  -    Stand-Sit [place] (Transfers)  independent  -    Comment (Transfers)  seated for tx  -       Gait/Stairs (Locomotion)    [place] Level (Gait)  independent  -      User Key  (r) = Recorded By, (t) = Taken By, (c) = Cosigned By    Initials Name Provider Type    Zeinab Lopez PT Physical Therapist          SANTOS Wound     Row Name 04/10/21 1100             Wound 03/04/21 0845 Right posterior calf Venous Ulcer    Wound - Properties Group Placement Date: 03/04/21  - Placement Time: 0845  - Present on Hospital Admission: Y  -MC Side: Right  -MC Orientation: posterior  - Location: calf  -MC Primary Wound Type: Venous ulcer  -MC    Wound Image  Images linked: 1  -  "     Dressing Appearance  open to air pt removed this AM to shower, did not replace dressing  -      Base  moist;yellow;slough;pink;red;subcutaneous biofilm layer, moist after debridement of dry biofilm  -      Periwound  swelling;edematous;excoriated  -      Periwound Temperature  cool  -      Periwound Skin Turgor  firm  -      Edges  irregular;open  -      Wound Length (cm)  5 cm including small satellite areas  -      Wound Width (cm)  5.6 cm  -      Wound Depth (cm)  0.1 cm  -      Drainage Characteristics/Odor  serosanguineous;serous  -      Drainage Amount  scant  -      Care, Wound  cleansed with;wound cleanser;debrided  -      Dressing Care  dressing applied;antimicrobial agent applied;foam;border dressing;multi-layer wrap HFBt, 6\" optifoam  -      Periwound Care  barrier ointment applied;cleansed with pH balanced cleanser;dry periwound area maintained zguard  -      Retired Wound - Properties Group Date first assessed: 03/04/21  - Time first assessed: 0845  - Present on Hospital Admission: Y  - Side: Right  - Location: calf  - Primary Wound Type: Venous ulcer  -      User Key  (r) = Recorded By, (t) = Taken By, (c) = Cosigned By    Initials Name Provider Type     Neva Burkett, PT Physical Therapist    Zeinab Lopez, PT Physical Therapist        Lymphedema     Row Name 04/10/21 1100             Skin Changes/Observations    Lower Extremity Conditions  bilateral:;clean;dry;shiny;hairless;fragile  -      Lower Extremity Color/Pigment  right:;hyperpigmented;brawny  -         Lymphedema Pulses/Capillary Refill    Lower Extremity Capillary Refill  right:;less than 3 seconds  -         RLE Quick Girth (cm)    Met-heads  27 cm  -JM      Mid foot  26.5 cm  -JM      Smallest ankle  29.3 cm  -JM      Largest calf  48 cm  -JM      Tib tuberosity  46 cm  -JM      RLE Quick Girth Total  176.8  -JM         Compression/Skin Care    Skin Care  washed/dried;lotion " applied  -JM      Wrapping Location  lower extremity  -JM      Wrapping Location LE  bilateral:;foot to knee  -JM      Wrapping Comments  wound dressings R calf, size 4&5 compressogrips doubled/overlapping for gradient multilayer compression  -        User Key  (r) = Recorded By, (t) = Taken By, (c) = Cosigned By    Initials Name Provider Type    Zeinab Lopez, PT Physical Therapist          WOUND DEBRIDEMENT  Total area of Debridement: 20cmsq  Debridement Site 1  Location- Site 1: RLE wound  Selective Debridement- Site 1: Wound Surface <20cmsq  Instruments- Site 1: tweezers  Excised Tissue Description- Site 1: maximum, slough, other (comment) (biofilm layer)  Bleeding- Site 1: scant             Therapy Education     Row Name 04/10/21 1100             Therapy Education    Education Details  Continue with zguard to wound edges, HFBt to wound, cover with optifoam dressing, change every 2-3 days.  Continue daily compressogrip use, leg elevation, and diuretics as prescribed.  -JM      Given  Bandaging/dressing change;Edema management;Symptoms/condition management  -      Program  Reinforced;Progressed  -TOÑO      How Provided  Verbal;Demonstration  -JM      Provided to  Patient  -JM      Level of Understanding  Teach back education performed;Verbalized  -        User Key  (r) = Recorded By, (t) = Taken By, (c) = Cosigned By    Initials Name Provider Type    Zeinab Lopez, PT Physical Therapist          Recommendation and Plan  PT Assessment/Plan     Row Name 04/10/21 1100          PT Assessment    Functional Limitations  Performance in work activities;Performance in self-care ADL;Limitation in home management;Impaired gait;Other (comment) wound care  -TOÑO     Impairments  Edema;Impaired lymphatic circulation;Impaired venous circulation;Integumentary integrity;Pain  -     Assessment Comments  RLE wound with new skin bud formation in central wound bed, and moist red base after debridement of biofilm  oniidup.  PT recommended trial of unna boot this tx, but pt going out of town and will not be able to return for tx for two weeks, so continued with HFBt to reduce biofilm, optifoam to cover, and compressogrips for MLW.  Will assess at next tx and initiate unna boot if indicated.  -        PT Plan    PT Frequency  1x/week;2x/week  -     Physical Therapy Interventions (Optional Details)  patient/family education;wound care  -     PT Plan Comments  debridement, ?unna boot vs MLW  -       User Key  (r) = Recorded By, (t) = Taken By, (c) = Cosigned By    Initials Name Provider Type    Zeinab Lopez, PT Physical Therapist          Goals  PT OP Goals     Row Name 04/10/21 1100          Time Calculation    PT Goal Re-Cert Due Date  06/02/21  -       User Key  (r) = Recorded By, (t) = Taken By, (c) = Cosigned By    Initials Name Provider Type    Zeinab Lopez, PT Physical Therapist          PT Goal Re-Cert Due Date: 06/02/21            Time Calculation: Start Time: 1100  Therapy Charges for Today     Code Description Service Date Service Provider Modifiers Qty    37083086835 HC STEFANIE DEBRIDE OPEN WOUND UP TO 20CM 4/10/2021 Zeinab Cleveland, PT GP 1    96725196060 HC PT MULTI LAYER COMP SYS BELOW KNEE 4/10/2021 Zeinab Cleveland, PT GP 1                  Zeinab Cleveland, PT  4/10/2021

## 2021-04-12 NOTE — TELEPHONE ENCOUNTER
Spoke with patient who notes that his BLEs are normal size upon awakening. He notes that his legs swell as the day goes on. He notes that he took a metolazone this weekend. He notes that he has been cramping when he takes more than a 1 mg per day of bumex. He notes that he is wearing his compression socks.   Patient would like to defer adjustment of his diuretics at this time. Patient instructed to call the office if any change in symptoms.

## 2021-04-24 NOTE — THERAPY WOUND CARE TREATMENT
Outpatient Rehabilitation - Wound/Debridement Treatment Note   Danilo     Patient Name: Akshat Winkler  : 1955  MRN: [id number]  Today's Date: 2021                 Admit Date: 2021    Visit Dx:    ICD-10-CM ICD-9-CM   1. Open wound of right lower extremity, subsequent encounter  S81.801D V58.89     891.0   2. Bilateral lower extremity edema  R60.0 782.3       Patient Active Problem List   Diagnosis   • Chronic diastolic heart failure (CMS/HCC)   • Type 2 diabetes mellitus without complication, without long-term current use of insulin (CMS/[facility])   • Hyperlipidemia LDL goal <70   • Essential hypertension   • Coronary artery disease involving native coronary artery of native heart with angina pectoris (CMS/[facility])   • Severe obstructive sleep apnea, no CPAP   • Class 3 severe obesity due to excess calories with serious comorbidity and body mass index (BMI) of 40.0 to 44.9 in adult (CMS/[facility])   • Chronic anticoagulation   • Persistent atrial fibrillation/flutter   • Compression fracture of lumbar vertebra (CMS/[facility])   • Spondylosis of 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 or radiculopathy   • Lumbar stenosis with neurogenic claudication   • Lumbar disc herniation   • Myofascial pain   • Pathologic compression fracture of vertebra (CMS/[facility])   • Chronic bilateral severe lower extremity edema   • Mechanical low back pain   • History of kyphoplasty   • Prediabetes        Past Medical History:   Diagnosis Date   • Acute diastolic HF (heart failure) (CMS/[facility])    • Acute hypokalemia    • Arrhythmia    • Back injury     Fall on 17   • Cellulitis of leg    • Chest pain syndrome    • CHF (congestive heart failure) (CMS/[facility])    • Diabetes mellitus (CMS/[facility])     patient reports borderline    • Hyperlipidemia    • Hypertension    • Muscle pain     around back   • Obesity    • Obstructive sleep apnea     right now patient is sleeeping in recliner and does not use-when he lies down he will have to use cpap   •  Paroxysmal A-fib (CMS/HCC)    • Peripheral artery disease (CMS/HCC)    • Spondylosis of 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 or radiculopathy 2/7/2018   • Wears glasses         Past Surgical History:   Procedure Laterality Date   • CARDIAC CATHETERIZATION     • CARDIOVERSION      multiple   • COLONOSCOPY      about 14 years ago   • KYPHOPLASTY N/A 3/5/2018    Procedure: KYPHOPLASTY L4;  Surgeon: Akshat Davidson MD;  Location:  [facility];  Service:    • KYPHOPLASTY N/A 4/16/2018    Procedure: KYPHOPLASTY L1 AND L2;  Surgeon: Akshat Davidson MD;  Location:  [facility];  Service: Neurosurgery   • OTHER SURGICAL HISTORY  06/29/2015    PVA         EVALUATION  PT Ortho     Row Name 04/24/21 1030       Subjective Comments    Subjective Comments  Pt reports the wound hurts within the first several hours after a dressing change. Otherwise, the pain is improved overall.  -MC       Subjective Pain    Able to rate subjective pain?  yes  -MC    Pre-Treatment Pain Level  3  -MC    Post-Treatment Pain Level  5  -MC       Transfers    Sit-Stand [place] (Transfers)  independent  -MC    Stand-Sit [place] (Transfers)  independent  -MC    Comment (Transfers)  seated for tx  -       Gait/Stairs (Locomotion)    [place] Level (Gait)  independent  -      User Key  (r) = Recorded By, (t) = Taken By, (c) = Cosigned By    Initials Name Provider Type    Neva Schmidt PT Physical Therapist          [facility] Wound     Row Name 04/24/21 1030             Wound 03/04/21 0845 Right posterior calf Venous Ulcer    Wound - Properties Group Placement Date: 03/04/21  - Placement Time: 0845  - Present on Hospital Admission: Y  - Side: Right  - Orientation: posterior  - Location: calf  - Primary Wound Type: Venous ulcer  -MC    Wound Image  Images linked: 1  -MC      Dressing Appearance  intact;moist drainage  -MC      Base  moist;pink;red;granulating;epithelialization;yellow;slough extensive new epithelialization, scant  "slough  -      Periwound  swelling;edematous;excoriated  -      Periwound Temperature  cool  -      Periwound Skin Turgor  firm  -      Edges  irregular;open  -      Wound Length (cm)  3 cm  -      Wound Width (cm)  5 cm  -      Wound Depth (cm)  0.1 cm  -      Drainage Characteristics/Odor  serosanguineous;serous  -      Drainage Amount  scant  -      Care, Wound  cleansed with;wound cleanser;debrided  -      Dressing Care  dressing applied;antimicrobial agent applied;foam;low-adherent;border dressing;multi-layer wrap HFBt, 6\" optifoam, MLW  -      Periwound Care  cleansed with pH balanced cleanser;barrier ointment applied zguard  -      Retired Wound - Properties Group Date first assessed: 03/04/21  - Time first assessed: 0845  - Present on Hospital Admission: Y  - Side: Right  - Location: calf  - Primary Wound Type: Venous ulcer  -      User Key  (r) = Recorded By, (t) = Taken By, (c) = Cosigned By    Initials Name Provider Type    Neva Schmidt, PT Physical Therapist    Neva Schmidt, PT Physical Therapist        Lymphedema     Row Name 04/24/21 1030             Lymphedema Edema Assessment    Ptting Edema Category  By severity  -      Pitting Edema  Moderate  -         Skin Changes/Observations    Lower Extremity Conditions  bilateral:;clean;dry;shiny;hairless;fragile  -      Lower Extremity Color/Pigment  right:;hyperpigmented;brawny  -         Lymphedema Pulses/Capillary Refill    Lower Extremity Capillary Refill  right:;less than 3 seconds  -         Compression/Skin Care    Skin Care  washed/dried;lotion applied  -      Wrapping Location  lower extremity  -      Wrapping Location LE  right:;foot to knee  -      Wrapping Comments  wound dressings R calf, size 4&5 compressogrips doubled/overlapping for gradient multilayer compression  -      Bandage Layers  cotton elastic stocking- double layer (comment size)  -        User Key  (r) = " Recorded By, (t) = Taken By, (c) = Cosigned By    Initials Name Provider Type    Neva Schmidt PT Physical Therapist          WOUND DEBRIDEMENT  Total area of Debridement: nonselective  Debridement Site 1  Location- Site 1: RLE wound  Selective Debridement- Site 1: Wound Surface <20cmsq  Instruments- Site 1: other (comment) (gauze)  Excised Tissue Description- Site 1: scant, slough  Bleeding- Site 1: none             Therapy Education     Row Name 04/24/21 1030             Therapy Education    Education Details  Continue with current POC  -MC      Given  Bandaging/dressing change;Edema management;Symptoms/condition management  -      Program  Reinforced  -      How Provided  Verbal;Demonstration  -MC      Provided to  Patient  -      Level of Understanding  Teach back education performed;Verbalized  -        User Key  (r) = Recorded By, (t) = Taken By, (c) = Cosigned By    Initials Name Provider Type    Neva Schmidt PT Physical Therapist          Recommendation and Plan  PT Assessment/Plan     Row Name 04/24/21 1030          PT Assessment    Functional Limitations  Performance in work activities;Performance in self-care ADL;Limitation in home management;Impaired gait;Other (comment) wound mgmt  -     Impairments  Edema;Impaired lymphatic circulation;Impaired venous circulation;Integumentary integrity;Pain  -     Assessment Comments  Pt with notable improvement since last assessment, with new epithelialization in the center of the wound as well as around the edges, only scant slough, and improved wound dimensions. Pt still with moderate RLE edema, well-managed with compression therapy at this time. Pt will continue to benefit from skilled PT wound care to promote healing.  -     Rehab Potential  Good  -     Patient/caregiver participated in establishment of treatment plan and goals  Yes  -     Patient would benefit from skilled therapy intervention  Yes  -MC        PT Plan    PT  Frequency  1x/week;2x/week  -     Physical Therapy Interventions (Optional Details)  wound care;patient/family education  -     PT Plan Comments  debridement, MLW  -       User Key  (r) = Recorded By, (t) = Taken By, (c) = Cosigned By    Initials Name Provider Type    Neva Schmidt, PT Physical Therapist          Goals  PT OP Goals     Row Name 04/24/21 1030          Time Calculation    PT Goal Re-Cert Due Date  06/02/21  -       User Key  (r) = Recorded By, (t) = Taken By, (c) = Cosigned By    Initials Name Provider Type    Neva Schmidt, PT Physical Therapist          PT Goal Re-Cert Due Date: 06/02/21            Time Calculation: Start Time: 1030  Time Calculation- PT  Start Time: 1030  PT Received On: 04/24/21  PT Goal Re-Cert Due Date: 06/02/21  Untimed Charges  Wound Care: 66343 Non-selective debridement, 45746 Multilayer comp below knee  17075-Ffsjvrupey comp below knee: 15  97602-Non-selective debridement: 15  Total Minutes  Untimed Charges Total Minutes: 30   Total Minutes: 30  Therapy Charges for Today     Code Description Service Date Service Provider Modifiers Qty    33241128018 HC NONSELECTIVE DEBRIDEMENT 4/24/2021 Neva Burkett, PT GP 1    93728092475 HC PT MULTI LAYER COMP SYS BELOW KNEE 4/24/2021 Neva Burkett, PT GP 1                  Neva Burkett, PT  4/24/2021

## 2021-05-08 NOTE — THERAPY PROGRESS REPORT/RE-CERT
Outpatient Rehabilitation - Wound/Debridement Progress Note   Danilo     Patient Name: Akshat Winkler  : 1955  MRN: [id number]  Today's Date: 2021                 Admit Date: 2021    Visit Dx:    ICD-10-CM ICD-9-CM   1. Open wound of right lower extremity, subsequent encounter  S81.801D V58.89     891.0   2. Bilateral lower extremity edema  R60.0 782.3   3. Open wound of left lower extremity, subsequent encounter  S81.802D V58.89     891.0   Posterior R calf:    Anterior LLE (new):      Patient Active Problem List   Diagnosis   • Chronic diastolic heart failure (CMS/HCC)   • Type 2 diabetes mellitus without complication, without long-term current use of insulin (CMS/[facility])   • Hyperlipidemia LDL goal <70   • Essential hypertension   • Coronary artery disease involving native coronary artery of native heart with angina pectoris (CMS/[facility])   • Severe obstructive sleep apnea, no CPAP   • Class 3 severe obesity due to excess calories with serious comorbidity and body mass index (BMI) of 40.0 to 44.9 in adult (CMS/[facility])   • Chronic anticoagulation   • Persistent atrial fibrillation/flutter   • Compression fracture of lumbar vertebra (CMS/[facility])   • Spondylosis of 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 or radiculopathy   • Lumbar stenosis with neurogenic claudication   • Lumbar disc herniation   • Myofascial pain   • Pathologic compression fracture of vertebra (CMS/HCC)   • Chronic bilateral severe lower extremity edema   • Mechanical low back pain   • History of kyphoplasty   • Prediabetes        Past Medical History:   Diagnosis Date   • Acute diastolic HF (heart failure) (CMS/[facility])    • Acute hypokalemia    • Arrhythmia    • Back injury     Fall on 17   • Cellulitis of leg    • Chest pain syndrome    • CHF (congestive heart failure) (CMS/[facility])    • Diabetes mellitus (CMS/[facility])     patient reports borderline    • Hyperlipidemia    • Hypertension    • Muscle pain     around back   • Obesity    •  "Obstructive sleep apnea     right now patient is sleeeping in recliner and does not use-when he lies down he will have to use cpap   • Paroxysmal A-fib (CMS/HCC)    • Peripheral artery disease (CMS/HCC)    • Spondylosis of 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 or radiculopathy 2/7/2018   • Wears glasses         Past Surgical History:   Procedure Laterality Date   • CARDIAC CATHETERIZATION     • CARDIOVERSION      multiple   • COLONOSCOPY      about 14 years ago   • KYPHOPLASTY N/A 3/5/2018    Procedure: KYPHOPLASTY L4;  Surgeon: Akshat Davidson MD;  Location:  [facility];  Service:    • KYPHOPLASTY N/A 4/16/2018    Procedure: KYPHOPLASTY L1 AND L2;  Surgeon: Akshat Davidson MD;  Location:  [facility];  Service: Neurosurgery   • OTHER SURGICAL HISTORY  06/29/2015    PVA         EVALUATION  PT Ortho     Row Name 05/08/21 1115       Subjective Comments    Subjective Comments  Pt states new \"water blister\" formed on the left leg.  Pt states he tried to keep it intact but it ruptured, so he has been using HFB and a large band-aid on it.  States RLE wound seems better but skin around the edges is very sensitive.  Reports he wears the compressogrips during the week, but isn't able to take his diuretics as prescribed due to being at work in meetings and unable to go to the restroom frequently.  -       Subjective Pain    Able to rate subjective pain?  yes  -    Pre-Treatment Pain Level  3  -JM    Post-Treatment Pain Level  0  -JM       Transfers    Sit-Stand [place] (Transfers)  independent  -    Stand-Sit [place] (Transfers)  independent  -    Comment (Transfers)  seated for tx  -       Gait/Stairs (Locomotion)    [place] Level (Gait)  independent  -      User Key  (r) = Recorded By, (t) = Taken By, (c) = Cosigned By    Initials Name Provider Type    Zeinab Lopez PT Physical Therapist          [facility] Wound     Row Name 05/08/21 1115             Wound 05/08/21 1115 Left anterior leg Venous Ulcer " "   Wound - Properties Group Placement Date: 05/08/21  - Placement Time: 1115  - Side: Left  - Orientation: anterior  - Location: leg  - Primary Wound Type: Venous ulcer  -    Wound Image  Images linked: 1  -JM      Dressing Appearance  intact;moist drainage HFB, large bandaid  -      Base  clean;granulating;red  -      Red (%), Wound Tissue Color  100  -JM      Periwound  intact;dry;pink  -      Periwound Temperature  warm  -      Periwound Skin Turgor  soft  -      Edges  open  -      Wound Length (cm)  1.6 cm  -      Wound Width (cm)  2.6 cm  -      Wound Depth (cm)  0.1 cm  -      Drainage Characteristics/Odor  serosanguineous  -      Drainage Amount  small  -JM      Care, Wound  cleansed with;wound cleanser;debrided  -      Dressing Care  dressing applied;antimicrobial agent applied;foam;border dressing;multi-layer wrap HFBt, 4\" optifoam, MLW  -      Periwound Care  barrier ointment applied;cleansed with pH balanced cleanser;dry periwound area maintained zguard  -      Retired Wound - Properties Group Date first assessed: 05/08/21  - Time first assessed: 1115  - Side: Left  - Location: leg  - Primary Wound Type: Venous ulcer  -       Wound 03/04/21 0845 Right posterior calf Venous Ulcer    Wound - Properties Group Placement Date: 03/04/21  - Placement Time: 0845  - Present on Hospital Admission: Y  - Side: Right  - Orientation: posterior  - Location: calf  - Primary Wound Type: Venous ulcer  -    Wound Image  Images linked: 1  -JM      Dressing Appearance  intact;moist drainage  -      Base  moist;pink;red;granulating;epithelialization;yellow;slough skin bridges present, new satellite area lat/inf  -      Periwound  intact;moist;pink;swelling  -      Periwound Temperature  cool  -      Periwound Skin Turgor  firm  -      Edges  irregular;open  -      Wound Length (cm)  3.5 cm lat satellite area 2cm x 2cm x 0.1cm  -      Wound Width (cm)  " "5 cm  -      Wound Depth (cm)  0.1 cm  -      Drainage Characteristics/Odor  serosanguineous  -      Drainage Amount  small  -JM      Care, Wound  cleansed with;wound cleanser;debrided  -      Dressing Care  dressing applied;antimicrobial agent applied;foam;border dressing;multi-layer wrap HFBt, 6\" optifoam, MLW  -JM      Periwound Care  barrier ointment applied;cleansed with pH balanced cleanser;dry periwound area maintained zguard  -      Retired Wound - Properties Group Date first assessed: 03/04/21  - Time first assessed: 0845  - Present on Hospital Admission: Y  - Side: Right  - Location: calf  - Primary Wound Type: Venous ulcer  -      User Key  (r) = Recorded By, (t) = Taken By, (c) = Cosigned By    Initials Name Provider Type     Neva Burkett, PT Physical Therapist    Zeinab Lopez, PT Physical Therapist        Lymphedema     Row Name 05/08/21 1115             Lymphedema Edema Assessment    Ptting Edema Category  By severity  -      Pitting Edema  Moderate  -         Skin Changes/Observations    Lower Extremity Conditions  bilateral:;clean;dry;shiny;hairless;fragile  -      Lower Extremity Color/Pigment  right:;hyperpigmented;brawny  -         Lymphedema Pulses/Capillary Refill    Lower Extremity Capillary Refill  right:;less than 3 seconds  -         LLE Quick Girth (cm)    Met-heads  26.8 cm  -JM      Mid foot  27 cm  -JM      Smallest ankle  28.7 cm  -JM      Largest calf  47.5 cm  -JM      Tib tuberosity  45.3 cm  -JM         RLE Quick Girth (cm)    Met-heads  27.3 cm  -JM      Mid foot  27.3 cm  -JM      Smallest ankle  29 cm  -JM      Largest calf  48.5 cm  -JM      Tib tuberosity  45.5 cm  -JM      RLE Quick Girth Total  177.6  -JM         Compression/Skin Care    Skin Care  washed/dried;lotion applied  -      Wrapping Location  lower extremity  -      Wrapping Location LE  bilateral:;foot to knee  -      Wrapping Comments  wound dressings, size " 4&5 compressogrips doubled/overlapping for gradient multilayer compression  -TOÑO      Bandage Layers  cotton elastic stocking- double layer (comment size)  -TOÑO        User Key  (r) = Recorded By, (t) = Taken By, (c) = Cosigned By    Initials Name Provider Type    Zeinab Lopez, PT Physical Therapist          WOUND DEBRIDEMENT  Total area of Debridement: 8cmsq  Debridement Site 1  Location- Site 1: RLE wound  Selective Debridement- Site 1: Wound Surface <20cmsq  Instruments- Site 1: tweezers  Excised Tissue Description- Site 1: minimum, slough  Bleeding- Site 1: scant   Debridement Site 2  Location- Site 2: LLE wound  Selective Debridement- Site 2: Wound Surface <20cmsq  Instruments- Site 2: tweezers  Excised Tissue Description- Site 2: scant, slough  Bleeding- Site 2: scant, held pressure, 1 minute         Therapy Education     Row Name 05/08/21 9918             Therapy Education    Education Details  Keep dressings dry/intact, change every 2-3 days.  Use no-sting spray on skin around wound, zguard to edges of wound, HFBt to cover and optifoam gentle to secure dressing.  Continue daily use of compressogrips, reviewed application and use of both size 4 and 5 for MLW.  Elevate legs as much as possible, take meds as prescribed.  -TOÑO      Given  Bandaging/dressing change;Edema management;Symptoms/condition management  -TOÑO      Program  Reinforced  -TOÑO      How Provided  Verbal;Demonstration  -TOÑO      Provided to  Patient  -TOÑO      Level of Understanding  Teach back education performed;Verbalized  -TOÑO        User Key  (r) = Recorded By, (t) = Taken By, (c) = Cosigned By    Initials Name Provider Type    Zeinab Lopez, PT Physical Therapist          Recommendation and Plan  PT Assessment/Plan     Row Name 05/08/21 1116          PT Assessment    Functional Limitations  Performance in work activities;Performance in self-care ADL;Limitation in home management;Impaired gait;Other (comment) wound mgmt  -TOÑO      Impairments  Edema;Impaired lymphatic circulation;Impaired venous circulation;Integumentary integrity;Pain  -     Assessment Comments  RLE wound improving with expanding skin bridges, altough with new satellite areas noted today and pt also with new open wound of anterior LLE today.  BLE edema stable.  Pt continues to require skilled PT for wound debridement, advanced dressings, and MLW to promote venous return and support wound healing.  Pt able to change dressings and MLW at home with spouse's assistance, will continue with current frequency of every 2 weeks.  -     Rehab Potential  Good  -     Patient/caregiver participated in establishment of treatment plan and goals  Yes  -     Patient would benefit from skilled therapy intervention  Yes  -        PT Plan    PT Frequency  Other (comment) every 1-2 weeks  -     Predicted Duration of Therapy Intervention (PT)  8 visits  -     Planned CPT's?  PT STEFANIE DEBRIDE OPEN WOUND UP TO 20 CM: 94979;PT STEFANIE DEBRIDE OPEN WOUND EA ADD 20 CM: 66413;PT UNNA BOOT: 71818;PT MULTI LAYER COMP SYS LE;PT NONSELECT DEBRIDE 15 MIN: 14934;PT SELF CARE/MGMT/TRAIN 15 MIN: 79203  -     Physical Therapy Interventions (Optional Details)  bandaging;patient/family education;wound care  -     PT Plan Comments  debridement, MLW  -       User Key  (r) = Recorded By, (t) = Taken By, (c) = Cosigned By    Initials Name Provider Type    Zeinab Lopez, PT Physical Therapist          Goals  PT OP Goals     Row Name 21 1115          PT Short Term Goals    STG Date to Achieve  21  -     STG 1  Pt will verbalize s/sx of infection.  -     STG 1 Progress  Met  -     STG 2  Pt will demonstrate 25% reduction in wound area to indicate healing progress.  -     STG 2 Progress  Ongoing;Progressing  -     STG 3  Pt will demonstrate 1 cm decrease in BLE girth to indicate progress with edema management.  -     STG 3 Progress  Met  -        Long Term Goals     LTG Date to Achieve  06/02/21  -     LTG 1  Pt will demonstrate independence with clean home dressing changes and an appropriate compression management system.  -     LTG 1 Progress  Partially Met  -     LTG 2  Pt will demonstrate 75% reduction in wound area to indicate healing progress.  -     LTG 2 Progress  Ongoing;Progressing  -     LTG 3  Pt will demonstrate 3 cm decrease in BLE girth to indicate progress with edema management.  -     LTG 3 Progress  Ongoing;Progressing  -     LTG 4  Pt / spouse independent with BLE edema management (skin care, dressing changes prn, compression garment or system, possible compression pump).   -     LTG 4 Progress  Ongoing;Progressing  -        Time Calculation    PT Goal Re-Cert Due Date  06/02/21  -       User Key  (r) = Recorded By, (t) = Taken By, (c) = Cosigned By    Initials Name Provider Type    Zeinab Lopez, PT Physical Therapist              Time Calculation: Start Time: 1115  Time Calculation- PT  Start Time: 1115  PT Received On: 05/08/21  PT Goal Re-Cert Due Date: 06/02/21  Untimed Charges  Wound Care: 22150 Selective debridement, 90661 Multilayer comp below knee  25047-Tkrhphzmkz comp below knee: 20  43862-Zpwlfepww debridement: 20  Total Minutes  Untimed Charges Total Minutes: 40   Total Minutes: 40  Therapy Charges for Today     Code Description Service Date Service Provider Modifiers Qty    08973812240 HC STEFANIE DEBRIDE OPEN WOUND UP TO 20CM 5/8/2021 Zeinab Cleveland, PT GP 1    46372665978 HC PT MULTI LAYER COMP SYS BELOW KNEE 5/8/2021 Zeinab Cleveland, PT GP 1                  Zeinab Cleveland, PT  5/8/2021

## 2021-05-23 NOTE — THERAPY WOUND CARE TREATMENT
Outpatient Rehabilitation - Wound/Debridement Treatment Note   Dan[place]     Patient Name: Akshat Winkler  : 1955  MRN: [id number]  Today's Date: 2021                 Admit Date: 2021    Visit Dx:    ICD-10-CM ICD-9-CM   1. Open wound of right lower extremity, subsequent encounter  S81.801D V58.89     891.0   2. Bilateral lower extremity edema  R60.0 782.3   3. Open wound of left lower extremity, subsequent encounter  S81.802D V58.89     891.0       Patient Active Problem List   Diagnosis   • Chronic diastolic heart failure (CMS/HCC)   • Type 2 diabetes mellitus without complication, without long-term current use of insulin (CMS/[facility])   • Hyperlipidemia LDL goal <70   • Essential hypertension   • Coronary artery disease involving native coronary artery of native heart with angina pectoris (CMS/[facility])   • Severe obstructive sleep apnea, no CPAP   • Class 3 severe obesity due to excess calories with serious comorbidity and body mass index (BMI) of 40.0 to 44.9 in adult (CMS/[facility])   • Chronic anticoagulation   • Persistent atrial fibrillation/flutter   • Compression fracture of lumbar vertebra (CMS/[facility])   • Spondylosis of 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 or radiculopathy   • Lumbar stenosis with neurogenic claudication   • Lumbar disc herniation   • Myofascial pain   • Pathologic compression fracture of vertebra (CMS/[facility])   • Chronic bilateral severe lower extremity edema   • Mechanical low back pain   • History of kyphoplasty   • Prediabetes        Past Medical History:   Diagnosis Date   • Acute diastolic HF (heart failure) (CMS/[facility])    • Acute hypokalemia    • Arrhythmia    • Back injury     Fall on 19   • Cellulitis of leg    • Chest pain syndrome    • CHF (congestive heart failure) (CMS/[facility])    • Diabetes mellitus (CMS/[facility])     patient reports borderline    • Hyperlipidemia    • Hypertension    • Muscle pain     around back   • Obesity    • Obstructive sleep apnea     right now patient  is sleeeping in recliner and does not use-when he lies down he will have to use cpap   • Paroxysmal A-fib (CMS/HCC)    • Peripheral artery disease (CMS/HCC)    • Spondylosis of 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 or radiculopathy 2/7/2018   • Wears glasses         Past Surgical History:   Procedure Laterality Date   • CARDIAC CATHETERIZATION     • CARDIOVERSION      multiple   • COLONOSCOPY      about 14 years ago   • KYPHOPLASTY N/A 3/5/2018    Procedure: KYPHOPLASTY L4;  Surgeon: Akshat Davidson MD;  Location:  [facility];  Service:    • KYPHOPLASTY N/A 4/16/2018    Procedure: KYPHOPLASTY L1 AND L2;  Surgeon: Akshat Davidson MD;  Location:  [facility];  Service: Neurosurgery   • OTHER SURGICAL HISTORY  06/29/2015    PVA         EVALUATION  PT Ortho     Row Name 05/22/21 1115       Subjective Comments    Subjective Comments  Did not replace bandages or compression after showering this AM d/t knowing he was coming here. He did start on the vascular medication to help with blood flow. Reports issues with retaining fluid, but is still not able to take diuretics much during the week d/t inability to take frequent bathroom breaks at work.  -       Subjective Pain    Able to rate subjective pain?  yes  -    Pre-Treatment Pain Level  0  -    Post-Treatment Pain Level  2  -       Transfers    Sit-Stand [place] (Transfers)  independent  -MC    Stand-Sit [place] (Transfers)  independent  -MC    Comment (Transfers)  seated for tx  -       Gait/Stairs (Locomotion)    [place] Level (Gait)  independent  -      User Key  (r) = Recorded By, (t) = Taken By, (c) = Cosigned By    Initials Name Provider Type    Neva Schmidt PT Physical Therapist          LDA Wound     Row Name 05/22/21 1115             Wound 05/08/21 1115 Left anterior leg Venous Ulcer    Wound - Properties Group Placement Date: 05/08/21  -JM Placement Time: 1115  -JM Side: Left  - Orientation: anterior  -JM Location: leg  -JM Primary  Wound Type: Venous ulcer  -JM    Dressing Appearance  open to air  -      Base  dry;scab pinpoint scab remaining  -      Periwound  intact;dry;pink  -      Periwound Temperature  warm  -      Periwound Skin Turgor  soft  -      Drainage Amount  none  -      Care, Wound  cleansed with;wound cleanser  -      Dressing Care  multi-layer wrap  -      Retired Wound - Properties Group Date first assessed: 05/08/21  - Time first assessed: 1115  - Side: Left  - Location: leg  - Primary Wound Type: Venous ulcer  -JM       Wound 03/04/21 0845 Right posterior calf Venous Ulcer    Wound - Properties Group Placement Date: 03/04/21  - Placement Time: 0845  - Present on Hospital Admission: Y  - Side: Right  - Orientation: posterior  - Location: calf  - Primary Wound Type: Venous ulcer  -MC    Wound Image  Images linked: 1  -      Dressing Appearance  open to air  -      Base  moist;pink;red;granulating;epithelialization;yellow;slough;dry partially dry, especially along lateral area  -      Periwound  intact;pink;swelling  -      Periwound Temperature  cool  -      Periwound Skin Turgor  firm  -      Edges  irregular;open  -      Wound Length (cm)  3.5 cm  -      Wound Width (cm)  8.4 cm includes lateral satellite  -      Wound Depth (cm)  0.1 cm  -      Drainage Characteristics/Odor  serosanguineous  -      Drainage Amount  small  -      Care, Wound  cleansed with;wound cleanser;debrided  -      Dressing Care  dressing applied;silver impregnated;low-adherent;foam;gauze;multi-layer wrap mepilex Ag, kerlix, DARONW  -      Periwound Care  cleansed with pH balanced cleanser;barrier ointment applied zguard  -      Retired Wound - Properties Group Date first assessed: 03/04/21  - Time first assessed: 0845  - Present on Hospital Admission: Y  - Side: Right  - Location: calf  - Primary Wound Type: Venous ulcer  -      User Key  (r) = Recorded By, (t) = Taken By, (c)  "= Cosigned By    Initials Name Provider Type    Neva Schmidt, PT Physical Therapist    Neva Schmidt, PT Physical Therapist    Zeinab Lopez, PT Physical Therapist        Lymphedema     Row Name 05/22/21 1111             Lymphedema Edema Assessment    Ptting Edema Category  By severity  -      Pitting Edema  Severe;Very Severe  -      Edema Assessment Comment  BLE very taut  -         Skin Changes/Observations    Lower Extremity Conditions  bilateral:;clean;dry;shiny;hairless;fragile  -      Lower Extremity Color/Pigment  right:;hyperpigmented;brawny  -         Lymphedema Pulses/Capillary Refill    Lower Extremity Capillary Refill  right:;left:;less than 3 seconds  -         Compression/Skin Care    Skin Care  washed/dried;lotion applied  -      Wrapping Location  lower extremity  -      Wrapping Location LE  bilateral:;foot to knee  -      Wrapping Comments  wound dressings, size 4&5 compressogrips doubled/overlapping for gradient multilayer compression  -      Bandage Layers  cotton elastic stocking- double layer (comment size)  -        User Key  (r) = Recorded By, (t) = Taken By, (c) = Cosigned By    Initials Name Provider Type    Neva Schmidt, PT Physical Therapist          WOUND DEBRIDEMENT  Total area of Debridement: 5 cm2  Debridement Site 1  Location- Site 1: RLE wound  Selective Debridement- Site 1: Wound Surface <20cmsq  Instruments- Site 1: tweezers  Excised Tissue Description- Site 1: minimum, slough  Bleeding- Site 1: none             Therapy Education     Row Name 05/22/21 5274             Therapy Education    Education Details  Change dressing to mepilex Ag and kerlix to cover the whole wound area. Explained importance of maintaining coverage (avoiding \"airing out\"), even between showering and coming to wound care appts, as any drying of granulation tissue can set back healing progress. May leave LLE wound JANNY. Also discussed importance of " maintaining compression as instructed and taking medications as prescribed for intended effect.  -      Given  Bandaging/dressing change;Edema management;Symptoms/condition management  -      Program  Progressed;Modified  -      How Provided  Verbal;Demonstration  -      Provided to  Patient  -      Level of Understanding  Teach back education performed;Verbalized  -        User Key  (r) = Recorded By, (t) = Taken By, (c) = Cosigned By    Initials Name Provider Type     Neva uBrkett, PT Physical Therapist          Recommendation and Plan  PT Assessment/Plan     Row Name 05/22/21 9561          PT Assessment    Functional Limitations  Performance in work activities;Performance in self-care ADL;Limitation in home management;Impaired gait;Other (comment) wound mgmt  -     Impairments  Edema;Impaired lymphatic circulation;Impaired venous circulation;Integumentary integrity;Pain  -     Assessment Comments  Pt with some new epithelialization to the posterior R leg wound. However, debridement of other areas limited by dryness and adherence, as pt allowed the area to dry out since showering this AM. Pt also with very taut, edematous legs, without compression this AM. Pt reports partial noncompliance with his diuretics, taking them mostly on the weekends d/t work responsibilities. Switched the dressing to mepilex Ag to provide full width coverage of all the RLE open areas, and to provide a moist, antimicrobial environment for wound healing. Pt will continue to benefit from skilled PT wound care to promote healing.  -     Rehab Potential  Good  -     Patient/caregiver participated in establishment of treatment plan and goals  Yes  -     Patient would benefit from skilled therapy intervention  Yes  -MC        PT Plan    PT Frequency  Other (comment) every 2 weeks  -     Physical Therapy Interventions (Optional Details)  wound care;patient/family education  -     PT Plan Comments  debridement,  MLW  -       User Key  (r) = Recorded By, (t) = Taken By, (c) = Cosigned By    Initials Name Provider Type    Neva Schmidt, PT Physical Therapist          Goals  PT OP Goals     Row Name 05/22/21 1115          Time Calculation    PT Goal Re-Cert Due Date  06/02/21  -       User Key  (r) = Recorded By, (t) = Taken By, (c) = Cosigned By    Initials Name Provider Type    Neva Schmidt, PT Physical Therapist          PT Goal Re-Cert Due Date: 06/02/21            Time Calculation: Start Time: 1115  Therapy Charges for Today     Code Description Service Date Service Provider Modifiers Qty    99084534997 HC STEFANIE DEBRIDE OPEN WOUND UP TO 20CM 5/22/2021 Neva Burkett, PT GP 1    [id number] HC PT MULTI LAYER COMP SYS BELOW KNEE 5/22/2021 Neva Burkett, PT GP 1                  Neva Burkett, PT  5/23/2021

## 2021-06-05 NOTE — THERAPY PROGRESS REPORT/RE-CERT
Outpatient Rehabilitation - Wound/Debridement Progress Note   Danilo     Patient Name: Akshat Winkler  : 1955  MRN: [id number]  Today's Date: 2021                 Admit Date: 2021    Visit Dx:    ICD-10-CM ICD-9-CM   1. Open wound of right lower extremity, subsequent encounter  S81.801D V58.89     891.0   2. Bilateral lower extremity edema  R60.0 782.3   3. Open wound of left lower extremity, subsequent encounter  S81.802D V58.89     891.0       Patient Active Problem List   Diagnosis   • Chronic diastolic heart failure (CMS/HCC)   • Type 2 diabetes mellitus without complication, without long-term current use of insulin (CMS/[facility])   • Hyperlipidemia LDL goal <70   • Essential hypertension   • Coronary artery disease involving native coronary artery of native heart with angina pectoris (CMS/[facility])   • Severe obstructive sleep apnea, no CPAP   • Class 3 severe obesity due to excess calories with serious comorbidity and body mass index (BMI) of 40.0 to 44.9 in adult (CMS/[facility])   • Chronic anticoagulation   • Persistent atrial fibrillation/flutter   • Compression fracture of lumbar vertebra (CMS/[facility])   • Spondylosis of 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 or radiculopathy   • Lumbar stenosis with neurogenic claudication   • Lumbar disc herniation   • Myofascial pain   • Pathologic compression fracture of vertebra (CMS/[facility])   • Chronic bilateral severe lower extremity edema   • Mechanical low back pain   • History of kyphoplasty   • Prediabetes        Past Medical History:   Diagnosis Date   • Acute diastolic HF (heart failure) (CMS/[facility])    • Acute hypokalemia    • Arrhythmia    • Back injury     Fall on 17   • Cellulitis of leg    • Chest pain syndrome    • CHF (congestive heart failure) (CMS/[facility])    • Diabetes mellitus (CMS/[facility])     patient reports borderline    • Hyperlipidemia    • Hypertension    • Muscle pain     around back   • Obesity    • Obstructive sleep apnea     right now patient is  sleeeping in recliner and does not use-when he lies down he will have to use cpap   • Paroxysmal A-fib (CMS/HCC)    • Peripheral artery disease (CMS/HCC)    • Spondylosis of 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 or radiculopathy 2/7/2018   • Wears glasses         Past Surgical History:   Procedure Laterality Date   • CARDIAC CATHETERIZATION     • CARDIOVERSION      multiple   • COLONOSCOPY      about 14 years ago   • KYPHOPLASTY N/A 3/5/2018    Procedure: KYPHOPLASTY L4;  Surgeon: Akshat Davidson MD;  Location:  [facility];  Service:    • KYPHOPLASTY N/A 4/16/2018    Procedure: KYPHOPLASTY L1 AND L2;  Surgeon: Akshat Davidson MD;  Location:  [facility];  Service: Neurosurgery   • OTHER SURGICAL HISTORY  06/29/2015    PVA         EVALUATION  PT Ortho     Row Name 06/05/21 1100       Subjective Comments    Subjective Comments  Pt reports kerlix did not keep the mepilex ag in place, so he had to resume use of optifoam gentle dressings.  Reports he quit his job Tuesday, so will have to change to COBRA plan for insurance starting July 1, so will hope to be finished with PT wound care by then.  States he should be able to take diuretic daily since he is not at work.  -JM       Subjective Pain    Able to rate subjective pain?  yes  -JM    Pre-Treatment Pain Level  0  -JM    Post-Treatment Pain Level  0  -JM       Transfers    Sit-Stand [place] (Transfers)  independent  -JM    Stand-Sit [place] (Transfers)  independent  -JM    Comment (Transfers)  seated for tx  -JM       Gait/Stairs (Locomotion)    [place] Level (Gait)  independent  -      User Key  (r) = Recorded By, (t) = Taken By, (c) = Cosigned By    Initials Name Provider Type    Zeinab Lopez, PT Physical Therapist          LDA Wound     Row Name 06/05/21 1100             Wound 05/08/21 1115 Left anterior leg Venous Ulcer    Wound - Properties Group Placement Date: 05/08/21  -TOÑO Placement Time: 1115  -JM Side: Left  - Orientation: anterior  -  "Location: leg  -JM Primary Wound Type: Venous ulcer  -    Dressing Appearance  open to air  -      Base  scab;moist;pink pinpoint, actively weeping  -      Periwound  intact;dry;pink  -      Periwound Temperature  warm  -      Periwound Skin Turgor  soft  -JM      Drainage Amount  none  -JM      Care, Wound  cleansed with;wound cleanser  -      Dressing Care  multi-layer wrap  -JM      Retired Wound - Properties Group Date first assessed: 05/08/21  - Time first assessed: 1115  - Side: Left  - Location: leg  -JM Primary Wound Type: Venous ulcer  -JM       Wound 03/04/21 0845 Right posterior calf Venous Ulcer    Wound - Properties Group Placement Date: 03/04/21  - Placement Time: 0845  - Present on Hospital Admission: Y  - Side: Right  - Orientation: posterior  - Location: calf  - Primary Wound Type: Venous ulcer  -    Dressing Appearance  intact;moist drainage;other (see comments) gauze and optifoam gentle, no compressogrips  -      Base  moist;pink;red;granulating;epithelialization;yellow;slough  -      Periwound  intact;pink;swelling  -      Periwound Temperature  cool  -      Periwound Skin Turgor  firm  -      Edges  irregular;open  -JM      Wound Length (cm)  3 cm several small open areas  -      Wound Width (cm)  9 cm  -      Wound Depth (cm)  0.1 cm  -      Drainage Characteristics/Odor  serosanguineous  -      Drainage Amount  small  -JM      Care, Wound  cleansed with;wound cleanser;debrided  -      Dressing Care  dressing applied;antimicrobial agent applied;foam;low-adherent;multi-layer wrap sorbact, 6\" optifoam, MLW  -      Periwound Care  cleansed with pH balanced cleanser;dry periwound area maintained  -      Retired Wound - Properties Group Date first assessed: 03/04/21  - Time first assessed: 0845  - Present on Hospital Admission: Y  - Side: Right  - Location: calf  - Primary Wound Type: Venous ulcer  -      User Key  (r) = Recorded " By, (t) = Taken By, (c) = Cosigned By    Initials Name Provider Type    Neva Schmidt, PT Physical Therapist    Zeinab Lopez, PT Physical Therapist        Lymphedema     Row Name 06/05/21 1100             Lymphedema Edema Assessment    Ptting Edema Category  By severity  -      Pitting Edema  Severe  -         Skin Changes/Observations    Lower Extremity Conditions  bilateral:;clean;dry;shiny;hairless;fragile  -      Lower Extremity Color/Pigment  right:;hyperpigmented;brawny  -         Lymphedema Pulses/Capillary Refill    Lower Extremity Capillary Refill  right:;left:;less than 3 seconds  -         LLE Quick Girth (cm)    Met-heads  26.7 cm  -      Mid foot  27 cm  -JM      Smallest ankle  29.5 cm  -JM      Largest calf  50.8 cm  -JM      Tib tuberosity  45 cm  -JM         RLE Quick Girth (cm)    Met-heads  27 cm  -JM      Mid foot  27 cm  -JM      Smallest ankle  29.5 cm  -JM      Largest calf  48 cm  -JM      Tib tuberosity  44 cm  -JM      RLE Quick Girth Total  175.5  -JM         Compression/Skin Care    Skin Care  washed/dried;lotion applied  -      Wrapping Location  lower extremity  -      Wrapping Location LE  bilateral:;foot to knee  -      Wrapping Comments  wound dressings, size 4&5 compressogrips doubled/overlapping for gradient multilayer compression  -      Bandage Layers  cotton elastic stocking- double layer (comment size)  -        User Key  (r) = Recorded By, (t) = Taken By, (c) = Cosigned By    Initials Name Provider Type    Zeinab Lopez, PT Physical Therapist          WOUND DEBRIDEMENT  Total area of Debridement: 6cmsq  Debridement Site 1  Location- Site 1: RLE wound  Selective Debridement- Site 1: Wound Surface <20cmsq  Instruments- Site 1: tweezers  Excised Tissue Description- Site 1: minimum, slough, moderate, other (comment) (hypertrophic crusts)  Bleeding- Site 1: none             Therapy Education     Row Name 06/05/21 1100              Therapy Education    Education Details  Change to sorbact with optifoam gentle, try to rotate dressing so silicone border isn't always on the same area of skin.  Reinforced need for moist wound environment, do not let wound dry out, also need to consistently use compressogrips and take diuretics as prescribed.  -JM      Given  Bandaging/dressing change;Edema management;Symptoms/condition management  -      Program  Progressed;Modified  -TOÑO      How Provided  Verbal;Demonstration  -JM      Provided to  Patient  -JM      Level of Understanding  Teach back education performed;Verbalized  -        User Key  (r) = Recorded By, (t) = Taken By, (c) = Cosigned By    Initials Name Provider Type    Zeinab Lopez, PT Physical Therapist          Recommendation and Plan  PT Assessment/Plan     Row Name 06/05/21 1100          PT Assessment    Functional Limitations  Performance in work activities;Performance in self-care ADL;Limitation in home management;Impaired gait;Other (comment) wound mgmt  -     Impairments  Edema;Impaired lymphatic circulation;Impaired venous circulation;Integumentary integrity;Pain  -     Assessment Comments  Posterior R calf wound mostly reepithelialized centrally, but pt still with several small satellite areas, possibly from use of silicone border dressing.  Pt reporting ag foam and kerlix did not stay in place and requested to continue use of optifoam dressing.  PT changed dressing to sorbact contact layer with optifoam gentle, pt to adjust placement of optifoam for skin breaks.  BLE edema increased LLE, but pt also had left compressogrips off for a couple hours prior to tx.  Pt needs reinforcement for consistent compression, leg elevation, and diuretic use as prescribed.  Pt overall has made good progress with tx, will continue to benefit from skilled PT for wound debridement, dressings management, and compression wraps to BLE.  Of note, pt has left his job, so will have insurance  change , and may no longer be able to come for tx.  Continue current POC and adjust at needed pending insurance change.  -     Rehab Potential  Good  -     Patient/caregiver participated in establishment of treatment plan and goals  Yes  -     Patient would benefit from skilled therapy intervention  Yes  -        PT Plan    PT Frequency  1x/week;Other (comment) every 1-2 weeks  -     Predicted Duration of Therapy Intervention (PT)  4 visits  -     Planned CPT's?  PT STEFANIE DEBRIDE OPEN WOUND UP TO 20 CM: 34709;PT MULTI LAYER COMP SYS LE;PT NONSELECT DEBRIDE 15 MIN: 92506;PT SELF CARE/MGMT/TRAIN 15 MIN: 54957  -     Physical Therapy Interventions (Optional Details)  patient/family education;wound care  -     PT Plan Comments  debridement, MLW  -       User Key  (r) = Recorded By, (t) = Taken By, (c) = Cosigned By    Initials Name Provider Type    Zeinab Lopez, PT Physical Therapist          Goals  PT OP Goals     Row Name 21 1100          PT Short Term Goals    STG Date to Achieve  21  -     STG 1  Pt will verbalize s/sx of infection.  -     STG 1 Progress  Met  -     STG 2  Pt will demonstrate 25% reduction in wound area to indicate healing progress.  -     STG 2 Progress  Met  -     STG 2 Progress Comments  Wound area is approximately 75% reepithelialized  -     STG 3  Pt will demonstrate 1 cm decrease in BLE girth to indicate progress with edema management.  -     STG 3 Progress  Met  -        Long Term Goals    LTG Date to Achieve  21  -     LTG 1  Pt will demonstrate independence with clean home dressing changes and an appropriate compression management system.  -     LTG 1 Progress  Partially Met  -     LTG 1 Progress Comments  Met for dressing changes, needs reinforcement for consistent compression use.  -     LTG 2  Pt will demonstrate 75% reduction in wound area to indicate healing progress.  -     LTG 2 Progress  Progressing   -     LTG 3  Pt will demonstrate 3 cm decrease in BLE girth to indicate progress with edema management.  -TOÑO     LTG 3 Progress  Ongoing;Progressing  -TOÑO     LTG 4  Pt / spouse independent with BLE edema management (skin care, dressing changes prn, compression garment or system, possible compression pump).   -TOÑO     LTG 4 Progress  Ongoing;Progressing  -TOÑO        Time Calculation    PT Goal Re-Cert Due Date  09/03/21  -TOÑO       User Key  (r) = Recorded By, (t) = Taken By, (c) = Cosigned By    Initials Name Provider Type    Zeinab Lopez, PT Physical Therapist          PT Goal Re-Cert Due Date: 09/03/21  PT Short Term Goals  STG Date to Achieve: 07/03/21  STG 1: Pt will verbalize s/sx of infection.  STG 1 Progress: Met  STG 2: Pt will demonstrate 25% reduction in wound area to indicate healing progress.  STG 2 Progress: Met  STG 2 Progress Comments: Wound area is approximately 75% reepithelialized  STG 3: Pt will demonstrate 1 cm decrease in BLE girth to indicate progress with edema management.  STG 3 Progress: Met  Long Term Goals  LTG Date to Achieve: 09/03/21  LTG 1: Pt will demonstrate independence with clean home dressing changes and an appropriate compression management system.  LTG 1 Progress: Partially Met  LTG 1 Progress Comments: Met for dressing changes, needs reinforcement for consistent compression use.  LTG 2: Pt will demonstrate 75% reduction in wound area to indicate healing progress.  LTG 2 Progress: Progressing  LTG 3: Pt will demonstrate 3 cm decrease in BLE girth to indicate progress with edema management.  LTG 3 Progress: Ongoing, Progressing  LTG 4: Pt / spouse independent with BLE edema management (skin care, dressing changes prn, compression garment or system, possible compression pump).   LTG 4 Progress: Ongoing, Progressing      Time Calculation: Start Time: 1100  Untimed Charges  63607-Wfntkfppqd comp below knee: 15  88584-Ddpkabriq debridement: 15  Total Minutes  Untimed Charges  Total Minutes: 30   Total Minutes: 30  Therapy Charges for Today     Code Description Service Date Service Provider Modifiers Qty    51060380524 HC STEFANIE DEBRIDE OPEN WOUND UP TO 20CM 6/5/2021 Zeinab Cleveland, PT GP 1    20419488412 HC PT MULTI LAYER COMP SYS BELOW KNEE 6/5/2021 Zeinab Cleveland, PT GP 1                  Zeinab Cleveland, PT  6/5/2021

## 2021-06-16 NOTE — PROGRESS NOTES
Peterson Akshat MYRON   Male, 64 y.o., 1955  Weight:   122.1 kg (269 lb 4 oz)  Phone:   [phone] (M)  PCP:   Oren Mark MD  MRN:   [id number]  MyChart:   Pending  Next Appt:   12/17/2019  Message   Received: Yesterday   Message Contents   Trina Joy, Kirsten Willis, SIMONE Curtis,   Please request labs from [facility].  They would have been drawn in the last week or so.    thank you    Comments     6/14/2019 Requested.    6/14/19 Received.        
Poor

## 2021-07-28 NOTE — THERAPY DISCHARGE NOTE
Outpatient Rehabilitation - Wound/Debridement D/C Summary       Patient Name: Akshat Winkler  : 1955  MRN: [id number]  Today's Date: 2021                  Admit Date: (Not on file)    Visit Dx:  No diagnosis found.    Patient Active Problem List   Diagnosis   • Chronic diastolic heart failure (CMS/[facility])   • Type 2 diabetes mellitus without complication, without long-term current use of insulin (CMS/[facility])   • Hyperlipidemia LDL goal <70   • Essential hypertension   • Coronary artery disease involving native coronary artery of native heart with angina pectoris (CMS/[facility])   • Severe obstructive sleep apnea, no CPAP   • Class 3 severe obesity due to excess calories with serious comorbidity and body mass index (BMI) of 40.0 to 44.9 in adult (CMS/[facility])   • Chronic anticoagulation   • Persistent atrial fibrillation/flutter   • Compression fracture of lumbar vertebra (CMS/[facility])   • Spondylosis of 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 or radiculopathy   • Lumbar stenosis with neurogenic claudication   • Lumbar disc herniation   • Myofascial pain   • Pathologic compression fracture of vertebra (CMS/[facility])   • Chronic bilateral severe lower extremity edema   • Mechanical low back pain   • History of kyphoplasty   • Prediabetes        Past Medical History:   Diagnosis Date   • Acute diastolic HF (heart failure) (CMS/[facility])    • Acute hypokalemia    • Arrhythmia    • Back injury     Fall on 17   • Cellulitis of leg    • Chest pain syndrome    • CHF (congestive heart failure) (CMS/[facility])    • Diabetes mellitus (CMS/[facility])     patient reports borderline    • Hyperlipidemia    • Hypertension    • Muscle pain     around back   • Obesity    • Obstructive sleep apnea     right now patient is sleeeping in recliner and does not use-when he lies down he will have to use cpap   • Paroxysmal A-fib (CMS/[facility])    • Peripheral artery disease (CMS/[facility])    • Spondylosis of 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 or radiculopathy 2018   •  Wears glasses         Past Surgical History:   Procedure Laterality Date   • CARDIAC CATHETERIZATION     • CARDIOVERSION      multiple   • COLONOSCOPY      about 14 years ago   • KYPHOPLASTY N/A 3/5/2018    Procedure: KYPHOPLASTY L4;  Surgeon: Akshat Davidson MD;  Location:  [facility];  Service:    • KYPHOPLASTY N/A 4/16/2018    Procedure: KYPHOPLASTY L1 AND L2;  Surgeon: Akshat Davidson MD;  Location:  [facility];  Service: Neurosurgery   • OTHER SURGICAL HISTORY  06/29/2015    PVA         EVALUATION                WOUND DEBRIDEMENT                            Recommendation and Plan      Goals  PT OP Goals     Row Name 07/28/21 1552          PT Short Term Goals    STG Date to Achieve  07/03/21  -     STG 1  Pt will verbalize s/sx of infection.  -     STG 1 Progress  Met  -     STG 2  Pt will demonstrate 25% reduction in wound area to indicate healing progress.  -     STG 2 Progress  Met  -     STG 3  Pt will demonstrate 1 cm decrease in BLE girth to indicate progress with edema management.  -     STG 3 Progress  Met  -        Long Term Goals    LTG Date to Achieve  09/03/21  -     LTG 1  Pt will demonstrate independence with clean home dressing changes and an appropriate compression management system.  -     LTG 1 Progress  Partially Met  -     LTG 2  Pt will demonstrate 75% reduction in wound area to indicate healing progress.  -     LTG 2 Progress  Not Met  -     LTG 3  Pt will demonstrate 3 cm decrease in BLE girth to indicate progress with edema management.  -     LTG 3 Progress  Not Met  -     LTG 4  Pt / spouse independent with BLE edema management (skin care, dressing changes prn, compression garment or system, possible compression pump).   -     LTG 4 Progress  Not Met  -       User Key  (r) = Recorded By, (t) = Taken By, (c) = Cosigned By    Initials Name Provider Type    Neva Schmidt, PT Physical Therapist          Time Calculation:              OP Discharge Summary      Row Name 07/28/21 1552             OP PT Discharge Summary    Date of Discharge  07/28/21  -      Reason for Discharge  other (comment) pt has not followed up for over 30 days, will now require a new MD order  -MC      Outcomes Achieved  Patient able to partially acheive established goals  -      Discharge Destination  Unknown  -        User Key  (r) = Recorded By, (t) = Taken By, (c) = Cosigned By    Initials Name Provider Type    Neva Schmidt, PT Physical Therapist          Neva Burkett, PT  7/28/2021

## 2021-10-22 NOTE — PROGRESS NOTES
"Chief Complaint  Edema and Follow-up    Subjective    History of Present Illness {CC  Problem List  Visit  Diagnosis   Encounters  Notes  Medications  Labs  Result Review Imaging  Media :23}       History of Present Illness   66-year-old man presents the office today for evaluation of his acute on chronic diastolic heart failure. Patient reports that he has gained 14 pounds over the last few weeks. Notes worsening dyspnea on exertion, abdominal fullness and pedal edema. He reports that his feet are swollen he had a hard time getting his shoes on today. He is wearing his compression socks regularly. He reports that he is now taking his Bumex every other day secondary to significant myalgias in arms, legs and feet when he takes a daily.  Objective     Vital Signs:   Vitals:    10/22/21 1135   BP: 125/84   BP Location: Left arm   Patient Position: Sitting   Cuff Size: Adult   Pulse: 104   Resp: 20   Temp: 97.7 °F (36.5 °C)   TempSrc: Temporal   SpO2: 97%   Weight: 133 kg (293 lb 6 oz)   Height: 166.4 cm (65.51\")     Body mass index is 48.06 kg/m².  Physical Exam  Vitals and nursing note reviewed.   Constitutional:       Appearance: Normal appearance.   HENT:      Head: Normocephalic.   Eyes:      Pupils: Pupils are equal, round, and reactive to light.   Cardiovascular:      Rate and Rhythm: Normal rate and regular rhythm.      Pulses: Normal pulses.      Heart sounds: Normal heart sounds. No murmur heard.      Pulmonary:      Effort: Pulmonary effort is normal.      Breath sounds: Rales present.   Abdominal:      General: Bowel sounds are normal. There is distension.   Musculoskeletal:         General: Normal range of motion.      Cervical back: Normal range of motion.      Right lower leg: Edema present.      Left lower leg: Edema present.   Skin:     General: Skin is warm and dry.      Capillary Refill: Capillary refill takes less than 2 seconds.   Neurological:      Mental Status: He is alert and oriented " to person, place, and time.   Psychiatric:         Mood and Affect: Mood normal.         Thought Content: Thought content normal.              Result Review  Data Reviewed:{ Labs  Result Review  Imaging  Med Tab  Media :23}   Lab Results   Component Value Date    GLUCOSE 95 05/04/2021    CALCIUM 9.8 05/04/2021     05/04/2021    K 4.3 05/04/2021    CO2 22.6 05/04/2021     05/04/2021    BUN 15 05/04/2021    CREATININE 0.91 05/04/2021    EGFRIFAFRI 92 02/08/2018    EGFRIFNONA 84 05/04/2021    BCR 16.5 05/04/2021    ANIONGAP 12.4 05/04/2021                Assessment and Plan {CC Problem List  Visit Diagnosis  ROS  Review (Popup)  Health Maintenance  Quality  BestPractice  Medications  SmartSets  SnapShot Encounters  Media :23}   1. Acute on chronic diastolic heart failure (HCC)  Patient received 80 mg IV Lasix via IV push her left AC. Site was clean dry and intact, without ecchymosis.  - Basic Metabolic Panel; Future  - Basic Metabolic Panel    2. Persistent atrial fibrillation (HCC)  CHADS-VASc Risk Assessment            5 Total Score    1 CHF    1 Hypertension    1 DM    1 Vascular Disease    1 Age 65-74        Criteria that do not apply:    Age >/= 75    PRIOR STROKE/TIA/THROMBO    Sex: Female        Anticoagulate with Eliquis and denies any signs and symptoms of bleeding.    3. Essential hypertension  Continue Aldactone, Bumex, metolazone    4. Chronic venous insufficiency  Continue compression socks and elevation when seated          Follow Up {Instructions Charge Capture  Follow-up Communications :23}   Return if symptoms worsen or fail to improve.    Patient was given instructions and counseling regarding his condition or for health maintenance advice. Please see specific information pulled into the AVS if appropriate.  Patient was instructed to call the [facility] with any questions, concerns, or worsening symptoms.    *Please note that portions of this note were  completed with a voice recognition program. Efforts were made to edit the dictations, but occasionally words are mistranscribed.

## 2021-11-08 NOTE — TELEPHONE ENCOUNTER
Caller: Akshat Winkler    Relationship: Self    Best call back number:[phone]    What is the medical concern/diagnosis: WOUND ON RIGHT CALF    What specialty or service is being requested: WOUND CARE    What is the provider, practice or medical service name:     What is the office location:     What is the office phone number:     Any additional details: PATIENT STATES HE'S HAVING ISSUE WITH CALF AGAIN

## 2021-11-08 NOTE — TELEPHONE ENCOUNTER
Attempted to contact pt. No answer LVM with office number given. Informed that pt needs appt with provider before new referral can be made. Hub please relay above message.

## 2021-11-12 NOTE — PROGRESS NOTES
"    Chief Complaint   Patient presents with   • Cyst     discuss referrals for painful spot on leg.        HPI     Akshat Winkler is a pleasant 66 y.o. male with a PMH of chronic diastolic heart failure, paroxysmal atrial fibrillation, HTN, CAD, HLD, type 2 diabetes, and obstructive sleep apnea who presents for evaluation of \"chief complaint.\"     He states his RLE wound has gradually increased in size since he stopped going to wound care in July. It was slowly getting smaller when he had been having this treated by wound care. He has shooting pains around the wound. He states there is clear drainage. He has been taking vasculera. He has been taking 1/2 bumex daily and aldactone but is unsure if he is taking metolazone. If he takes a whole pill of bumex he has muscle cramps. He does note increased dyspnea in 2 weeks. His swelling has gradually returned since IV diuresis on 10/22/21. He denies chest pain, PND.      Past Medical History:   Diagnosis Date   • Acute diastolic HF (heart failure) ([facility])    • Acute hypokalemia    • Arrhythmia    • Back injury     Fall on 12/20/17   • Cellulitis of leg    • Chest pain syndrome    • CHF (congestive heart failure) ([facility])    • Diabetes mellitus ([facility])     patient reports borderline    • Hyperlipidemia    • Hypertension    • Muscle pain     around back   • Obesity    • Obstructive sleep apnea     right now patient is sleeeping in recliner and does not use-when he lies down he will have to use cpap   • Paroxysmal A-fib ([facility])    • Peripheral artery disease ([facility])    • Spondylosis of 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 or radiculopathy 2/7/2018   • Wears glasses        Past Surgical History:   Procedure Laterality Date   • CARDIAC CATHETERIZATION     • CARDIOVERSION      multiple   • COLONOSCOPY      about 14 years ago   • KYPHOPLASTY N/A 3/5/2018    Procedure: KYPHOPLASTY L4;  Surgeon: Akshat Davidson MD;  Location: [facility] OR;  Service:    • KYPHOPLASTY N/A 4/16/2018    Procedure: " KYPHOPLASTY L1 AND L2;  Surgeon: Akshat Davidson MD;  Location: [facility];  Service: Neurosurgery   • OTHER SURGICAL HISTORY  06/29/2015    PVA       Family History   Problem Relation Age of Onset   • Hypertension Mother    • Stroke Mother    • Stroke Father    • Sleep disorder Father    • Alzheimer's disease Father    • Hyperlipidemia Father    • Hypertension Father    • Prostate cancer Brother        Social History     Socioeconomic History   • Marital status:    Tobacco Use   • Smoking status: Never Smoker   • Smokeless tobacco: Never Used   Substance and Sexual Activity   • Alcohol use: No   • Drug use: No   • Sexual activity: Defer       Allergies   Allergen Reactions   • Bisoprolol Other (See Comments)     Severe Hypotension   • Flecainide Other (See Comments)     Syncope / collapse   • Metoprolol Other (See Comments)      Bradycardia, Severe Hypotension   • Tikosyn [Dofetilide] Other (See Comments)     Wide complex tachycardia       ROS    Review of Systems   Respiratory: Positive for shortness of breath.    Cardiovascular: Positive for leg swelling. Negative for chest pain.   Skin: Positive for skin lesions.       Vitals:    11/12/21 0751   BP: 124/74   Pulse: (!) 130   SpO2: 98%     Body mass index is 48.06 kg/m².      Current Outpatient Medications:   •  acetaminophen (TYLENOL) 325 MG tablet, Take 650 mg by mouth Daily., Disp: , Rfl:   •  bumetanide (BUMEX) 2 MG tablet, TAKE ONE TABLET BY MOUTH EVERY MORNING ON LESS BUSY DAYS AT WORK, Disp: 90 tablet, Rfl: 0  •  Dietary Management Product (Vasculera) tablet, Take 1 tablet by mouth Daily., Disp: 30 tablet, Rfl: 3  •  diphenhydrAMINE-acetaminophen (TYLENOL PM)  MG tablet per tablet, Take 1 tablet by mouth Every Night., Disp: , Rfl:   •  Eliquis 5 MG tablet tablet, TAKE ONE TABLET BY MOUTH EVERY 12 HOURS, Disp: 180 tablet, Rfl: 2  •  metFORMIN ER (GLUCOPHAGE-XR) 500 MG 24 hr tablet, Take 1 tablet by mouth 2 (Two) Times a Day With Meals. Take 1  daily with dinner or bedtime for one week, then increase to 2 daily., Disp: 60 tablet, Rfl: 2  •  metOLazone (ZAROXOLYN) 2.5 MG tablet, Take 1 tablet by mouth 2 (Two) Times a Week. WEEKENDS (Patient taking differently: Take 2.5 mg by mouth. WEEKENDS), Disp: 20 tablet, Rfl: 2  •  multivitamin with minerals (Multivitamin Adults) tablet tablet, Take 1 tablet by mouth Daily., Disp: 30 tablet, Rfl: 2  •  potassium chloride 10 MEQ CR tablet, Take 3 tablets by mouth Daily., Disp: 90 tablet, Rfl: 3  •  rosuvastatin (CRESTOR) 20 MG tablet, Take 1 tablet by mouth Daily., Disp: 90 tablet, Rfl: 0  •  spironolactone (Aldactone) 100 MG tablet, Take 1 tablet by mouth Daily., Disp: 30 tablet, Rfl: 5    PE    Physical Exam  Vitals reviewed.   Constitutional:       General: He is not in acute distress.     Appearance: He is well-developed. He is morbidly obese.   HENT:      Head: Normocephalic and atraumatic.   Eyes:      Conjunctiva/sclera: Conjunctivae normal.   Cardiovascular:      Rate and Rhythm: Regular rhythm. Tachycardia present.      Heart sounds: Normal heart sounds. No murmur heard.      Pulmonary:      Effort: Pulmonary effort is normal.      Breath sounds: Normal breath sounds. No wheezing, rhonchi or rales.   Musculoskeletal:      Cervical back: Normal range of motion.      Right lower leg: Edema present.      Left lower leg: Edema present.        Legs:       Comments: BLE tense 4+ edema with chronic skin discoloration c/w venous stasis. 10 cm x 4 cm ulceration with irregular border right lower extremity as shown. No purulent discharge or cellulitis   Skin:     General: Skin is warm and dry.   Neurological:      Mental Status: He is alert.      Gait: Gait normal.   Psychiatric:         Speech: Speech normal.         Behavior: Behavior normal.       ECG 12 Lead    Date/Time: 11/12/2021 9:50 AM  Performed by: Saurabh Medina PA  Authorized by: Saurabh Medina PA   Rhythm: atrial flutter  Rhythm comments: rapid ventricular  response  Rate: tachycardic  BPM: 124  QRS axis: right  Other findings: non-specific ST-T wave changes    Clinical impression: abnormal EKG               A/P    Problem List Items Addressed This Visit        Cardiac and Vasculature    Chronic diastolic heart failure (HCC)    Overview     · Cardiac cath (02/20/14):  elevated LVEDP suggesting diastolic heart failure.  · Intolerant to beta blocker therapy       Relevant Medications    metOLazone (ZAROXOLYN) 2.5 MG tablet    potassium chloride 10 MEQ CR tablet    bumetanide (BUMEX) 2 MG tablet       Endocrine and Metabolic    Type 2 diabetes mellitus without complication, without long-term current use of insulin (HCC)    Overview     · Statin therapy indicated given diabetic status  -Controlled, A1C 6.0 today       Relevant Medications    metFORMIN ER (GLUCOPHAGE-XR) 500 MG 24 hr tablet    Other Relevant Orders    POC Glycosylated Hemoglobin (Hb A1C) (Completed)       Symptoms and Signs    Chronic bilateral severe lower extremity edema  -Continue leg elevation. Counseled on low sodium diet      Other Visit Diagnoses     Atrial flutter with rapid ventricular response ([facility])    -  Primary  -I spoke with Trina at the  heart and valve clinic who agreed to see the patient today  -He will proceed there directly    Relevant Orders    ECG 12 Lead    Venous stasis ulcer of other part of right lower leg, unspecified ulcer stage, unspecified whether varicose veins present ([facility])      -Chronic. Culture obtained today.   -Patient is interested in referral to  wound care. Previously stopped going to wound care this year  -Continue vasculara. Discussed importance of improving leg edema/diuretic management for wound healing    Relevant Orders    Ambulatory Referral to Wound Clinic (Completed)    Wound Culture - Wound, Leg, Right    Open wound of right lower extremity, subsequent encounter        Relevant Orders    Ambulatory Referral to Wound Clinic (Completed)    Wound Culture -  Wound, Leg, Right    Chronic venous insufficiency        Relevant Orders    Ambulatory Referral to Wound Clinic (Completed)          Plan of care was reviewed with patient at the conclusion of today's visit. Education was provided regarding diagnoses, management, prescribed or recommended OTC products, and the importance of compliance with follow-up appointments. The patient was counseled regarding the risks, benefits, and possible side-effects of treatment. I advised the patient to keep me informed of any acute changes in their status including new, worsening, or persistent symptoms. Patient expresses understanding and agreement with the management plan.        RUPESH Díaz  Answers for HPI/ROS submitted by the patient on 11/12/2021  What is the primary reason for your visit?: Lower Extremity Injury  Incident occurred: more than 1 week ago  Incident location: at the gym  Injury mechanism: other  Pain location: right leg  Pain quality: aching, shooting  Pain - numeric: 7/10  Pain course: worsening  tingling: No  inability to bear weight: No  loss of motion: Yes  loss of sensation: No  muscle weakness: No  Foreign body present: no foreign bodies  Aggravated by: movement, palpation

## 2021-11-12 NOTE — PROGRESS NOTES
"Chief Complaint  Edema and Follow-up    Subjective    History of Present Illness {  Problem List  Visit  Diagnosis   Encounters  Notes  Medications  Labs  Result Review Imaging  Media :23}       History of Present Illness   66-year-old man presents to the office today as a same-day appointment at the request of his primary care provider. Patient was seen last in the heart valve center on 10/22/2021 where he underwent IV diuretics. He reports he lost 10 to 12 pounds off that dose of IV Lasix. Reports has been feeling well until about 10 days ago when he began to experience worsening fluid overload. He also reports in the last 2 days that his heart is gone out of rhythm. Reports blood pressures have been 120s to 140 systolic and heart rates been 120s. Reports fatigue, dyspnea, pedal edema. Does report mild leaking from right lower extremity and has a pending wound care referral  Objective     Vital Signs:   Vitals:    11/12/21 0954   BP: 141/91   BP Location: Left arm   Patient Position: Sitting   Cuff Size: Adult   Pulse: (!) 125   Resp: 20   Temp: 97.5 °F (36.4 °C)   TempSrc: Temporal   SpO2: 96%   Weight: 133 kg (292 lb 2 oz)   Height: 166.4 cm (65.51\")     Body mass index is 47.86 kg/m².  Physical Exam  Vitals and nursing note reviewed.   Constitutional:       Appearance: Normal appearance.   HENT:      Head: Normocephalic.   Eyes:      Pupils: Pupils are equal, round, and reactive to light.   Cardiovascular:      Rate and Rhythm: Regular rhythm. Tachycardia present.      Pulses: Normal pulses.      Heart sounds: Normal heart sounds. No murmur heard.      Pulmonary:      Effort: Pulmonary effort is normal.      Breath sounds: Rales present.   Abdominal:      General: Bowel sounds are normal.      Palpations: Abdomen is soft.   Musculoskeletal:         General: Normal range of motion.      Cervical back: Normal range of motion.      Right lower leg: Edema (weeping on posterior calf 2+ pitting ) present. "      Left lower leg: Edema (2+ pitting ) present.   Skin:     General: Skin is warm and dry.      Capillary Refill: Capillary refill takes less than 2 seconds.   Neurological:      Mental Status: He is alert and oriented to person, place, and time.   Psychiatric:         Mood and Affect: Mood normal.         Thought Content: Thought content normal.              Result Review  Data Reviewed:{ Labs  Result Review  Imaging  Med Tab  Media :23}     Lab Results   Component Value Date    GLUCOSE 123 (H) 11/12/2021    CALCIUM 9.4 11/12/2021     (L) 11/12/2021    K 3.8 11/12/2021    CO2 22.0 11/12/2021    CL 99 11/12/2021    BUN 12 11/12/2021    CREATININE 1.05 11/12/2021    EGFRIFAFRI 92 02/08/2018    EGFRIFNONA 71 11/12/2021    BCR 11.4 11/12/2021    ANIONGAP 12.0 11/12/2021              Assessment and Plan {CC Problem List  Visit Diagnosis  ROS  Review (Popup)  Health Maintenance  Quality  BestPractice  Medications  SmartSets  SnapShot Encounters  Media :23}   1. Acute on chronic diastolic heart failure (HCC)  Patient received IV Lasix (80 mg today through butterfly in the left AC. Butterfly was discontinued and site was without erythema or ecchymosis. Patient voided 4 times in the office prior to his discharge.  - Basic Metabolic Panel; Future  - Basic Metabolic Panel    2. Atrial flutter   CHADS-VASc Risk Assessment            5 Total Score    1 CHF    1 Hypertension    1 DM    1 Vascular Disease    1 Age 65-74        Criteria that do not apply:    Age >/= 75    PRIOR STROKE/TIA/THROMBO    Sex: Female        Anticoagulated with Eliquis and has not missed any doses; denies any signs and symptoms of bleeding  Intolerant in the past to bisoprolol, flecainide, metoprolol, Tikosyn   will discuss ongoing plan of care with Dr. Alvarez    3. Essential hypertension  Well controlled on aldactone, bumex   - Basic Metabolic Panel; Future  - Basic Metabolic Panel    4. Dysuria      - Urinalysis With Culture  If Indicated - Urine, Random Void  - Urinalysis, Microscopic Only - Urine, Random Void        Follow Up {Instructions Charge Capture  Follow-up Communications :23}   Return if symptoms worsen or fail to improve.    Patient was given instructions and counseling regarding his condition or for health maintenance advice. Please see specific information pulled into the AVS if appropriate.  Patient was instructed to call the [facility] Center with any questions, concerns, or worsening symptoms.

## 2021-11-18 NOTE — H&P
[facility] at [facility]  Cardiovascular History and Physical Note         Patient is a 66-year-old gentleman with a history of persistent atrial fibrillation, chronic diastolic heart failure, mild nonobstructive CAD, hypertension, hyperlipidemia and type 2 diabetes mellitus that presents today to undergo external cardioversion for his persistent atrial fibrillation the patient was seen on 11/12/2021 at the heart valve clinic at the request of his primary care provider.  The patient was having worsening heart failure symptoms and felt his heart was out of rhythm.  EKG showed patient was in atrial flutter at 124 bpm.  He is anticoagulated with Eliquis and reports no missed doses.  Of note the patient has been intolerant to bisoprolol, flecainide, metoprolol, Tikosyn and propafenone.    Cardiac risk factors: Male gender, hypertension, hyperlipidemia, obesity, type 2 diabetes mellitus    Past medical and surgical history, social and family history reviewed in EMR.    REVIEW OF SYSTEMS:   H&P ROS reviewed and pertinent CV ROS as noted in HPI.         Vital Sign Min/Max for last 24 hours  No data recorded   BP  Min: 143/101  Max: 143/101   No data recorded   No data recorded   No data recorded   No data recorded    No intake or output data in the 24 hours ending 11/18/21 1028        Physical Exam  Constitutional:       Appearance: He is well-developed.   HENT:      Head: Normocephalic.   Neck:      Vascular: No carotid bruit or JVD.   Cardiovascular:      Rate and Rhythm: Normal rate. Rhythm irregularly irregular.      Heart sounds: Normal heart sounds. No murmur heard.  No friction rub. No gallop.    Pulmonary:      Effort: Pulmonary effort is normal.      Breath sounds: Normal breath sounds.   Abdominal:      General: Bowel sounds are normal. There is no distension.      Palpations: Abdomen is soft.   Skin:     General: Skin is warm and dry.   Neurological:      Mental Status: He is alert and  oriented to person, place, and time.       EKG: Atrial flutter 124 bpm 11/12/2021    Lab Review:   Labs reviewed in the electronic medical record.  Pertinent findings include:  Lab Results   Component Value Date    GLUCOSE 123 (H) 11/12/2021    BUN 12 11/12/2021    CREATININE 1.05 11/12/2021    EGFRIFNONA 71 11/12/2021    EGFRIFAFRI 92 02/08/2018    BCR 11.4 11/12/2021    K 3.8 11/12/2021    CO2 22.0 11/12/2021    CALCIUM 9.4 11/12/2021    PROTENTOTREF 7.0 03/03/2018    ALBUMIN 3.70 05/04/2021    LABIL2 0.7 03/03/2018    AST 43 (H) 07/20/2018    ALT 19 07/20/2018     Lab Results   Component Value Date    WBC 5.17 05/04/2021    HGB 15.2 05/04/2021    HCT 44.6 05/04/2021    MCV 96.5 05/04/2021     05/04/2021     Lab Results   Component Value Date    CHOL 143 12/13/2019    CHLPL 138 05/09/2017    TRIG 60 12/13/2019    HDL 75 (H) 12/13/2019    LDL 56 12/13/2019     Results from last 7 days   Lab Units 11/12/21  0929   HEMOGLOBIN A1C % 6.0            Active Hospital Problems    Diagnosis    • **Persistent atrial fibrillation/flutter      · Diagnosed 2011  · [id number] 3 (HTN, CAD, DM)  · Echo (10/11/2011): Normal LVEF, mild MR, mild LA dilation  · LOLY/ECVcardioversion, 12/21/2011, with initiation of propafenone therapy.   · Successful LOLY/ECV for recurrent atrial fibrillation, 11/15/2013  · PVA with Dr. Cary, 6/29/2015  · Cardioversion (07/17/2015) for atrial flutter occurring post ablation   · ECV for recurrent atrial flutter, 12/20/17 with reattempt at beta blocker/flecainide.  · Intolerant to beta blocker/flecanide with severe hypotension, intolerant to propafenone  · Tikosyn admission with development of wide complex tachycardia, 7/2018     • Coronary artery disease involving native coronary artery of native heart with angina pectoris (HCC)      · Cardiac catheterization (02/20/14): mild nonobstructive CAD. LVEF 55%, elevated LVEDP suggesting diastolic heart failure.     • Chronic diastolic heart failure  (HCC)      · Cardiac cath (02/20/14):  elevated LVEDP suggesting diastolic heart failure.  · Intolerant to beta blocker therapy     • Hyperlipidemia LDL goal <70      · High intensity statin therapy indicated given presence of coronary artery disease     • Essential hypertension    • Type 2 diabetes mellitus without complication, without long-term current use of insulin (HCC)      · Statin therapy indicated given diabetic status       Patient presents today to undergo external cardioversion for his persistent atrial fibrillation.  The risks and benefits of the procedure were discussed and the patient is agreeable to proceed.       · Proceed with external cardioversion.  No LOLY needed as patient has been compliant with his Eliquis with no missed doses  · If patient continues to have repeated episodes with RVR may benefit from future referral to EP for consideration of AV joby ablation and placement of pacemaker.        FAITH Roy

## 2021-11-28 NOTE — PROGRESS NOTES
"[facility]      Identification: Akshat Winkler is a 66 y.o. male who is  and resides in [place], [place]    Reason for visit:  · Coronary Artery Disease   · Atrial fibrillation/flutter      Subjective      Akshat Winkler presents to [facility] for followup.    Recently hospitalized for external cardioversion for atrial flutter.  Fortunately, patient quickly converted back to atrial flutter several minutes after cardioversion.  Patient has seen Dr. Jeffery in the office and worn a 4-day Holter monitor, results of which are pending.  He is scheduled for an echocardiogram prior to coming up with a therapeutic regimen for his flutter.            Review of Systems   All other systems reviewed and are negative.      Objective     /82 (BP Location: Left arm, Patient Position: Sitting)   Pulse 96   Ht 175.3 cm (69\")   Wt 133 kg (294 lb)   SpO2 97%   BMI 43.42 kg/m²       Constitutional:       Appearance: Healthy appearance. Well-developed.   Eyes:      General: No scleral icterus.  HENT:      Head: Normocephalic and atraumatic.   Neck:      Vascular: No carotid bruit or JVD. JVD normal.   Pulmonary:      Effort: Pulmonary effort is normal.      Breath sounds: Normal breath sounds.   Cardiovascular:      Normal rate. Regular rhythm.      Murmurs: There is no murmur.      No gallop.   Musculoskeletal:      Extremities: No clubbing present.Skin:     General: Skin is warm and dry. There is no cyanosis.   Neurological:      Mental Status: Alert.   Psychiatric:         Attention and Perception: Attention normal.         Behavior: Behavior normal.         Result Review :    Lab Results   Component Value Date    GLUCOSE 112 (H) 11/18/2021    BUN 14 11/18/2021    CREATININE 0.94 11/18/2021    EGFRIFNONA 81 11/18/2021    BCR 14.9 11/18/2021    K 4.5 11/18/2021    CO2 21.0 (L) 11/18/2021    CALCIUM 9.4 11/18/2021     Lab Results   Component Value Date    WBC 5.17 05/04/2021    " HGB 15.2 05/04/2021    HCT 44.6 05/04/2021    MCV 96.5 05/04/2021     05/04/2021     Lab Results   Component Value Date    CHOL 143 12/13/2019    TRIG 60 12/13/2019    HDL 75 (H) 12/13/2019    LDL 56 12/13/2019     Lab Results   Component Value Date    HGBA1C 6.0 11/12/2021             Assessment     Problem List Items Addressed This Visit        Cardiology Problems    Persistent atrial fibrillation/flutter (Chronic)    Overview     · Diagnosed 2011  · [id number] 3 (HTN, CAD, DM)  · Echo (10/11/2011): Normal LVEF, mild MR, mild LA dilation  · LOLY/ECVcardioversion, 12/21/2011, with initiation of propafenone therapy.   · Successful LOLY/ECV for recurrent atrial fibrillation, 11/15/2013  · PVA with Dr. Cary, 6/29/2015  · Cardioversion (07/17/2015) for atrial flutter occurring post ablation   · ECV for recurrent atrial flutter, 12/20/17 with reattempt at beta blocker/flecainide.  · Intolerant to beta blocker/flecanide with severe hypotension, intolerant to propafenone  · Tikosyn admission with development of wide complex tachycardia, 7/2018  · ECV for recurrent atrial flutter with early return of atrial flutter, 11/18/2021         Relevant Medications    apixaban (Eliquis) 5 MG tablet tablet    Chronic diastolic heart failure (HCC) - Primary (Chronic)    Overview     · Cardiac cath (02/20/14):  elevated LVEDP suggesting diastolic heart failure.  · Intolerant to beta blocker therapy         Current Assessment & Plan     · Stage C HFpEF with persistent NYHA class III symptoms  · Obtain echocardiogram  · Continue Bumex, spironolactone, and metolazone         Relevant Medications    spironolactone (Aldactone) 100 MG tablet          Plan   • Continue present medical therapy  • Obtain echocardiogram  • Atrial flutter per Dr. Jeffery      Follow-up   Return in about 6 months (around 5/30/2022).        Abhijit Alvarez MD, [facility], [facility]  11/30/2021

## 2021-11-29 NOTE — TELEPHONE ENCOUNTER
Caller: Akshat Winkler    Relationship: Self    Best call back number: [phone]    What medication are you requesting: ANTIBIOTICS    What are your current symptoms: SORE ON LEG    How long have you been experiencing symptoms: 4 MONTHS    Have you had these symptoms before:    [x] Yes  [] No    Have you been treated for these symptoms before:   [x] Yes  [] No    If a prescription is needed, what is your preferred pharmacy and phone number: [facility] [address] [facility] [phone] [facility] [phone]      Additional notes: PATIENT STATED HE WAS TREATED FOR THIS BY MARY [facility]IR AT HIS APPOINTMENT ON 11/12/21 AND THE SORE ON HIS LEG HAS GOTTEN A LITTLE BETTER BUT HAS NOT CLEARED UP COMPLETELY.

## 2021-11-30 PROBLEM — I48.19 PERSISTENT ATRIAL FIBRILLATION: Chronic | Status: ACTIVE | Noted: 2017-11-28

## 2021-11-30 NOTE — ASSESSMENT & PLAN NOTE
· Stage C HFpEF with persistent NYHA class III symptoms  · Obtain echocardiogram  · Continue Bumex, spironolactone, and metolazone

## 2021-11-30 NOTE — TELEPHONE ENCOUNTER
"Attempted to contact, no answer LVM relaying PCP's message      \"We did discuss that I was referring him to a different wound care clinic at his last visit. I will send in a prescription for a 1-week course of doxycycline but strongly encourage him to make an appointment with wound care. I believe this will be needed to resolve his wound\"  Hub can relay and document.   "

## 2021-11-30 NOTE — TELEPHONE ENCOUNTER
He was referred to wound care at  at his last visit. It looks like he refused to schedule per referral documentation. I still believe he needs to go to wound care if he is still having this issue.

## 2021-11-30 NOTE — TELEPHONE ENCOUNTER
We did discuss that I was referring him to a different wound care clinic at his last visit. I will send in a prescription for a 1-week course of doxycycline but strongly encourage him to make an appointment with wound care. I believe this will be needed to resolve his wound.

## 2021-11-30 NOTE — TELEPHONE ENCOUNTER
Contacted patient and relayed PCP's recommendation. He states that he had been following up with wound care and his sores had only improved slightly. He states that he had much more improvement after taking the doxycycline within the past week and is requesting a refill.     I advised against this, he was disagreeable and is still requesting abx and does not want to return to Wound Care.

## 2021-12-03 NOTE — PROGRESS NOTES
"Chief Complaint  Edema and Follow-up    Subjective    History of Present Illness {CC  Problem List  Visit  Diagnosis   Encounters  Notes  Medications  Labs  Result Review Imaging  Media :23}       History of Present Illness   66-year-old man presents the office today for ongoing evaluation of his acute on chronic diastolic heart failure.  Patient reports he has been in flutter for the last few weeks with an unsuccessful cardioversion on 11/18/2021.  Patient recently wore an extended Holter and is awaiting those results for Dr. Jeffery to determine plan of care.  He remains in flutter in the 120s.  He reports over the last few weeks he has been experiencing dyspnea, abdominal fullness and pedal edema.  Notes that a sore on his right lower extremity has opened but is improving after starting antibiotics per his PCP.  He is wearing compression socks.  He reports that he can barely ambulate across the floor without dyspnea.  He reports his legs are so heavy he is having trouble getting them in and out of the car.  Objective     Vital Signs:   Vitals:    12/03/21 0822   BP: 136/83   BP Location: Left arm   Patient Position: Sitting   Cuff Size: Adult   Pulse: (!) 129   Resp: 20   Temp: 98 °F (36.7 °C)   TempSrc: Temporal   SpO2: 95%   Weight: 136 kg (299 lb 8 oz)   Height: 175.3 cm (69\")     Body mass index is 44.23 kg/m².  Physical Exam  Vitals and nursing note reviewed.   Constitutional:       Appearance: Normal appearance.   HENT:      Head: Normocephalic.   Eyes:      Pupils: Pupils are equal, round, and reactive to light.   Cardiovascular:      Rate and Rhythm: Normal rate and regular rhythm.      Pulses: Normal pulses.      Heart sounds: Normal heart sounds. No murmur heard.      Pulmonary:      Effort: Pulmonary effort is normal.      Breath sounds: Normal breath sounds.   Abdominal:      General: Bowel sounds are normal.      Palpations: Abdomen is soft.   Musculoskeletal:         General: Normal " range of motion.      Cervical back: Normal range of motion.      Right lower leg: No edema.      Left lower leg: No edema.   Skin:     General: Skin is warm and dry.      Capillary Refill: Capillary refill takes less than 2 seconds.   Neurological:      Mental Status: He is alert and oriented to person, place, and time.   Psychiatric:         Mood and Affect: Mood normal.         Thought Content: Thought content normal.              Result Review  Data Reviewed:{ Labs  Result Review  Imaging  Med Tab  Media :23}   Lab Results   Component Value Date    GLUCOSE 112 (H) 11/18/2021    CALCIUM 9.4 11/18/2021     (L) 11/18/2021    K 4.5 11/18/2021    CO2 21.0 (L) 11/18/2021     11/18/2021    BUN 14 11/18/2021    CREATININE 0.94 11/18/2021    EGFRIFAFRI 92 02/08/2018    EGFRIFNONA 81 11/18/2021    BCR 14.9 11/18/2021    ANIONGAP 13.0 11/18/2021                Assessment and Plan {CC Problem List  Visit Diagnosis  ROS  Review (Popup)  Health Maintenance  Quality  BestPractice  Medications  SmartSets  SnapShot Encounters  Media :23}   1. Acute on chronic diastolic heart failure (HCC)  Pt. received 80 mg of IV Lasix through butterfly in the left AC.  Butterfly was discontinued and site was free of erythema and ecchymosis  - Basic Metabolic Panel; Future  - Basic Metabolic Panel    2.  Atrial flutter   Patient recently for 72-hour heart monitor, those results pending.  Plan plan of care per Dr. Jeffery pending heart monitor results    3. Essential hypertension  Well-controlled on Aldactone, Bumex  Continue to monitor closely    4. Chronic venous insufficiency  continue bilateral compression socks      Follow Up {Instructions Charge Capture  Follow-up Communications :23}   Return if symptoms worsen or fail to improve.    Patient was given instructions and counseling regarding his condition or for health maintenance advice. Please see specific information pulled into the AVS if  appropriate.  Patient was instructed to call the [facility] with any questions, concerns, or worsening symptoms.

## 2021-12-07 NOTE — PROGRESS NOTES
Chief Complaint   Patient presents with   • Diabetes   • Leg Pain     Right        HPI     Akshat Winkler is a 66 y.o. male with a PMH of chronic diastolic heart failure and persistent atrial flutter who is here for follow-up of right leg wound.     He was treated with doxycycline and referred to wound care on 11/12/21. He noticed improvement in his wound and decreased size so he did not go to wound care though he was called to schedule an appointment. He states his wound has never resolved. He is currently finishing a 1-week course of doxycycline again and has 3 days remaining. He denies fever or chills.     He has an echocardiogram tomorrow. He is working with his cardiologist and EP to try and manage his aflutter. He has failed several antiarrhythmics and cardioversion.     Past Medical History:   Diagnosis Date   • Acute diastolic HF (heart failure) ([facility])    • Acute hypokalemia    • Arrhythmia    • Back injury     Fall on 12/20/17   • Cellulitis of leg    • Chest pain syndrome    • CHF (congestive heart failure) ([facility])    • Deep vein thrombosis ([facility]) 1995   • Diabetes mellitus ([facility])     patient reports borderline    • Hyperlipidemia    • Hypertension    • Muscle pain     around back   • Obesity    • Obstructive sleep apnea     right now patient is sleeeping in recliner and does not use-when he lies down he will have to use cpap   • Paroxysmal A-fib ([facility])    • Peripheral artery disease ([facility])    • Spondylosis of 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 or radiculopathy 2/7/2018   • Wears glasses        Past Surgical History:   Procedure Laterality Date   • CARDIAC CATHETERIZATION     • CARDIOVERSION      multiple   • COLONOSCOPY      about 14 years ago   • KYPHOPLASTY N/A 3/5/2018    Procedure: KYPHOPLASTY L4;  Surgeon: Akshat Davidson MD;  Location:  [facility];  Service:    • KYPHOPLASTY N/A 4/16/2018    Procedure: KYPHOPLASTY L1 AND L2;  Surgeon: Akshat Davidson MD;  Location:  [facility];  Service: Neurosurgery   •  OTHER SURGICAL HISTORY  06/29/2015    PVA       Family History   Problem Relation Age of Onset   • Hypertension Mother    • Stroke Mother    • Stroke Father    • Sleep disorder Father    • Alzheimer's disease Father    • Hyperlipidemia Father    • Hypertension Father    • Prostate cancer Brother    • Heart disease Maternal Grandmother        Social History     Socioeconomic History   • Marital status:    Tobacco Use   • Smoking status: Never Smoker   • Smokeless tobacco: Never Used   Vaping Use   • Vaping Use: Never used   Substance and Sexual Activity   • Alcohol use: No   • Drug use: No   • Sexual activity: Not Currently     Birth control/protection: Condom       Allergies   Allergen Reactions   • Bisoprolol Other (See Comments)     Severe Hypotension   • Flecainide Other (See Comments)     Syncope / collapse   • Metoprolol Other (See Comments)      Bradycardia, Severe Hypotension   • Tikosyn [Dofetilide] Other (See Comments)     Wide complex tachycardia       ROS    Review of Systems   Constitutional: Negative for chills and fever.   Cardiovascular: Negative for chest pain.   Skin: Positive for skin lesions.       Vitals:    12/07/21 0748   BP: 115/80   Pulse: (!) 126   Temp: 98 °F (36.7 °C)   SpO2: 95%     Body mass index is 44.81 kg/m².      Current Outpatient Medications:   •  acetaminophen (TYLENOL) 325 MG tablet, Take 650 mg by mouth Daily., Disp: , Rfl:   •  apixaban (Eliquis) 5 MG tablet tablet, Take 1 tablet by mouth Every 12 (Twelve) Hours., Disp: 180 tablet, Rfl: 3  •  bumetanide (BUMEX) 2 MG tablet, TAKE ONE TABLET BY MOUTH EVERY MORNING ON LESS BUSY DAYS AT WORK, Disp: 90 tablet, Rfl: 0  •  Dietary Management Product (Vasculera) tablet, Take 1 tablet by mouth Daily., Disp: 30 tablet, Rfl: 3  •  diphenhydrAMINE-acetaminophen (TYLENOL PM)  MG tablet per tablet, Take 1 tablet by mouth Every Night., Disp: , Rfl:   •  doxycycline (VIBRAMYICN) 100 MG tablet, Take 1 tablet by mouth 2 (Two) Times  a Day for 7 days., Disp: 14 tablet, Rfl: 0  •  magnesium oxide (MAG-OX) 400 MG tablet, Take 1 tablet by mouth Daily., Disp: 30 tablet, Rfl: 3  •  metOLazone (ZAROXOLYN) 2.5 MG tablet, Take 1 tablet by mouth 2 (Two) Times a Week. WEEKENDS (Patient taking differently: Take 2.5 mg by mouth. WEEKENDS), Disp: 20 tablet, Rfl: 2  •  multivitamin with minerals (Multivitamin Adults) tablet tablet, Take 1 tablet by mouth Daily., Disp: 30 tablet, Rfl: 2  •  potassium chloride 10 MEQ CR tablet, Take 3 tablets by mouth Daily., Disp: 90 tablet, Rfl: 3  •  rosuvastatin (CRESTOR) 20 MG tablet, Take 1 tablet by mouth Daily., Disp: 90 tablet, Rfl: 0  •  spironolactone (Aldactone) 100 MG tablet, Take 1 tablet by mouth Daily., Disp: 90 tablet, Rfl: 3    PE    Physical Exam  Vitals reviewed.   Constitutional:       General: He is not in acute distress.     Appearance: He is morbidly obese.   Musculoskeletal:        Legs:    Neurological:      Mental Status: He is alert.         Results    Results for orders placed or performed in visit on 12/03/21   Basic Metabolic Panel    Specimen: Blood   Result Value Ref Range    Glucose 184 (H) 65 - 99 mg/dL    BUN 15 8 - 23 mg/dL    Creatinine 1.07 0.76 - 1.27 mg/dL    Sodium 137 136 - 145 mmol/L    Potassium 3.6 3.5 - 5.2 mmol/L    Chloride 101 98 - 107 mmol/L    CO2 23.0 22.0 - 29.0 mmol/L    Calcium 8.7 8.6 - 10.5 mg/dL    eGFR Non African Amer 70 >60 mL/min/1.73    BUN/Creatinine Ratio 14.0 7.0 - 25.0    Anion Gap 13.0 5.0 - 15.0 mmol/L       A/P    Problem List Items Addressed This Visit        Cardiac and Vasculature    Persistent atrial fibrillation/flutter - Primary (Chronic)    Overview     · Diagnosed 2011  · [id number] 3 (HTN, CAD, DM)  · Echo (10/11/2011): Normal LVEF, mild MR, mild LA dilation  · LOLY/ECVcardioversion, 12/21/2011, with initiation of propafenone therapy.   · Successful LOLY/ECV for recurrent atrial fibrillation, 11/15/2013  · PVA with Dr. Cary,  6/29/2015  · Cardioversion (07/17/2015) for atrial flutter occurring post ablation   · ECV for recurrent atrial flutter, 12/20/17 with reattempt at beta blocker/flecainide.  · Intolerant to beta blocker/flecanide with severe hypotension, intolerant to propafenone  · Tikosyn admission with development of wide complex tachycardia, 7/2018  · ECV for recurrent atrial flutter with early return of atrial flutter, 11/18/2021          Endocrine and Metabolic    Type 2 diabetes mellitus without complication, without long-term current use of insulin ([facility])    Overview     · Statin therapy indicated given diabetic status  -A1C was 6.0 three weeks ago, controlled         Other Visit Diagnoses     Open wound of right lower extremity, subsequent encounter      -Wound culture 11/12/21 grew several organisms  -Complete doxycycline  -I gave patient the number for central scheduling and emphasized the importance of compliance with wound care until this is healed. He expressed understanding and agreement  -Counseled patient to call or return if he has any worsening          Plan of care was reviewed with patient at the conclusion of today's visit. Education was provided regarding diagnoses, management, and the importance of keeping follow-up appointments. The patient was counseled regarding the risks, benefits, and possible side-effects of treatment. Patient and/or family express understanding and agreement with the management plan.        RUPESH Díaz

## 2021-12-07 NOTE — TELEPHONE ENCOUNTER
Dr. Jeffery would like the patient to have an ECHO ASAP. I have tried to call the patient at both numbers. No answer. I left a message with this information. Order placed for ECHO.

## 2021-12-07 NOTE — PATIENT INSTRUCTIONS
Please call central scheduling to set up wound care appointment as soon as possible: [phone]  Let me know if your wound worsens: spreading redness, swelling, pain, fever, chills, increased drainage or size

## 2021-12-13 NOTE — TELEPHONE ENCOUNTER
Patient called the office today noting that he has gained 4 more lbs over the weekend. He notes that he is very dyspneic and has worsening abdominal fullness. Had an echo at Dr Andrew's office Friday Morning. Offered IV diuresis today but patient declined. Suggested increasing bumex to 2 mg bid but patient notes that he is unable to tolerate secondary to muscle cramping. Patient would like to discuss with Dr Jeffery.

## 2021-12-14 NOTE — TELEPHONE ENCOUNTER
Patient is calling back today to see what the plan is for him currently.He reports that he has gained 25 pounds over the past 6 months. I offered to get him an appointment yesterday with the [facility] but he refused.  He is extremely short of breath, can only walk about 20 feet before he has to stop and is only sleeping a total of 3 hours each night. He either sleeps in a recliner or elevated in the bed on 2 pillows. He has not checked his BP or HR today because he is currently at work. I finally got him to agree to let me transfer him to the [facility] today to see if he can get in this afternoon to be evaluated. The ECHO results are on your desk to review.

## 2021-12-15 NOTE — PROGRESS NOTES
"Chief Complaint  Edema and Follow-up    Subjective    History of Present Illness {  Problem List  Visit  Diagnosis   Encounters  Notes  Medications  Labs  Result Review Imaging  Media :23}       History of Present Illness   The office today for ongoing evaluation of his acute on chronic diastolic heart failure and atrial flutter.  Patient underwent a cardioversion with Dr. Alvarez on 11/18/21 and quickly returned to atrial flutter. Patient is intolerant to BB, antiarrhythmics. Patient recently wore a 3 day monitor with Dr Jeffery and had an echo completed at Dr Andrew's office December 10,2021, data deficient. Patient was seen last in [facility] on 12/3/21 and underwent Iv diuresis where he had very little improvement in his symptoms. He notes dyspnea with minimal exertion, abdominal fullness, significant pedal edema and fatigue. Patient notes a weight gain of 15 lbs over the last week. Notes compliance with his medications.  Objective     Vital Signs:   Vitals:    12/15/21 0815   BP: 124/75   BP Location: Left arm   Patient Position: Sitting   Cuff Size: Adult   Pulse: 116   Resp: 20   Temp: 98.2 °F (36.8 °C)   TempSrc: Temporal   SpO2: 94%   Weight: (!) 143 kg (316 lb)   Height: 175.3 cm (69.02\")     Body mass index is 46.64 kg/m².  Physical Exam  Vitals and nursing note reviewed.   Constitutional:       Appearance: Normal appearance.   HENT:      Head: Normocephalic.   Eyes:      Pupils: Pupils are equal, round, and reactive to light.   Cardiovascular:      Rate and Rhythm: Regular rhythm. Tachycardia present.      Pulses: Normal pulses.      Heart sounds: Normal heart sounds. No murmur heard.      Pulmonary:      Effort: Pulmonary effort is normal.      Breath sounds: Rales present.   Abdominal:      General: Bowel sounds are normal. There is distension.   Musculoskeletal:         General: Normal range of motion.      Cervical back: Normal range of motion.      Right lower leg: Edema (3+ pitting edema " noted ankles to knees ) present.      Left lower leg: Edema (3+ pitting edema noted ankles to knees) present.   Skin:     General: Skin is warm and dry.      Capillary Refill: Capillary refill takes less than 2 seconds.      Comments: Venous insufficiency noted BLEs  Compression stockings in place    Neurological:      Mental Status: He is alert and oriented to person, place, and time.   Psychiatric:         Mood and Affect: Mood normal.         Thought Content: Thought content normal.              Result Review  Data Reviewed:{ Labs  Result Review  Imaging  Med Tab  Media :23}   Lab Results   Component Value Date    GLUCOSE 184 (H) 12/03/2021    CALCIUM 8.7 12/03/2021     12/03/2021    K 3.6 12/03/2021    CO2 23.0 12/03/2021     12/03/2021    BUN 15 12/03/2021    CREATININE 1.07 12/03/2021    EGFRIFAFRI 92 02/08/2018    EGFRIFNONA 70 12/03/2021    BCR 14.0 12/03/2021    ANIONGAP 13.0 12/03/2021     12/10/21 : echo:  Echo 60%, left atrium dilated, right atrium dilated, moderate TR     Assessment and Plan {CC Problem List  Visit Diagnosis  ROS  Review (Popup)  Health Maintenance  Quality  BestPractice  Medications  SmartSets  SnapShot Encounters  Media :23}   1. Acute on chronic diastolic heart failure (HCC)  IV diuresis today in office. Patient received 80 mg lasix  today through a butterfly  in the left ac over slow IV push.  Patient voided  X 1 in the office prior to discharge from the office. Erythema  was d/c'd and area was free of erythema, ecchymosis, or drainage.  Patient was  instructed to record urinary output for the next 24 hours. Patient will receive a follow up call from the HF center in 24 hours to evaluate urinary output and reassess signs and symptoms.   - Magnesium; Future  - proBNP; Future  - Comprehensive Metabolic Panel; Future  - Magnesium  - proBNP  - Comprehensive Metabolic Panel    2. Longstanding persistent atrial fibrillation (HCC)/atrial flutter   Plan of care  per Dr Jeffery  - Magnesium; Future  - Comprehensive Metabolic Panel; Future  - Magnesium  - Comprehensive Metabolic Panel    3. Essential hypertension  Well controlled   - Comprehensive Metabolic Panel; Future  - Comprehensive Metabolic Panel          Follow Up {Instructions Charge Capture  Follow-up Communications :23}   Return if symptoms worsen or fail to improve.    Patient was given instructions and counseling regarding his condition or for health maintenance advice. Please see specific information pulled into the AVS if appropriate.  Patient was instructed to call the [facility] with any questions, concerns, or worsening symptoms.

## 2021-12-16 PROBLEM — I50.9 ACUTE ON CHRONIC CONGESTIVE HEART FAILURE (HCC): Status: RESOLVED | Noted: 2021-01-01 | Resolved: 2021-01-01

## 2021-12-16 PROBLEM — I50.9 ACUTE ON CHRONIC CONGESTIVE HEART FAILURE (HCC): Status: ACTIVE | Noted: 2021-01-01

## 2021-12-16 NOTE — H&P
[facility] at [facility]  Cardiovascular History and Physical Note    Chief complaint: Shortness of breath,          66-year-old gentleman with diastolic and right-sided heart failure, persistent atrial fibrillation status post multiple cardioversions/PVA, who has been experiencing worsening shortness of breath, lower extremity swelling, abdominal bloating symptoms.  He has been seeing the heart and valve clinic and receiving escalating diuretics without improvement.  The patient has remained in atrial fibrillation/flutter with a ventricular rate of 120 bpm.  He is presently seeing Dr. Jeffery for this.  An echocardiogram was recently performed at Dr. Andrew's office ([facility] echo cost $3800) which showed normal LV systolic function, moderate TR, and RVSP of 58 mmHg      Cardiac risk factors: Male gender, nonobstructive CAD, hypertension, hyperlipidemia, type 2 diabetes mellitus    Past medical and surgical history, social and family history reviewed in EMR.    REVIEW OF SYSTEMS:   H&P ROS reviewed and pertinent CV ROS as noted in HPI.         Vital Sign Min/Max for last 24 hours  Temp  Min: 98.2 °F (36.8 °C)  Max: 98.2 °F (36.8 °C)   BP  Min: 113/96  Max: 136/105   Pulse  Min: 108  Max: 124   Resp  Min: 18  Max: 24   SpO2  Min: 93 %  Max: 97 %   No data recorded      Intake/Output Summary (Last 24 hours) at 12/16/2021 1313  Last data filed at 12/16/2021 1201  Gross per 24 hour   Intake --   Output 1200 ml   Net -1200 ml           Constitutional:       General: Awake.      Appearance: Healthy appearance.   Pulmonary:      Effort: Pulmonary effort is normal.      Breath sounds: Decreased breath sounds present.   Cardiovascular:      Tachycardia present. Regularly irregular rhythm.   Edema:     Peripheral edema present.     Pretibial: bilateral 3+ pitting edema of the pretibial area.     Ankle: bilateral 3+ pitting edema of the ankle.     Feet: bilateral 3+ pitting edema of the  feet.  Skin:     General: Skin is warm and dry.      Capillary Refill: Capillary refill takes less than 2 seconds.   Neurological:      Mental Status: Alert and oriented to person, place and time.   Psychiatric:         Behavior: Behavior is cooperative.         EK2021 likely atrial flutter at 118 bpm    Lab Review:   Labs reviewed in the electronic medical record.  Pertinent findings include:  Lab Results   Component Value Date    GLUCOSE 145 (H) 2021    BUN 27 (H) 2021    CREATININE 1.45 (H) 2021    EGFRIFNONA 49 (L) 2021    EGFRIFAFRI 92 2018    BCR 18.6 2021    K 5.7 (H) 2021    CO2 25.0 2021    CALCIUM 10.4 2021    PROTENTOTREF 7.0 2018    ALBUMIN 3.50 2021    LABIL2 0.7 2018    AST 43 (H) 2021    ALT 18 2021     Lab Results   Component Value Date    WBC 4.71 2021    HGB 14.3 2021    HCT 44.8 2021    .5 (H) 2021     2021     Lab Results   Component Value Date    CHOL 143 2019    CHLPL 138 2017    TRIG 60 2019    HDL 75 (H) 2019    LDL 56 2019                Active Hospital Problems    Diagnosis    • Persistent atrial fibrillation/flutter      · Diagnosed   · [id number] 3 (HTN, CAD, DM)  · Echo (10/11/2011): Normal LVEF, mild MR, mild LA dilation  · LOLY/ECVcardioversion (2011) initiation of propafenone therapy.   · Successful LOLY/ECV for recurrent atrial fibrillation, 11/15/2013  · PVA with Dr. Cary, 2015  · Cardioversion (2015) for atrial flutter occurring post ablation   · ECV for recurrent atrial flutter, 17 with reattempt at beta blocker/flecainide.  · Intolerant to beta blocker/flecanide with severe hypotension, intolerant to propafenone  · Tikosyn admission with development of wide complex tachycardia, 2018  · ECV for recurrent atrial flutter with early return of atrial flutter, 2021     • Acute on  chronic diastolic heart failure (HCC)      · Cardiac cath (02/20/14):  elevated LVEDP suggesting diastolic heart failure.  · Intolerant to beta blocker therapy     • Type 2 diabetes mellitus without complication, without long-term current use of insulin ([facility])      · Statin therapy indicated given diabetic status     • Coronary artery disease involving native coronary artery of native heart with angina pectoris ([facility])      · Cardiac catheterization (02/20/14): mild nonobstructive CAD. LVEF 55%, elevated LVEDP suggesting diastolic heart failure.     • Hyperlipidemia LDL goal <70      · High intensity statin therapy indicated given presence of coronary artery disease     • Essential hypertension    • Class 3 severe obesity due to excess calories with serious comorbidity and body mass index (BMI) of 40.0 to 44.9 in adult ([facility])                Acute diastolic heart failure  · Admit for IV diuresis  · Bumex 2 mg IV twice daily  · Milrinone drip  · Spironolactone 25 mg daily    Persistent atrial fibrillation/flutter  · Eliquis 5 mg twice daily  · Intolerant to multiple antiarrhythmics including beta-blockers.  · Currently in atrial flutter at 120 bpm  · Have EP to see tomorrow  · May benefit from AV joby ablation with pacemaker placement    Coronary artery disease  · Nonobstructive CAD noted on cath 2014  · Troponin negative.  No chest pain    Hypertension  · Spironolactone 25 mg daily (at home takes 100 mg daily)  · Currently controlled at 112/87    Hyperlipidemia  · Crestor 20 mg daily    Borderline diabetes mellitus  · Last hemoglobin A1c 6.0          Electronically signed by Lucian Alvarez IV, MD, 12/16/21, 4:18 PM EST.

## 2021-12-16 NOTE — TELEPHONE ENCOUNTER
Patient notes that he voided 1700 ml after IV diuresis. Patient notes that he is still extremely dyspneic with abdominal fullness, pedal edema. He notes very little improvement in symptoms after IV diuresis yesterday. Patient notes that he sat in a chair all night due to his orthopnea. Did recommend that patient present to ED for admission. Patient verbalized understanding.

## 2021-12-16 NOTE — ED PROVIDER NOTES
Subjective   86-year-old male who presents with complaint of shortness of breath and lower extremity edema.  The patient reports a chronic history of atrial flutter, and chronic history of lower extremity edema.  He reports that he has been struggling with lower extremity edema for the last 6 to 10 months.  He states that he has been struggling with atrial flutter for a long time as well, but over the last month and a half Dr. Jeffery and Dr. Alvarez have been working to try to improve the A-flutter.  He reports receiving ablation and medication adjustments without any improvement.  He presents with a heart rate between approxione 115-120 and appears to be in a flutter at this given time.  He reports that he went to the heart valve clinic yesterday due to increasing lower extremity edema and associated increased shortness of breath.  He was given a dose of Lasix yesterday but he reports it has not improved his symptoms and he has now represented to the ER for evaluation of continued worsening shortness of breath.  He reports that he does not typically wear oxygen or have underlying lung disease.  He reports he walks a very short distance before he has to rest, and even feels short of breath when lying flat at rest.  He denies chest pain.  No fever, body aches, or chills.  He has not been diagnosed with COVID-19 and has been vaccinated against COVID-19.  He denies any sick contacts.  He does report leakage of clear fluid from the lateral right lower extremity secondary to chronic edema.  He also has chronic skin color changes to his bilateral lower extremities secondary to chronic edema.          Review of Systems   Constitutional: Positive for fatigue. Negative for chills and fever.   HENT: Negative for congestion, ear pain, postnasal drip, sinus pressure and sore throat.    Eyes: Negative for pain, redness and visual disturbance.   Respiratory: Positive for shortness of breath. Negative for cough and chest  tightness.    Cardiovascular: Positive for leg swelling. Negative for chest pain and palpitations.   Gastrointestinal: Negative for abdominal pain, anal bleeding, blood in stool, diarrhea, nausea and vomiting.   Endocrine: Negative for polydipsia and polyuria.   Genitourinary: Negative for difficulty urinating, dysuria, frequency and urgency.   Musculoskeletal: Negative for arthralgias, back pain and neck pain.   Skin: Negative for pallor and rash.   Allergic/Immunologic: Negative for environmental allergies and immunocompromised state.   Neurological: Negative for dizziness, weakness and headaches.   Hematological: Negative for adenopathy.   Psychiatric/Behavioral: Negative for confusion, self-injury and suicidal ideas. The patient is not nervous/anxious.    All other systems reviewed and are negative.      Past Medical History:   Diagnosis Date   • Acute diastolic HF (heart failure) ([facility])    • Acute hypokalemia    • Arrhythmia    • Back injury     Fall on 12/20/17   • Cellulitis of leg    • Chest pain syndrome    • CHF (congestive heart failure) ([facility])    • Deep vein thrombosis ([facility]) 1995   • Diabetes mellitus ([facility])     patient reports borderline    • Hyperlipidemia    • Hypertension    • Muscle pain     around back   • Obesity    • Obstructive sleep apnea     right now patient is sleeeping in recliner and does not use-when he lies down he will have to use cpap   • Paroxysmal A-fib ([facility])    • Peripheral artery disease ([facility])    • Spondylosis of 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 or radiculopathy 2/7/2018   • Wears glasses        Allergies   Allergen Reactions   • Bisoprolol Other (See Comments)     Severe Hypotension   • Flecainide Other (See Comments)     Syncope / collapse   • Metoprolol Other (See Comments)      Bradycardia, Severe Hypotension   • Tikosyn [Dofetilide] Other (See Comments)     Wide complex tachycardia       Past Surgical History:   Procedure Laterality Date   • CARDIAC CATHETERIZATION     •  CARDIOVERSION      multiple   • COLONOSCOPY      about 14 years ago   • KYPHOPLASTY N/A 3/5/2018    Procedure: KYPHOPLASTY L4;  Surgeon: Akshat Davidson MD;  Location:  [facility];  Service:    • KYPHOPLASTY N/A 4/16/2018    Procedure: KYPHOPLASTY L1 AND L2;  Surgeon: Akshat Davidson MD;  Location:  [facility];  Service: Neurosurgery   • OTHER SURGICAL HISTORY  06/29/2015    PVA       Family History   Problem Relation Age of Onset   • Hypertension Mother    • Stroke Mother    • Stroke Father    • Sleep disorder Father    • Alzheimer's disease Father    • Hyperlipidemia Father    • Hypertension Father    • Prostate cancer Brother    • Heart disease Maternal Grandmother        Social History     Socioeconomic History   • Marital status:    Tobacco Use   • Smoking status: Never Smoker   • Smokeless tobacco: Never Used   Vaping Use   • Vaping Use: Never used   Substance and Sexual Activity   • Alcohol use: No   • Drug use: No   • Sexual activity: Not Currently     Birth control/protection: Condom           Objective   Physical Exam  Vitals and nursing note reviewed.   Constitutional:       General: He is not in acute distress.     Appearance: Normal appearance. He is well-developed. He is not toxic-appearing or diaphoretic.   HENT:      Head: Normocephalic and atraumatic.      Right Ear: External ear normal.      Left Ear: External ear normal.      Nose: Nose normal.   Eyes:      General: Lids are normal.      Pupils: Pupils are equal, round, and reactive to light.   Neck:      Trachea: No tracheal deviation.   Cardiovascular:      Rate and Rhythm: Normal rate and regular rhythm.      Pulses: No decreased pulses.      Heart sounds: Normal heart sounds. No murmur heard.  No friction rub. No gallop.       Comments: 4+ pitting edema to the bilateral lower extremities with chronic darkened skin color changes and hardening.    Wound that is leaking serous fluid over the right lateral lower extremity.  No surrounding  erythema or signs of infection.  No purulent drainage.  Pulmonary:      Effort: Pulmonary effort is normal. Tachypnea present. No respiratory distress.      Breath sounds: Examination of the right-lower field reveals decreased breath sounds and rales. Examination of the left-lower field reveals decreased breath sounds and rales. Decreased breath sounds and rales present. No wheezing or rhonchi.       Abdominal:      General: Bowel sounds are normal.      Palpations: Abdomen is soft.      Tenderness: There is no abdominal tenderness. There is no guarding or rebound.   Musculoskeletal:         General: No deformity. Normal range of motion.      Cervical back: Normal range of motion and neck supple.      Right lower le+ Pitting Edema present.      Left lower le+ Pitting Edema present.   Lymphadenopathy:      Cervical: No cervical adenopathy.   Skin:     General: Skin is warm and dry.      Findings: No rash.   Neurological:      Mental Status: He is alert and oriented to person, place, and time.      Cranial Nerves: No cranial nerve deficit.      Sensory: No sensory deficit.   Psychiatric:         Speech: Speech normal.         Behavior: Behavior normal.         Thought Content: Thought content normal.         Judgment: Judgment normal.         Procedures           ED Course                                                 MDM  Number of Diagnoses or Management Options  Acute on chronic congestive heart failure, unspecified heart failure type (HCC): new and requires workup  Acute pulmonary edema (HCC): new and requires workup  Acute renal insufficiency: new and requires workup  Cardiomegaly: new and requires workup  Dyspnea, unspecified type: new and requires workup  Hypervolemia, unspecified hypervolemia type: new and requires workup  Pleural effusion on right: new and requires workup  Diagnosis management comments: The patient is felt to have fluid overload secondary to CHF exacerbation with borderline oxygen  saturations.  He was also noted to have a flutter which is a chronic and continuous problem for him that per his report has been difficult to correct.    Lab evaluation shows elevated BNP.  Labs also show increased creatinine and increased potassium.  Imaging of the chest shows fluid collection and on exam he has significant lower extremity edema.    I consulted cardiology who evaluated the patient and will admit for IV diuresis.           Amount and/or Complexity of Data Reviewed  Clinical lab tests: ordered and reviewed  Tests in the radiology section of CPT®: ordered and reviewed  Decide to obtain previous medical records or to obtain history from someone other than the patient: yes  Review and summarize past medical records: yes  Discuss the patient with other providers: yes  Independent visualization of images, tracings, or specimens: yes        Final diagnoses:   Acute on chronic congestive heart failure, unspecified heart failure type (HCC)   Acute pulmonary edema (HCC)   Hypervolemia, unspecified hypervolemia type   Dyspnea, unspecified type   Acute renal insufficiency   Pleural effusion on right   Cardiomegaly       ED Disposition  ED Disposition     ED Disposition Condition Comment    Decision to Admit  Level of Care: Telemetry [5]   Admitting Physician: CHARANJIT FERRARI IV [[id number]]   Attending Physician: CHARANJIT FERRARI IV [[id number]]            No follow-up provider specified.       Medication List      No changes were made to your prescriptions during this visit.          Alejandro Bower MD  12/16/21 2265

## 2021-12-17 NOTE — CASE MANAGEMENT/SOCIAL WORK
Discharge Planning Assessment  [facility]     Patient Name: Akshat Winkler  MRN: [id number]  Today's Date: 12/17/2021    Admit Date: 12/16/2021     Discharge Needs Assessment     Row Name 12/17/21 1454       Living Environment    Lives With spouse    Current Living Arrangements home/apartment/condo    Primary Care Provided by self    Provides Primary Care For no one    Family Caregiver if Needed spouse    Able to Return to Prior Arrangements yes       Transition Planning    Patient/Family Anticipates Transition to home    Patient/Family Anticipated Services at Transition none    Transportation Anticipated family or friend will provide       Discharge Needs Assessment    Readmission Within the Last 30 Days no previous admission in last 30 days    Equipment Currently Used at Home walker, rolling; cane, straight    Equipment Needed After Discharge none               Discharge Plan     Row Name 12/17/21 1455       Plan    Plan home    Patient/Family in Agreement with Plan yes    Plan Comments I met with Mr. Winkler at the bedside. He lives with his wife in [facility]. He is independent will mobility and activities of daily living. He is still employeed full time. Mr. Winkler drives himself when leaving the home and is not current with any outpatient services. Mr. Winkler has a rolling walker and a straight cane from a back surgery a while back. Mr. Winkler anticipates going home this Sunday. Mr. Winkler states that his wife is able to transport him home at the time of discharge. Mr. Winkler denies any additional discharge needs. Case management will continue to follow Mr. Winkler's plan of care and assist with discharge needs.    Final Discharge Disposition Code 01 - home or self-care              Continued Care and Services - Admitted Since 12/16/2021    Coordination has not been started for this encounter.          Demographic Summary     Row Name 12/17/21 1453       General Information    General Information  Comments I confirmed that Mr. Winkler's PCP is Saurabh Medina. I confirmed that his insurer is [employer].               Functional Status     Row Name 12/17/21 1454       Functional Status, IADL    Medications independent    Meal Preparation independent    Housekeeping independent    Laundry independent    Shopping independent       Employment/    Employment Status employed full-time               Psychosocial    No documentation.                Abuse/Neglect    No documentation.                Legal    No documentation.                Substance Abuse    No documentation.                Patient Forms    No documentation.                   Saurabh Zuleta RN

## 2021-12-17 NOTE — PLAN OF CARE
Goal Outcome Evaluation:  Plan of Care Reviewed With: patient        Progress: no change  Outcome Summary: PT eval performed: Patient presenting with decline in stamina and poor activity tolerance.  SBA for transfers.  Pt able to ambulate 130', RW with SBA.  Pt limited by stamina, demonstrates wide stance and effort to advance legs to due edema.  Recommend home with assist when medically ready.

## 2021-12-17 NOTE — THERAPY EVALUATION
Patient Name: Akshat Winkler  : 1955    MRN: [id number]                              Today's Date: 2021       Admit Date: 2021    Visit Dx:     ICD-10-CM ICD-9-CM   1. Acute on chronic congestive heart failure, unspecified heart failure type ([facility])  I50.9 428.0   2. Acute pulmonary edema ([facility])  J81.0 518.4   3. Hypervolemia, unspecified hypervolemia type  E87.70 276.69   4. Dyspnea, unspecified type  R06.00 786.09   5. Acute renal insufficiency  N28.9 593.9   6. Pleural effusion on right  J90 511.9   7. Cardiomegaly  I51.7 429.3     Patient Active Problem List   Diagnosis   • Acute on chronic right-sided heart failure ([facility])   • Type 2 diabetes mellitus without complication, without long-term current use of insulin ([facility])   • Hyperlipidemia LDL goal <70   • Essential hypertension   • Coronary artery disease involving native coronary artery of native heart with angina pectoris ([facility])   • Severe obstructive sleep apnea, no CPAP   • Class 3 severe obesity due to excess calories with serious comorbidity and body mass index (BMI) of 40.0 to 44.9 in adult ([facility])   • Chronic anticoagulation   • Persistent atrial fibrillation/flutter   • Compression fracture of lumbar vertebra ([facility])   • Spondylosis of 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 or radiculopathy   • Lumbar stenosis with neurogenic claudication   • Lumbar disc herniation   • Myofascial pain   • Pathologic compression fracture of vertebra ([facility])   • Chronic bilateral severe lower extremity edema   • Mechanical low back pain   • History of kyphoplasty     Past Medical History:   Diagnosis Date   • Acute diastolic HF (heart failure) ([facility])    • Acute hypokalemia    • Arrhythmia    • Back injury     Fall on 17   • Cellulitis of leg    • Chest pain syndrome    • CHF (congestive heart failure) ([facility])    • Deep vein thrombosis ([facility])    • Diabetes mellitus ([facility])     patient reports borderline    • Hyperlipidemia    • Hypertension    • Muscle pain      around back   • Obesity    • Obstructive sleep apnea     right now patient is sleeeping in recliner and does not use-when he lies down he will have to use cpap   • Paroxysmal A-fib (HCC)    • Peripheral artery disease (HCC)    • Spondylosis of 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 or radiculopathy 2/7/2018   • Wears glasses      Past Surgical History:   Procedure Laterality Date   • CARDIAC CATHETERIZATION     • CARDIOVERSION      multiple   • COLONOSCOPY      about 14 years ago   • KYPHOPLASTY N/A 3/5/2018    Procedure: KYPHOPLASTY L4;  Surgeon: Akshat Davidson MD;  Location:  [facility];  Service:    • KYPHOPLASTY N/A 4/16/2018    Procedure: KYPHOPLASTY L1 AND L2;  Surgeon: Akshat Davidson MD;  Location:  [facility];  Service: Neurosurgery   • OTHER SURGICAL HISTORY  06/29/2015    PVA      General Information     Row Name 12/17/21 1303          Physical Therapy Time and Intention    Document Type evaluation  -KG     Mode of Treatment physical therapy  -KG     Row Name 12/17/21 1303          General Information    Patient Profile Reviewed yes  -KG     Prior Level of Function independent:; all household mobility; ADL's  -KG     Existing Precautions/Restrictions cardiac; other (see comments)  edema LEs  -KG     Barriers to Rehab none identified  -KG     Row Name 12/17/21 1303          Living Environment    Lives With spouse  -KG     Row Name 12/17/21 1303          Home Main Entrance    Number of Stairs, Main Entrance none  -KG     Row Name 12/17/21 1303          Stairs Within Home, Primary    Number of Stairs, Within Home, Primary none  -KG     Row Name 12/17/21 1303          Cognition    Orientation Status (Cognition) oriented x 3  -KG     Row Name 12/17/21 1303          Safety Issues, Functional Mobility    Safety Issues Affecting Function (Mobility) awareness of need for assistance  -KG     Impairments Affecting Function (Mobility) shortness of breath; endurance/activity tolerance  -KG           User Key  (r) = Recorded  By, (t) = Taken By, (c) = Cosigned By    Initials Name Provider Type    MADHAV Juliane Higginbotham Physical Therapist               Mobility     Row Name 12/17/21 1304          Bed Mobility    Comment (Bed Mobility) UIC  -KG     Row Name 12/17/21 1304          Transfers    Comment (Transfers) cues safe HP  -KG     Row Name 12/17/21 1304          Sit-Stand Transfer    Sit-Stand [place] (Transfers) standby assist  -KG     Assistive Device (Sit-Stand Transfers) walker, front-wheeled  -KG     Row Name 12/17/21 1304          Gait/Stairs (Locomotion)    [place] Level (Gait) standby assist  -KG     Assistive Device (Gait) walker, front-wheeled  -KG     Distance in Feet (Gait) 130  -KG     Deviations/Abnormal Patterns (Gait) gait speed decreased; base of support, wide  -KG     Comment (Gait/Stairs) Pt able to ambulate 130', RW with SBA.  Pt limited by stamina, wide stance and effort to advance legs to due edema.  -KG           User Key  (r) = Recorded By, (t) = Taken By, (c) = Cosigned By    Initials Name Provider Type    MADHAV Juliane Higginbotham Physical Therapist               Obj/Interventions     Row Name 12/17/21 1307          Strength Comprehensive (MMT)    Comment, General Manual Muscle Testing (MMT) Assessment WFL but limited by edema in legs  -KG     Row Name 12/17/21 1307          Motor Skills    Motor Skills therapeutic exercise  -KG     Therapeutic Exercise knee; ankle  -KG     Row Name 12/17/21 1307          Knee (Therapeutic Exercise)    Knee (Therapeutic Exercise) strengthening exercise  -KG     Knee Strengthening (Therapeutic Exercise) bilateral; LAQ (long arc quad); heel slides; 10 repetitions; 2 sets  -KG     Row Name 12/17/21 1307          Ankle (Therapeutic Exercise)    Ankle (Therapeutic Exercise) strengthening exercise  -KG     Ankle Strengthening (Therapeutic Exercise) bilateral; dorsiflexion; plantarflexion; 2 sets; 10 repetitions  -KG     Row Name 12/17/21 1307          Balance    Balance Assessment  sitting static balance; standing dynamic balance  -KG     Static Sitting Balance WFL  -KG     Dynamic Standing Balance WFL; supported; standing  -KG     Balance Interventions standing; sit to stand; weight shifting activity  -KG           User Key  (r) = Recorded By, (t) = Taken By, (c) = Cosigned By    Initials Name Provider Type    MADHAV Juliane Higginbotham Physical Therapist               Goals/Plan     Row Name 12/17/21 1311          Bed Mobility Goal 1 (PT)    Activity/Assistive Device (Bed Mobility Goal 1, PT) sit to supine/supine to sit  -KG     [place] Level/Cues Needed (Bed Mobility Goal 1, PT) independent  -KG     Time Frame (Bed Mobility Goal 1, PT) short term goal (STG); 3 days  -KG     Progress/Outcomes (Bed Mobility Goal 1, PT) continuing progress toward goal  -KG     Row Name 12/17/21 1311          Transfer Goal 1 (PT)    Activity/Assistive Device (Transfer Goal 1, PT) sit-to-stand/stand-to-sit; bed-to-chair/chair-to-bed  -KG     [place] Level/Cues Needed (Transfer Goal 1, PT) independent  -KG     Time Frame (Transfer Goal 1, PT) short term goal (STG); 5 days  -KG     Progress/Outcome (Transfer Goal 1, PT) continuing progress toward goal  -KG     Row Name 12/17/21 1311          Gait Training Goal 1 (PT)    Activity/Assistive Device (Gait Training Goal 1, PT) gait (walking locomotion)  -KG     [place] Level (Gait Training Goal 1, PT) independent  -KG     Distance (Gait Training Goal 1, PT) 400  -KG     Time Frame (Gait Training Goal 1, PT) long term goal (LTG); 10 days  -KG     Progress/Outcome (Gait Training Goal 1, PT) continuing progress toward goal  -KG           User Key  (r) = Recorded By, (t) = Taken By, (c) = Cosigned By    Initials Name Provider Type    MADHAV Juliane Higginbotham Physical Therapist               Clinical Impression     Row Name 12/17/21 1303          Pain    Additional Documentation Pain Scale: Numbers Pre/Post-Treatment (Group)  -KG     Row Name 12/17/21 0918           Pain Scale: Numbers Pre/Post-Treatment    Pretreatment Pain Rating 0/10 - no pain  -KG     Posttreatment Pain Rating 0/10 - no pain  -KG     Row Name 12/17/21 1308          Plan of Care Review    Plan of Care Reviewed With patient  -KG     Progress no change  -KG     Outcome Summary PT eval performed: Patient presenting with decline in stamina and poor activity tolerance.  SBA for transfers.  Pt able to ambulate 130', RW with SBA.  Pt limited by stamina, demonstrates wide stance and effort to advance legs to due edema.  Recommend home with assist when medically ready.  -KG     Row Name 12/17/21 1308          Therapy Assessment/Plan (PT)    Rehab Potential (PT) good, to achieve stated therapy goals  -KG     Criteria for Skilled Interventions Met (PT) yes; meets criteria; skilled treatment is necessary  -KG     Row Name 12/17/21 1308          Vital Signs    Pre Systolic BP Rehab 95  -KG     Pre Treatment Diastolic BP 81  -KG     Intra Systolic BP Rehab 94  -KG     Intra Treatment Diastolic BP 80  -KG     Post Systolic BP Rehab 99  -KG     Post Treatment Diastolic BP 82  -KG     Pretreatment Heart Rate (beats/min) 125  -KG     Posttreatment Heart Rate (beats/min) 141  -KG     Pre SpO2 (%) 96  -KG     O2 Delivery Pre Treatment room air  -KG     O2 Delivery Intra Treatment room air  -KG     Post SpO2 (%) 97  -KG     O2 Delivery Post Treatment room air  -KG     Row Name 12/17/21 1308          Positioning and Restraints    Pre-Treatment Position sitting in chair/recliner  -KG     Post Treatment Position chair  -KG     In Chair notified nsg; call light within reach; encouraged to call for assist; waffle cushion  -KG           User Key  (r) = Recorded By, (t) = Taken By, (c) = Cosigned By    Initials Name Provider Type    KG Juliane Higginbotham Physical Therapist               Outcome Measures     Row Name 12/17/21 1313          How much help from another person do you currently need...    Turning from your back to your side  while in flat bed without using bedrails? 4  -KG     Moving from lying on back to sitting on the side of a flat bed without bedrails? 3  -KG     Moving to and from a bed to a chair (including a wheelchair)? 4  -KG     Standing up from a chair using your arms (e.g., wheelchair, bedside chair)? 4  -KG     Climbing 3-5 steps with a railing? 2  -KG     To walk in hospital room? 3  -KG     AM-PAC 6 Clicks Score (PT) 20  -KG     Row Name 12/17/21 1313          Functional Assessment    Outcome Measure Options AM-PAC 6 Clicks Basic Mobility (PT)  -KG           User Key  (r) = Recorded By, (t) = Taken By, (c) = Cosigned By    Initials Name Provider Type    KG Juliane Higginbotham Physical Therapist                             Physical Therapy Education                 Title: PT OT SLP Therapies (Done)     Topic: Physical Therapy (Done)     Point: Mobility training (Done)     Learning Progress Summary           Patient Acceptance, E,TB, VU by KG at 12/17/2021 1317                   Point: Home exercise program (Done)     Learning Progress Summary           Patient Acceptance, E,TB, VU by KG at 12/17/2021 1317                   Point: Body mechanics (Done)     Learning Progress Summary           Patient Acceptance, E,TB, VU by KG at 12/17/2021 1317                   Point: Precautions (Done)     Learning Progress Summary           Patient Acceptance, E,TB, VU by KG at 12/17/2021 1317                               User Key     Initials Effective Dates Name Provider Type Discipline    KG 06/16/21 -  Juliane Higginbotham Physical Therapist PT              PT Recommendation and Plan     Plan of Care Reviewed With: patient  Progress: no change  Outcome Summary: PT eval performed: Patient presenting with decline in stamina and poor activity tolerance.  SBA for transfers.  Pt able to ambulate 130', RW with SBA.  Pt limited by stamina, demonstrates wide stance and effort to advance legs to due edema.  Recommend home with assist when  medically ready.     Time Calculation:    PT Charges     Row Name 12/17/21 1319             Time Calculation    Start Time 1230  -KG      PT Received On 12/17/21  -KG      PT Goal Re-Cert Due Date 12/27/21  -KG              Time Calculation- PT    Total Timed Code Minutes- PT 10 minute(s)  -KG              Timed Charges    02687 - PT Therapeutic Exercise Minutes 10  -KG              Untimed Charges    PT Eval/Re-eval Minutes 45  -KG              Total Minutes    Timed Charges Total Minutes 10  -KG      Untimed Charges Total Minutes 45  -KG       Total Minutes 55  -KG            User Key  (r) = Recorded By, (t) = Taken By, (c) = Cosigned By    Initials Name Provider Type    KG Juliane Higginbotham Physical Therapist              Therapy Charges for Today     Code Description Service Date Service Provider Modifiers Qty    88997416778 HC PT THER PROC EA 15 MIN 12/17/2021 Juliane Higginbotham GP 1    51567724543 HC PT EVAL LOW COMPLEXITY 3 12/17/2021 Juliane Higginbotham GP 1          PT G-Codes  Outcome Measure Options: AM-PAC 6 Clicks Basic Mobility (PT)  AM-PAC 6 Clicks Score (PT): 20    Juliane Higginbotham  12/17/2021

## 2021-12-17 NOTE — NURSING NOTE
WOC consult:     BLE     Patient presents with significant edema +4 to his BLE.   Posterior RLE presents with an open venous ulceration.   Wound bed is pink dermis.   Small serous drainage.     Placed Xeroform, ABD pads, and secured with Kerlix.     -Consulted PT wound care for compression wrap therapy  -Keep legs elevated     Currently being diuresed.     Heels intact and blanching.     Change dressings daily until PT wound care places compression.     Thanks

## 2021-12-17 NOTE — PROGRESS NOTES
Cardiology Progress Note      Reason for visit:    · Acute diastolic and right-sided heart failure  · Persistent atrial fibrillation/flutter    IDENTIFICATION: 66-year-old gentleman who resides in [place], [place]    Active Hospital Problems    Diagnosis  POA   • **Acute on chronic right-sided heart failure ([facility]CC) [I50.813]  Yes     Priority: High     · Echo (12/10/2021): LVEF 60%.  Moderate TR.  RVSP 58 mmHg     • Persistent atrial fibrillation/flutter [I48.19]  Yes     Priority: High     · Diagnosed 2011  · [id number] 3 (HTN, CAD, DM)  · Echo (10/11/2011): Normal LVEF, mild MR, mild LA dilation  · LOLY/ECVcardioversion (12/21/2011) initiation of propafenone therapy.   · Successful LOLY/ECV for recurrent atrial fibrillation, 11/15/2013  · PVA with Dr. Cary, 6/29/2015  · Cardioversion (07/17/2015) for atrial flutter occurring post ablation   · ECV for recurrent atrial flutter, 12/20/17 with reattempt at beta blocker/flecainide.  · Intolerant to beta blocker/flecanide with severe hypotension, intolerant to propafenone  · Tikosyn admission with development of wide complex tachycardia, 7/2018  · ECV for recurrent atrial flutter with early return of atrial flutter, 11/18/2021     • Hyperlipidemia LDL goal <70 [E78.5]  Yes     Priority: Medium     · High intensity statin therapy indicated given presence of coronary artery disease     • Essential hypertension [I10]  Yes     Priority: Medium   • Class 3 severe obesity due to excess calories with serious comorbidity and body mass index (BMI) of 40.0 to 44.9 in adult ([facility]) [E66.01, Z68.41]  Not Applicable     Priority: Low   • Chronic bilateral severe lower extremity edema [R60.0]  Yes            Patient currently sitting up in the chair.  His legs were wrapped yesterday.  He does report breathing better after receiving IV Bumex.  Milrinone drip was ordered but never started.  He remains in atrial flutter at 118 bpm.         Vital Sign Min/Max for last 24 hours  Temp   Min: 97.6 °F (36.4 °C)  Max: 98.2 °F (36.8 °C)   BP  Min: 112/87  Max: 136/105   Pulse  Min: 108  Max: 124   Resp  Min: 17  Max: 24   SpO2  Min: 80 %  Max: 99 %   No data recorded      Intake/Output Summary (Last 24 hours) at 2021 0749  Last data filed at 2021 0718  Gross per 24 hour   Intake --   Output 7700 ml   Net -7700 ml           Physical Exam  Constitutional:       General: He is awake.   Cardiovascular:      Rate and Rhythm: Tachycardia present. Rhythm regularly irregular.   Pulmonary:      Effort: Pulmonary effort is normal.      Breath sounds: Decreased breath sounds present.   Abdominal:      General: There is distension.   Musculoskeletal:      Right lower le+ Pitting Edema present.      Left lower le+ Pitting Edema present.   Skin:     General: Skin is warm and dry.   Neurological:      Mental Status: He is alert and oriented to person, place, and time.   Psychiatric:         Behavior: Behavior is cooperative.         Tele: Atrial flutter    Results Review (reviewed the patient's recent labs in the electronic medical record):     EKG (2020): Atrial flutter at 118 bpm    CXR (2021): Persistent and progressive cardiomegaly with bilateral pulmonary opacifications right greater than left concerning for pulmonary edema.  Moderate right pleural effusion present    ECHO performed at Dr. Andrew's office (12/10/2021): LVEF 60%.  RVSP 58 mmHg    Result Review:  I have personally reviewed the results from the time of this admission to 2021 07:49 EST and agree with these findings:  [x]  Laboratory  []  Microbiology  [x]  Radiology  [x]  EKG/Telemetry   [x]  Cardiology/Vascular   []  Pathology  []  Old records  []  Other:  Most notable findings include: BUN 27, creatinine 1.39, GFR 51    Results from last 7 days   Lab Units 21  0514 21  0945 12/15/21  0835   SODIUM mmol/L 136 134* 132*   POTASSIUM mmol/L 4.2 5.7* 5.2   CHLORIDE mmol/L 96* 97* 97*   BUN mg/dL 27*  27* 25*   CREATININE mg/dL 1.39* 1.45* 0.86   MAGNESIUM mg/dL 2.3  --  1.7     Results from last 7 days   Lab Units 12/16/21  0945   TROPONIN T ng/mL 0.011     Results from last 7 days   Lab Units 12/17/21  0514 12/16/21  0945   WBC 10*3/mm3 4.59 4.71   HEMOGLOBIN g/dL 14.3 14.3   HEMATOCRIT % 44.1 44.8   PLATELETS 10*3/mm3 159 159       Lab Results   Component Value Date    HGBA1C 6.00 (H) 12/16/2021       Lab Results   Component Value Date    CHOL 103 12/16/2021    CHLPL 138 05/09/2017    TRIG 81 12/16/2021    HDL 31 (L) 12/16/2021    LDL 56 12/16/2021              Acute diastolic/right-sided heart failure  · Bumex 2 mg IV twice daily  · Milrinone drip never started  · Spironolactone 25 mg daily  · Echo at Dr. Andrew's office 12/10 LVEF 60%.  RVSP 58 mmHg       Persistent atrial fibrillation/flutter  · Eliquis 5 mg twice daily  · Intolerant to multiple antiarrhythmics including beta-blockers.  · Currently in atrial flutter at 120 bpm  · May benefit from AV joby ablation with pacemaker placement     Coronary artery disease  · Nonobstructive CAD noted on cath 2014  · Troponin negative.  No chest pain     Hypertension  · Spironolactone 25 mg daily (at home takes 100 mg daily)  · Currently controlled at 112/87     Hyperlipidemia  · Crestor 20 mg daily     Borderline diabetes mellitus  · Last hemoglobin A1c 6.0       Acute kidney injury  · Creatinine on arrival 1.45 and currently 1.39         · Start milrinone drip at fixed rate of 0.35 mcg/kg/minute  · Continue to diurese with IV Bumex over the weekend.  Monitor creatinine closely  · Will have EP to see today for his persistent atrial flutter    Electronically signed by FAITH Raza, 12/17/21, 7:49 AM EST.

## 2021-12-17 NOTE — NURSING NOTE
Referral received for Phase II Cardiac Rehab.  Staff will continue to follow and review for Phase II eligibly.

## 2021-12-17 NOTE — PROGRESS NOTES
[facility]  Progress Note       LOS: 1 day   Patient Care Team:  Saurabh Medina PA as PCP - General (Physician Assistant)  Lucian Alvarez IV, MD as Consulting Physician (Cardiology)  Mandy Clark PA-C as Physician Assistant (Physician Assistant)  Josh Moss MD as Consulting Physician (Pain Medicine)  Hannah Leonard APRN as Nurse Practitioner (Cardiology)  Akshat Davidson MD as Surgeon (Neurosurgery)    Chief Complaint:  Atrial flutter     Subjective      The patient was admitted yesterday with worsening heart failure symptoms.  He was started on Bumex 2 mg IV twice daily, and milrinone drip and Aldactone 25 mg daily.  He is diuresed over 7 L of fluid and is feeling better today.  He recently had echocardiogram at Dr. Andrew's office 12/10/2021 EF 60%, mod TR LA enlargement, mild concentric LVH, IVC mildly dilated.   His heart rates in atrial flutter have been in the 120s during this admission.  He is not aware of palpitations unless he is laying down and very still.  He denies any chest pain.  He states he is not having any shortness of breath currently.  His abdomen is less distended.  He still complains of lower extremity edema.        Cardiology Problem List:  1. Persistent Atrial Fibrillation/Flutter   a. CHADSvasc = 3 (HTN, CAD, DM)  b. Echocardiogram 10/11/81050: normal LVEF, mild MR, mild LA dilatation  c. LOLY/ECVcardioversion, 12/21/2011, with initiation of propafenone therapy.  d. Successful LOLY/ECV for recurrent atrial fibrillation, 11/15/2013  e. PVA with Dr. Cary, 6/29/2015  f. Cardioversion (07/17/2015) for atrial flutter occurring post ablation   g. ECV for recurrent atrial flutter, 12/20/17 with reattempt at beta blocker/flecainide.  h. Intolerant to beta blocker/flecanide with severe hypotension, intolerant to propafenone  i. Tikosyn admission with development of wide complex tachycardia, 7/2018  j. Recurrent atrial  fibrillation with ECV on 11/18/2021- initially successful with early return to atrial flutter   k. Event Monitor 11/24-11/27/2021: afib/flutter average HTR 89 bpm  2. Chronic diastolic heart failure:  a. Cardiac catheterization (02/20/14): mild nonobstructive CAD. LVEF 55%, elevated LVEDP suggesting diastolic heart failure.  3. HTN  4. HLP  5. DM2  6. JOSESITO  7. Surgical History:  a. Kyphoplasty L4  b. Kyphoplasty L1 and L2        Review of Systems:   Pertinent positives in HPI, all others reviewed and negative.      Objective       Current Facility-Administered Medications:   •  acetaminophen (TYLENOL) tablet 650 mg, 650 mg, Oral, Daily, Lucian Alvarez IV, MD, 650 mg at 12/17/21 0822  •  apixaban (ELIQUIS) tablet 5 mg, 5 mg, Oral, Q12H, Lucian Alvarez IV, MD, 5 mg at 12/17/21 0823  •  bumetanide (BUMEX) injection 2 mg, 2 mg, Intravenous, Q12H, Lucian Alvarez IV, MD, 2 mg at 12/17/21 1309  •  Magnesium Sulfate 2 gram Bolus, followed by 8 gram infusion (total Mg dose 10 grams)- Mg less than or equal to 1mg/dL, 2 g, Intravenous, PRN **OR** Magnesium Sulfate 2 gram / 50mL Infusion (GIVE X 3 BAGS TO EQUAL 6GM TOTAL DOSE) - Mg 1.1 - 1.5 mg/dl, 2 g, Intravenous, PRN **OR** Magnesium Sulfate 4 gram infusion- Mg 1.6-1.9 mg/dL, 4 g, Intravenous, PRN, Lucian Alvarez IV, MD, Last Rate: 25 mL/hr at 12/16/21 1554, 4 g at 12/16/21 1554  •  milrinone (PRIMACOR) 20 mg in 100 mL D5W infusion, 0.35 mcg/kg/min, Intravenous, Continuous, Courtney Carmichael APRN, Last Rate: 15.02 mL/hr at 12/17/21 0921, 0.35 mcg/kg/min at 12/17/21 0921  •  multivitamin with minerals 1 tablet, 1 tablet, Oral, Daily, Lucian Alvarez IV, MD, 1 tablet at 12/17/21 0823  •  Pharmacy Consult - [facility], , Does not apply, Daily, Oneida Lazo, PharmD  •  potassium chloride (MICRO-K) CR capsule 40 mEq, 40 mEq, Oral, PRN **OR** potassium chloride (KLOR-CON) packet 40 mEq, 40 mEq, Oral, PRN **OR** potassium chloride  "10 mEq in 100 mL IVPB, 10 mEq, Intravenous, Q1H PRN, Lucian Alvarez IV, MD  •  rosuvastatin (CRESTOR) tablet 20 mg, 20 mg, Oral, Daily, Lucian Alvarez IV, MD, 20 mg at 12/17/21 0823  •  sodium chloride 0.9 % flush 10 mL, 10 mL, Intravenous, PRN, Lucian Alvarez IV, MD  •  sodium chloride 0.9 % flush 10 mL, 10 mL, Intravenous, Q12H, Lucian Alvarez IV, MD, 10 mL at 12/17/21 0823  •  sodium chloride 0.9 % flush 10 mL, 10 mL, Intravenous, PRN, Lucian Alvarez IV, MD  •  spironolactone (ALDACTONE) tablet 25 mg, 25 mg, Oral, Daily, Lucian Alvarez IV, MD, 25 mg at 12/17/21 0823    Vital Sign Min/Max for last 24 hours  Temp  Min: 97.6 °F (36.4 °C)  Max: 97.8 °F (36.6 °C)   BP  Min: 103/58  Max: 133/90   Pulse  Min: 118  Max: 124   Resp  Min: 17  Max: 20   SpO2  Min: 80 %  Max: 99 %   No data recorded   Weight  Min: 137 kg (302 lb)  Max: 138 kg (303 lb 5.7 oz)     Flowsheet Rows      First Filed Value   Admission Height 175.3 cm (69\") Documented at 12/16/2021 0931   Admission Weight 143 kg (316 lb) Documented at 12/16/2021 0931            Intake/Output Summary (Last 24 hours) at 12/17/2021 1359  Last data filed at 12/17/2021 0900  Gross per 24 hour   Intake 240 ml   Output 7275 ml   Net -7035 ml       Physical Exam:     General Appearance:    Alert, cooperative, in no acute distress   Lungs:     Clear to auscultation laterally,respirations regular, even and                  unlabored    Heart:   Irregularly irregular, tachycardic, normal S1 and S2, no            murmur, no gallop, no rub, no click   Chest Wall:    No abnormalities observed   Abdomen:     Normal bowel sounds, obese, distended, soft   Extremities:   Moves all extremities well, + significant lower extremity edema, bandage wrapped.   Pulses:   Pulses palpable and equal bilaterally   Skin:   No bleeding, bruising or rash        Results Review:   Results from last 7 days   Lab Units 12/17/21  0514 " 12/16/21  0945   WBC 10*3/mm3 4.59 4.71   HEMOGLOBIN g/dL 14.3 14.3   HEMATOCRIT % 44.1 44.8   PLATELETS 10*3/mm3 159 159     Results from last 7 days   Lab Units 12/17/21  1158 12/17/21  0514 12/17/21  0514 12/16/21  0945 12/16/21  0945   SODIUM mmol/L 136  --  136  --  134*   POTASSIUM mmol/L 4.0  --  4.2  --  5.7*   CHLORIDE mmol/L 96*   < > 96*   < > 97*   CO2 mmol/L 28.0   < > 28.0   < > 25.0   BUN mg/dL 28*  --  27*  --  27*   CREATININE mg/dL 1.52*  --  1.39*  --  1.45*   GLUCOSE mg/dL 123*   < > 134*   < > 145*    < > = values in this interval not displayed.      Results from last 7 days   Lab Units 12/16/21  0945   HEMOGLOBIN A1C % 6.00*     Results from last 7 days   Lab Units 12/16/21  0945   CHOLESTEROL mg/dL 103   TRIGLYCERIDES mg/dL 81   HDL CHOL mg/dL 31*     Results from last 7 days   Lab Units 12/16/21  0945   TSH uIU/mL 5.050*     Results from last 7 days   Lab Units 12/16/21  0945   PROBNP pg/mL 926.5*         Results from last 7 days   Lab Units 12/16/21  0945   TROPONIN T ng/mL 0.011       Intake/Output Summary (Last 24 hours) at 12/17/2021 1359  Last data filed at 12/17/2021 0900  Gross per 24 hour   Intake 240 ml   Output 7275 ml   Net -7035 ml       I personally viewed and interpreted the patient's EKG/Telemetry data    EKG: Atrial flutter 118 bpm  Telemetry: Atrial flutter 120s    Ejection Fraction  No results found for: EF    Echo EF Estimated  Lab Results   Component Value Date    ECHOEFEST 60 02/27/2018         Present on Admission:  • Persistent atrial fibrillation/flutter  • Acute on chronic right-sided heart failure (HCC)  • Hyperlipidemia LDL goal <70  • Essential hypertension  • (Resolved) Acute on chronic congestive heart failure (HCC)  • Chronic bilateral severe lower extremity edema    Assessment/Plan      1. Persistent AF/AFL:   - admitted 12/16/2021 with HRs in 120s in atrial flutter , recent event monitor 11/24/2021 with average HR 89 bpm in afib/flutter.  He thinks his heart  rates have been fast for the past week.  -s/p PVA with Dr. Cary in 2015. Intolerant to BB/Flecainide/Amiodarone in the past with failure of Tikosyn due to development of WCT. Persistent AF with worsening HF symptoms.  He is intolerant to AV joby blocking agent with severe hypotension.  He recently underwent successful ECV with Dr. Alvarez 11/18/21 with ERAF.  - options include AVN + PM (recent normal EF on echo at Dr. Andrew's office) vs redo PVA.  Patient is not interested in pulmonary vein ablation at this time and would like to pursue pacemaker with AV node ablation.  He would like us to talk more about this procedure with his wife.  Most likely this will be done on Monday, 12/20/2021.      2. Acute on Chronic Diastolic HF/Right heart failure-With increased SOB/Edema.   - On Bumex and metolazone at home, admitted 12/16/2021 with worsening CHF  - 7000 mL NET output with IV diuresis with Bumex   -Continue aggressive diuresis watch BMP      Plan for disposition: Continue diuresis over the weekend.  Potential AV node ablation pacemaker implant on Monday with Dr. Jeffery.  N.p.o. after midnight on Sunday.    Electronically signed by RUPESH Martino, 12/17/21, 1:26 PM EST.

## 2021-12-18 NOTE — PLAN OF CARE
Goal Outcome Evaluation:         SBP in 80s at beginning of shift on Milrinone gtt also recieveing BUmex IV BID. Ramiro RN stopped Milrinone gtt to allow patient's BP to improve. I called cardiology at beginning of shift to report BP and HR in aflutter 120s (baseline for this pt) and to verify if they still wanted Milrinone gtt off. Spoke with RUPESH Bliss with cardiology. She ordered paramter limits for both the MIlrinone gtt and the IV bumex. Ordered to hold Milrinon gtt and Bumex for SBP <90 and pt symptomatic, see MAR for order modifications. Patient's BP did return to SBP 90s and occasionally low 100s. Restarted gtt back at the ordered fixed rate. Cycled patient's BP for every 30 minutes. BP rechecked for any less 90. Around 02:00AM SBP rang in in 80s, rechecked 3 times SBP still <90. Milrinone drip stopped, see MAR. Now cycling BPs every 1 hour until SBP greater than 90 and maintaining in the 90s. Will attempt to restart gtt once patient's BP improves. Other VSS. Will continue to monitor.

## 2021-12-18 NOTE — SIGNIFICANT NOTE
SBP in 80s at beginning of shift on Milrinone gtt also recieveing BUmex IV BID. Ramiro RN stopped Milrinone gtt to allow patient's BP to improve. I called cardiology at beginning of shift to report BP and HR in aflutter 120s (baseline for this pt) and to verify if they still wanted Milrinone gtt off. Spoke with RUPESH Bliss with cardiology. She ordered paramter limits for both the MIlrinone gtt and the IV bumex. Ordered to hold Milrinon gtt and Bumex for SBP <90 and pt symptomatic, see MAR for order modifications. Patient's BP did return to SBP 90s and occasionally low 100s. Restarted gtt back at the ordered fixed rate. Cycled patient's BP for every 30 minutes. BP rechecked for any less 90. Around 02:00AM SBP rang in in 80s, rechecked 3 times SBP still <90. Milrinone drip stopped, see MAR. Now cycling BPs every 1 hour until SBP greater than 90 and maintaining in the 90s. Will attempt to restart gtt once patient's BP improves. Other VSS. Will continue to monitor.

## 2021-12-18 NOTE — PROGRESS NOTES
[facility]  Progress Note       LOS: 2 days   Patient Care Team:  Saurabh Medina PA as PCP - General (Physician Assistant)  Lucian Alvarez IV, MD as Consulting Physician (Cardiology)  Mandy Clark PA-C as Physician Assistant (Physician Assistant)  Josh Moss MD as Consulting Physician (Pain Medicine)  Hannah Leonard APRN as Nurse Practitioner (Cardiology)  Akshat Davidson MD as Surgeon (Neurosurgery)    Chief Complaint:  Atrial flutter     Subjective      Feeling less SOB today and LE edema is slowly improving. No CP.   Agrees with plan for AVN + PM implant on Monday.   Wife coming tomorrow to talk to Dr. Jeffery.  Continue improvement overall.        Cardiology Problem List:  1. Persistent Atrial Fibrillation/Flutter   a. CHADSvasc = 3 (HTN, CAD, DM)  b. Echocardiogram 10/11/99914: normal LVEF, mild MR, mild LA dilatation  c. LOLY/ECVcardioversion, 12/21/2011, with initiation of propafenone therapy.  d. Successful LOLY/ECV for recurrent atrial fibrillation, 11/15/2013  e. PVA with Dr. Cary, 6/29/2015  f. Cardioversion (07/17/2015) for atrial flutter occurring post ablation   g. ECV for recurrent atrial flutter, 12/20/17 with reattempt at beta blocker/flecainide.  h. Intolerant to beta blocker/flecanide with severe hypotension, intolerant to propafenone  i. Tikosyn admission with development of wide complex tachycardia, 7/2018  j. Recurrent atrial fibrillation with ECV on 11/18/2021- initially successful with early return to atrial flutter   k. Event Monitor 11/24-11/27/2021: afib/flutter average HTR 89 bpm  2. Chronic diastolic heart failure:  a. Cardiac catheterization (02/20/14): mild nonobstructive CAD. LVEF 55%, elevated LVEDP suggesting diastolic heart failure.  3. HTN  4. HLP  5. DM2  6. JOSESITO  7. Surgical History:  a. Kyphoplasty L4  b. Kyphoplasty L1 and L2        Review of Systems:   Pertinent positives in HPI, all others reviewed  and negative.      Objective       Current Facility-Administered Medications:   •  acetaminophen (TYLENOL) tablet 650 mg, 650 mg, Oral, Daily, Lucian Alvarez IV, MD, 650 mg at 12/18/21 0932  •  apixaban (ELIQUIS) tablet 5 mg, 5 mg, Oral, Q12H, Lucian Alvarez IV, MD, 5 mg at 12/18/21 0932  •  bumetanide (BUMEX) injection 2 mg, 2 mg, Intravenous, Q12H, Susu Santos PA  •  Magnesium Sulfate 2 gram Bolus, followed by 8 gram infusion (total Mg dose 10 grams)- Mg less than or equal to 1mg/dL, 2 g, Intravenous, PRN **OR** Magnesium Sulfate 2 gram / 50mL Infusion (GIVE X 3 BAGS TO EQUAL 6GM TOTAL DOSE) - Mg 1.1 - 1.5 mg/dl, 2 g, Intravenous, PRN **OR** Magnesium Sulfate 4 gram infusion- Mg 1.6-1.9 mg/dL, 4 g, Intravenous, PRN, Lucian Alvarez IV, MD, Last Rate: 25 mL/hr at 12/18/21 0931, 4 g at 12/18/21 0931  •  milrinone (PRIMACOR) 20 mg in 100 mL D5W infusion, 0.35 mcg/kg/min, Intravenous, Continuous, Susu Santos PA, Last Rate: 15.02 mL/hr at 12/18/21 0931, 0.35 mcg/kg/min at 12/18/21 0931  •  multivitamin with minerals 1 tablet, 1 tablet, Oral, Daily, Lucian Alvarez IV, MD, 1 tablet at 12/18/21 0932  •  Pharmacy Consult - [facility], , Does not apply, Daily, Oneida Lazo, PharmD  •  potassium chloride (MICRO-K) CR capsule 40 mEq, 40 mEq, Oral, PRN **OR** potassium chloride (KLOR-CON) packet 40 mEq, 40 mEq, Oral, PRN **OR** potassium chloride 10 mEq in 100 mL IVPB, 10 mEq, Intravenous, Q1H PRN, Lucian Alvarez IV, MD  •  rosuvastatin (CRESTOR) tablet 20 mg, 20 mg, Oral, Daily, Lucian Alvarez IV, MD, 20 mg at 12/18/21 0932  •  simethicone (MYLICON) chewable tablet 80 mg, 80 mg, Oral, 4x Daily PRN, Susu Santos PA  •  sodium chloride 0.9 % flush 10 mL, 10 mL, Intravenous, PRN, Lucian Alvarez IV, MD  •  sodium chloride 0.9 % flush 10 mL, 10 mL, Intravenous, Q12H, Lucian Alvarez IV, MD, 10 mL at 12/18/21 0933  •  sodium chloride 0.9  "% flush 10 mL, 10 mL, Intravenous, PRN, Lucian Alvarez IV, MD  •  spironolactone (ALDACTONE) tablet 25 mg, 25 mg, Oral, Daily, Lucian Alvarez IV, MD, 25 mg at 12/18/21 0932    Vital Sign Min/Max for last 24 hours  Temp  Min: 97.7 °F (36.5 °C)  Max: 98.3 °F (36.8 °C)   BP  Min: 83/60  Max: 118/52   Pulse  Min: 121  Max: 125   Resp  Min: 16  Max: 18   SpO2  Min: 89 %  Max: 97 %   No data recorded   Weight  Min: 133 kg (292 lb 12.8 oz)  Max: 133 kg (292 lb 12.8 oz)     Flowsheet Rows      First Filed Value   Admission Height 175.3 cm (69\") Documented at 12/16/2021 0931   Admission Weight 143 kg (316 lb) Documented at 12/16/2021 0931            Intake/Output Summary (Last 24 hours) at 12/18/2021 0937  Last data filed at 12/18/2021 0730  Gross per 24 hour   Intake 130.3 ml   Output 2475 ml   Net -2344.7 ml       Physical Exam:     General Appearance:    Alert, cooperative, in no acute distress   Lungs:    Clear to auscultation bilaterally.  Respiratory effort stable    Heart:   Tachycardic regular rate and rhythm normal S1 and S2.     Chest Wall:    No abnormalities observed   Abdomen:     Normal bowel sounds, obese, distended, soft   Extremities:   Moves all extremities well, + significant lower extremity edema, bandage wrapped. Slowly improving.  Abdominal edema also improving.   Pulses:   Pulses palpable and equal bilaterally   Skin:   No bleeding, bruising or rash        Results Review:   Results from last 7 days   Lab Units 12/17/21  0514 12/16/21  0945   WBC 10*3/mm3 4.59 4.71   HEMOGLOBIN g/dL 14.3 14.3   HEMATOCRIT % 44.1 44.8   PLATELETS 10*3/mm3 159 159     Results from last 7 days   Lab Units 12/18/21  0510 12/17/21  1158 12/17/21  1158 12/17/21  0514 12/17/21  0514   SODIUM mmol/L 135*  --  136  --  136   POTASSIUM mmol/L 3.9  --  4.0  --  4.2   CHLORIDE mmol/L 95*   < > 96*   < > 96*   CO2 mmol/L 28.0   < > 28.0   < > 28.0   BUN mg/dL 27*  --  28*  --  27*   CREATININE mg/dL 1.47*  --  " 1.52*  --  1.39*   GLUCOSE mg/dL 121*   < > 123*   < > 134*    < > = values in this interval not displayed.      Results from last 7 days   Lab Units 12/16/21  0945   HEMOGLOBIN A1C % 6.00*     Results from last 7 days   Lab Units 12/16/21  0945   CHOLESTEROL mg/dL 103   TRIGLYCERIDES mg/dL 81   HDL CHOL mg/dL 31*     Results from last 7 days   Lab Units 12/16/21  0945   TSH uIU/mL 5.050*     Results from last 7 days   Lab Units 12/16/21  0945   PROBNP pg/mL 926.5*         Results from last 7 days   Lab Units 12/16/21  0945   TROPONIN T ng/mL 0.011       Intake/Output Summary (Last 24 hours) at 12/18/2021 0937  Last data filed at 12/18/2021 0730  Gross per 24 hour   Intake 130.3 ml   Output 2475 ml   Net -2344.7 ml       I personally viewed and interpreted the patient's EKG/Telemetry data    EKG: none for review today.   Telemetry: Atrial flutter 120s    Ejection Fraction  No results found for: EF    Echo EF Estimated  Lab Results   Component Value Date    ECHOEFEST 60 02/27/2018         Present on Admission:  • Persistent atrial fibrillation/flutter  • Acute on chronic right-sided heart failure (HCC)  • Hyperlipidemia LDL goal <70  • Essential hypertension  • (Resolved) Acute on chronic congestive heart failure (HCC)  • Chronic bilateral severe lower extremity edema    Assessment/Plan      1. Persistent AF/AFL:   - admitted 12/16/2021 with HRs in 120s in atrial flutter , recent event monitor 11/24/2021 with average HR 89 bpm in afib/flutter.  He thinks his heart rates have been fast for the past week.  -s/p PVA with Dr. Cary in 2015. Intolerant to BB/Flecainide/Amiodarone in the past with failure of Tikosyn due to development of WCT. Persistent AF with worsening HF symptoms.  He is intolerant to AV joby blocking agent with severe hypotension.  He recently underwent successful ECV with Dr. Alvarez 11/18/21 with ERAF.  - options include AVN + PM (recent normal EF on echo at Dr. Andrew's office) vs redo PVA.   Patient is not interested in pulmonary vein ablation at this time and would like to pursue pacemaker with AV node ablation.  He would like us to talk more about this procedure with his wife.  Most likely this will be done on Monday, 12/20/2021.      2. Acute on Chronic Diastolic HF/Right heart failure-With increased SOB/Edema.   - On Bumex and metolazone at home, admitted 12/16/2021 with worsening CHF  - >9000 mL NET output with IV diuresis with Bumex and Milrinone since admssion  -Continue aggressive diuresis watch BMP- Cr is stable 12/18/2021. BMP in AM. Replace Mg and Potassium as needed.  Continue IV milrinone and Bumex for now.  Will discontinue IV Bumex in a.m. tomorrow      Plan for disposition: Continue diuresis over the weekend.  Potential AV node ablation pacemaker implant on Monday with Dr. Jeffery.  N.p.o. after midnight on Sunday.    Electronically signed by RUPESH Martino, 12/18/21, 9:35 AM DELMA.      Joaquin HENRIQUEZ MD, personally performed the services face to face as described and documented by the above named individual. I have made any necessary edits and it is both accurate and complete 12/18/2021  10:56 EST

## 2021-12-19 NOTE — PLAN OF CARE
Goal Outcome Evaluation:  Plan of Care Reviewed With: patient           Outcome Summary: PT wound evaluated BLE, pt well-known to PT from prior episodes of outpatient PT wound care.  RLE ulceration has been present since last episode, currently 10cm x4cm x0.2cm with moist wound bed with scattered pale granulation, dermis, and slough.  Lat LLE with new small open area 0.7cm x2cm x0.1cm that is clean and granulating after PT debrided slough.  BLE with chronic venous stasis and brawny skin, moderate erythema.  PT debrided wounds, applied ag foam to RLE wounds, xeroform and small optifoam to LLE, and placed MLW to BLE using size 5&6 compressogrips for gradient compression to increase venous return and support wound healing.  Pt has been self-managing wounds with spouse's assistance using supplies from prior wound care treatments.  Pt reluctant to resume OP treatment due to high co-pays and cost of care.  Pt able to verbalize appropriate dressings changes and compression use, so may be able to resume self-care with spouse's assistance at d/c.  PT will change current dressings/wraps in 2-3 days.    RLE and lateral RLE wound:        LLE and lateral LLE wound:

## 2021-12-19 NOTE — THERAPY EVALUATION
Acute Care - Wound/Debridement Initial Evaluation  [facility]     Patient Name: Akshat Winkler  : 1955  MRN: [id number]  Today's Date: 2021                Admit Date: 2021    Visit Dx:    ICD-10-CM ICD-9-CM   1. Bilateral lower extremity edema  R60.0 782.3   2. Acute on chronic congestive heart failure, unspecified heart failure type (HCC)  I50.9 428.0   3. Acute pulmonary edema ([facility])  J81.0 518.4   4. Hypervolemia, unspecified hypervolemia type  E87.70 276.69   5. Dyspnea, unspecified type  R06.00 786.09   6. Acute renal insufficiency  N28.9 593.9   7. Pleural effusion on right  J90 511.9   8. Cardiomegaly  I51.7 429.3   9. Acute on chronic diastolic congestive heart failure (HCC)  I50.33 428.33     428.0   10. Multiple open wounds of right lower extremity, subsequent encounter  S81.801D V58.89     894.0       Patient Active Problem List   Diagnosis   • Acute on chronic right-sided heart failure (HCC)   • Type 2 diabetes mellitus without complication, without long-term current use of insulin ([facility])   • Hyperlipidemia LDL goal <70   • Essential hypertension   • Coronary artery disease involving native coronary artery of native heart with angina pectoris ([facility])   • Severe obstructive sleep apnea, no CPAP   • Class 3 severe obesity due to excess calories with serious comorbidity and body mass index (BMI) of 40.0 to 44.9 in adult ([facility])   • Chronic anticoagulation   • Persistent atrial fibrillation/flutter   • Compression fracture of lumbar vertebra ([facility])   • Spondylosis of 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 or radiculopathy   • Lumbar stenosis with neurogenic claudication   • Lumbar disc herniation   • Myofascial pain   • Pathologic compression fracture of vertebra ([facility])   • Chronic bilateral severe lower extremity edema   • Mechanical low back pain   • History of kyphoplasty        Past Medical History:   Diagnosis Date   • Acute diastolic HF (heart failure) ([facility])    • Acute hypokalemia    •  Arrhythmia    • Back injury     Fall on 12/20/17   • Cellulitis of leg    • Chest pain syndrome    • CHF (congestive heart failure) ([facility])    • Deep vein thrombosis ([facility]) 1995   • Diabetes mellitus ([facility])     patient reports borderline    • Hyperlipidemia    • Hypertension    • Muscle pain     around back   • Obesity    • Obstructive sleep apnea     right now patient is sleeeping in recliner and does not use-when he lies down he will have to use cpap   • Paroxysmal A-fib ([facility])    • Peripheral artery disease ([facility])    • Spondylosis of 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 or radiculopathy 2/7/2018   • Wears glasses         Past Surgical History:   Procedure Laterality Date   • CARDIAC CATHETERIZATION     • CARDIOVERSION      multiple   • COLONOSCOPY      about 14 years ago   • KYPHOPLASTY N/A 3/5/2018    Procedure: KYPHOPLASTY L4;  Surgeon: Akshat Davidson MD;  Location:  [facility];  Service:    • KYPHOPLASTY N/A 4/16/2018    Procedure: KYPHOPLASTY L1 AND L2;  Surgeon: Akshat Davidson MD;  Location:  [facility];  Service: Neurosurgery   • OTHER SURGICAL HISTORY  06/29/2015    PVA           Wound 03/04/21 0845 Right posterior calf Venous Ulcer (Active)   Wound Image     12/19/21 0930   Dressing Appearance intact; moist drainage 12/19/21 0930   Closure Other (Comment) 12/18/21 2044   Base moist; pink; red; yellow; slough; subcutaneous 12/19/21 0930   Periwound macerated; moist; pink; redness; swelling; warm 12/19/21 0930   Periwound Temperature warm 12/19/21 0930   Periwound Skin Turgor firm 12/19/21 0930   Edges irregular; open 12/19/21 0930   Wound Length (cm) 10 cm 12/19/21 0930   Wound Width (cm) 4 cm 12/19/21 0930   Wound Depth (cm) 0.2 cm 12/19/21 0930   Drainage Characteristics/Odor serosanguineous 12/19/21 0930   Drainage Amount moderate 12/19/21 0930   Care, Wound cleansed with; wound cleanser; debrided 12/19/21 0930   Dressing Care dressing applied; silver impregnated; foam; multi-layer wrap 12/19/21 0930   Periwound  Care cleansed with pH balanced cleanser; dry periwound area maintained 12/19/21 0930       Wound 05/08/21 1115 Left anterior leg Venous Ulcer (Active)   Dressing Appearance dry 12/18/21 2044   Closure Other (Comment) 12/18/21 2044   Base maroon/purple 12/18/21 2044   Drainage Amount none 12/18/21 2044   Periwound Care dry periwound area maintained 12/18/21 2044       Wound 12/19/21 0930 Left lateral leg Venous Ulcer (Active)   Wound Image    12/19/21 0930   Dressing Appearance intact; moist drainage 12/19/21 0930   Base clean; granulating; yellow; slough 12/19/21 0930   Red (%), Wound Tissue Color 100 12/19/21 0930   Periwound intact; redness; swelling 12/19/21 0930   Periwound Temperature warm 12/19/21 0930   Periwound Skin Turgor firm 12/19/21 0930   Edges open 12/19/21 0930   Wound Length (cm) 0.7 cm 12/19/21 0930   Wound Width (cm) 2 cm 12/19/21 0930   Wound Depth (cm) 0.1 cm 12/19/21 0930   Drainage Characteristics/Odor serosanguineous 12/19/21 0930   Drainage Amount small 12/19/21 0930   Care, Wound cleansed with; wound cleanser; debrided 12/19/21 0930   Dressing Care dressing applied; petroleum-based; gauze; low-adherent; foam; multi-layer wrap 12/19/21 0930   Periwound Care cleansed with pH balanced cleanser; dry periwound area maintained 12/19/21 0930      Lymphedema     Row Name 12/19/21 0930             Lymphedema Edema Assessment    Pitting Edema Severe  -              Skin Changes/Observations    Location/Assessment Lower Extremity  -      Lower Extremity Conditions bilateral:; clean; shiny; hairless; inflamed; fragile  -      Lower Extremity Color/Pigment bilateral:; erythema; hyperpigmented; brawny; P'eau d'orange  -              Lymphedema Pulses/Capillary Refill    Lymphedema Pulses/Capillary Refill lower extremity pulses; capillary refill  -      Dorsalis Pedis Pulse right:; left:; +1 diminished  very faint thru severe pitting edema  -      Posterior Tibialis Pulse right:; left:  unable  to palpate thru pitting edema  -      Capillary Refill lower extremity capillary refill  -JM      Lower Extremity Capillary Refill right:; left:; less than 3 seconds  -              Lymphedema Measurements    Measurement Type(s) Quick Girth  -JM      Quick Girth Areas Lower extremities  -JM              LLE Quick Girth (cm)    Met-heads 28 cm  -JM      Mid foot 28.5 cm  -JM      Smallest ankle 29.8 cm  -JM      Largest calf 50.5 cm  -JM      Tib tuberosity 49.3 cm  -JM              RLE Quick Girth (cm)    Met-heads 28.5 cm  -JM      Mid foot 28 cm  -JM      Smallest ankle 30 cm  -JM      Largest calf 48.5 cm  -JM      Tib tuberosity 47.5 cm  -JM      RLE Quick Girth Total 182.5  -JM              Compression/Skin Care    Compression/Skin Care skin care; wrapping location; bandaging  -      Skin Care washed/dried; lotion applied  eucerin skin calming  -      Wrapping Location lower extremity  -JM      Wrapping Location LE bilateral:; foot to knee  -      Wrapping Comments RLE dressings secured with cast padding; BLE size 5&6 compressogrips doubled/overlapping layers for gradient compression  -      Bandage Layers padding/fluff layer; cotton elastic stocking- double layer (comment size)  -            User Key  (r) = Recorded By, (t) = Taken By, (c) = Cosigned By    Initials Name Provider Type    Zeinab Lopez, PT Physical Therapist                WOUND DEBRIDEMENT  Total area of Debridement: 12cmsq  Debridement Site 1  Location- Site 1: BLE  Selective Debridement- Site 1: Wound Surface <20cmsq  Instruments- Site 1: tweezers  Excised Tissue Description- Site 1: moderate, slough  Bleeding- Site 1: scant               PT Assessment (last 12 hours)     PT Evaluation and Treatment     Row Name 12/19/21 6512          Physical Therapy Time and Intention    Subjective Information complains of; pain; swelling  -     Document Type wound care; evaluation  -     Patient Effort excellent  -     Row Name  12/19/21 0930          General Information    Patient Profile Reviewed yes  -     Patient Observations alert; cooperative; agree to therapy  -     Prior Level of Function independent:; wound care  pt and spouse have been doing dressings and leg wraps  -     Equipment Currently Used at Home wound care supplies  using HFB and ag foam, compressogrips  -     Pertinent History of Current Functional Problem Pt with acute on chronic CHF, well-known to PT from previous admissions with h/o chronic BLE wounds/cellulitis.  Pt and spouse have been self-managing wounds with supplies from previous wound care episodes.  -     Existing Precautions/Restrictions cardiac; other (see comments)  BLE edema/wounds  -     Equipment Issued to Patient other (see comments)  extra compressogrips left in pt room  -     Risks Reviewed patient:; increased discomfort; increased drainage  -     Benefits Reviewed patient:; improve skin integrity; decrease pain  -     Barriers to Rehab medically complex  -     Row Name 12/19/21 0930          Cognition    Orientation Status (Cognition) oriented x 4  -     Row Name 12/19/21 0930          Pain    Additional Documentation Pain Scale: Numbers Pre/Post-Treatment (Group)  -     Row Name 12/19/21 0930          Pain Scale: Numbers Pre/Post-Treatment    Pretreatment Pain Rating 8/10  -     Posttreatment Pain Rating 5/10  -JM     Pain Location - Side Bilateral  -     Pain Location - Orientation lower  -     Pain Location extremity  -     Pre/Posttreatment Pain Comment Reports legs feel much better after being wrapped  -     Row Name             Wound 05/08/21 1115 Left anterior leg Venous Ulcer    Wound - Properties Group Placement Date: 05/08/21  - Placement Time: 1115 - Side: Left  - Orientation: anterior  - Location: leg  - Primary Wound Type: Venous ulcer  -     Retired Wound - Properties Group Date first assessed: 05/08/21  - Time first assessed: 1115  -  Side: Left  - Location: leg  - Primary Wound Type: Venous ulcer  -     Row Name 12/19/21 0930          Wound 03/04/21 0845 Right posterior calf Venous Ulcer    Wound - Properties Group Placement Date: 03/04/21  - Placement Time: 0845 - Present on Hospital Admission: Y  - Side: Right  - Orientation: posterior  - Location: calf  - Primary Wound Type: Venous ulcer  -     Wound Image  View All Images View Images  -     Dressing Appearance intact; moist drainage  -     Base moist; pink; red; yellow; slough; subcutaneous  -     Periwound macerated; moist; pink; redness; swelling; warm  satellite area post calf  -     Periwound Temperature warm  -     Periwound Skin Turgor firm  -     Edges irregular; open  -     Wound Length (cm) 10 cm  area not including small satellite post calf  -     Wound Width (cm) 4 cm  -     Wound Depth (cm) 0.2 cm  -     Drainage Characteristics/Odor serosanguineous  -     Drainage Amount moderate  -     Care, Wound cleansed with; wound cleanser; debrided  -     Dressing Care dressing applied; silver impregnated; foam; multi-layer wrap  optifoam ag, cast padding, MLW  -     Periwound Care cleansed with pH balanced cleanser; dry periwound area maintained  -     Retired Wound - Properties Group Date first assessed: 03/04/21  - Time first assessed: 0845  - Present on Hospital Admission: Y  - Side: Right  - Location: calf  - Primary Wound Type: Venous ulcer  -     Row Name 12/19/21 0930          Wound 12/19/21 0930 Left lateral leg Venous Ulcer    Wound - Properties Group Placement Date: 12/19/21  - Placement Time: 0930 - Side: Left  - Orientation: lateral  - Location: leg  - Primary Wound Type: Venous ulcer  -     Wound Image  View All Images View Images  -     Dressing Appearance intact; moist drainage  -     Base clean; granulating; yellow; slough  clean/granulating after debridement of slough  -     Red (%), Wound  "Tissue Color 100  -     Periwound intact; redness; swelling  -     Periwound Temperature warm  -     Periwound Skin Turgor firm  -     Edges open  -     Wound Length (cm) 0.7 cm  -     Wound Width (cm) 2 cm  -     Wound Depth (cm) 0.1 cm  -     Drainage Characteristics/Odor serosanguineous  -     Drainage Amount small  -     Care, Wound cleansed with; wound cleanser; debrided  -     Dressing Care dressing applied; petroleum-based; gauze; low-adherent; foam; multi-layer wrap  xeroform, 4\" optifoam, MLW  -     Periwound Care cleansed with pH balanced cleanser; dry periwound area maintained  -     Retired Wound - Properties Group Date first assessed: 12/19/21  - Time first assessed: 0930  - Side: Left  - Location: leg  - Primary Wound Type: Venous ulcer  -     Row Name 12/19/21 0930          Plan of Care Review    Plan of Care Reviewed With patient  -     Outcome Summary PT wound evaluated BLE, pt well-known to PT from prior episodes of outpatient PT wound care.  RLE ulceration has been present since last episode, currently 10cm x4cm x0.2cm with moist wound bed with scattered pale granulation, dermis, and slough.  Lat LLE with new small open area 0.7cm x2cm x0.1cm that is clean and granulating after PT debrided slough.  BLE with chronic venous stasis and brawny skin, moderate erythema.  PT debrided wounds, applied ag foam to RLE wounds, xeroform and small optifoam to LLE, and placed MLW to BLE using size 5&6 compressogrips for gradient compression to increase venous return and support wound healing.  Pt has been self-managing wounds with spouse's assistance using supplies from prior wound care treatments.  Pt reluctant to resume OP treatment due to high co-pays and cost of care.  Pt able to verbalize appropriate dressings changes and compression use, so may be able to resume self-care with spouse's assistance at d/c.  PT will change current dressings/wraps in 2-3 days.  -     Row " Name 12/19/21 0930          Physical Therapy Goals    Wound Care Goal Selection (PT) wound care, PT goal 1  -     Row Name 12/19/21 0930          Wound Care Goal 1 (PT)    Wound Care Goal 1 (PT) Reduce wound dimensions by 10% as evidence of wound healing.  -     Time Frame (Wound Care Goal 1, PT) long term goal (LTG); 10 days  -     Row Name 12/19/21 0930          PT Evaluation Complexity    History, PT Evaluation Complexity 3 or more personal factors and/or comorbidities  -     Examination of Body Systems (PT Eval Complexity) total of 3 or more elements  -     Clinical Presentation (PT Evaluation Complexity) evolving  -     Clinical Decision Making (PT Evaluation Complexity) moderate complexity  -     Overall Complexity (PT Evaluation Complexity) moderate complexity  -           User Key  (r) = Recorded By, (t) = Taken By, (c) = Cosigned By    Initials Name Provider Type    Neva Schmidt, PT Physical Therapist    Zeinab Lopez, PT Physical Therapist              Physical Therapy Education                 Title: PT OT SLP Therapies (Done)     Topic: Physical Therapy (Done)     Point: Mobility training (Done)     Learning Progress Summary           Patient Acceptance, E,TB, VU by KG at 12/17/2021 1317                   Point: Home exercise program (Done)     Learning Progress Summary           Patient Acceptance, E,TB, VU by KG at 12/17/2021 1317                   Point: Body mechanics (Done)     Learning Progress Summary           Patient Acceptance, E,TB, VU by KG at 12/17/2021 1317                   Point: Precautions (Done)     Learning Progress Summary           Patient Acceptance, E,TB, VU by KG at 12/17/2021 1317                               User Key     Initials Effective Dates Name Provider Type Discipline     06/16/21 -  Juliane Higginbotham Physical Therapist PT                Recommendation and Plan  Anticipated Discharge Disposition (PT): home with assist  Therapy  Frequency (PT): daily  Plan of Care Reviewed With: patient           Outcome Summary: PT wound evaluated BLE, pt well-known to PT from prior episodes of outpatient PT wound care.  RLE ulceration has been present since last episode, currently 10cm x4cm x0.2cm with moist wound bed with scattered pale granulation, dermis, and slough.  Lat LLE with new small open area 0.7cm x2cm x0.1cm that is clean and granulating after PT debrided slough.  BLE with chronic venous stasis and brawny skin, moderate erythema.  PT debrided wounds, applied ag foam to RLE wounds, xeroform and small optifoam to LLE, and placed MLW to BLE using size 5&6 compressogrips for gradient compression to increase venous return and support wound healing.  Pt has been self-managing wounds with spouse's assistance using supplies from prior wound care treatments.  Pt reluctant to resume OP treatment due to high co-pays and cost of care.  Pt able to verbalize appropriate dressings changes and compression use, so may be able to resume self-care with spouse's assistance at d/c.  PT will change current dressings/wraps in 2-3 days.  Plan of Care Reviewed With: patient            Time Calculation   PT Charges     Row Name 12/19/21 0930             Time Calculation    Start Time 0930  -JM      PT Goal Re-Cert Due Date 12/27/21  -JM              Untimed Charges    PT Eval/Re-eval Minutes 45  -JM      Wound Care 37252 Multilayer comp below knee; 39485 Selective debridement  -JM      78800-Njygfreiyb comp below knee 20  -JM      98304-Jclunteve debridement 15  -JM              Total Minutes    Untimed Charges Total Minutes 80  -JM       Total Minutes 80  -JM            User Key  (r) = Recorded By, (t) = Taken By, (c) = Cosigned By    Initials Name Provider Type    Zeinab Lopez, PT Physical Therapist                  Therapy Charges for Today     Code Description Service Date Service Provider Modifiers Qty    67224439311 HC STEFANIE DEBRIDE OPEN WOUND UP TO 20CM  12/19/2021 Zeinab Cleveland, PT GP 1    06047443855 HC PT MULTI LAYER COMP SYS BELOW KNEE 12/19/2021 Zeinab Cleveland, PT GP 1    [id number] HC PT RE-EVAL ESTABLISHED PLAN 2 12/19/2021 Zeinab Cleveland, PT GP 1            PT G-Codes  Outcome Measure Options: AM-PAC 6 Clicks Basic Mobility (PT)  AM-PAC 6 Clicks Score (PT): 22       Zeinab Cleveland, PT  12/19/2021

## 2021-12-19 NOTE — PROGRESS NOTES
[facility]  Progress Note       LOS: 3 days   Patient Care Team:  Saurabh Medina PA as PCP - General (Physician Assistant)  Lucian Alvarez IV, MD as Consulting Physician (Cardiology)  Mandy Clark PA-C as Physician Assistant (Physician Assistant)  Josh Moss MD as Consulting Physician (Pain Medicine)  Hannah Leonard APRN as Nurse Practitioner (Cardiology)  Akshat Davidson MD as Surgeon (Neurosurgery)    Chief Complaint:  Atrial flutter     Subjective      Sitting up in chair.  Feels much better today.  Shortness of breath improved.  Lower extremity edema improved.  Still remains in atrial flutter with rapid rates.        Cardiology Problem List:  1. Persistent Atrial Fibrillation/Flutter   a. CHADSvasc = 3 (HTN, CAD, DM)  b. Echocardiogram 10/11/83428: normal LVEF, mild MR, mild LA dilatation  c. LOLY/ECVcardioversion, 12/21/2011, with initiation of propafenone therapy.  d. Successful LOLY/ECV for recurrent atrial fibrillation, 11/15/2013  e. PVA with Dr. Cary, 6/29/2015  f. Cardioversion (07/17/2015) for atrial flutter occurring post ablation   g. ECV for recurrent atrial flutter, 12/20/17 with reattempt at beta blocker/flecainide.  h. Intolerant to beta blocker/flecanide with severe hypotension, intolerant to propafenone  i. Tikosyn admission with development of wide complex tachycardia, 7/2018  j. Recurrent atrial fibrillation with ECV on 11/18/2021- initially successful with early return to atrial flutter   k. Event Monitor 11/24-11/27/2021: afib/flutter average HTR 89 bpm  2. Chronic diastolic heart failure:  a. Cardiac catheterization (02/20/14): mild nonobstructive CAD. LVEF 55%, elevated LVEDP suggesting diastolic heart failure.  b. Echocardiogram 12/10/2021 at Dr. Andrew's office EF 60%, moderate TR, left atrial enlargement, mild concentric LVH, IVC mildly dilated  3. HTN  4. HLP  5. DM2  6. JOSESITO  7. Surgical History:  a. Kyphoplasty  L4  b. Kyphoplasty L1 and L2        Review of Systems:   Pertinent positives in HPI, all others reviewed and negative.      Objective       Current Facility-Administered Medications:   •  acetaminophen (TYLENOL) tablet 650 mg, 650 mg, Oral, Daily, Lucian Alvarez IV, MD, 650 mg at 12/18/21 0932  •  apixaban (ELIQUIS) tablet 5 mg, 5 mg, Oral, Q12H, Lucian Alvarez IV, MD, 5 mg at 12/18/21 2044  •  bumetanide (BUMEX) injection 2 mg, 2 mg, Intravenous, Q12H, Susu Santos PA, 2 mg at 12/19/21 0234  •  Magnesium Sulfate 2 gram Bolus, followed by 8 gram infusion (total Mg dose 10 grams)- Mg less than or equal to 1mg/dL, 2 g, Intravenous, PRN **OR** Magnesium Sulfate 2 gram / 50mL Infusion (GIVE X 3 BAGS TO EQUAL 6GM TOTAL DOSE) - Mg 1.1 - 1.5 mg/dl, 2 g, Intravenous, PRN **OR** Magnesium Sulfate 4 gram infusion- Mg 1.6-1.9 mg/dL, 4 g, Intravenous, PRN, Lucian Alvarez IV, MD, Last Rate: 25 mL/hr at 12/18/21 0931, 4 g at 12/18/21 0931  •  milrinone (PRIMACOR) 20 mg in 100 mL D5W infusion, 0.35 mcg/kg/min, Intravenous, Continuous, Susu Santos PA, Last Rate: 15.02 mL/hr at 12/19/21 0029, 0.35 mcg/kg/min at 12/19/21 0029  •  multivitamin with minerals 1 tablet, 1 tablet, Oral, Daily, Lucian Alvarez IV, MD, 1 tablet at 12/18/21 0932  •  Pharmacy Consult - MTM, , Does not apply, Daily, Oneida Lazo, PharmD  •  potassium chloride (MICRO-K) CR capsule 40 mEq, 40 mEq, Oral, PRN **OR** potassium chloride (KLOR-CON) packet 40 mEq, 40 mEq, Oral, PRN **OR** potassium chloride 10 mEq in 100 mL IVPB, 10 mEq, Intravenous, Q1H PRN, Lucian Alvarez IV, MD  •  rosuvastatin (CRESTOR) tablet 20 mg, 20 mg, Oral, Daily, Lucian Alvarez IV, MD, 20 mg at 12/18/21 0932  •  simethicone (MYLICON) chewable tablet 80 mg, 80 mg, Oral, 4x Daily PRN, Susu Santos PA  •  sodium chloride 0.9 % flush 10 mL, 10 mL, Intravenous, PRN, Lucian Alvarez IV, MD  •  sodium  "chloride 0.9 % flush 10 mL, 10 mL, Intravenous, Q12H, Lucian Alvarez IV, MD, 10 mL at 12/18/21 2044  •  sodium chloride 0.9 % flush 10 mL, 10 mL, Intravenous, PRN, Lucian Alvarez IV, MD  •  spironolactone (ALDACTONE) tablet 25 mg, 25 mg, Oral, Daily, Lucian Alvarez IV, MD, 25 mg at 12/18/21 0932    Vital Sign Min/Max for last 24 hours  Temp  Min: 97.8 °F (36.6 °C)  Max: 98 °F (36.7 °C)   BP  Min: 88/80  Max: 140/101   Pulse  Min: 121  Max: 139   Resp  Min: 16  Max: 16   SpO2  Min: 91 %  Max: 100 %   No data recorded   Weight  Min: 132 kg (290 lb 6.4 oz)  Max: 132 kg (290 lb 6.4 oz)     Flowsheet Rows      First Filed Value   Admission Height 175.3 cm (69\") Documented at 12/16/2021 0931   Admission Weight 143 kg (316 lb) Documented at 12/16/2021 0931            Intake/Output Summary (Last 24 hours) at 12/19/2021 0851  Last data filed at 12/19/2021 0734  Gross per 24 hour   Intake 960 ml   Output 4150 ml   Net -3190 ml       Physical Exam:     General Appearance:    Alert, cooperative, in no acute distress   Lungs:    Clear to auscultation bilaterally with decreased breath sounds at both bases.  Respiratory effort fair    Heart:   Tachycardic irregular rate and rhythm normal S1-S2.   Chest Wall:    No abnormalities observed   Abdomen:     Normal bowel sounds, obese, distended, soft   Extremities:   Moves all extremities well, + significant lower extremity edema, bandage wrapped. Slowly improving.  Abdominal edema also improving.   Pulses:   Pulses palpable and equal bilaterally   Skin:   No bleeding, bruising or rash        Results Review:   Results from last 7 days   Lab Units 12/17/21  0514 12/16/21  0945   WBC 10*3/mm3 4.59 4.71   HEMOGLOBIN g/dL 14.3 14.3   HEMATOCRIT % 44.1 44.8   PLATELETS 10*3/mm3 159 159     Results from last 7 days   Lab Units 12/19/21  0543 12/18/21  0510 12/18/21  0510 12/17/21  1158 12/17/21  1158   SODIUM mmol/L 133*  --  135*  --  136   POTASSIUM mmol/L " 4.1  --  3.9  --  4.0   CHLORIDE mmol/L 94*   < > 95*   < > 96*   CO2 mmol/L 26.0   < > 28.0   < > 28.0   BUN mg/dL 25*  --  27*  --  28*   CREATININE mg/dL 1.11  --  1.47*  --  1.52*   GLUCOSE mg/dL 136*   < > 121*   < > 123*    < > = values in this interval not displayed.      Results from last 7 days   Lab Units 12/16/21  0945   HEMOGLOBIN A1C % 6.00*     Results from last 7 days   Lab Units 12/16/21  0945   CHOLESTEROL mg/dL 103   TRIGLYCERIDES mg/dL 81   HDL CHOL mg/dL 31*     Results from last 7 days   Lab Units 12/16/21  0945   TSH uIU/mL 5.050*     Results from last 7 days   Lab Units 12/16/21  0945   PROBNP pg/mL 926.5*         Results from last 7 days   Lab Units 12/16/21  0945   TROPONIN T ng/mL 0.011       Intake/Output Summary (Last 24 hours) at 12/19/2021 0851  Last data filed at 12/19/2021 0734  Gross per 24 hour   Intake 960 ml   Output 4150 ml   Net -3190 ml       I personally viewed and interpreted the patient's EKG/Telemetry data    EKG: none for review today.   Telemetry: Atrial flutter 120s    Ejection Fraction  No results found for: EF    Echo EF Estimated  Lab Results   Component Value Date    ECHOEFEST 60 02/27/2018         Present on Admission:  • Persistent atrial fibrillation/flutter  • Acute on chronic right-sided heart failure (HCC)  • Hyperlipidemia LDL goal <70  • Essential hypertension  • (Resolved) Acute on chronic congestive heart failure (HCC)  • Chronic bilateral severe lower extremity edema    Assessment/Plan      1. Persistent AF/AFL:   - admitted 12/16/2021 with HRs in 120s in atrial flutter , recent event monitor 11/24/2021 with average HR 89 bpm in afib/flutter.  He thinks his heart rates have been fast for the past week.  -s/p PVA with Dr. Cary in 2015. Intolerant to BB/Flecainide/Amiodarone in the past with failure of Tikosyn due to development of WCT. Persistent AF with worsening HF symptoms.  He is intolerant to AV joby blocking agent with severe hypotension.  He  recently underwent successful ECV with Dr. Alvarez 11/18/21 with ERAF.  -Long discussion with patient and he is not interested in redo pulmonary vein ablation at this time.  Other option is to pursue AV node ablation and pacemaker implant which he is willing to proceed with. N.p.o. after midnight tonight.  Plan for AV node ablation pacemaker tomorrow with Dr. Jeffery. The risks, benefits, and alternatives of the procedure have been reviewed and the patient wishes to proceed.  We will hold Eliquis today and in a.m. tomorrow.       2. Acute on Chronic Diastolic HF/Right heart failure-With increased SOB/Edema.   -Echocardiogram 12/10/2021 at Dr. Andrew's office EF 60%  - On Bumex and metolazone at home, admitted 12/16/2021 with worsening CHF  - >07403 mL NET output with IV diuresis with Bumex and Milrinone since admssion  -We will discontinue IV Bumex and start oral diuretics.  Cr is stable 12/18/2021. BMP in AM. Replace Mg and Potassium as needed.  Continue IV milrinone additional 24 to 36 hours.        Plan for disposition: Continue diuresis today. AV node ablation pacemaker implant on Monday with Dr. Jeffery.  N.p.o. after midnight on Sunday.    Electronically signed by RUPESH Martino, 12/19/21, 8:51 AM Joaquin CRISTINA MD, personally performed the services face to face as described and documented by the above named individual. I have made any necessary edits and it is both accurate and complete 12/19/2021  10:39 EST

## 2021-12-20 PROBLEM — I50.33 ACUTE ON CHRONIC DIASTOLIC CONGESTIVE HEART FAILURE (HCC): Status: ACTIVE | Noted: 2021-01-01

## 2021-12-20 PROBLEM — Z95.0 STATUS POST PLACEMENT OF CARDIAC PACEMAKER: Status: ACTIVE | Noted: 2021-01-01

## 2021-12-20 NOTE — CASE MANAGEMENT/SOCIAL WORK
Continued Stay Note  [facility]     Patient Name: Akshat Winkler  MRN: [id number]  Today's Date: 12/20/2021    Admit Date: 12/16/2021     Discharge Plan     Row Name 12/20/21 1642       Plan    Plan home    Patient/Family in Agreement with Plan yes    Plan Comments I met with Mr. Thompson at the bedside. He is groggy from his procedure today. He still anticipates going home at the time of discharge and his wife transporting him home. Mr. Winkler denies any additional discharge needs. Case thiago will continue to follow along with Mr. Winkler's plan of care and assist with discharge needs.    Final Discharge Disposition Code 01 - home or self-care               Discharge Codes    No documentation.               Expected Discharge Date and Time     Expected Discharge Date Expected Discharge Time    Dec 19, 2021             Saurabh Zuleta RN

## 2021-12-20 NOTE — PROGRESS NOTES
Cardiology Progress Note      Reason for visit:    · Acute diastolic and right-sided heart failure  · Persistent atrial fibrillation/flutter status post AV joby ablation and pacemaker    IDENTIFICATION: 66-year-old gentleman who is  and resides in [facility]    Diagnosis  POA   • **Acute on chronic right-sided heart failure (HCC) [I50.813]  Yes     Priority: High     · Echo (12/10/2021): LVEF 60%.  Moderate TR.  RVSP 58 mmHg     • Persistent atrial fibrillation/flutter [I48.19]  Yes     Priority: High     · Diagnosed 2011  · [id number] 3 (HTN, CAD, DM)  · Echo (10/11/2011): Normal LVEF, mild MR, mild LA dilation  · LOLY/ECVcardioversion (12/21/2011) initiation of propafenone therapy.   · Successful LOLY/ECV for recurrent atrial fibrillation, 11/15/2013  · PVA with Dr. Cary, 6/29/2015  · Cardioversion (07/17/2015) for atrial flutter occurring post ablation   · ECV for recurrent atrial flutter, 12/20/17 with reattempt at beta blocker/flecainide.  · Intolerant to beta blocker/flecanide with severe hypotension, intolerant to propafenone  · Tikosyn admission with development of wide complex tachycardia, 7/2018  · ECV for recurrent atrial flutter with early return of atrial flutter, 11/18/2021     • Hyperlipidemia LDL goal <70 [E78.5]  Yes     Priority: Medium     · High intensity statin therapy indicated given presence of coronary artery disease     • Essential hypertension [I10]  Yes     Priority: Medium   • Class 3 severe obesity due to excess calories with serious comorbidity and body mass index (BMI) of 40.0 to 44.9 in adult (HCC) [E66.01, Z68.41]  Not Applicable     Priority: Low   • Acute on chronic diastolic congestive heart failure (HCC) [I50.33]  Unknown     Added automatically from request for surgery [id number]     • Chronic bilateral severe lower extremity edema [R60.0]  Yes            Patient became hypotensive over the weekend requiring midodrine drip be  temporarily stopped.  EP followed patient over the weekend and decision was made to proceed with AV joby ablation and pacemaker placement which patient had earlier today.  Over 15,000 mL diuresed since admission.  IV Bumex has been transitioned to p.o.  Milrinone drip has been discontinued.  He is currently lying in bed drowsy.  His wife is at bedside.           Vital Sign Min/Max for last 24 hours  Temp  Min: 97.3 °F (36.3 °C)  Max: 98 °F (36.7 °C)   BP  Min: 106/68  Max: 133/80   Pulse  Min: 107  Max: 160   Resp  Min: 18  Max: 20   SpO2  Min: 92 %  Max: 98 %   No data recorded      Intake/Output Summary (Last 24 hours) at 12/20/2021 1018  Last data filed at 12/20/2021 0608  Gross per 24 hour   Intake 240 ml   Output 1350 ml   Net -1110 ml           Physical Exam  Constitutional:       General: He is awake.   Cardiovascular:      Rate and Rhythm: Normal rate.      Comments: Paced  Pulmonary:      Effort: Pulmonary effort is normal.      Breath sounds: Decreased breath sounds present.   Skin:     General: Skin is warm and dry.   Neurological:      Mental Status: He is alert and oriented to person, place, and time.   Psychiatric:         Behavior: Behavior is cooperative.             Tele:  Paced     Results Review (reviewed the patient's recent labs in the electronic medical record):      EKG (12/16/2020): Atrial flutter at 118 bpm     CXR (12/16/2021): Persistent and progressive cardiomegaly with bilateral pulmonary opacifications right greater than left concerning for pulmonary edema.  Moderate right pleural effusion present     ECHO performed at Dr. Andrew's office (12/10/2021): LVEF 60%.  RVSP 58 mmHg     Result Review:  I have personally reviewed the results from the time of this admission to 12/20/2021 10:18 EST and agree with these findings:  [x]  Laboratory  []  Microbiology  [x]  Radiology  [x]  EKG/Telemetry   [x]  Cardiology/Vascular   []  Pathology  []  Old records  []  Other:  Most notable findings  include: Sodium 133, BUN 24,    Results from last 7 days   Lab Units 12/20/21  0615 12/19/21  0543 12/18/21  0510 12/17/21  1158 12/17/21  0514 12/17/21  0514 12/16/21  0945 12/15/21  0835 12/15/21  0835   SODIUM mmol/L 133* 133* 135* 136   < > 136 134*   < > 132*   POTASSIUM mmol/L 3.8 4.1 3.9 4.0   < > 4.2 5.7*   < > 5.2   CHLORIDE mmol/L 92* 94* 95* 96*  --  96* 97*  --  97*   BUN mg/dL 24* 25* 27* 28*   < > 27* 27*   < > 25*   CREATININE mg/dL 1.16 1.11 1.47* 1.52*   < > 1.39* 1.45*   < > 0.86   MAGNESIUM mg/dL 2.0 1.8 1.6  --    < > 2.3  --    < > 1.7    < > = values in this interval not displayed.     Results from last 7 days   Lab Units 12/16/21  0945   TROPONIN T ng/mL 0.011     Results from last 7 days   Lab Units 12/17/21  0514 12/16/21  0945   WBC 10*3/mm3 4.59 4.71   HEMOGLOBIN g/dL 14.3 14.3   HEMATOCRIT % 44.1 44.8   PLATELETS 10*3/mm3 159 159       Lab Results   Component Value Date    HGBA1C 6.00 (H) 12/16/2021       Lab Results   Component Value Date    CHOL 103 12/16/2021    CHLPL 138 05/09/2017    TRIG 81 12/16/2021    HDL 31 (L) 12/16/2021    LDL 56 12/16/2021              Acute diastolic/right-sided heart failure  · IV Bumex transition to p.o. 12/19/2021  · Milrinone drip  infusing  · Spironolactone 25 mg daily  · Echo at Dr. Andrew's office 12/10 LVEF 60%.  RVSP 58 mmHg        Persistent atrial fibrillation/flutter  · Eliquis 5 mg twice daily  · Intolerant to multiple antiarrhythmics including beta-blockers.  · Currently in atrial flutter at 120 bpm  · May benefit from AV joby ablation with pacemaker placement     Coronary artery disease  · Nonobstructive CAD noted on cath 2014  · Troponin negative.  No chest pain     Hypertension  · Spironolactone 25 mg daily (at home takes 100 mg daily)  · /60     Hyperlipidemia  · Crestor 20 mg daily     Borderline diabetes mellitus  · Last hemoglobin A1c 6.0        Acute kidney injury  · Current creatinine 1.16                · Continue p.o.  Bumex  · Hopefully discharge home tomorrow    Electronically signed by FAITH Raza, 12/20/21, 10:18 AM EST.

## 2021-12-21 NOTE — THERAPY WOUND CARE TREATMENT
Acute Care - Wound/Debridement Treatment Note  [facility]     Patient Name: Akshat Winkler  : 1955  MRN: [id number]  Today's Date: 2021                Admit Date: 2021    Visit Dx:    ICD-10-CM ICD-9-CM   1. Bilateral lower extremity edema  R60.0 782.3   2. Acute on chronic congestive heart failure, unspecified heart failure type ([facility])  I50.9 428.0   3. Acute pulmonary edema ([facility])  J81.0 518.4   4. Hypervolemia, unspecified hypervolemia type  E87.70 276.69   5. Dyspnea, unspecified type  R06.00 786.09   6. Acute renal insufficiency  N28.9 593.9   7. Pleural effusion on right  J90 511.9   8. Cardiomegaly  I51.7 429.3   9. Acute on chronic diastolic congestive heart failure ([facility])  I50.33 428.33     428.0   10. Multiple open wounds of right lower extremity, subsequent encounter  S81.801D V58.89     894.0   11. Chronic diastolic heart failure ([facility])  I50.32 428.32   12. Persistent atrial fibrillation ([facility])  I48.19 427.31       Patient Active Problem List   Diagnosis   • Acute on chronic right-sided heart failure ([facility])   • Type 2 diabetes mellitus without complication, without long-term current use of insulin ([facility])   • Hyperlipidemia LDL goal <70   • Essential hypertension   • Coronary artery disease involving native coronary artery of native heart with angina pectoris ([facility])   • Severe obstructive sleep apnea, no CPAP   • Class 3 severe obesity due to excess calories with serious comorbidity and body mass index (BMI) of 40.0 to 44.9 in adult ([facility])   • Chronic anticoagulation   • Persistent atrial fibrillation/flutter   • Compression fracture of lumbar vertebra ([facility])   • Spondylosis of 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 or radiculopathy   • Lumbar stenosis with neurogenic claudication   • Lumbar disc herniation   • Myofascial pain   • Pathologic compression fracture of vertebra ([facility])   • Chronic bilateral severe lower extremity edema   • Mechanical low back pain   • History of kyphoplasty   • Acute on  chronic diastolic congestive heart failure (HCC)   • Presence of cardiac pacemaker        Past Medical History:   Diagnosis Date   • Acute diastolic HF (heart failure) ([facility])    • Acute hypokalemia    • Arrhythmia    • Back injury     Fall on 12/20/17   • Cellulitis of leg    • Chest pain syndrome    • CHF (congestive heart failure) ([facility])    • Deep vein thrombosis ([facility]) 1995   • Diabetes mellitus ([facility])     patient reports borderline    • Hyperlipidemia    • Hypertension    • Muscle pain     around back   • Obesity    • Obstructive sleep apnea     right now patient is sleeeping in recliner and does not use-when he lies down he will have to use cpap   • Paroxysmal A-fib ([facility])    • Peripheral artery disease ([facility])    • Spondylosis of 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 or radiculopathy 2/7/2018   • Wears glasses         Past Surgical History:   Procedure Laterality Date   • CARDIAC CATHETERIZATION     • CARDIAC ELECTROPHYSIOLOGY PROCEDURE N/A 12/20/2021    Procedure: AV node ablation + PM implant;  Surgeon: Joaquin Jeffery MD;  Location:  [facility];  Service: Cardiovascular;  Laterality: N/A;   • CARDIAC ELECTROPHYSIOLOGY PROCEDURE N/A 12/20/2021    Procedure: AV node ablation;  Surgeon: Joaquin Jeffery MD;  Location: [facility] LOCATION;  Service: Cardiovascular;  Laterality: N/A;   • CARDIOVERSION      multiple   • COLONOSCOPY      about 14 years ago   • KYPHOPLASTY N/A 3/5/2018    Procedure: KYPHOPLASTY L4;  Surgeon: Akshat Davidson MD;  Location:  [facility];  Service:    • KYPHOPLASTY N/A 4/16/2018    Procedure: KYPHOPLASTY L1 AND L2;  Surgeon: Akshat Davidson MD;  Location:  [facility];  Service: Neurosurgery   • OTHER SURGICAL HISTORY  06/29/2015    PVA           Wound 03/04/21 0845 Right posterior calf Venous Ulcer (Active)   Dressing Appearance intact; dried drainage 12/21/21 1012   Closure LUIS 12/21/21 0758   Base moist; pink; red; yellow; slough 12/21/21 1012   Periwound moist;  edematous; pink; swelling 12/21/21 1012   Periwound Temperature warm 12/21/21 1012   Periwound Skin Turgor firm 12/21/21 1012   Edges irregular; open 12/21/21 1012   Drainage Characteristics/Odor serosanguineous 12/21/21 1012   Drainage Amount small 12/21/21 1012   Care, Wound cleansed with; soap and water; debrided 12/21/21 1012   Dressing Care silver impregnated; low-adherent; foam 12/21/21 1012   Periwound Care cleansed with pH balanced cleanser; dry periwound area maintained 12/21/21 1012       Wound 12/19/21 0930 Left lateral leg Venous Ulcer (Active)   Dressing Appearance dry; intact 12/21/21 1012   Closure LUIS 12/21/21 0758   Base moist; pink; red 12/21/21 1012   Periwound intact; edematous; moist; swelling 12/21/21 1012   Periwound Temperature warm 12/21/21 1012   Periwound Skin Turgor firm 12/21/21 1012   Edges open 12/21/21 1012   Drainage Characteristics/Odor serosanguineous 12/21/21 1012   Drainage Amount scant 12/21/21 1012   Care, Wound cleansed with; soap and water 12/21/21 1012   Dressing Care dressing changed; silver impregnated; collagen; low-adherent; foam 12/21/21 1012   Periwound Care dry periwound area maintained; cleansed with pH balanced cleanser 12/21/21 1012       Wound 12/20/21 0947 Left upper chest Incision (Active)   Dressing Appearance dry; intact 12/21/21 0758   Closure LUIS 12/21/21 0758   Periwound intact; dry 12/21/21 0758   Periwound Temperature warm 12/21/21 0758   Periwound Skin Turgor soft 12/21/21 0758   Drainage Amount none 12/21/21 0758   Periwound Care dry periwound area maintained 12/21/21 0758      Lymphedema     Row Name 12/21/21 1012             Subjective Pain    Able to rate subjective pain? yes  -LH              Lymphedema Edema Assessment    Ptting Edema Category By severity  -      Pitting Edema Severe  -LH              Skin Changes/Observations    Location/Assessment Lower Extremity  -      Lower Extremity Conditions bilateral:; clean; shiny; hairless; inflamed;  fragile  -      Lower Extremity Color/Pigment bilateral:; erythema; hyperpigmented; brawny; P'eau d'orange  -              Compression/Skin Care    Compression/Skin Care skin care; wrapping location; bandaging  -      Skin Care washed/dried; lotion applied  -      Wrapping Location lower extremity  -      Wrapping Location LE bilateral:; foot to knee  -      Wrapping Comments BLE size 5/6 cmopressogrip applied doubled and overlapped for gradient compression.  -            User Key  (r) = Recorded By, (t) = Taken By, (c) = Cosigned By    Initials Name Provider Type     Bertin Paredes, PT Physical Therapist                WOUND DEBRIDEMENT  Total area of Debridement: ~2cm2  Debridement Site 1  Location- Site 1: RLE  Selective Debridement- Site 1: Wound Surface <20cmsq  Instruments- Site 1: tweezers  Excised Tissue Description- Site 1: minimum, slough  Bleeding- Site 1: scant               PT Assessment (last 12 hours)     PT Evaluation and Treatment     [facility] Name 12/21/21 1012          Physical Therapy Time and Intention    Subjective Information complains of; swelling; weakness  -     Document Type therapy note (daily note); wound care  -     Mode of Treatment physical therapy; individual therapy  -     Row Name 12/21/21 1012          Cognition    Affect/Mental Status (Cognitive) WFL  [facility]     Orientation Status (Cognition) oriented x 4  -[facility] Name 12/21/21 1012          Pain    Additional Documentation Pain Scale: FACES Pre/Post-Treatment (Group)  -     Row Name 12/21/21 1012          Pain Scale: Word Pre/Post-Treatment    Pain: Word Scale, Pretreatment 2 - mild pain  -     Posttreatment Pain Rating 2 - mild pain  -     Pain Location - Side Bilateral  -     Pain Location - Orientation lower  -     Pain Location extremity  -     Pain Intervention(s) Repositioned  -     Row Name 12/21/21 1012          Wound 03/04/21 0845 Right posterior calf Venous Ulcer    Wound - Properties  Group Placement Date: 03/04/21  - Placement Time: 0845 - Present on Hospital Admission: Y  - Side: Right  - Orientation: posterior  - Location: calf  - Primary Wound Type: Venous ulcer  -MC     Dressing Appearance intact; dried drainage  -LH     Base moist; pink; red; yellow; slough  -LH     Periwound moist; edematous; pink; swelling  -LH     Periwound Temperature warm  -LH     Periwound Skin Turgor firm  -LH     Edges irregular; open  -LH     Drainage Characteristics/Odor serosanguineous  -LH     Drainage Amount small  -LH     Care, Wound cleansed with; soap and water; debrided  -LH     Dressing Care silver impregnated; low-adherent; foam  -LH     Periwound Care cleansed with pH balanced cleanser; dry periwound area maintained  -     Retired Wound - Properties Group Date first assessed: 03/04/21  [facility] Time first assessed: 0845  - Present on Hospital Admission: Y  - Side: Right  - Location: calf  - Primary Wound Type: Venous ulcer  -     Row Name 12/21/21 1012          Wound 12/19/21 0930 Left lateral leg Venous Ulcer    Wound - Properties Group Placement Date: 12/19/21  [facility] Placement Time: 0930 - Side: Left  - Orientation: lateral  - Location: leg  - Primary Wound Type: Venous ulcer  -     Dressing Appearance dry; intact  -LH     Base moist; pink; red  -LH     Periwound intact; edematous; moist; swelling  -LH     Periwound Temperature warm  -LH     Periwound Skin Turgor firm  -LH     Edges open  -LH     Drainage Characteristics/Odor serosanguineous  -LH     Drainage Amount scant  -LH     Care, Wound cleansed with; soap and water  -LH     Dressing Care dressing changed; silver impregnated; collagen; low-adherent; foam  Emili Ag, 4x4 optifoam  -LH     Periwound Care dry periwound area maintained; cleansed with pH balanced cleanser  -     Retired Wound - Properties Group Date first assessed: 12/19/21  [facility] Time first assessed: 0930 -JM Side: Left  - Location: leg  - Primary  Wound Type: Venous ulcer  -     Row Name             Wound 12/20/21 0947 Left upper chest Incision    Wound - Properties Group Placement Date: 12/20/21  -LL Placement Time: 0947 -LL Side: Left  -LL Orientation: upper  -LL Location: chest  -LL Primary Wound Type: Incision  -LL     Retired Wound - Properties Group Date first assessed: 12/20/21  -LL Time first assessed: 0947  -LL Side: Left  -LL Location: chest  -LL Primary Wound Type: Incision  -LL     Row Name 12/21/21 1012          Coping    Observed Emotional State calm; cooperative  -     Verbalized Emotional State acceptance  -     Trust Relationship/Rapport care explained; questions answered  -     Row Name 12/21/21 1012          Plan of Care Review    Plan of Care Reviewed With patient  -     Progress improving  -     Outcome Summary Pt appears to be demonstrating improved lateral LLE wound healing with small amount of re-epithelialization of wound edges and no necrotic tissue inhabiting wound base. Pt's BLE edema and posterior RLE ulcer remains relatively unchanged this session. PT able to debride small amount of slough from wound base to improve wound healing potential. PT plans to f/u with debridement/dressing changes in 2-3 days. Pt provided with additional wound care supplies in anticipation of DC.  -     Row Name 12/21/21 1012          Positioning and Restraints    Pre-Treatment Position sitting in chair/recliner  -     Post Treatment Position chair  -     In Chair reclined; call light within reach; encouraged to call for assist; legs elevated  -           User Key  (r) = Recorded By, (t) = Taken By, (c) = Cosigned By    Initials Name Provider Type    Rowena Tello RN Registered Nurse    Neva Schmidt, PT Physical Therapist    Zeinab oLpez, PT Physical Therapist    Bertin Lopez, PT Physical Therapist              Physical Therapy Education                 Title: PT OT SLP Therapies (Resolved)     Topic:  Physical Therapy (Resolved)     Point: Mobility training (Resolved)     Learning Progress Summary           Patient Acceptance, E,TB, VU by KG at 12/17/2021 1317                   Point: Home exercise program (Resolved)     Learning Progress Summary           Patient Acceptance, E,TB, VU by KG at 12/17/2021 1317                   Point: Body mechanics (Resolved)     Learning Progress Summary           Patient Acceptance, E,TB, VU by KG at 12/17/2021 1317                   Point: Precautions (Resolved)     Learning Progress Summary           Patient Acceptance, E,TB, VU by KG at 12/17/2021 1317                               User Key     Initials Effective Dates Name Provider Type Discipline    KG 06/16/21 -  Juliane Higginbotham Physical Therapist PT                Recommendation and Plan  Anticipated Discharge Disposition (PT): home with assist  Therapy Frequency (PT): daily  Plan of Care Reviewed With: patient   Progress: improving       Progress: improving  Outcome Summary: Pt appears to be demonstrating improved lateral LLE wound healing with small amount of re-epithelialization of wound edges and no necrotic tissue inhabiting wound base. Pt's BLE edema and posterior RLE ulcer remains relatively unchanged this session. PT able to debride small amount of slough from wound base to improve wound healing potential. PT plans to f/u with debridement/dressing changes in 2-3 days. Pt provided with additional wound care supplies in anticipation of DC.  Plan of Care Reviewed With: patient            Time Calculation   PT Charges     Row Name 12/21/21 1124             Time Calculation    Start Time 1012  -LH      PT Goal Re-Cert Due Date 12/27/21  -LH              Untimed Charges    Wound Care 62345 Selective debridement; 29018 Multilayer comp below knee  -LH      53032-Tnweuenccj comp below knee 15  -LH      43355-Yhkipiljs debridement 15  -LH              Total Minutes    Untimed Charges Total Minutes 30  -LH       Total  Minutes 30  -            User Key  (r) = Recorded By, (t) = Taken By, (c) = Cosigned By    Initials Name Provider Type     Bertin Pareeds, PT Physical Therapist                  Therapy Charges for Today     Code Description Service Date Service Provider Modifiers Qty    33377737759 HC STEFANIE DEBRIDE OPEN WOUND UP TO 20CM 12/21/2021 Bertin Paredes, PT GP 1    30800417854 HC PT MULTI LAYER COMP SYS BELOW KNEE 12/21/2021 Bertin Paredes, PT GP 1            PT G-Codes  Outcome Measure Options: AM-PAC 6 Clicks Basic Mobility (PT)  AM-PAC 6 Clicks Score (PT): 23       Bertin Paredes PT  12/21/2021

## 2021-12-21 NOTE — DISCHARGE SUMMARY
Discharge Summary  [place], [place]    Patient Name: Akshat Winkler  : 1955  MRN: [id number]    Date of Admission: 2021  Date of Discharge:  2021    IDENTIFICATION:  Akshat Winkler is a 66 y.o. male who lives in [place], [place]    Active Hospital Problems    Diagnosis    • **Acute on chronic right-sided heart failure (HCC)      · Echo (12/10/2021): LVEF 60%.  Moderate TR.  RVSP 58 mmHg     • Persistent atrial fibrillation/flutter      · Diagnosed   · CAWYE3Kget 3 (HTN, CAD, DM)  · Echo (10/11/2011): Normal LVEF, mild MR, mild LA dilation  · LOLY/ECVcardioversion (2011) initiation of propafenone therapy.   · Successful LOLY/ECV for recurrent atrial fibrillation, 11/15/2013  · PVA with Dr. Cary, 2015  · Cardioversion (2015) for atrial flutter occurring post ablation   · ECV for recurrent atrial flutter, 17 with reattempt at beta blocker/flecainide.  · Intolerant to beta blocker/flecanide with severe hypotension, intolerant to propafenone  · Tikosyn admission with development of wide complex tachycardia, 2018  · ECV for recurrent atrial flutter with early return of atrial flutter, 2021  · Admission to [facility] 2021 with acute CHF and atrial flutter with rapid ventricular response  · AV joby ablation with pacemaker placement 2021     • Presence of cardiac pacemaker    • Hyperlipidemia LDL goal <70      · High intensity statin therapy indicated given presence of coronary artery disease     • Essential hypertension    • Class 3 severe obesity due to excess calories with serious comorbidity and body mass index (BMI) of 40.0 to 44.9 in adult (HCC)    • Chronic bilateral severe lower extremity edema        Summary of Hospitalization:  Patient is a 66-year-old gentleman with a history of diastolic and right-sided heart failure, persistent atrial fibrillation status post multiple cardioversions/PVA and  intolerant to multiple antiarrhythmics including beta-blockers who was admitted to [facility] on 12/16/2021 for worsening heart failure symptoms and atrial fibrillation/flutter with a ventricular rate of 120 bpm.  He was placed on milrinone drip and given IV diuresis with improvement in his breathing, abdominal bloating and lower extremity edema.  Dr. Jeffery was consulted for his atrial fibrillation/flutter and the patient underwent an AV joby ablation with placement of pacemaker on 12/20/2021.  His milrinone drip was discontinued and IV diuretics transition to p.o..  His postprocedure chest x-ray did not indicate any pneumothorax.  The patient needs to ambulate and if does well he can be discharged home today.  He will return for a wound check in 7 to 10 days and follow-up with Dr. Jeffery in 3 months.  He will follow-up with myself or Dr. Alvarez in 4 weeks.  He will also follow-up with the heart failure clinic in 4 days with a repeat BMP.  The patient will not resume his Eliquis until 12/22/2021.  No ACE/ARB/Entresto will be prescribed due to patient's elevated creatinine.    Procedures Performed  Procedure(s):  AV node ablation + PM implant  AV node ablation         Pertinent Test Results:  Results from last 7 days   Lab Units 12/21/21  0457 12/20/21  0615 12/19/21  0543 12/18/21  0510 12/17/21  1158 12/17/21  0514 12/16/21  0945   WBC 10*3/mm3  --   --   --   --   --  4.59 4.71   HEMOGLOBIN g/dL  --   --   --   --   --  14.3 14.3   HEMATOCRIT %  --   --   --   --   --  44.1 44.8   PLATELETS 10*3/mm3  --   --   --   --   --  159 159   SODIUM mmol/L 129* 133* 133* 135* 136 136 134*   POTASSIUM mmol/L 4.1 3.8 4.1 3.9 4.0 4.2 5.7*   CHLORIDE mmol/L 90* 92* 94* 95* 96* 96* 97*   CO2 mmol/L 25.0 28.0 26.0 28.0 28.0 28.0 25.0   BUN mg/dL 30* 24* 25* 27* 28* 27* 27*   CREATININE mg/dL 1.30* 1.16 1.11 1.47* 1.52* 1.39* 1.45*   GLUCOSE mg/dL 113* 118* 136* 121* 123* 134* 145*   CALCIUM mg/dL 9.1 9.7  10.0 10.0 10.3 10.7* 10.4     Results from last 7 days   Lab Units 12/16/21  0945 12/15/21  0835   BILIRUBIN mg/dL 5.5* 5.0*   ALK PHOS U/L 171* 166*   ALT (SGPT) U/L 18 20   AST (SGOT) U/L 43* 39      Results from last 7 days   Lab Units 12/16/21  0945   CHOLESTEROL mg/dL 103   TRIGLYCERIDES mg/dL 81   HDL CHOL mg/dL 31*   LDL CHOL mg/dL 56     Results from last 7 days   Lab Units 12/16/21  0945   TSH uIU/mL 5.050*   HEMOGLOBIN A1C % 6.00*       XR Chest 1 View    Result Date: 12/16/2021  Impression: Persistent and progressive cardiomegaly with bilateral pulmonary opacifications, right greater than left, concerning for pulmonary edema versus airspace disease involvement with a moderate right pleural effusion now present.  D:  12/16/2021 E:  12/16/2021  This report was finalized on 12/16/2021 4:39 PM by Dr. Rosendo Vincent.      XR Chest PA & Lateral    Result Date: 12/21/2021  Impression: Pulmonary edema is improved from comparison with diminished effusions, notably on the right. There is mild persistent atelectasis. No distinct pneumothorax. Unchanged cardiac enlargement.  This report was finalized on 12/21/2021 8:38 AM by Jose Manuel Conde.            Results for orders placed during the hospital encounter of 02/27/18    Adult Transthoracic Echo Complete W/ Cont if Necessary Per Protocol    Interpretation Summary  · Technically difficult study due to body habitus  · Left ventricular systolic function is normal. Estimated EF = 60%.  · The cardiac valves are poorly visualized but there does not appear to be significant stenotic or regurgitant lesions.  · Right ventricular cavity is mild-to-moderately dilated.  · Atrial flutter noted throughout the examination.        Physical Exam at Discharge    Vital Signs  Temp:  [96.3 °F (35.7 °C)-97.7 °F (36.5 °C)] 97.7 °F (36.5 °C)  Heart Rate:  [] 81  Resp:  [14-20] 18  BP: ()/(46-82) 110/78    Constitutional:       General: Awake.   Pulmonary:      Effort: Pulmonary  effort is normal.      Breath sounds: Decreased breath sounds present.   Cardiovascular:      Normal rate.      Murmurs: There is no murmur.      Comments: Paced  Edema:     Peripheral edema present.  Skin:     General: Skin is warm and dry.   Neurological:      Mental Status: Alert and oriented to person, place and time.   Psychiatric:         Behavior: Behavior is cooperative.          Discharge Disposition  Home         Discharge Medications      New Medications      Instructions Start Date   pharmacy consult - MTM   Does not apply, Daily         Changes to Medications      Instructions Start Date   apixaban 5 MG tablet tablet  Commonly known as: Eliquis  What changed: additional instructions   5 mg, Oral, Every 12 Hours, Patient did not start until 12/22/2021      bumetanide 2 MG tablet  Commonly known as: BUMEX  What changed: See the new instructions.   2 mg, Oral, Daily      rosuvastatin 20 MG tablet  Commonly known as: CRESTOR  What changed: when to take this   20 mg, Oral, Daily      spironolactone 100 MG tablet  Commonly known as: Aldactone  What changed: how much to take   50 mg, Oral, Daily         Continue These Medications      Instructions Start Date   acetaminophen 325 MG tablet  Commonly known as: TYLENOL   650 mg, Oral, Every 6 Hours PRN, OTC      diphenhydrAMINE-acetaminophen  MG tablet per tablet  Commonly known as: TYLENOL PM   1 tablet, Oral, Nightly PRN, OTC      magnesium oxide 400 MG tablet  Commonly known as: MAG-OX   400 mg, Oral, Daily      multivitamin with minerals tablet tablet   1 tablet, Oral, Daily      potassium chloride 10 MEQ CR tablet   30 mEq, Oral, Daily      Vasculera tablet   1 tablet, Oral, Daily         Stop These Medications    metOLazone 2.5 MG tablet  Commonly known as: ZAROXOLYN            Discharge Diet: Healthy heart/low-sodium/consistent low carbohydrate    Activity at Discharge:     Future Appointments   Date Time Provider Department Center   12/23/2021  8:45  AM Trina Joy APRN [facility]   1/19/2022  8:15 AM Saurabh Medina PA [facility]   5/25/2022  9:00 AM Joaquin Jeffery MD [facility]     Additional Instructions for the Follow-ups that You Need to Schedule     Basic Metabolic Panel    Dec 26, 2021 (Approximate)      Release to patient: Immediate                      Electronically signed by FAITH Raza, 12/21/21, 8:03 AM EST.    Time: Discharge 25 min

## 2021-12-21 NOTE — PLAN OF CARE
Goal Outcome Evaluation:  Plan of Care Reviewed With: patient        Progress: improving  Outcome Summary: Pt appears to be demonstrating improved lateral LLE wound healing with small amount of re-epithelialization of wound edges and no necrotic tissue inhabiting wound base. Pt's BLE edema and posterior RLE ulcer remains relatively unchanged this session. PT able to debride small amount of slough from wound base to improve wound healing potential. PT plans to f/u with debridement/dressing changes in 2-3 days. Pt provided with additional wound care supplies in anticipation of DC.

## 2021-12-21 NOTE — NURSING NOTE
Verbalizes POC, d/c teaching, medications and follow-up appointments. Daughter to transport pt home. VSS. Well-saturated on room air. Pain adequately controlled with current regimen of tylenol. Tele shows a V-paced rhythm. Dressing dry and intact after pacemaker placement. PTWOC has debrided and redressed wounds to BLE. Pt completed CXR this AM. Up in chair all morning. Adequate I/O per urinal. D/C home in stable/good condition--pt asking appropriate questions in regards to d/c teaching.

## 2021-12-21 NOTE — THERAPY TREATMENT NOTE
Patient Name: Akshat Winkler  : 1955    MRN: [id number]                              Today's Date: 2021       Admit Date: 2021    Visit Dx:     ICD-10-CM ICD-9-CM   1. Bilateral lower extremity edema  R60.0 782.3   2. Acute on chronic congestive heart failure, unspecified heart failure type ([facility])  I50.9 428.0   3. Acute pulmonary edema ([facility])  J81.0 518.4   4. Hypervolemia, unspecified hypervolemia type  E87.70 276.69   5. Dyspnea, unspecified type  R06.00 786.09   6. Acute renal insufficiency  N28.9 593.9   7. Pleural effusion on right  J90 511.9   8. Cardiomegaly  I51.7 429.3   9. Acute on chronic diastolic congestive heart failure ([facility])  I50.33 428.33     428.0   10. Multiple open wounds of right lower extremity, subsequent encounter  S81.801D V58.89     894.0   11. Chronic diastolic heart failure ([facility])  I50.32 428.32   12. Persistent atrial fibrillation ([facility])  I48.19 427.31     Patient Active Problem List   Diagnosis   • Acute on chronic right-sided heart failure ([facility])   • Type 2 diabetes mellitus without complication, without long-term current use of insulin ([facility])   • Hyperlipidemia LDL goal <70   • Essential hypertension   • Coronary artery disease involving native coronary artery of native heart with angina pectoris ([facility])   • Severe obstructive sleep apnea, no CPAP   • Class 3 severe obesity due to excess calories with serious comorbidity and body mass index (BMI) of 40.0 to 44.9 in adult ([facility])   • Chronic anticoagulation   • Persistent atrial fibrillation/flutter   • Compression fracture of lumbar vertebra ([facility])   • Spondylosis of 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 or radiculopathy   • Lumbar stenosis with neurogenic claudication   • Lumbar disc herniation   • Myofascial pain   • Pathologic compression fracture of vertebra ([facility])   • Chronic bilateral severe lower extremity edema   • Mechanical low back pain   • History of kyphoplasty   • Acute on chronic diastolic congestive heart failure  ([facility])   • Presence of cardiac pacemaker     Past Medical History:   Diagnosis Date   • Acute diastolic HF (heart failure) ([facility])    • Acute hypokalemia    • Arrhythmia    • Back injury     Fall on 12/20/17   • Cellulitis of leg    • Chest pain syndrome    • CHF (congestive heart failure) ([facility])    • Deep vein thrombosis ([facility]) 1995   • Diabetes mellitus ([facility])     patient reports borderline    • Hyperlipidemia    • Hypertension    • Muscle pain     around back   • Obesity    • Obstructive sleep apnea     right now patient is sleeeping in recliner and does not use-when he lies down he will have to use cpap   • Paroxysmal A-fib ([facility])    • Peripheral artery disease ([facility])    • Spondylosis of 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 or radiculopathy 2/7/2018   • Wears glasses      Past Surgical History:   Procedure Laterality Date   • CARDIAC CATHETERIZATION     • CARDIAC ELECTROPHYSIOLOGY PROCEDURE N/A 12/20/2021    Procedure: AV node ablation + PM implant;  Surgeon: Joaquin Jeffery MD;  Location: [facility];  Service: Cardiovascular;  Laterality: N/A;   • CARDIAC ELECTROPHYSIOLOGY PROCEDURE N/A 12/20/2021    Procedure: AV node ablation;  Surgeon: Joaquin Jeffery MD;  Location: [facility];  Service: Cardiovascular;  Laterality: N/A;   • CARDIOVERSION      multiple   • COLONOSCOPY      about 14 years ago   • KYPHOPLASTY N/A 3/5/2018    Procedure: KYPHOPLASTY L4;  Surgeon: Akshat Davidson MD;  Location:  [facility];  Service:    • KYPHOPLASTY N/A 4/16/2018    Procedure: KYPHOPLASTY L1 AND L2;  Surgeon: Akshat Davidson MD;  Location:  [facility];  Service: Neurosurgery   • OTHER SURGICAL HISTORY  06/29/2015    PVA      General Information     Row Name 12/21/21 1313          Physical Therapy Time and Intention    Document Type therapy note (daily note)  -EJ     Mode of Treatment physical therapy  -EJ     Row Name 12/21/21 1313          General Information    Patient Profile Reviewed yes  -EJ      Existing Precautions/Restrictions cardiac; other (see comments); pacemaker  BLE edema/wounds  -EJ     Row Name 12/21/21 1313          Cognition    Orientation Status (Cognition) oriented x 4  -EJ     Row Name 12/21/21 1313          Safety Issues, Functional Mobility    Impairments Affecting Function (Mobility) endurance/activity tolerance  -EJ           User Key  (r) = Recorded By, (t) = Taken By, (c) = Cosigned By    Initials Name Provider Type    Sil Christopher PT Physical Therapist               Mobility     Row Name 12/21/21 1322          Bed Mobility    Comment (Bed Mobility) UIC  -EJ     Row Name 12/21/21 1322          Sit-Stand Transfer    Sit-Stand [place] (Transfers) contact guard  -EJ     Assistive Device (Sit-Stand Transfers) walker, front-wheeled  -EJ     Row Name 12/21/21 1322          Gait/Stairs (Locomotion)    [place] Level (Gait) contact guard  -EJ     Assistive Device (Gait) walker, front-wheeled  -EJ     Distance in Feet (Gait) 160  -EJ     Deviations/Abnormal Patterns (Gait) gait speed decreased; base of support, wide  -EJ     Bilateral Gait Deviations forward flexed posture  -EJ     Comment (Gait/Stairs) Pt cued for upright posture, limited by endurance.  -EJ           User Key  (r) = Recorded By, (t) = Taken By, (c) = Cosigned By    Initials Name Provider Type    Sil Christopher PT Physical Therapist               Obj/Interventions    No documentation.                Goals/Plan    No documentation.                Clinical Impression     Row Name 12/21/21 1323          Pain    Additional Documentation Pain Scale: FACES Pre/Post-Treatment (Group)  -EJ     Row Name 12/21/21 1323          Pain Scale: FACES Pre/Post-Treatment    Pain: FACES Scale, Pretreatment 2-->hurts little bit  -EJ     Posttreatment Pain Rating 2-->hurts little bit  -EJ     Pain Location - Orientation generalized  -EJ     Row Name 12/21/21 1323          Plan of Care Review    Plan of Care Reviewed With  patient  -EJ     Progress improving  -EJ     Outcome Summary Pt ambulates in lewis x 160 ft with RWx, CGA, cues for upright posture. Pt denied need for rolling walker, and initially reports not having one, later reporting one is at home. Pt recommended to use RWx upon return home with assist.  -EJ     Row Name 12/21/21 1323          Vital Signs    Intratreatment Heart Rate (beats/min) 87  -EJ     Pre SpO2 (%) 85  -EJ     O2 Delivery Pre Treatment room air  -EJ     O2 Delivery Intra Treatment room air  -EJ     O2 Delivery Post Treatment room air  -EJ     Pre Patient Position Sitting  -EJ     Intra Patient Position Standing  -EJ     Post Patient Position Sitting  -EJ     Row Name 12/21/21 1323          Positioning and Restraints    Pre-Treatment Position sitting in chair/recliner  -EJ     Post Treatment Position chair  -EJ     In Chair reclined; call light within reach; encouraged to call for assist; notified nsg  -EJ           User Key  (r) = Recorded By, (t) = Taken By, (c) = Cosigned By    Initials Name Provider Type    EJ Sil Driver, PT Physical Therapist               Outcome Measures     Row Name 12/21/21 1326 12/21/21 0758       How much help from another person do you currently need...    Turning from your back to your side while in flat bed without using bedrails? 4  -EJ 4  -LC    Moving from lying on back to sitting on the side of a flat bed without bedrails? 4  -EJ 4  -LC    Moving to and from a bed to a chair (including a wheelchair)? 4  -EJ 4  -LC    Standing up from a chair using your arms (e.g., wheelchair, bedside chair)? 4  -EJ 4  -LC    Climbing 3-5 steps with a railing? 3  -EJ 3  -LC    To walk in hospital room? 4  -EJ 4  -LC    AM-PAC 6 Clicks Score (PT) 23  -EJ 23  -LC    Row Name 12/21/21 1326          Functional Assessment    Outcome Measure Options AM-PAC 6 Clicks Basic Mobility (PT)  -EJ           User Key  (r) = Recorded By, (t) = Taken By, (c) = Cosigned By    Initials Name Provider  Type    EJ Sil Driver, PT Physical Therapist    Cristine Butler RN Registered Nurse                             Physical Therapy Education                 Title: PT OT SLP Therapies (In Progress)     Topic: Physical Therapy (In Progress)     Point: Mobility training (In Progress)     Learning Progress Summary           Patient Acceptance, E, NR by EJ at 12/21/2021 1326    Acceptance, E,TB, VU by KG at 12/17/2021 1317                   Point: Home exercise program (In Progress)     Learning Progress Summary           Patient Acceptance, E, NR by EJ at 12/21/2021 1326    Acceptance, E,TB, VU by KG at 12/17/2021 1317                   Point: Body mechanics (In Progress)     Learning Progress Summary           Patient Acceptance, E, NR by EJ at 12/21/2021 1326    Acceptance, E,TB, VU by KG at 12/17/2021 1317                   Point: Precautions (In Progress)     Learning Progress Summary           Patient Acceptance, E, NR by EJ at 12/21/2021 1326    Acceptance, E,TB, VU by KG at 12/17/2021 1317                               User Key     Initials Effective Dates Name Provider Type Discipline     06/16/21 -  Sil Driver, PT Physical Therapist PT    KG 06/16/21 -  Juliane Higginbotham Physical Therapist PT              PT Recommendation and Plan     Plan of Care Reviewed With: patient  Progress: improving  Outcome Summary: Pt ambulates in lewis x 160 ft with RWx, CGA, cues for upright posture. Pt denied need for rolling walker, and initially reports not having one, later reporting one is at home. Pt recommended to use RWx upon return home with assist.     Time Calculation:    PT Charges     Row Name 12/21/21 1326 12/21/21 1124          Time Calculation    Start Time 1100  - 1012  -     PT Received On 12/21/21  - --     PT Goal Re-Cert Due Date 12/27/21  - 12/27/21  -            Time Calculation- PT    Total Timed Code Minutes- PT 18 minute(s)  - --            Timed Charges    97844 - PT  Therapeutic Activity Minutes 18  -EJ --            Untimed Charges    Wound Care -- 86873 Selective debridement; 27470 Multilayer comp below knee  -     90823-Hfnnunhkjr comp below knee -- 15  -LH     11439-Ipdmlqgrr debridement -- 15  -LH            Total Minutes    Timed Charges Total Minutes 18  -EJ --     Untimed Charges Total Minutes -- 30  -LH      Total Minutes 18  -EJ 30  -LH           User Key  (r) = Recorded By, (t) = Taken By, (c) = Cosigned By    Initials Name Provider Type     Sil Driver, PT Physical Therapist     Bertin Paredes, PT Physical Therapist              Therapy Charges for Today     Code Description Service Date Service Provider Modifiers Qty    99687521854  PT THERAPEUTIC ACT EA 15 MIN 12/21/2021 Sil Driver, PT GP 1          PT G-Codes  Outcome Measure Options: AM-PAC 6 Clicks Basic Mobility (PT)  AM-PAC 6 Clicks Score (PT): 23    Sil Castrejon, PT  12/21/2021

## 2021-12-21 NOTE — PLAN OF CARE
Goal Outcome Evaluation:  Plan of Care Reviewed With: patient        Progress: improving  Outcome Summary: Pt ambulates in lewis x 160 ft with RWx, CGA, cues for upright posture. Pt denied need for rolling walker, and initially reports not having one, later reporting one is at home. Pt recommended to use RWx upon return home with assist.

## 2021-12-21 NOTE — PROGRESS NOTES
[facility] at [facility]  Progress Note     LOS: 5 days   Patient Care Team:  Saurabh Medina PA as PCP - General (Physician Assistant)  Lucian Alvarez IV, MD as Consulting Physician (Cardiology)  Mandy Clark PA-C as Physician Assistant (Physician Assistant)  Josh Moss MD as Consulting Physician (Pain Medicine)  Hannah Leonard APRN as Nurse Practitioner (Cardiology)  Akshat Davidson MD as Surgeon (Neurosurgery)    Chief Complaint:  CHF    Subjective  Feels much better today. Breathing is improved. Minimal incisional soreness.     Interval History: Status post [employer] VVIR pacemaker implant with AV node ablation      Review of Systems:   Pertinent positives in HPI, all others reviewed and negative.      Current Facility-Administered Medications:   •  acetaminophen (TYLENOL) tablet 650 mg, 650 mg, Oral, Q4H PRN **OR** acetaminophen (TYLENOL) suppository 650 mg, 650 mg, Rectal, Q4H PRN, Joaquin Jeffery MD  •  acetaminophen (TYLENOL) tablet 650 mg, 650 mg, Oral, Daily, Lucian Alvarez IV, MD, 650 mg at 12/21/21 0810  •  bumetanide (BUMEX) tablet 2 mg, 2 mg, Oral, Daily, Susu Santos PA, 2 mg at 12/21/21 0810  •  Magnesium Sulfate 2 gram Bolus, followed by 8 gram infusion (total Mg dose 10 grams)- Mg less than or equal to 1mg/dL, 2 g, Intravenous, PRN **OR** Magnesium Sulfate 2 gram / 50mL Infusion (GIVE X 3 BAGS TO EQUAL 6GM TOTAL DOSE) - Mg 1.1 - 1.5 mg/dl, 2 g, Intravenous, PRN **OR** Magnesium Sulfate 4 gram infusion- Mg 1.6-1.9 mg/dL, 4 g, Intravenous, PRN, Lucian Alvarez IV, MD, Last Rate: 25 mL/hr at 12/19/21 0935, 4 g at 12/19/21 0935  •  multivitamin with minerals 1 tablet, 1 tablet, Oral, Daily, Lucian Alvarez IV, MD, 1 tablet at 12/21/21 0810  •  ondansetron (ZOFRAN) injection 4 mg, 4 mg, Intravenous, Q6H PRN, Joaquin Jeffery MD  •  Pharmacy Consult - [place], , Does not apply, Daily, Oneida Lazo,  "PharmD  •  potassium chloride (MICRO-K) CR capsule 40 mEq, 40 mEq, Oral, PRN **OR** potassium chloride (KLOR-CON) packet 40 mEq, 40 mEq, Oral, PRN **OR** potassium chloride 10 mEq in 100 mL IVPB, 10 mEq, Intravenous, Q1H PRN, Lucian Alvarez IV, MD  •  rosuvastatin (CRESTOR) tablet 20 mg, 20 mg, Oral, Daily, Lucian Alvarez IV, MD, 20 mg at 12/21/21 0810  •  simethicone (MYLICON) chewable tablet 80 mg, 80 mg, Oral, 4x Daily PRN, Susu Santos PA  •  sodium chloride 0.9 % flush 10 mL, 10 mL, Intravenous, Q12H, Anitra Douglass APRN, 10 mL at 12/21/21 0812  •  sodium chloride 0.9 % flush 10 mL, 10 mL, Intravenous, PRN, Joaquin Jeffery MD  •  sodium chloride 0.9 % flush 3 mL, 3 mL, Intravenous, Q12H, Joaquin Jeffery MD, 3 mL at 12/20/21 2000  •  spironolactone (ALDACTONE) tablet 25 mg, 25 mg, Oral, Daily, Lucian Alvarez IV, MD, 25 mg at 12/21/21 0810      Objective     Vital Sign Min/Max for last 24 hours  Temp  Min: 96.3 °F (35.7 °C)  Max: 97.7 °F (36.5 °C)   BP  Min: 92/47  Max: 127/46   Pulse  Min: 79  Max: 160   Resp  Min: 14  Max: 20   SpO2  Min: 90 %  Max: 98 %   Flow (L/min)  Min: 2  Max: 2   No data recorded     Flowsheet Rows      First Filed Value   Admission Height 175.3 cm (69\") Documented at 12/16/2021 0931   Admission Weight 143 kg (316 lb) Documented at 12/16/2021 0931        Physical Exam:     General Appearance:    Alert, cooperative, in no acute distress   Lungs:     Clear to auscultation,respirations regular, even and                  unlabored    Heart:    Regular rhythm and normal rate, normal S1 and S2, no            murmur, no gallop, no rub, no click   Chest Wall:    No abnormalities observed   Abdomen:     Normal bowel sounds, no masses,  soft non-tender, non-distended,    Extremities:   No gross deformities, no edema, no cyanosis,    Pulses:   Pulses palpable and equal bilaterally   Skin:   Implant site clean dry intact without signs of hematoma or " infection.      Results Review:   Results from last 7 days   Lab Units 12/17/21  0514 12/16/21  0945   WBC 10*3/mm3 4.59 4.71   HEMOGLOBIN g/dL 14.3 14.3   HEMATOCRIT % 44.1 44.8   PLATELETS 10*3/mm3 159 159     Results from last 7 days   Lab Units 12/21/21  0457 12/20/21  0615 12/20/21  0615 12/19/21  0543 12/19/21  0543   SODIUM mmol/L 129*  --  133*  --  133*   POTASSIUM mmol/L 4.1  --  3.8  --  4.1   CHLORIDE mmol/L 90*   < > 92*   < > 94*   CO2 mmol/L 25.0   < > 28.0   < > 26.0   BUN mg/dL 30*  --  24*  --  25*   CREATININE mg/dL 1.30*  --  1.16  --  1.11   GLUCOSE mg/dL 113*   < > 118*   < > 136*    < > = values in this interval not displayed.      Results from last 7 days   Lab Units 12/16/21  0945   HEMOGLOBIN A1C % 6.00*     Results from last 7 days   Lab Units 12/16/21  0945   CHOLESTEROL mg/dL 103   TRIGLYCERIDES mg/dL 81   HDL CHOL mg/dL 31*     Results from last 7 days   Lab Units 12/16/21  0945   TSH uIU/mL 5.050*     Results from last 7 days   Lab Units 12/16/21  0945   PROBNP pg/mL 926.5*         Results from last 7 days   Lab Units 12/16/21  0945   TROPONIN T ng/mL 0.011       Intake/Output Summary (Last 24 hours) at 12/21/2021 0813  Last data filed at 12/21/2021 0100  Gross per 24 hour   Intake 740 ml   Output 775 ml   Net -35 ml       I personally viewed and interpreted the patient's EKG/Telemetry data    Chest X-ray: no penumothorax; acceptable lead position.     Telemetry:    Present on Admission:  • Persistent atrial fibrillation/flutter  • Acute on chronic right-sided heart failure (HCC)  • Hyperlipidemia LDL goal <70  • Essential hypertension  • (Resolved) Acute on chronic congestive heart failure (HCC)  • Chronic bilateral severe lower extremity edema    Assessment/Plan      1. Persistent AF/AFL:   - admitted 12/16/2021 with HRs in 120s in atrial flutter , recent event monitor 11/24/2021 with average HR 89 bpm in afib/flutter.  He thinks his heart rates have been fast for the past  week.  -s/p PVA with Dr. Cary in 2015. Intolerant to BB/Flecainide/Amiodarone in the past with failure of Tikosyn due to development of WCT. Persistent AF with worsening HF symptoms.  He is intolerant to AV joby blocking agent with severe hypotension.  He recently underwent successful ECV with Dr. Alvarez 11/18/21 with ERAF.  -Now s/p successful implant of single single-chamber VVIR permanent pacemaker ([employer]) and subsequent AV node ablation performed 12/20/21 per Dr Seay- Pt has done well overnight. The chest Xray and chest incision site are unremarkable. Wound care and post device care and mobility restrictions  have been reviewed with the patient in detail. Pt will have a wound check in 7-10 days then a follow up with  in 3 months with [facility] PPM check   -Dr. Jeffery okay to resume Eliquis  Thursday 12/23/21       2. Acute on Chronic Diastolic HF/Right heart failure-With increased SOB/Edema.   -Echocardiogram 12/10/2021 at Dr. Andrew's office EF 60%  - On Bumex and metolazone at home, admitted 12/16/2021 with worsening CHF  -  Excellent diureses this admission transitioned from IV to oral diuretics   -Medical mgmt of CHF per Dr. Alvarez. Will defer medical therapy to their team .        Plan for disposition: Patient is stable from an EP standpoint.  Will arrange wound check in 1 week and follow-up with Dr. Jeffery in 3 months with [employer] pacemaker check.  EP will sign off at this time    Electronically signed by FAITH Renee, 12/21/21, 8:16 AM EST.

## 2021-12-21 NOTE — PLAN OF CARE
Problem: Adult Inpatient Plan of Care  Goal: Plan of Care Review  Outcome: Ongoing, Progressing  Flowsheets (Taken 12/21/2021 9622)  Progress: improving  Plan of Care Reviewed With: patient  Outcome Summary:   a/o x 4   VSS, RA   Vpaced on the monitor   L chest incision pressure dressing clean and intact, R groin site is soft with no bleeding   no complaint, no changes to report   Goal Outcome Evaluation:  Plan of Care Reviewed With: patient        Progress: improving  Outcome Summary: a/o x 4; VSS, RA; Vpaced on the monitor; L chest incision pressure dressing clean and intact, R groin site is soft with no bleeding; no complaint, no changes to report

## 2021-12-21 NOTE — OUTREACH NOTE
Prep Survey      Responses   [facility] patient discharged from? [place]   Is LACE score < 7 ? No   Emergency Room discharge w/ pulse ox? No   Eligibility [facility]   Date of Admission 12/16/21   Date of Discharge 12/21/21   Discharge Disposition Home or Self Care   Discharge diagnosis Acute on chronic right-sided heart failure,    atrial fibrillation/flutter with a ventricular rate,    AV node ablation + PM implant   Does the patient have one of the following disease processes/diagnoses(primary or secondary)? CHF   Does the patient have Home health ordered? No   Is there a DME ordered? No   Prep survey completed? Yes          Enedina Cooper RN

## 2021-12-22 NOTE — OUTREACH NOTE
Call Center TCM Note      Responses   [facility] patient discharged from? [place]   Does the patient have one of the following disease processes/diagnoses(primary or secondary)? CHF   TCM attempt successful? Yes   Call start time 1034   Call end time 1049   Discharge diagnosis Acute on chronic right-sided heart failure,    atrial fibrillation/flutter with a ventricular rate,    AV node ablation + PM implant   Meds reviewed with patient/caregiver? Yes   Is the patient having any side effects they believe may be caused by any medication additions or changes? No   Does the patient have all medications ordered at discharge? Yes   Is the patient taking all medications as directed (includes completed medication regime)? Yes   Comments regarding appointments Cardio 12/23/21   Does the patient have a primary care provider?  Yes   Does the patient have an appointment with their PCP within 7 days of discharge? Yes   Comments regarding PCP HOSP DC FU appt 12/27/21 @ 9:45 am listed. Pt reports he cancelled the appt and refused another appt to be made. Route to office.    Has the patient kept scheduled appointments due by today? N/A   Pulse Ox monitoring None   Psychosocial issues? No   Did the patient receive a copy of their discharge instructions? Yes   Nursing interventions Reviewed instructions with patient   What is the patient's perception of their health status since discharge? Worsening  [Edema better , but SOA feels worse. ]   Is the patient weighing daily? Yes   Does the patient have scales? Yes   Daily weight interventions Education provided on importance of daily weight   Is the patient able to teach back signs and symptoms of worsening condition? (i.e. weight gain, shortness of air, etc.) Yes   If the patient is a current smoker, are they able to teach back resources for cessation? Not a smoker   Is the patient/caregiver able to teach back the hierarchy of who to call/visit for symptoms/problems? PCP,  Specialist, Home health nurse, Urgent Care, ED, 911 Yes   TCM call completed? Yes   Wrap up additional comments Pt C/O increased Soa. Audiable wheezing on phone. Stated he attempted to reach cardio and unable. Pt does not have pulse ox. Advised pt that if increased SOA and feeling like he could not get a good breath and SOA at rest then he need to seek treatment right away. Call 911 or go to ER . PT [facility]. Pt wished for this RN to call cardio . Call placed to Cardio group and spoke to Tari. She states the office will call him at this time.            Anna Luevano RN    12/22/2021, 10:51 EST

## 2021-12-22 NOTE — TELEPHONE ENCOUNTER
----- Message from Saba Hdz Rep sent at 12/22/2021 10:44 AM EST -----  Regarding: Nurse call center call about pt  Anna from the nurse call center called about Mr. Winkler.  She said he is having a lot of wheezing after he was d/c from [facility] yesterday. Nurse ask if we could reach out to pt today. He has an apt on 12/23 with [facility].

## 2021-12-22 NOTE — TELEPHONE ENCOUNTER
Spoke to patient and states that he has been having SOA and wheezing. Patient has an appointment tomorrow. He was prescribed Sprinlactone and Bumex which he has not taken either today or yesterday. He states that he will take Bumex tonight.

## 2021-12-23 NOTE — PROGRESS NOTES
"Chief Complaint  Congestive Heart Failure and Follow-up    Subjective    History of Present Illness {CC  Problem List  Visit  Diagnosis   Encounters  Notes  Medications  Labs  Result Review Imaging  Media :23}       History of Present Illness   66 year old male presents to the office today for ongoing evaluation of his acute on chronic diastolic heart failure and atrial flutter. Patient recently hospitalized at [facility] for atrial flutter and DHF exacerbation. Patient underwent av node ablation and implantation of PPM per Dr Jeffery on 12/20/21. He notes that he has not taken any diuretics since discharge on Monday because he wasn't sure what to take. He notes significant fatigue, dyspnea with minimal exertion and abdominal fullness. He notes that his pedal edema has improved significantly since aggressive diuresis while inpatient. Currently denies chest pain.;   Objective     Vital Signs:   Vitals:    12/23/21 0826   BP: 129/75   BP Location: Left arm   Patient Position: Sitting   Cuff Size: Adult   Pulse: 81   Resp: 22   Temp: 97 °F (36.1 °C)   TempSrc: Temporal   SpO2: 91%   Weight: 131 kg (289 lb 4 oz)   Height: 175.3 cm (69\")     Body mass index is 42.71 kg/m².  Physical Exam  Vitals and nursing note reviewed.   Constitutional:       Appearance: Normal appearance.   HENT:      Head: Normocephalic.   Eyes:      Pupils: Pupils are equal, round, and reactive to light.   Cardiovascular:      Rate and Rhythm: Normal rate and regular rhythm.      Pulses: Normal pulses.      Heart sounds: Normal heart sounds. No murmur heard.      Pulmonary:      Effort: Pulmonary effort is normal.      Breath sounds: Rales present.   Abdominal:      General: Bowel sounds are normal. There is distension.      Palpations: Abdomen is soft.   Musculoskeletal:         General: Normal range of motion.      Cervical back: Normal range of motion.      Right lower leg: No edema.      Left lower leg: No edema.   Skin:     General: Skin " is warm and dry.      Capillary Refill: Capillary refill takes less than 2 seconds.      Comments: Dressing to left infraclavicular space, CDI   Chronic venous changes to bilateral lower extremities   Neurological:      Mental Status: He is alert and oriented to person, place, and time.   Psychiatric:         Mood and Affect: Mood normal.         Thought Content: Thought content normal.              Result Review  Data Reviewed:{ Labs  Result Review  Imaging  Med Tab  Media :23}     Lab Results   Component Value Date    GLUCOSE 119 (H) 12/23/2021    CALCIUM 9.7 12/23/2021     (L) 12/23/2021    K 4.4 12/23/2021    CO2 22.0 12/23/2021    CL 85 (L) 12/23/2021    BUN 44 (H) 12/23/2021    CREATININE 1.65 (H) 12/23/2021    EGFRIFAFRI 92 02/08/2018    EGFRIFNONA 42 (L) 12/23/2021    BCR 26.7 (H) 12/23/2021    ANIONGAP 19.0 (H) 12/23/2021     Lab Results   Component Value Date    WBC 4.59 12/17/2021    HGB 14.3 12/17/2021    HCT 44.1 12/17/2021    .8 (H) 12/17/2021     12/17/2021                Assessment and Plan {CC Problem List  Visit Diagnosis  ROS  Review (Popup)  Health Maintenance  Quality  BestPractice  Medications  SmartSets  SnapShot Encounters  Media :23}   1. Acute on chronic diastolic heart failure (HCC)  Patient given 80mg lasix iv through a butterfly in left ac. Patient voided x 1 in office   - Basic Metabolic Panel; Future  - Basic Metabolic Panel  Resume aldactone, bumex   2. Longstanding persistent atrial fibrillation (HCC)  S/p av node ablation and implantation of PPM  - Basic Metabolic Panel; Future  - Basic Metabolic Panel    3. Essential hypertension  Well controlled   - Basic Metabolic Panel; Future  - Basic Metabolic Panel    Follow Up {Instructions Charge Capture  Follow-up Communications :23}   No follow-ups on file.    Patient was given instructions and counseling regarding his condition or for health maintenance advice. Please see specific information pulled  into the AVS if appropriate.  Patient was instructed to call the [facility] with any questions, concerns, or worsening symptoms.

## 2021-12-24 NOTE — TELEPHONE ENCOUNTER
Reviewed labs with patient's wife. Increase po intake. Patient's wife notes some improvement in his symptoms.

## 2021-12-28 NOTE — TELEPHONE ENCOUNTER
Patient returned my call and notes that his swelling has improved greatly. Notes that he tires easily. Notes BILLS. Continue current plan of care

## 2022-01-01 ENCOUNTER — APPOINTMENT (OUTPATIENT)
Dept: GENERAL RADIOLOGY | Facility: HOSPITAL | Age: 67
End: 2022-01-01

## 2022-01-01 ENCOUNTER — READMISSION MANAGEMENT (OUTPATIENT)
Dept: CALL CENTER | Facility: HOSPITAL | Age: 67
End: 2022-01-01

## 2022-01-01 ENCOUNTER — APPOINTMENT (OUTPATIENT)
Dept: ULTRASOUND IMAGING | Facility: HOSPITAL | Age: 67
End: 2022-01-01

## 2022-01-01 ENCOUNTER — HOSPITAL ENCOUNTER (INPATIENT)
Facility: HOSPITAL | Age: 67
LOS: 4 days | End: 2022-01-15
Attending: INTERNAL MEDICINE | Admitting: INTERNAL MEDICINE

## 2022-01-01 ENCOUNTER — APPOINTMENT (OUTPATIENT)
Dept: CT IMAGING | Facility: HOSPITAL | Age: 67
End: 2022-01-01

## 2022-01-01 ENCOUNTER — TELEPHONE (OUTPATIENT)
Dept: CARDIOLOGY | Facility: CLINIC | Age: 67
End: 2022-01-01

## 2022-01-01 ENCOUNTER — APPOINTMENT (OUTPATIENT)
Dept: CARDIOLOGY | Facility: HOSPITAL | Age: 67
End: 2022-01-01

## 2022-01-01 ENCOUNTER — TELEPHONE (OUTPATIENT)
Dept: CARDIOLOGY | Facility: HOSPITAL | Age: 67
End: 2022-01-01

## 2022-01-01 ENCOUNTER — HOSPITAL ENCOUNTER (OUTPATIENT)
Facility: HOSPITAL | Age: 67
Setting detail: OBSERVATION
Discharge: HOSPICE | End: 2022-01-11
Attending: EMERGENCY MEDICINE | Admitting: INTERNAL MEDICINE

## 2022-01-01 VITALS
HEART RATE: 80 BPM | DIASTOLIC BLOOD PRESSURE: 54 MMHG | OXYGEN SATURATION: 91 % | TEMPERATURE: 99.7 F | SYSTOLIC BLOOD PRESSURE: 93 MMHG | RESPIRATION RATE: 21 BRPM

## 2022-01-01 VITALS
WEIGHT: 313.3 LBS | TEMPERATURE: 97.5 F | RESPIRATION RATE: 20 BRPM | OXYGEN SATURATION: 95 % | HEART RATE: 80 BPM | SYSTOLIC BLOOD PRESSURE: 136 MMHG | HEIGHT: 69 IN | BODY MASS INDEX: 46.4 KG/M2 | DIASTOLIC BLOOD PRESSURE: 87 MMHG

## 2022-01-01 DIAGNOSIS — J81.0 ACUTE PULMONARY EDEMA: Primary | ICD-10-CM

## 2022-01-01 DIAGNOSIS — I50.33 ACUTE ON CHRONIC DIASTOLIC HEART FAILURE: Primary | ICD-10-CM

## 2022-01-01 DIAGNOSIS — K74.60 LIVER CIRRHOSIS SECONDARY TO NASH: ICD-10-CM

## 2022-01-01 DIAGNOSIS — I48.11 LONGSTANDING PERSISTENT ATRIAL FIBRILLATION: ICD-10-CM

## 2022-01-01 DIAGNOSIS — K75.81 LIVER CIRRHOSIS SECONDARY TO NASH: ICD-10-CM

## 2022-01-01 DIAGNOSIS — I10 ESSENTIAL HYPERTENSION: ICD-10-CM

## 2022-01-01 DIAGNOSIS — E87.6 HYPOKALEMIA: ICD-10-CM

## 2022-01-01 DIAGNOSIS — E83.42 HYPOMAGNESEMIA: ICD-10-CM

## 2022-01-01 LAB
25(OH)D3 SERPL-MCNC: 27.8 NG/ML
ABO GROUP BLD: NORMAL
ABO GROUP BLD: NORMAL
ALBUMIN SERPL-MCNC: 3 G/DL (ref 3.5–5.2)
ALBUMIN SERPL-MCNC: 3.1 G/DL (ref 3.5–5.2)
ALBUMIN SERPL-MCNC: 3.2 G/DL (ref 3.5–5.2)
ALBUMIN SERPL-MCNC: 3.3 G/DL (ref 3.5–5.2)
ALBUMIN/GLOB SERPL: 0.6 G/DL
ALBUMIN/GLOB SERPL: 0.7 G/DL
ALP SERPL-CCNC: 229 U/L (ref 39–117)
ALP SERPL-CCNC: 247 U/L (ref 39–117)
ALP SERPL-CCNC: 249 U/L (ref 39–117)
ALP SERPL-CCNC: 260 U/L (ref 39–117)
ALP SERPL-CCNC: 263 U/L (ref 39–117)
ALP SERPL-CCNC: 264 U/L (ref 39–117)
ALPHA-FETOPROTEIN: 6.45 NG/ML (ref 0–8.3)
ALT SERPL W P-5'-P-CCNC: 36 U/L (ref 1–41)
ALT SERPL W P-5'-P-CCNC: 36 U/L (ref 1–41)
ALT SERPL W P-5'-P-CCNC: 38 U/L (ref 1–41)
ALT SERPL W P-5'-P-CCNC: 42 U/L (ref 1–41)
ALT SERPL W P-5'-P-CCNC: 43 U/L (ref 1–41)
ALT SERPL W P-5'-P-CCNC: 49 U/L (ref 1–41)
ANION GAP SERPL CALCULATED.3IONS-SCNC: 11 MMOL/L (ref 5–15)
ANION GAP SERPL CALCULATED.3IONS-SCNC: 12 MMOL/L (ref 5–15)
ANION GAP SERPL CALCULATED.3IONS-SCNC: 13 MMOL/L (ref 5–15)
ANION GAP SERPL CALCULATED.3IONS-SCNC: 14 MMOL/L (ref 5–15)
ANION GAP SERPL CALCULATED.3IONS-SCNC: 17 MMOL/L (ref 5–15)
ANION GAP SERPL CALCULATED.3IONS-SCNC: 18 MMOL/L (ref 5–15)
ANISOCYTOSIS BLD QL: ABNORMAL
ANISOCYTOSIS BLD QL: NORMAL
APPEARANCE FLD: CLEAR
APTT PPP: 41.8 SECONDS (ref 22–39)
AST SERPL-CCNC: 61 U/L (ref 1–40)
AST SERPL-CCNC: 91 U/L (ref 1–40)
AST SERPL-CCNC: 92 U/L (ref 1–40)
AST SERPL-CCNC: 93 U/L (ref 1–40)
AST SERPL-CCNC: 94 U/L (ref 1–40)
AST SERPL-CCNC: 99 U/L (ref 1–40)
BACTERIA FLD CULT: NORMAL
BACTERIA SPEC AEROBE CULT: ABNORMAL
BACTERIA UR QL AUTO: ABNORMAL /HPF
BASOPHILS # BLD AUTO: 0 10*3/MM3 (ref 0–0.2)
BASOPHILS # BLD AUTO: 0 10*3/MM3 (ref 0–0.2)
BASOPHILS # BLD AUTO: 0.01 10*3/MM3 (ref 0–0.2)
BASOPHILS # BLD AUTO: 0.03 10*3/MM3 (ref 0–0.2)
BASOPHILS # BLD AUTO: 0.05 10*3/MM3 (ref 0–0.2)
BASOPHILS # BLD MANUAL: 0 10*3/MM3 (ref 0–0.2)
BASOPHILS NFR BLD AUTO: 0 % (ref 0–1.5)
BASOPHILS NFR BLD AUTO: 0 % (ref 0–1.5)
BASOPHILS NFR BLD AUTO: 0.1 % (ref 0–1.5)
BASOPHILS NFR BLD AUTO: 0.5 % (ref 0–1.5)
BASOPHILS NFR BLD AUTO: 0.7 % (ref 0–1.5)
BASOPHILS NFR BLD MANUAL: 0 % (ref 0–1.5)
BH CV ECHO MEAS - AO MAX PG (FULL): 3.3 MMHG
BH CV ECHO MEAS - AO MAX PG: 5 MMHG
BH CV ECHO MEAS - AO MEAN PG (FULL): 2 MMHG
BH CV ECHO MEAS - AO MEAN PG: 3.2 MMHG
BH CV ECHO MEAS - AO V2 MAX: 107.6 CM/SEC
BH CV ECHO MEAS - AO V2 MEAN: 86.5 CM/SEC
BH CV ECHO MEAS - AO V2 VTI: 21.6 CM
BH CV ECHO MEAS - ASC AORTA: 3.1 CM
BH CV ECHO MEAS - BSA(HAYCOCK): 2.7 M^2
BH CV ECHO MEAS - BSA: 2.5 M^2
BH CV ECHO MEAS - BZI_BMI: 46.2 KILOGRAMS/M^2
BH CV ECHO MEAS - BZI_METRIC_HEIGHT: 175.3 CM
BH CV ECHO MEAS - BZI_METRIC_WEIGHT: 142 KG
BH CV ECHO MEAS - LA DIMENSION: 4.1 CM
BH CV ECHO MEAS - LAD MAJOR: 6.3 CM
BH CV ECHO MEAS - LAT PEAK E' VEL: 8.5 CM/SEC
BH CV ECHO MEAS - LATERAL E/E' RATIO: 9.5
BH CV ECHO MEAS - LV IVRT: 0.08 SEC
BH CV ECHO MEAS - LV MAX PG: 1.7 MMHG
BH CV ECHO MEAS - LV MEAN PG: 1.2 MMHG
BH CV ECHO MEAS - LV V1 MAX: 66 CM/SEC
BH CV ECHO MEAS - LV V1 MEAN: 50.9 CM/SEC
BH CV ECHO MEAS - LV V1 VTI: 13.5 CM
BH CV ECHO MEAS - MED PEAK E' VEL: 10.9 CM/SEC
BH CV ECHO MEAS - MEDIAL E/E' RATIO: 7.4
BH CV ECHO MEAS - MV DEC SLOPE: 702.5 CM/SEC^2
BH CV ECHO MEAS - MV DEC TIME: 0.14 SEC
BH CV ECHO MEAS - MV E MAX VEL: 81 CM/SEC
BH CV ECHO MEAS - MV P1/2T MAX VEL: 109.9 CM/SEC
BH CV ECHO MEAS - MV P1/2T: 45.8 MSEC
BH CV ECHO MEAS - MVA P1/2T LCG: 2 CM^2
BH CV ECHO MEAS - MVA(P1/2T): 4.8 CM^2
BH CV ECHO MEAS - PA ACC TIME: 0.06 SEC
BH CV ECHO MEAS - PA PR(ACCEL): 50.5 MMHG
BH CV ECHO MEAS - RAP SYSTOLE: 15 MMHG
BH CV ECHO MEAS - RVSP: 52 MMHG
BH CV ECHO MEAS - TAPSE (>1.6): 1.4 CM
BH CV ECHO MEAS - TR MAX PG: 37 MMHG
BH CV ECHO MEAS - TR MAX VEL: 304 CM/SEC
BH CV ECHO MEASUREMENTS AVERAGE E/E' RATIO: 8.35
BH CV VAS BP RIGHT ARM: NORMAL MMHG
BH CV XLRA - RV BASE: 3.6 CM
BH CV XLRA - RV LENGTH: 6.3 CM
BH CV XLRA - RV MID: 2.5 CM
BH CV XLRA - TDI S': 6.3 CM/SEC
BILIRUB CONJ SERPL-MCNC: 3.2 MG/DL (ref 0–0.3)
BILIRUB SERPL-MCNC: 4.6 MG/DL (ref 0–1.2)
BILIRUB SERPL-MCNC: 5.2 MG/DL (ref 0–1.2)
BILIRUB SERPL-MCNC: 5.8 MG/DL (ref 0–1.2)
BILIRUB SERPL-MCNC: 5.9 MG/DL (ref 0–1.2)
BILIRUB SERPL-MCNC: 6.4 MG/DL (ref 0–1.2)
BILIRUB SERPL-MCNC: 7.5 MG/DL (ref 0–1.2)
BILIRUB UR QL STRIP: NEGATIVE
BLD GP AB SCN SERPL QL: NEGATIVE
BUN SERPL-MCNC: 29 MG/DL (ref 8–23)
BUN SERPL-MCNC: 30 MG/DL (ref 8–23)
BUN SERPL-MCNC: 35 MG/DL (ref 8–23)
BUN SERPL-MCNC: 44 MG/DL (ref 8–23)
BUN SERPL-MCNC: 47 MG/DL (ref 8–23)
BUN SERPL-MCNC: 49 MG/DL (ref 8–23)
BUN/CREAT SERPL: 16.7 (ref 7–25)
BUN/CREAT SERPL: 18.3 (ref 7–25)
BUN/CREAT SERPL: 21.2 (ref 7–25)
BUN/CREAT SERPL: 22.4 (ref 7–25)
BUN/CREAT SERPL: 28.5 (ref 7–25)
BUN/CREAT SERPL: 36.8 (ref 7–25)
BURR CELLS BLD QL SMEAR: NORMAL
CALCIUM SPEC-SCNC: 10 MG/DL (ref 8.6–10.5)
CALCIUM SPEC-SCNC: 10.3 MG/DL (ref 8.6–10.5)
CALCIUM SPEC-SCNC: 9.8 MG/DL (ref 8.6–10.5)
CALCIUM SPEC-SCNC: 9.8 MG/DL (ref 8.6–10.5)
CHLORIDE SERPL-SCNC: 91 MMOL/L (ref 98–107)
CHLORIDE SERPL-SCNC: 91 MMOL/L (ref 98–107)
CHLORIDE SERPL-SCNC: 92 MMOL/L (ref 98–107)
CHLORIDE SERPL-SCNC: 95 MMOL/L (ref 98–107)
CHLORIDE SERPL-SCNC: 95 MMOL/L (ref 98–107)
CHLORIDE SERPL-SCNC: 96 MMOL/L (ref 98–107)
CK SERPL-CCNC: 86 U/L (ref 20–200)
CLARITY UR: CLEAR
CO2 SERPL-SCNC: 20 MMOL/L (ref 22–29)
CO2 SERPL-SCNC: 21 MMOL/L (ref 22–29)
CO2 SERPL-SCNC: 23 MMOL/L (ref 22–29)
CO2 SERPL-SCNC: 24 MMOL/L (ref 22–29)
CO2 SERPL-SCNC: 25 MMOL/L (ref 22–29)
CO2 SERPL-SCNC: 27 MMOL/L (ref 22–29)
COLOR FLD: YELLOW
COLOR UR: YELLOW
CREAT SERPL-MCNC: 1.33 MG/DL (ref 0.76–1.27)
CREAT SERPL-MCNC: 1.64 MG/DL (ref 0.76–1.27)
CREAT SERPL-MCNC: 1.65 MG/DL (ref 0.76–1.27)
CREAT SERPL-MCNC: 1.65 MG/DL (ref 0.76–1.27)
CREAT SERPL-MCNC: 1.74 MG/DL (ref 0.76–1.27)
CREAT SERPL-MCNC: 1.96 MG/DL (ref 0.76–1.27)
CREAT UR-MCNC: 31.3 MG/DL
CRP SERPL-MCNC: 10.51 MG/DL (ref 0–0.5)
CRP SERPL-MCNC: 5.54 MG/DL (ref 0–0.5)
CRP SERPL-MCNC: 5.8 MG/DL (ref 0–0.5)
CRP SERPL-MCNC: 5.84 MG/DL (ref 0–0.5)
CRP SERPL-MCNC: 6.26 MG/DL (ref 0–0.5)
CRP SERPL-MCNC: 6.37 MG/DL (ref 0–0.5)
D DIMER PPP FEU-MCNC: 2.01 MCGFEU/ML (ref 0–0.56)
D-LACTATE SERPL-SCNC: 4.2 MMOL/L (ref 0.5–2)
D-LACTATE SERPL-SCNC: 4.6 MMOL/L (ref 0.5–2)
D-LACTATE SERPL-SCNC: 5.2 MMOL/L (ref 0.5–2)
DEPRECATED RDW RBC AUTO: 66.4 FL (ref 37–54)
DEPRECATED RDW RBC AUTO: 67.2 FL (ref 37–54)
DEPRECATED RDW RBC AUTO: 68 FL (ref 37–54)
DEPRECATED RDW RBC AUTO: 69.2 FL (ref 37–54)
DEPRECATED RDW RBC AUTO: 69.4 FL (ref 37–54)
DEPRECATED RDW RBC AUTO: 70.4 FL (ref 37–54)
EOSINOPHIL # BLD AUTO: 0 10*3/MM3 (ref 0–0.4)
EOSINOPHIL # BLD AUTO: 0.09 10*3/MM3 (ref 0–0.4)
EOSINOPHIL # BLD AUTO: 0.1 10*3/MM3 (ref 0–0.4)
EOSINOPHIL # BLD AUTO: 0.14 10*3/MM3 (ref 0–0.4)
EOSINOPHIL # BLD AUTO: 0.2 10*3/MM3 (ref 0–0.4)
EOSINOPHIL # BLD MANUAL: 0.11 10*3/MM3 (ref 0–0.4)
EOSINOPHIL NFR BLD AUTO: 0 % (ref 0.3–6.2)
EOSINOPHIL NFR BLD AUTO: 1.4 % (ref 0.3–6.2)
EOSINOPHIL NFR BLD AUTO: 1.4 % (ref 0.3–6.2)
EOSINOPHIL NFR BLD AUTO: 1.6 % (ref 0.3–6.2)
EOSINOPHIL NFR BLD AUTO: 1.7 % (ref 0.3–6.2)
EOSINOPHIL NFR BLD MANUAL: 1 % (ref 0.3–6.2)
ERYTHROCYTE [DISTWIDTH] IN BLOOD BY AUTOMATED COUNT: 18.6 % (ref 12.3–15.4)
ERYTHROCYTE [DISTWIDTH] IN BLOOD BY AUTOMATED COUNT: 18.7 % (ref 12.3–15.4)
ERYTHROCYTE [DISTWIDTH] IN BLOOD BY AUTOMATED COUNT: 18.7 % (ref 12.3–15.4)
ERYTHROCYTE [DISTWIDTH] IN BLOOD BY AUTOMATED COUNT: 18.8 % (ref 12.3–15.4)
ERYTHROCYTE [DISTWIDTH] IN BLOOD BY AUTOMATED COUNT: 18.8 % (ref 12.3–15.4)
ERYTHROCYTE [DISTWIDTH] IN BLOOD BY AUTOMATED COUNT: 19.4 % (ref 12.3–15.4)
FERRITIN SERPL-MCNC: 172.5 NG/ML (ref 30–400)
FLUAV RNA RESP QL NAA+PROBE: NOT DETECTED
FLUBV RNA RESP QL NAA+PROBE: NOT DETECTED
GFR SERPL CREATININE-BSD FRML MDRD: 35 ML/MIN/1.73
GFR SERPL CREATININE-BSD FRML MDRD: 40 ML/MIN/1.73
GFR SERPL CREATININE-BSD FRML MDRD: 42 ML/MIN/1.73
GFR SERPL CREATININE-BSD FRML MDRD: 42 ML/MIN/1.73
GFR SERPL CREATININE-BSD FRML MDRD: 43 ML/MIN/1.73
GFR SERPL CREATININE-BSD FRML MDRD: 54 ML/MIN/1.73
GLOBULIN UR ELPH-MCNC: 4.6 GM/DL
GLOBULIN UR ELPH-MCNC: 4.6 GM/DL
GLOBULIN UR ELPH-MCNC: 4.7 GM/DL
GLOBULIN UR ELPH-MCNC: 4.7 GM/DL
GLOBULIN UR ELPH-MCNC: 4.8 GM/DL
GLOBULIN UR ELPH-MCNC: 5.1 GM/DL
GLUCOSE BLDC GLUCOMTR-MCNC: 101 MG/DL (ref 70–130)
GLUCOSE BLDC GLUCOMTR-MCNC: 108 MG/DL (ref 70–130)
GLUCOSE BLDC GLUCOMTR-MCNC: 112 MG/DL (ref 70–130)
GLUCOSE BLDC GLUCOMTR-MCNC: 113 MG/DL (ref 70–130)
GLUCOSE BLDC GLUCOMTR-MCNC: 115 MG/DL (ref 70–130)
GLUCOSE BLDC GLUCOMTR-MCNC: 117 MG/DL (ref 70–130)
GLUCOSE BLDC GLUCOMTR-MCNC: 118 MG/DL (ref 70–130)
GLUCOSE BLDC GLUCOMTR-MCNC: 120 MG/DL (ref 70–130)
GLUCOSE BLDC GLUCOMTR-MCNC: 124 MG/DL (ref 70–130)
GLUCOSE BLDC GLUCOMTR-MCNC: 125 MG/DL (ref 70–130)
GLUCOSE BLDC GLUCOMTR-MCNC: 129 MG/DL (ref 70–130)
GLUCOSE BLDC GLUCOMTR-MCNC: 132 MG/DL (ref 70–130)
GLUCOSE BLDC GLUCOMTR-MCNC: 134 MG/DL (ref 70–130)
GLUCOSE BLDC GLUCOMTR-MCNC: 136 MG/DL (ref 70–130)
GLUCOSE BLDC GLUCOMTR-MCNC: 137 MG/DL (ref 70–130)
GLUCOSE BLDC GLUCOMTR-MCNC: 138 MG/DL (ref 70–130)
GLUCOSE BLDC GLUCOMTR-MCNC: 142 MG/DL (ref 70–130)
GLUCOSE BLDC GLUCOMTR-MCNC: 152 MG/DL (ref 70–130)
GLUCOSE BLDC GLUCOMTR-MCNC: 157 MG/DL (ref 70–130)
GLUCOSE BLDC GLUCOMTR-MCNC: 159 MG/DL (ref 70–130)
GLUCOSE BLDC GLUCOMTR-MCNC: 160 MG/DL (ref 70–130)
GLUCOSE BLDC GLUCOMTR-MCNC: 174 MG/DL (ref 70–130)
GLUCOSE BLDC GLUCOMTR-MCNC: 83 MG/DL (ref 70–130)
GLUCOSE SERPL-MCNC: 116 MG/DL (ref 65–99)
GLUCOSE SERPL-MCNC: 128 MG/DL (ref 65–99)
GLUCOSE SERPL-MCNC: 129 MG/DL (ref 65–99)
GLUCOSE SERPL-MCNC: 140 MG/DL (ref 65–99)
GLUCOSE SERPL-MCNC: 156 MG/DL (ref 65–99)
GLUCOSE SERPL-MCNC: 98 MG/DL (ref 65–99)
GLUCOSE UR STRIP-MCNC: NEGATIVE MG/DL
GRAM STN SPEC: NORMAL
HAV AB SER QL IA: NEGATIVE
HAV IGM SERPL QL IA: NORMAL
HBV CORE IGM SERPL QL IA: NORMAL
HBV SURFACE AB SER RIA-ACNC: NORMAL
HBV SURFACE AG SERPL QL IA: NORMAL
HCT VFR BLD AUTO: 43.2 % (ref 37.5–51)
HCT VFR BLD AUTO: 43.2 % (ref 37.5–51)
HCT VFR BLD AUTO: 44.2 % (ref 37.5–51)
HCT VFR BLD AUTO: 46 % (ref 37.5–51)
HCT VFR BLD AUTO: 47.6 % (ref 37.5–51)
HCT VFR BLD AUTO: 48.3 % (ref 37.5–51)
HCV AB SER DONR QL: NORMAL
HGB BLD-MCNC: 14.2 G/DL (ref 13–17.7)
HGB BLD-MCNC: 14.4 G/DL (ref 13–17.7)
HGB BLD-MCNC: 14.5 G/DL (ref 13–17.7)
HGB BLD-MCNC: 15.2 G/DL (ref 13–17.7)
HGB BLD-MCNC: 15.7 G/DL (ref 13–17.7)
HGB BLD-MCNC: 16.2 G/DL (ref 13–17.7)
HGB UR QL STRIP.AUTO: ABNORMAL
HOLD SPECIMEN: NORMAL
HYALINE CASTS UR QL AUTO: ABNORMAL /LPF
IMM GRANULOCYTES # BLD AUTO: 0.01 10*3/MM3 (ref 0–0.05)
IMM GRANULOCYTES # BLD AUTO: 0.03 10*3/MM3 (ref 0–0.05)
IMM GRANULOCYTES # BLD AUTO: 0.04 10*3/MM3 (ref 0–0.05)
IMM GRANULOCYTES NFR BLD AUTO: 0.2 % (ref 0–0.5)
IMM GRANULOCYTES NFR BLD AUTO: 0.3 % (ref 0–0.5)
IMM GRANULOCYTES NFR BLD AUTO: 0.3 % (ref 0–0.5)
IMM GRANULOCYTES NFR BLD AUTO: 0.4 % (ref 0–0.5)
IMM GRANULOCYTES NFR BLD AUTO: 0.5 % (ref 0–0.5)
INR PPP: 1.87 (ref 0.85–1.16)
INR PPP: 2.01 (ref 0.85–1.16)
INR PPP: 2.5 (ref 0.85–1.16)
INR PPP: 2.51 (ref 0.85–1.16)
INR PPP: 2.67 (ref 0.85–1.16)
INR PPP: 3.5 (ref 0.85–1.16)
INR PPP: 4.26 (ref 0.85–1.16)
KETONES UR QL STRIP: NEGATIVE
LAB AP CASE REPORT: NORMAL
LDH FLD-CCNC: 74 U/L
LDH SERPL-CCNC: 468 U/L (ref 135–225)
LEFT ATRIUM VOLUME INDEX: 31.3 ML/M^2
LEFT ATRIUM VOLUME: 78.1 ML
LEUKOCYTE ESTERASE UR QL STRIP.AUTO: ABNORMAL
LIPASE SERPL-CCNC: 28 U/L (ref 13–60)
LV EF 2D ECHO EST: 60 %
LYMPHOCYTES # BLD AUTO: 0.12 10*3/MM3 (ref 0.7–3.1)
LYMPHOCYTES # BLD AUTO: 0.22 10*3/MM3 (ref 0.7–3.1)
LYMPHOCYTES # BLD AUTO: 0.28 10*3/MM3 (ref 0.7–3.1)
LYMPHOCYTES # BLD AUTO: 0.3 10*3/MM3 (ref 0.7–3.1)
LYMPHOCYTES # BLD AUTO: 0.37 10*3/MM3 (ref 0.7–3.1)
LYMPHOCYTES # BLD MANUAL: 0.11 10*3/MM3 (ref 0.7–3.1)
LYMPHOCYTES NFR BLD AUTO: 1 % (ref 19.6–45.3)
LYMPHOCYTES NFR BLD AUTO: 2.6 % (ref 19.6–45.3)
LYMPHOCYTES NFR BLD AUTO: 4.2 % (ref 19.6–45.3)
LYMPHOCYTES NFR BLD AUTO: 4.6 % (ref 19.6–45.3)
LYMPHOCYTES NFR BLD AUTO: 5.2 % (ref 19.6–45.3)
LYMPHOCYTES NFR BLD MANUAL: 8 % (ref 5–12)
LYMPHOCYTES NFR FLD MANUAL: 24 %
MACROPHAGE FLUID: 4 %
MAGNESIUM SERPL-MCNC: 2.1 MG/DL (ref 1.6–2.4)
MAGNESIUM SERPL-MCNC: 2.2 MG/DL (ref 1.6–2.4)
MAGNESIUM SERPL-MCNC: 2.2 MG/DL (ref 1.6–2.4)
MAGNESIUM SERPL-MCNC: 2.4 MG/DL (ref 1.6–2.4)
MCH RBC QN AUTO: 32.4 PG (ref 26.6–33)
MCH RBC QN AUTO: 32.7 PG (ref 26.6–33)
MCH RBC QN AUTO: 32.8 PG (ref 26.6–33)
MCH RBC QN AUTO: 33 PG (ref 26.6–33)
MCH RBC QN AUTO: 33.2 PG (ref 26.6–33)
MCH RBC QN AUTO: 33.2 PG (ref 26.6–33)
MCHC RBC AUTO-ENTMCNC: 32.6 G/DL (ref 31.5–35.7)
MCHC RBC AUTO-ENTMCNC: 32.9 G/DL (ref 31.5–35.7)
MCHC RBC AUTO-ENTMCNC: 33 G/DL (ref 31.5–35.7)
MCHC RBC AUTO-ENTMCNC: 33 G/DL (ref 31.5–35.7)
MCHC RBC AUTO-ENTMCNC: 33.5 G/DL (ref 31.5–35.7)
MCHC RBC AUTO-ENTMCNC: 33.6 G/DL (ref 31.5–35.7)
MCV RBC AUTO: 100.4 FL (ref 79–97)
MCV RBC AUTO: 101.4 FL (ref 79–97)
MCV RBC AUTO: 97.6 FL (ref 79–97)
MCV RBC AUTO: 98.3 FL (ref 79–97)
MCV RBC AUTO: 98.9 FL (ref 79–97)
MCV RBC AUTO: 99.8 FL (ref 79–97)
MESOTHL CELL NFR FLD MANUAL: 5 %
MONOCYTES # BLD AUTO: 0.34 10*3/MM3 (ref 0.1–0.9)
MONOCYTES # BLD AUTO: 0.35 10*3/MM3 (ref 0.1–0.9)
MONOCYTES # BLD AUTO: 0.56 10*3/MM3 (ref 0.1–0.9)
MONOCYTES # BLD AUTO: 0.77 10*3/MM3 (ref 0.1–0.9)
MONOCYTES # BLD AUTO: 1.05 10*3/MM3 (ref 0.1–0.9)
MONOCYTES # BLD: 0.85 10*3/MM3 (ref 0.1–0.9)
MONOCYTES NFR BLD AUTO: 10.8 % (ref 5–12)
MONOCYTES NFR BLD AUTO: 5.2 % (ref 5–12)
MONOCYTES NFR BLD AUTO: 5.3 % (ref 5–12)
MONOCYTES NFR BLD AUTO: 6.5 % (ref 5–12)
MONOCYTES NFR BLD AUTO: 9.2 % (ref 5–12)
MONOCYTES NFR FLD: 16 %
NEUTROPHILS # BLD AUTO: 9.55 10*3/MM3 (ref 1.7–7)
NEUTROPHILS NFR BLD AUTO: 10.03 10*3/MM3 (ref 1.7–7)
NEUTROPHILS NFR BLD AUTO: 5.77 10*3/MM3 (ref 1.7–7)
NEUTROPHILS NFR BLD AUTO: 5.82 10*3/MM3 (ref 1.7–7)
NEUTROPHILS NFR BLD AUTO: 5.98 10*3/MM3 (ref 1.7–7)
NEUTROPHILS NFR BLD AUTO: 7.63 10*3/MM3 (ref 1.7–7)
NEUTROPHILS NFR BLD AUTO: 81.5 % (ref 42.7–76)
NEUTROPHILS NFR BLD AUTO: 87.8 % (ref 42.7–76)
NEUTROPHILS NFR BLD AUTO: 88.1 % (ref 42.7–76)
NEUTROPHILS NFR BLD AUTO: 88.9 % (ref 42.7–76)
NEUTROPHILS NFR BLD AUTO: 90 % (ref 42.7–76)
NEUTROPHILS NFR BLD MANUAL: 83 % (ref 42.7–76)
NEUTROPHILS NFR FLD MANUAL: 51 %
NEUTS BAND NFR BLD MANUAL: 7 % (ref 0–5)
NITRITE UR QL STRIP: NEGATIVE
NRBC BLD AUTO-RTO: 0 /100 WBC (ref 0–0.2)
NT-PROBNP SERPL-MCNC: 1074 PG/ML (ref 0–900)
NT-PROBNP SERPL-MCNC: 2114 PG/ML (ref 0–900)
PATH REPORT.FINAL DX SPEC: NORMAL
PH FLD: 7.55 [PH]
PH UR STRIP.AUTO: 7 [PH] (ref 5–8)
PLAT MORPH BLD: NORMAL
PLATELET # BLD AUTO: 115 10*3/MM3 (ref 140–450)
PLATELET # BLD AUTO: 115 10*3/MM3 (ref 140–450)
PLATELET # BLD AUTO: 123 10*3/MM3 (ref 140–450)
PLATELET # BLD AUTO: 124 10*3/MM3 (ref 140–450)
PLATELET # BLD AUTO: 128 10*3/MM3 (ref 140–450)
PLATELET # BLD AUTO: 86 10*3/MM3 (ref 140–450)
PMV BLD AUTO: 10.8 FL (ref 6–12)
PMV BLD AUTO: 10.9 FL (ref 6–12)
PMV BLD AUTO: 11.1 FL (ref 6–12)
PMV BLD AUTO: 11.2 FL (ref 6–12)
PMV BLD AUTO: 11.3 FL (ref 6–12)
PMV BLD AUTO: 11.6 FL (ref 6–12)
POTASSIUM SERPL-SCNC: 4.7 MMOL/L (ref 3.5–5.2)
POTASSIUM SERPL-SCNC: 4.8 MMOL/L (ref 3.5–5.2)
POTASSIUM SERPL-SCNC: 4.9 MMOL/L (ref 3.5–5.2)
POTASSIUM SERPL-SCNC: 5 MMOL/L (ref 3.5–5.2)
POTASSIUM SERPL-SCNC: 5 MMOL/L (ref 3.5–5.2)
POTASSIUM SERPL-SCNC: 5.2 MMOL/L (ref 3.5–5.2)
PROCALCITONIN SERPL-MCNC: 0.48 NG/ML (ref 0–0.25)
PROCALCITONIN SERPL-MCNC: 0.56 NG/ML (ref 0–0.25)
PROT FLD-MCNC: 1.1 G/DL
PROT SERPL-MCNC: 7.6 G/DL (ref 6–8.5)
PROT SERPL-MCNC: 7.8 G/DL (ref 6–8.5)
PROT SERPL-MCNC: 7.9 G/DL (ref 6–8.5)
PROT SERPL-MCNC: 7.9 G/DL (ref 6–8.5)
PROT SERPL-MCNC: 8.1 G/DL (ref 6–8.5)
PROT SERPL-MCNC: 8.4 G/DL (ref 6–8.5)
PROT UR QL STRIP: ABNORMAL
PROTHROMBIN TIME: 20.8 SECONDS (ref 11.4–14.4)
PROTHROMBIN TIME: 21.9 SECONDS (ref 11.4–14.4)
PROTHROMBIN TIME: 26 SECONDS (ref 11.4–14.4)
PROTHROMBIN TIME: 26 SECONDS (ref 11.4–14.4)
PROTHROMBIN TIME: 27.3 SECONDS (ref 11.4–14.4)
PROTHROMBIN TIME: 33.5 SECONDS (ref 11.4–14.4)
PROTHROMBIN TIME: 39 SECONDS (ref 11.4–14.4)
QT INTERVAL: 426 MS
QTC INTERVAL: 494 MS
RBC # BLD AUTO: 4.33 10*6/MM3 (ref 4.14–5.8)
RBC # BLD AUTO: 4.36 10*6/MM3 (ref 4.14–5.8)
RBC # BLD AUTO: 4.37 10*6/MM3 (ref 4.14–5.8)
RBC # BLD AUTO: 4.58 10*6/MM3 (ref 4.14–5.8)
RBC # BLD AUTO: 4.84 10*6/MM3 (ref 4.14–5.8)
RBC # BLD AUTO: 4.95 10*6/MM3 (ref 4.14–5.8)
RBC # FLD AUTO: <2000 /MM3
RBC # UR STRIP: ABNORMAL /HPF
REF LAB TEST METHOD: ABNORMAL
RH BLD: POSITIVE
RH BLD: POSITIVE
RSV RNA NPH QL NAA+NON-PROBE: NOT DETECTED
SARS-COV-2 RNA RESP QL NAA+PROBE: DETECTED
SCAN SLIDE: NORMAL
SODIUM SERPL-SCNC: 127 MMOL/L (ref 136–145)
SODIUM SERPL-SCNC: 129 MMOL/L (ref 136–145)
SODIUM SERPL-SCNC: 130 MMOL/L (ref 136–145)
SODIUM SERPL-SCNC: 132 MMOL/L (ref 136–145)
SODIUM SERPL-SCNC: 133 MMOL/L (ref 136–145)
SODIUM SERPL-SCNC: 134 MMOL/L (ref 136–145)
SODIUM UR-SCNC: 55 MMOL/L
SP GR UR STRIP: 1.01 (ref 1–1.03)
SQUAMOUS #/AREA URNS HPF: ABNORMAL /HPF
T&S EXPIRATION DATE: NORMAL
TROPONIN T SERPL-MCNC: 0.03 NG/ML (ref 0–0.03)
UROBILINOGEN UR QL STRIP: ABNORMAL
VARIANT LYMPHS NFR BLD MANUAL: 1 % (ref 19.6–45.3)
WBC # FLD AUTO: 177 /MM3
WBC # UR STRIP: ABNORMAL /HPF
WBC MORPH BLD: NORMAL
WBC NRBC COR # BLD: 10.61 10*3/MM3 (ref 3.4–10.8)
WBC NRBC COR # BLD: 11.44 10*3/MM3 (ref 3.4–10.8)
WBC NRBC COR # BLD: 6.54 10*3/MM3 (ref 3.4–10.8)
WBC NRBC COR # BLD: 6.64 10*3/MM3 (ref 3.4–10.8)
WBC NRBC COR # BLD: 7.14 10*3/MM3 (ref 3.4–10.8)
WBC NRBC COR # BLD: 8.59 10*3/MM3 (ref 3.4–10.8)
WHOLE BLOOD HOLD SPECIMEN: NORMAL
WHOLE BLOOD HOLD SPECIMEN: NORMAL

## 2022-01-01 PROCEDURE — 86901 BLOOD TYPING SEROLOGIC RH(D): CPT

## 2022-01-01 PROCEDURE — 84484 ASSAY OF TROPONIN QUANT: CPT

## 2022-01-01 PROCEDURE — 96376 TX/PRO/DX INJ SAME DRUG ADON: CPT

## 2022-01-01 PROCEDURE — 25010000002 AMPICILLIN PER 500 MG: Performed by: HOSPITALIST

## 2022-01-01 PROCEDURE — 99226 PR SBSQ OBSERVATION CARE/DAY 35 MINUTES: CPT | Performed by: HOSPITALIST

## 2022-01-01 PROCEDURE — 86706 HEP B SURFACE ANTIBODY: CPT | Performed by: PHYSICIAN ASSISTANT

## 2022-01-01 PROCEDURE — 86850 RBC ANTIBODY SCREEN: CPT | Performed by: INTERNAL MEDICINE

## 2022-01-01 PROCEDURE — 83615 LACTATE (LD) (LDH) ENZYME: CPT | Performed by: HOSPITALIST

## 2022-01-01 PROCEDURE — G0378 HOSPITAL OBSERVATION PER HR: HCPCS

## 2022-01-01 PROCEDURE — 80053 COMPREHEN METABOLIC PANEL: CPT | Performed by: NURSE PRACTITIONER

## 2022-01-01 PROCEDURE — 25010000002 CEFTRIAXONE PER 250 MG: Performed by: INTERNAL MEDICINE

## 2022-01-01 PROCEDURE — 85610 PROTHROMBIN TIME: CPT | Performed by: INTERNAL MEDICINE

## 2022-01-01 PROCEDURE — 25010000002 LORAZEPAM PER 2 MG: Performed by: INTERNAL MEDICINE

## 2022-01-01 PROCEDURE — 82962 GLUCOSE BLOOD TEST: CPT

## 2022-01-01 PROCEDURE — 74176 CT ABD & PELVIS W/O CONTRAST: CPT

## 2022-01-01 PROCEDURE — 25010000002 MORPHINE PER 10 MG: Performed by: HOSPITALIST

## 2022-01-01 PROCEDURE — 86140 C-REACTIVE PROTEIN: CPT | Performed by: NURSE PRACTITIONER

## 2022-01-01 PROCEDURE — 85007 BL SMEAR W/DIFF WBC COUNT: CPT | Performed by: NURSE PRACTITIONER

## 2022-01-01 PROCEDURE — 76942 ECHO GUIDE FOR BIOPSY: CPT

## 2022-01-01 PROCEDURE — 25010000005 REMDESIVIR 100 MG/20ML SOLUTION 1 EACH VIAL: Performed by: NURSE PRACTITIONER

## 2022-01-01 PROCEDURE — 83880 ASSAY OF NATRIURETIC PEPTIDE: CPT | Performed by: EMERGENCY MEDICINE

## 2022-01-01 PROCEDURE — 99220 PR INITIAL OBSERVATION CARE/DAY 70 MINUTES: CPT | Performed by: INTERNAL MEDICINE

## 2022-01-01 PROCEDURE — 85379 FIBRIN DEGRADATION QUANT: CPT | Performed by: NURSE PRACTITIONER

## 2022-01-01 PROCEDURE — 89051 BODY FLUID CELL COUNT: CPT | Performed by: HOSPITALIST

## 2022-01-01 PROCEDURE — 99214 OFFICE O/P EST MOD 30 MIN: CPT | Performed by: NURSE PRACTITIONER

## 2022-01-01 PROCEDURE — 85025 COMPLETE CBC W/AUTO DIFF WBC: CPT | Performed by: NURSE PRACTITIONER

## 2022-01-01 PROCEDURE — 74018 RADEX ABDOMEN 1 VIEW: CPT

## 2022-01-01 PROCEDURE — 25010000002 FUROSEMIDE PER 20 MG: Performed by: NURSE PRACTITIONER

## 2022-01-01 PROCEDURE — 71045 X-RAY EXAM CHEST 1 VIEW: CPT

## 2022-01-01 PROCEDURE — 86900 BLOOD TYPING SEROLOGIC ABO: CPT | Performed by: INTERNAL MEDICINE

## 2022-01-01 PROCEDURE — 97597 DBRDMT OPN WND 1ST 20 CM/<: CPT

## 2022-01-01 PROCEDURE — 86140 C-REACTIVE PROTEIN: CPT | Performed by: INTERNAL MEDICINE

## 2022-01-01 PROCEDURE — 29581 APPL MULTLAYER CMPRN SYS LEG: CPT

## 2022-01-01 PROCEDURE — 83735 ASSAY OF MAGNESIUM: CPT | Performed by: INTERNAL MEDICINE

## 2022-01-01 PROCEDURE — 82306 VITAMIN D 25 HYDROXY: CPT | Performed by: INTERNAL MEDICINE

## 2022-01-01 PROCEDURE — 25010000002 HYDROMORPHONE 1 MG/ML SOLUTION: Performed by: INTERNAL MEDICINE

## 2022-01-01 PROCEDURE — 83735 ASSAY OF MAGNESIUM: CPT | Performed by: EMERGENCY MEDICINE

## 2022-01-01 PROCEDURE — 99226 PR SBSQ OBSERVATION CARE/DAY 35 MINUTES: CPT | Performed by: INTERNAL MEDICINE

## 2022-01-01 PROCEDURE — 87086 URINE CULTURE/COLONY COUNT: CPT | Performed by: NURSE PRACTITIONER

## 2022-01-01 PROCEDURE — 99214 OFFICE O/P EST MOD 30 MIN: CPT | Performed by: INTERNAL MEDICINE

## 2022-01-01 PROCEDURE — 87186 SC STD MICRODIL/AGAR DIL: CPT | Performed by: NURSE PRACTITIONER

## 2022-01-01 PROCEDURE — 83880 ASSAY OF NATRIURETIC PEPTIDE: CPT | Performed by: INTERNAL MEDICINE

## 2022-01-01 PROCEDURE — 87070 CULTURE OTHR SPECIMN AEROBIC: CPT | Performed by: HOSPITALIST

## 2022-01-01 PROCEDURE — 99285 EMERGENCY DEPT VISIT HI MDM: CPT

## 2022-01-01 PROCEDURE — 63710000001 DEXAMETHASONE PER 0.25 MG: Performed by: NURSE PRACTITIONER

## 2022-01-01 PROCEDURE — 83735 ASSAY OF MAGNESIUM: CPT | Performed by: NURSE PRACTITIONER

## 2022-01-01 PROCEDURE — 84157 ASSAY OF PROTEIN OTHER: CPT | Performed by: HOSPITALIST

## 2022-01-01 PROCEDURE — 25010000002 HYDROMORPHONE PER 4 MG: Performed by: INTERNAL MEDICINE

## 2022-01-01 PROCEDURE — 25010000002 MORPHINE PER 10 MG: Performed by: NURSE PRACTITIONER

## 2022-01-01 PROCEDURE — 71250 CT THORAX DX C-: CPT

## 2022-01-01 PROCEDURE — 99217 PR OBSERVATION CARE DISCHARGE MANAGEMENT: CPT | Performed by: INTERNAL MEDICINE

## 2022-01-01 PROCEDURE — 83605 ASSAY OF LACTIC ACID: CPT | Performed by: NURSE PRACTITIONER

## 2022-01-01 PROCEDURE — 86900 BLOOD TYPING SEROLOGIC ABO: CPT

## 2022-01-01 PROCEDURE — 82248 BILIRUBIN DIRECT: CPT | Performed by: NURSE PRACTITIONER

## 2022-01-01 PROCEDURE — 93005 ELECTROCARDIOGRAM TRACING: CPT | Performed by: EMERGENCY MEDICINE

## 2022-01-01 PROCEDURE — 93306 TTE W/DOPPLER COMPLETE: CPT

## 2022-01-01 PROCEDURE — 99212 OFFICE O/P EST SF 10 MIN: CPT | Performed by: INTERNAL MEDICINE

## 2022-01-01 PROCEDURE — 97162 PT EVAL MOD COMPLEX 30 MIN: CPT

## 2022-01-01 PROCEDURE — 99212 OFFICE O/P EST SF 10 MIN: CPT | Performed by: PHYSICIAN ASSISTANT

## 2022-01-01 PROCEDURE — 82105 ALPHA-FETOPROTEIN SERUM: CPT | Performed by: PHYSICIAN ASSISTANT

## 2022-01-01 PROCEDURE — 82728 ASSAY OF FERRITIN: CPT | Performed by: NURSE PRACTITIONER

## 2022-01-01 PROCEDURE — 25010000002 CEFTRIAXONE PER 250 MG: Performed by: NURSE PRACTITIONER

## 2022-01-01 PROCEDURE — 87015 SPECIMEN INFECT AGNT CONCNTJ: CPT | Performed by: HOSPITALIST

## 2022-01-01 PROCEDURE — 86708 HEPATITIS A ANTIBODY: CPT | Performed by: INTERNAL MEDICINE

## 2022-01-01 PROCEDURE — 83690 ASSAY OF LIPASE: CPT

## 2022-01-01 PROCEDURE — 83986 ASSAY PH BODY FLUID NOS: CPT | Performed by: HOSPITALIST

## 2022-01-01 PROCEDURE — 87205 SMEAR GRAM STAIN: CPT | Performed by: HOSPITALIST

## 2022-01-01 PROCEDURE — 83605 ASSAY OF LACTIC ACID: CPT | Performed by: INTERNAL MEDICINE

## 2022-01-01 PROCEDURE — 81001 URINALYSIS AUTO W/SCOPE: CPT | Performed by: EMERGENCY MEDICINE

## 2022-01-01 PROCEDURE — 86901 BLOOD TYPING SEROLOGIC RH(D): CPT | Performed by: INTERNAL MEDICINE

## 2022-01-01 PROCEDURE — 84145 PROCALCITONIN (PCT): CPT | Performed by: INTERNAL MEDICINE

## 2022-01-01 PROCEDURE — 96375 TX/PRO/DX INJ NEW DRUG ADDON: CPT

## 2022-01-01 PROCEDURE — 25010000002 SULFUR HEXAFLUORIDE MICROSPH 60.7-25 MG RECONSTITUTED SUSPENSION: Performed by: INTERNAL MEDICINE

## 2022-01-01 PROCEDURE — 85025 COMPLETE CBC W/AUTO DIFF WBC: CPT

## 2022-01-01 PROCEDURE — 80053 COMPREHEN METABOLIC PANEL: CPT

## 2022-01-01 PROCEDURE — 82570 ASSAY OF URINE CREATININE: CPT | Performed by: NURSE PRACTITIONER

## 2022-01-01 PROCEDURE — 63710000001 INSULIN LISPRO (HUMAN) PER 5 UNITS: Performed by: NURSE PRACTITIONER

## 2022-01-01 PROCEDURE — 85610 PROTHROMBIN TIME: CPT | Performed by: EMERGENCY MEDICINE

## 2022-01-01 PROCEDURE — 84300 ASSAY OF URINE SODIUM: CPT | Performed by: NURSE PRACTITIONER

## 2022-01-01 PROCEDURE — 87637 SARSCOV2&INF A&B&RSV AMP PRB: CPT | Performed by: EMERGENCY MEDICINE

## 2022-01-01 PROCEDURE — 85730 THROMBOPLASTIN TIME PARTIAL: CPT | Performed by: INTERNAL MEDICINE

## 2022-01-01 PROCEDURE — 87077 CULTURE AEROBIC IDENTIFY: CPT | Performed by: NURSE PRACTITIONER

## 2022-01-01 PROCEDURE — 96365 THER/PROPH/DIAG IV INF INIT: CPT

## 2022-01-01 PROCEDURE — 80074 ACUTE HEPATITIS PANEL: CPT | Performed by: NURSE PRACTITIONER

## 2022-01-01 PROCEDURE — 93306 TTE W/DOPPLER COMPLETE: CPT | Performed by: INTERNAL MEDICINE

## 2022-01-01 PROCEDURE — C9803 HOPD COVID-19 SPEC COLLECT: HCPCS

## 2022-01-01 PROCEDURE — 82550 ASSAY OF CK (CPK): CPT | Performed by: INTERNAL MEDICINE

## 2022-01-01 PROCEDURE — 93005 ELECTROCARDIOGRAM TRACING: CPT

## 2022-01-01 PROCEDURE — 25010000002 MAGNESIUM SULFATE 2 GM/50ML SOLUTION: Performed by: NURSE PRACTITIONER

## 2022-01-01 PROCEDURE — 88112 CYTOPATH CELL ENHANCE TECH: CPT | Performed by: HOSPITALIST

## 2022-01-01 PROCEDURE — 76705 ECHO EXAM OF ABDOMEN: CPT

## 2022-01-01 RX ORDER — POLYVINYL ALCOHOL 14 MG/ML
2 SOLUTION/ DROPS OPHTHALMIC 2 TIMES DAILY
Status: DISCONTINUED | OUTPATIENT
Start: 2022-01-01 | End: 2022-01-01 | Stop reason: HOSPADM

## 2022-01-01 RX ORDER — BUMETANIDE 0.25 MG/ML
1 INJECTION INTRAMUSCULAR; INTRAVENOUS EVERY 12 HOURS
Status: DISCONTINUED | OUTPATIENT
Start: 2022-01-01 | End: 2022-01-01

## 2022-01-01 RX ORDER — HYDROMORPHONE HYDROCHLORIDE 1 MG/ML
0.5 INJECTION, SOLUTION INTRAMUSCULAR; INTRAVENOUS; SUBCUTANEOUS ONCE
Status: DISCONTINUED | OUTPATIENT
Start: 2022-01-01 | End: 2022-01-01 | Stop reason: HOSPADM

## 2022-01-01 RX ORDER — AMPICILLIN 500 MG/1
500 CAPSULE ORAL EVERY 6 HOURS SCHEDULED
Status: DISCONTINUED | OUTPATIENT
Start: 2022-01-01 | End: 2022-01-01 | Stop reason: HOSPADM

## 2022-01-01 RX ORDER — MAGNESIUM SULFATE HEPTAHYDRATE 40 MG/ML
2 INJECTION, SOLUTION INTRAVENOUS AS NEEDED
Status: DISCONTINUED | OUTPATIENT
Start: 2022-01-01 | End: 2022-01-01 | Stop reason: HOSPADM

## 2022-01-01 RX ORDER — SODIUM CHLORIDE 9 MG/ML
10 INJECTION INTRAVENOUS AS NEEDED
Status: DISCONTINUED | OUTPATIENT
Start: 2022-01-01 | End: 2022-01-01 | Stop reason: HOSPADM

## 2022-01-01 RX ORDER — MORPHINE SULFATE 2 MG/ML
1 INJECTION, SOLUTION INTRAMUSCULAR; INTRAVENOUS ONCE
Status: COMPLETED | OUTPATIENT
Start: 2022-01-01 | End: 2022-01-01

## 2022-01-01 RX ORDER — LORAZEPAM 2 MG/ML
1 INJECTION INTRAMUSCULAR
Status: DISCONTINUED | OUTPATIENT
Start: 2022-01-01 | End: 2022-01-01 | Stop reason: HOSPADM

## 2022-01-01 RX ORDER — FUROSEMIDE 10 MG/ML
20 INJECTION INTRAMUSCULAR; INTRAVENOUS EVERY 6 HOURS
Status: DISCONTINUED | OUTPATIENT
Start: 2022-01-01 | End: 2022-01-01 | Stop reason: HOSPADM

## 2022-01-01 RX ORDER — GLYCOPYRROLATE 0.2 MG/ML
0.4 INJECTION INTRAMUSCULAR; INTRAVENOUS EVERY 4 HOURS PRN
Status: DISCONTINUED | OUTPATIENT
Start: 2022-01-01 | End: 2022-01-01 | Stop reason: HOSPADM

## 2022-01-01 RX ORDER — DEXTROSE MONOHYDRATE 25 G/50ML
25 INJECTION, SOLUTION INTRAVENOUS
Status: DISCONTINUED | OUTPATIENT
Start: 2022-01-01 | End: 2022-01-01 | Stop reason: HOSPADM

## 2022-01-01 RX ORDER — KETOROLAC TROMETHAMINE 30 MG/ML
15 INJECTION, SOLUTION INTRAMUSCULAR; INTRAVENOUS EVERY 8 HOURS PRN
Status: DISCONTINUED | OUTPATIENT
Start: 2022-01-01 | End: 2022-01-01 | Stop reason: HOSPADM

## 2022-01-01 RX ORDER — ACETAMINOPHEN 650 MG/1
650 SUPPOSITORY RECTAL EVERY 4 HOURS PRN
Status: DISCONTINUED | OUTPATIENT
Start: 2022-01-01 | End: 2022-01-01 | Stop reason: HOSPADM

## 2022-01-01 RX ORDER — BUMETANIDE 0.25 MG/ML
1 INJECTION INTRAMUSCULAR; INTRAVENOUS
Status: DISCONTINUED | OUTPATIENT
Start: 2022-01-01 | End: 2022-01-01

## 2022-01-01 RX ORDER — BISACODYL 5 MG/1
10 TABLET, DELAYED RELEASE ORAL DAILY PRN
Status: DISCONTINUED | OUTPATIENT
Start: 2022-01-01 | End: 2022-01-01 | Stop reason: HOSPADM

## 2022-01-01 RX ORDER — ACETAMINOPHEN 325 MG/1
650 TABLET ORAL EVERY 6 HOURS PRN
Status: DISCONTINUED | OUTPATIENT
Start: 2022-01-01 | End: 2022-01-01

## 2022-01-01 RX ORDER — SODIUM CHLORIDE 0.9 % (FLUSH) 0.9 %
10 SYRINGE (ML) INJECTION AS NEEDED
Status: DISCONTINUED | OUTPATIENT
Start: 2022-01-01 | End: 2022-01-01 | Stop reason: HOSPADM

## 2022-01-01 RX ORDER — HALOPERIDOL 5 MG/ML
1 INJECTION INTRAMUSCULAR EVERY 4 HOURS PRN
Status: DISCONTINUED | OUTPATIENT
Start: 2022-01-01 | End: 2022-01-01 | Stop reason: HOSPADM

## 2022-01-01 RX ORDER — CHOLECALCIFEROL (VITAMIN D3) 125 MCG
5 CAPSULE ORAL NIGHTLY PRN
Status: DISCONTINUED | OUTPATIENT
Start: 2022-01-01 | End: 2022-01-01 | Stop reason: HOSPADM

## 2022-01-01 RX ORDER — BENZONATATE 100 MG/1
100 CAPSULE ORAL 3 TIMES DAILY PRN
Status: DISCONTINUED | OUTPATIENT
Start: 2022-01-01 | End: 2022-01-01 | Stop reason: HOSPADM

## 2022-01-01 RX ORDER — BISACODYL 10 MG
10 SUPPOSITORY, RECTAL RECTAL DAILY PRN
Status: DISCONTINUED | OUTPATIENT
Start: 2022-01-01 | End: 2022-01-01 | Stop reason: HOSPADM

## 2022-01-01 RX ORDER — POLYVINYL ALCOHOL 14 MG/ML
2 SOLUTION/ DROPS OPHTHALMIC
Status: DISCONTINUED | OUTPATIENT
Start: 2022-01-01 | End: 2022-01-01 | Stop reason: HOSPADM

## 2022-01-01 RX ORDER — DOCUSATE SODIUM 100 MG/1
100 CAPSULE, LIQUID FILLED ORAL DAILY PRN
Status: DISCONTINUED | OUTPATIENT
Start: 2022-01-01 | End: 2022-01-01 | Stop reason: HOSPADM

## 2022-01-01 RX ORDER — SCOLOPAMINE TRANSDERMAL SYSTEM 1 MG/1
1 PATCH, EXTENDED RELEASE TRANSDERMAL
Status: DISCONTINUED | OUTPATIENT
Start: 2022-01-01 | End: 2022-01-01 | Stop reason: HOSPADM

## 2022-01-01 RX ORDER — LORAZEPAM 2 MG/ML
1 INJECTION INTRAMUSCULAR EVERY 6 HOURS
Status: DISCONTINUED | OUTPATIENT
Start: 2022-01-01 | End: 2022-01-01 | Stop reason: HOSPADM

## 2022-01-01 RX ORDER — POTASSIUM CHLORIDE 750 MG/1
30 CAPSULE, EXTENDED RELEASE ORAL DAILY
Status: DISCONTINUED | OUTPATIENT
Start: 2022-01-01 | End: 2022-01-01 | Stop reason: HOSPADM

## 2022-01-01 RX ORDER — BUMETANIDE 0.25 MG/ML
2 INJECTION INTRAMUSCULAR; INTRAVENOUS ONCE
Status: DISCONTINUED | OUTPATIENT
Start: 2022-01-01 | End: 2022-01-01

## 2022-01-01 RX ORDER — MAGNESIUM SULFATE HEPTAHYDRATE 40 MG/ML
4 INJECTION, SOLUTION INTRAVENOUS AS NEEDED
Status: DISCONTINUED | OUTPATIENT
Start: 2022-01-01 | End: 2022-01-01 | Stop reason: HOSPADM

## 2022-01-01 RX ORDER — FUROSEMIDE 10 MG/ML
20 INJECTION INTRAMUSCULAR; INTRAVENOUS EVERY 6 HOURS PRN
Status: DISCONTINUED | OUTPATIENT
Start: 2022-01-01 | End: 2022-01-01 | Stop reason: HOSPADM

## 2022-01-01 RX ORDER — LIDOCAINE HYDROCHLORIDE 10 MG/ML
10 INJECTION, SOLUTION EPIDURAL; INFILTRATION; INTRACAUDAL; PERINEURAL ONCE
Status: COMPLETED | OUTPATIENT
Start: 2022-01-01 | End: 2022-01-01

## 2022-01-01 RX ORDER — ALUMINA, MAGNESIA, AND SIMETHICONE 2400; 2400; 240 MG/30ML; MG/30ML; MG/30ML
15 SUSPENSION ORAL EVERY 6 HOURS PRN
Status: DISCONTINUED | OUTPATIENT
Start: 2022-01-01 | End: 2022-01-01 | Stop reason: HOSPADM

## 2022-01-01 RX ORDER — ROSUVASTATIN CALCIUM 20 MG/1
20 TABLET, COATED ORAL NIGHTLY
Status: DISCONTINUED | OUTPATIENT
Start: 2022-01-01 | End: 2022-01-01

## 2022-01-01 RX ORDER — HYDROMORPHONE HYDROCHLORIDE 1 MG/ML
0.5 INJECTION, SOLUTION INTRAMUSCULAR; INTRAVENOUS; SUBCUTANEOUS EVERY 4 HOURS PRN
Status: DISCONTINUED | OUTPATIENT
Start: 2022-01-01 | End: 2022-01-01

## 2022-01-01 RX ORDER — DEXTROMETHORPHAN POLISTIREX 30 MG/5ML
60 SUSPENSION ORAL EVERY 12 HOURS PRN
Status: DISCONTINUED | OUTPATIENT
Start: 2022-01-01 | End: 2022-01-01 | Stop reason: HOSPADM

## 2022-01-01 RX ORDER — AMOXICILLIN 250 MG
2 CAPSULE ORAL 2 TIMES DAILY
Status: DISCONTINUED | OUTPATIENT
Start: 2022-01-01 | End: 2022-01-01 | Stop reason: HOSPADM

## 2022-01-01 RX ORDER — BUMETANIDE 0.25 MG/ML
0.5 INJECTION INTRAMUSCULAR; INTRAVENOUS ONCE
Status: COMPLETED | OUTPATIENT
Start: 2022-01-01 | End: 2022-01-01

## 2022-01-01 RX ORDER — LORAZEPAM 2 MG/ML
1 INJECTION INTRAMUSCULAR EVERY 4 HOURS PRN
Status: DISCONTINUED | OUTPATIENT
Start: 2022-01-01 | End: 2022-01-01

## 2022-01-01 RX ORDER — BUMETANIDE 0.25 MG/ML
2 INJECTION INTRAMUSCULAR; INTRAVENOUS
Status: DISCONTINUED | OUTPATIENT
Start: 2022-01-01 | End: 2022-01-01

## 2022-01-01 RX ORDER — MORPHINE SULFATE 2 MG/ML
0.5 INJECTION, SOLUTION INTRAMUSCULAR; INTRAVENOUS ONCE
Status: COMPLETED | OUTPATIENT
Start: 2022-01-01 | End: 2022-01-01

## 2022-01-01 RX ORDER — POLYETHYLENE GLYCOL 3350 17 G/17G
17 POWDER, FOR SOLUTION ORAL DAILY PRN
Status: DISCONTINUED | OUTPATIENT
Start: 2022-01-01 | End: 2022-01-01 | Stop reason: HOSPADM

## 2022-01-01 RX ORDER — PANTOPRAZOLE SODIUM 40 MG/10ML
40 INJECTION, POWDER, LYOPHILIZED, FOR SOLUTION INTRAVENOUS
Status: DISCONTINUED | OUTPATIENT
Start: 2022-01-01 | End: 2022-01-01 | Stop reason: HOSPADM

## 2022-01-01 RX ORDER — DEXAMETHASONE SODIUM PHOSPHATE 4 MG/ML
6 INJECTION, SOLUTION INTRA-ARTICULAR; INTRALESIONAL; INTRAMUSCULAR; INTRAVENOUS; SOFT TISSUE DAILY
Status: DISCONTINUED | OUTPATIENT
Start: 2022-01-01 | End: 2022-01-01

## 2022-01-01 RX ORDER — CALCIUM CARBONATE 200(500)MG
2 TABLET,CHEWABLE ORAL 3 TIMES DAILY PRN
Status: DISCONTINUED | OUTPATIENT
Start: 2022-01-01 | End: 2022-01-01 | Stop reason: HOSPADM

## 2022-01-01 RX ORDER — LACTULOSE 10 G/15ML
20 SOLUTION ORAL 2 TIMES DAILY
Status: DISCONTINUED | OUTPATIENT
Start: 2022-01-01 | End: 2022-01-01 | Stop reason: HOSPADM

## 2022-01-01 RX ORDER — ALBUTEROL SULFATE 90 UG/1
2 AEROSOL, METERED RESPIRATORY (INHALATION) EVERY 6 HOURS PRN
Status: DISCONTINUED | OUTPATIENT
Start: 2022-01-01 | End: 2022-01-01 | Stop reason: HOSPADM

## 2022-01-01 RX ORDER — AMPICILLIN 500 MG/1
500 INJECTION, POWDER, FOR SOLUTION INTRAMUSCULAR; INTRAVENOUS EVERY 6 HOURS SCHEDULED
Status: DISCONTINUED | OUTPATIENT
Start: 2022-01-01 | End: 2022-01-01

## 2022-01-01 RX ORDER — MAGNESIUM SULFATE 1 G/100ML
1 INJECTION INTRAVENOUS AS NEEDED
Status: DISCONTINUED | OUTPATIENT
Start: 2022-01-01 | End: 2022-01-01 | Stop reason: HOSPADM

## 2022-01-01 RX ORDER — BISACODYL 10 MG
10 SUPPOSITORY, RECTAL RECTAL NIGHTLY
Status: DISCONTINUED | OUTPATIENT
Start: 2022-01-01 | End: 2022-01-01 | Stop reason: HOSPADM

## 2022-01-01 RX ORDER — NICOTINE POLACRILEX 4 MG
15 LOZENGE BUCCAL
Status: DISCONTINUED | OUTPATIENT
Start: 2022-01-01 | End: 2022-01-01 | Stop reason: HOSPADM

## 2022-01-01 RX ADMIN — DOCUSATE SODIUM 50 MG AND SENNOSIDES 8.6 MG 2 TABLET: 8.6; 5 TABLET, FILM COATED ORAL at 20:45

## 2022-01-01 RX ADMIN — DOCUSATE SODIUM 50 MG AND SENNOSIDES 8.6 MG 2 TABLET: 8.6; 5 TABLET, FILM COATED ORAL at 09:56

## 2022-01-01 RX ADMIN — DOCUSATE SODIUM 50 MG AND SENNOSIDES 8.6 MG 2 TABLET: 8.6; 5 TABLET, FILM COATED ORAL at 21:27

## 2022-01-01 RX ADMIN — HYDROMORPHONE HYDROCHLORIDE 1 MG: 1 INJECTION, SOLUTION INTRAMUSCULAR; INTRAVENOUS; SUBCUTANEOUS at 09:47

## 2022-01-01 RX ADMIN — APIXABAN 5 MG: 5 TABLET, FILM COATED ORAL at 22:16

## 2022-01-01 RX ADMIN — AMPICILLIN SODIUM 500 MG: 500 INJECTION, POWDER, FOR SOLUTION INTRAMUSCULAR; INTRAVENOUS at 09:50

## 2022-01-01 RX ADMIN — ALUMINUM HYDROXIDE, MAGNESIUM HYDROXIDE, AND DIMETHICONE 15 ML: 400; 400; 40 SUSPENSION ORAL at 23:00

## 2022-01-01 RX ADMIN — LORAZEPAM 1 MG: 2 INJECTION INTRAMUSCULAR; INTRAVENOUS at 05:48

## 2022-01-01 RX ADMIN — HYDROMORPHONE HYDROCHLORIDE 0.5 MG: 1 INJECTION, SOLUTION INTRAMUSCULAR; INTRAVENOUS; SUBCUTANEOUS at 22:59

## 2022-01-01 RX ADMIN — CEFTRIAXONE SODIUM 1 G: 1 INJECTION, POWDER, FOR SOLUTION INTRAMUSCULAR; INTRAVENOUS at 20:41

## 2022-01-01 RX ADMIN — APIXABAN 5 MG: 5 TABLET, FILM COATED ORAL at 13:06

## 2022-01-01 RX ADMIN — HYDROMORPHONE HYDROCHLORIDE 1 MG: 1 INJECTION, SOLUTION INTRAMUSCULAR; INTRAVENOUS; SUBCUTANEOUS at 05:37

## 2022-01-01 RX ADMIN — LIDOCAINE HYDROCHLORIDE 10 ML: 10 INJECTION, SOLUTION EPIDURAL; INFILTRATION; INTRACAUDAL; PERINEURAL at 11:05

## 2022-01-01 RX ADMIN — MINERAL OIL: 1000 LIQUID ORAL at 04:16

## 2022-01-01 RX ADMIN — MINERAL OIL: 1000 LIQUID ORAL at 20:30

## 2022-01-01 RX ADMIN — HYDROMORPHONE HYDROCHLORIDE 1 MG: 1 INJECTION, SOLUTION INTRAMUSCULAR; INTRAVENOUS; SUBCUTANEOUS at 08:44

## 2022-01-01 RX ADMIN — MAGNESIUM SULFATE HEPTAHYDRATE 2 G: 2 INJECTION, SOLUTION INTRAVENOUS at 08:14

## 2022-01-01 RX ADMIN — SODIUM CHLORIDE 2 G: 900 INJECTION INTRAVENOUS at 05:28

## 2022-01-01 RX ADMIN — FUROSEMIDE 20 MG: 10 INJECTION, SOLUTION INTRAMUSCULAR; INTRAVENOUS at 09:11

## 2022-01-01 RX ADMIN — DEXAMETHASONE 6 MG: 2 TABLET ORAL at 08:12

## 2022-01-01 RX ADMIN — MINERAL OIL: 1000 LIQUID ORAL at 03:59

## 2022-01-01 RX ADMIN — FUROSEMIDE 20 MG: 10 INJECTION, SOLUTION INTRAMUSCULAR; INTRAVENOUS at 15:55

## 2022-01-01 RX ADMIN — AMPICILLIN SODIUM 500 MG: 500 INJECTION, POWDER, FOR SOLUTION INTRAMUSCULAR; INTRAVENOUS at 15:20

## 2022-01-01 RX ADMIN — DEXAMETHASONE 6 MG: 2 TABLET ORAL at 10:12

## 2022-01-01 RX ADMIN — LORAZEPAM 1 MG: 2 INJECTION INTRAMUSCULAR; INTRAVENOUS at 00:18

## 2022-01-01 RX ADMIN — POTASSIUM CHLORIDE 30 MEQ: 750 CAPSULE, EXTENDED RELEASE ORAL at 09:00

## 2022-01-01 RX ADMIN — FUROSEMIDE 20 MG: 10 INJECTION, SOLUTION INTRAMUSCULAR; INTRAVENOUS at 20:27

## 2022-01-01 RX ADMIN — BUMETANIDE 2 MG: 0.25 INJECTION INTRAMUSCULAR; INTRAVENOUS at 17:47

## 2022-01-01 RX ADMIN — LORAZEPAM 1 MG: 2 INJECTION INTRAMUSCULAR; INTRAVENOUS at 17:11

## 2022-01-01 RX ADMIN — ROSUVASTATIN CALCIUM 20 MG: 20 TABLET, FILM COATED ORAL at 22:16

## 2022-01-01 RX ADMIN — INSULIN LISPRO 2 UNITS: 100 INJECTION, SOLUTION INTRAVENOUS; SUBCUTANEOUS at 17:53

## 2022-01-01 RX ADMIN — APIXABAN 5 MG: 5 TABLET, FILM COATED ORAL at 19:58

## 2022-01-01 RX ADMIN — MINERAL OIL: 1000 LIQUID ORAL at 09:47

## 2022-01-01 RX ADMIN — MINERAL OIL: 1000 LIQUID ORAL at 09:12

## 2022-01-01 RX ADMIN — ACETAMINOPHEN 650 MG: 650 SUPPOSITORY RECTAL at 13:29

## 2022-01-01 RX ADMIN — AMPICILLIN SODIUM 500 MG: 500 INJECTION, POWDER, FOR SOLUTION INTRAMUSCULAR; INTRAVENOUS at 09:01

## 2022-01-01 RX ADMIN — ANTACID TABLETS 2 TABLET: 500 TABLET, CHEWABLE ORAL at 19:18

## 2022-01-01 RX ADMIN — Medication 400 MG: at 10:12

## 2022-01-01 RX ADMIN — AMPICILLIN SODIUM 500 MG: 500 INJECTION, POWDER, FOR SOLUTION INTRAMUSCULAR; INTRAVENOUS at 01:57

## 2022-01-01 RX ADMIN — LACTULOSE 20 G: 20 SOLUTION ORAL at 14:30

## 2022-01-01 RX ADMIN — HYDROMORPHONE HYDROCHLORIDE 1 MG: 1 INJECTION, SOLUTION INTRAMUSCULAR; INTRAVENOUS; SUBCUTANEOUS at 16:43

## 2022-01-01 RX ADMIN — AMPICILLIN 500 MG: 500 CAPSULE ORAL at 12:00

## 2022-01-01 RX ADMIN — LORAZEPAM 1 MG: 2 INJECTION INTRAMUSCULAR; INTRAVENOUS at 14:31

## 2022-01-01 RX ADMIN — GLYCOPYRROLATE 0.4 MG: 0.2 INJECTION INTRAMUSCULAR; INTRAVENOUS at 05:36

## 2022-01-01 RX ADMIN — LORAZEPAM 1 MG: 2 INJECTION INTRAMUSCULAR; INTRAVENOUS at 06:13

## 2022-01-01 RX ADMIN — HYDROMORPHONE HYDROCHLORIDE 1 MG: 1 INJECTION, SOLUTION INTRAMUSCULAR; INTRAVENOUS; SUBCUTANEOUS at 20:28

## 2022-01-01 RX ADMIN — HYDROMORPHONE HYDROCHLORIDE 1 MG: 1 INJECTION, SOLUTION INTRAMUSCULAR; INTRAVENOUS; SUBCUTANEOUS at 15:55

## 2022-01-01 RX ADMIN — REMDESIVIR 100 MG: 100 INJECTION, POWDER, LYOPHILIZED, FOR SOLUTION INTRAVENOUS at 12:00

## 2022-01-01 RX ADMIN — LORAZEPAM 1 MG: 2 INJECTION INTRAMUSCULAR; INTRAVENOUS at 00:59

## 2022-01-01 RX ADMIN — BUMETANIDE 2 MG: 0.25 INJECTION INTRAMUSCULAR; INTRAVENOUS at 17:52

## 2022-01-01 RX ADMIN — LACTULOSE 20 G: 20 SOLUTION ORAL at 21:27

## 2022-01-01 RX ADMIN — FUROSEMIDE 20 MG: 10 INJECTION, SOLUTION INTRAMUSCULAR; INTRAVENOUS at 20:29

## 2022-01-01 RX ADMIN — Medication 400 MG: at 08:30

## 2022-01-01 RX ADMIN — BUMETANIDE 2 MG: 0.25 INJECTION INTRAMUSCULAR; INTRAVENOUS at 08:29

## 2022-01-01 RX ADMIN — HYDROMORPHONE HYDROCHLORIDE 0.5 MG: 1 INJECTION, SOLUTION INTRAMUSCULAR; INTRAVENOUS; SUBCUTANEOUS at 13:56

## 2022-01-01 RX ADMIN — CEFTRIAXONE SODIUM 1 G: 1 INJECTION, POWDER, FOR SOLUTION INTRAMUSCULAR; INTRAVENOUS at 22:18

## 2022-01-01 RX ADMIN — FUROSEMIDE 20 MG: 10 INJECTION, SOLUTION INTRAMUSCULAR; INTRAVENOUS at 03:12

## 2022-01-01 RX ADMIN — SULFUR HEXAFLUORIDE 2 ML: KIT at 09:57

## 2022-01-01 RX ADMIN — HYDROMORPHONE HYDROCHLORIDE 1 MG: 1 INJECTION, SOLUTION INTRAMUSCULAR; INTRAVENOUS; SUBCUTANEOUS at 15:02

## 2022-01-01 RX ADMIN — HYDROMORPHONE HYDROCHLORIDE 1 MG: 1 INJECTION, SOLUTION INTRAMUSCULAR; INTRAVENOUS; SUBCUTANEOUS at 15:51

## 2022-01-01 RX ADMIN — MINERAL OIL: 1000 LIQUID ORAL at 15:51

## 2022-01-01 RX ADMIN — Medication 5 MG: at 20:45

## 2022-01-01 RX ADMIN — Medication 400 MG: at 09:27

## 2022-01-01 RX ADMIN — BUMETANIDE 2 MG: 0.25 INJECTION INTRAMUSCULAR; INTRAVENOUS at 09:00

## 2022-01-01 RX ADMIN — GLYCOPYRROLATE 0.4 MG: 0.2 INJECTION INTRAMUSCULAR; INTRAVENOUS at 00:19

## 2022-01-01 RX ADMIN — AMPICILLIN 500 MG: 500 CAPSULE ORAL at 06:46

## 2022-01-01 RX ADMIN — POLYVINYL ALCOHOL 2 DROP: 14 SOLUTION/ DROPS OPHTHALMIC at 20:31

## 2022-01-01 RX ADMIN — POLYETHYLENE GLYCOL 3350 17 G: 17 POWDER, FOR SOLUTION ORAL at 21:37

## 2022-01-01 RX ADMIN — SCOPALAMINE 1 PATCH: 1 PATCH, EXTENDED RELEASE TRANSDERMAL at 05:37

## 2022-01-01 RX ADMIN — LACTULOSE 20 G: 20 SOLUTION ORAL at 21:16

## 2022-01-01 RX ADMIN — REMDESIVIR 100 MG: 100 INJECTION, POWDER, LYOPHILIZED, FOR SOLUTION INTRAVENOUS at 17:53

## 2022-01-01 RX ADMIN — Medication 400 MG: at 08:13

## 2022-01-01 RX ADMIN — AMPICILLIN 500 MG: 500 CAPSULE ORAL at 13:56

## 2022-01-01 RX ADMIN — REMDESIVIR 200 MG: 100 INJECTION, POWDER, LYOPHILIZED, FOR SOLUTION INTRAVENOUS at 22:17

## 2022-01-01 RX ADMIN — DOCUSATE SODIUM 100 MG: 100 CAPSULE, LIQUID FILLED ORAL at 19:58

## 2022-01-01 RX ADMIN — PANTOPRAZOLE SODIUM 40 MG: 40 INJECTION, POWDER, FOR SOLUTION INTRAVENOUS at 16:50

## 2022-01-01 RX ADMIN — DEXAMETHASONE 6 MG: 2 TABLET ORAL at 09:27

## 2022-01-01 RX ADMIN — MINERAL OIL: 1000 LIQUID ORAL at 15:55

## 2022-01-01 RX ADMIN — APIXABAN 5 MG: 5 TABLET, FILM COATED ORAL at 09:27

## 2022-01-01 RX ADMIN — LORAZEPAM 1 MG: 2 INJECTION INTRAMUSCULAR; INTRAVENOUS at 12:35

## 2022-01-01 RX ADMIN — LORAZEPAM 1 MG: 2 INJECTION INTRAMUSCULAR; INTRAVENOUS at 13:00

## 2022-01-01 RX ADMIN — APIXABAN 5 MG: 5 TABLET, FILM COATED ORAL at 20:45

## 2022-01-01 RX ADMIN — BUMETANIDE 0.5 MG: 0.25 INJECTION INTRAMUSCULAR; INTRAVENOUS at 09:56

## 2022-01-01 RX ADMIN — AMPICILLIN SODIUM 500 MG: 500 INJECTION, POWDER, FOR SOLUTION INTRAMUSCULAR; INTRAVENOUS at 14:09

## 2022-01-01 RX ADMIN — APIXABAN 5 MG: 5 TABLET, FILM COATED ORAL at 08:13

## 2022-01-01 RX ADMIN — FUROSEMIDE 20 MG: 10 INJECTION, SOLUTION INTRAMUSCULAR; INTRAVENOUS at 15:51

## 2022-01-01 RX ADMIN — POTASSIUM CHLORIDE 30 MEQ: 750 CAPSULE, EXTENDED RELEASE ORAL at 09:29

## 2022-01-01 RX ADMIN — LORAZEPAM 1 MG: 2 INJECTION INTRAMUSCULAR; INTRAVENOUS at 17:54

## 2022-01-01 RX ADMIN — REMDESIVIR 100 MG: 100 INJECTION, POWDER, LYOPHILIZED, FOR SOLUTION INTRAVENOUS at 13:34

## 2022-01-01 RX ADMIN — MORPHINE SULFATE 1 MG: 2 INJECTION, SOLUTION INTRAMUSCULAR; INTRAVENOUS at 03:18

## 2022-01-01 RX ADMIN — BUMETANIDE 1 MG: 0.25 INJECTION INTRAMUSCULAR; INTRAVENOUS at 10:12

## 2022-01-01 RX ADMIN — AMPICILLIN 500 MG: 500 CAPSULE ORAL at 06:01

## 2022-01-01 RX ADMIN — REMDESIVIR 100 MG: 100 INJECTION, POWDER, LYOPHILIZED, FOR SOLUTION INTRAVENOUS at 13:06

## 2022-01-01 RX ADMIN — POTASSIUM CHLORIDE 30 MEQ: 750 CAPSULE, EXTENDED RELEASE ORAL at 08:13

## 2022-01-01 RX ADMIN — DEXAMETHASONE 6 MG: 2 TABLET ORAL at 09:00

## 2022-01-01 RX ADMIN — HYDROMORPHONE HYDROCHLORIDE 0.5 MG: 1 INJECTION, SOLUTION INTRAMUSCULAR; INTRAVENOUS; SUBCUTANEOUS at 05:27

## 2022-01-01 RX ADMIN — HYDROMORPHONE HYDROCHLORIDE 1 MG: 1 INJECTION, SOLUTION INTRAMUSCULAR; INTRAVENOUS; SUBCUTANEOUS at 03:59

## 2022-01-01 RX ADMIN — SODIUM CHLORIDE, PRESERVATIVE FREE 10 ML: 5 INJECTION INTRAVENOUS at 08:14

## 2022-01-01 RX ADMIN — HYDROMORPHONE HYDROCHLORIDE 1 MG: 1 INJECTION, SOLUTION INTRAMUSCULAR; INTRAVENOUS; SUBCUTANEOUS at 09:12

## 2022-01-01 RX ADMIN — HYDROMORPHONE HYDROCHLORIDE 1 MG: 1 INJECTION, SOLUTION INTRAMUSCULAR; INTRAVENOUS; SUBCUTANEOUS at 03:12

## 2022-01-01 RX ADMIN — POTASSIUM CHLORIDE 30 MEQ: 750 CAPSULE, EXTENDED RELEASE ORAL at 10:12

## 2022-01-01 RX ADMIN — SODIUM CHLORIDE 2 G: 900 INJECTION INTRAVENOUS at 06:46

## 2022-01-01 RX ADMIN — Medication 400 MG: at 09:00

## 2022-01-01 RX ADMIN — BISACODYL 10 MG: 5 TABLET, COATED ORAL at 19:58

## 2022-01-01 RX ADMIN — ACETAMINOPHEN 650 MG: 650 SUPPOSITORY RECTAL at 18:21

## 2022-01-01 RX ADMIN — AMPICILLIN SODIUM 500 MG: 500 INJECTION, POWDER, FOR SOLUTION INTRAMUSCULAR; INTRAVENOUS at 02:56

## 2022-01-01 RX ADMIN — MINERAL OIL: 1000 LIQUID ORAL at 20:28

## 2022-01-01 RX ADMIN — HYDROMORPHONE HYDROCHLORIDE 0.5 MG: 1 INJECTION, SOLUTION INTRAMUSCULAR; INTRAVENOUS; SUBCUTANEOUS at 09:36

## 2022-01-01 RX ADMIN — POTASSIUM CHLORIDE 30 MEQ: 750 CAPSULE, EXTENDED RELEASE ORAL at 08:30

## 2022-01-01 RX ADMIN — MORPHINE SULFATE 0.5 MG: 2 INJECTION, SOLUTION INTRAMUSCULAR; INTRAVENOUS at 02:02

## 2022-01-01 RX ADMIN — DEXAMETHASONE 6 MG: 2 TABLET ORAL at 08:30

## 2022-01-01 RX ADMIN — HYDROMORPHONE HYDROCHLORIDE 0.5 MG: 1 INJECTION, SOLUTION INTRAMUSCULAR; INTRAVENOUS; SUBCUTANEOUS at 06:01

## 2022-01-01 RX ADMIN — HYDROMORPHONE HYDROCHLORIDE 1 MG: 1 INJECTION, SOLUTION INTRAMUSCULAR; INTRAVENOUS; SUBCUTANEOUS at 20:30

## 2022-01-01 RX ADMIN — LORAZEPAM 1 MG: 2 INJECTION INTRAMUSCULAR; INTRAVENOUS at 05:36

## 2022-01-01 RX ADMIN — SODIUM CHLORIDE 500 ML: 9 INJECTION, SOLUTION INTRAVENOUS at 04:42

## 2022-01-01 RX ADMIN — AMPICILLIN SODIUM 500 MG: 500 INJECTION, POWDER, FOR SOLUTION INTRAMUSCULAR; INTRAVENOUS at 19:58

## 2022-01-01 RX ADMIN — AMPICILLIN SODIUM 500 MG: 500 INJECTION, POWDER, FOR SOLUTION INTRAMUSCULAR; INTRAVENOUS at 20:48

## 2022-01-01 RX ADMIN — LORAZEPAM 1 MG: 2 INJECTION INTRAMUSCULAR; INTRAVENOUS at 18:38

## 2022-01-01 RX ADMIN — SODIUM CHLORIDE 2 G: 900 INJECTION INTRAVENOUS at 06:01

## 2022-01-01 RX ADMIN — AMPICILLIN 500 MG: 500 CAPSULE ORAL at 16:50

## 2022-01-01 RX ADMIN — AMPICILLIN 500 MG: 500 CAPSULE ORAL at 23:37

## 2022-01-01 RX ADMIN — ANTACID TABLETS 2 TABLET: 500 TABLET, CHEWABLE ORAL at 05:35

## 2022-01-01 RX ADMIN — LACTULOSE 20 G: 20 SOLUTION ORAL at 09:28

## 2022-01-01 RX ADMIN — LORAZEPAM 1 MG: 2 INJECTION INTRAMUSCULAR; INTRAVENOUS at 10:15

## 2022-01-01 RX ADMIN — FUROSEMIDE 20 MG: 10 INJECTION, SOLUTION INTRAMUSCULAR; INTRAVENOUS at 03:59

## 2022-01-01 RX ADMIN — AMPICILLIN 500 MG: 500 CAPSULE ORAL at 00:34

## 2022-01-03 NOTE — TELEPHONE ENCOUNTER
Spoke with patient who note severe cramping.patient to take an extra dose of potassium 10 meq and an extra dose of magnesium now. Will need labs in near future to assess electrolyte levels

## 2022-01-03 NOTE — OUTREACH NOTE
CHF Week 2 Survey      Responses   [facility] patient discharged from? [facility]   Does the patient have one of the following disease processes/diagnoses(primary or secondary)? CHF   Week 2 attempt successful? Yes   Call start time 1422   Call end time 1422   Discharge diagnosis Acute on chronic right-sided heart failure,    atrial fibrillation/flutter with a ventricular rate,    AV node ablation + PM implant   Person spoke with today (if not patient) and relationship patient   Meds reviewed with patient/caregiver? Yes   Is the patient taking all medications as directed (includes completed medication regime)? Yes   CHF Week 2 call completed? Yes   Wrap up additional comments Very breif call patient states he is doing great but is currently having some cramping problems and cant talk at this time.            Verona German RN

## 2022-01-05 PROBLEM — N39.0 ACUTE UTI (URINARY TRACT INFECTION): Status: ACTIVE | Noted: 2022-01-01

## 2022-01-05 PROBLEM — U07.1 COVID-19 VIRUS INFECTION: Status: ACTIVE | Noted: 2022-01-01

## 2022-01-05 PROBLEM — R06.09 DYSPNEA ON EXERTION: Status: ACTIVE | Noted: 2022-01-01

## 2022-01-05 PROBLEM — J12.82 PNEUMONIA DUE TO COVID-19 VIRUS: Status: ACTIVE | Noted: 2022-01-01

## 2022-01-05 PROBLEM — U07.1 PNEUMONIA DUE TO COVID-19 VIRUS: Status: ACTIVE | Noted: 2022-01-01

## 2022-01-05 PROBLEM — J90 PLEURAL EFFUSION, RIGHT: Status: ACTIVE | Noted: 2022-01-01

## 2022-01-05 PROBLEM — J81.0 ACUTE PULMONARY EDEMA (HCC): Status: ACTIVE | Noted: 2022-01-01

## 2022-01-05 PROBLEM — K76.0 HEPATIC STEATOSIS: Status: ACTIVE | Noted: 2022-01-01

## 2022-01-05 PROBLEM — M48.50XA PATHOLOGIC COMPRESSION FRACTURE OF VERTEBRA (HCC): Status: RESOLVED | Noted: 2018-04-12 | Resolved: 2022-01-01

## 2022-01-05 PROBLEM — E87.1 HYPONATREMIA: Status: ACTIVE | Noted: 2022-01-01

## 2022-01-05 PROBLEM — R79.89 ELEVATED PROCALCITONIN: Status: ACTIVE | Noted: 2022-01-01

## 2022-01-05 PROBLEM — U07.1 PNEUMONIA DUE TO COVID-19 VIRUS: Status: RESOLVED | Noted: 2022-01-01 | Resolved: 2022-01-01

## 2022-01-05 PROBLEM — J12.82 PNEUMONIA DUE TO COVID-19 VIRUS: Status: RESOLVED | Noted: 2022-01-01 | Resolved: 2022-01-01

## 2022-01-05 PROBLEM — I50.813 ACUTE ON CHRONIC RIGHT-SIDED CONGESTIVE HEART FAILURE (HCC): Status: ACTIVE | Noted: 2021-01-01

## 2022-01-05 PROBLEM — N18.9 CKD (CHRONIC KIDNEY DISEASE): Status: ACTIVE | Noted: 2022-01-01

## 2022-01-05 PROBLEM — J81.0 ACUTE PULMONARY EDEMA (HCC): Status: RESOLVED | Noted: 2022-01-01 | Resolved: 2022-01-01

## 2022-01-05 PROBLEM — I50.33 ACUTE ON CHRONIC DIASTOLIC CONGESTIVE HEART FAILURE (HCC): Status: RESOLVED | Noted: 2021-01-01 | Resolved: 2022-01-01

## 2022-01-05 NOTE — TELEPHONE ENCOUNTER
Patient called stating that he is extremely short of breath. He states that he is so weak that he cannot hardly stand up to walk and that his stomach is extremely bloat and he feels like he is going to throw up. He states that he does have lower extremity edema, but it is the same as it always is. It is not any worse. He denies any weight gain. He also states that he is starting to have pain in his left elbow. He has not checked his BP or vitals today. He states that he feels too bad to do anything. He cannot even go get the labs done that Trina CUEVAS ordered for him on 1/3/22. I instructed him to go to the closest ER to be evaluated because he feels so poorly. He agreed and verbalized understanding.

## 2022-01-06 PROBLEM — N18.30 STAGE 3 CHRONIC KIDNEY DISEASE (HCC): Status: ACTIVE | Noted: 2022-01-01

## 2022-01-06 NOTE — NURSING NOTE
Woc consulted for: Right lower extremity    Wound/ Skin Assessment:       Bilateral lower extremities present with severe hemosiderin staining, extreme orange peel texture, raised areas that look like blisters but are just some.  Right leg more swollen than left.  Right leg presents with large area of dermal erosion with irregular borders on the right distal medial extremity.  Wound is 10 cm x 3.5 cm x 0.2 cm.  Yellowish clear drainage.    Intervention performed: Cleansed with normal saline extremity wrapped with Xeroform wound covered with ABD pad to collect drainage and leg wrapped with Kerlix.    Recommendations: Recommend old lymphedema specialist or PT wound for compression.    Head to toe Ax performed.    Additional skin issues: None noted    Skin interventions in place.    Pressure Injury Protocol (initiate for Dawson Score of 18 or less):   *Maintain good skin care, keep dry, turn q 2 hr, keep heels elevated and offloaded with heel boots.    *Apply z-guard to sacrococcygeal area/ perineal area BID or daily and PRN per incontinent episodes.  *Follow C.A.R.E protocol if medical devices (Bipap, azul, Ng tube, etc) are being used.     Specialty bed: Patient might continue with SHAINA mattress but patient is in Covid restrictions.  Please use waffle.    Woc will follow.    Please contact with questions or concerns.    Dragon disclaimer:  This note was entered via electronic voice recognition/ transcription prorgram. The electronic translation of spoken language may permit erroneous, or at times, nonsensical words or phrases to be inadvertently transcribed; Although I have reviewed the note for such errors, some may still exist.

## 2022-01-06 NOTE — PLAN OF CARE
Problem: Adult Inpatient Plan of Care  Goal: Optimal Comfort and Wellbeing  Outcome: Ongoing, Progressing  Intervention: Provide Person-Centered Care  Recent Flowsheet Documentation  Taken 1/6/2022 1800 by Ryley Naylro RN  Trust Relationship/Rapport:   care explained   questions answered  Taken 1/6/2022 1600 by Ryley Naylor RN  Trust Relationship/Rapport:   care explained   questions answered  Taken 1/6/2022 1400 by Ryley Naylor RN  Trust Relationship/Rapport:   care explained   questions answered  Taken 1/6/2022 1200 by Ryley Naylor RN  Trust Relationship/Rapport:   care explained   questions answered  Taken 1/6/2022 1000 by Ryley Naylor RN  Trust Relationship/Rapport:   care explained   questions answered  Taken 1/6/2022 0830 by Ryley Naylor RN  Trust Relationship/Rapport:   care explained   questions answered   Goal Outcome Evaluation: Patient doing well this shift, sitting up in chair most of day due to being more comfortable. Positions changed throughout the day with pillows and elevating legs. Continues to call for assistance when needed. Call light within reach.

## 2022-01-06 NOTE — CASE MANAGEMENT/SOCIAL WORK
Discharge Planning Assessment  [facility]     Patient Name: Akshat Winkler  MRN: [id number]  Today's Date: 1/6/2022    Admit Date: 1/5/2022     Discharge Needs Assessment     Row Name 01/06/22 0957       Living Environment    Lives With spouse    Current Living Arrangements home/apartment/condo    Living Arrangement Comments Supportive spouse. Pt works.       Discharge Needs Assessment    Equipment Currently Used at Home cane, straight; walker, rolling    Equipment Needed After Discharge none    Discharge Coordination/Progress Has a cane and walker available for prn use from a back injury. No HH involved.               Discharge Plan     Row Name 01/06/22 0958       Plan    Plan Home at DC    Patient/Family in Agreement with Plan unable to assess    Plan Comments I could not reach the pt or family by phone. Info from the chart and recent admission. No DC needs identified at this time.    Final Discharge Disposition Code 01 - home or self-care    Row Name 01/06/22 0833       Plan    Final Discharge Disposition Code 01 - home or self-care              Continued Care and Services - Admitted Since 1/5/2022    Coordination has not been started for this encounter.       Expected Discharge Date and Time     Expected Discharge Date Expected Discharge Time    Leonardo 10, 2022          Demographic Summary    No documentation.                Functional Status     Row Name 01/06/22 0957       Functional Status    Usual Activity Tolerance good       Functional Status, IADL    Medications independent    Meal Preparation independent    Housekeeping independent    Laundry independent    Shopping independent               Psychosocial    No documentation.                Abuse/Neglect    No documentation.                Legal    No documentation.                Substance Abuse    No documentation.                Patient Forms    No documentation.                   Laney Infante RN

## 2022-01-06 NOTE — CONSULTS
Inpatient Cardiology Consult  Consult performed by: Courtney Carmichael APRN  Consult ordered by: Peggy Seth APRN          [facility]  Cardiovascular Consultation Note         Patient is a 66-year-old gentleman with a history of chronic right-sided heart failure, mild nonobstructive coronary artery disease, persistent atrial fibrillation/flutter, AV joby ablation/pacemaker implant, type 2 diabetes mellitus, chronic lower extremity edema, severe obesity, hypertension and hyperlipidemia who we are being asked to consult for acute CHF.  The patient was actually just discharged from [facility] on 12/21/2021 after being admitted for worsening heart failure symptoms and atrial flutter with RVR.  He was treated with IV diuresis and milrinone drip.  EP was consulted and he ultimately underwent an AV joby ablation with placement of permanent pacemaker.  Patient presented back to [facility] last evening with worsening shortness of breath and abdominal distention.  proBNP mildly elevated 1074, and troponins negative.  Patient reports after his recent AV joby ablation and pacemaker his lower extremity edema improved.  He still has swelling but says it is his baseline.  He also reports his breathing improved until a few days ago when he began noticing his belly swelling and becoming more short of breath.  Chest x-ray suggests right pleural effusion.  The patient has tested positive for COVID-19 pneumonia.  He received both doses of Covid-19 vaccine but had not received a booster.  Hospitalist have asked us to evaluate patient and make recommendations regarding diuretics.  He is being treated with remdesivir and Decadron for his Covid-19.  I did not enter the room but spoke with him on the telephone and observed him through the window. He is sitting up in the chair and breathing easy on O2 at 2 L via nasal cannula.  He was not dyspneic as he spoke.  He denies  "fever, chills, nausea or vomiting.  He feels better today after receiving IV Bumex last evening.  He is requesting that I contact his wife \"Shama and speak with her.  I attempted to contact her via telephone but there was no answer.  I left a message on her voicemail.        Cardiac risk factors: Male gender, mild CAD, hypertension, hyperlipidemia, type 2 diabetes mellitus    Past medical and surgical history, social and family history reviewed in EMR.    REVIEW OF SYSTEMS:   H&P ROS reviewed and pertinent CV ROS as noted in HPI.         Vital Sign Min/Max for last 24 hours  Temp  Min: 96.3 °F (35.7 °C)  Max: 98.1 °F (36.7 °C)   BP  Min: 103/79  Max: 134/58   Pulse  Min: 80  Max: 85   Resp  Min: 16  Max: 20   SpO2  Min: 94 %  Max: 98 %   No data recorded      Intake/Output Summary (Last 24 hours) at 2022 1049  Last data filed at 2022 0800  Gross per 24 hour   Intake 120 ml   Output 475 ml   Net -355 ml         Pulmonary:      Comments: Sitting up in the chair breathing easy on room air.  Did not appear to be dyspneic when speaking to me on the telephone  Cardiovascular:      Normal rate. Regular rhythm.      Comments: Paced rhythm noted on telemetry        [id number] sinus with ventricular paced    Lab Review:   Labs reviewed in the electronic medical record.  Pertinent findings include:  Lab Results   Component Value Date    GLUCOSE 156 (H) 2022    BUN 30 (H) 2022    CREATININE 1.64 (H) 2022    EGFRIFNONA 43 (L) 2022    EGFRIFAFRI 92 2018    BCR 18.3 2022    K 5.0 2022    CO2 24.0 2022    CALCIUM 10.0 2022    PROTENTOTREF 7.0 2018    ALBUMIN 3.30 (L) 2022    LABIL2 0.7 2018    AST 93 (H) 2022    ALT 38 2022     Lab Results   Component Value Date    WBC 6.54 2022    HGB 15.2 2022    HCT 46.0 2022    .4 (H) 2022     (L) 2022     Lab Results   Component Value Date    CHOL 103 " 12/16/2021    CHLPL 138 05/09/2017    TRIG 81 12/16/2021    HDL 31 (L) 12/16/2021    LDL 56 12/16/2021                Active Hospital Problems    Diagnosis    • **COVID-19 virus infection    • Stage 3 chronic kidney disease (HCC)    • Persistent atrial fibrillation/flutter      · Diagnosed 2011  · [id number] 3 (HTN, CAD, DM)  · Echo (10/11/2011): Normal LVEF, mild MR, mild LA dilation  · LOLY/ECVcardioversion (12/21/2011) initiation of propafenone therapy.   · Successful LOLY/ECV for recurrent atrial fibrillation, 11/15/2013  · PVA with Dr. Cary, 6/29/2015  · Cardioversion (07/17/2015) for atrial flutter occurring post ablation   · ECV for recurrent atrial flutter, 12/20/17 with reattempt at beta blocker/flecainide.  · Intolerant to beta blocker/flecanide with severe hypotension, intolerant to propafenone  · Tikosyn admission with development of wide complex tachycardia, 7/2018  · ECV for recurrent atrial flutter with early return of atrial flutter, 11/18/2021  · Admission to [facility] 12/16/2021 with acute CHF and atrial flutter with rapid ventricular response  · AV joby ablation with pacemaker placement 12/20/2021     • Type 2 diabetes mellitus without complication, without long-term current use of insulin (HCC)      · Statin therapy indicated given diabetic status     • Acute on chronic right-sided heart failure (HCC)      · Echo (12/10/2021): LVEF 60%.  Moderate TR.  RVSP 58 mmHg     • Hyponatremia    • Pleural effusion, right    • Hepatic steatosis    • Elevated procalcitonin    • Acute UTI (urinary tract infection)    • Presence of cardiac pacemaker      · AV joby ablation with pacemaker placement 12/20/2021       • Essential hypertension    • Severe obstructive sleep apnea, no CPAP      No longer on CPAP, sleeps in reclined position     • Chronic bilateral severe lower extremity edema    • Class 3 severe obesity due to excess calories with serious comorbidity and body mass index (BMI) of  40.0 to 44.9 in adult (HCC)                Shortness of breath/Covid-19//CHF  · Multifactorial from Covid-19/right-sided heart failure/right-sided pleural  · Started on remdesivir and Decadron  · O2 2 L on nasal cannula with O2 saturations 98%  · Hospitalist to treat Covid-19      Chronic kidney disease  · Current creatinine 1.64.   · Baseline creatinine 1.3-1.4    Acute on chronic right-sided heart failure/right pleural effusion  · proBNP only mildly elevated  · Hest x-ray shows right pleural effusion  · Bumex 1 mg twice daily  · Strict I's and O's and daily weights  · Takes spironolactone 100 mg daily but currently on hold while diuresing  · May benefit from thoracentesis of right pleural effusion.    Atrial fibrillation/flutter   · Intolerant to multiple antiarrhythmics including beta-blocker, Tikosyn and flecainide causing severe hypotension  · Recent AV joby ablation with placement of permanent pacemaker  · Continue to hold Eliquis as patient may benefit from thoracentesis    Type 2 diabetes mellitus  · Management per hospitalist      Hyperlipidemia  · Takes Crestor 20 mg daily but currently not ordered      FAITH Roy

## 2022-01-06 NOTE — PLAN OF CARE
Goal Outcome Evaluation:      Pt arrived to floor. SOA on exertion. Currently chilling and c/o being cold.

## 2022-01-06 NOTE — CONSULTS
[facility]    Referring Provider: No ref. provider found    Primary Care Provider: Saurabh Medina PA    Reason for Consultation: Jaundice, elevated OT question cirrhosis    Chief complaint shortness of breath    History of present illness:  Akshat Winkler is a 66 y.o. male who is admitted with right-sided pleural effusion and positive COVID-19.  He has a history of right-sided heart failure.  He had a recent pacemaker placed about 2 weeks ago.  He denies any alcohol.  No history of liver disease no family history of liver disease.  He does describe some increased lower abdominal interim edema as well as abdominal distention.    Allergies:  Bisoprolol, Flecainide, Metoprolol, and Tikosyn [dofetilide]    Scheduled Meds:  !NOT ORDERED- apixaban (ELIQUIS), , Does not apply, Daily With Dinner  bumetanide, 1 mg, Intravenous, BID  cefTRIAXone, 1 g, Intravenous, Nightly  dexamethasone, 6 mg, Oral, Daily   Or  dexamethasone, 6 mg, Intravenous, Daily  insulin lispro, 0-7 Units, Subcutaneous, TID AC  magnesium oxide, 400 mg, Oral, Daily  pharmacy consult - MTM, , Does not apply, Daily  potassium chloride, 30 mEq, Oral, Daily  remdesivir, 100 mg, Intravenous, Daily With Lunch         Infusions:  Pharmacy Consult - Remdesivir,         PRN Meds:  acetaminophen  •  albuterol sulfate HFA  •  benzonatate  •  dextromethorphan polistirex ER  •  dextrose  •  dextrose  •  glucagon (human recombinant)  •  magnesium sulfate **OR** magnesium sulfate in D5W 1g/100mL (PREMIX) **OR** magnesium sulfate  •  Pharmacy Consult - Remdesivir  •  sodium chloride    Home Meds:  Medications Prior to Admission   Medication Sig Dispense Refill Last Dose   • acetaminophen (TYLENOL) 325 MG tablet Take 650 mg by mouth Every 6 (Six) Hours As Needed for Mild Pain , Headache or Fever. OTC      • apixaban (Eliquis) 5 MG tablet tablet Take 1 tablet by mouth Every 12 (Twelve) Hours. Patient did not start until 12/22/2021 180 tablet 3    • bumetanide  (BUMEX) 2 MG tablet Take 1 tablet by mouth Daily. 90 tablet 0    • Dietary Management Product (Vasculera) tablet Take 1 tablet by mouth Daily. 30 tablet 3    • diphenhydrAMINE-acetaminophen (TYLENOL PM)  MG tablet per tablet Take 1 tablet by mouth At Night As Needed for Sleep. OTC      • magnesium oxide (MAG-OX) 400 MG tablet Take 1 tablet by mouth Daily. 30 tablet 3    • multivitamin with minerals (Multivitamin Adults) tablet tablet Take 1 tablet by mouth Daily. 30 tablet 2    • potassium chloride 10 MEQ CR tablet Take 3 tablets by mouth Daily. 90 tablet 3    • rosuvastatin (CRESTOR) 20 MG tablet Take 1 tablet by mouth Daily. (Patient taking differently: Take 20 mg by mouth Every Night.) 90 tablet 0    • spironolactone (Aldactone) 100 MG tablet Take 0.5 tablets by mouth Daily. 45 tablet 3        ROS: Review of Systems   Constitutional: Negative for fatigue and unexpected weight change.   Respiratory: Positive for shortness of breath.    Cardiovascular: Positive for leg swelling. Negative for chest pain.   Gastrointestinal: Positive for abdominal distention. Negative for abdominal pain.   Skin: Positive for color change.   All other systems reviewed and are negative.      PAST MED HX: Pt  has a past medical history of Acute diastolic HF (heart failure) (HCC), Acute hypokalemia, Arrhythmia, Back injury, Cellulitis of leg, Chest pain syndrome, CHF (congestive heart failure) (HCC), Deep vein thrombosis (HCC) (1995), Diabetes mellitus (HCC), Hyperlipidemia, Hypertension, Muscle pain, Obesity, Obstructive sleep apnea, Paroxysmal A-fib (HCC), Peripheral artery disease ([facility]), Spondylosis of 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 or radiculopathy (2/7/2018), and Wears glasses.  PAST SURG HX: Pt  has a past surgical history that includes Other surgical history (06/29/2015); Cardioversion; Cardiac catheterization; Kyphoplasty (N/A, 3/5/2018); Kyphoplasty (N/A, 4/16/2018); Colonoscopy; Cardiac electrophysiology procedure (N/A,  "12/20/2021); Cardiac electrophysiology procedure (N/A, 12/20/2021); and Cardioversion.  FAM HX: family history includes Alzheimer's disease in his father; Heart disease in his maternal grandmother; Hyperlipidemia in his father; Hypertension in his father and mother; Prostate cancer in his brother; Sleep disorder in his father; Stroke in his father and mother.  SOC HX: Pt  reports that he has never smoked. He has never used smokeless tobacco. He reports that he does not drink alcohol and does not use drugs.    /86 (BP Location: Left arm, Patient Position: Sitting)   Pulse 83   Temp 97 °F (36.1 °C) (Oral)   Resp 20   Ht 175.3 cm (69\")   Wt (!) 142 kg (313 lb 4.8 oz)   SpO2 94%   BMI 46.27 kg/m²     Physical Exam  Wt Readings from Last 3 Encounters:   01/06/22 (!) 142 kg (313 lb 4.8 oz)   12/23/21 131 kg (289 lb 4 oz)   12/19/21 132 kg (290 lb 6.4 oz)   ,body mass index is 46.27 kg/m².    General Well developed; obese; no acute distress.  Sitting up in a chair  ENT Good dentition.  Oral mucosa pink & moist without thrush or lesions.    Neck Neck supple; trachea midline. No thyromegaly  Resp CTA; no rhonchi, rales, or wheezes.  Respiration effort normal  CV RRR; ; no M/R/G.  3+ extremity edema, pacemaker left chest  GI Abd soft, obese, normal active bowel sounds.   Skin No rash; no lesions; no bruises.  Skin turgor normal  MSK No clubbing; no cyanosis.    Psych Oriented to time, place, and person.  Appropriate affect      Results Review:   I reviewed the patient's new clinical results.  I reviewed the patient's new imaging results and agree with the interpretation.    Lab Results   Component Value Date    WBC 6.54 01/05/2022    HGB 15.2 01/05/2022    HCT 46.0 01/05/2022    .4 (H) 01/05/2022     (L) 01/05/2022       Lab Results   Component Value Date    GLUCOSE 156 (H) 01/05/2022    BUN 30 (H) 01/05/2022    CREATININE 1.64 (H) 01/05/2022    EGFRIFNONA 43 (L) 01/05/2022    EGFRIFAFRI 92 " 02/08/2018    BCR 18.3 01/05/2022    CO2 24.0 01/05/2022    CALCIUM 10.0 01/05/2022    PROTENTOTREF 7.0 03/03/2018    ALBUMIN 3.30 (L) 01/05/2022    LABIL2 0.7 03/03/2018    AST 93 (H) 01/05/2022    ALT 38 01/05/2022     Results for IRENE GILES (MRN [id number]) as of 1/6/2022 14:35   Ref. Range 1/5/2022 11:15 1/5/2022 22:34 1/5/2022 22:55   Alkaline Phosphatase Latest Ref Range: 39 - 117 U/L 260 (H)  263 (H)   Total Protein Latest Ref Range: 6.0 - 8.5 g/dL 7.9  8.1   ALT (SGPT) Latest Ref Range: 1 - 41 U/L 36  38   AST (SGOT) Latest Ref Range: 1 - 40 U/L 92 (H)  93 (H)   Total Bilirubin Latest Ref Range: 0.0 - 1.2 mg/dL 5.9 (H)  6.4 (H)   Albumin Latest Ref Range: 3.50 - 5.20 g/dL 3.30 (L)  3.30 (L)   Ultrasound right upper quadrant per radiology report    FINDINGS:      The exam is somewhat limited owing to bowel gas, patient body habitus,  as well as patient condition with limited mobility and inability to hold  respirations.     The visualized portions of the pancreas appear unremarkable.     Increased echogenicity and coarsened echotexture of the liver  parenchyma. No discrete hepatic lesion. No intrahepatic ductal  dilatation.     The gallbladder contains multiple mobile shadowing dependent gallstones.  No evidence of gallbladder wall thickening or pericholecystic fluid.     Normal appearance of the common bile duct measuring 2 mm in diameter.     Normal appearance of the right kidney.     Right pleural effusion.     IMPRESSION:     Somewhat limited exam owing to multiple patient factors.     Cholelithiasis without evidence of cholecystitis.     Increased echogenicity and coarsened echotexture of the liver compatible  with hepatic steatosis and/or hepatic parenchymal disease.     Right pleural effusion.     This report was finalized on 1/5/2022 5:05 PM by Juan Dave MD.  Results for IRENE GILES (MRN [id number]) as of 1/6/2022 14:35   Ref. Range 1/5/2022 22:56   Hep A IgM Latest Ref Range:  Non-Reactive  Non-Reactive   Hepatitis B Surface Ag Latest Ref Range: Non-Reactive  Non-Reactive   Hep B Core IgM Latest Ref Range: Non-Reactive  Non-Reactive   Hepatitis C Ab Latest Ref Range: Non-Reactive  Non-Reactive   Results for IRENE GILES (MRN [id number]) as of 1/6/2022 14:35   Ref. Range 3/3/2018 11:10 7/20/2018 07:09 7/20/2018 11:58   TAY Direct Latest Ref Range: Negative  Negative  Negative   Results for IRENE GILES (MRN [id number]) as of 1/6/2022 14:35   Ref. Range 12/15/2021 08:35 12/16/2021 09:45 1/5/2022 11:15   proBNP Latest Ref Range: 0.0 - 900.0 pg/mL 808.9 926.5 (H) 1,074.0 (H)     ASSESSMENTS/PLANS    1.  Cirrhosis- SHERMAN versus congestive hepatopathy  2.  Right pleural effusion, suspect hepatic hydrothorax  3.  Morbid obesity  4.  Right heart failure  5.  COVID-19  6..  CKD  7.  Atrial fibrillation flutter, status post AV joby ablation and placement of PPC  8.  Diabetes type 2  9.  Hyperlipidemia    Plans are for a thoracentesis tomorrow.  Would avoid large bore chest tubes in the event this would be hepatic hydrothorax.   Bile duct is 2 mm which would argue against biliary obstruction.  His hepatitis serology is negative  Echo is pending  CT of abdomen/pelvis without contrast to evaluate for ascites at some point  Will check hepatitis A antibody total, vitamin D level  Needs protein bedtime snack  Unfortunately options are limited therapeutically.  Not a candidate for TIPS due to his heart failure  Will calculate child score mL in couple days after the Eliquis is worn off as it may interfere with the calculations      I discussed the patient's findings and my recommendations with patient    Erasmo Justin MD  01/06/22  14:36 EST

## 2022-01-06 NOTE — ED PROVIDER NOTES
Subjective   Pt presents with dyspnea.  Onset about three days ago but this has been a recurrent problem for the last few months.  He has a history of CHF, HTN, PAF.  He has been on diuretics frequently in the last few months.  He was admitted in December for similar symptoms and ended up with a pacemaker.  He was treated with diuretics then too.  He has significant leg edema today as well as abd distention/pressure.  No fever, cough or chest pain.      History provided by:  Patient and spouse      Review of Systems   Constitutional: Negative for fever.   Respiratory: Positive for shortness of breath. Negative for cough.    Cardiovascular: Positive for leg swelling. Negative for chest pain.   Gastrointestinal: Positive for abdominal distention. Negative for abdominal pain, diarrhea and vomiting.   All other systems reviewed and are negative.      Past Medical History:   Diagnosis Date   • Acute diastolic HF (heart failure) ([facility])    • Acute hypokalemia    • Arrhythmia    • Back injury     Fall on 12/20/17   • Cellulitis of leg    • Chest pain syndrome    • CHF (congestive heart failure) ([facility])    • Deep vein thrombosis ([facility]) 1995   • Diabetes mellitus ([facility])     patient reports borderline    • Hyperlipidemia    • Hypertension    • Muscle pain     around back   • Obesity    • Obstructive sleep apnea     right now patient is sleeeping in recliner and does not use-when he lies down he will have to use cpap   • Paroxysmal A-fib ([facility])    • Peripheral artery disease ([facility])    • Spondylosis of 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 or radiculopathy 2/7/2018   • Wears glasses        Allergies   Allergen Reactions   • Bisoprolol Other (See Comments)     Severe Hypotension   • Flecainide Other (See Comments)     Syncope / collapse   • Metoprolol Other (See Comments)      Bradycardia, Severe Hypotension   • Tikosyn [Dofetilide] Other (See Comments)     Wide complex tachycardia       Past Surgical History:   Procedure Laterality Date   •  CARDIAC CATHETERIZATION     • CARDIAC ELECTROPHYSIOLOGY PROCEDURE N/A 12/20/2021    Procedure: AV node ablation + PM implant;  Surgeon: Joaquin Jeffery MD;  Location:  [facility];  Service: Cardiovascular;  Laterality: N/A;   • CARDIAC ELECTROPHYSIOLOGY PROCEDURE N/A 12/20/2021    Procedure: AV node ablation;  Surgeon: Joaquin Jeffery MD;  Location:  [facility];  Service: Cardiovascular;  Laterality: N/A;   • CARDIOVERSION      multiple   • CARDIOVERSION     • COLONOSCOPY      about 14 years ago   • KYPHOPLASTY N/A 3/5/2018    Procedure: KYPHOPLASTY L4;  Surgeon: Akshat Davidson MD;  Location:  [facility];  Service:    • KYPHOPLASTY N/A 4/16/2018    Procedure: KYPHOPLASTY L1 AND L2;  Surgeon: Akshat Davidson MD;  Location:  [facility];  Service: Neurosurgery   • OTHER SURGICAL HISTORY  06/29/2015    PVA       Family History   Problem Relation Age of Onset   • Hypertension Mother    • Stroke Mother    • Stroke Father    • Sleep disorder Father    • Alzheimer's disease Father    • Hyperlipidemia Father    • Hypertension Father    • Prostate cancer Brother    • Heart disease Maternal Grandmother        Social History     Socioeconomic History   • Marital status:    Tobacco Use   • Smoking status: Never Smoker   • Smokeless tobacco: Never Used   Vaping Use   • Vaping Use: Never used   Substance and Sexual Activity   • Alcohol use: No   • Drug use: No   • Sexual activity: Not Currently     Birth control/protection: Condom           Objective   Physical Exam  Vitals and nursing note reviewed.   Constitutional:       General: He is not in acute distress.     Appearance: Normal appearance. He is not ill-appearing.   HENT:      Head: Normocephalic and atraumatic.      Mouth/Throat:      Mouth: Mucous membranes are moist.   Eyes:      General: No scleral icterus.        Right eye: No discharge.         Left eye: No discharge.      Conjunctiva/sclera: Conjunctivae normal.    Cardiovascular:      Rate and Rhythm: Normal rate and regular rhythm.      Heart sounds: No murmur heard.      Pulmonary:      Effort: Pulmonary effort is normal. No respiratory distress.      Breath sounds: Rales (diffuse) present. No wheezing.   Abdominal:      General: Bowel sounds are normal. There is no distension.      Palpations: Abdomen is soft.      Tenderness: There is no abdominal tenderness. There is no guarding or rebound.      Comments: Distended   Musculoskeletal:         General: No swelling. Normal range of motion.      Cervical back: Normal range of motion and neck supple.      Right lower leg: Edema present.      Left lower leg: Edema present.      Comments: Significant edema, weeping that is soaking the bedsheets despite legs being wrapped.   Skin:     General: Skin is warm and dry.      Findings: No rash.   Neurological:      General: No focal deficit present.      Mental Status: He is alert. Mental status is at baseline.   Psychiatric:         Mood and Affect: Mood normal.         Behavior: Behavior normal.         Thought Content: Thought content normal.         Procedures           ED Course         CXR edema.  EKG paced.  Labs similar to baselines except LFTs up.  Covid positive.    Reviewed previous records, pacemaker mid-December.    Pt unaware of any liver disease but records indicate bili has been up since 2014.  Not as high as it is now though, it seems to be trending up.  Old records indicate it's been worked up at least some in the past, GI saw him, had CT suggesting early cirrhotic changes. He denies alcohol history, I suspect he has SHERMAN cirrhosis.    Bumex IV given.  Pt admitted for further care.  Discussed results with him and wife.                                        MDM  Number of Diagnoses or Management Options     Amount and/or Complexity of Data Reviewed  Clinical lab tests: ordered and reviewed  Tests in the radiology section of CPT®: reviewed and ordered  Decide to  obtain previous medical records or to obtain history from someone other than the patient: yes  Obtain history from someone other than the patient: yes  Review and summarize past medical records: yes  Discuss the patient with other providers: yes  Independent visualization of images, tracings, or specimens: yes        Final diagnoses:   Acute pulmonary edema (HCC)   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       Medication List      No changes were made to your prescriptions during this visit.          Miky Ortega MD  01/05/22 [id number]

## 2022-01-06 NOTE — H&P
[facility] Medicine Services  HISTORY AND PHYSICAL    Patient Name: Akshat Winkler  : 1955  MRN: [id number]  Primary Care Physician: Saurabh Medina PA  Date of admission: 2022    Subjective   Subjective     Chief Complaint:  Dyspnea    HPI:  Akshat Winkler is a 66 y.o. male with PMH diastolic HF, afib with recent pacemaker placement, T2DM, elevated LFTs, JOSESITO, morbid obesity, and CKD who presented to the ED with chief complaint of increasing SOB over the last week.  He denies fever, myalgias or GI symptoms.  ED eval revealed COVID +.  Pt is fully vaccinated  He was discharged from hospital 21 after ablation and pacemaker placement, but didn't take diuretics after dc because he was unsure what to take per afib clinic note on 21 and he received IV Lasix and restarted home bumex and aldactone after that visit.  His symptoms and edema improved from that.        COVID Details:    Symptoms:    [] NONE [] Fever []  Cough [x] Shortness of breath [] Change in taste/smell    Review of Systems   Constitutional: Positive for activity change, chills and fatigue. Negative for fever.   HENT: Negative for congestion, hearing loss and sore throat.    Eyes: Negative for visual disturbance.   Respiratory: Positive for shortness of breath. Negative for cough and wheezing.    Cardiovascular: Positive for leg swelling. Negative for chest pain and palpitations.   Gastrointestinal: Positive for abdominal distention. Negative for abdominal pain, constipation, diarrhea, nausea and vomiting.   Genitourinary: Positive for frequency. Negative for decreased urine volume, dysuria and hematuria.   Musculoskeletal: Negative for back pain and myalgias.   Skin: Negative for wound.   Neurological: Negative for dizziness, weakness and light-headedness.   Psychiatric/Behavioral: Negative for confusion.        All other systems reviewed and are negative.     Personal History     Past Medical History:    Diagnosis Date   • Acute diastolic HF (heart failure) ([facility])    • Acute hypokalemia    • Arrhythmia    • Back injury     Fall on 12/20/17   • Cellulitis of leg    • Chest pain syndrome    • CHF (congestive heart failure) ([facility])    • Deep vein thrombosis ([facility]) 1995   • Diabetes mellitus ([facility])     patient reports borderline    • Hyperlipidemia    • Hypertension    • Muscle pain     around back   • Obesity    • Obstructive sleep apnea     right now patient is sleeeping in recliner and does not use-when he lies down he will have to use cpap   • Paroxysmal A-fib ([facility])    • Peripheral artery disease ([facility])    • Spondylosis of 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 or radiculopathy 2/7/2018   • Wears glasses        Past Surgical History:   Procedure Laterality Date   • CARDIAC CATHETERIZATION     • CARDIAC ELECTROPHYSIOLOGY PROCEDURE N/A 12/20/2021    Procedure: AV node ablation + PM implant;  Surgeon: Joaquin Jeffery MD;  Location: [facility];  Service: Cardiovascular;  Laterality: N/A;   • CARDIAC ELECTROPHYSIOLOGY PROCEDURE N/A 12/20/2021    Procedure: AV node ablation;  Surgeon: Joaquin Jeffery MD;  Location: [facility];  Service: Cardiovascular;  Laterality: N/A;   • CARDIOVERSION      multiple   • CARDIOVERSION     • COLONOSCOPY      about 14 years ago   • KYPHOPLASTY N/A 3/5/2018    Procedure: KYPHOPLASTY L4;  Surgeon: Akshat Davidson MD;  Location:  [facility];  Service:    • KYPHOPLASTY N/A 4/16/2018    Procedure: KYPHOPLASTY L1 AND L2;  Surgeon: Akshat Davidson MD;  Location:  [facility];  Service: Neurosurgery   • OTHER SURGICAL HISTORY  06/29/2015    PVA       Family History: family history includes Alzheimer's disease in his father; Heart disease in his maternal grandmother; Hyperlipidemia in his father; Hypertension in his father and mother; Prostate cancer in his brother; Sleep disorder in his father; Stroke in his father and mother. Otherwise pertinent FHx was reviewed and  unremarkable.     Social History:  reports that he has never smoked. He has never used smokeless tobacco. He reports that he does not drink alcohol and does not use drugs.  Social History     Social History Narrative    Patient lives at home with his wife and denies caffeine intake.    Works in service at [employer]       Medications:  Vasculera, acetaminophen, apixaban, bumetanide, diphenhydrAMINE-acetaminophen, magnesium oxide, multivitamin with minerals, potassium chloride, rosuvastatin, and spironolactone    Allergies   Allergen Reactions   • Bisoprolol Other (See Comments)     Severe Hypotension   • Flecainide Other (See Comments)     Syncope / collapse   • Metoprolol Other (See Comments)      Bradycardia, Severe Hypotension   • Tikosyn [Dofetilide] Other (See Comments)     Wide complex tachycardia       Objective   Objective     Vital Signs:   Temp:  [96.3 °F (35.7 °C)-98.2 °F (36.8 °C)] 97.1 °F (36.2 °C)  Heart Rate:  [79-80] 80  Resp:  [16-20] 16  BP: (104-134)/(53-90) 107/79  Flow (L/min):  [2] 2    Physical Exam   Constitutional: Awake, alert  Eyes: PERRLA, icteric sclerae, no conjunctival injection  HENT: NCAT, mucous membranes moist  Neck: Supple, no thyromegaly, no lymphadenopathy, trachea midline  Respiratory: Decreased in bases, no wheezing/rhonchi noted, nonlabored respirations.  Some dyspnea with conversation  Cardiovascular: RRR, paced, no murmurs, rubs, or gallops, palpable pedal pulses bilaterally  Gastrointestinal: Positive bowel sounds, soft, nontender, nondistended, obese  Musculoskeletal: 2+bilateral ankle edema, no clubbing or cyanosis to extremities  Psychiatric: Appropriate affect, cooperative  Neurologic: Oriented x 3, CHENG, speech clear  Skin: No rashes      Results Reviewed:  I have personally reviewed most recent indicated data and agree with findings including:  [x]  Laboratory  [x]  Radiology  [x]  EKG/Telemetry  []  Pathology  []  Cardiac/Vascular Studies  [x]  Old records  []   Other:  Most pertinent findings include:      LAB RESULTS:      Lab 01/05/22  2256 01/05/22  1115   WBC 6.54 7.14   HEMOGLOBIN 15.2 14.4   HEMATOCRIT 46.0 44.2   PLATELETS 128* 115*   NEUTROS ABS  --  5.82   IMMATURE GRANS (ABS)  --  0.03   LYMPHS ABS  --  0.37*   MONOS ABS  --  0.77   EOS ABS  --  0.10   .4* 101.4*   CRP  --  5.80*   PROCALCITONIN  --  0.48*   PROTIME  --  26.0*         Lab 01/05/22  1115   SODIUM 132*   POTASSIUM 4.7   CHLORIDE 95*   CO2 25.0   ANION GAP 12.0   BUN 29*   CREATININE 1.74*   GLUCOSE 98   CALCIUM 10.0   MAGNESIUM 2.2         Lab 01/05/22  1115   TOTAL PROTEIN 7.9   ALBUMIN 3.30*   GLOBULIN 4.6   ALT (SGPT) 36   AST (SGOT) 92*   BILIRUBIN 5.9*   ALK PHOS 260*   LIPASE 28         Lab 01/05/22  1115   PROBNP 1,074.0*   TROPONIN T 0.026   PROTIME 26.0*   INR 2.50*             Lab 01/05/22  1115   FERRITIN 172.50         Brief Urine Lab Results  (Last result in the past 365 days)      Color   Clarity   Blood   Leuk Est   Nitrite   Protein   CREAT   Urine HCG        01/05/22 1635 Yellow   Clear   Small (1+)   Trace   Negative   100 mg/dL (2+)               Microbiology Results (last 10 days)     Procedure Component Value - Date/Time    COVID-19, FLU A/B, RSV PCR - Swab, Nasopharynx [[id number]]  (Abnormal) Collected: 01/05/22 1556    Lab Status: Final result Specimen: Swab from Nasopharynx Updated: 01/05/22 1708     COVID19 Detected     Influenza A PCR Not Detected     Influenza B PCR Not Detected     RSV, PCR Not Detected          XR Chest 1 View    Result Date: 1/5/2022  EXAMINATION: XR CHEST 1 VW-  INDICATION: Upper Abdominal Pain Triage Protocol  TECHNIQUE: Frontal view of the chest  COMPARISON: 12/21/2021  FINDINGS:  Stable cardiomediastinal silhouette demonstrating mild cardiomegaly with unchanged single lead cardiac pacer device and left chest pulse generator. Perihilar vascular engorgement and diffuse interstitial prominence compatible with interstitial pulmonary edema. New  mild elevation of the right hemidiaphragm versus small-medium right pleural effusion. No pneumothorax.      Impression:  Interstitial pulmonary edema. Small-medium right pleural effusion versus mild elevation of the right hemidiaphragm from prior exam.  This report was finalized on 1/5/2022 2:02 PM by Juan Dave MD.      US Abdomen Limited    Result Date: 1/5/2022  EXAMINATION: US ABDOMEN LIMITED-  INDICATION: ruq - abnormal LFTs suspect cirrhosis  TECHNIQUE: Transverse and sagittal grayscale sonographic assessment of the right upper quadrant supplemented with color Doppler.  COMPARISON: CT abdomen and pelvis 7/20/2018,  FINDINGS:  The exam is somewhat limited owing to bowel gas, patient body habitus, as well as patient condition with limited mobility and inability to hold respirations.  The visualized portions of the pancreas appear unremarkable.  Increased echogenicity and coarsened echotexture of the liver parenchyma. No discrete hepatic lesion. No intrahepatic ductal dilatation.  The gallbladder contains multiple mobile shadowing dependent gallstones. No evidence of gallbladder wall thickening or pericholecystic fluid.  Normal appearance of the common bile duct measuring 2 mm in diameter.  Normal appearance of the right kidney.  Right pleural effusion.      Impression:  Somewhat limited exam owing to multiple patient factors.  Cholelithiasis without evidence of cholecystitis.  Increased echogenicity and coarsened echotexture of the liver compatible with hepatic steatosis and/or hepatic parenchymal disease.  Right pleural effusion.  This report was finalized on 1/5/2022 5:05 PM by Juan Dave MD.        Results for orders placed during the hospital encounter of 02/27/18    Adult Transthoracic Echo Complete W/ Cont if Necessary Per Protocol    Interpretation Summary  · Technically difficult study due to body habitus  · Left ventricular systolic function is normal. Estimated EF = 60%.  · The cardiac valves  are poorly visualized but there does not appear to be significant stenotic or regurgitant lesions.  · Right ventricular cavity is mild-to-moderately dilated.  · Atrial flutter noted throughout the examination.      Assessment/Plan   Assessment & Plan       Acute on chronic right-sided heart failure (HCC)    Type 2 diabetes mellitus without complication, without long-term current use of insulin (HCC)    Essential hypertension    Severe obstructive sleep apnea, no CPAP    Class 3 severe obesity due to excess calories with serious comorbidity and body mass index (BMI) of 40.0 to 44.9 in adult (HCC)    Persistent atrial fibrillation/flutter    MARKO (acute kidney injury) (HCC)    Hyponatremia    CKD (chronic kidney disease)    Pleural effusion, right    Hepatic steatosis    Elevated procalcitonin    Acute UTI (urinary tract infection)    COVID-19 virus infection      Dyspnea, multifactorial (including covid 19, volume overload/chf, possibly cirrhosis)  A/C DHF  Right sided pleural effusion (on cxr)  COVID 19 pcr +  -bumex 2mg iv tonight, then 1mg iv bid w/ hold parameters  - Cards consult in a.m. & will defer further diuresis orders to cardiology and welcome changes to the current orders (defer echo decision to cards)  -initiate remdesivir & decadron (sats reliably <94% w/ elevated crp), trend inflammatory markers; monitor renal fxn and LFT's  -RA sat 92% during hospitalist service initial exam, not on home O2    Renal insufficiency (Favor MARKO on ckd 3 appears baseline cr 1.3)   Mild hyponatremia  -current cr 1.74 (previous baseline cr ~1.3, within past month creatinine rising gradually to 1.65 on 12/23/21 day previous d/c)  -consider stopping remdesivir if renal function deteriorating  -cpk in a.m.; monitor sodium  -if renal function worsening w/ diuresis, consider nephrology consultation    UTI, poa  -initiate rocephin, follow urine culture    Hx Afib (recent avn ablation & pacemaker 2020)  -recent PPM placement  -hold  eliquis for now (inr 2.5, ? Due to liver dz); daily inr checks, consider restarting if inr <2      Elevated AST & bilirubin  Fatty liver, w/ suggestion cirrhosis on imaging  cholelithiasis  Morbid obesity/JOSESITO  -cpap nightly  -remote ct a/p 2018 suggested possible cirrhosis morphology  -Abd U/s in ED: gallstones, no darwin dil, no gb thickening, fatty liver w/ coarsened echotexture  -bilirubin 4 back in 2018; favor cirrhosis due to fatty liver, possibly exacerbated by right sided chf/congestive hepatopathy  -check acute hepatitis panel  -will consult GI in a.m. for their input    HTN  -stable    T2DM  -SSI, titrate prn  -A1C 6%      Am labs: cbc,cmp,mag, crp, daily inr (holding eliquis); cards & gi consults in a.m.    DVT prophylaxis:  On chronic eliquis, on hold currently (inr 2.5)      Daily Care Communication  Due to current limited visitation policies, an attempt will be made daily to update patient's identified best point-of-contact(s)   Contact: Shama   Relation: wife   Time of communication: 1900   Notes (if applicable): updated on COVID and POC        CODE STATUS:    Code Status (Patient has no pulse and is not breathing): CPR (Attempt to Resuscitate)  Medical Interventions (Patient has pulse or is breathing): Full Support      This note has been completed as part of a split-shared workflow.     Signature: Electronically signed by FAITH Salguero, 01/05/22, 7:44 PM EST      Attending   Admission Attestation       I have seen and examined the patient, performing an independent face-to-face diagnostic evaluation with plan of care reviewed and developed with the advanced practice clinician (APC).      Brief Summary Statement:   Akshat Winkler is a 66 y.o. male w/ hx obesity/josesito, dm2, ckd 3 (baseline cr ~1.2-3 earlier this year, but more recently 1.4-1.5 few weeks ago in mid-December 2021), chronic DHF, parox afib (sintolerant of previous antiarrhythmics) who was admitted here at [facility] to the cardiology service  12/16/21 through 12/21/21 with worsening chf symptoms and rvr, was placed on milrinone drip, iv diuresis and underwent av joby ablation w/ placement of pacemaker on 12/20/21, was restarted on his eliquis day after discharge but no ace/arb/entresto were prescribed due to renal insufficiency, as creatinine was 1.65 on day of discharge.        Patient presents back to [facility] ED w/ gradually increasing dyspnea, worse w/ exertion of the past week. Has chronic BLE edema unchanged, but does note increased abdominal girth, orthopnea, dry cough. Denies chest pain, fever, chills. In ED oxygen sats consistently ~92% on room air; cxr showed interstitial pulm edema, small-medium right pleural effusion, elevated probnp 1074, creatinine 1.74, sodium 132, total bilirubin 5.9, ast 92, alt 36, alk phos 260; covid 19 pcr +, u/a w/ 1+ bacteria, 13-20 wbc, LE +. Given bumex 2mg iv in ed and admitted to hospitalist service. Initiated rocephin, remdesivir, decadron. In reviewing old records, appears previous ct imaging 2018 showed nodular appearing liver suggesting cirrhosis; and bilirubin has been >4 dating back to 2018 as well.     Remainder of detailed HPI is as noted by APC and has been reviewed and/or edited by me for completeness.    Attending Physical Exam:  Constitutional:Alert, oriented x 3, nontoxic appearing but slightly dyspneic w/ long sentences appearing sitting up on side of bed; no distress, no accessory muscle use  Psych:Normal/appropriate affect  HEENT:NCAT, oropharynx clear, nasal canula in place  Neck: neck supple, full range of motion  Neuro: Face symmetric, speech clear, equal , moves all extremities  Cardiac: RRR; 2+BLE edema  Resp: decreased air movement right lung base on posterior exam, no overt wheezes, slightly dyspneic w/ long sentences, but no distress  GI: abd soft, nontender, morbidly obese  Skin: chronic venous stasis changes BLE w/ 2+ edema  Musculoskeletal/extremities: no cyanosis of extremities; full  range of motiont knees and elbows      Brief Assessment/Plan :  See detailed assessment and plan developed with APC which I have reviewed and/or edited for completeness.        Admission Status: I believe that this patient meets inpatient status due to need for iv diuresis, covid treaments, supplemental oxygen, close monitoring of renal function w/ expected stay > 2 midnights      Lele Mays MD  01/05/22

## 2022-01-06 NOTE — OUTREACH NOTE
CHF Week 3 Survey      Responses   [facility] patient discharged from? [place]   Does the patient have one of the following disease processes/diagnoses(primary or secondary)? CHF   Week 3 attempt successful? No   Unsuccessful attempts Attempt 1   Rescheduled Revoked   Revoke Readmitted          Audrey Matson RN

## 2022-01-06 NOTE — H&P (VIEW-ONLY)
[facility] Medicine Services  HISTORY AND PHYSICAL    Patient Name: Akshat Winkler  : 1955  MRN: [id number]  Primary Care Physician: Saurabh Medina PA  Date of admission: 2022    Subjective   Subjective     Chief Complaint:  Dyspnea    HPI:  Akshat Winkler is a 66 y.o. male with PMH diastolic HF, afib with recent pacemaker placement, T2DM, elevated LFTs, JOSESITO, morbid obesity, and CKD who presented to the ED with chief complaint of increasing SOB over the last week.  He denies fever, myalgias or GI symptoms.  ED eval revealed COVID +.  Pt is fully vaccinated  He was discharged from hospital 21 after ablation and pacemaker placement, but didn't take diuretics after dc because he was unsure what to take per afib clinic note on 21 and he received IV Lasix and restarted home bumex and aldactone after that visit.  His symptoms and edema improved from that.        COVID Details:    Symptoms:    [] NONE [] Fever []  Cough [x] Shortness of breath [] Change in taste/smell    Review of Systems   Constitutional: Positive for activity change, chills and fatigue. Negative for fever.   HENT: Negative for congestion, hearing loss and sore throat.    Eyes: Negative for visual disturbance.   Respiratory: Positive for shortness of breath. Negative for cough and wheezing.    Cardiovascular: Positive for leg swelling. Negative for chest pain and palpitations.   Gastrointestinal: Positive for abdominal distention. Negative for abdominal pain, constipation, diarrhea, nausea and vomiting.   Genitourinary: Positive for frequency. Negative for decreased urine volume, dysuria and hematuria.   Musculoskeletal: Negative for back pain and myalgias.   Skin: Negative for wound.   Neurological: Negative for dizziness, weakness and light-headedness.   Psychiatric/Behavioral: Negative for confusion.        All other systems reviewed and are negative.     Personal History     Past Medical History:    Diagnosis Date   • Acute diastolic HF (heart failure) ([facility])    • Acute hypokalemia    • Arrhythmia    • Back injury     Fall on 12/20/17   • Cellulitis of leg    • Chest pain syndrome    • CHF (congestive heart failure) ([facility])    • Deep vein thrombosis ([facility]) 1995   • Diabetes mellitus ([facility])     patient reports borderline    • Hyperlipidemia    • Hypertension    • Muscle pain     around back   • Obesity    • Obstructive sleep apnea     right now patient is sleeeping in recliner and does not use-when he lies down he will have to use cpap   • Paroxysmal A-fib ([facility])    • Peripheral artery disease ([facility])    • Spondylosis of 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 or radiculopathy 2/7/2018   • Wears glasses        Past Surgical History:   Procedure Laterality Date   • CARDIAC CATHETERIZATION     • CARDIAC ELECTROPHYSIOLOGY PROCEDURE N/A 12/20/2021    Procedure: AV node ablation + PM implant;  Surgeon: Joaquin Jeffery MD;  Location: [facility] EP INVASIVE LOCATION;  Service: Cardiovascular;  Laterality: N/A;   • CARDIAC ELECTROPHYSIOLOGY PROCEDURE N/A 12/20/2021    Procedure: AV node ablation;  Surgeon: Joaquin Jeffery MD;  Location: [facility] EP INVASIVE LOCATION;  Service: Cardiovascular;  Laterality: N/A;   • CARDIOVERSION      multiple   • CARDIOVERSION     • COLONOSCOPY      about 14 years ago   • KYPHOPLASTY N/A 3/5/2018    Procedure: KYPHOPLASTY L4;  Surgeon: Akshat Davidson MD;  Location:  [facility];  Service:    • KYPHOPLASTY N/A 4/16/2018    Procedure: KYPHOPLASTY L1 AND L2;  Surgeon: Akshat Davidson MD;  Location:  [facility];  Service: Neurosurgery   • OTHER SURGICAL HISTORY  06/29/2015    PVA       Family History: family history includes Alzheimer's disease in his father; Heart disease in his maternal grandmother; Hyperlipidemia in his father; Hypertension in his father and mother; Prostate cancer in his brother; Sleep disorder in his father; Stroke in his father and mother. Otherwise pertinent FHx was reviewed and  unremarkable.     Social History:  reports that he has never smoked. He has never used smokeless tobacco. He reports that he does not drink alcohol and does not use drugs.  Social History     Social History Narrative    Patient lives at home with his wife and denies caffeine intake.    Works in service at [employer]       Medications:  Vasculera, acetaminophen, apixaban, bumetanide, diphenhydrAMINE-acetaminophen, magnesium oxide, multivitamin with minerals, potassium chloride, rosuvastatin, and spironolactone    Allergies   Allergen Reactions   • Bisoprolol Other (See Comments)     Severe Hypotension   • Flecainide Other (See Comments)     Syncope / collapse   • Metoprolol Other (See Comments)      Bradycardia, Severe Hypotension   • Tikosyn [Dofetilide] Other (See Comments)     Wide complex tachycardia       Objective   Objective     Vital Signs:   Temp:  [96.3 °F (35.7 °C)-98.2 °F (36.8 °C)] 97.1 °F (36.2 °C)  Heart Rate:  [79-80] 80  Resp:  [16-20] 16  BP: (104-134)/(53-90) 107/79  Flow (L/min):  [2] 2    Physical Exam   Constitutional: Awake, alert  Eyes: PERRLA, icteric sclerae, no conjunctival injection  HENT: NCAT, mucous membranes moist  Neck: Supple, no thyromegaly, no lymphadenopathy, trachea midline  Respiratory: Decreased in bases, no wheezing/rhonchi noted, nonlabored respirations.  Some dyspnea with conversation  Cardiovascular: RRR, paced, no murmurs, rubs, or gallops, palpable pedal pulses bilaterally  Gastrointestinal: Positive bowel sounds, soft, nontender, nondistended, obese  Musculoskeletal: 2+bilateral ankle edema, no clubbing or cyanosis to extremities  Psychiatric: Appropriate affect, cooperative  Neurologic: Oriented x 3, CHENG, speech clear  Skin: No rashes      Results Reviewed:  I have personally reviewed most recent indicated data and agree with findings including:  [x]  Laboratory  [x]  Radiology  [x]  EKG/Telemetry  []  Pathology  []  Cardiac/Vascular Studies  [x]  Old records  []   Other:  Most pertinent findings include:      LAB RESULTS:      Lab 01/05/22  2256 01/05/22  1115   WBC 6.54 7.14   HEMOGLOBIN 15.2 14.4   HEMATOCRIT 46.0 44.2   PLATELETS 128* 115*   NEUTROS ABS  --  5.82   IMMATURE GRANS (ABS)  --  0.03   LYMPHS ABS  --  0.37*   MONOS ABS  --  0.77   EOS ABS  --  0.10   .4* 101.4*   CRP  --  5.80*   PROCALCITONIN  --  0.48*   PROTIME  --  26.0*         Lab 01/05/22  1115   SODIUM 132*   POTASSIUM 4.7   CHLORIDE 95*   CO2 25.0   ANION GAP 12.0   BUN 29*   CREATININE 1.74*   GLUCOSE 98   CALCIUM 10.0   MAGNESIUM 2.2         Lab 01/05/22  1115   TOTAL PROTEIN 7.9   ALBUMIN 3.30*   GLOBULIN 4.6   ALT (SGPT) 36   AST (SGOT) 92*   BILIRUBIN 5.9*   ALK PHOS 260*   LIPASE 28         Lab 01/05/22  1115   PROBNP 1,074.0*   TROPONIN T 0.026   PROTIME 26.0*   INR 2.50*             Lab 01/05/22  1115   FERRITIN 172.50         Brief Urine Lab Results  (Last result in the past 365 days)      Color   Clarity   Blood   Leuk Est   Nitrite   Protein   CREAT   Urine HCG        01/05/22 1635 Yellow   Clear   Small (1+)   Trace   Negative   100 mg/dL (2+)               Microbiology Results (last 10 days)     Procedure Component Value - Date/Time    COVID-19, FLU A/B, RSV PCR - Swab, Nasopharynx [[id number]]  (Abnormal) Collected: 01/05/22 1556    Lab Status: Final result Specimen: Swab from Nasopharynx Updated: 01/05/22 1708     COVID19 Detected     Influenza A PCR Not Detected     Influenza B PCR Not Detected     RSV, PCR Not Detected          XR Chest 1 View    Result Date: 1/5/2022  EXAMINATION: XR CHEST 1 VW-  INDICATION: Upper Abdominal Pain Triage Protocol  TECHNIQUE: Frontal view of the chest  COMPARISON: 12/21/2021  FINDINGS:  Stable cardiomediastinal silhouette demonstrating mild cardiomegaly with unchanged single lead cardiac pacer device and left chest pulse generator. Perihilar vascular engorgement and diffuse interstitial prominence compatible with interstitial pulmonary edema. New  mild elevation of the right hemidiaphragm versus small-medium right pleural effusion. No pneumothorax.      Impression:  Interstitial pulmonary edema. Small-medium right pleural effusion versus mild elevation of the right hemidiaphragm from prior exam.  This report was finalized on 1/5/2022 2:02 PM by Juan Dave MD.      US Abdomen Limited    Result Date: 1/5/2022  EXAMINATION: US ABDOMEN LIMITED-  INDICATION: ruq - abnormal LFTs suspect cirrhosis  TECHNIQUE: Transverse and sagittal grayscale sonographic assessment of the right upper quadrant supplemented with color Doppler.  COMPARISON: CT abdomen and pelvis 7/20/2018,  FINDINGS:  The exam is somewhat limited owing to bowel gas, patient body habitus, as well as patient condition with limited mobility and inability to hold respirations.  The visualized portions of the pancreas appear unremarkable.  Increased echogenicity and coarsened echotexture of the liver parenchyma. No discrete hepatic lesion. No intrahepatic ductal dilatation.  The gallbladder contains multiple mobile shadowing dependent gallstones. No evidence of gallbladder wall thickening or pericholecystic fluid.  Normal appearance of the common bile duct measuring 2 mm in diameter.  Normal appearance of the right kidney.  Right pleural effusion.      Impression:  Somewhat limited exam owing to multiple patient factors.  Cholelithiasis without evidence of cholecystitis.  Increased echogenicity and coarsened echotexture of the liver compatible with hepatic steatosis and/or hepatic parenchymal disease.  Right pleural effusion.  This report was finalized on 1/5/2022 5:05 PM by Juan Dave MD.        Results for orders placed during the hospital encounter of 02/27/18    Adult Transthoracic Echo Complete W/ Cont if Necessary Per Protocol    Interpretation Summary  · Technically difficult study due to body habitus  · Left ventricular systolic function is normal. Estimated EF = 60%.  · The cardiac valves  are poorly visualized but there does not appear to be significant stenotic or regurgitant lesions.  · Right ventricular cavity is mild-to-moderately dilated.  · Atrial flutter noted throughout the examination.      Assessment/Plan   Assessment & Plan       Acute on chronic right-sided heart failure (HCC)    Type 2 diabetes mellitus without complication, without long-term current use of insulin (HCC)    Essential hypertension    Severe obstructive sleep apnea, no CPAP    Class 3 severe obesity due to excess calories with serious comorbidity and body mass index (BMI) of 40.0 to 44.9 in adult (HCC)    Persistent atrial fibrillation/flutter    MARKO (acute kidney injury) (HCC)    Hyponatremia    CKD (chronic kidney disease)    Pleural effusion, right    Hepatic steatosis    Elevated procalcitonin    Acute UTI (urinary tract infection)    COVID-19 virus infection      Dyspnea, multifactorial (including covid 19, volume overload/chf, possibly cirrhosis)  A/C DHF  Right sided pleural effusion (on cxr)  COVID 19 pcr +  -bumex 2mg iv tonight, then 1mg iv bid w/ hold parameters  - Cards consult in a.m. & will defer further diuresis orders to cardiology and welcome changes to the current orders (defer echo decision to cards)  -initiate remdesivir & decadron (sats reliably <94% w/ elevated crp), trend inflammatory markers; monitor renal fxn and LFT's  -RA sat 92% during hospitalist service initial exam, not on home O2    Renal insufficiency (Favor MARKO on ckd 3 appears baseline cr 1.3)   Mild hyponatremia  -current cr 1.74 (previous baseline cr ~1.3, within past month creatinine rising gradually to 1.65 on 12/23/21 day previous d/c)  -consider stopping remdesivir if renal function deteriorating  -cpk in a.m.; monitor sodium  -if renal function worsening w/ diuresis, consider nephrology consultation    UTI, poa  -initiate rocephin, follow urine culture    Hx Afib (recent avn ablation & pacemaker 2020)  -recent PPM placement  -hold  eliquis for now (inr 2.5, ? Due to liver dz); daily inr checks, consider restarting if inr <2      Elevated AST & bilirubin  Fatty liver, w/ suggestion cirrhosis on imaging  cholelithiasis  Morbid obesity/JOSESITO  -cpap nightly  -remote ct a/p 2018 suggested possible cirrhosis morphology  -Abd U/s in ED: gallstones, no darwin dil, no gb thickening, fatty liver w/ coarsened echotexture  -bilirubin 4 back in 2018; favor cirrhosis due to fatty liver, possibly exacerbated by right sided chf/congestive hepatopathy  -check acute hepatitis panel  -will consult GI in a.m. for their input    HTN  -stable    T2DM  -SSI, titrate prn  -A1C 6%      Am labs: cbc,cmp,mag, crp, daily inr (holding eliquis); cards & gi consults in a.m.    DVT prophylaxis:  On chronic eliquis, on hold currently (inr 2.5)      Daily Care Communication  Due to current limited visitation policies, an attempt will be made daily to update patient's identified best point-of-contact(s)   Contact: Shama   Relation: wife   Time of communication: 1900   Notes (if applicable): updated on COVID and POC        CODE STATUS:    Code Status (Patient has no pulse and is not breathing): CPR (Attempt to Resuscitate)  Medical Interventions (Patient has pulse or is breathing): Full Support      This note has been completed as part of a split-shared workflow.     Signature: Electronically signed by FAITH Salguero, 01/05/22, 7:44 PM EST      Attending   Admission Attestation       I have seen and examined the patient, performing an independent face-to-face diagnostic evaluation with plan of care reviewed and developed with the advanced practice clinician (APC).      Brief Summary Statement:   Akshat Winkler is a 66 y.o. male w/ hx obesity/josesito, dm2, ckd 3 (baseline cr ~1.2-3 earlier this year, but more recently 1.4-1.5 few weeks ago in mid-December 2021), chronic DHF, parox afib (sintolerant of previous antiarrhythmics) who was admitted here at [facility] to the cardiology service  12/16/21 through 12/21/21 with worsening chf symptoms and rvr, was placed on milrinone drip, iv diuresis and underwent av joby ablation w/ placement of pacemaker on 12/20/21, was restarted on his eliquis day after discharge but no ace/arb/entresto were prescribed due to renal insufficiency, as creatinine was 1.65 on day of discharge.        Patient presents back to [facility] ED w/ gradually increasing dyspnea, worse w/ exertion of the past week. Has chronic BLE edema unchanged, but does note increased abdominal girth, orthopnea, dry cough. Denies chest pain, fever, chills. In ED oxygen sats consistently ~92% on room air; cxr showed interstitial pulm edema, small-medium right pleural effusion, elevated probnp 1074, creatinine 1.74, sodium 132, total bilirubin 5.9, ast 92, alt 36, alk phos 260; covid 19 pcr +, u/a w/ 1+ bacteria, 13-20 wbc, LE +. Given bumex 2mg iv in ed and admitted to hospitalist service. Initiated rocephin, remdesivir, decadron. In reviewing old records, appears previous ct imaging 2018 showed nodular appearing liver suggesting cirrhosis; and bilirubin has been >4 dating back to 2018 as well.     Remainder of detailed HPI is as noted by APC and has been reviewed and/or edited by me for completeness.    Attending Physical Exam:  Constitutional:Alert, oriented x 3, nontoxic appearing but slightly dyspneic w/ long sentences appearing sitting up on side of bed; no distress, no accessory muscle use  Psych:Normal/appropriate affect  HEENT:NCAT, oropharynx clear, nasal canula in place  Neck: neck supple, full range of motion  Neuro: Face symmetric, speech clear, equal , moves all extremities  Cardiac: RRR; 2+BLE edema  Resp: decreased air movement right lung base on posterior exam, no overt wheezes, slightly dyspneic w/ long sentences, but no distress  GI: abd soft, nontender, morbidly obese  Skin: chronic venous stasis changes BLE w/ 2+ edema  Musculoskeletal/extremities: no cyanosis of extremities; full  range of motiont knees and elbows      Brief Assessment/Plan :  See detailed assessment and plan developed with APC which I have reviewed and/or edited for completeness.        Admission Status: I believe that this patient meets inpatient status due to need for iv diuresis, covid treaments, supplemental oxygen, close monitoring of renal function w/ expected stay > 2 midnights      Lele Mays MD  01/05/22

## 2022-01-06 NOTE — PROGRESS NOTES
[facility] Medicine Services  PROGRESS NOTE    Patient Name: Akshat Winkler  : 1955  MRN: [id number]    Date of Admission: 2022  Primary Care Physician: Saurabh Medina PA    Subjective   Subjective     CC:  Dyspnea    HPI:  SOA better. Dry cough. Edema noted. RLE wound noted. No n/v. No dysuria. Weaker.    Review of Systems   Constitutional: Positive for activity change and fatigue.   HENT: Negative.    Respiratory: Positive for chest tightness and shortness of breath.    Cardiovascular: Positive for leg swelling.   Gastrointestinal: Positive for abdominal distention.   Genitourinary: Negative.    Skin: Positive for wound.   Neurological: Negative.    Psychiatric/Behavioral: Negative.           Objective   Objective     Vital Signs:   Temp:  [96.3 °F (35.7 °C)-98.1 °F (36.7 °C)] 98.1 °F (36.7 °C)  Heart Rate:  [80-85] 85  Resp:  [16-20] 16  BP: (103-134)/(53-90) 116/87  Flow (L/min):  [2] 2     Physical Exam:  NAD, alert and oriented, sitting up in chair  OP clear, MMM  Neck supple  No LAD  RRR  Decreased at bases, markedly worse on R  +BS, ND, NT, soft  3+ B LE Edema, large R lateral/posterior tibial wound  CHENG  Normal affect    Results Reviewed:  LAB RESULTS:      Lab 22  0403 22  2256 22  2255 22  1115   WBC  --  6.54  --  7.14   HEMOGLOBIN  --  15.2  --  14.4   HEMATOCRIT  --  46.0  --  44.2   PLATELETS  --  128*  --  115*   NEUTROS ABS  --  5.77  --  5.82   IMMATURE GRANS (ABS)  --  0.01  --  0.03   LYMPHS ABS  --  0.30*  --  0.37*   MONOS ABS  --  0.34  --  0.77   EOS ABS  --  0.09  --  0.10   MCV  --  100.4*  --  101.4*   CRP  --   --  6.26* 5.80*   PROCALCITONIN  --   --   --  0.48*   PROTIME 27.3*  --   --  26.0*   D DIMER QUANT  --  2.01*  --   --          Lab 22  0403 22  2255 22  1115   SODIUM  --  134* 132*   POTASSIUM  --  5.0 4.7   CHLORIDE  --  96* 95*   CO2  --  24.0 25.0   ANION GAP  --  14.0 12.0   BUN  --  30* 29*    CREATININE  --  1.64* 1.74*   GLUCOSE  --  156* 98   CALCIUM  --  10.0 10.0   MAGNESIUM 2.1 2.2 2.2         Lab 01/05/22  2255 01/05/22  1115   TOTAL PROTEIN 8.1 7.9   ALBUMIN 3.30* 3.30*   GLOBULIN 4.8 4.6   ALT (SGPT) 38 36   AST (SGOT) 93* 92*   BILIRUBIN 6.4* 5.9*   ALK PHOS 263* 260*   LIPASE  --  28         Lab 01/06/22  0403 01/05/22  1115   PROBNP  --  1,074.0*   TROPONIN T  --  0.026   PROTIME 27.3* 26.0*   INR 2.67* 2.50*             Lab 01/05/22  1115   FERRITIN 172.50         Brief Urine Lab Results  (Last result in the past 365 days)      Color   Clarity   Blood   Leuk Est   Nitrite   Protein   CREAT   Urine HCG        01/05/22 1635             31.3         01/05/22 1635 Yellow   Clear   Small (1+)   Trace   Negative   100 mg/dL (2+)                 Microbiology Results Abnormal     None          XR Chest 1 View    Result Date: 1/5/2022  EXAMINATION: XR CHEST 1 VW-  INDICATION: Upper Abdominal Pain Triage Protocol  TECHNIQUE: Frontal view of the chest  COMPARISON: 12/21/2021  FINDINGS:  Stable cardiomediastinal silhouette demonstrating mild cardiomegaly with unchanged single lead cardiac pacer device and left chest pulse generator. Perihilar vascular engorgement and diffuse interstitial prominence compatible with interstitial pulmonary edema. New mild elevation of the right hemidiaphragm versus small-medium right pleural effusion. No pneumothorax.      Impression:  Interstitial pulmonary edema. Small-medium right pleural effusion versus mild elevation of the right hemidiaphragm from prior exam.  This report was finalized on 1/5/2022 2:02 PM by Juan Dave MD.      US Abdomen Limited    Result Date: 1/5/2022  EXAMINATION: US ABDOMEN LIMITED-  INDICATION: ruq - abnormal LFTs suspect cirrhosis  TECHNIQUE: Transverse and sagittal grayscale sonographic assessment of the right upper quadrant supplemented with color Doppler.  COMPARISON: CT abdomen and pelvis 7/20/2018,  FINDINGS:  The exam is somewhat  limited owing to bowel gas, patient body habitus, as well as patient condition with limited mobility and inability to hold respirations.  The visualized portions of the pancreas appear unremarkable.  Increased echogenicity and coarsened echotexture of the liver parenchyma. No discrete hepatic lesion. No intrahepatic ductal dilatation.  The gallbladder contains multiple mobile shadowing dependent gallstones. No evidence of gallbladder wall thickening or pericholecystic fluid.  Normal appearance of the common bile duct measuring 2 mm in diameter.  Normal appearance of the right kidney.  Right pleural effusion.      Impression:  Somewhat limited exam owing to multiple patient factors.  Cholelithiasis without evidence of cholecystitis.  Increased echogenicity and coarsened echotexture of the liver compatible with hepatic steatosis and/or hepatic parenchymal disease.  Right pleural effusion.  This report was finalized on 1/5/2022 5:05 PM by Juan Dave MD.        Results for orders placed during the hospital encounter of 02/27/18    Adult Transthoracic Echo Complete W/ Cont if Necessary Per Protocol    Interpretation Summary  · Technically difficult study due to body habitus  · Left ventricular systolic function is normal. Estimated EF = 60%.  · The cardiac valves are poorly visualized but there does not appear to be significant stenotic or regurgitant lesions.  · Right ventricular cavity is mild-to-moderately dilated.  · Atrial flutter noted throughout the examination.      I have reviewed the medications:  Scheduled Meds:!NOT ORDERED- apixaban (ELIQUIS), , Does not apply, Daily With Dinner  bumetanide, 1 mg, Intravenous, BID  cefTRIAXone, 1 g, Intravenous, Nightly  dexamethasone, 6 mg, Oral, Daily   Or  dexamethasone, 6 mg, Intravenous, Daily  insulin lispro, 0-7 Units, Subcutaneous, TID AC  magnesium oxide, 400 mg, Oral, Daily  pharmacy consult - MTM, , Does not apply, Daily  potassium chloride, 30 mEq, Oral,  Daily  remdesivir, 100 mg, Intravenous, Daily With Lunch      Continuous Infusions:Pharmacy Consult - Remdesivir,       PRN Meds:.acetaminophen  •  albuterol sulfate HFA  •  benzonatate  •  dextromethorphan polistirex ER  •  dextrose  •  dextrose  •  glucagon (human recombinant)  •  magnesium sulfate **OR** magnesium sulfate in D5W 1g/100mL (PREMIX) **OR** magnesium sulfate  •  Pharmacy Consult - Remdesivir  •  sodium chloride    Assessment/Plan   Assessment & Plan     Active Hospital Problems    Diagnosis  POA   • **COVID-19 virus infection [U07.1]  Yes   • Hyponatremia [E87.1]  Yes   • Stage 3 chronic kidney disease (HCC) [N18.30]  Yes   • Pleural effusion, right [J90]  Yes   • Hepatic steatosis [K76.0]  Yes   • Elevated procalcitonin [R79.89]  Yes   • Acute UTI (urinary tract infection) [N39.0]  Yes   • Presence of cardiac pacemaker [Z95.0]  Yes   • Chronic bilateral severe lower extremity edema [R60.0]  Yes   • Persistent atrial fibrillation/flutter [I48.19]  Yes   • Type 2 diabetes mellitus without complication, without long-term current use of insulin (HCC) [E11.9]  Yes   • Essential hypertension [I10]  Yes   • Severe obstructive sleep apnea, no CPAP [G47.33]  Yes   • Class 3 severe obesity due to excess calories with serious comorbidity and body mass index (BMI) of 40.0 to 44.9 in adult (HCC) [E66.01, Z68.41]  Not Applicable   • Acute on chronic right-sided heart failure (HCC) [I50.813]  Yes      Resolved Hospital Problems    Diagnosis Date Resolved POA   • Acute pulmonary edema (HCC) [J81.0] 01/05/2022 Yes   • Pneumonia due to COVID-19 virus [U07.1, J12.82] 01/05/2022 Yes   • Acute on chronic diastolic congestive heart failure (HCC) [I50.33] 01/05/2022 Yes        Brief Hospital Course to date:  Akshat Winkler is a 66 y.o. male here with dyspnea    Dyspnea  A/C DHF  R pleural effusion  COVID+  -diurese as tolerated, per cardiology  -plan for IR thoracentesis on 1/7 (due to eliquis  use)      COVID+  -dexamethasone/remdesivir  -wean oxygen as tolerated    UTI/POA  -rocephin    Atrial fibrillation  -recent PPM  -on eliquis, held    Suspected new diagnosis cirrhosis  -GI consulted    CKD  -monitor, on diuretics    DM  -titrate    Large RLE wound  -PT wound care consulted    DVT prophylaxis:  Medical DVT prophylaxis orders are present.       AM-PAC 6 Clicks Score (PT): 17 (01/05/22 2016)    Disposition: I expect the patient to be discharged TBD.    CODE STATUS:   Code Status and Medical Interventions:   Ordered at: 01/05/22 1932     Code Status (Patient has no pulse and is not breathing):    CPR (Attempt to Resuscitate)     Medical Interventions (Patient has pulse or is breathing):    Full Support       Ted Sparks MD  01/06/22

## 2022-01-07 PROBLEM — N17.9 AKI (ACUTE KIDNEY INJURY) (HCC): Status: RESOLVED | Noted: 2017-12-22 | Resolved: 2022-01-01

## 2022-01-07 PROBLEM — R06.09 DYSPNEA ON EXERTION: Status: RESOLVED | Noted: 2022-01-01 | Resolved: 2022-01-01

## 2022-01-07 PROBLEM — R79.89 ELEVATED PROCALCITONIN: Status: RESOLVED | Noted: 2022-01-01 | Resolved: 2022-01-01

## 2022-01-07 PROBLEM — I31.1 CONSTRICTIVE PERICARDITIS: Status: ACTIVE | Noted: 2022-01-01

## 2022-01-07 PROBLEM — E87.1 HYPONATREMIA: Status: RESOLVED | Noted: 2022-01-01 | Resolved: 2022-01-01

## 2022-01-07 PROBLEM — I31.8 PERICARDIAL CALCIFICATION: Status: ACTIVE | Noted: 2022-01-01

## 2022-01-07 PROBLEM — Z98.890 HISTORY OF KYPHOPLASTY: Status: RESOLVED | Noted: 2018-10-16 | Resolved: 2022-01-01

## 2022-01-07 PROBLEM — R73.03 PREDIABETES: Status: RESOLVED | Noted: 2021-01-01 | Resolved: 2022-01-01

## 2022-01-07 PROBLEM — K74.60 CIRRHOSIS (HCC): Status: ACTIVE | Noted: 2022-01-01

## 2022-01-07 PROBLEM — S32.000A COMPRESSION FRACTURE OF LUMBAR VERTEBRA (HCC): Status: RESOLVED | Noted: 2017-12-22 | Resolved: 2022-01-01

## 2022-01-07 NOTE — PROGRESS NOTES
"GI Daily Progress Note  Subjective:    Chief Complaint: Follow up liver cirrhosis     Underwent right thoracentesis today.   2,000 mL removed.  Shortness of breath improved.       Objective:    /80 (BP Location: Left arm, Patient Position: Sitting)   Pulse 83   Temp 97.7 °F (36.5 °C) (Axillary)   Resp 18   Ht 175.3 cm (69\")   Wt (!) 142 kg (313 lb 4.8 oz)   SpO2 98%   BMI 46.27 kg/m²     Physical Exam  Patient is in airborne isolation for active COVID19 infection.  Patient was seen via hallway window and discussed via telephone.   He is alert and oriented x 3.   No acute distress.   Cardiac: Normal rate and rhythm  Pulmonary: No respiratory distress    Lab  Lab Results   Component Value Date    WBC 6.64 01/07/2022    HGB 14.2 01/07/2022    HGB 15.2 01/05/2022    HGB 14.4 01/05/2022    MCV 99.8 (H) 01/07/2022     (L) 01/07/2022    INR 2.01 (H) 01/07/2022    INR 2.67 (H) 01/06/2022    INR 2.50 (H) 01/05/2022    INR 2.11 (H) 11/18/2021    INR 1.35 (H) 07/15/2018     Lab Results   Component Value Date    GLUCOSE 116 (H) 01/07/2022    BUN 35 (H) 01/07/2022    CREATININE 1.65 (H) 01/07/2022    EGFRIFNONA 42 (L) 01/07/2022    EGFRIFAFRI 92 02/08/2018    BCR 21.2 01/07/2022     (L) 01/07/2022    K 5.2 01/07/2022    CO2 27.0 01/07/2022    CALCIUM 9.8 01/07/2022    PROTENTOTREF 7.0 03/03/2018    ALBUMIN 3.10 (L) 01/07/2022    ALKPHOS 249 (H) 01/07/2022    BILITOT 5.2 (H) 01/07/2022    BILIDIR 3.2 (H) 01/06/2022    ALT 36 01/07/2022    AST 94 (H) 01/07/2022     Transthoracic echocardiogram:   · Technically difficult study  · Left ventricular systolic function is normal. Estimated left ventricular EF = 60%.  · The right ventricular cavity is dilated.  · The right atrial cavity is mild to moderately dilated.  · Moderate tricuspid valve regurgitation is present.  · Estimated right ventricular systolic pressure from tricuspid regurgitation is moderately elevated (45-55 mmHg).    Assessment:    Liver " cirrhosis, suspect SHERMAN, component of congestive hepatopathy.      Acute on chronic right sided heart failure   Right pleural effusion s/p thoracentesis today  Morbid obesity, BMI is 46.    Coagulopathy, on Eliquis.     COVID-19 infection     Plan:    >>> Continue IV Bumex per cardiology  >>> Obtain CT abdomen/pelvis without contrast tomorrow.     >>> Obtain AFP  >>> Will check immune status to hepatitis A and hepatitis B    >>> He will need outpatient follow up at discharge with [facility]  Recommend a high protein diet (1.2-1.5 gm/kg daily) as well as low sodium diet (< 2,000 mg daily).      RUPESH Hodge  01/07/22  14:19 EST

## 2022-01-07 NOTE — PLAN OF CARE
VSS on 2L NC.  V-paced per tele.  Pt woke up and got in his chair around 4am.  No complaints this shift.  Will continue to monitor.     Problem: Adult Inpatient Plan of Care  Goal: Plan of Care Review  Outcome: Ongoing, Progressing  Goal: Patient-Specific Goal (Individualized)  Outcome: Ongoing, Progressing  Goal: Absence of Hospital-Acquired Illness or Injury  Outcome: Ongoing, Progressing  Intervention: Identify and Manage Fall Risk  Recent Flowsheet Documentation  Taken 1/7/2022 0400 by Susu Krishnamurthy, RN  Safety Promotion/Fall Prevention:   activity supervised   assistive device/personal items within reach   clutter free environment maintained   safety round/check completed   room organization consistent   nonskid shoes/slippers when out of bed   fall prevention program maintained  Taken 1/7/2022 0200 by Susu Krishnamurthy, RN  Safety Promotion/Fall Prevention:   activity supervised   assistive device/personal items within reach   clutter free environment maintained   safety round/check completed   room organization consistent   nonskid shoes/slippers when out of bed   fall prevention program maintained  Taken 1/7/2022 0000 by Susu Krishnamurthy, RN  Safety Promotion/Fall Prevention:   activity supervised   assistive device/personal items within reach   clutter free environment maintained   fall prevention program maintained   nonskid shoes/slippers when out of bed   room organization consistent   safety round/check completed  Taken 1/6/2022 2200 by Susu Krishnamurthy, RN  Safety Promotion/Fall Prevention:   activity supervised   assistive device/personal items within reach   clutter free environment maintained   safety round/check completed   room organization consistent   nonskid shoes/slippers when out of bed   fall prevention program maintained  Taken 1/6/2022 2000 by Susu Krishnamurthy, RN  Safety Promotion/Fall Prevention:   activity supervised   assistive device/personal items within reach   clutter free environment  maintained   safety round/check completed   room organization consistent   nonskid shoes/slippers when out of bed   fall prevention program maintained  Intervention: Prevent Skin Injury  Recent Flowsheet Documentation  Taken 1/7/2022 0400 by Susu Krishnamurthy RN  Body Position: position changed independently  Skin Protection:   adhesive use limited   incontinence pads utilized   tubing/devices free from skin contact   skin-to-device areas padded  Taken 1/7/2022 0200 by Susu Krishnamurthy RN  Body Position: position changed independently  Skin Protection:   adhesive use limited   incontinence pads utilized   tubing/devices free from skin contact   skin-to-device areas padded  Taken 1/7/2022 0000 by Susu Krishnamurthy RN  Body Position: position changed independently  Skin Protection:   adhesive use limited   incontinence pads utilized   tubing/devices free from skin contact   skin-to-device areas padded  Taken 1/6/2022 2200 by Susu Krishnamurthy RN  Body Position: position changed independently  Skin Protection:   adhesive use limited   incontinence pads utilized   tubing/devices free from skin contact   skin-to-device areas padded  Taken 1/6/2022 2000 by Susu Krishnamurthy RN  Body Position: position changed independently  Skin Protection:   adhesive use limited   tubing/devices free from skin contact   skin-to-device areas padded   incontinence pads utilized  Intervention: Prevent and Manage VTE (venous thromboembolism) Risk  Recent Flowsheet Documentation  Taken 1/7/2022 0000 by Susu Krishnamurthy RN  VTE Prevention/Management:   bilateral   dorsiflexion/plantar flexion performed   bleeding risk factor(s) identified  Intervention: Prevent Infection  Recent Flowsheet Documentation  Taken 1/7/2022 0400 by Susu Krishnamurthy RN  Infection Prevention:   environmental surveillance performed   visitors restricted/screened   single patient room provided   rest/sleep promoted   personal protective equipment utilized  Taken 1/7/2022  0200 by Susu Krishnamurthy RN  Infection Prevention:   environmental surveillance performed   visitors restricted/screened   single patient room provided   rest/sleep promoted   personal protective equipment utilized  Taken 1/7/2022 0000 by Susu Krishnamurthy RN  Infection Prevention:   environmental surveillance performed   visitors restricted/screened   single patient room provided   rest/sleep promoted   personal protective equipment utilized  Taken 1/6/2022 2200 by Susu Krishnamurthy RN  Infection Prevention:   environmental surveillance performed   visitors restricted/screened   single patient room provided   personal protective equipment utilized   rest/sleep promoted  Taken 1/6/2022 2000 by Susu Krishnamurthy RN  Infection Prevention:   environmental surveillance performed   visitors restricted/screened   single patient room provided   rest/sleep promoted   personal protective equipment utilized  Goal: Optimal Comfort and Wellbeing  Outcome: Ongoing, Progressing  Intervention: Provide Person-Centered Care  Recent Flowsheet Documentation  Taken 1/7/2022 0000 by Susu Krishnamurthy RN  Trust Relationship/Rapport:   care explained   choices provided  Goal: Readiness for Transition of Care  Outcome: Ongoing, Progressing     Problem: Diabetes Comorbidity  Goal: Blood Glucose Level Within Desired Range  Outcome: Ongoing, Progressing     Problem: Skin Injury Risk Increased  Goal: Skin Health and Integrity  Outcome: Ongoing, Progressing  Intervention: Optimize Skin Protection  Recent Flowsheet Documentation  Taken 1/7/2022 0400 by Susu Krishnamurthy, RN  Pressure Reduction Techniques:   frequent weight shift encouraged   pressure points protected  Head of Bed (HOB): HOB at 30-45 degrees  Pressure Reduction Devices:   pressure-redistributing mattress utilized   positioning supports utilized  Skin Protection:   adhesive use limited   incontinence pads utilized   tubing/devices free from skin contact   skin-to-device areas  padded  Taken 1/7/2022 0200 by Susu Krishnamurthy RN  Pressure Reduction Techniques:   frequent weight shift encouraged   pressure points protected  Head of Bed (HOB): HOB at 20-30 degrees  Pressure Reduction Devices:   pressure-redistributing mattress utilized   positioning supports utilized  Skin Protection:   adhesive use limited   incontinence pads utilized   tubing/devices free from skin contact   skin-to-device areas padded  Taken 1/7/2022 0000 by Susu Krishnamurthy RN  Pressure Reduction Techniques:   frequent weight shift encouraged   pressure points protected  Head of Bed (HOB): HOB at 20-30 degrees  Pressure Reduction Devices:   pressure-redistributing mattress utilized   positioning supports utilized  Skin Protection:   adhesive use limited   incontinence pads utilized   tubing/devices free from skin contact   skin-to-device areas padded  Taken 1/6/2022 2200 by Susu Krishnamurthy RN  Pressure Reduction Techniques:   weight shift assistance provided   pressure points protected  Head of Bed (HOB): HOB at 20-30 degrees  Pressure Reduction Devices:   pressure-redistributing mattress utilized   positioning supports utilized  Skin Protection:   adhesive use limited   incontinence pads utilized   tubing/devices free from skin contact   skin-to-device areas padded  Taken 1/6/2022 2000 by Susu Krishnamurthy RN  Pressure Reduction Techniques:   frequent weight shift encouraged   pressure points protected  Head of Bed (HOB): HOB at 20-30 degrees  Pressure Reduction Devices:   pressure-redistributing mattress utilized   positioning supports utilized  Skin Protection:   adhesive use limited   tubing/devices free from skin contact   skin-to-device areas padded   incontinence pads utilized     Problem: Fall Injury Risk  Goal: Absence of Fall and Fall-Related Injury  Outcome: Ongoing, Progressing  Intervention: Identify and Manage Contributors to Fall Injury Risk  Recent Flowsheet Documentation  Taken 1/7/2022 0400 by Raghu  HANANE Cristobal  Medication Review/Management: medications reviewed  Taken 1/7/2022 0200 by Susu Krishnamurthy RN  Medication Review/Management: medications reviewed  Taken 1/7/2022 0000 by Susu Krishnamurthy RN  Medication Review/Management: medications reviewed  Taken 1/6/2022 2200 by Susu Krishnamurthy RN  Medication Review/Management: medications reviewed  Taken 1/6/2022 2000 by Susu Krishnamurthy RN  Medication Review/Management: medications reviewed  Intervention: Promote Injury-Free Environment  Recent Flowsheet Documentation  Taken 1/7/2022 0400 by Susu Krishnamurthy RN  Safety Promotion/Fall Prevention:   activity supervised   assistive device/personal items within reach   clutter free environment maintained   safety round/check completed   room organization consistent   nonskid shoes/slippers when out of bed   fall prevention program maintained  Taken 1/7/2022 0200 by Susu Krishnamurthy RN  Safety Promotion/Fall Prevention:   activity supervised   assistive device/personal items within reach   clutter free environment maintained   safety round/check completed   room organization consistent   nonskid shoes/slippers when out of bed   fall prevention program maintained  Taken 1/7/2022 0000 by Susu Krishnamurthy RN  Safety Promotion/Fall Prevention:   activity supervised   assistive device/personal items within reach   clutter free environment maintained   fall prevention program maintained   nonskid shoes/slippers when out of bed   room organization consistent   safety round/check completed  Taken 1/6/2022 2200 by Susu Krishnamurthy RN  Safety Promotion/Fall Prevention:   activity supervised   assistive device/personal items within reach   clutter free environment maintained   safety round/check completed   room organization consistent   nonskid shoes/slippers when out of bed   fall prevention program maintained  Taken 1/6/2022 2000 by Susu Krishnamurthy RN  Safety Promotion/Fall Prevention:   activity supervised   assistive  device/personal items within reach   clutter free environment maintained   safety round/check completed   room organization consistent   nonskid shoes/slippers when out of bed   fall prevention program maintained

## 2022-01-07 NOTE — PROGRESS NOTES
[facility] Medicine Services  PROGRESS NOTE    Patient Name: Akshat Winkler  : 1955  MRN: [id number]    Date of Admission: 2022  Primary Care Physician: Saurabh Medina PA    Subjective   Subjective     CC:  Dyspnea    HPI:  SOA better. Dry cough. 99% on RA. No n/v. Tolerating PO. Hungry. LE edema noted.     Review of Systems   Constitutional: Positive for activity change and fatigue.   HENT: Negative.    Respiratory: Positive for chest tightness and shortness of breath.    Cardiovascular: Positive for leg swelling.   Gastrointestinal: Positive for abdominal distention.   Genitourinary: Negative.    Skin: Positive for wound.   Neurological: Negative.    Psychiatric/Behavioral: Negative.           Objective   Objective     Vital Signs:   Temp:  [97 °F (36.1 °C)-98.1 °F (36.7 °C)] 97.8 °F (36.6 °C)  Heart Rate:  [80-94] 82  Resp:  [16-20] 18  BP: (113-129)/(79-93) 118/81  Flow (L/min):  [2] 2     Physical Exam:  NAD, alert and oriented, sitting up in chair, eating breakfast  OP clear, MMM, stable  Neck supple, stable  No LAD, stable  RRR, stable  Decreased at bases, markedly worse on R, unchanged  +BS, ND, NT, soft, stable  3+ B LE Edema, RLE wrapped  CHENG, stable  Normal affect    Results Reviewed:  LAB RESULTS:      Lab 22  0806 22  0403 22  2256 22  2255 22  1115   WBC 6.64  --  6.54  --  7.14   HEMOGLOBIN 14.2  --  15.2  --  14.4   HEMATOCRIT 43.2  --  46.0  --  44.2   PLATELETS 124*  --  128*  --  115*   NEUTROS ABS 5.98  --  5.77  --  5.82   IMMATURE GRANS (ABS) 0.03  --  0.01  --  0.03   LYMPHS ABS 0.28*  --  0.30*  --  0.37*   MONOS ABS 0.35  --  0.34  --  0.77   EOS ABS 0.00  --  0.09  --  0.10   MCV 99.8*  --  100.4*  --  101.4*   CRP  --   --   --  6.26* 5.80*   PROCALCITONIN  --   --   --   --  0.48*   PROTIME 21.9* 27.3*  --   --  26.0*   D DIMER QUANT  --   --  2.01*  --   --          Lab 22  0403 22  2255 22  9183    SODIUM  --  134* 132*   POTASSIUM  --  5.0 4.7   CHLORIDE  --  96* 95*   CO2  --  24.0 25.0   ANION GAP  --  14.0 12.0   BUN  --  30* 29*   CREATININE  --  1.64* 1.74*   GLUCOSE  --  156* 98   CALCIUM  --  10.0 10.0   MAGNESIUM 2.1 2.2 2.2         Lab 01/06/22  2049 01/05/22  2255 01/05/22  1115   TOTAL PROTEIN  --  8.1 7.9   ALBUMIN  --  3.30* 3.30*   GLOBULIN  --  4.8 4.6   ALT (SGPT)  --  38 36   AST (SGOT)  --  93* 92*   BILIRUBIN  --  6.4* 5.9*   BILIRUBIN DIRECT 3.2*  --   --    ALK PHOS  --  263* 260*   LIPASE  --   --  28         Lab 01/07/22  0806 01/06/22  0403 01/05/22  1115   PROBNP  --   --  1,074.0*   TROPONIN T  --   --  0.026   PROTIME 21.9* 27.3* 26.0*   INR 2.01* 2.67* 2.50*             Lab 01/05/22  1115   FERRITIN 172.50         Brief Urine Lab Results  (Last result in the past 365 days)      Color   Clarity   Blood   Leuk Est   Nitrite   Protein   CREAT   Urine HCG        01/05/22 1635             31.3         01/05/22 1635 Yellow   Clear   Small (1+)   Trace   Negative   100 mg/dL (2+)                 Microbiology Results Abnormal     None          XR Chest 1 View    Result Date: 1/5/2022  EXAMINATION: XR CHEST 1 VW-  INDICATION: Upper Abdominal Pain Triage Protocol  TECHNIQUE: Frontal view of the chest  COMPARISON: 12/21/2021  FINDINGS:  Stable cardiomediastinal silhouette demonstrating mild cardiomegaly with unchanged single lead cardiac pacer device and left chest pulse generator. Perihilar vascular engorgement and diffuse interstitial prominence compatible with interstitial pulmonary edema. New mild elevation of the right hemidiaphragm versus small-medium right pleural effusion. No pneumothorax.      Impression:  Interstitial pulmonary edema. Small-medium right pleural effusion versus mild elevation of the right hemidiaphragm from prior exam.  This report was finalized on 1/5/2022 2:02 PM by Juan Dave MD.      US Abdomen Limited    Result Date: 1/5/2022  EXAMINATION: US ABDOMEN  LIMITED-  INDICATION: ruq - abnormal LFTs suspect cirrhosis  TECHNIQUE: Transverse and sagittal grayscale sonographic assessment of the right upper quadrant supplemented with color Doppler.  COMPARISON: CT abdomen and pelvis 7/20/2018,  FINDINGS:  The exam is somewhat limited owing to bowel gas, patient body habitus, as well as patient condition with limited mobility and inability to hold respirations.  The visualized portions of the pancreas appear unremarkable.  Increased echogenicity and coarsened echotexture of the liver parenchyma. No discrete hepatic lesion. No intrahepatic ductal dilatation.  The gallbladder contains multiple mobile shadowing dependent gallstones. No evidence of gallbladder wall thickening or pericholecystic fluid.  Normal appearance of the common bile duct measuring 2 mm in diameter.  Normal appearance of the right kidney.  Right pleural effusion.      Impression:  Somewhat limited exam owing to multiple patient factors.  Cholelithiasis without evidence of cholecystitis.  Increased echogenicity and coarsened echotexture of the liver compatible with hepatic steatosis and/or hepatic parenchymal disease.  Right pleural effusion.  This report was finalized on 1/5/2022 5:05 PM by Juan Dave MD.        Results for orders placed during the hospital encounter of 02/27/18    Adult Transthoracic Echo Complete W/ Cont if Necessary Per Protocol    Interpretation Summary  · Technically difficult study due to body habitus  · Left ventricular systolic function is normal. Estimated EF = 60%.  · The cardiac valves are poorly visualized but there does not appear to be significant stenotic or regurgitant lesions.  · Right ventricular cavity is mild-to-moderately dilated.  · Atrial flutter noted throughout the examination.      I have reviewed the medications:  Scheduled Meds:!NOT ORDERED- apixaban (ELIQUIS), , Does not apply, Daily With Dinner  ampicillin, 500 mg, Intravenous, Q6H  bumetanide, 2 mg,  Intravenous, BID  dexamethasone, 6 mg, Oral, Daily   Or  dexamethasone, 6 mg, Intravenous, Daily  insulin lispro, 0-7 Units, Subcutaneous, TID AC  magnesium oxide, 400 mg, Oral, Daily  pharmacy consult - MTM, , Does not apply, Daily  potassium chloride, 30 mEq, Oral, Daily  remdesivir, 100 mg, Intravenous, Daily With Lunch      Continuous Infusions:hold, 1 each  Pharmacy Consult - Remdesivir,       PRN Meds:.•  hold  •  acetaminophen  •  albuterol sulfate HFA  •  benzonatate  •  dextromethorphan polistirex ER  •  dextrose  •  dextrose  •  glucagon (human recombinant)  •  magnesium sulfate **OR** magnesium sulfate in D5W 1g/100mL (PREMIX) **OR** magnesium sulfate  •  Pharmacy Consult - Remdesivir  •  sodium chloride    Assessment/Plan   Assessment & Plan     Active Hospital Problems    Diagnosis  POA   • **COVID-19 virus infection [U07.1]  Yes   • Hyponatremia [E87.1]  Yes   • Stage 3 chronic kidney disease (HCC) [N18.30]  Yes   • Pleural effusion, right [J90]  Yes   • Hepatic steatosis [K76.0]  Yes   • Elevated procalcitonin [R79.89]  Yes   • Acute UTI (urinary tract infection) [N39.0]  Yes   • Presence of cardiac pacemaker [Z95.0]  Yes   • Chronic bilateral severe lower extremity edema [R60.0]  Yes   • Persistent atrial fibrillation/flutter [I48.19]  Yes   • Type 2 diabetes mellitus without complication, without long-term current use of insulin (HCC) [E11.9]  Yes   • Essential hypertension [I10]  Yes   • Severe obstructive sleep apnea, no CPAP [G47.33]  Yes   • Class 3 severe obesity due to excess calories with serious comorbidity and body mass index (BMI) of 40.0 to 44.9 in adult (HCC) [E66.01, Z68.41]  Not Applicable   • Acute on chronic right-sided heart failure (HCC) [I50.813]  Yes      Resolved Hospital Problems    Diagnosis Date Resolved POA   • Acute pulmonary edema (HCC) [J81.0] 01/05/2022 Yes   • Pneumonia due to COVID-19 virus [U07.1, J12.82] 01/05/2022 Yes   • Acute on chronic diastolic congestive heart  failure (HCC) [I50.33] 01/05/2022 Yes        Brief Hospital Course to date:  Akshat Winkler is a 66 y.o. male here with dyspnea    Dyspnea  A/C DHF  R pleural effusion  COVID+  -diurese as tolerated, per cardiology  -plan for IR thoracentesis on 1/7 (due to eliquis use), ordered  -avoid large bore per GI as worrisome for hydrothorax    COVID+  -dexamethasone/remdesivir  -wean oxygen as tolerated    UTI/POA  -change to ampicillin, await final sensitivities    Atrial fibrillation  -recent PPM  -on eliquis, held    Suspected new diagnosis cirrhosis  -GI consulted, worrisome for hepatic hydrothorax on R, advised avoiding large bore chest tubes  -not a TIPS candidate    CKD  -monitor, on diuretics    DM  -titrate, stable    Large RLE wound  -PT wound care consulted  -images reviewed    DVT prophylaxis:  Medical DVT prophylaxis orders are present.       AM-PAC 6 Clicks Score (PT): 17 (01/06/22 0830)    Disposition: I expect the patient to be discharged TBD.    CODE STATUS:   Code Status and Medical Interventions:   Ordered at: 01/05/22 1932     Code Status (Patient has no pulse and is not breathing):    CPR (Attempt to Resuscitate)     Medical Interventions (Patient has pulse or is breathing):    Full Support       Ted Sparks MD  01/07/22

## 2022-01-07 NOTE — PROGRESS NOTES
Cardiology Progress Note      Reason for visit:    · Right-sided heart failure  · Recent pacemaker implant status post AV joby ablation for atrial fibrillation/flutter    IDENTIFICATION: 66-year-old gentleman who is  and resides in [place], [place]    Active Hospital Problems    Diagnosis  POA   • **COVID-19 virus infection [U07.1]  Yes     Priority: High   • Stage 3 chronic kidney disease (HCC) [N18.30]  Yes     Priority: High   • Persistent atrial fibrillation/flutter [I48.19]  Yes     Priority: High     · Diagnosed 2011  · [id number] 3 (HTN, CAD, DM)  · Echo (10/11/2011): Normal LVEF, mild MR, mild LA dilation  · LOLY/ECVcardioversion (12/21/2011) initiation of propafenone therapy.   · Successful LOLY/ECV for recurrent atrial fibrillation, 11/15/2013  · PVA with Dr. Cary, 6/29/2015  · Cardioversion (07/17/2015) for atrial flutter occurring post ablation   · ECV for recurrent atrial flutter, 12/20/17 with reattempt at beta blocker/flecainide.  · Intolerant to beta blocker/flecanide with severe hypotension, intolerant to propafenone  · Tikosyn admission with development of wide complex tachycardia, 7/2018  · ECV for recurrent atrial flutter with early return of atrial flutter, 11/18/2021  · Admission to [facility]/16/2021 with acute CHF and atrial flutter with rapid ventricular response  · AV joby ablation with pacemaker placement 12/20/2021     • Type 2 diabetes mellitus without complication, without long-term current use of insulin (HCC) [E11.9]  Yes     Priority: High     · Statin therapy indicated given diabetic status     • Acute on chronic right-sided heart failure (HCC) [I50.813]  Yes     Priority: High     · Echo (12/10/2021): LVEF 60%.  Moderate TR.  RVSP 58 mmHg     • Hyponatremia [E87.1]  Yes     Priority: Medium   • Pleural effusion, right [J90]  Yes     Priority: Medium   • Hepatic steatosis [K76.0]  Yes     Priority: Medium   • Elevated procalcitonin [R79.89]  Yes      Priority: Medium   • Acute UTI (urinary tract infection) [N39.0]  Yes     Priority: Medium   • Presence of cardiac pacemaker [Z95.0]  Yes     Priority: Medium     · AV joby ablation with pacemaker placement 12/20/2021       • Essential hypertension [I10]  Yes     Priority: Medium   • Severe obstructive sleep apnea, no CPAP [G47.33]  Yes     Priority: Medium     No longer on CPAP, sleeps in reclined position     • Chronic bilateral severe lower extremity edema [R60.0]  Yes     Priority: Low   • Class 3 severe obesity due to excess calories with serious comorbidity and body mass index (BMI) of 40.0 to 44.9 in adult (HCC) [E66.01, Z68.41]  Not Applicable     Priority: Low            GI was consulted yesterday for concern of liver cirrhosis versus congestive hepatopathy.  He has a right pleural effusion and they suspect hepatic hydrothorax and have recommended against use of a large bore chest tube.  Creatinine is stable at 1.65.  He is currently off the floor for a thoracentesis.             Vital Sign Min/Max for last 24 hours  Temp  Min: 97 °F (36.1 °C)  Max: 98.1 °F (36.7 °C)   BP  Min: 113/86  Max: 129/93   Pulse  Min: 80  Max: 94   Resp  Min: 16  Max: 20   SpO2  Min: 91 %  Max: 100 %   Flow (L/min)  Min: 2  Max: 2      Intake/Output Summary (Last 24 hours) at 1/7/2022 0859  Last data filed at 1/7/2022 0405  Gross per 24 hour   Intake --   Output 1451 ml   Net -1451 ml           Physical Exam     No physical exam performed due to being in isolation for covid 19.  Chart reviewed and recommendations made    Tele: Ventricular paced    Results Review (reviewed the patient's recent labs in the electronic medical record):     EKG (1/5/2022): Ventricular paced    CXR (1/7/2022): Results pending    ECHO (1/7/2022): Results pending    Result Review:  I have personally reviewed the results from the time of this admission to 1/7/2022 08:59 EST and agree with these findings:  [x]  Laboratory  []  Microbiology  [x]   Radiology  [x]  EKG/Telemetry   [x]  Cardiology/Vascular   []  Pathology  []  Old records  []  Other:  Most notable findings include: BUN 35, creatinine 1.65, sodium 133, ALT 36, AST 94, platelets 124,    Results from last 7 days   Lab Units 01/07/22  0806 01/06/22  0403 01/05/22 2255 01/05/22  1115   SODIUM mmol/L 133*  --  134* 132*   POTASSIUM mmol/L 5.2  --  5.0 4.7   CHLORIDE mmol/L 95*  --  96* 95*   BUN mg/dL 35*  --  30* 29*   CREATININE mg/dL 1.65*  --  1.64* 1.74*   MAGNESIUM mg/dL  --  2.1 2.2 2.2     Results from last 7 days   Lab Units 01/05/22  1115   TROPONIN T ng/mL 0.026     Results from last 7 days   Lab Units 01/07/22  0806 01/05/22  2256 01/05/22  1115   WBC 10*3/mm3 6.64 6.54 7.14   HEMOGLOBIN g/dL 14.2 15.2 14.4   HEMATOCRIT % 43.2 46.0 44.2   PLATELETS 10*3/mm3 124* 128* 115*       Lab Results   Component Value Date    HGBA1C 6.00 (H) 12/16/2021       Lab Results   Component Value Date    CHOL 103 12/16/2021    CHLPL 138 05/09/2017    TRIG 81 12/16/2021    HDL 31 (L) 12/16/2021    LDL 56 12/16/2021                 Shortness of breath/Covid-19//CHF  · Multifactorial from Covid-19/right-sided heart failure/right-sided pleural  · Started on remdesivir and Decadron  · O2 2 L on nasal cannula with O2 saturations 98%  · Hospitalist to treat Covid-19        Chronic kidney disease  · Current creatinine 1.64.   · Baseline creatinine 1.3-1.4     Acute on chronic right-sided heart failure/right pleural effusion  · proBNP only mildly elevated  · Hest x-ray shows right pleural effusion  · Bumex 1 mg twice daily  · Strict I's and O's and daily weights  · Takes spironolactone 100 mg daily but currently on hold while diuresing  · May benefit from thoracentesis of right pleural effusion.     Atrial fibrillation/flutter   · Intolerant to multiple antiarrhythmics including beta-blocker, Tikosyn and flecainide causing severe hypotension  · Recent AV joby ablation with placement of permanent  pacemaker  · Continue to hold Eliquis as patient may benefit from thoracentesis     Type 2 diabetes mellitus  · Management per hospitalist                   · Continue IV Bumex twice daily  · Hold spironolactone while IV diuresis  · Restart Eliquis once no invasive procedures planned      Electronically signed by FAITH Raza, 01/07/22, 8:59 AM EST.

## 2022-01-07 NOTE — PLAN OF CARE
Problem: Adult Inpatient Plan of Care  Goal: Absence of Hospital-Acquired Illness or Injury  Intervention: Prevent Skin Injury  Recent Flowsheet Documentation  Taken 1/7/2022 1800 by Ryley Naylor RN  Body Position: position changed independently  Skin Protection:   adhesive use limited   incontinence pads utilized  Taken 1/7/2022 1600 by Ryley Naylor RN  Body Position: position changed independently  Skin Protection:   adhesive use limited   incontinence pads utilized  Taken 1/7/2022 1400 by Ryley Naylor RN  Body Position: position changed independently  Skin Protection:   adhesive use limited   incontinence pads utilized  Taken 1/7/2022 1200 by Ryley Naylor RN  Body Position: position changed independently  Skin Protection:   adhesive use limited   incontinence pads utilized  Taken 1/7/2022 1000 by Ryley Naylor RN  Body Position: position changed independently  Skin Protection:   adhesive use limited   incontinence pads utilized  Taken 1/7/2022 0830 by Ryley Naylor RN  Body Position: position changed independently  Skin Protection:   adhesive use limited   incontinence pads utilized   Goal Outcome Evaluation: Patient up to bathroom today with one person assist. No falls or injury during this shift. Continues to use call light when assistance is needed. Call light within reach.

## 2022-01-07 NOTE — NURSING NOTE
Image guided right thoracentesis performed by MD Maravilla. 2000 mls clear yellow fluid removed from pleural space. Patient tolerated well. Report called to nurse.

## 2022-01-08 NOTE — PROGRESS NOTES
"GI Daily Progress Note  Subjective     Irene Winkler is a 66 y.o. male who was admitted with COVID-19 virus infection.   Feels better.  Breathing better.  No abd pain.  Worried about his bill.     Chief Complaint:  SOA    Objective     /85 (BP Location: Left arm, Patient Position: Sitting)   Pulse 80   Temp 97.7 °F (36.5 °C) (Oral)   Resp 18   Ht 175.3 cm (69\")   Wt (!) 142 kg (313 lb 4.8 oz)   SpO2 93%   BMI 46.27 kg/m²     Intake/Output last 3 shifts:  I/O last 3 completed shifts:  In: 1080 [P.O.:1080]  Out: 4725 [Urine:4725]  Intake/Output this shift:  I/O this shift:  In: 360 [P.O.:360]  Out: 1500 [Urine:1500]      Physical Exam  Wt Readings from Last 3 Encounters:   01/06/22 (!) 142 kg (313 lb 4.8 oz)   12/23/21 131 kg (289 lb 4 oz)   12/19/21 132 kg (290 lb 6.4 oz)   ,body mass index is 46.27 kg/m².,@FLOWAMB(6)@,@FLOWAMB(5)@,@FLOWAMB(8)@   CONSTITUTIONAL:sitting in chair  Resp CTA; no rhonchi, rales, or wheezes.  Respiration effort normal  CV RRR; no M/R/G. 2-3 + lower extremity edema  GI Abd soft, NT, ND, normal active bowel sounds.    Psych: Awake and alert    DATA:  Results for IRENE WINKLER (MRN [id number]) as of 1/8/2022 16:01   Ref. Range 1/8/2022 04:08   Glucose Latest Ref Range: 65 - 99 mg/dL 129 (H)   Sodium Latest Ref Range: 136 - 145 mmol/L 127 (L)   Potassium Latest Ref Range: 3.5 - 5.2 mmol/L 4.9   CO2 Latest Ref Range: 22.0 - 29.0 mmol/L 23.0   Chloride Latest Ref Range: 98 - 107 mmol/L 91 (L)   Anion Gap Latest Ref Range: 5.0 - 15.0 mmol/L 13.0   Creatinine Latest Ref Range: 0.76 - 1.27 mg/dL 1.96 (H)   BUN Latest Ref Range: 8 - 23 mg/dL 44 (H)   BUN/Creatinine Ratio Latest Ref Range: 7.0 - 25.0  22.4   Calcium Latest Ref Range: 8.6 - 10.5 mg/dL 9.8   eGFR Non  Am Latest Ref Range: >60 mL/min/1.73 35 (L)   Alkaline Phosphatase Latest Ref Range: 39 - 117 U/L 247 (H)   Total Protein Latest Ref Range: 6.0 - 8.5 g/dL 7.6   ALT (SGPT) Latest Ref Range: 1 - 41 U/L 42 (H) "   AST (SGOT) Latest Ref Range: 1 - 40 U/L 99 (H)   Total Bilirubin Latest Ref Range: 0.0 - 1.2 mg/dL 4.6 (H)   Albumin Latest Ref Range: 3.50 - 5.20 g/dL 3.00 (L)   Globulin Latest Units: gm/dL 4.6   A/G Ratio Latest Units: g/dL 0.7   C-Reactive Protein Latest Ref Range: 0.00 - 0.50 mg/dL 5.54 (H)   Protime Latest Ref Range: 11.4 - 14.4 Seconds 20.8 (H)   INR Latest Ref Range: 0.85 - 1.16  1.87 (H)   WBC Latest Ref Range: 3.40 - 10.80 10*3/mm3 8.59   RBC Latest Ref Range: 4.14 - 5.80 10*6/mm3 4.37   Hemoglobin Latest Ref Range: 13.0 - 17.7 g/dL 14.5   Hematocrit Latest Ref Range: 37.5 - 51.0 % 43.2   CT chest abdomen pelvis per radiologist report  FINDINGS:      CHEST: Thyroid is homogeneous. There is mild enlargement identified of  the lymph nodes in the mediastinum. There is calcification seen of the  pericardium. There is no pericardial effusion. There is a pleural  effusion identified small in size on the right with ill-defined  opacification and dense airspace disease in the right middle and lower  lung fields. The left lung reveals mild atelectatic change with  calcified granuloma identified in the lingula. There is sparing of the  right upper lung field. Degenerative changes seen within the spine.  There is no adenopathy identified in the axillary regions.     ABDOMEN: Some anasarca seen in the subcutaneous tissues. The liver is  homogeneous. The gallbladder is filled with stones. There is enlargement  identified of the iliac vessels and IVC. Findings raise the concern for  possible elevated right atrium pressure. Clinical correlation is needed.  Extensive varices identified within the mesentery. Kidneys and adrenal  glands within normal limits. Pancreas is homogeneous. No renal or  adrenal abnormality present. Some vascular calcification seen within the  abdominal aorta and iliac vessels.     PELVIS: Distention seen of the iliac vessels. There are varices  identified in the inguinal regions bilaterally. No  pelvic adenopathy. No  free fluid or free air. No abnormal mass or fluid collections  identified.     IMPRESSION:  There is extensive dilatation identified of the IVC and  iliac vessels raising the concern for elevated right atrium pressure.  There is calcification seen of the pericardium. There is a small pleural  effusion identified on the right with airspace disease and infiltrate in  the right middle and lower lung field. Anasarca seen throughout the  subcutaneous tissues of the abdomen and lower extremities with varices  seen within the inguinal regions bilaterally.     DICTATED:   01/08/2022  EDITED/lfs:   01/08/2022  Results for IRENE GILES (MRN [id number]) as of 1/8/2022 16:01   Ref. Range 1/7/2022 08:06   Hep A Total Ab Latest Ref Range: Negative  Negative     Assessment/Plan     1.  Calcific constrictive pericarditis  2.  Congestive hepatopathy  3.  COVID-19  4.  Morbid obesity  5.  Right pleural effusion    Will plan IR liver biopsy Monday/Tuesday to measure cardiac and portal pressures and evaluate for cirrhosis versus passive congestion  He does have portal hypertension but does not appear to have ascites which would make the right pleural effusion less likely to be secondary to hepatic hydrothorax examination    He is not immune to hepatitis A will therefore begin immunization today.  Will need a repeat vaccination in 6 months    I will sign off.  Please reconsult if needed      COVID-19 virus infection    Acute on chronic right-sided heart failure (HCC)    Type 2 diabetes mellitus without complication, without long-term current use of insulin (HCC)    Essential hypertension    Severe obstructive sleep apnea, no CPAP    Class 3 severe obesity due to excess calories with serious comorbidity and body mass index (BMI) of 40.0 to 44.9 in adult (HCC)    Persistent atrial fibrillation/flutter    Chronic bilateral severe lower extremity edema    Presence of cardiac pacemaker    Stage 3 chronic kidney  disease (HCC)    Pleural effusion, right    Hepatic steatosis    Acute UTI (urinary tract infection)    Cirrhosis (HCC)    Constrictive pericarditis       LOS: 3 days     Erasmo Justin MD  01/08/22  16:00 EST

## 2022-01-08 NOTE — PLAN OF CARE
Goal Outcome Evaluation:  Plan of Care Reviewed With: patient           Outcome Summary: Pt well-known to PT wound care from previous admissions, presenting with chronic BLE edema and RLE wound.  Edema appears improved from previous admission and RLE wound moist with some skin bridges present.  Lat LLE wound present last admission now dry and crusted, small partial-thickness area noted prox/lat L calf approx 1cm x1cm new since last admission.  PT washed BLE with theraworx foam and green wipes, lightly debrided wounds, applied mepilex ag to R calf wound, xeroform to smaller areas, cast padding to secure, and applied size 5&6 compressogrips doubled and overlapping from MH to prox calf for gradient compression to increase venous return and improve skin integrity.  PT will change wraps 2-3x/week.  Pt is managing dressings and compressogrips at home with spouse's assistance and plans to resume home management at d/c.

## 2022-01-08 NOTE — PROGRESS NOTES
[facility] Medicine Services  PROGRESS NOTE    Patient Name: Akshat Winkler  : 1955  MRN: [id number]    Date of Admission: 2022  Primary Care Physician: Saurabh Medina PA    Subjective   Subjective     CC:  Dyspnea    HPI:  SOA better. On RA. Hungry. Edema seems better. No f/c. Wants to go home.    Review of Systems   Constitutional: Positive for activity change and fatigue.   HENT: Negative.    Respiratory: Negative for chest tightness and shortness of breath.    Cardiovascular: Positive for leg swelling.   Gastrointestinal: Positive for abdominal distention.   Genitourinary: Negative.    Skin: Positive for wound.   Neurological: Negative.    Psychiatric/Behavioral: Negative.           Objective   Objective     Vital Signs:   Temp:  [97.6 °F (36.4 °C)-98.3 °F (36.8 °C)] 97.7 °F (36.5 °C)  Heart Rate:  [80-86] 80  Resp:  [16-20] 18  BP: ()/() 124/85     Physical Exam:  NAD, alert and oriented  OP clear, MMM  Neck supple  No LAD  RRR  Decreased at bases  +BS, mild distention, soft  CHENG  B LE edema, pitting, seems slightly better  Normal affect    Results Reviewed:  LAB RESULTS:      Lab 22  0408 22  0806 22  0403 22  2256 22  2255 22  1115   WBC 8.59 6.64  --  6.54  --  7.14   HEMOGLOBIN 14.5 14.2  --  15.2  --  14.4   HEMATOCRIT 43.2 43.2  --  46.0  --  44.2   PLATELETS 115* 124*  --  128*  --  115*   NEUTROS ABS 7.63* 5.98  --  5.77  --  5.82   IMMATURE GRANS (ABS) 0.03 0.03  --  0.01  --  0.03   LYMPHS ABS 0.22* 0.28*  --  0.30*  --  0.37*   MONOS ABS 0.56 0.35  --  0.34  --  0.77   EOS ABS 0.14 0.00  --  0.09  --  0.10   MCV 98.9* 99.8*  --  100.4*  --  101.4*   CRP 5.54* 6.37*  --   --  6.26* 5.80*   PROCALCITONIN  --   --   --   --   --  0.48*   LDH  --  468*  --   --   --   --    PROTIME 20.8* 21.9* 27.3*  --   --  26.0*   D DIMER QUANT  --   --   --  2.01*  --   --          Lab 22  0408 22  0806 22  0403  01/05/22 2255 01/05/22  1115   SODIUM 127* 133*  --  134* 132*   POTASSIUM 4.9 5.2  --  5.0 4.7   CHLORIDE 91* 95*  --  96* 95*   CO2 23.0 27.0  --  24.0 25.0   ANION GAP 13.0 11.0  --  14.0 12.0   BUN 44* 35*  --  30* 29*   CREATININE 1.96* 1.65*  --  1.64* 1.74*   GLUCOSE 129* 116*  --  156* 98   CALCIUM 9.8 9.8  --  10.0 10.0   MAGNESIUM  --   --  2.1 2.2 2.2         Lab 01/08/22  0408 01/07/22  0806 01/06/22 2049 01/05/22 2255 01/05/22  1115   TOTAL PROTEIN 7.6 7.8  --  8.1 7.9   ALBUMIN 3.00* 3.10*  --  3.30* 3.30*   GLOBULIN 4.6 4.7  --  4.8 4.6   ALT (SGPT) 42* 36  --  38 36   AST (SGOT) 99* 94*  --  93* 92*   BILIRUBIN 4.6* 5.2*  --  6.4* 5.9*   BILIRUBIN DIRECT  --   --  3.2*  --   --    ALK PHOS 247* 249*  --  263* 260*   LIPASE  --   --   --   --  28         Lab 01/08/22  0408 01/07/22  0806 01/06/22  0403 01/05/22  1115   PROBNP  --   --   --  1,074.0*   TROPONIN T  --   --   --  0.026   PROTIME 20.8* 21.9* 27.3* 26.0*   INR 1.87* 2.01* 2.67* 2.50*             Lab 01/05/22  1115   FERRITIN 172.50         Brief Urine Lab Results  (Last result in the past 365 days)      Color   Clarity   Blood   Leuk Est   Nitrite   Protein   CREAT   Urine HCG        01/05/22 1635             31.3         01/05/22 1635 Yellow   Clear   Small (1+)   Trace   Negative   100 mg/dL (2+)                 Microbiology Results Abnormal     Procedure Component Value - Date/Time    Body Fluid Culture - Body Fluid, Pleural Cavity [[id number]] Collected: 01/07/22 1327    Lab Status: Preliminary result Specimen: Body Fluid from Pleural Cavity Updated: 01/07/22 1454     Gram Stain No WBCs or organisms seen          Adult Transthoracic Echo Complete W/ Cont if Necessary Per Protocol    Addendum Date:     · Technically difficult study due to body habitus · Left ventricular systolic function is normal. Estimated left ventricular EF = 60%. · Abnormal left ventricular septal bounce suggestive of pericardial constriction · The right  ventricular cavity is dilated. · Moderate tricuspid valve regurgitation is present. · Estimated right ventricular systolic pressure from tricuspid regurgitation is moderately elevated (45-55 mmHg).      Result Date: 1/7/2022  · Technically difficult study · Left ventricular systolic function is normal. Estimated left ventricular EF = 60%. · The right ventricular cavity is dilated. · The right atrial cavity is mild to moderately dilated. · Moderate tricuspid valve regurgitation is present. · Estimated right ventricular systolic pressure from tricuspid regurgitation is moderately elevated (45-55 mmHg).      CT Abdomen Pelvis Without Contrast    Result Date: 1/8/2022  EXAMINATION: CT CHEST WO CONTRAST DIAGNOSTIC-, CT ABDOMEN PELVIS WO CONTRAST-  INDICATION: Pericardial constriction; J81.0-Acute pulmonary edema; K75.81-Nonalcoholic steatohepatitis (SHERMAN); K74.60-Unspecified cirrhosis of liver shortness of breath and chest pain  TECHNIQUE: Multiple axial CT imaging is obtained of the chest abdomen and pelvis without the ministration of oral or intravenous contrast.  The radiation dose reduction device was turned on for each scan per the ALARA (As Low as Reasonably Achievable) protocol.  COMPARISON: 12/22/2017 and 7/20/2018  FINDINGS: Chest: Thyroid is homogeneous. There is mild enlargement identified of the lymph nodes in the mediastinum. There is calcification seen of the pericardium. There is no pericardial effusion. There is a pleural effusion identified small in size on the right with ill-defined opacification and dense airspace disease in the right middle and lower lung field. The left lung reveals mild atelectatic change with calcified granuloma identified in the lingula. There is sparing of the right upper lung field. Degenerative changes seen within the spine. There is no adenopathy identified in the axillary regions.  ABDOMEN: Some anasarca seen in the subcutaneous tissues. The liver is homogeneous. The  gallbladder is filled with stones. There is enlargement identified of the iliac vessels and IVC. Findings raise the concern for polyp possible elevated right atrium pressure. Clinical correlation is needed. Extensive varices identified within the mesentery. Kidneys and adrenal glands within normal limits. Pancreas is homogeneous. No renal or adrenal abnormality present. Some vascular calcification seen within the abdominal aorta and iliac vessels.  Pelvis: Distention seen of the iliac vessels. There is there is seen is identified in the inguinal regions bilaterally. No pelvic adenopathy. No free fluid or free air. No abnormal mass or fluid collections identified.        Impression: There is extensive dilatation identified of the IVC and iliac vessels raising the concern for elevated right atrium pressure. There is calcification seen of the pericardium. There is a small pleural effusion identified on the right with airspace disease and infiltrate in the right middle and lower lung field. Anasarca seen throughout the subcutaneous tissues of the abdomen and lower extremities with there is a seen within the inguinal regions bilaterally.       CT Chest Without Contrast Diagnostic    Result Date: 1/8/2022  EXAMINATION: CT CHEST WO CONTRAST DIAGNOSTIC-, CT ABDOMEN PELVIS WO CONTRAST-  INDICATION: Pericardial constriction; J81.0-Acute pulmonary edema; K75.81-Nonalcoholic steatohepatitis (SHERMAN); K74.60-Unspecified cirrhosis of liver shortness of breath and chest pain  TECHNIQUE: Multiple axial CT imaging is obtained of the chest abdomen and pelvis without the ministration of oral or intravenous contrast.  The radiation dose reduction device was turned on for each scan per the ALARA (As Low as Reasonably Achievable) protocol.  COMPARISON: 12/22/2017 and 7/20/2018  FINDINGS: Chest: Thyroid is homogeneous. There is mild enlargement identified of the lymph nodes in the mediastinum. There is calcification seen of the pericardium.  There is no pericardial effusion. There is a pleural effusion identified small in size on the right with ill-defined opacification and dense airspace disease in the right middle and lower lung field. The left lung reveals mild atelectatic change with calcified granuloma identified in the lingula. There is sparing of the right upper lung field. Degenerative changes seen within the spine. There is no adenopathy identified in the axillary regions.  ABDOMEN: Some anasarca seen in the subcutaneous tissues. The liver is homogeneous. The gallbladder is filled with stones. There is enlargement identified of the iliac vessels and IVC. Findings raise the concern for polyp possible elevated right atrium pressure. Clinical correlation is needed. Extensive varices identified within the mesentery. Kidneys and adrenal glands within normal limits. Pancreas is homogeneous. No renal or adrenal abnormality present. Some vascular calcification seen within the abdominal aorta and iliac vessels.  Pelvis: Distention seen of the iliac vessels. There is there is seen is identified in the inguinal regions bilaterally. No pelvic adenopathy. No free fluid or free air. No abnormal mass or fluid collections identified.        Impression: There is extensive dilatation identified of the IVC and iliac vessels raising the concern for elevated right atrium pressure. There is calcification seen of the pericardium. There is a small pleural effusion identified on the right with airspace disease and infiltrate in the right middle and lower lung field. Anasarca seen throughout the subcutaneous tissues of the abdomen and lower extremities with there is a seen within the inguinal regions bilaterally.       XR Chest 1 View    Result Date: 1/8/2022   EXAMINATION: XR CHEST 1 VW-  INDICATION: DYSPNEA, ON EXERTION  COMPARISON: 01/07/2022  FINDINGS: Portable chest reveals pacer leads in satisfactory position. Heart is borderline enlarged. Increased markings seen  the lung bases bilaterally with no definite pleural effusion. Heart is enlarged with degenerative changes seen within the spine. Pulmonary vascularity is within normal limits.         Impression: Minimal increased markings in the lung bases bilaterally. The heart is enlarged with low lung volumes bilaterally.       XR Chest 1 View    Result Date: 1/7/2022  EXAMINATION: XR CHEST 1 VW-  INDICATION: s/p thora; J81.0-Acute pulmonary edema; K75.81-Nonalcoholic steatohepatitis (SHERMAN); K74.60-Unspecified cirrhosis of liver  COMPARISON: 1/5/2022  FINDINGS: Left-sided single lead pacemaker is noted. Heart is at upper limits of normal size. Vasculature remains cephalized and a diffuse pulmonary interstitial disease pattern appears stable overall. Right pleural effusion appears to have been drained. There is trace amount of remaining intrafissural fluid. No pneumothorax is seen.       Impression: 1. Interval drainage of right pleural effusion. No evidence of pneumothorax. 2. Stable borderline heart shadow enlargement and mild pulmonary vascular congestion.    This report was finalized on 1/7/2022 1:06 PM by Dr. Ted Wells MD.      US Thoracentesis    Result Date: 1/7/2022  DATE OF EXAM: 1/7/2022 10:56 AM  PROCEDURE: US THORACENTESIS-  INDICATIONS: pleural effusion; J81.0-Acute pulmonary edema; K75.81-Nonalcoholic steatohepatitis (SHERMAN); K74.60-Unspecified cirrhosis of liver  COMPARISON: No Comparisons Available  FLUOROSCOPIC TIME: None  PHYSICIAN MONITORED CONSCIOUS SEDATION TIME: None  CONTRAST MEDIA: None  TECHNIQUE: The patient's medications were reviewed prior to the procedure. The procedure, risks and alternatives were discussed and informed written consent was obtained. Maximal sterile barrier technique was utilized throughout the procedure. The patient was placed in the seated position and ultrasound was utilized to localize the right-sided pleural effusion. The skin was marked prepped and draped. Maximal sterile  barrier technique was utilized during this procedure. Partial protective equipment was used secondary to the patient's Covid positive status. The skin was marked prepped draped and anesthetized with 1% Xylocaine. A 5 Kinyarwanda catheter was inserted in the right pleural space by trocar technique. A total of 2 L of fluid was removed. Appropriate specimens were sent to the lab. The catheter was removed and hemostasis achieved. Postprocedure chest radiograph shows no pneumothorax.       Impression: Technically successful ultrasound-guided right thoracentesis with removal of 2 L of fluid.  This report was finalized on 1/7/2022 1:51 PM by Lex Maravilla MD.        Results for orders placed during the hospital encounter of 02/27/18    Adult Transthoracic Echo Complete W/ Cont if Necessary Per Protocol    Interpretation Summary  · Technically difficult study due to body habitus  · Left ventricular systolic function is normal. Estimated EF = 60%.  · The cardiac valves are poorly visualized but there does not appear to be significant stenotic or regurgitant lesions.  · Right ventricular cavity is mild-to-moderately dilated.  · Atrial flutter noted throughout the examination.      I have reviewed the medications:  Scheduled Meds:!NOT ORDERED- apixaban (ELIQUIS), , Does not apply, Daily With Dinner  ampicillin, 500 mg, Intravenous, Q6H  bumetanide, 2 mg, Intravenous, BID  dexamethasone, 6 mg, Oral, Daily  insulin lispro, 0-7 Units, Subcutaneous, TID AC  magnesium oxide, 400 mg, Oral, Daily  pharmacy consult - MTM, , Does not apply, Daily  potassium chloride, 30 mEq, Oral, Daily  remdesivir, 100 mg, Intravenous, Daily With Lunch      Continuous Infusions:hold, 1 each  Pharmacy Consult - Remdesivir,       PRN Meds:.•  hold  •  acetaminophen  •  albuterol sulfate HFA  •  benzonatate  •  calcium carbonate  •  dextromethorphan polistirex ER  •  dextrose  •  dextrose  •  glucagon (human recombinant)  •  magnesium sulfate **OR** magnesium  sulfate in D5W 1g/100mL (PREMIX) **OR** magnesium sulfate  •  Pharmacy Consult - Remdesivir  •  sodium chloride    Assessment/Plan   Assessment & Plan     Active Hospital Problems    Diagnosis  POA   • **COVID-19 virus infection [U07.1]  Yes   • Cirrhosis (HCC) [K74.60]  Yes   • 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]  Yes   • Stage 3 chronic kidney disease (HCC) [N18.30]  Yes   • Pleural effusion, right [J90]  Yes   • Hepatic steatosis [K76.0]  Yes   • Acute UTI (urinary tract infection) [N39.0]  Yes   • Presence of cardiac pacemaker [Z95.0]  Yes   • Chronic bilateral severe lower extremity edema [R60.0]  Yes   • Persistent atrial fibrillation/flutter [I48.19]  Yes   • Type 2 diabetes mellitus without complication, without long-term current use of insulin (HCC) [E11.9]  Yes   • Essential hypertension [I10]  Yes   • Severe obstructive sleep apnea, no CPAP [G47.33]  Yes   • Class 3 severe obesity due to excess calories with serious comorbidity and body mass index (BMI) of 40.0 to 44.9 in adult (HCC) [E66.01, Z68.41]  Not Applicable   • Acute on chronic right-sided heart failure (HCC) [I50.813]  Yes      Resolved Hospital Problems    Diagnosis Date Resolved POA   • Acute pulmonary edema (HCC) [J81.0] 01/05/2022 Yes   • Pneumonia due to COVID-19 virus [U07.1, J12.82] 01/05/2022 Yes   • Hyponatremia [E87.1] 01/07/2022 Yes   • Elevated procalcitonin [R79.89] 01/07/2022 Yes   • Dyspnea on exertion [R06.00] 01/07/2022 Yes   • Acute on chronic diastolic congestive heart failure (HCC) [I50.33] 01/05/2022 Yes        Brief Hospital Course to date:  Akshat Winkler is a 66 y.o. male here with dyspnea    Dyspnea  A/C DHF  R pleural effusion  COVID+  -diurese as tolerated, per cardiology  -s/p thoracentesis, s/p 2L drained, transudate  -reviewed cardiology findings, worrisome for constrictive physiology    COVID+  -dexamethasone/remdesivir  -wean oxygen as tolerated, has been on RA x 2 days now, unclear if COVID having a  role    UTI/POA  -changed to ampicillin, Enterococcus noted    Atrial fibrillation  -recent PPM  -on eliquis, resumed    Suspected new diagnosis cirrhosis  -GI consulted, CT reviewed, follow up GI recommendations    CKD  -monitor, on diuretics/stable    DM  -titrate, stable    Large RLE wound  -PT wound care consulted  -images reviewed    DVT prophylaxis:  Medical DVT prophylaxis orders are present.       AM-PAC 6 Clicks Score (PT): 17 (01/07/22 0830)    Disposition: I expect the patient to be discharged TBD.    CODE STATUS:   Code Status and Medical Interventions:   Ordered at: 01/05/22 1932     Code Status (Patient has no pulse and is not breathing):    CPR (Attempt to Resuscitate)     Medical Interventions (Patient has pulse or is breathing):    Full Support       Ted Sparks MD  01/08/22

## 2022-01-08 NOTE — PROGRESS NOTES
"[facility] Cardiology Daily Note       LOS: 3 days   Patient Care Team:  Saurabh Medina PA as PCP - General (Physician Assistant)  Lucian Alvarez IV, MD as Consulting Physician (Cardiology)  Mandy Clark PA-C as Physician Assistant (Physician Assistant)  Josh Moss MD as Consulting Physician (Pain Medicine)  Hannah Leonard APRN as Nurse Practitioner (Cardiology)  Akshat Davidson MD as Surgeon (Neurosurgery)    Chief Complaint:  Acute on chronic systolic heart failure; Afib    Subjective     Subjective: no acute issues overnight. V/S stable. Creatinine trending up.     Review of Systems:   As above.    Medications:  ampicillin, 500 mg, Intravenous, Q6H  apixaban, 5 mg, Oral, Q12H  bumetanide, 2 mg, Intravenous, BID  dexamethasone, 6 mg, Oral, Daily  insulin lispro, 0-7 Units, Subcutaneous, TID AC  magnesium oxide, 400 mg, Oral, Daily  pharmacy consult - MTM, , Does not apply, Daily  potassium chloride, 30 mEq, Oral, Daily  remdesivir, 100 mg, Intravenous, Daily With Lunch        Objective     Vital Sign Min/Max for last 24 hours  Temp  Min: 97.6 °F (36.4 °C)  Max: 98.3 °F (36.8 °C)    BP  Min: 97/71  Max: 124/85   Pulse  Min: 80  Max: 86   Resp  Min: 16  Max: 18   SpO2  Min: 93 %  Max: 99 %   No data recorded   No data recorded      Intake/Output Summary (Last 24 hours) at 1/8/2022 1235  Last data filed at 1/8/2022 0900  Gross per 24 hour   Intake 720 ml   Output 2775 ml   Net -2055 ml        Flowsheet Rows      First Filed Value   Admission Height 175.3 cm (69\") Documented at 01/05/2022 1041   Admission Weight 131 kg (289 lb) Documented at 01/05/2022 1041          Physical Exam:    General: Alert and oriented.   Cardiovascular: Heart has a nondisplaced focal PMI. Regular rate and rhythm without murmur, gallop or rub.  Lungs: Clear without rales or wheezes. Equal expansion is noted.   Abdomen: Soft, nontender.  Extremities: Show 1+ edema. Discoloration to bilateral " lower extremities consistent with chronic edema.   Skin: warm and dry.     Results Review:    I reviewed the patient's new clinical results.  EKG:  Tele: NSR    Labs:    Results from last 7 days   Lab Units 01/08/22 0408 01/07/22  0806 01/05/22  2255   SODIUM mmol/L 127* 133* 134*   POTASSIUM mmol/L 4.9 5.2 5.0   CHLORIDE mmol/L 91* 95* 96*   CO2 mmol/L 23.0 27.0 24.0   BUN mg/dL 44* 35* 30*   CREATININE mg/dL 1.96* 1.65* 1.64*   CALCIUM mg/dL 9.8 9.8 10.0   BILIRUBIN mg/dL 4.6* 5.2* 6.4*   ALK PHOS U/L 247* 249* 263*   ALT (SGPT) U/L 42* 36 38   AST (SGOT) U/L 99* 94* 93*   GLUCOSE mg/dL 129* 116* 156*     Results from last 7 days   Lab Units 01/08/22 0408 01/07/22  0806 01/05/22  2256   WBC 10*3/mm3 8.59 6.64 6.54   HEMOGLOBIN g/dL 14.5 14.2 15.2   HEMATOCRIT % 43.2 43.2 46.0   PLATELETS 10*3/mm3 115* 124* 128*     Lab Results   Component Value Date    TROPONINI 0.021 02/27/2018    TROPONINT 0.026 01/05/2022    TROPONINT 0.011 12/16/2021     Lipid Panel    Lipid Panel 12/16/21   Total Cholesterol 103   Triglycerides 81   HDL Cholesterol 31 (A)   VLDL Cholesterol 16   LDL Cholesterol  56   LDL/HDL Ratio 1.80   (A) Abnormal value             Lab Results   Component Value Date    INR 1.87 (H) 01/08/2022    INR 2.01 (H) 01/07/2022    INR 2.67 (H) 01/06/2022    PROTIME 20.8 (H) 01/08/2022    PROTIME 21.9 (H) 01/07/2022    PROTIME 27.3 (H) 01/06/2022         Assessment        Shortness of breath/Covid-19//CHF  · Multifactorial from Covid-19/right-sided heart failure/right-sided pleural  · Started on remdesivir and Decadron  · O2 2 L on nasal cannula with O2 saturations 98%  · Hospitalist to treat Covid-19      Chronic kidney disease  · Baseline creatinine 1.3-1.4     Acute on chronic right-sided heart failure/right pleural effusion  · proBNP only mildly elevated  · CXR shows right pleural effusion  · Echo: LV septal bounce concerning for constrictive physiology  · Strict I's and O's and daily  weights  · Takes spironolactone 100 mg daily but currently on hold while diuresing  · S/p right thoracentesis with removal of 2L, 1/7  · Chart review yielded a old CT scan which showed pericardial calcification  · Patient has no history of pericarditis or autoimmune disease  · CT chest and abdomen: There is calcification seen of the pericardium. There is a small pleural effusion identified on the right with airspace disease and infiltrate in the right middle and lower lung field. Anasarca seen throughout      Atrial fibrillation/flutter   · Intolerant to multiple antiarrhythmics including beta-blocker, Tikosyn and flecainide causing severe hypotension  · Recent AV joby ablation with placement of permanent pacemaker  · Continue to hold Eliquis as patient may benefit from thoracentesis     Type 2 diabetes mellitus  · Management per hospitalist    Cirrhosis      Plan       · Hold evening dose of Bumex with up-trending creatinine.   · Resume Aldactone once we switch to oral diuresis.   · Restart Eliquis once no invasive procedures planned for stroke prevention  · Dr. Alvarez to discuss care with Dr. Albarran; Pericardiotomy is extremely high risk surgery in a patient without cirrhosis or morbid obesity.   · Will review CXR once available        Electronically signed by FAITH Riley, 01/08/22, 12:35 PM EST.

## 2022-01-08 NOTE — PLAN OF CARE
VSS on RA.  V-paced per tele.  No complaints this shift.  Adequate UOP.  Will continue to monitor.     Problem: Adult Inpatient Plan of Care  Goal: Plan of Care Review  Outcome: Ongoing, Progressing  Goal: Patient-Specific Goal (Individualized)  Outcome: Ongoing, Progressing  Goal: Absence of Hospital-Acquired Illness or Injury  Outcome: Ongoing, Progressing  Intervention: Identify and Manage Fall Risk  Recent Flowsheet Documentation  Taken 1/8/2022 0200 by Susu Krishnamurthy, RN  Safety Promotion/Fall Prevention:   activity supervised   assistive device/personal items within reach   clutter free environment maintained   fall prevention program maintained   nonskid shoes/slippers when out of bed   room organization consistent   safety round/check completed  Taken 1/8/2022 0000 by Susu Krishnamurthy, RN  Safety Promotion/Fall Prevention:   activity supervised   assistive device/personal items within reach   clutter free environment maintained   safety round/check completed   room organization consistent   nonskid shoes/slippers when out of bed   fall prevention program maintained  Taken 1/7/2022 2200 by Susu Krishnamurthy, RN  Safety Promotion/Fall Prevention:   activity supervised   assistive device/personal items within reach   clutter free environment maintained   fall prevention program maintained   nonskid shoes/slippers when out of bed   room organization consistent   safety round/check completed  Taken 1/7/2022 2000 by Susu Krishnamurthy, RN  Safety Promotion/Fall Prevention:   activity supervised   assistive device/personal items within reach   clutter free environment maintained   fall prevention program maintained   nonskid shoes/slippers when out of bed   room organization consistent   safety round/check completed  Intervention: Prevent Skin Injury  Recent Flowsheet Documentation  Taken 1/8/2022 0200 by Susu Krishnamurthy, RN  Body Position: position changed independently  Skin Protection:   adhesive use limited    incontinence pads utilized   tubing/devices free from skin contact   skin-to-device areas padded  Taken 1/8/2022 0000 by Susu Krishnamurthy RN  Body Position: position changed independently  Skin Protection:   adhesive use limited   tubing/devices free from skin contact   skin sealant/moisture barrier applied   skin-to-device areas padded  Taken 1/7/2022 2200 by Susu Krishnamurthy RN  Body Position: position changed independently  Skin Protection:   adhesive use limited   incontinence pads utilized   tubing/devices free from skin contact   skin-to-device areas padded  Taken 1/7/2022 2000 by Susu Krishnamurthy RN  Body Position: position changed independently  Skin Protection:   adhesive use limited   tubing/devices free from skin contact   skin-to-device areas padded   incontinence pads utilized  Intervention: Prevent and Manage VTE (venous thromboembolism) Risk  Recent Flowsheet Documentation  Taken 1/7/2022 2000 by Susu Krishnamurthy RN  VTE Prevention/Management:   bilateral   dorsiflexion/plantar flexion performed   bleeding risk factor(s) identified  Intervention: Prevent Infection  Recent Flowsheet Documentation  Taken 1/8/2022 0200 by Susu Krishnamurthy RN  Infection Prevention:   environmental surveillance performed   visitors restricted/screened   single patient room provided   rest/sleep promoted   personal protective equipment utilized  Taken 1/8/2022 0000 by Susu Krishnamurthy RN  Infection Prevention:   environmental surveillance performed   visitors restricted/screened   single patient room provided   rest/sleep promoted   personal protective equipment utilized  Taken 1/7/2022 2200 by Susu Krishnamurthy RN  Infection Prevention:   environmental surveillance performed   visitors restricted/screened   single patient room provided   rest/sleep promoted   personal protective equipment utilized  Taken 1/7/2022 2000 by Susu Krishnamurthy RN  Infection Prevention:   environmental surveillance performed   visitors  restricted/screened   single patient room provided   rest/sleep promoted   personal protective equipment utilized  Goal: Optimal Comfort and Wellbeing  Outcome: Ongoing, Progressing  Intervention: Provide Person-Centered Care  Recent Flowsheet Documentation  Taken 1/7/2022 2000 by Susu Krishnamurthy RN  Trust Relationship/Rapport:   choices provided   care explained  Goal: Readiness for Transition of Care  Outcome: Ongoing, Progressing     Problem: Diabetes Comorbidity  Goal: Blood Glucose Level Within Desired Range  Outcome: Ongoing, Progressing     Problem: Skin Injury Risk Increased  Goal: Skin Health and Integrity  Outcome: Ongoing, Progressing  Intervention: Optimize Skin Protection  Recent Flowsheet Documentation  Taken 1/8/2022 0200 by Susu Krishnamurthy RN  Pressure Reduction Techniques:   frequent weight shift encouraged   heels elevated off bed   pressure points protected  Head of Bed (HOB): HOB at 20-30 degrees  Pressure Reduction Devices:   pressure-redistributing mattress utilized   positioning supports utilized   heel offloading device utilized  Skin Protection:   adhesive use limited   incontinence pads utilized   tubing/devices free from skin contact   skin-to-device areas padded  Taken 1/8/2022 0000 by Susu Krishnamurthy RN  Pressure Reduction Techniques:   frequent weight shift encouraged   heels elevated off bed   pressure points protected  Head of Bed (HOB): HOB at 20-30 degrees  Pressure Reduction Devices:   pressure-redistributing mattress utilized   positioning supports utilized   heel offloading device utilized  Skin Protection:   adhesive use limited   tubing/devices free from skin contact   skin sealant/moisture barrier applied   skin-to-device areas padded  Taken 1/7/2022 2200 by Susu Krishnamurthy RN  Pressure Reduction Techniques:   frequent weight shift encouraged   heels elevated off bed   pressure points protected  Head of Bed (HOB): HOB at 30-45 degrees  Pressure Reduction Devices:    pressure-redistributing mattress utilized   positioning supports utilized   heel offloading device utilized  Skin Protection:   adhesive use limited   incontinence pads utilized   tubing/devices free from skin contact   skin-to-device areas padded  Taken 1/7/2022 2000 by Susu Krishnamurthy RN  Pressure Reduction Techniques:   frequent weight shift encouraged   heels elevated off bed   pressure points protected  Head of Bed (HOB): HOB at 30-45 degrees  Pressure Reduction Devices:   pressure-redistributing mattress utilized   positioning supports utilized   heel offloading device utilized  Skin Protection:   adhesive use limited   tubing/devices free from skin contact   skin-to-device areas padded   incontinence pads utilized     Problem: Fall Injury Risk  Goal: Absence of Fall and Fall-Related Injury  Outcome: Ongoing, Progressing  Intervention: Identify and Manage Contributors to Fall Injury Risk  Recent Flowsheet Documentation  Taken 1/8/2022 0200 by Susu Krishnamurthy RN  Medication Review/Management: medications reviewed  Taken 1/8/2022 0000 by Susu Krishnamurthy RN  Medication Review/Management: medications reviewed  Taken 1/7/2022 2200 by Susu Krishnamurthy RN  Medication Review/Management: medications reviewed  Taken 1/7/2022 2000 by Susu Krishnamurthy RN  Medication Review/Management: medications reviewed  Intervention: Promote Injury-Free Environment  Recent Flowsheet Documentation  Taken 1/8/2022 0200 by Susu Krishnamurthy RN  Safety Promotion/Fall Prevention:   activity supervised   assistive device/personal items within reach   clutter free environment maintained   fall prevention program maintained   nonskid shoes/slippers when out of bed   room organization consistent   safety round/check completed  Taken 1/8/2022 0000 by Susu Krishnamurthy, RN  Safety Promotion/Fall Prevention:   activity supervised   assistive device/personal items within reach   clutter free environment maintained   safety round/check completed    room organization consistent   nonskid shoes/slippers when out of bed   fall prevention program maintained  Taken 1/7/2022 2200 by Susu Krishnamurthy, RN  Safety Promotion/Fall Prevention:   activity supervised   assistive device/personal items within reach   clutter free environment maintained   fall prevention program maintained   nonskid shoes/slippers when out of bed   room organization consistent   safety round/check completed  Taken 1/7/2022 2000 by Susu Krishnamurthy, RN  Safety Promotion/Fall Prevention:   activity supervised   assistive device/personal items within reach   clutter free environment maintained   fall prevention program maintained   nonskid shoes/slippers when out of bed   room organization consistent   safety round/check completed

## 2022-01-08 NOTE — THERAPY EVALUATION
Acute Care - Wound/Debridement Initial Evaluation  [facility]     Patient Name: Akshat Winkler  : 1955  MRN: [id number]  Today's Date: 2022                Admit Date: 2022    Visit Dx:    ICD-10-CM ICD-9-CM   1. Acute pulmonary edema (HCC)  J81.0 518.4   2. Liver cirrhosis secondary to SHERMAN (HCC)  K75.81 571.8    K74.60 571.5       Patient Active Problem List   Diagnosis   • Acute on chronic right-sided heart failure (HCC)   • Type 2 diabetes mellitus without complication, without long-term current use of insulin (HCC)   • Hyperlipidemia LDL goal <70   • Essential hypertension   • Coronary artery disease involving native coronary artery of native heart with angina pectoris (HCC)   • Severe obstructive sleep apnea, no CPAP   • Class 3 severe obesity due to excess calories with serious comorbidity and body mass index (BMI) of 40.0 to 44.9 in adult (HCC)   • Persistent atrial fibrillation/flutter   • Spondylosis of 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 or radiculopathy   • Lumbar stenosis with neurogenic claudication   • Lumbar disc herniation   • Myofascial pain   • Chronic bilateral severe lower extremity edema   • Mechanical low back pain   • Presence of cardiac pacemaker   • Stage 3 chronic kidney disease (HCC)   • Pleural effusion, right   • Hepatic steatosis   • Acute UTI (urinary tract infection)   • COVID-19 virus infection   • Cirrhosis (HCC)   • Constrictive pericarditis        Past Medical History:   Diagnosis Date   • Acute diastolic HF (heart failure) (HCC)    • Acute hypokalemia    • Arrhythmia    • Back injury     Fall on 17   • Cellulitis of leg    • Chest pain syndrome    • CHF (congestive heart failure) (HCC)    • Compression fracture of lumbar vertebra (HCC) 2017   • Deep vein thrombosis (HCC)    • Diabetes mellitus (HCC)     patient reports borderline    • History of kyphoplasty 10/16/2018   • Hyperlipidemia    • Hypertension    • Muscle pain     around back   •  Obesity    • Obstructive sleep apnea     right now patient is sleeeping in recliner and does not use-when he lies down he will have to use cpap   • Paroxysmal A-fib (HCC)    • Peripheral artery disease (HCC)    • Spondylosis of 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 or radiculopathy 2/7/2018   • Wears glasses         Past Surgical History:   Procedure Laterality Date   • CARDIAC CATHETERIZATION     • CARDIAC ELECTROPHYSIOLOGY PROCEDURE N/A 12/20/2021    Procedure: AV node ablation + PM implant;  Surgeon: Joaquin Jeffery MD;  Location:  [facility];  Service: Cardiovascular;  Laterality: N/A;   • CARDIAC ELECTROPHYSIOLOGY PROCEDURE N/A 12/20/2021    Procedure: AV node ablation;  Surgeon: Joaquin Jeffery MD;  Location:  [facility];  Service: Cardiovascular;  Laterality: N/A;   • CARDIOVERSION      multiple   • CARDIOVERSION     • COLONOSCOPY      about 14 years ago   • KYPHOPLASTY N/A 3/5/2018    Procedure: KYPHOPLASTY L4;  Surgeon: Akshat Davidson MD;  Location:  [facility];  Service:    • KYPHOPLASTY N/A 4/16/2018    Procedure: KYPHOPLASTY L1 AND L2;  Surgeon: Akshat Davidson MD;  Location:  [facility];  Service: Neurosurgery   • OTHER SURGICAL HISTORY  06/29/2015    PVA           Wound 03/04/21 0845 Right posterior calf Venous Ulcer (Active)   Dressing Appearance intact; moist drainage 01/08/22 1545   Closure LUIS 01/08/22 0800   Base moist; pink; red; yellow; slough 01/08/22 1545   Periwound intact; moist; macerated; redness; swelling 01/08/22 1545   Periwound Temperature warm 01/08/22 1545   Periwound Skin Turgor firm 01/08/22 1545   Edges irregular 01/08/22 1545   Drainage Characteristics/Odor serosanguineous 01/08/22 1545   Drainage Amount moderate 01/08/22 1545   Care, Wound cleansed with; wound cleanser; debrided 01/08/22 1545   Dressing Care silver impregnated; foam; multi-layer wrap 01/08/22 1545   Periwound Care cleansed with pH balanced cleanser; dry periwound area maintained;  moisturizer applied 01/08/22 1545      Lymphedema     Row Name 01/08/22 1545             Lymphedema Edema Assessment    Pitting Edema Moderate  -              Skin Changes/Observations    Location/Assessment Lower Extremity  -      Lower Extremity Conditions bilateral:; clean; dry; shiny; hairless; fragile  -      Lower Extremity Color/Pigment bilateral:; hyperpigmented; brawny; P'eau d'orange  -              Lymphedema Pulses/Capillary Refill    Lymphedema Pulses/Capillary Refill lower extremity pulses; capillary refill  -      Dorsalis Pedis Pulse right:; left:; +1 diminished  -      Capillary Refill lower extremity capillary refill  -      Lower Extremity Capillary Refill right:; left:; less than 3 seconds  -              Compression/Skin Care    Compression/Skin Care skin care; wrapping location; bandaging  -      Skin Care washed/dried; lotion applied  -      Wrapping Location lower extremity  -      Wrapping Location LE bilateral:; foot to knee  -      Wrapping Comments mepilex ag to R calf wound, xeroform to distal R calf, xeroform to small ulcerations LLE, secured with cast padding, BLE size 5&6 compressogrips doubled/overlapping layers for gradient compression  -      Bandage Layers padding/fluff layer; cotton elastic stocking- double layer (comment size)  -            User Key  (r) = Recorded By, (t) = Taken By, (c) = Cosigned By    Initials Name Provider Type    Zeinab Lopez, PT Physical Therapist                WOUND DEBRIDEMENT  Total area of Debridement: 4cmsq  Debridement Site 1  Location- Site 1: BLE wounds  Selective Debridement- Site 1: Wound Surface <20cmsq  Instruments- Site 1: tweezers  Excised Tissue Description- Site 1: scant, slough  Bleeding- Site 1: none               PT Assessment (last 12 hours)     PT Evaluation and Treatment     Row Name 01/08/22 1545          Physical Therapy Time and Intention    Subjective Information complains of; fatigue  -TOÑO      Document Type wound care; evaluation  -     Patient Effort good  -     Symptoms Noted During/After Treatment none  -     Row Name 01/08/22 1545          General Information    Patient Profile Reviewed yes  -     Patient Observations alert; cooperative; agree to therapy  -     Prior Level of Function independent:; wound care  spouse assisting with leg wraps/dressings  -     Equipment Currently Used at Home wound care supplies  -     Pertinent History of Current Functional Problem Pt s/p thoracentesis, has ongoing RLE wound and BLE edema seen by PT wound during prior admissions  -     Existing Precautions/Restrictions fall  -     Risks Reviewed patient:; increased discomfort; increased drainage  -     Benefits Reviewed patient:; improve skin integrity; decrease pain  -     Barriers to Rehab medically complex; previous functional deficit  -     Row Name 01/08/22 1545          Cognition    Affect/Mental Status (Cognitive) WFL  -     Orientation Status (Cognition) oriented x 4  -     Row Name 01/08/22 1545          Pain Scale: Word Pre/Post-Treatment    Pain: Word Scale, Pretreatment 2 - mild pain  -     Posttreatment Pain Rating 2 - mild pain  -     Pain Location - Side Right  -     Pain Location - Orientation lower  -     Pain Location extremity  -     Pre/Posttreatment Pain Comment Mild RLE wound pain, improved after tx  -     Pain Intervention(s) Repositioned  -     Row Name 01/08/22 1545          Wound 03/04/21 0845 Right posterior calf Venous Ulcer    Wound - [facility] Placement Date: 03/04/21  - Placement Time: 0845  - Present on Hospital Admission: Y  - Side: Right  - Orientation: posterior  - Location: calf  - Primary Wound Type: Venous ulcer  -     Dressing Appearance intact; moist drainage  -     Base moist; pink; red; yellow; slough  -     Periwound intact; moist; macerated; redness; swelling  -     Periwound Temperature warm  -      Periwound Skin Turgor firm  -     Edges irregular  -     Drainage Characteristics/Odor serosanguineous  -     Drainage Amount moderate  -     Care, Wound cleansed with; wound cleanser; debrided  -     Dressing Care silver impregnated; foam; multi-layer wrap  mepilex ag, xeroform, cast padding, MLW  -     Periwound Care cleansed with pH balanced cleanser; dry periwound area maintained; moisturizer applied  -     Retired Wound - [facility] Group Date first assessed: 03/04/21  - Time first assessed: 0845  - Present on Hospital Admission: Y  - Side: Right  - Location: calf  - Primary Wound Type: Venous ulcer  -     Row Name 01/08/22 1545          Coping    Observed Emotional State calm; cooperative  -     Row Name 01/08/22 1545          Plan of Care Review    Plan of Care Reviewed With patient  -     Outcome Summary Pt well-known to PT wound care from previous admissions, presenting with chronic BLE edema and RLE wound.  Edema appears improved from previous admission and RLE wound moist with some skin bridges present.  Lat LLE wound present last admission now dry and crusted, small partial-thickness area noted prox/lat L calf approx 1cm x1cm new since last admission.  PT washed BLE with theraworx foam and green wipes, lightly debrided wounds, applied mepilex ag to R calf wound, xeroform to smaller areas, cast padding to secure, and applied size 5&6 compressogrips doubled and overlapping from MH to prox calf for gradient compression to increase venous return and improve skin integrity.  PT will change wraps 2-3x/week.  Pt is managing dressings and compressogrips at home with spouse's assistance and plans to resume home management at d/c.  -     Row Name 01/08/22 1545          Physical Therapy Goals    Wound Care Goal Selection (PT) wound care, PT goal 1  -     Row Name 01/08/22 1545          Wound Care Goal 1 (PT)    Wound Care Goal 1 (PT) Reduce RLE drainage to minimal to promote wound  healing.  -     Time Frame (Wound Care Goal 1, PT) long term goal (LTG); 10 days  -     Row Name 01/08/22 1545          Positioning and Restraints    Pre-Treatment Position sitting in chair/recliner  -     Post Treatment Position chair  -     In Chair reclined; call light within reach; encouraged to call for assist; legs elevated  -     Row Name 01/08/22 1545          Therapy Assessment/Plan (PT)    Rehab Potential (PT) good, to achieve stated therapy goals  -     Criteria for Skilled Interventions Met (PT) yes; skilled treatment is necessary  -     Row Name 01/08/22 1545          PT Evaluation Complexity    History, PT Evaluation Complexity 3 or more personal factors and/or comorbidities  -     Examination of Body Systems (PT Eval Complexity) total of 3 or more elements  -     Clinical Presentation (PT Evaluation Complexity) evolving  -     Clinical Decision Making (PT Evaluation Complexity) moderate complexity  -     Overall Complexity (PT Evaluation Complexity) moderate complexity  -           User Key  (r) = Recorded By, (t) = Taken By, (c) = Cosigned By    Initials Name Provider Type    Neva Schmidt, PT Physical Therapist    Zeinab Lopez, PT Physical Therapist              Physical Therapy Education                 Title: PT OT SLP Therapies (Not Started)     Topic: Physical Therapy (Not Started)     Point: Mobility training (Not Started)     Learner Progress:  Not documented in this visit.          Point: Home exercise program (Not Started)     Learner Progress:  Not documented in this visit.          Point: Body mechanics (Not Started)     Learner Progress:  Not documented in this visit.          Point: Precautions (Not Started)     Learner Progress:  Not documented in this visit.                            Recommendation and Plan  Planned Therapy Interventions (PT): wound care  Plan of Care Reviewed With: patient           Outcome Summary: Pt well-known to PT wound  care from previous admissions, presenting with chronic BLE edema and RLE wound.  Edema appears improved from previous admission and RLE wound moist with some skin bridges present.  Lat LLE wound present last admission now dry and crusted, small partial-thickness area noted prox/lat L calf approx 1cm x1cm new since last admission.  PT washed BLE with theraworx foam and green wipes, lightly debrided wounds, applied mepilex ag to R calf wound, xeroform to smaller areas, cast padding to secure, and applied size 5&6 compressogrips doubled and overlapping from MH to prox calf for gradient compression to increase venous return and improve skin integrity.  PT will change wraps 2-3x/week.  Pt is managing dressings and compressogrips at home with spouse's assistance and plans to resume home management at d/c.  Plan of Care Reviewed With: patient            Time Calculation   PT Charges     Row Name 01/08/22 1545             Time Calculation    Start Time 1545  -JM      PT Goal Re-Cert Due Date 01/18/22  -JM              Untimed Charges    PT Eval/Re-eval Minutes 40  -JM      Wound Care 67950 Multilayer comp below knee; 56519 Selective debridement  -JM      01295-Dkqtreqwoy comp below knee 20  -JM      99523-Wqwthqncn debridement 10  -JM              Total Minutes    Untimed Charges Total Minutes 70  -JM       Total Minutes 70  -JM            User Key  (r) = Recorded By, (t) = Taken By, (c) = Cosigned By    Initials Name Provider Type    Zeinab Lopez, PT Physical Therapist                  Therapy Charges for Today     Code Description Service Date Service Provider Modifiers Qty    39999912406 HC STEFANIE DEBRIDE OPEN WOUND UP TO 20CM 1/8/2022 Zeinab Cleveland, PT GP 1    62150045802 HC PT MULTI LAYER COMP SYS BELOW KNEE 1/8/2022 Zeinab Cleveland, PT GP 1    34508127915 HC PT EVAL MOD COMPLEXITY 3 1/8/2022 Zeinab Cleveland, PT GP 1            PT G-Codes  AM-PAC 6 Clicks Score (PT): 17       Zeinab Cleveland  PT  1/8/2022

## 2022-01-08 NOTE — PLAN OF CARE
Problem: Adult Inpatient Plan of Care  Goal: Absence of Hospital-Acquired Illness or Injury  Intervention: Prevent Skin Injury  Recent Flowsheet Documentation  Taken 1/8/2022 1800 by Ryley Naylor RN  Body Position: position changed independently  Skin Protection:   adhesive use limited   incontinence pads utilized  Taken 1/8/2022 1600 by Ryley Naylor RN  Body Position: position changed independently  Skin Protection:   adhesive use limited   incontinence pads utilized  Taken 1/8/2022 1400 by Ryley Naylor RN  Body Position: position changed independently  Skin Protection:   adhesive use limited   incontinence pads utilized  Taken 1/8/2022 1200 by Ryley Naylor RN  Body Position: position changed independently  Skin Protection:   adhesive use limited   incontinence pads utilized  Taken 1/8/2022 1000 by Ryley Naylor RN  Body Position: position changed independently  Skin Protection:   adhesive use limited   incontinence pads utilized  Taken 1/8/2022 0800 by Ryley Naylor RN  Body Position: position changed independently  Skin Protection:   adhesive use limited   incontinence pads utilized   Goal Outcome Evaluation: Patient continues with wound care to right lower extremity. Patient educated on importance of frequent position changes to off load added pressure and prevent further breakdown.

## 2022-01-09 NOTE — PLAN OF CARE
Goal Outcome Evaluation:  Plan of Care Reviewed With: patient           Outcome Summary: Patient complains of ongoing abdominal pain throughout the night. In the beginning of the shift he said he felt constipated and thought that was causing the pain. A little before midnight he said he did not feel like that was the cause and I paged devon manning. We decided to repeat his CT Scan and he was ordered dilaudid IV which seemed to help with his pain.

## 2022-01-09 NOTE — PROGRESS NOTES
CROSS COVER  Nurse called us midnight, with pt co of increasing ABD pain, restless,   Ct ABD ordered, which showed 2 tiny small bubbles of free air of ? Significance.  No as's N/V.  No fever,   Last BM was on 1/8 am.         Vitals:    01/09/22 0404   BP: 113/93   Pulse: 80   Resp: 19   Temp: 97.6 °F (36.4 °C)   SpO2: 94%           Old records reviewed and summarized in PM hx.      EXAM :  RS- CTA-BL, ,  No wheezing , no crackles, good effort.  CVS- s1s2 regular,   ABD- sitting up in recliner, ABD distended, severe tenderness on RUQ, BS decreased, belly firm,   EXT- 4 + edema  NEURO- AAO-3, restless.    LABS:  Labs reviewed  LA - still pending.    A/P:  RUQ pain- with Gallstones, but no inflammation on CT-could not get MRI- due to PPM.  D/w Radiology in AM, about tiny bubbles of free air on recent CT ABD.  Pain better after IV dilaudid.  Abs switched to Rocephin to cover for sbp.  Fup Lactic acid .     I have discussed with findings, diagnosis and plans with the patient and family.     Bruce Vizcaino MD 01/09/22 06:28 EST

## 2022-01-09 NOTE — PROGRESS NOTES
[facility] Medicine Services  PROGRESS NOTE    Patient Name: Akshat Winkler  : 1955  MRN: [id number]    Date of Admission: 2022  Primary Care Physician: Saurabh Medina PA    Subjective   Subjective     CC:  Heart failure    HPI:  Patient confused, somewhat agitated.  Significant pain in the right upper quadrant, poor historian and difficult to get a decent history    ROS:  Limited ROS secondary to mental status  -Positive right upper quadrant pain    Objective   Objective     Vital Signs:   Temp:  [97.6 °F (36.4 °C)-97.7 °F (36.5 °C)] 97.7 °F (36.5 °C)  Heart Rate:  [80-81] 80  Resp:  [16-19] 17  BP: ()/(68-93) 93/82     Physical Exam:  Constitutional: Confused, oriented to month and name  HENT: NCAT, mucous membranes moist  Respiratory: Decreased breath sounds right, respiratory effort normal   Cardiovascular: RRR, no murmurs, rubs, or gallops  Gastrointestinal: Abdomen is tight, distended, right upper quadrant with diffuse tenderness to palpation  Musculoskeletal: No bilateral ankle edema  Psychiatric: Appropriate affect, cooperative  Neurologic: Oriented x 2-3, speech mostly clear, confused and not following all commands  Skin: No rashes      Results Reviewed:  LAB RESULTS:      Lab 22  0408 22  0408 22  0806 22  0403 22  2256 22  2255 22  1115   WBC 11.44* 8.59 6.64  --  6.54  --  7.14   HEMOGLOBIN 16.2 14.5 14.2  --  15.2  --  14.4   HEMATOCRIT 48.3 43.2 43.2  --  46.0  --  44.2   PLATELETS 123* 115* 124*  --  128*  --  115*   NEUTROS ABS 10.03* 7.63* 5.98  --  5.77  --  5.82   IMMATURE GRANS (ABS) 0.04 0.03 0.03  --  0.01  --  0.03   LYMPHS ABS 0.12* 0.22* 0.28*  --  0.30*  --  0.37*   MONOS ABS 1.05* 0.56 0.35  --  0.34  --  0.77   EOS ABS 0.20 0.14 0.00  --  0.09  --  0.10   MCV 97.6* 98.9* 99.8*  --  100.4*  --  101.4*   CRP 5.84* 5.54* 6.37*  --   --  6.26* 5.80*   PROCALCITONIN 0.56*  --   --   --   --   --  0.48*    LDH  --   --  468*  --   --   --   --    PROTIME 26.0* 20.8* 21.9* 27.3*  --   --  26.0*   APTT 41.8*  --   --   --   --   --   --    D DIMER QUANT  --   --   --   --  2.01*  --   --          Lab 01/09/22 0408 01/08/22 0408 01/07/22 0806 01/06/22 0403 01/05/22 2255 01/05/22  1115   SODIUM 129* 127* 133*  --  134* 132*   POTASSIUM 5.0 4.9 5.2  --  5.0 4.7   CHLORIDE 92* 91* 95*  --  96* 95*   CO2 20.0* 23.0 27.0  --  24.0 25.0   ANION GAP 17.0* 13.0 11.0  --  14.0 12.0   BUN 47* 44* 35*  --  30* 29*   CREATININE 1.65* 1.96* 1.65*  --  1.64* 1.74*   GLUCOSE 140* 129* 116*  --  156* 98   CALCIUM 10.3 9.8 9.8  --  10.0 10.0   MAGNESIUM  --   --   --  2.1 2.2 2.2         Lab 01/09/22 0408 01/08/22 0408 01/07/22 0806 01/06/22 2049 01/05/22 2255 01/05/22  1115   TOTAL PROTEIN 8.4 7.6 7.8  --  8.1 7.9   ALBUMIN 3.30* 3.00* 3.10*  --  3.30* 3.30*   GLOBULIN 5.1 4.6 4.7  --  4.8 4.6   ALT (SGPT) 49* 42* 36  --  38 36   AST (SGOT) 91* 99* 94*  --  93* 92*   BILIRUBIN 5.8* 4.6* 5.2*  --  6.4* 5.9*   BILIRUBIN DIRECT  --   --   --  3.2*  --   --    ALK PHOS 264* 247* 249*  --  263* 260*   LIPASE  --   --   --   --   --  28         Lab 01/09/22 0408 01/08/22 0408 01/07/22 0806 01/06/22  0403 01/05/22  1115   PROBNP 2,114.0*  --   --   --  1,074.0*   TROPONIN T  --   --   --   --  0.026   PROTIME 26.0* 20.8* 21.9* 27.3* 26.0*   INR 2.51* 1.87* 2.01* 2.67* 2.50*             Lab 01/05/22  1115   FERRITIN 172.50         Brief Urine Lab Results  (Last result in the past 365 days)      Color   Clarity   Blood   Leuk Est   Nitrite   Protein   CREAT   Urine HCG        01/05/22 1635             31.3         01/05/22 1635 Yellow   Clear   Small (1+)   Trace   Negative   100 mg/dL (2+)                 Microbiology Results Abnormal     Procedure Component Value - Date/Time    Body Fluid Culture - Body Fluid, Pleural Cavity [[id number]] Collected: 01/07/22 1327    Lab Status: Preliminary result Specimen: Body Fluid from Pleural  Cavity Updated: 01/08/22 1049     Body Fluid Culture No growth     Gram Stain No WBCs or organisms seen          Adult Transthoracic Echo Complete W/ Cont if Necessary Per Protocol    Addendum Date:     · Technically difficult study due to body habitus · Left ventricular systolic function is normal. Estimated left ventricular EF = 60%. · Abnormal left ventricular septal bounce suggestive of pericardial constriction · The right ventricular cavity is dilated. · Moderate tricuspid valve regurgitation is present. · Estimated right ventricular systolic pressure from tricuspid regurgitation is moderately elevated (45-55 mmHg).      Result Date: 1/7/2022  · Technically difficult study · Left ventricular systolic function is normal. Estimated left ventricular EF = 60%. · The right ventricular cavity is dilated. · The right atrial cavity is mild to moderately dilated. · Moderate tricuspid valve regurgitation is present. · Estimated right ventricular systolic pressure from tricuspid regurgitation is moderately elevated (45-55 mmHg).      CT Abdomen Pelvis Without Contrast    Result Date: 1/9/2022  CT Abdomen Pelvis WO INDICATION: Increasing abdominal distention and pain. TECHNIQUE: CT of the abdomen and pelvis without IV contrast. Coronal and sagittal reconstructions were obtained.  Radiation dose reduction techniques included automated exposure control or exposure modulation based on body size. Count of known CT and cardiac nuc med studies performed in previous 12 months: 2. COMPARISON: 1/8/2022 FINDINGS: Pericardial calcifications are unchanged compatible with prior pericarditis. Small right pleural effusion is similar to prior. There is continued pneumonia in the right lower and right middle lobes. There is some atelectasis in the left lung base. Generalized anasarca is again identified. There is a small amount of ascites similar to prior. There is atherosclerotic disease with no aortic aneurysm. The IVC and iliac veins in  the pelvis remains distended which may be secondary to right heart dysfunction. This is unchanged. Gallbladder is filled with stones. No biliary obstruction. There is a nonobstructing stone or vascular calcification in the left kidney. No ureteral stones are seen on either side, and there is no hydronephrosis. Unenhanced solid abdominal organs are otherwise normal. Urinary bladder is decompressed and poorly characterized. Prostate is unremarkable. There is no evidence of acute colitis, and there is no colonic distention. The stomach is normal. There is no small bowel obstruction. There is nothing to suggest acute appendicitis. Multiple venous varicosities are noted in both groins, right worse than left. Patient is status post kyphoplasty of L4, L2, and L1. There are 2 tiny bubbles of potential free intraperitoneal air adjacent to the left lobe of the liver. Etiology is unclear as the bowel appears stable from prior. Surgical consultation and short interval follow-up recommended.     Impression: 1. 2 potential bubbles of free intraperitoneal air adjacent to the left hepatic lobe. Etiology is unclear as the bowel appears stable from prior and there is nothing to suggest a bowel perforation. Surgical consultation and follow-up recommended. 2. Otherwise, no significant change from the recent prior exam. Signer Name: Jorge Rudd MD  Signed: 1/9/2022 3:22 AM  Workstation Name: [facility]  [facility]    CT Abdomen Pelvis Without Contrast    Result Date: 1/8/2022  EXAMINATION: CT CHEST WO CONTRAST DIAGNOSTIC, CT ABDOMEN/ PELVIS WO CONTRAST - 01/08/2022  INDICATION:  J81.0-Acute pulmonary edema; K75.81-Nonalcoholic steatohepatitis (SHERMAN); K74.60-Unspecified cirrhosis of liver. Shortness of breath and chest pain.  TECHNIQUE: Multiple axial CT imaging is obtained of the chest, abdomen and pelvis without the administration of oral or intravenous contrast.  The radiation dose reduction device was  turned on for each scan per the ALARA (As Low as Reasonably Achievable) protocol.  COMPARISON: 12/22/2017 and 7/20/2018  FINDINGS:  CHEST: Thyroid is homogeneous. There is mild enlargement identified of the lymph nodes in the mediastinum. There is calcification seen of the pericardium. There is no pericardial effusion. There is a pleural effusion identified small in size on the right with ill-defined opacification and dense airspace disease in the right middle and lower lung fields. The left lung reveals mild atelectatic change with calcified granuloma identified in the lingula. There is sparing of the right upper lung field. Degenerative changes seen within the spine. There is no adenopathy identified in the axillary regions.  ABDOMEN: Some anasarca seen in the subcutaneous tissues. The liver is homogeneous. The gallbladder is filled with stones. There is enlargement identified of the iliac vessels and IVC. Findings raise the concern for possible elevated right atrium pressure. Clinical correlation is needed. Extensive varices identified within the mesentery. Kidneys and adrenal glands within normal limits. Pancreas is homogeneous. No renal or adrenal abnormality present. Some vascular calcification seen within the abdominal aorta and iliac vessels.  PELVIS: Distention seen of the iliac vessels. There are varices identified in the inguinal regions bilaterally. No pelvic adenopathy. No free fluid or free air. No abnormal mass or fluid collections identified.      Impression: There is extensive dilatation identified of the IVC and iliac vessels raising the concern for elevated right atrium pressure. There is calcification seen of the pericardium. There is a small pleural effusion identified on the right with airspace disease and infiltrate in the right middle and lower lung field. Anasarca seen throughout the subcutaneous tissues of the abdomen and lower extremities with varices seen within the inguinal regions  bilaterally.  DICTATED:   01/08/2022 EDITED/lfs:   01/08/2022      CT Chest Without Contrast Diagnostic    Result Date: 1/8/2022  EXAMINATION: CT CHEST WO CONTRAST DIAGNOSTIC, CT ABDOMEN/ PELVIS WO CONTRAST - 01/08/2022  INDICATION:  J81.0-Acute pulmonary edema; K75.81-Nonalcoholic steatohepatitis (SHERMAN); K74.60-Unspecified cirrhosis of liver. Shortness of breath and chest pain.  TECHNIQUE: Multiple axial CT imaging is obtained of the chest, abdomen and pelvis without the administration of oral or intravenous contrast.  The radiation dose reduction device was turned on for each scan per the ALARA (As Low as Reasonably Achievable) protocol.  COMPARISON: 12/22/2017 and 7/20/2018  FINDINGS:  CHEST: Thyroid is homogeneous. There is mild enlargement identified of the lymph nodes in the mediastinum. There is calcification seen of the pericardium. There is no pericardial effusion. There is a pleural effusion identified small in size on the right with ill-defined opacification and dense airspace disease in the right middle and lower lung fields. The left lung reveals mild atelectatic change with calcified granuloma identified in the lingula. There is sparing of the right upper lung field. Degenerative changes seen within the spine. There is no adenopathy identified in the axillary regions.  ABDOMEN: Some anasarca seen in the subcutaneous tissues. The liver is homogeneous. The gallbladder is filled with stones. There is enlargement identified of the iliac vessels and IVC. Findings raise the concern for possible elevated right atrium pressure. Clinical correlation is needed. Extensive varices identified within the mesentery. Kidneys and adrenal glands within normal limits. Pancreas is homogeneous. No renal or adrenal abnormality present. Some vascular calcification seen within the abdominal aorta and iliac vessels.  PELVIS: Distention seen of the iliac vessels. There are varices identified in the inguinal regions bilaterally.  No pelvic adenopathy. No free fluid or free air. No abnormal mass or fluid collections identified.      Impression: There is extensive dilatation identified of the IVC and iliac vessels raising the concern for elevated right atrium pressure. There is calcification seen of the pericardium. There is a small pleural effusion identified on the right with airspace disease and infiltrate in the right middle and lower lung field. Anasarca seen throughout the subcutaneous tissues of the abdomen and lower extremities with varices seen within the inguinal regions bilaterally.  DICTATED:   01/08/2022 EDITED/lfs:   01/08/2022      XR Chest 1 View    Result Date: 1/8/2022   EXAMINATION: XR CHEST 1 VW - 01/08/2022  INDICATION: Dyspnea on exertion.  COMPARISON: 01/07/2022  FINDINGS: Portable chest reveals pacer leads in satisfactory position. Heart is borderline enlarged. Increased markings seen at the lung bases bilaterally with no definite pleural effusion. Heart is enlarged with degenerative changes seen within the spine. Pulmonary vascularity is within normal limits.       Impression: Minimal increased markings in the lung bases bilaterally. The heart is enlarged with low lung volumes bilaterally.  DICTATED:   01/08/2022 EDITED/lfs:   01/08/2022       XR Chest 1 View    Result Date: 1/7/2022  EXAMINATION: XR CHEST 1 VW-  INDICATION: s/p thora; J81.0-Acute pulmonary edema; K75.81-Nonalcoholic steatohepatitis (SHERMAN); K74.60-Unspecified cirrhosis of liver  COMPARISON: 1/5/2022  FINDINGS: Left-sided single lead pacemaker is noted. Heart is at upper limits of normal size. Vasculature remains cephalized and a diffuse pulmonary interstitial disease pattern appears stable overall. Right pleural effusion appears to have been drained. There is trace amount of remaining intrafissural fluid. No pneumothorax is seen.       Impression: 1. Interval drainage of right pleural effusion. No evidence of pneumothorax. 2. Stable borderline heart  shadow enlargement and mild pulmonary vascular congestion.    This report was finalized on 1/7/2022 1:06 PM by Dr. Ted Wells MD.      US Thoracentesis    Result Date: 1/7/2022  DATE OF EXAM: 1/7/2022 10:56 AM  PROCEDURE: US THORACENTESIS-  INDICATIONS: pleural effusion; J81.0-Acute pulmonary edema; K75.81-Nonalcoholic steatohepatitis (SHERMAN); K74.60-Unspecified cirrhosis of liver  COMPARISON: No Comparisons Available  FLUOROSCOPIC TIME: None  PHYSICIAN MONITORED CONSCIOUS SEDATION TIME: None  CONTRAST MEDIA: None  TECHNIQUE: The patient's medications were reviewed prior to the procedure. The procedure, risks and alternatives were discussed and informed written consent was obtained. Maximal sterile barrier technique was utilized throughout the procedure. The patient was placed in the seated position and ultrasound was utilized to localize the right-sided pleural effusion. The skin was marked prepped and draped. Maximal sterile barrier technique was utilized during this procedure. Partial protective equipment was used secondary to the patient's Covid positive status. The skin was marked prepped draped and anesthetized with 1% Xylocaine. A 5 Maldivian catheter was inserted in the right pleural space by trocar technique. A total of 2 L of fluid was removed. Appropriate specimens were sent to the lab. The catheter was removed and hemostasis achieved. Postprocedure chest radiograph shows no pneumothorax.       Impression: Technically successful ultrasound-guided right thoracentesis with removal of 2 L of fluid.  This report was finalized on 1/7/2022 1:51 PM by Lex Maravilla MD.        Results for orders placed during the hospital encounter of 02/27/18    Adult Transthoracic Echo Complete W/ Cont if Necessary Per Protocol    Interpretation Summary  · Technically difficult study due to body habitus  · Left ventricular systolic function is normal. Estimated EF = 60%.  · The cardiac valves are poorly visualized but there does not  appear to be significant stenotic or regurgitant lesions.  · Right ventricular cavity is mild-to-moderately dilated.  · Atrial flutter noted throughout the examination.      I have reviewed the medications:  Scheduled Meds:apixaban, 5 mg, Oral, Q12H  bumetanide, 0.5 mg, Intravenous, Once  cefTRIAXone, 2 g, Intravenous, Q24H  dexamethasone, 6 mg, Oral, Daily  insulin lispro, 0-7 Units, Subcutaneous, TID AC  magnesium oxide, 400 mg, Oral, Daily  pharmacy consult - MTM, , Does not apply, Daily  potassium chloride, 30 mEq, Oral, Daily  remdesivir, 100 mg, Intravenous, Daily With Lunch  senna-docusate sodium, 2 tablet, Oral, BID      Continuous Infusions:Pharmacy Consult - Remdesivir,       PRN Meds:.•  albuterol sulfate HFA  •  benzonatate  •  bisacodyl  •  calcium carbonate  •  dextromethorphan polistirex ER  •  dextrose  •  dextrose  •  docusate sodium  •  glucagon (human recombinant)  •  hepatitis A  •  HYDROmorphone  •  magnesium sulfate **OR** magnesium sulfate in D5W 1g/100mL (PREMIX) **OR** magnesium sulfate  •  Pharmacy Consult - Remdesivir  •  polyethylene glycol  •  sodium chloride    Assessment/Plan   Assessment & Plan     Active Hospital Problems    Diagnosis  POA   • **COVID-19 virus infection [U07.1]  Yes   • Cirrhosis (HCC) [K74.60]  Yes   • 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]  Yes   • Stage 3 chronic kidney disease (HCC) [N18.30]  Yes   • Pleural effusion, right [J90]  Yes   • Hepatic steatosis [K76.0]  Yes   • Acute UTI (urinary tract infection) [N39.0]  Yes   • Presence of cardiac pacemaker [Z95.0]  Yes   • Chronic bilateral severe lower extremity edema [R60.0]  Yes   • Persistent atrial fibrillation/flutter [I48.19]  Yes   • Type 2 diabetes mellitus without complication, without long-term current use of insulin (HCC) [E11.9]  Yes   • Essential hypertension [I10]  Yes   • Severe obstructive sleep apnea, no CPAP [G47.33]  Yes   • Class 3 severe obesity due to excess calories with serious comorbidity and  body mass index (BMI) of 40.0 to 44.9 in adult (HCC) [E66.01, Z68.41]  Not Applicable   • Acute on chronic right-sided heart failure (HCC) [I50.813]  Yes      Resolved Hospital Problems    Diagnosis Date Resolved POA   • Acute pulmonary edema (HCC) [J81.0] 01/05/2022 Yes   • Pneumonia due to COVID-19 virus [U07.1, J12.82] 01/05/2022 Yes   • Hyponatremia [E87.1] 01/07/2022 Yes   • Elevated procalcitonin [R79.89] 01/07/2022 Yes   • Dyspnea on exertion [R06.00] 01/07/2022 Yes   • Acute on chronic diastolic congestive heart failure (HCC) [I50.33] 01/05/2022 Yes        Brief Hospital Course to date:  Akshat Winkler is a 66 y.o. male with PMH diastolic HF, afib with recent pacemaker placement, T2DM, elevated LFTs, JOSESITO, morbid obesity, and CKD who presented to the ED with chief complaint of increasing SOB over the last week.   S/p thoracentesis with 2 L of fluid removed    Dyspnea with heart failure  -Concern for constrictive pericarditis  -Discussed case with Dr. Justin -further work-up this week regarding possible transfer to  pending CTS conversation, Dr. Albarran regarding pericardiectomy, with Dr. Linn  -Status post thoracentesis with 2 L removed, transudate, reaccumulation on CT from 1/9  -COVID positive  -Continue remdesivir and Decadron  -Repeat CT abdomen/pelvis overnight with possible free air.  Likely secondary to peptic ulcer disease  -Start PPI  -Discussed case with Dr. Mendoza -patient is high risk candidate would likely not survive anesthesia, any type of surgery.  Discussed with his wife    Cirrhosis  Possible congestive hepatopathy  -Plan for possible liver biopsy if warranted, pending cardiology recommendations  -Continue Rocephin    UTI  -Enterococcus, continue ampicillin      A. Fib  -Recent pacemaker placed 2 weeks ago  -Continue Eliquis  -Cardiology following    CKD  - recheck in am    Hyponatremia  -Secondary to volume overload    Diabetes  - continue SSI    Morbid  obesity    Discussed with his wife at length today, she would like to change CODE STATUS to no CPR  Greater than 45 min spent in care of patient and consultation with specialists and family    DVT prophylaxis:  Medical DVT prophylaxis orders are present.       AM-PAC 6 Clicks Score (PT): 17 (01/08/22 0800)    Disposition: I expect the patient to be discharged TBD, guarded prognosis  CODE STATUS:   Code Status and Medical Interventions:   Ordered at: 01/05/22 1932     Code Status (Patient has no pulse and is not breathing):    CPR (Attempt to Resuscitate)     Medical Interventions (Patient has pulse or is breathing):    Full Support       Patricia Bowers, DO  01/09/22

## 2022-01-09 NOTE — PROGRESS NOTES
"[facility] Cardiology Daily Note       LOS: 4 days   Patient Care Team:  Saurabh Medina PA as PCP - General (Physician Assistant)  Lucian Alvarez IV, MD as Consulting Physician (Cardiology)  Mandy Clark PA-C as Physician Assistant (Physician Assistant)  Josh Moss MD as Consulting Physician (Pain Medicine)  Hannah Leonard APRN as Nurse Practitioner (Cardiology)  Akshat Davidson MD as Surgeon (Neurosurgery)    Chief Complaint:  Acute on chronic systolic heart failure; Afib    Subjective     Subjective: no acute issues overnight. V/S stable. Remains on RA. Creatinine improved after holding dose of Bumex last evening. CXR reviewed from this AM- appears to some re accumulation of right sided pleural effusion. He complains of acute abdominal pain. Just had CT abd which revealed free air. Awaiting further direction from medicine team.     Review of Systems:   As above.    Medications:  apixaban, 5 mg, Oral, Q12H  cefTRIAXone, 2 g, Intravenous, Q24H  dexamethasone, 6 mg, Oral, Daily  insulin lispro, 0-7 Units, Subcutaneous, TID AC  magnesium oxide, 400 mg, Oral, Daily  pharmacy consult - MTM, , Does not apply, Daily  potassium chloride, 30 mEq, Oral, Daily  remdesivir, 100 mg, Intravenous, Daily With Lunch        Objective     Vital Sign Min/Max for last 24 hours  Temp  Min: 97.6 °F (36.4 °C)  Max: 97.7 °F (36.5 °C)    BP  Min: 93/82  Max: 135/86   Pulse  Min: 80  Max: 81   Resp  Min: 16  Max: 19   SpO2  Min: 94 %  Max: 98 %   No data recorded   No data recorded      Intake/Output Summary (Last 24 hours) at 1/9/2022 0754  Last data filed at 1/8/2022 2337  Gross per 24 hour   Intake 360 ml   Output 2350 ml   Net -1990 ml        Flowsheet Rows      First Filed Value   Admission Height 175.3 cm (69\") Documented at 01/05/2022 1041   Admission Weight 131 kg (289 lb) Documented at 01/05/2022 1041          Physical Exam:    General: Alert and oriented.   Cardiovascular: Heart " has a nondisplaced focal PMI. Regular rate and rhythm without murmur, gallop or rub.  Lungs: Clear without rales or wheezes. Equal expansion is noted.   Abdomen: Soft, nontender.  Extremities: Show 1+ edema. Discoloration to bilateral lower extremities consistent with chronic edema.   Skin: warm and dry.     Results Review:    I reviewed the patient's new clinical results.  EKG:  Tele: V paced    Labs:    Results from last 7 days   Lab Units 01/09/22  0408 01/08/22  0408 01/07/22  0806   SODIUM mmol/L 129* 127* 133*   POTASSIUM mmol/L 5.0 4.9 5.2   CHLORIDE mmol/L 92* 91* 95*   CO2 mmol/L 20.0* 23.0 27.0   BUN mg/dL 47* 44* 35*   CREATININE mg/dL 1.65* 1.96* 1.65*   CALCIUM mg/dL 10.3 9.8 9.8   BILIRUBIN mg/dL 5.8* 4.6* 5.2*   ALK PHOS U/L 264* 247* 249*   ALT (SGPT) U/L 49* 42* 36   AST (SGOT) U/L 91* 99* 94*   GLUCOSE mg/dL 140* 129* 116*     Results from last 7 days   Lab Units 01/09/22  0408 01/08/22  0408 01/07/22  0806   WBC 10*3/mm3 11.44* 8.59 6.64   HEMOGLOBIN g/dL 16.2 14.5 14.2   HEMATOCRIT % 48.3 43.2 43.2   PLATELETS 10*3/mm3 123* 115* 124*     Lab Results   Component Value Date    TROPONINI 0.021 02/27/2018    TROPONINT 0.026 01/05/2022    TROPONINT 0.011 12/16/2021     Lipid Panel    Lipid Panel 12/16/21   Total Cholesterol 103   Triglycerides 81   HDL Cholesterol 31 (A)   VLDL Cholesterol 16   LDL Cholesterol  56   LDL/HDL Ratio 1.80   (A) Abnormal value             Lab Results   Component Value Date    INR 2.51 (H) 01/09/2022    INR 1.87 (H) 01/08/2022    INR 2.01 (H) 01/07/2022    PROTIME 26.0 (H) 01/09/2022    PROTIME 20.8 (H) 01/08/2022    PROTIME 21.9 (H) 01/07/2022         Assessment        Shortness of breath/Covid-19//CHF  · Multifactorial from Covid-19/right-sided heart failure/right-sided pleural  · Started on remdesivir and Decadron  · O2 2 L on nasal cannula with O2 saturations 98%  · Hospitalist to treat Covid-19      Chronic kidney disease  · Baseline creatinine 1.3-1.4     Acute on  chronic right-sided heart failure  · proBNP only mildly elevated  · Echo: LV septal bounce concerning for constrictive physiology  · Takes spironolactone 100 mg daily but currently on hold while diuresing  · Chart review yielded a old CT scan which showed pericardial calcification  · Patient has no history of pericarditis or autoimmune disease  · CT chest and abdomen: There is calcification seen of the pericardium. There is a small pleural effusion identified on the right with airspace disease and infiltrate in the right middle and lower lung field. Anasarca seen throughout      Right pleural effusion  · S/p right thoracentesis with removal of 2L, 1/7  · CXR 1/9/21: small right pleural effusion (reaccumulation)    Atrial fibrillation/flutter   · Intolerant to multiple antiarrhythmics including beta-blocker, Tikosyn and flecainide causing severe hypotension  · Recent AV joby ablation with placement of permanent pacemaker     Type 2 diabetes mellitus  · Management per hospitalist    Portal hypertension  · Cirrhosis versus passive hepatic congestion secondary to right sided heart failure  · Image guided liver bx this week      Plan       · Creatinine improved today. Will give AM dose of Bumex 1 mg IV.   · Resume Aldactone once we switch to oral diuresis.   · Continue Eliquis for stroke prevention  · Dr. Alvarez to discuss care with Dr. Albarran; Pericardiotomy is extremely high risk surgery in a patient without cirrhosis or morbid obesity.   · Await liver biopsy and results.         Electronically signed by FAITH Riley, 01/09/22, 7:54 AM EST.

## 2022-01-09 NOTE — PROGRESS NOTES
"GI Daily Progress Note  Subjective     Akshat Winkler is a 66 y.o. male who was admitted with COVID-19 virus infection.   Patient developed significant abdominal pain last night.  CT scan of the abdomen performed which showed a small amount of free air in the abdomen.    Chief Complaint: Abdominal pain  Objective     /99 (BP Location: Left arm, Patient Position: Sitting)   Pulse 85   Temp 97.7 °F (36.5 °C) (Oral)   Resp 22   Ht 175.3 cm (69\")   Wt (!) 142 kg (313 lb 4.8 oz)   SpO2 94%   BMI 46.27 kg/m²     Intake/Output last 3 shifts:  I/O last 3 completed shifts:  In: 480 [P.O.:480]  Out: 4125 [Urine:4125]  Intake/Output this shift:  I/O this shift:  In: -   Out: 400 [Urine:400]      Physical Exam  Wt Readings from Last 3 Encounters:   01/06/22 (!) 142 kg (313 lb 4.8 oz)   12/23/21 131 kg (289 lb 4 oz)   12/19/21 132 kg (290 lb 6.4 oz)   ,body mass index is 46.27 kg/m².,@FLOWAMB(6)@,@FLOWAMB(5)@,@FLOWAMB(8)@   CONSTITUTIONAL: Lying in bed no distress  Resp CTA; no rhonchi, rales, or wheezes.  Respiration effort normal  CV RRR; no M/R/G. No lower extremity edema  GI Abd soft, diffusely tender, obese, normal active bowel sounds.    Psych: Awake and alert wanting to get out of bed    DATA:  CT abdomen pelvis without contrast per radiologist report     FINDINGS:  Pericardial calcifications are unchanged compatible with prior pericarditis. Small right pleural effusion is similar to prior. There is continued pneumonia in the right lower and right middle lobes. There is some atelectasis in the left lung base.  Generalized anasarca is again identified. There is a small amount of ascites similar to prior. There is atherosclerotic disease with no aortic aneurysm. The IVC and iliac veins in the pelvis remains distended which may be secondary to right heart  dysfunction. This is unchanged. Gallbladder is filled with stones. No biliary obstruction. There is a nonobstructing stone or vascular calcification in the " left kidney. No ureteral stones are seen on either side, and there is no hydronephrosis.  Unenhanced solid abdominal organs are otherwise normal.     Urinary bladder is decompressed and poorly characterized. Prostate is unremarkable. There is no evidence of acute colitis, and there is no colonic distention. The stomach is normal. There is no small bowel obstruction. There is nothing to suggest acute  appendicitis. Multiple venous varicosities are noted in both groins, right worse than left. Patient is status post kyphoplasty of L4, L2, and L1.     There are 2 tiny bubbles of potential free intraperitoneal air adjacent to the left lobe of the liver. Etiology is unclear as the bowel appears stable from prior. Surgical consultation and short interval follow-up recommended.     IMPRESSION:     1. 2 potential bubbles of free intraperitoneal air adjacent to the left hepatic lobe. Etiology is unclear as the bowel appears stable from prior and there is nothing to suggest a bowel perforation. Surgical consultation and follow-up recommended.  2. Otherwise, no significant change from the recent prior exam.           Assessment/Plan     1.  Calcific constrictive pericarditis  2.  Congestive hepatopathy  3.  COVID-19  4.  Morbid obesity  5.  Right pleural effusion  6.  Possible free air in the abdomen    <Would continue supportive care.  Would add PPI.,  Antibiotics.  Patient is not a surgical candidate.    <Would not pursue liver biopsy unless Dr. Alvarez  confers with Dr. Albarran and he thinks this would influence his decision about possible pericardotomy        COVID-19 virus infection    Acute on chronic right-sided heart failure (HCC)    Type 2 diabetes mellitus without complication, without long-term current use of insulin (HCC)    Essential hypertension    Severe obstructive sleep apnea, no CPAP    Class 3 severe obesity due to excess calories with serious comorbidity and body mass index (BMI) of 40.0 to 44.9 in adult  (HCC)    Persistent atrial fibrillation/flutter    Chronic bilateral severe lower extremity edema    Presence of cardiac pacemaker    Stage 3 chronic kidney disease (HCC)    Pleural effusion, right    Hepatic steatosis    Acute UTI (urinary tract infection)    Cirrhosis (HCC)    Constrictive pericarditis       LOS: 4 days     Erasmo Justin MD  01/09/22  15:13 EST

## 2022-01-10 PROBLEM — J81.0 ACUTE PULMONARY EDEMA (HCC): Status: ACTIVE | Noted: 2022-01-01

## 2022-01-10 PROBLEM — K63.1 BOWEL PERFORATION: Status: ACTIVE | Noted: 2022-01-01

## 2022-01-10 NOTE — PROGRESS NOTES
Cardiology Progress Note      Reason for visit:    · Constrictive pericarditis  · Right heart failure  ·     IDENTIFICATION: 66-year-old gentleman who is  and resides in [place], [place]    Active Hospital Problems    Diagnosis  POA   • **Acute on chronic right-sided heart failure (HCC) [I50.813]  Yes     Priority: High     · Echo (12/10/2021): LVEF 60%.  Moderate TR.  RVSP 58 mmHg     • Stage 3 chronic kidney disease (HCC) [N18.30]  Yes     Priority: High   • COVID-19 virus infection [U07.1]  Yes     Priority: High   • Persistent atrial fibrillation/flutter [I48.19]  Yes     Priority: High     · Diagnosed 2011  · [id number] 3 (HTN, CAD, DM)  · Echo (10/11/2011): Normal LVEF, mild MR, mild LA dilation  · LOLY/ECVcardioversion (12/21/2011) initiation of propafenone therapy.   · Successful LOLY/ECV for recurrent atrial fibrillation, 11/15/2013  · PVA with Dr. Cary, 6/29/2015  · Cardioversion (07/17/2015) for atrial flutter occurring post ablation   · ECV for recurrent atrial flutter, 12/20/17 with reattempt at beta blocker/flecainide.  · Intolerant to beta blocker/flecanide with severe hypotension, intolerant to propafenone  · Tikosyn admission with development of wide complex tachycardia, 7/2018  · ECV for recurrent atrial flutter with early return of atrial flutter, 11/18/2021  · Admission to [facility] 12/16/2021 with acute CHF and atrial flutter with rapid ventricular response  · AV joby ablation with pacemaker placement 12/20/2021     • Type 2 diabetes mellitus without complication, without long-term current use of insulin (HCC) [E11.9]  Yes     Priority: High     · Statin therapy indicated given diabetic status     • Pleural effusion, right [J90]  Yes     Priority: Medium     · Status post thoracentesis with removal of 2 L fluid, 1/7/2022     • Hepatic steatosis [K76.0]  Yes     Priority: Medium   • Acute UTI (urinary tract infection) [N39.0]  Yes     Priority: Medium   • Presence of  cardiac pacemaker [Z95.0]  Yes     Priority: Medium     · AV joby ablation with pacemaker placement 12/20/2021       • Essential hypertension [I10]  Yes     Priority: Medium   • Severe obstructive sleep apnea, no CPAP [G47.33]  Yes     Priority: Medium     No longer on CPAP, sleeps in reclined position     • Chronic bilateral severe lower extremity edema [R60.0]  Yes     Priority: Low   • Class 3 severe obesity due to excess calories with serious comorbidity and body mass index (BMI) of 40.0 to 44.9 in adult (HCC) [E66.01, Z68.41]  Not Applicable     Priority: Low   • Acute pulmonary edema (HCC) [J81.0]  Yes   • Cirrhosis (HCC) [K74.60]  Yes   • 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]  Yes     · Echo (1/7/2022): LVEF 70%.  LV septal bounce suggestive of constrictive physiology              Patient had echocardiogram on Friday and there is concern of possible calcific constrictive pericarditis.  He had CT scan over the weekend which had a small amount of free air.            Vital Sign Min/Max for last 24 hours  Temp  Min: 97.6 °F (36.4 °C)  Max: 97.9 °F (36.6 °C)   BP  Min: 109/93  Max: 142/112   Pulse  Min: 79  Max: 85   Resp  Min: 16  Max: 22   SpO2  Min: 90 %  Max: 94 %   Flow (L/min)  Min: 2  Max: 2      Intake/Output Summary (Last 24 hours) at 1/10/2022 1037  Last data filed at 1/10/2022 0659  Gross per 24 hour   Intake --   Output 1850 ml   Net -1850 ml           Physical Exam  Constitutional:       Appearance: He is morbidly obese. He is ill-appearing.   HENT:      Head: Normocephalic.   Cardiovascular:      Rate and Rhythm: Normal rate and regular rhythm.   Psychiatric:         Behavior: Behavior is cooperative.         No physical exam was performed..  Chart was reviewed    Tele: Ventricular paced     Results Review (reviewed the patient's recent labs in the electronic medical record):      EKG (1/5/2022): Ventricular paced     CXR (1/9/2022):  Cardiomegaly and mild CHF.  Mildly increased right basilar  atelectasis versus pneumonia     ECHO (1/7/2022):  LVEF 60%.  Abnormal left ventricular septal bounce suggestive of pericardial constriction, moderate TR.  RVSP 45-55 mmHg.  RV is dilated.  Technically difficult study due to body habitus    Result Review:      Results from last 7 days   Lab Units 01/10/22  0300 01/09/22  2316 01/09/22  0408 01/08/22  0408 01/07/22  0806 01/07/22  0806 01/06/22  0403 01/05/22  2255 01/05/22  2255 01/05/22  1115 01/05/22  1115   SODIUM mmol/L  --  130* 129* 127*   < > 133*  --    < > 134*   < > 132*   POTASSIUM mmol/L  --  4.8 5.0 4.9   < > 5.2  --    < > 5.0   < > 4.7   CHLORIDE mmol/L  --  91* 92* 91*  --  95*  --   --  96*  --  95*   BUN mg/dL  --  49* 47* 44*   < > 35*  --    < > 30*   < > 29*   CREATININE mg/dL  --  1.33* 1.65* 1.96*   < > 1.65*  --    < > 1.64*   < > 1.74*   MAGNESIUM mg/dL 2.4  --   --   --   --   --  2.1  --  2.2   < > 2.2    < > = values in this interval not displayed.     Results from last 7 days   Lab Units 01/05/22  1115   TROPONIN T ng/mL 0.026     Results from last 7 days   Lab Units 01/10/22  0300 01/09/22  0408 01/08/22 0408   WBC 10*3/mm3 10.61 11.44* 8.59   HEMOGLOBIN g/dL 15.7 16.2 14.5   HEMATOCRIT % 47.6 48.3 43.2   PLATELETS 10*3/mm3 86* 123* 115*       Lab Results   Component Value Date    INR 4.26 (H) 01/10/2022    INR 2.51 (H) 01/09/2022    INR 1.87 (H) 01/08/2022    PROTIME 39.0 (H) 01/10/2022    PROTIME 26.0 (H) 01/09/2022    PROTIME 20.8 (H) 01/08/2022         Lab Results   Component Value Date    HGBA1C 6.00 (H) 12/16/2021       Lab Results   Component Value Date    CHOL 103 12/16/2021    CHLPL 138 05/09/2017    TRIG 81 12/16/2021    HDL 31 (L) 12/16/2021    LDL 56 12/16/2021              Constrictive pericarditis/biventricular heart failure  · unfortunately patient is not a candidate for pericardiectomy due to worsening liver failure as well as new finding of perforated bowel/pneumoperitoneum  · Dr. Albarran had been previously consulted  but with worsening liver function and new finding of bowel perforation, no longer a candidate.  · Status has declined quickly over the last several days.  Hospice recommended       Cirrhosis  · Likely related to portal congestion per Dr. Gonsalez/GI     Atrial fibrillation/flutter   · Intolerant to multiple antiarrhythmics including beta-blocker, Tikosyn and flecainide causing severe hypotension  · Recent AV joby ablation with placement of permanent pacemaker  · Continue to hold Eliquis for any possible invasive procedure     Type 2 diabetes mellitus  · Management per hospitalist                 · Agree with hospice care    Electronically signed by Lucian Alvarez IV, MD, 01/10/22, 7:11 PM EST.

## 2022-01-10 NOTE — CASE MANAGEMENT/SOCIAL WORK
Continued Stay Note  [facility]     Patient Name: Akshat Winkler  MRN: [id number]  Today's Date: 1/10/2022    Admit Date: 1/5/2022     Discharge Plan     Row Name 01/10/22 1554       Plan    Plan Ongoing    Plan Comments I attempted to contact pt's wife. No answer. Inpatient Hospice consulted today. I spoke with Eugenio/Hospice, he states pt is being transferred to Inpatient Hospice. CM will continue to follow from the periphery.    Final Discharge Disposition Code 30 - still a patient    Row Name 01/10/22 9084       Plan    Plan Hospice Consult    Plan Comments Attempted to contact spouse to set up time for discussion/consultation.   No answer. Will attempt again at later time.               Discharge Codes    No documentation.               Expected Discharge Date and Time     Expected Discharge Date Expected Discharge Time    Leonardo 10, 2022             Veronica Dewitt RN

## 2022-01-10 NOTE — PLAN OF CARE
Goal Outcome Evaluation:      Pt uncomfortable in bed and chair, has sat in chair most of the day with c/o ab pain, azul placed due to pt urgency/frequency and him not using call bell for assistance to stand to urinate, hospice consult placed, pt now on 2l nc other vss stable cont to monitor

## 2022-01-10 NOTE — PAYOR COMM NOTE
"Please review for reconsideration.    Chloé Ye RN CM  Phone [phone]  Fax [phone]      WinklerAkshat (66 y.o. Male)             Date of Birth Social Security Number Address Home Phone MRN    1955  [id number] [id number] [phone] [id number]    Mandaen Marital Status             Caodaism        Admission Date Admission Type Admitting Provider Attending Provider Department, Room/Bed    1/5/22 Emergency Patricia Bowers DO Carroll, Meghan C, DO [address], S[address]/1    Discharge Date Discharge Disposition Discharge Destination                         Attending Provider: Patricia Bowers DO    Allergies: Bisoprolol, Flecainide, Metoprolol, Tikosyn [Dofetilide]    Isolation: Contact Air   Infection: COVID (confirmed) (01/05/22)   Code Status: No CPR   Advance Care Planning Activity    Ht: 175.3 cm (69\")   Wt: 142 kg (313 lb 4.8 oz)    Admission Cmt: None   Principal Problem: Acute on chronic right-sided heart failure (HCC) [I50.813] More...                 Active Insurance as of 1/5/2022     Primary Coverage     Payor Plan Insurance Group Employer/Plan Group    [facility] [employer] [facility] [employer] [facility] PPO [id number]     Payor Plan Address Payor Plan Phone Number Payor Plan Fax Number Effective Dates    [address] [phone]  9/1/2021 - None Entered    [facility] [id number]       Subscriber Name Subscriber Birth Date Member ID       AKSHAT WINKLER 1955 [id number]                 Emergency Contacts      (Rel.) Home Phone Work Phone Mobile Phone    WinklerShama young (Spouse) [phone] -- [phone]              Oxygen Therapy (last 3 days)     Date/Time SpO2 Device (Oxygen Therapy) Flow (L/min) Oxygen Concentration (%) ETCO2 (mmHg)    01/10/22 1417 88 -- -- -- --    01/10/22 1000 -- room air -- -- --    01/10/22 0800 -- room air -- -- --    01/10/22 0600 94 room air -- -- --    01/10/22 0400 94 room air -- -- " --    01/10/22 0255 94 -- -- -- --    01/10/22 0200 -- room air -- -- --    01/10/22 0000 93 room air -- -- --    01/09/22 2319 90 room air -- -- --    01/09/22 2200 93 room air -- -- --    01/09/22 2053 92 nasal cannula 2 -- --    01/09/22 2018 92 -- -- -- --    01/09/22 1600 -- room air -- -- --    01/09/22 1431 94 room air -- -- --    01/09/22 1400 -- room air -- -- --    01/09/22 1200 -- room air -- -- --    01/09/22 1000 -- room air -- -- --    01/09/22 0800 -- room air -- -- --    01/09/22 0527 -- room air -- -- --    01/09/22 0404 94 room air -- -- --    01/09/22 0200 -- room air -- -- --    01/09/22 0000 -- room air -- -- --    01/08/22 2337 94 room air -- -- --    01/08/22 2200 -- room air -- -- --    01/08/22 2002 -- room air -- -- --    01/08/22 1851 98 room air -- -- --    01/08/22 1800 -- room air -- -- --    01/08/22 1600 -- room air -- -- --    01/08/22 1400 -- room air -- -- --    01/08/22 1200 -- room air -- -- --    01/08/22 1000 -- room air -- -- --    01/08/22 0800 -- room air -- -- --    01/08/22 0600 -- room air -- -- --    01/08/22 0400 -- room air -- -- --    01/08/22 0358 93 room air -- -- --    01/08/22 0200 -- room air -- -- --    01/08/22 0000 -- room air -- -- --    01/07/22 2241 94 room air -- -- --    01/07/22 2200 -- room air -- -- --    01/07/22 2000 -- room air -- -- --    01/07/22 1910 99 room air -- -- --    01/07/22 1800 -- room air -- -- --    01/07/22 1600 -- room air -- -- --    01/07/22 1540 98 room air -- -- --    01/07/22 1400 -- room air -- -- --    01/07/22 1302 98 room air -- -- --    01/07/22 1200 -- room air -- -- --    01/07/22 11:32:33 100 -- -- -- --    01/07/22 11:26:13 99 -- -- -- --    01/07/22 11:16:58 93 -- -- -- --    01/07/22 1000 -- room air -- -- --    01/07/22 0830 -- nasal cannula 2 -- --    01/07/22 0800 100 nasal cannula 2 -- --    01/07/22 0600 -- nasal cannula 2 -- --    01/07/22 0400 -- nasal cannula 2 -- --    01/07/22 0358 96 nasal cannula 2 -- --     01/07/22 0200 -- nasal cannula 2 -- --    01/07/22 0000 -- nasal cannula 2 -- --        Facility-Administered Medications as of 1/10/2022   Medication Dose Route Frequency Provider Last Rate Last Admin   • albuterol sulfate HFA (PROVENTIL HFA;VENTOLIN HFA;PROAIR HFA) inhaler 2 puff  2 puff Inhalation Q6H PRN Peggy Seth APRN       • aluminum-magnesium hydroxide-simethicone (MAALOX MAX) 400-400-40 MG/5ML suspension 15 mL  15 mL Oral Q6H PRN Cathleen Velazquez APRN   15 mL at 01/09/22 2300   • ampicillin (PRINCIPEN) capsule 500 mg  500 mg Oral Q6H Patricia Bowers DO   500 mg at 01/10/22 1356   • apixaban (ELIQUIS) tablet 5 mg  5 mg Oral Q12H Ted Sparks MD   5 mg at 01/10/22 0927   • benzonatate (TESSALON) capsule 100 mg  100 mg Oral TID PRN Peggy Seth APRN       • bisacodyl (DULCOLAX) EC tablet 10 mg  10 mg Oral Daily PRN Luz Maria Prado APRIZABEL   10 mg at 01/08/22 1958   • [COMPLETED] bumetanide (BUMEX) injection 0.5 mg  0.5 mg Intravenous Once Na Aguayo APRN   0.5 mg at 01/09/22 0956   • calcium carbonate (TUMS) chewable tablet 500 mg (200 mg elemental)  2 tablet Oral TID PRN Luz Maria Prado APRN   2 tablet at 01/08/22 1918   • cefTRIAXone (ROCEPHIN) 2 g/100 mL 0.9% NS IVPB (MBP)  2 g Intravenous Q24H Bruce Vizcaino MD   2 g at 01/10/22 0646   • dexamethasone (DECADRON) tablet 6 mg  6 mg Oral Daily Peggy Seth APRN   6 mg at 01/10/22 0927   • dextromethorphan polistirex ER (DELSYM) 30 MG/5ML oral suspension 60 mg  60 mg Oral Q12H PRN Peggy Seth APRN       • dextrose (D50W) (25 g/50 mL) IV injection 25 g  25 g Intravenous Q15 Min PRN Peggy Seth APRN       • dextrose (GLUTOSE) oral gel 15 g  15 g Oral Q15 Min PRN Peggy Seth APRN       • docusate sodium (COLACE) capsule 100 mg  100 mg Oral Daily PRN Luz Maria Prado APRN   100 mg at 01/08/22 1958   • glucagon (human recombinant) (GLUCAGEN DIAGNOSTIC) injection 1 mg  1 mg Subcutaneous Q15 Min PRN Peggy Seth APRN       •  hepatitis A (HAVRIX) vaccine 1,440 Units  1,440 Units Intramuscular During Hospitalization Erasmo Justin MD       • HYDROmorphone (DILAUDID) injection 0.5 mg  0.5 mg Intravenous Q4H PRN Bertin Storm DO   0.5 mg at 01/10/22 1356   • insulin lispro (humaLOG) injection 0-7 Units  0-7 Units Subcutaneous TID AC Peggy Seth APRN   2 Units at 01/06/22 1753   • lactulose (CHRONULAC) 10 GM/15ML solution 20 g  20 g Oral BID Patricia Bowers C, DO   20 g at 01/10/22 0928   • [COMPLETED] lidocaine PF 1% (XYLOCAINE) injection 10 mL  10 mL Subcutaneous Once Ted Sparks MD   10 mL at 01/07/22 1105   • magnesium oxide (MAG-OX) tablet 400 mg  400 mg Oral Daily Peggy Seth APRN   400 mg at 01/10/22 0927   • magnesium sulfate 4 gram infusion - Mg less than or equal to 1mg/dL  4 g Intravenous PRN Peggy Seth APRN        Or   • magnesium sulfate 3 gram infusion (1gm x 3) - Mg 1.1 - 1.5 mg/dL  1 g Intravenous PRN Peggy Seth APRN        Or   • Magnesium Sulfate 2 gram infusion- Mg 1.6 - 1.9 mg/dL  2 g Intravenous PRN Peggy Seth APRN 25 mL/hr at 01/09/22 0814 2 g at 01/09/22 0814   • melatonin tablet 5 mg  5 mg Oral Nightly PRN Patricia Bowers, DO   5 mg at 01/09/22 2045   • [COMPLETED] morphine injection 0.5 mg  0.5 mg Intravenous Once Luz Maria Prado APRN   0.5 mg at 01/09/22 0202   • [COMPLETED] morphine injection 1 mg  1 mg Intravenous Once Ted Sparks MD   1 mg at 01/09/22 0318   • pantoprazole (PROTONIX) injection 40 mg  40 mg Intravenous BID AC Patricia Bowers C, DO   40 mg at 01/09/22 1650   • Pharmacy Consult - MTM   Does not apply Daily Jackson Adams, PharmD       • Pharmacy Consult - Remdesivir   Does not apply Continuous PRN Peggy Seth APRN       • polyethylene glycol (MIRALAX) packet 17 g  17 g Oral Daily PRN Luz Maria Prado APRN   17 g at 01/08/22 2137   • potassium chloride (MICRO-K) CR capsule 30 mEq  30 mEq Oral Daily Peggy Seth APRN   30 mEq at 01/10/22 0993   • [COMPLETED] remdesivir  200 mg in sodium chloride 0.9 % 290 mL IVPB (powder vial)  200 mg Intravenous Q24H Peggy Seth APRN   200 mg at 01/05/22 2217    Followed by   • [COMPLETED] remdesivir 100 mg in sodium chloride 0.9 % 270 mL IVPB (powder vial)  100 mg Intravenous Daily With Lunch Peggy Seth APRN   100 mg at 01/09/22 1200   • sennosides-docusate (PERICOLACE) 8.6-50 MG per tablet 2 tablet  2 tablet Oral BID Patricia Bowers DO   2 tablet at 01/09/22 2045   • [COMPLETED] sodium chloride 0.9 % bolus 500 mL  500 mL Intravenous Once Cathleen Velazquez APRN 500 mL/hr at 01/10/22 0442 500 mL at 01/10/22 0442   • sodium chloride 0.9 % flush 10 mL  10 mL Intravenous PRN Peggy Seth APRN   10 mL at 01/09/22 0814   • [COMPLETED] sulfur hexafluoride microsph (LUMASON) 60.7-25 MG IV reconstituted suspension reconstituted suspension 2 mL  2 mL Intravenous Once in imaging Lucian Alvarez IV, MD   2 mL at 01/07/22 0957        Physician Progress Notes (last 72 hours)      Lolis Green PA at 01/10/22 1248     Attestation signed by Erasmo Justin MD at 01/10/22 1311    I have reviewed this documentation and agree.    I called patient's wife Shama and discussed with her that he has a perforation somewhere in his GI tract and that he is not an operative candidate due to his severe cardiac status as well his his current liver condition.  With his current liver disease he has an 82% perioperative mortality.    Discussed Hospice with her  which she is agreeable to talk with.  Would recommend comfort care with pain control and comfort diet. She seems to understand the situation.  She would like her and her kids to be able to visit him                  GI Daily Progress Note  Subjective:    Chief Complaint: Follow up congestive hepatopathy     Mr. Winkler complains of continued abdominal pain.      Objective:    /93 (BP Location: Left arm, Patient Position: Sitting)   Pulse 79   Temp 97.9 °F (36.6 °C) (Oral)   Resp 16   Ht 175.3  "cm (69\")   Wt (!) 142 kg (313 lb 4.8 oz)   SpO2 94%   BMI 46.27 kg/m²     Physical Exam  Constitutional:       General: He is in acute distress.      Appearance: He is ill-appearing.   Cardiovascular:      Rate and Rhythm: Normal rate and regular rhythm.   Pulmonary:      Effort: Pulmonary effort is normal.   Abdominal:      Tenderness: There is abdominal tenderness. There is guarding and rebound.      Comments: Obese abdomen.   No bowel sound auscultated.      Musculoskeletal:      Right lower leg: Edema present.      Left lower leg: Edema present.       Lab  Lab Results   Component Value Date    WBC 10.61 01/10/2022    HGB 15.7 01/10/2022    HGB 16.2 01/09/2022    HGB 14.5 01/08/2022    MCV 98.3 (H) 01/10/2022    PLT 86 (L) 01/10/2022    INR 4.26 (H) 01/10/2022    INR 2.51 (H) 01/09/2022    INR 1.87 (H) 01/08/2022    INR 2.01 (H) 01/07/2022    INR 2.67 (H) 01/06/2022     Lab Results   Component Value Date    GLUCOSE 128 (H) 01/09/2022    BUN 49 (H) 01/09/2022    CREATININE 1.33 (H) 01/09/2022    EGFRIFNONA 54 (L) 01/09/2022    EGFRIFAFRI 92 02/08/2018    BCR 36.8 (H) 01/09/2022     (L) 01/09/2022    K 4.8 01/09/2022    CO2 21.0 (L) 01/09/2022    CALCIUM 10.0 01/09/2022    PROTENTOTREF 7.0 03/03/2018    ALBUMIN 3.20 (L) 01/09/2022    ALKPHOS 229 (H) 01/09/2022    BILITOT 7.5 (H) 01/09/2022    BILIDIR 3.2 (H) 01/06/2022    ALT 43 (H) 01/09/2022    AST 61 (H) 01/09/2022     Assessment:    Acute abdomen, free air seen on recent CT with concerns of pneumatosis in the right colon   Calcific constrictive pericarditis   Congestive hepatopathy   COVID-19 infection   Morbid obesity   Coagulopathy, INR worse today.   Anticoagulation use, on Eliquis     Plan:    CT images reviewed with interventional radiologist and Dr. Justin.   There is concern of small pneumatosis in the ascending colon and cecum.  There is a 82 % perioperative mortality related to his liver disease.  This does not include mortality risk related " to his constrictive pericarditis.    Acute abdomen concerns were discussed with surgery yesterday and he is not a surgical candidate.       Primary team has discussed this with his wife yesterday.  His code status has been changed to DNR/DNI.        Dr. Justin has additionally called his wife today.   Hospice has been consulted     RUPESH Hodge  01/10/22  12:48 EST      Electronically signed by Erasmo Justin MD at 01/10/22 1311     Courtney Carmichael APRN at 01/10/22 1037          Cardiology Progress Note      Reason for visit:    · Right-sided heart failure  · Recent pacemaker implant status post AV joby ablation for atrial fibrillation/flutter  · Possible calcific constrictive pericarditis    IDENTIFICATION: 66-year-old gentleman who is  and resides in [facility] Problems    Diagnosis  POA   • **Acute on chronic right-sided heart failure (HCC) [I50.813]  Yes     Priority: High     · Echo (12/10/2021): LVEF 60%.  Moderate TR.  RVSP 58 mmHg     • Stage 3 chronic kidney disease (HCC) [N18.30]  Yes     Priority: High   • COVID-19 virus infection [U07.1]  Yes     Priority: High   • Persistent atrial fibrillation/flutter [I48.19]  Yes     Priority: High     · Diagnosed 2011  · [id number] 3 (HTN, CAD, DM)  · Echo (10/11/2011): Normal LVEF, mild MR, mild LA dilation  · LOLY/ECVcardioversion (12/21/2011) initiation of propafenone therapy.   · Successful LOLY/ECV for recurrent atrial fibrillation, 11/15/2013  · PVA with Dr. Cary, 6/29/2015  · Cardioversion (07/17/2015) for atrial flutter occurring post ablation   · ECV for recurrent atrial flutter, 12/20/17 with reattempt at beta blocker/flecainide.  · Intolerant to beta blocker/flecanide with severe hypotension, intolerant to propafenone  · Tikosyn admission with development of wide complex tachycardia, 7/2018  · ECV for recurrent atrial flutter with early return of atrial flutter, 11/18/2021  · Admission to [facility]  [facility] 12/16/2021 with acute CHF and atrial flutter with rapid ventricular response  · AV joby ablation with pacemaker placement 12/20/2021     • Type 2 diabetes mellitus without complication, without long-term current use of insulin (HCC) [E11.9]  Yes     Priority: High     · Statin therapy indicated given diabetic status     • Pleural effusion, right [J90]  Yes     Priority: Medium     · Status post thoracentesis with removal of 2 L fluid, 1/7/2022     • Hepatic steatosis [K76.0]  Yes     Priority: Medium   • Acute UTI (urinary tract infection) [N39.0]  Yes     Priority: Medium   • Presence of cardiac pacemaker [Z95.0]  Yes     Priority: Medium     · AV joby ablation with pacemaker placement 12/20/2021       • Essential hypertension [I10]  Yes     Priority: Medium   • Severe obstructive sleep apnea, no CPAP [G47.33]  Yes     Priority: Medium     No longer on CPAP, sleeps in reclined position     • Chronic bilateral severe lower extremity edema [R60.0]  Yes     Priority: Low   • Class 3 severe obesity due to excess calories with serious comorbidity and body mass index (BMI) of 40.0 to 44.9 in adult (HCC) [E66.01, Z68.41]  Not Applicable     Priority: Low   • Acute pulmonary edema (HCC) [J81.0]  Yes   • Cirrhosis (HCC) [K74.60]  Yes   • 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]  Yes     · Echo (1/7/2022): LVEF 70%.  LV septal bounce suggestive of constrictive physiology         Subjective     Patient had echocardiogram on Friday and there is concern of possible calcific constrictive pericarditis.  He had CT scan over the weekend which had a small amount of free air.         Objective   Vital Sign Min/Max for last 24 hours  Temp  Min: 97.6 °F (36.4 °C)  Max: 97.9 °F (36.6 °C)   BP  Min: 109/93  Max: 142/112   Pulse  Min: 79  Max: 85   Resp  Min: 16  Max: 22   SpO2  Min: 90 %  Max: 94 %   Flow (L/min)  Min: 2  Max: 2      Intake/Output Summary (Last 24 hours) at 1/10/2022 1037  Last data filed at 1/10/2022 0659  Gross  per 24 hour   Intake --   Output 1850 ml   Net -1850 ml           Physical Exam    No physical exam was performed..  Chart was reviewed    Tele: Ventricular paced     Results Review (reviewed the patient's recent labs in the electronic medical record):      EKG (1/5/2022): Ventricular paced     CXR (1/9/2022):  Cardiomegaly and mild CHF.  Mildly increased right basilar atelectasis versus pneumonia     ECHO (1/7/2022):  LVEF 60%.  Abnormal left ventricular septal bounce suggestive of pericardial constriction, moderate TR.  RVSP 45-55 mmHg.  RV is dilated.  Technically difficult study due to body habitus    Result Review:  I have personally reviewed the results from the time of this admission to 1/10/2022 10:37 EST and agree with these findings:  [x]  Laboratory  []  Microbiology  [x]  Radiology  [x]  EKG/Telemetry   [x]  Cardiology/Vascular   []  Pathology  []  Old records  []  Other:  Most notable findings include: Lactic acid 5.2, platelets 86, BUN 49, creatinine 1.33, sodium 130, ALT 43, AST 61, GFR 54, C-reactive protein 10.51    Results from last 7 days   Lab Units 01/10/22  0300 01/09/22  2316 01/09/22  0408 01/08/22  0408 01/07/22  0806 01/07/22  0806 01/06/22  0403 01/05/22  2255 01/05/22  2255 01/05/22  1115 01/05/22  1115   SODIUM mmol/L  --  130* 129* 127*   < > 133*  --    < > 134*   < > 132*   POTASSIUM mmol/L  --  4.8 5.0 4.9   < > 5.2  --    < > 5.0   < > 4.7   CHLORIDE mmol/L  --  91* 92* 91*  --  95*  --   --  96*  --  95*   BUN mg/dL  --  49* 47* 44*   < > 35*  --    < > 30*   < > 29*   CREATININE mg/dL  --  1.33* 1.65* 1.96*   < > 1.65*  --    < > 1.64*   < > 1.74*   MAGNESIUM mg/dL 2.4  --   --   --   --   --  2.1  --  2.2   < > 2.2    < > = values in this interval not displayed.     Results from last 7 days   Lab Units 01/05/22  1115   TROPONIN T ng/mL 0.026     Results from last 7 days   Lab Units 01/10/22  0300 01/09/22  0408 01/08/22  0408   WBC 10*3/mm3 10.61 11.44* 8.59   HEMOGLOBIN g/dL  15.7 16.2 14.5   HEMATOCRIT % 47.6 48.3 43.2   PLATELETS 10*3/mm3 86* 123* 115*       Lab Results   Component Value Date    HGBA1C 6.00 (H) 12/16/2021       Lab Results   Component Value Date    CHOL 103 12/16/2021    CHLPL 138 05/09/2017    TRIG 81 12/16/2021    HDL 31 (L) 12/16/2021    LDL 56 12/16/2021         Assessment     Shortness of breath/Covid-19//CHF  · Multifactorial from Covid-19/right-sided heart failure/right-sided pleural  · Started on remdesivir and Decadron  · O2 2 L on nasal cannula with O2 saturations 98%  · Hospitalist to treat Covid-19        Chronic kidney disease  · Current creatinine 1.33.   · Baseline creatinine 1.3-1.4     Acute on chronic right-sided heart failure/right pleural effusion/possible calcific constrictive pericarditis  · proBNP only mildly elevated  · Hest x-ray shows right pleural effusion  · Bumex 1 mg twice daily  · Strict I's and O's and daily weights  · Takes spironolactone 100 mg daily but currently on hold while diuresing  · Underwent right thoracentesis 1/7/2022 with 2 L removed  · Echo 1/7 showed abnormal LV septal bounce suggestive of pericardial constriction  · Chart review yielded a old CT scan which showed pericardial calcification  · Patient has no history of pericarditis or autoimmune disease  · CT chest and abdomen: There is calcification seen of the pericardium. There is a small pleural effusion identified on the right with airspace disease and infiltrate in the right middle and lower lung field. Anasarca seen throughout   · In discussion with Dr. Albarran for possible pericardiectomy       Atrial fibrillation/flutter   · Intolerant to multiple antiarrhythmics including beta-blocker, Tikosyn and flecainide causing severe hypotension  · Recent AV joby ablation with placement of permanent pacemaker  · Continue to hold Eliquis for any possible invasive procedure     Type 2 diabetes mellitus  · Management per hospitalist        Portal hypertension  · Cirrhosis  versus passive hepatic congestion secondary to right sided heart failure  · Would recommend liver biopsy to confirm diagnosis of hepatic congestion and if this is the case may be candidate for pericardiectomy.        Plan     · GI has spoken with patient's wife Shama and recommended hospice care as patient is not an operative candidate due to his severe cardiac status.  · Hospice to see patient.    Electronically signed by FAITH Raza, 01/10/22, 10:37 AM EST.    Electronically signed by Courtney Carmichael APRN at 01/10/22 1425     Patricia Bowers DO at 01/10/22 0859              [facility] Medicine Services  PROGRESS NOTE    Patient Name: Akshat Winkler  : 1955  MRN: [id number]    Date of Admission: 2022  Primary Care Physician: Saurabh Medina PA    Subjective   Subjective     CC:  abd pain    HPI:  More alert today, still with significant abd pain.  Asking for help with positioning to get more comfortable    ROS:  Gen- No fevers, chills  CV- No chest pain, palpitations  Resp- No cough, dyspnea  GI- No N/V/D,  + abd pain        Objective   Objective     Vital Signs:   Temp:  [97.6 °F (36.4 °C)-97.9 °F (36.6 °C)] 97.9 °F (36.6 °C)  Heart Rate:  [79-85] 79  Resp:  [16-22] 16  BP: (109-142)/() 109/93  Flow (L/min):  [2] 2     Physical Exam:  Constitutional: No acute distress, awake, alert  HENT: NCAT, mucous membranes moist  Respiratory: Clear to auscultation bilaterally, respiratory effort normal   Cardiovascular: RRR, no murmurs, rubs, or gallops  Gastrointestinal: distended, RUQ tenderness  Musculoskeletal: pitting edema, legs wrapped  Psychiatric: Appropriate affect, cooperative  Neurologic: Oriented x 2-3, speech clear  Skin: No rashes      Results Reviewed:  LAB RESULTS:      Lab 01/10/22  0646 01/10/22  0320 01/10/22  0300 22  1226 22  0408 22  0408 22  0806 22  0403 22  2256 22  2255 22  1115 22  1115    WBC  --   --  10.61  --  11.44* 8.59 6.64  --  6.54  --    < > 7.14   HEMOGLOBIN  --   --  15.7  --  16.2 14.5 14.2  --  15.2  --    < > 14.4   HEMATOCRIT  --   --  47.6  --  48.3 43.2 43.2  --  46.0  --    < > 44.2   PLATELETS  --   --  86*  --  123* 115* 124*  --  128*  --    < > 115*   NEUTROS ABS  --   --  9.55*  --  10.03* 7.63* 5.98  --  5.77  --    < > 5.82   IMMATURE GRANS (ABS)  --   --   --   --  0.04 0.03 0.03  --  0.01  --   --  0.03   LYMPHS ABS  --   --   --   --  0.12* 0.22* 0.28*  --  0.30*  --   --  0.37*   MONOS ABS  --   --   --   --  1.05* 0.56 0.35  --  0.34  --   --  0.77   EOS ABS  --   --  0.11  --  0.20 0.14 0.00  --  0.09  --    < > 0.10   MCV  --   --  98.3*  --  97.6* 98.9* 99.8*  --  100.4*  --    < > 101.4*   CRP  --   --  10.51*  --  5.84* 5.54* 6.37*  --   --  6.26*   < > 5.80*   PROCALCITONIN  --   --   --   --  0.56*  --   --   --   --   --   --  0.48*   LACTATE 5.2* 4.2*  --  4.6*  --   --   --   --   --   --   --   --    LDH  --   --   --   --   --   --  468*  --   --   --   --   --    PROTIME  --   --  39.0*  --  26.0* 20.8* 21.9* 27.3*  --   --    < > 26.0*   APTT  --   --   --   --  41.8*  --   --   --   --   --   --   --    D DIMER QUANT  --   --   --   --   --   --   --   --  2.01*  --   --   --     < > = values in this interval not displayed.         Lab 01/10/22  0300 01/09/22  2316 01/09/22 0408 01/08/22  0408 01/07/22  0806 01/06/22  0403 01/05/22  2255 01/05/22  1115 01/05/22  1115   SODIUM  --  130* 129* 127* 133*  --  134*   < > 132*   POTASSIUM  --  4.8 5.0 4.9 5.2  --  5.0   < > 4.7   CHLORIDE  --  91* 92* 91* 95*  --  96*   < > 95*   CO2  --  21.0* 20.0* 23.0 27.0  --  24.0   < > 25.0   ANION GAP  --  18.0* 17.0* 13.0 11.0  --  14.0   < > 12.0   BUN  --  49* 47* 44* 35*  --  30*   < > 29*   CREATININE  --  1.33* 1.65* 1.96* 1.65*  --  1.64*   < > 1.74*   GLUCOSE  --  128* 140* 129* 116*  --  156*   < > 98   CALCIUM  --  10.0 10.3 9.8 9.8  --  10.0   < > 10.0    MAGNESIUM 2.4  --   --   --   --  2.1 2.2  --  2.2    < > = values in this interval not displayed.         Lab 01/09/22  2316 01/09/22  0408 01/08/22  0408 01/07/22  0806 01/06/22 2049 01/05/22  2255 01/05/22  1115 01/05/22  1115   TOTAL PROTEIN 7.9 8.4 7.6 7.8  --  8.1   < > 7.9   ALBUMIN 3.20* 3.30* 3.00* 3.10*  --  3.30*   < > 3.30*   GLOBULIN 4.7 5.1 4.6 4.7  --  4.8   < > 4.6   ALT (SGPT) 43* 49* 42* 36  --  38   < > 36   AST (SGOT) 61* 91* 99* 94*  --  93*   < > 92*   BILIRUBIN 7.5* 5.8* 4.6* 5.2*  --  6.4*   < > 5.9*   BILIRUBIN DIRECT  --   --   --   --  3.2*  --   --   --    ALK PHOS 229* 264* 247* 249*  --  263*   < > 260*   LIPASE  --   --   --   --   --   --   --  28    < > = values in this interval not displayed.         Lab 01/10/22  0300 01/09/22  0408 01/08/22  0408 01/07/22  0806 01/06/22  0403 01/05/22  1115 01/05/22  1115   PROBNP  --  2,114.0*  --   --   --   --  1,074.0*   TROPONIN T  --   --   --   --   --   --  0.026   PROTIME 39.0* 26.0* 20.8* 21.9* 27.3*   < > 26.0*   INR 4.26* 2.51* 1.87* 2.01* 2.67*   < > 2.50*    < > = values in this interval not displayed.             Lab 01/09/22  1302 01/09/22  0408 01/05/22  1115   FERRITIN  --   --  172.50   ABO TYPING A   < >  --    RH TYPING Positive   < >  --    ANTIBODY SCREEN Negative  --   --     < > = values in this interval not displayed.         Brief Urine Lab Results  (Last result in the past 365 days)      Color   Clarity   Blood   Leuk Est   Nitrite   Protein   CREAT   Urine HCG        01/05/22 1635             31.3         01/05/22 1635 Yellow   Clear   Small (1+)   Trace   Negative   100 mg/dL (2+)                 Microbiology Results Abnormal     Procedure Component Value - Date/Time    Body Fluid Culture - Body Fluid, Pleural Cavity [[id number]] Collected: 01/07/22 1327    Lab Status: Final result Specimen: Body Fluid from Pleural Cavity Updated: 01/10/22 0607     Body Fluid Culture No growth at 3 days     Gram Stain No WBCs or  organisms seen          CT Abdomen Pelvis Without Contrast    Result Date: 1/9/2022  CT Abdomen Pelvis WO INDICATION: Increasing abdominal distention and pain. TECHNIQUE: CT of the abdomen and pelvis without IV contrast. Coronal and sagittal reconstructions were obtained.  Radiation dose reduction techniques included automated exposure control or exposure modulation based on body size. Count of known CT and cardiac nuc med studies performed in previous 12 months: 2. COMPARISON: 1/8/2022 FINDINGS: Pericardial calcifications are unchanged compatible with prior pericarditis. Small right pleural effusion is similar to prior. There is continued pneumonia in the right lower and right middle lobes. There is some atelectasis in the left lung base. Generalized anasarca is again identified. There is a small amount of ascites similar to prior. There is atherosclerotic disease with no aortic aneurysm. The IVC and iliac veins in the pelvis remains distended which may be secondary to right heart dysfunction. This is unchanged. Gallbladder is filled with stones. No biliary obstruction. There is a nonobstructing stone or vascular calcification in the left kidney. No ureteral stones are seen on either side, and there is no hydronephrosis. Unenhanced solid abdominal organs are otherwise normal. Urinary bladder is decompressed and poorly characterized. Prostate is unremarkable. There is no evidence of acute colitis, and there is no colonic distention. The stomach is normal. There is no small bowel obstruction. There is nothing to suggest acute appendicitis. Multiple venous varicosities are noted in both groins, right worse than left. Patient is status post kyphoplasty of L4, L2, and L1. There are 2 tiny bubbles of potential free intraperitoneal air adjacent to the left lobe of the liver. Etiology is unclear as the bowel appears stable from prior. Surgical consultation and short interval follow-up recommended.     Impression: 1. 2  potential bubbles of free intraperitoneal air adjacent to the left hepatic lobe. Etiology is unclear as the bowel appears stable from prior and there is nothing to suggest a bowel perforation. Surgical consultation and follow-up recommended. 2. Otherwise, no significant change from the recent prior exam. Signer Name: Jorge Rudd MD  Signed: 1/9/2022 3:22 AM  Workstation Name: [facility]    XR Chest 1 View    Result Date: 1/9/2022  EXAMINATION: XR CHEST 1 VW - 01/09/2022  INDICATION: Dyspnea on exertion.  COMPARISON: 01/08/2022  FINDINGS: Left-sided single-lead pacemaker is again noted. The heart is enlarged. Vasculature cephalized. There is a diffuse interstitial disease pattern present, generally stable in the upper and mid lungs allowing for different film technique, and mildly increased in the right lung base where there is increased silhouetting of the medial right hemidiaphragm. No pneumothorax or significant effusion is seen.      Impression: 1. Cardiomegaly and mild CHF. 2. Mildly increased right basilar atelectasis versus pneumonia.  DICTATED:   01/09/2022 EDITED/lfs:   01/09/2022        XR Abdomen KUB    Result Date: 1/10/2022  CR Abdomen 1 Vw INDICATION: Abdominal pain. COMPARISON: CT abdomen pelvis earlier same day FINDINGS: AP radiograph(s) of the abdomen. Patient is status post multilevel kyphoplasty in the lumbar spine. Bowel gas pattern is nonspecific with large and small bowel gas noted. No radiopaque foreign bodies.     Impression: Nonspecific but nonobstructive bowel gas pattern. Signer Name: Jorge Rudd MD  Signed: 1/10/2022 12:13 AM  Workstation Name: [facility]  Radiology [facility]      Results for orders placed during the hospital encounter of 01/05/22    Adult Transthoracic Echo Complete W/ Cont if Necessary Per Protocol    Interpretation Summary  · Technically difficult study due to body habitus  · Left ventricular systolic function  is normal. Estimated left ventricular EF = 60%.  · Abnormal left ventricular septal bounce suggestive of pericardial constriction  · The right ventricular cavity is dilated.  · Moderate tricuspid valve regurgitation is present.  · Estimated right ventricular systolic pressure from tricuspid regurgitation is moderately elevated (45-55 mmHg).      I have reviewed the medications:  Scheduled Meds:ampicillin, 500 mg, Oral, Q6H  apixaban, 5 mg, Oral, Q12H  cefTRIAXone, 2 g, Intravenous, Q24H  dexamethasone, 6 mg, Oral, Daily  insulin lispro, 0-7 Units, Subcutaneous, TID AC  lactulose, 20 g, Oral, BID  magnesium oxide, 400 mg, Oral, Daily  pantoprazole, 40 mg, Intravenous, BID AC  pharmacy consult - MTM, , Does not apply, Daily  potassium chloride, 30 mEq, Oral, Daily  senna-docusate sodium, 2 tablet, Oral, BID      Continuous Infusions:Pharmacy Consult - Remdesivir,       PRN Meds:.•  albuterol sulfate HFA  •  aluminum-magnesium hydroxide-simethicone  •  benzonatate  •  bisacodyl  •  calcium carbonate  •  dextromethorphan polistirex ER  •  dextrose  •  dextrose  •  docusate sodium  •  glucagon (human recombinant)  •  hepatitis A  •  HYDROmorphone  •  magnesium sulfate **OR** magnesium sulfate in D5W 1g/100mL (PREMIX) **OR** magnesium sulfate  •  melatonin  •  Pharmacy Consult - Remdesivir  •  polyethylene glycol  •  sodium chloride    Assessment/Plan   Assessment & Plan     Active Hospital Problems    Diagnosis  POA   • **Acute on chronic right-sided heart failure (HCC) [I50.813]  Yes   • Cirrhosis (HCC) [K74.60]  Yes   • 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]  Yes   • Stage 3 chronic kidney disease (HCC) [N18.30]  Yes   • Pleural effusion, right [J90]  Yes   • Hepatic steatosis [K76.0]  Yes   • Acute UTI (urinary tract infection) [N39.0]  Yes   • COVID-19 virus infection [U07.1]  Yes   • Presence of cardiac pacemaker [Z95.0]  Yes   • Chronic bilateral severe lower extremity edema [R60.0]  Yes   • Persistent atrial  fibrillation/flutter [I48.19]  Yes   • Type 2 diabetes mellitus without complication, without long-term current use of insulin (HCC) [E11.9]  Yes   • Essential hypertension [I10]  Yes   • Severe obstructive sleep apnea, no CPAP [G47.33]  Yes   • Class 3 severe obesity due to excess calories with serious comorbidity and body mass index (BMI) of 40.0 to 44.9 in adult (HCC) [E66.01, Z68.41]  Not Applicable      Resolved Hospital Problems    Diagnosis Date Resolved POA   • Acute pulmonary edema (HCC) [J81.0] 01/05/2022 Yes   • Pneumonia due to COVID-19 virus [U07.1, J12.82] 01/05/2022 Yes   • Hyponatremia [E87.1] 01/07/2022 Yes   • Elevated procalcitonin [R79.89] 01/07/2022 Yes   • Dyspnea on exertion [R06.00] 01/07/2022 Yes   • Acute on chronic diastolic congestive heart failure (HCC) [I50.33] 01/05/2022 Yes        Brief Hospital Course to date:  Akshat Winkler is a 66 y.o. male  with PMH diastolic HF, afib with recent pacemaker placement, T2DM, elevated LFTs, JOSESITO, morbid obesity, and CKD who presented to the ED with chief complaint of increasing SOB over the last week.   S/p thoracentesis with 2 L of fluid removed     Dyspnea with heart failure  -Concern for constrictive pericarditis  -Status post thoracentesis with 2 L removed, transudate, reaccumulation on CT from 1/9  -COVID positive  -Continue remdesivir and Decadron  -Repeat CT abdomen/pelvis overnight with possible free air.  Likely secondary to peptic ulcer disease  -Start PPI  -Discussed case with Dr. Nunez on 1/9 -patient is high risk candidate would likely not survive anesthesia, any type of surgery.  Discussed with his wife  -discussed again with Dr Justin today, he spoke with his wife today who is agreeable to hospice   -liver biopsy on hold  -hospice consult, hopefully can get his better a little better controlled     Cirrhosis  Possible congestive hepatopathy  -Continue Rocephin empirically     UTI  -Enterococcus, continue ampicillin        A.  "Fib  -Recent pacemaker placed 2 weeks ago  -Continue Eliquis  -Cardiology following     CKD  - recheck in am     Hyponatremia  -Secondary to volume overload  -hold further lab draws once hospice eval completed     Diabetes  - continue SSI     Morbid obesity    DVT prophylaxis:  Medical DVT prophylaxis orders are present.       AM-PAC 6 Clicks Score (PT): 17 (01/08/22 0800)    Disposition: I expect the patient to be discharged pending hospice eval, likely inpatient hospice    CODE STATUS:   Code Status and Medical Interventions:   Ordered at: 01/09/22 1333     Medical Intervention Limits:    NO intubation (DNI)     Level Of Support Discussed With:    Health Care Surrogate     Code Status (Patient has no pulse and is not breathing):    No CPR (Do Not Attempt to Resuscitate)     Medical Interventions (Patient has pulse or is breathing):    Limited Support     Comments:    his wife, alin YUAN Robinson,   01/10/22                Electronically signed by Patricia Bowers DO at 01/10/22 1430     Erasmo Justin MD at 01/09/22 1513          GI Daily Progress Note  Subjective     Akshat Winkler is a 66 y.o. male who was admitted with COVID-19 virus infection.   Patient developed significant abdominal pain last night.  CT scan of the abdomen performed which showed a small amount of free air in the abdomen.    Chief Complaint: Abdominal pain  Objective     /99 (BP Location: Left arm, Patient Position: Sitting)   Pulse 85   Temp 97.7 °F (36.5 °C) (Oral)   Resp 22   Ht 175.3 cm (69\")   Wt (!) 142 kg (313 lb 4.8 oz)   SpO2 94%   BMI 46.27 kg/m²     Intake/Output last 3 shifts:  I/O last 3 completed shifts:  In: 480 [P.O.:480]  Out: 4125 [Urine:4125]  Intake/Output this shift:  I/O this shift:  In: -   Out: 400 [Urine:400]      Physical Exam  Wt Readings from Last 3 Encounters:   01/06/22 (!) 142 kg (313 lb 4.8 oz)   12/23/21 131 kg (289 lb 4 oz)   12/19/21 132 kg (290 lb 6.4 oz)   ,body mass index is " 46.27 kg/m².,@FLOWAMB(6)@,@FLOWAMB(5)@,@FLOWAMB(8)@   CONSTITUTIONAL: Lying in bed no distress  Resp CTA; no rhonchi, rales, or wheezes.  Respiration effort normal  CV RRR; no M/R/G. No lower extremity edema  GI Abd soft, diffusely tender, obese, normal active bowel sounds.    Psych: Awake and alert wanting to get out of bed    DATA:  CT abdomen pelvis without contrast per radiologist report     FINDINGS:  Pericardial calcifications are unchanged compatible with prior pericarditis. Small right pleural effusion is similar to prior. There is continued pneumonia in the right lower and right middle lobes. There is some atelectasis in the left lung base.  Generalized anasarca is again identified. There is a small amount of ascites similar to prior. There is atherosclerotic disease with no aortic aneurysm. The IVC and iliac veins in the pelvis remains distended which may be secondary to right heart  dysfunction. This is unchanged. Gallbladder is filled with stones. No biliary obstruction. There is a nonobstructing stone or vascular calcification in the left kidney. No ureteral stones are seen on either side, and there is no hydronephrosis.  Unenhanced solid abdominal organs are otherwise normal.     Urinary bladder is decompressed and poorly characterized. Prostate is unremarkable. There is no evidence of acute colitis, and there is no colonic distention. The stomach is normal. There is no small bowel obstruction. There is nothing to suggest acute  appendicitis. Multiple venous varicosities are noted in both groins, right worse than left. Patient is status post kyphoplasty of L4, L2, and L1.     There are 2 tiny bubbles of potential free intraperitoneal air adjacent to the left lobe of the liver. Etiology is unclear as the bowel appears stable from prior. Surgical consultation and short interval follow-up recommended.     IMPRESSION:     1. 2 potential bubbles of free intraperitoneal air adjacent to the left hepatic lobe.  Etiology is unclear as the bowel appears stable from prior and there is nothing to suggest a bowel perforation. Surgical consultation and follow-up recommended.  2. Otherwise, no significant change from the recent prior exam.           Assessment/Plan     1.  Calcific constrictive pericarditis  2.  Congestive hepatopathy  3.  COVID-19  4.  Morbid obesity  5.  Right pleural effusion  6.  Possible free air in the abdomen    <Would continue supportive care.  Would add PPI.,  Antibiotics.  Patient is not a surgical candidate.    <Would not pursue liver biopsy unless Dr. Alvarez  confers with Dr. Albarran and he thinks this would influence his decision about possible pericardotomy        COVID-19 virus infection    Acute on chronic right-sided heart failure (HCC)    Type 2 diabetes mellitus without complication, without long-term current use of insulin (HCC)    Essential hypertension    Severe obstructive sleep apnea, no CPAP    Class 3 severe obesity due to excess calories with serious comorbidity and body mass index (BMI) of 40.0 to 44.9 in adult (HCC)    Persistent atrial fibrillation/flutter    Chronic bilateral severe lower extremity edema    Presence of cardiac pacemaker    Stage 3 chronic kidney disease (HCC)    Pleural effusion, right    Hepatic steatosis    Acute UTI (urinary tract infection)    Cirrhosis (HCC)    Constrictive pericarditis       LOS: 4 days     Erasmo Justin MD  22  15:13 EST    Electronically signed by Erasmo Justin MD at 22 1518     Patricia Bowers DO at 22 0840              [facility]  PROGRESS NOTE    Patient Name: Akshat Winkler  : 1955  MRN: [id number]    Date of Admission: 2022  Primary Care Physician: Saurabh Medina PA    Subjective   Subjective     CC:  Heart failure    HPI:  Patient confused, somewhat agitated.  Significant pain in the right upper quadrant, poor historian and difficult to get a decent  history    ROS:  Limited ROS secondary to mental status  -Positive right upper quadrant pain    Objective   Objective     Vital Signs:   Temp:  [97.6 °F (36.4 °C)-97.7 °F (36.5 °C)] 97.7 °F (36.5 °C)  Heart Rate:  [80-81] 80  Resp:  [16-19] 17  BP: ()/(68-93) 93/82     Physical Exam:  Constitutional: Confused, oriented to month and name  HENT: NCAT, mucous membranes moist  Respiratory: Decreased breath sounds right, respiratory effort normal   Cardiovascular: RRR, no murmurs, rubs, or gallops  Gastrointestinal: Abdomen is tight, distended, right upper quadrant with diffuse tenderness to palpation  Musculoskeletal: No bilateral ankle edema  Psychiatric: Appropriate affect, cooperative  Neurologic: Oriented x 2-3, speech mostly clear, confused and not following all commands  Skin: No rashes      Results Reviewed:  LAB RESULTS:      Lab 01/09/22  0408 01/08/22  0408 01/07/22  0806 01/06/22  0403 01/05/22  2256 01/05/22  2255 01/05/22  1115   WBC 11.44* 8.59 6.64  --  6.54  --  7.14   HEMOGLOBIN 16.2 14.5 14.2  --  15.2  --  14.4   HEMATOCRIT 48.3 43.2 43.2  --  46.0  --  44.2   PLATELETS 123* 115* 124*  --  128*  --  115*   NEUTROS ABS 10.03* 7.63* 5.98  --  5.77  --  5.82   IMMATURE GRANS (ABS) 0.04 0.03 0.03  --  0.01  --  0.03   LYMPHS ABS 0.12* 0.22* 0.28*  --  0.30*  --  0.37*   MONOS ABS 1.05* 0.56 0.35  --  0.34  --  0.77   EOS ABS 0.20 0.14 0.00  --  0.09  --  0.10   MCV 97.6* 98.9* 99.8*  --  100.4*  --  101.4*   CRP 5.84* 5.54* 6.37*  --   --  6.26* 5.80*   PROCALCITONIN 0.56*  --   --   --   --   --  0.48*   LDH  --   --  468*  --   --   --   --    PROTIME 26.0* 20.8* 21.9* 27.3*  --   --  26.0*   APTT 41.8*  --   --   --   --   --   --    D DIMER QUANT  --   --   --   --  2.01*  --   --          Lab 01/09/22  0408 01/08/22 0408 01/07/22  0806 01/06/22 0403 01/05/22  2255 01/05/22  1115   SODIUM 129* 127* 133*  --  134* 132*   POTASSIUM 5.0 4.9 5.2  --  5.0 4.7   CHLORIDE 92* 91* 95*  --  96* 95*    CO2 20.0* 23.0 27.0  --  24.0 25.0   ANION GAP 17.0* 13.0 11.0  --  14.0 12.0   BUN 47* 44* 35*  --  30* 29*   CREATININE 1.65* 1.96* 1.65*  --  1.64* 1.74*   GLUCOSE 140* 129* 116*  --  156* 98   CALCIUM 10.3 9.8 9.8  --  10.0 10.0   MAGNESIUM  --   --   --  2.1 2.2 2.2         Lab 01/09/22  0408 01/08/22  0408 01/07/22  0806 01/06/22 2049 01/05/22  2255 01/05/22  1115   TOTAL PROTEIN 8.4 7.6 7.8  --  8.1 7.9   ALBUMIN 3.30* 3.00* 3.10*  --  3.30* 3.30*   GLOBULIN 5.1 4.6 4.7  --  4.8 4.6   ALT (SGPT) 49* 42* 36  --  38 36   AST (SGOT) 91* 99* 94*  --  93* 92*   BILIRUBIN 5.8* 4.6* 5.2*  --  6.4* 5.9*   BILIRUBIN DIRECT  --   --   --  3.2*  --   --    ALK PHOS 264* 247* 249*  --  263* 260*   LIPASE  --   --   --   --   --  28         Lab 01/09/22  0408 01/08/22  0408 01/07/22  0806 01/06/22  0403 01/05/22  1115   PROBNP 2,114.0*  --   --   --  1,074.0*   TROPONIN T  --   --   --   --  0.026   PROTIME 26.0* 20.8* 21.9* 27.3* 26.0*   INR 2.51* 1.87* 2.01* 2.67* 2.50*             Lab 01/05/22  1115   FERRITIN 172.50         Brief Urine Lab Results  (Last result in the past 365 days)      Color   Clarity   Blood   Leuk Est   Nitrite   Protein   CREAT   Urine HCG        01/05/22 1635             31.3         01/05/22 1635 Yellow   Clear   Small (1+)   Trace   Negative   100 mg/dL (2+)                 Microbiology Results Abnormal     Procedure Component Value - Date/Time    Body Fluid Culture - Body Fluid, Pleural Cavity [[id number]] Collected: 01/07/22 1327    Lab Status: Preliminary result Specimen: Body Fluid from Pleural Cavity Updated: 01/08/22 1049     Body Fluid Culture No growth     Gram Stain No WBCs or organisms seen          Adult Transthoracic Echo Complete W/ Cont if Necessary Per Protocol    Addendum Date:     · Technically difficult study due to body habitus · Left ventricular systolic function is normal. Estimated left ventricular EF = 60%. · Abnormal left ventricular septal bounce suggestive of  pericardial constriction · The right ventricular cavity is dilated. · Moderate tricuspid valve regurgitation is present. · Estimated right ventricular systolic pressure from tricuspid regurgitation is moderately elevated (45-55 mmHg).      Result Date: 1/7/2022  · Technically difficult study · Left ventricular systolic function is normal. Estimated left ventricular EF = 60%. · The right ventricular cavity is dilated. · The right atrial cavity is mild to moderately dilated. · Moderate tricuspid valve regurgitation is present. · Estimated right ventricular systolic pressure from tricuspid regurgitation is moderately elevated (45-55 mmHg).      CT Abdomen Pelvis Without Contrast    Result Date: 1/9/2022  CT Abdomen Pelvis WO INDICATION: Increasing abdominal distention and pain. TECHNIQUE: CT of the abdomen and pelvis without IV contrast. Coronal and sagittal reconstructions were obtained.  Radiation dose reduction techniques included automated exposure control or exposure modulation based on body size. Count of known CT and cardiac nuc med studies performed in previous 12 months: 2. COMPARISON: 1/8/2022 FINDINGS: Pericardial calcifications are unchanged compatible with prior pericarditis. Small right pleural effusion is similar to prior. There is continued pneumonia in the right lower and right middle lobes. There is some atelectasis in the left lung base. Generalized anasarca is again identified. There is a small amount of ascites similar to prior. There is atherosclerotic disease with no aortic aneurysm. The IVC and iliac veins in the pelvis remains distended which may be secondary to right heart dysfunction. This is unchanged. Gallbladder is filled with stones. No biliary obstruction. There is a nonobstructing stone or vascular calcification in the left kidney. No ureteral stones are seen on either side, and there is no hydronephrosis. Unenhanced solid abdominal organs are otherwise normal. Urinary bladder is  decompressed and poorly characterized. Prostate is unremarkable. There is no evidence of acute colitis, and there is no colonic distention. The stomach is normal. There is no small bowel obstruction. There is nothing to suggest acute appendicitis. Multiple venous varicosities are noted in both groins, right worse than left. Patient is status post kyphoplasty of L4, L2, and L1. There are 2 tiny bubbles of potential free intraperitoneal air adjacent to the left lobe of the liver. Etiology is unclear as the bowel appears stable from prior. Surgical consultation and short interval follow-up recommended.     Impression: 1. 2 potential bubbles of free intraperitoneal air adjacent to the left hepatic lobe. Etiology is unclear as the bowel appears stable from prior and there is nothing to suggest a bowel perforation. Surgical consultation and follow-up recommended. 2. Otherwise, no significant change from the recent prior exam. Signer Name: Jorge Rudd MD  Signed: 1/9/2022 3:22 AM  Workstation Name: [facility]  [facility]    CT Abdomen Pelvis Without Contrast    Result Date: 1/8/2022  EXAMINATION: CT CHEST WO CONTRAST DIAGNOSTIC, CT ABDOMEN/ PELVIS WO CONTRAST - 01/08/2022  INDICATION:  J81.0-Acute pulmonary edema; K75.81-Nonalcoholic steatohepatitis (SHERMAN); K74.60-Unspecified cirrhosis of liver. Shortness of breath and chest pain.  TECHNIQUE: Multiple axial CT imaging is obtained of the chest, abdomen and pelvis without the administration of oral or intravenous contrast.  The radiation dose reduction device was turned on for each scan per the ALARA (As Low as Reasonably Achievable) protocol.  COMPARISON: 12/22/2017 and 7/20/2018  FINDINGS:  CHEST: Thyroid is homogeneous. There is mild enlargement identified of the lymph nodes in the mediastinum. There is calcification seen of the pericardium. There is no pericardial effusion. There is a pleural effusion identified small in size on the right  with ill-defined opacification and dense airspace disease in the right middle and lower lung fields. The left lung reveals mild atelectatic change with calcified granuloma identified in the lingula. There is sparing of the right upper lung field. Degenerative changes seen within the spine. There is no adenopathy identified in the axillary regions.  ABDOMEN: Some anasarca seen in the subcutaneous tissues. The liver is homogeneous. The gallbladder is filled with stones. There is enlargement identified of the iliac vessels and IVC. Findings raise the concern for possible elevated right atrium pressure. Clinical correlation is needed. Extensive varices identified within the mesentery. Kidneys and adrenal glands within normal limits. Pancreas is homogeneous. No renal or adrenal abnormality present. Some vascular calcification seen within the abdominal aorta and iliac vessels.  PELVIS: Distention seen of the iliac vessels. There are varices identified in the inguinal regions bilaterally. No pelvic adenopathy. No free fluid or free air. No abnormal mass or fluid collections identified.      Impression: There is extensive dilatation identified of the IVC and iliac vessels raising the concern for elevated right atrium pressure. There is calcification seen of the pericardium. There is a small pleural effusion identified on the right with airspace disease and infiltrate in the right middle and lower lung field. Anasarca seen throughout the subcutaneous tissues of the abdomen and lower extremities with varices seen within the inguinal regions bilaterally.  DICTATED:   01/08/2022 EDITED/lfs:   01/08/2022      CT Chest Without Contrast Diagnostic    Result Date: 1/8/2022  EXAMINATION: CT CHEST WO CONTRAST DIAGNOSTIC, CT ABDOMEN/ PELVIS WO CONTRAST - 01/08/2022  INDICATION:  J81.0-Acute pulmonary edema; K75.81-Nonalcoholic steatohepatitis (SHERMAN); K74.60-Unspecified cirrhosis of liver. Shortness of breath and chest pain.   TECHNIQUE: Multiple axial CT imaging is obtained of the chest, abdomen and pelvis without the administration of oral or intravenous contrast.  The radiation dose reduction device was turned on for each scan per the ALARA (As Low as Reasonably Achievable) protocol.  COMPARISON: 12/22/2017 and 7/20/2018  FINDINGS:  CHEST: Thyroid is homogeneous. There is mild enlargement identified of the lymph nodes in the mediastinum. There is calcification seen of the pericardium. There is no pericardial effusion. There is a pleural effusion identified small in size on the right with ill-defined opacification and dense airspace disease in the right middle and lower lung fields. The left lung reveals mild atelectatic change with calcified granuloma identified in the lingula. There is sparing of the right upper lung field. Degenerative changes seen within the spine. There is no adenopathy identified in the axillary regions.  ABDOMEN: Some anasarca seen in the subcutaneous tissues. The liver is homogeneous. The gallbladder is filled with stones. There is enlargement identified of the iliac vessels and IVC. Findings raise the concern for possible elevated right atrium pressure. Clinical correlation is needed. Extensive varices identified within the mesentery. Kidneys and adrenal glands within normal limits. Pancreas is homogeneous. No renal or adrenal abnormality present. Some vascular calcification seen within the abdominal aorta and iliac vessels.  PELVIS: Distention seen of the iliac vessels. There are varices identified in the inguinal regions bilaterally. No pelvic adenopathy. No free fluid or free air. No abnormal mass or fluid collections identified.      Impression: There is extensive dilatation identified of the IVC and iliac vessels raising the concern for elevated right atrium pressure. There is calcification seen of the pericardium. There is a small pleural effusion identified on the right with airspace disease and  infiltrate in the right middle and lower lung field. Anasarca seen throughout the subcutaneous tissues of the abdomen and lower extremities with varices seen within the inguinal regions bilaterally.  DICTATED:   01/08/2022 EDITED/lfs:   01/08/2022      XR Chest 1 View    Result Date: 1/8/2022   EXAMINATION: XR CHEST 1 VW - 01/08/2022  INDICATION: Dyspnea on exertion.  COMPARISON: 01/07/2022  FINDINGS: Portable chest reveals pacer leads in satisfactory position. Heart is borderline enlarged. Increased markings seen at the lung bases bilaterally with no definite pleural effusion. Heart is enlarged with degenerative changes seen within the spine. Pulmonary vascularity is within normal limits.       Impression: Minimal increased markings in the lung bases bilaterally. The heart is enlarged with low lung volumes bilaterally.  DICTATED:   01/08/2022 EDITED/lfs:   01/08/2022       XR Chest 1 View    Result Date: 1/7/2022  EXAMINATION: XR CHEST 1 VW-  INDICATION: s/p thora; J81.0-Acute pulmonary edema; K75.81-Nonalcoholic steatohepatitis (SHERMAN); K74.60-Unspecified cirrhosis of liver  COMPARISON: 1/5/2022  FINDINGS: Left-sided single lead pacemaker is noted. Heart is at upper limits of normal size. Vasculature remains cephalized and a diffuse pulmonary interstitial disease pattern appears stable overall. Right pleural effusion appears to have been drained. There is trace amount of remaining intrafissural fluid. No pneumothorax is seen.       Impression: 1. Interval drainage of right pleural effusion. No evidence of pneumothorax. 2. Stable borderline heart shadow enlargement and mild pulmonary vascular congestion.    This report was finalized on 1/7/2022 1:06 PM by Dr. Ted Wells MD.      US Thoracentesis    Result Date: 1/7/2022  DATE OF EXAM: 1/7/2022 10:56 AM  PROCEDURE: US THORACENTESIS-  INDICATIONS: pleural effusion; J81.0-Acute pulmonary edema; K75.81-Nonalcoholic steatohepatitis (SHERAMN); K74.60-Unspecified cirrhosis of  liver  COMPARISON: No Comparisons Available  FLUOROSCOPIC TIME: None  PHYSICIAN MONITORED CONSCIOUS SEDATION TIME: None  CONTRAST MEDIA: None  TECHNIQUE: The patient's medications were reviewed prior to the procedure. The procedure, risks and alternatives were discussed and informed written consent was obtained. Maximal sterile barrier technique was utilized throughout the procedure. The patient was placed in the seated position and ultrasound was utilized to localize the right-sided pleural effusion. The skin was marked prepped and draped. Maximal sterile barrier technique was utilized during this procedure. Partial protective equipment was used secondary to the patient's Covid positive status. The skin was marked prepped draped and anesthetized with 1% Xylocaine. A 5 Irish catheter was inserted in the right pleural space by trocar technique. A total of 2 L of fluid was removed. Appropriate specimens were sent to the lab. The catheter was removed and hemostasis achieved. Postprocedure chest radiograph shows no pneumothorax.       Impression: Technically successful ultrasound-guided right thoracentesis with removal of 2 L of fluid.  This report was finalized on 1/7/2022 1:51 PM by Lex Maravilla MD.        Results for orders placed during the hospital encounter of 02/27/18    Adult Transthoracic Echo Complete W/ Cont if Necessary Per Protocol    Interpretation Summary  · Technically difficult study due to body habitus  · Left ventricular systolic function is normal. Estimated EF = 60%.  · The cardiac valves are poorly visualized but there does not appear to be significant stenotic or regurgitant lesions.  · Right ventricular cavity is mild-to-moderately dilated.  · Atrial flutter noted throughout the examination.      I have reviewed the medications:  Scheduled Meds:apixaban, 5 mg, Oral, Q12H  bumetanide, 0.5 mg, Intravenous, Once  cefTRIAXone, 2 g, Intravenous, Q24H  dexamethasone, 6 mg, Oral, Daily  insulin lispro,  0-7 Units, Subcutaneous, TID AC  magnesium oxide, 400 mg, Oral, Daily  pharmacy consult - MTM, , Does not apply, Daily  potassium chloride, 30 mEq, Oral, Daily  remdesivir, 100 mg, Intravenous, Daily With Lunch  senna-docusate sodium, 2 tablet, Oral, BID      Continuous Infusions:Pharmacy Consult - Remdesivir,       PRN Meds:.•  albuterol sulfate HFA  •  benzonatate  •  bisacodyl  •  calcium carbonate  •  dextromethorphan polistirex ER  •  dextrose  •  dextrose  •  docusate sodium  •  glucagon (human recombinant)  •  hepatitis A  •  HYDROmorphone  •  magnesium sulfate **OR** magnesium sulfate in D5W 1g/100mL (PREMIX) **OR** magnesium sulfate  •  Pharmacy Consult - Remdesivir  •  polyethylene glycol  •  sodium chloride    Assessment/Plan   Assessment & Plan     Active Hospital Problems    Diagnosis  POA   • **COVID-19 virus infection [U07.1]  Yes   • Cirrhosis (HCC) [K74.60]  Yes   • 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]  Yes   • Stage 3 chronic kidney disease (HCC) [N18.30]  Yes   • Pleural effusion, right [J90]  Yes   • Hepatic steatosis [K76.0]  Yes   • Acute UTI (urinary tract infection) [N39.0]  Yes   • Presence of cardiac pacemaker [Z95.0]  Yes   • Chronic bilateral severe lower extremity edema [R60.0]  Yes   • Persistent atrial fibrillation/flutter [I48.19]  Yes   • Type 2 diabetes mellitus without complication, without long-term current use of insulin (HCC) [E11.9]  Yes   • Essential hypertension [I10]  Yes   • Severe obstructive sleep apnea, no CPAP [G47.33]  Yes   • Class 3 severe obesity due to excess calories with serious comorbidity and body mass index (BMI) of 40.0 to 44.9 in adult (HCC) [E66.01, Z68.41]  Not Applicable   • Acute on chronic right-sided heart failure (HCC) [I50.813]  Yes      Resolved Hospital Problems    Diagnosis Date Resolved POA   • Acute pulmonary edema (HCC) [J81.0] 01/05/2022 Yes   • Pneumonia due to COVID-19 virus [U07.1, J12.82] 01/05/2022 Yes   • Hyponatremia [E87.1] 01/07/2022 Yes    • Elevated procalcitonin [R79.89] 01/07/2022 Yes   • Dyspnea on exertion [R06.00] 01/07/2022 Yes   • Acute on chronic diastolic congestive heart failure (HCC) [I50.33] 01/05/2022 Yes        Brief Hospital Course to date:  Akshat Winkler is a 66 y.o. male with PMH diastolic HF, afib with recent pacemaker placement, T2DM, elevated LFTs, JOSESITO, morbid obesity, and CKD who presented to the ED with chief complaint of increasing SOB over the last week.   S/p thoracentesis with 2 L of fluid removed    Dyspnea with heart failure  -Concern for constrictive pericarditis  -Discussed case with Dr. Justin -further work-up this week regarding possible transfer to  pending CTS conversation, Dr. Albarran regarding pericardiectomy, with Dr. Linn  -Status post thoracentesis with 2 L removed, transudate, reaccumulation on CT from 1/9  -COVID positive  -Continue remdesivir and Decadron  -Repeat CT abdomen/pelvis overnight with possible free air.  Likely secondary to peptic ulcer disease  -Start PPI  -Discussed case with Dr. Mendoza -patient is high risk candidate would likely not survive anesthesia, any type of surgery.  Discussed with his wife    Cirrhosis  Possible congestive hepatopathy  -Plan for possible liver biopsy if warranted, pending cardiology recommendations  -Continue Rocephin    UTI  -Enterococcus, continue ampicillin      A. Fib  -Recent pacemaker placed 2 weeks ago  -Continue Eliquis  -Cardiology following    CKD  - recheck in am    Hyponatremia  -Secondary to volume overload    Diabetes  - continue SSI    Morbid obesity    Discussed with his wife at length today, she would like to change CODE STATUS to no CPR  Greater than 45 min spent in care of patient and consultation with specialists and family    DVT prophylaxis:  Medical DVT prophylaxis orders are present.       AM-PAC 6 Clicks Score (PT): 17 (01/08/22 0800)    Disposition: I expect the patient to be discharged TBD, guarded prognosis  CODE  STATUS:   Code Status and Medical Interventions:   Ordered at: 01/05/22 1932     Code Status (Patient has no pulse and is not breathing):    CPR (Attempt to Resuscitate)     Medical Interventions (Patient has pulse or is breathing):    Full Support       Patricia Bowers DO  01/09/22                Electronically signed by Patricia Bowers DO at 01/09/22 1506     Na Aguayo, APRN at 01/09/22 0754          [facility] Cardiology Daily Note       LOS: 4 days   Patient Care Team:  Saurabh Medina PA as PCP - General (Physician Assistant)  Lucian Alvarez IV, MD as Consulting Physician (Cardiology)  Mandy Clark PA-C as Physician Assistant (Physician Assistant)  Josh Moss MD as Consulting Physician (Pain Medicine)  Hannah Leonard APRN as Nurse Practitioner (Cardiology)  Akshat Davidson MD as Surgeon (Neurosurgery)    Chief Complaint:  Acute on chronic systolic heart failure; Afib    Subjective     Subjective: no acute issues overnight. V/S stable. Remains on RA. Creatinine improved after holding dose of Bumex last evening. CXR reviewed from this AM- appears to some re accumulation of right sided pleural effusion. He complains of acute abdominal pain. Just had CT abd which revealed free air. Awaiting further direction from medicine team.     Review of Systems:   As above.    Medications:  apixaban, 5 mg, Oral, Q12H  cefTRIAXone, 2 g, Intravenous, Q24H  dexamethasone, 6 mg, Oral, Daily  insulin lispro, 0-7 Units, Subcutaneous, TID AC  magnesium oxide, 400 mg, Oral, Daily  pharmacy consult - MTM, , Does not apply, Daily  potassium chloride, 30 mEq, Oral, Daily  remdesivir, 100 mg, Intravenous, Daily With Lunch        Objective     Vital Sign Min/Max for last 24 hours  Temp  Min: 97.6 °F (36.4 °C)  Max: 97.7 °F (36.5 °C)    BP  Min: 93/82  Max: 135/86   Pulse  Min: 80  Max: 81   Resp  Min: 16  Max: 19   SpO2  Min: 94 %  Max: 98 %   No data recorded   No data  "recorded      Intake/Output Summary (Last 24 hours) at 1/9/2022 0754  Last data filed at 1/8/2022 2337  Gross per 24 hour   Intake 360 ml   Output 2350 ml   Net -1990 ml        Flowsheet Rows      First Filed Value   Admission Height 175.3 cm (69\") Documented at 01/05/2022 1041   Admission Weight 131 kg (289 lb) Documented at 01/05/2022 1041          Physical Exam:    General: Alert and oriented.   Cardiovascular: Heart has a nondisplaced focal PMI. Regular rate and rhythm without murmur, gallop or rub.  Lungs: Clear without rales or wheezes. Equal expansion is noted.   Abdomen: Soft, nontender.  Extremities: Show 1+ edema. Discoloration to bilateral lower extremities consistent with chronic edema.   Skin: warm and dry.     Results Review:    I reviewed the patient's new clinical results.  EKG:  Tele: V paced    Labs:    Results from last 7 days   Lab Units 01/09/22 0408 01/08/22  0408 01/07/22  0806   SODIUM mmol/L 129* 127* 133*   POTASSIUM mmol/L 5.0 4.9 5.2   CHLORIDE mmol/L 92* 91* 95*   CO2 mmol/L 20.0* 23.0 27.0   BUN mg/dL 47* 44* 35*   CREATININE mg/dL 1.65* 1.96* 1.65*   CALCIUM mg/dL 10.3 9.8 9.8   BILIRUBIN mg/dL 5.8* 4.6* 5.2*   ALK PHOS U/L 264* 247* 249*   ALT (SGPT) U/L 49* 42* 36   AST (SGOT) U/L 91* 99* 94*   GLUCOSE mg/dL 140* 129* 116*     Results from last 7 days   Lab Units 01/09/22  0408 01/08/22  0408 01/07/22  0806   WBC 10*3/mm3 11.44* 8.59 6.64   HEMOGLOBIN g/dL 16.2 14.5 14.2   HEMATOCRIT % 48.3 43.2 43.2   PLATELETS 10*3/mm3 123* 115* 124*     Lab Results   Component Value Date    TROPONINI 0.021 02/27/2018    TROPONINT 0.026 01/05/2022    TROPONINT 0.011 12/16/2021     Lipid Panel    Lipid Panel 12/16/21   Total Cholesterol 103   Triglycerides 81   HDL Cholesterol 31 (A)   VLDL Cholesterol 16   LDL Cholesterol  56   LDL/HDL Ratio 1.80   (A) Abnormal value             Lab Results   Component Value Date    INR 2.51 (H) 01/09/2022    INR 1.87 (H) 01/08/2022    INR 2.01 (H) 01/07/2022    " PROTIME 26.0 (H) 01/09/2022    PROTIME 20.8 (H) 01/08/2022    PROTIME 21.9 (H) 01/07/2022         Assessment        Shortness of breath/Covid-19//CHF  · Multifactorial from Covid-19/right-sided heart failure/right-sided pleural  · Started on remdesivir and Decadron  · O2 2 L on nasal cannula with O2 saturations 98%  · Hospitalist to treat Covid-19      Chronic kidney disease  · Baseline creatinine 1.3-1.4     Acute on chronic right-sided heart failure  · proBNP only mildly elevated  · Echo: LV septal bounce concerning for constrictive physiology  · Takes spironolactone 100 mg daily but currently on hold while diuresing  · Chart review yielded a old CT scan which showed pericardial calcification  · Patient has no history of pericarditis or autoimmune disease  · CT chest and abdomen: There is calcification seen of the pericardium. There is a small pleural effusion identified on the right with airspace disease and infiltrate in the right middle and lower lung field. Anasarca seen throughout      Right pleural effusion  · S/p right thoracentesis with removal of 2L, 1/7  · CXR 1/9/21: small right pleural effusion (reaccumulation)    Atrial fibrillation/flutter   · Intolerant to multiple antiarrhythmics including beta-blocker, Tikosyn and flecainide causing severe hypotension  · Recent AV joby ablation with placement of permanent pacemaker     Type 2 diabetes mellitus  · Management per hospitalist    Portal hypertension  · Cirrhosis versus passive hepatic congestion secondary to right sided heart failure  · Image guided liver bx this week      Plan       · Creatinine improved today. Will give AM dose of Bumex 1 mg IV.   · Resume Aldactone once we switch to oral diuresis.   · Continue Eliquis for stroke prevention  · Dr. Alvaerz to discuss care with Dr. Albarran; Pericardiotomy is extremely high risk surgery in a patient without cirrhosis or morbid obesity.   · Await liver biopsy and results.         Electronically signed  "by FAITH Riley, 01/09/22, 7:54 AM EST.              Electronically signed by Na Aguayo APRN at 01/09/22 0836     Bruce Vizcaino MD at 01/09/22 0628          CROSS COVER  Nurse called us midnight, with pt co of increasing ABD pain, restless,   Ct ABD ordered, which showed 2 tiny small bubbles of free air of ? Significance.  No as's N/V.  No fever,   Last BM was on 1/8 am.         Vitals:    01/09/22 0404   BP: 113/93   Pulse: 80   Resp: 19   Temp: 97.6 °F (36.4 °C)   SpO2: 94%           Old records reviewed and summarized in PM hx.      EXAM :  RS- CTA-BL, ,  No wheezing , no crackles, good effort.  CVS- s1s2 regular,   ABD- sitting up in recliner, ABD distended, severe tenderness on RUQ, BS decreased, belly firm,   EXT- 4 + edema  NEURO- AAO-3, restless.    LABS:  Labs reviewed  LA - still pending.    A/P:  RUQ pain- with Gallstones, but no inflammation on CT-could not get MRI- due to PPM.  D/w Radiology in AM, about tiny bubbles of free air on recent CT ABD.  Pain better after IV dilaudid.  Abs switched to Rocephin to cover for sbp.  Fup Lactic acid .     I have discussed with findings, diagnosis and plans with the patient and family.     Bruce Vizcaino MD 01/09/22 06:28 EST             Electronically signed by Bruce Vizcaino MD at 01/09/22 0638     Erasmo Justin MD at 01/08/22 1600          GI Daily Progress Note  Subjective     Akshat Winkler is a 66 y.o. male who was admitted with COVID-19 virus infection.   Feels better.  Breathing better.  No abd pain.  Worried about his bill.     Chief Complaint:  SOA    Objective     /85 (BP Location: Left arm, Patient Position: Sitting)   Pulse 80   Temp 97.7 °F (36.5 °C) (Oral)   Resp 18   Ht 175.3 cm (69\")   Wt (!) 142 kg (313 lb 4.8 oz)   SpO2 93%   BMI 46.27 kg/m²     Intake/Output last 3 shifts:  I/O last 3 completed shifts:  In: 1080 [P.O.:1080]  Out: 4725 [Urine:4725]  Intake/Output this shift:  I/O this " shift:  In: 360 [P.O.:360]  Out: 1500 [Urine:1500]      Physical Exam  Wt Readings from Last 3 Encounters:   01/06/22 (!) 142 kg (313 lb 4.8 oz)   12/23/21 131 kg (289 lb 4 oz)   12/19/21 132 kg (290 lb 6.4 oz)   ,body mass index is 46.27 kg/m².,@FLOWAMB(6)@,@FLOWAMB(5)@,@FLOWAMB(8)@   CONSTITUTIONAL:sitting in chair  Resp CTA; no rhonchi, rales, or wheezes.  Respiration effort normal  CV RRR; no M/R/G. 2-3 + lower extremity edema  GI Abd soft, NT, ND, normal active bowel sounds.    Psych: Awake and alert    DATA:  Results for IRENE GILES (MRN [id number]) as of 1/8/2022 16:01   Ref. Range 1/8/2022 04:08   Glucose Latest Ref Range: 65 - 99 mg/dL 129 (H)   Sodium Latest Ref Range: 136 - 145 mmol/L 127 (L)   Potassium Latest Ref Range: 3.5 - 5.2 mmol/L 4.9   CO2 Latest Ref Range: 22.0 - 29.0 mmol/L 23.0   Chloride Latest Ref Range: 98 - 107 mmol/L 91 (L)   Anion Gap Latest Ref Range: 5.0 - 15.0 mmol/L 13.0   Creatinine Latest Ref Range: 0.76 - 1.27 mg/dL 1.96 (H)   BUN Latest Ref Range: 8 - 23 mg/dL 44 (H)   BUN/Creatinine Ratio Latest Ref Range: 7.0 - 25.0  22.4   Calcium Latest Ref Range: 8.6 - 10.5 mg/dL 9.8   eGFR Non  Am Latest Ref Range: >60 mL/min/1.73 35 (L)   Alkaline Phosphatase Latest Ref Range: 39 - 117 U/L 247 (H)   Total Protein Latest Ref Range: 6.0 - 8.5 g/dL 7.6   ALT (SGPT) Latest Ref Range: 1 - 41 U/L 42 (H)   AST (SGOT) Latest Ref Range: 1 - 40 U/L 99 (H)   Total Bilirubin Latest Ref Range: 0.0 - 1.2 mg/dL 4.6 (H)   Albumin Latest Ref Range: 3.50 - 5.20 g/dL 3.00 (L)   Globulin Latest Units: gm/dL 4.6   A/G Ratio Latest Units: g/dL 0.7   C-Reactive Protein Latest Ref Range: 0.00 - 0.50 mg/dL 5.54 (H)   Protime Latest Ref Range: 11.4 - 14.4 Seconds 20.8 (H)   INR Latest Ref Range: 0.85 - 1.16  1.87 (H)   WBC Latest Ref Range: 3.40 - 10.80 10*3/mm3 8.59   RBC Latest Ref Range: 4.14 - 5.80 10*6/mm3 4.37   Hemoglobin Latest Ref Range: 13.0 - 17.7 g/dL 14.5   Hematocrit Latest Ref Range:  37.5 - 51.0 % 43.2   CT chest abdomen pelvis per radiologist report  FINDINGS:      CHEST: Thyroid is homogeneous. There is mild enlargement identified of  the lymph nodes in the mediastinum. There is calcification seen of the  pericardium. There is no pericardial effusion. There is a pleural  effusion identified small in size on the right with ill-defined  opacification and dense airspace disease in the right middle and lower  lung fields. The left lung reveals mild atelectatic change with  calcified granuloma identified in the lingula. There is sparing of the  right upper lung field. Degenerative changes seen within the spine.  There is no adenopathy identified in the axillary regions.     ABDOMEN: Some anasarca seen in the subcutaneous tissues. The liver is  homogeneous. The gallbladder is filled with stones. There is enlargement  identified of the iliac vessels and IVC. Findings raise the concern for  possible elevated right atrium pressure. Clinical correlation is needed.  Extensive varices identified within the mesentery. Kidneys and adrenal  glands within normal limits. Pancreas is homogeneous. No renal or  adrenal abnormality present. Some vascular calcification seen within the  abdominal aorta and iliac vessels.     PELVIS: Distention seen of the iliac vessels. There are varices  identified in the inguinal regions bilaterally. No pelvic adenopathy. No  free fluid or free air. No abnormal mass or fluid collections  identified.     IMPRESSION:  There is extensive dilatation identified of the IVC and  iliac vessels raising the concern for elevated right atrium pressure.  There is calcification seen of the pericardium. There is a small pleural  effusion identified on the right with airspace disease and infiltrate in  the right middle and lower lung field. Anasarca seen throughout the  subcutaneous tissues of the abdomen and lower extremities with varices  seen within the inguinal regions bilaterally.      DICTATED:   01/08/2022  EDITED/lfs:   01/08/2022  Results for IRENE GILES (MRN [id number]) as of 1/8/2022 16:01   Ref. Range 1/7/2022 08:06   Hep A Total Ab Latest Ref Range: Negative  Negative     Assessment/Plan     1.  Calcific constrictive pericarditis  2.  Congestive hepatopathy  3.  COVID-19  4.  Morbid obesity  5.  Right pleural effusion    Will plan IR liver biopsy Monday/Tuesday to measure cardiac and portal pressures and evaluate for cirrhosis versus passive congestion  He does have portal hypertension but does not appear to have ascites which would make the right pleural effusion less likely to be secondary to hepatic hydrothorax examination    He is not immune to hepatitis A will therefore begin immunization today.  Will need a repeat vaccination in 6 months    I will sign off.  Please reconsult if needed      COVID-19 virus infection    Acute on chronic right-sided heart failure (HCC)    Type 2 diabetes mellitus without complication, without long-term current use of insulin (HCC)    Essential hypertension    Severe obstructive sleep apnea, no CPAP    Class 3 severe obesity due to excess calories with serious comorbidity and body mass index (BMI) of 40.0 to 44.9 in adult (HCC)    Persistent atrial fibrillation/flutter    Chronic bilateral severe lower extremity edema    Presence of cardiac pacemaker    Stage 3 chronic kidney disease (HCC)    Pleural effusion, right    Hepatic steatosis    Acute UTI (urinary tract infection)    Cirrhosis (HCC)    Constrictive pericarditis       LOS: 3 days     Erasmo Justin MD  01/08/22  16:00 EST    Electronically signed by Erasmo Justin MD at 01/08/22 1613     Na Aguayo APRN at 01/08/22 1235          [facility] Cardiology Daily Note       LOS: 3 days   Patient Care Team:  Saurabh Medina PA as PCP - General (Physician Assistant)  Lucian Alvarez IV, MD as Consulting Physician (Cardiology)  Mandy Clark  "CELINE as Physician Assistant (Physician Assistant)  Josh Moss MD as Consulting Physician (Pain Medicine)  Hannah Leonard APRN as Nurse Practitioner (Cardiology)  Akshat Davidson MD as Surgeon (Neurosurgery)    Chief Complaint:  Acute on chronic systolic heart failure; Afib    Subjective     Subjective: no acute issues overnight. V/S stable. Creatinine trending up.     Review of Systems:   As above.    Medications:  ampicillin, 500 mg, Intravenous, Q6H  apixaban, 5 mg, Oral, Q12H  bumetanide, 2 mg, Intravenous, BID  dexamethasone, 6 mg, Oral, Daily  insulin lispro, 0-7 Units, Subcutaneous, TID AC  magnesium oxide, 400 mg, Oral, Daily  pharmacy consult - MTM, , Does not apply, Daily  potassium chloride, 30 mEq, Oral, Daily  remdesivir, 100 mg, Intravenous, Daily With Lunch        Objective     Vital Sign Min/Max for last 24 hours  Temp  Min: 97.6 °F (36.4 °C)  Max: 98.3 °F (36.8 °C)    BP  Min: 97/71  Max: 124/85   Pulse  Min: 80  Max: 86   Resp  Min: 16  Max: 18   SpO2  Min: 93 %  Max: 99 %   No data recorded   No data recorded      Intake/Output Summary (Last 24 hours) at 1/8/2022 1235  Last data filed at 1/8/2022 0900  Gross per 24 hour   Intake 720 ml   Output 2775 ml   Net -2055 ml        Flowsheet Rows      First Filed Value   Admission Height 175.3 cm (69\") Documented at 01/05/2022 1041   Admission Weight 131 kg (289 lb) Documented at 01/05/2022 1041          Physical Exam:    General: Alert and oriented.   Cardiovascular: Heart has a nondisplaced focal PMI. Regular rate and rhythm without murmur, gallop or rub.  Lungs: Clear without rales or wheezes. Equal expansion is noted.   Abdomen: Soft, nontender.  Extremities: Show 1+ edema. Discoloration to bilateral lower extremities consistent with chronic edema.   Skin: warm and dry.     Results Review:    I reviewed the patient's new clinical results.  EKG:  Tele: NSR    Labs:    Results from last 7 days   Lab Units 01/08/22  0408 01/07/22  0806 " 01/05/22  2255   SODIUM mmol/L 127* 133* 134*   POTASSIUM mmol/L 4.9 5.2 5.0   CHLORIDE mmol/L 91* 95* 96*   CO2 mmol/L 23.0 27.0 24.0   BUN mg/dL 44* 35* 30*   CREATININE mg/dL 1.96* 1.65* 1.64*   CALCIUM mg/dL 9.8 9.8 10.0   BILIRUBIN mg/dL 4.6* 5.2* 6.4*   ALK PHOS U/L 247* 249* 263*   ALT (SGPT) U/L 42* 36 38   AST (SGOT) U/L 99* 94* 93*   GLUCOSE mg/dL 129* 116* 156*     Results from last 7 days   Lab Units 01/08/22  0408 01/07/22  0806 01/05/22 2256   WBC 10*3/mm3 8.59 6.64 6.54   HEMOGLOBIN g/dL 14.5 14.2 15.2   HEMATOCRIT % 43.2 43.2 46.0   PLATELETS 10*3/mm3 115* 124* 128*     Lab Results   Component Value Date    TROPONINI 0.021 02/27/2018    TROPONINT 0.026 01/05/2022    TROPONINT 0.011 12/16/2021     Lipid Panel    Lipid Panel 12/16/21   Total Cholesterol 103   Triglycerides 81   HDL Cholesterol 31 (A)   VLDL Cholesterol 16   LDL Cholesterol  56   LDL/HDL Ratio 1.80   (A) Abnormal value             Lab Results   Component Value Date    INR 1.87 (H) 01/08/2022    INR 2.01 (H) 01/07/2022    INR 2.67 (H) 01/06/2022    PROTIME 20.8 (H) 01/08/2022    PROTIME 21.9 (H) 01/07/2022    PROTIME 27.3 (H) 01/06/2022         Assessment        Shortness of breath/Covid-19//CHF  · Multifactorial from Covid-19/right-sided heart failure/right-sided pleural  · Started on remdesivir and Decadron  · O2 2 L on nasal cannula with O2 saturations 98%  · Hospitalist to treat Covid-19      Chronic kidney disease  · Baseline creatinine 1.3-1.4     Acute on chronic right-sided heart failure/right pleural effusion  · proBNP only mildly elevated  · CXR shows right pleural effusion  · Echo: LV septal bounce concerning for constrictive physiology  · Strict I's and O's and daily weights  · Takes spironolactone 100 mg daily but currently on hold while diuresing  · S/p right thoracentesis with removal of 2L, 1/7  · Chart review yielded a old CT scan which showed pericardial calcification  · Patient has no history of pericarditis or  autoimmune disease  · CT chest and abdomen: There is calcification seen of the pericardium. There is a small pleural effusion identified on the right with airspace disease and infiltrate in the right middle and lower lung field. Anasarca seen throughout      Atrial fibrillation/flutter   · Intolerant to multiple antiarrhythmics including beta-blocker, Tikosyn and flecainide causing severe hypotension  · Recent AV joby ablation with placement of permanent pacemaker  · Continue to hold Eliquis as patient may benefit from thoracentesis     Type 2 diabetes mellitus  · Management per hospitalist    Cirrhosis      Plan       · Hold evening dose of Bumex with up-trending creatinine.   · Resume Aldactone once we switch to oral diuresis.   · Restart Eliquis once no invasive procedures planned for stroke prevention  · Dr. Alvarez to discuss care with Dr. Albarran; Pericardiotomy is extremely high risk surgery in a patient without cirrhosis or morbid obesity.   · Will review CXR once available        Electronically signed by FAITH Riley, 22, 12:35 PM EST.            Electronically signed by Na Aguayo APRN at 22 0754     Ted Sparks MD at 22 1018              [facility] Medicine Services  PROGRESS NOTE    Patient Name: Akshat Winkler  : 1955  MRN: [id number]    Date of Admission: 2022  Primary Care Physician: Saurabh Medina PA    Subjective   Subjective     CC:  Dyspnea    HPI:  SOA better. On RA. Hungry. Edema seems better. No f/c. Wants to go home.    Review of Systems   Constitutional: Positive for activity change and fatigue.   HENT: Negative.    Respiratory: Negative for chest tightness and shortness of breath.    Cardiovascular: Positive for leg swelling.   Gastrointestinal: Positive for abdominal distention.   Genitourinary: Negative.    Skin: Positive for wound.   Neurological: Negative.    Psychiatric/Behavioral: Negative.            Objective   Objective     Vital Signs:   Temp:  [97.6 °F (36.4 °C)-98.3 °F (36.8 °C)] 97.7 °F (36.5 °C)  Heart Rate:  [80-86] 80  Resp:  [16-20] 18  BP: ()/() 124/85     Physical Exam:  NAD, alert and oriented  OP clear, MMM  Neck supple  No LAD  RRR  Decreased at bases  +BS, mild distention, soft  CHENG  B LE edema, pitting, seems slightly better  Normal affect    Results Reviewed:  LAB RESULTS:      Lab 01/08/22  0408 01/07/22  0806 01/06/22  0403 01/05/22  2256 01/05/22 2255 01/05/22  1115   WBC 8.59 6.64  --  6.54  --  7.14   HEMOGLOBIN 14.5 14.2  --  15.2  --  14.4   HEMATOCRIT 43.2 43.2  --  46.0  --  44.2   PLATELETS 115* 124*  --  128*  --  115*   NEUTROS ABS 7.63* 5.98  --  5.77  --  5.82   IMMATURE GRANS (ABS) 0.03 0.03  --  0.01  --  0.03   LYMPHS ABS 0.22* 0.28*  --  0.30*  --  0.37*   MONOS ABS 0.56 0.35  --  0.34  --  0.77   EOS ABS 0.14 0.00  --  0.09  --  0.10   MCV 98.9* 99.8*  --  100.4*  --  101.4*   CRP 5.54* 6.37*  --   --  6.26* 5.80*   PROCALCITONIN  --   --   --   --   --  0.48*   LDH  --  468*  --   --   --   --    PROTIME 20.8* 21.9* 27.3*  --   --  26.0*   D DIMER QUANT  --   --   --  2.01*  --   --          Lab 01/08/22  0408 01/07/22  0806 01/06/22  0403 01/05/22 2255 01/05/22  1115   SODIUM 127* 133*  --  134* 132*   POTASSIUM 4.9 5.2  --  5.0 4.7   CHLORIDE 91* 95*  --  96* 95*   CO2 23.0 27.0  --  24.0 25.0   ANION GAP 13.0 11.0  --  14.0 12.0   BUN 44* 35*  --  30* 29*   CREATININE 1.96* 1.65*  --  1.64* 1.74*   GLUCOSE 129* 116*  --  156* 98   CALCIUM 9.8 9.8  --  10.0 10.0   MAGNESIUM  --   --  2.1 2.2 2.2         Lab 01/08/22  0408 01/07/22  0806 01/06/22  2049 01/05/22  2255 01/05/22  1115   TOTAL PROTEIN 7.6 7.8  --  8.1 7.9   ALBUMIN 3.00* 3.10*  --  3.30* 3.30*   GLOBULIN 4.6 4.7  --  4.8 4.6   ALT (SGPT) 42* 36  --  38 36   AST (SGOT) 99* 94*  --  93* 92*   BILIRUBIN 4.6* 5.2*  --  6.4* 5.9*   BILIRUBIN DIRECT  --   --  3.2*  --   --    ALK PHOS 247* 249*  --   263* 260*   LIPASE  --   --   --   --  28         Lab 01/08/22  0408 01/07/22  0806 01/06/22  0403 01/05/22  1115   PROBNP  --   --   --  1,074.0*   TROPONIN T  --   --   --  0.026   PROTIME 20.8* 21.9* 27.3* 26.0*   INR 1.87* 2.01* 2.67* 2.50*             Lab 01/05/22  1115   FERRITIN 172.50         Brief Urine Lab Results  (Last result in the past 365 days)      Color   Clarity   Blood   Leuk Est   Nitrite   Protein   CREAT   Urine HCG        01/05/22 1635             31.3         01/05/22 1635 Yellow   Clear   Small (1+)   Trace   Negative   100 mg/dL (2+)                 Microbiology Results Abnormal     Procedure Component Value - Date/Time    Body Fluid Culture - Body Fluid, Pleural Cavity [[id number]] Collected: 01/07/22 1327    Lab Status: Preliminary result Specimen: Body Fluid from Pleural Cavity Updated: 01/07/22 2829     Gram Stain No WBCs or organisms seen          Adult Transthoracic Echo Complete W/ Cont if Necessary Per Protocol    Addendum Date:     · Technically difficult study due to body habitus · Left ventricular systolic function is normal. Estimated left ventricular EF = 60%. · Abnormal left ventricular septal bounce suggestive of pericardial constriction · The right ventricular cavity is dilated. · Moderate tricuspid valve regurgitation is present. · Estimated right ventricular systolic pressure from tricuspid regurgitation is moderately elevated (45-55 mmHg).      Result Date: 1/7/2022  · Technically difficult study · Left ventricular systolic function is normal. Estimated left ventricular EF = 60%. · The right ventricular cavity is dilated. · The right atrial cavity is mild to moderately dilated. · Moderate tricuspid valve regurgitation is present. · Estimated right ventricular systolic pressure from tricuspid regurgitation is moderately elevated (45-55 mmHg).      CT Abdomen Pelvis Without Contrast    Result Date: 1/8/2022  EXAMINATION: CT CHEST WO CONTRAST DIAGNOSTIC-, CT ABDOMEN PELVIS WO  CONTRAST-  INDICATION: Pericardial constriction; J81.0-Acute pulmonary edema; K75.81-Nonalcoholic steatohepatitis (SHERMAN); K74.60-Unspecified cirrhosis of liver shortness of breath and chest pain  TECHNIQUE: Multiple axial CT imaging is obtained of the chest abdomen and pelvis without the ministration of oral or intravenous contrast.  The radiation dose reduction device was turned on for each scan per the ALARA (As Low as Reasonably Achievable) protocol.  COMPARISON: 12/22/2017 and 7/20/2018  FINDINGS: Chest: Thyroid is homogeneous. There is mild enlargement identified of the lymph nodes in the mediastinum. There is calcification seen of the pericardium. There is no pericardial effusion. There is a pleural effusion identified small in size on the right with ill-defined opacification and dense airspace disease in the right middle and lower lung field. The left lung reveals mild atelectatic change with calcified granuloma identified in the lingula. There is sparing of the right upper lung field. Degenerative changes seen within the spine. There is no adenopathy identified in the axillary regions.  ABDOMEN: Some anasarca seen in the subcutaneous tissues. The liver is homogeneous. The gallbladder is filled with stones. There is enlargement identified of the iliac vessels and IVC. Findings raise the concern for polyp possible elevated right atrium pressure. Clinical correlation is needed. Extensive varices identified within the mesentery. Kidneys and adrenal glands within normal limits. Pancreas is homogeneous. No renal or adrenal abnormality present. Some vascular calcification seen within the abdominal aorta and iliac vessels.  Pelvis: Distention seen of the iliac vessels. There is there is seen is identified in the inguinal regions bilaterally. No pelvic adenopathy. No free fluid or free air. No abnormal mass or fluid collections identified.        Impression: There is extensive dilatation identified of the IVC and  iliac vessels raising the concern for elevated right atrium pressure. There is calcification seen of the pericardium. There is a small pleural effusion identified on the right with airspace disease and infiltrate in the right middle and lower lung field. Anasarca seen throughout the subcutaneous tissues of the abdomen and lower extremities with there is a seen within the inguinal regions bilaterally.       CT Chest Without Contrast Diagnostic    Result Date: 1/8/2022  EXAMINATION: CT CHEST WO CONTRAST DIAGNOSTIC-, CT ABDOMEN PELVIS WO CONTRAST-  INDICATION: Pericardial constriction; J81.0-Acute pulmonary edema; K75.81-Nonalcoholic steatohepatitis (SHERMAN); K74.60-Unspecified cirrhosis of liver shortness of breath and chest pain  TECHNIQUE: Multiple axial CT imaging is obtained of the chest abdomen and pelvis without the ministration of oral or intravenous contrast.  The radiation dose reduction device was turned on for each scan per the ALARA (As Low as Reasonably Achievable) protocol.  COMPARISON: 12/22/2017 and 7/20/2018  FINDINGS: Chest: Thyroid is homogeneous. There is mild enlargement identified of the lymph nodes in the mediastinum. There is calcification seen of the pericardium. There is no pericardial effusion. There is a pleural effusion identified small in size on the right with ill-defined opacification and dense airspace disease in the right middle and lower lung field. The left lung reveals mild atelectatic change with calcified granuloma identified in the lingula. There is sparing of the right upper lung field. Degenerative changes seen within the spine. There is no adenopathy identified in the axillary regions.  ABDOMEN: Some anasarca seen in the subcutaneous tissues. The liver is homogeneous. The gallbladder is filled with stones. There is enlargement identified of the iliac vessels and IVC. Findings raise the concern for polyp possible elevated right atrium pressure. Clinical correlation is needed.  Extensive varices identified within the mesentery. Kidneys and adrenal glands within normal limits. Pancreas is homogeneous. No renal or adrenal abnormality present. Some vascular calcification seen within the abdominal aorta and iliac vessels.  Pelvis: Distention seen of the iliac vessels. There is there is seen is identified in the inguinal regions bilaterally. No pelvic adenopathy. No free fluid or free air. No abnormal mass or fluid collections identified.        Impression: There is extensive dilatation identified of the IVC and iliac vessels raising the concern for elevated right atrium pressure. There is calcification seen of the pericardium. There is a small pleural effusion identified on the right with airspace disease and infiltrate in the right middle and lower lung field. Anasarca seen throughout the subcutaneous tissues of the abdomen and lower extremities with there is a seen within the inguinal regions bilaterally.       XR Chest 1 View    Result Date: 1/8/2022   EXAMINATION: XR CHEST 1 VW-  INDICATION: DYSPNEA, ON EXERTION  COMPARISON: 01/07/2022  FINDINGS: Portable chest reveals pacer leads in satisfactory position. Heart is borderline enlarged. Increased markings seen the lung bases bilaterally with no definite pleural effusion. Heart is enlarged with degenerative changes seen within the spine. Pulmonary vascularity is within normal limits.         Impression: Minimal increased markings in the lung bases bilaterally. The heart is enlarged with low lung volumes bilaterally.       XR Chest 1 View    Result Date: 1/7/2022  EXAMINATION: XR CHEST 1 VW-  INDICATION: s/p thora; J81.0-Acute pulmonary edema; K75.81-Nonalcoholic steatohepatitis (SHERMAN); K74.60-Unspecified cirrhosis of liver  COMPARISON: 1/5/2022  FINDINGS: Left-sided single lead pacemaker is noted. Heart is at upper limits of normal size. Vasculature remains cephalized and a diffuse pulmonary interstitial disease pattern appears stable  overall. Right pleural effusion appears to have been drained. There is trace amount of remaining intrafissural fluid. No pneumothorax is seen.       Impression: 1. Interval drainage of right pleural effusion. No evidence of pneumothorax. 2. Stable borderline heart shadow enlargement and mild pulmonary vascular congestion.    This report was finalized on 1/7/2022 1:06 PM by Dr. Ted Wells MD.      US Thoracentesis    Result Date: 1/7/2022  DATE OF EXAM: 1/7/2022 10:56 AM  PROCEDURE: US THORACENTESIS-  INDICATIONS: pleural effusion; J81.0-Acute pulmonary edema; K75.81-Nonalcoholic steatohepatitis (SHERMAN); K74.60-Unspecified cirrhosis of liver  COMPARISON: No Comparisons Available  FLUOROSCOPIC TIME: None  PHYSICIAN MONITORED CONSCIOUS SEDATION TIME: None  CONTRAST MEDIA: None  TECHNIQUE: The patient's medications were reviewed prior to the procedure. The procedure, risks and alternatives were discussed and informed written consent was obtained. Maximal sterile barrier technique was utilized throughout the procedure. The patient was placed in the seated position and ultrasound was utilized to localize the right-sided pleural effusion. The skin was marked prepped and draped. Maximal sterile barrier technique was utilized during this procedure. Partial protective equipment was used secondary to the patient's Covid positive status. The skin was marked prepped draped and anesthetized with 1% Xylocaine. A 5 [place] catheter was inserted in the right pleural space by trocar technique. A total of 2 L of fluid was removed. Appropriate specimens were sent to the lab. The catheter was removed and hemostasis achieved. Postprocedure chest radiograph shows no pneumothorax.       Impression: Technically successful ultrasound-guided right thoracentesis with removal of 2 L of fluid.  This report was finalized on 1/7/2022 1:51 PM by Lex Maravilla MD.        Results for orders placed during the hospital encounter of 02/27/18    Adult  Transthoracic Echo Complete W/ Cont if Necessary Per Protocol    Interpretation Summary  · Technically difficult study due to body habitus  · Left ventricular systolic function is normal. Estimated EF = 60%.  · The cardiac valves are poorly visualized but there does not appear to be significant stenotic or regurgitant lesions.  · Right ventricular cavity is mild-to-moderately dilated.  · Atrial flutter noted throughout the examination.      I have reviewed the medications:  Scheduled Meds:!NOT ORDERED- apixaban (ELIQUIS), , Does not apply, Daily With Dinner  ampicillin, 500 mg, Intravenous, Q6H  bumetanide, 2 mg, Intravenous, BID  dexamethasone, 6 mg, Oral, Daily  insulin lispro, 0-7 Units, Subcutaneous, TID AC  magnesium oxide, 400 mg, Oral, Daily  pharmacy consult - MTM, , Does not apply, Daily  potassium chloride, 30 mEq, Oral, Daily  remdesivir, 100 mg, Intravenous, Daily With Lunch      Continuous Infusions:hold, 1 each  Pharmacy Consult - Remdesivir,       PRN Meds:.•  hold  •  acetaminophen  •  albuterol sulfate HFA  •  benzonatate  •  calcium carbonate  •  dextromethorphan polistirex ER  •  dextrose  •  dextrose  •  glucagon (human recombinant)  •  magnesium sulfate **OR** magnesium sulfate in D5W 1g/100mL (PREMIX) **OR** magnesium sulfate  •  Pharmacy Consult - Remdesivir  •  sodium chloride    Assessment/Plan   Assessment & Plan     Active Hospital Problems    Diagnosis  POA   • **COVID-19 virus infection [U07.1]  Yes   • Cirrhosis (HCC) [K74.60]  Yes   • 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]  Yes   • Stage 3 chronic kidney disease (HCC) [N18.30]  Yes   • Pleural effusion, right [J90]  Yes   • Hepatic steatosis [K76.0]  Yes   • Acute UTI (urinary tract infection) [N39.0]  Yes   • Presence of cardiac pacemaker [Z95.0]  Yes   • Chronic bilateral severe lower extremity edema [R60.0]  Yes   • Persistent atrial fibrillation/flutter [I48.19]  Yes   • Type 2 diabetes mellitus without complication, without long-term  current use of insulin (HCC) [E11.9]  Yes   • Essential hypertension [I10]  Yes   • Severe obstructive sleep apnea, no CPAP [G47.33]  Yes   • Class 3 severe obesity due to excess calories with serious comorbidity and body mass index (BMI) of 40.0 to 44.9 in adult (HCC) [E66.01, Z68.41]  Not Applicable   • Acute on chronic right-sided heart failure (HCC) [I50.813]  Yes      Resolved Hospital Problems    Diagnosis Date Resolved POA   • Acute pulmonary edema (HCC) [J81.0] 01/05/2022 Yes   • Pneumonia due to COVID-19 virus [U07.1, J12.82] 01/05/2022 Yes   • Hyponatremia [E87.1] 01/07/2022 Yes   • Elevated procalcitonin [R79.89] 01/07/2022 Yes   • Dyspnea on exertion [R06.00] 01/07/2022 Yes   • Acute on chronic diastolic congestive heart failure (HCC) [I50.33] 01/05/2022 Yes        Brief Hospital Course to date:  Akshat Winkler is a 66 y.o. male here with dyspnea    Dyspnea  A/C DHF  R pleural effusion  COVID+  -diurese as tolerated, per cardiology  -s/p thoracentesis, s/p 2L drained, transudate  -reviewed cardiology findings, worrisome for constrictive physiology    COVID+  -dexamethasone/remdesivir  -wean oxygen as tolerated, has been on RA x 2 days now, unclear if COVID having a role    UTI/POA  -changed to ampicillin, Enterococcus noted    Atrial fibrillation  -recent PPM  -on eliquis, resumed    Suspected new diagnosis cirrhosis  -GI consulted, CT reviewed, follow up GI recommendations    CKD  -monitor, on diuretics/stable    DM  -titrate, stable    Large RLE wound  -PT wound care consulted  -images reviewed    DVT prophylaxis:  Medical DVT prophylaxis orders are present.       AM-PAC 6 Clicks Score (PT): 17 (01/07/22 0830)    Disposition: I expect the patient to be discharged TBD.    CODE STATUS:   Code Status and Medical Interventions:   Ordered at: 01/05/22 1932     Code Status (Patient has no pulse and is not breathing):    CPR (Attempt to Resuscitate)     Medical Interventions (Patient has pulse or is  "breathing):    Full Support       Ted Sparks MD  01/08/22                Electronically signed by Ted Sparks MD at 01/08/22 1024         Erasmo Justin MD   Physician   Gastroenterology   Progress Notes      Addendum   Date of Service:  01/08/22 1600   Creation Time:  01/08/22 1600              Expand All Collapse All        Show:Clear all  [x]Manual[x]Template[x]Copied    Added by:  [x]Erasmo Justin MD      []Gabrielver for details    GI Daily Progress Note     Subjective         Akshat Winkler is a 66 y.o. male who was admitted with COVID-19 virus infection.   Feels better.  Breathing better.  No abd pain.  Worried about his bill.      Chief Complaint:  SOA           Objective         /85 (BP Location: Left arm, Patient Position: Sitting)   Pulse 80   Temp 97.7 °F (36.5 °C) (Oral)   Resp 18   Ht 175.3 cm (69\")   Wt (!) 142 kg (313 lb 4.8 oz)   SpO2 93%   BMI 46.27 kg/m²      Intake/Output last 3 shifts:  I/O last 3 completed shifts:  In: 1080 [P.O.:1080]  Out: 4725 [Urine:4725]  Intake/Output this shift:  I/O this shift:  In: 360 [P.O.:360]  Out: 1500 [Urine:1500]        Physical Exam      Wt Readings from Last 3 Encounters:   01/06/22 (!) 142 kg (313 lb 4.8 oz)   12/23/21 131 kg (289 lb 4 oz)   12/19/21 132 kg (290 lb 6.4 oz)   ,body mass index is 46.27 kg/m².,@FLOWAMB(6)@,@FLOWAMB(5)@,@FLOWAMB(8)@   CONSTITUTIONAL:sitting in chair  Resp                CTA; no rhonchi, rales, or wheezes.  Respiration effort normal  CV                   RRR; no M/R/G. 2-3 + lower extremity edema  GI                    Abd soft, NT, ND, normal active bowel sounds.    Psych:             Awake and alert     DATA:  Results for AKSHAT WINKLER (MRN [id number]) as of 1/8/2022 16:01    Ref. Range 1/8/2022 04:08   Glucose Latest Ref Range: 65 - 99 mg/dL 129 (H)   Sodium Latest Ref Range: 136 - 145 mmol/L 127 (L)   Potassium Latest Ref Range: 3.5 - 5.2 mmol/L 4.9   CO2 Latest Ref Range: 22.0 - 29.0 mmol/L 23.0 "   Chloride Latest Ref Range: 98 - 107 mmol/L 91 (L)   Anion Gap Latest Ref Range: 5.0 - 15.0 mmol/L 13.0   Creatinine Latest Ref Range: 0.76 - 1.27 mg/dL 1.96 (H)   BUN Latest Ref Range: 8 - 23 mg/dL 44 (H)   BUN/Creatinine Ratio Latest Ref Range: 7.0 - 25.0  22.4   Calcium Latest Ref Range: 8.6 - 10.5 mg/dL 9.8   eGFR Non  Am Latest Ref Range: >60 mL/min/1.73 35 (L)   Alkaline Phosphatase Latest Ref Range: 39 - 117 U/L 247 (H)   Total Protein Latest Ref Range: 6.0 - 8.5 g/dL 7.6   ALT (SGPT) Latest Ref Range: 1 - 41 U/L 42 (H)   AST (SGOT) Latest Ref Range: 1 - 40 U/L 99 (H)   Total Bilirubin Latest Ref Range: 0.0 - 1.2 mg/dL 4.6 (H)   Albumin Latest Ref Range: 3.50 - 5.20 g/dL 3.00 (L)   Globulin Latest Units: gm/dL 4.6   A/G Ratio Latest Units: g/dL 0.7   C-Reactive Protein Latest Ref Range: 0.00 - 0.50 mg/dL 5.54 (H)   Protime Latest Ref Range: 11.4 - 14.4 Seconds 20.8 (H)   INR Latest Ref Range: 0.85 - 1.16  1.87 (H)   WBC Latest Ref Range: 3.40 - 10.80 10*3/mm3 8.59   RBC Latest Ref Range: 4.14 - 5.80 10*6/mm3 4.37   Hemoglobin Latest Ref Range: 13.0 - 17.7 g/dL 14.5   Hematocrit Latest Ref Range: 37.5 - 51.0 % 43.2   CT chest abdomen pelvis per radiologist report  FINDINGS:      CHEST: Thyroid is homogeneous. There is mild enlargement identified of  the lymph nodes in the mediastinum. There is calcification seen of the  pericardium. There is no pericardial effusion. There is a pleural  effusion identified small in size on the right with ill-defined  opacification and dense airspace disease in the right middle and lower  lung fields. The left lung reveals mild atelectatic change with  calcified granuloma identified in the lingula. There is sparing of the  right upper lung field. Degenerative changes seen within the spine.  There is no adenopathy identified in the axillary regions.     ABDOMEN: Some anasarca seen in the subcutaneous tissues. The liver is  homogeneous. The gallbladder is filled with  stones. There is enlargement  identified of the iliac vessels and IVC. Findings raise the concern for  possible elevated right atrium pressure. Clinical correlation is needed.  Extensive varices identified within the mesentery. Kidneys and adrenal  glands within normal limits. Pancreas is homogeneous. No renal or  adrenal abnormality present. Some vascular calcification seen within the  abdominal aorta and iliac vessels.     PELVIS: Distention seen of the iliac vessels. There are varices  identified in the inguinal regions bilaterally. No pelvic adenopathy. No  free fluid or free air. No abnormal mass or fluid collections  identified.     IMPRESSION:  There is extensive dilatation identified of the IVC and  iliac vessels raising the concern for elevated right atrium pressure.  There is calcification seen of the pericardium. There is a small pleural  effusion identified on the right with airspace disease and infiltrate in  the right middle and lower lung field. Anasarca seen throughout the  subcutaneous tissues of the abdomen and lower extremities with varices  seen within the inguinal regions bilaterally.     DICTATED:   01/08/2022  EDITED/lfs:   01/08/2022  Results for IRENE GILES (MRN [id number]) as of 1/8/2022 16:01    Ref. Range 1/7/2022 08:06   Hep A Total Ab Latest Ref Range: Negative  Negative            Assessment/Plan         1.  Calcific constrictive pericarditis  2.  Congestive hepatopathy  3.  COVID-19  4.  Morbid obesity  5.  Right pleural effusion     Will plan IR liver biopsy Monday/Tuesday to measure cardiac and portal pressures and evaluate for cirrhosis versus passive congestion  He does have portal hypertension but does not appear to have ascites which would make the right pleural effusion less likely to be secondary to hepatic hydrothorax examination     He is not immune to hepatitis A will therefore begin immunization today.  Will need a repeat vaccination in 6 months     I will sign off.   Please reconsult if needed       COVID-19 virus infection    Acute on chronic right-sided heart failure (HCC)    Type 2 diabetes mellitus without complication, without long-term current use of insulin (HCC)    Essential hypertension    Severe obstructive sleep apnea, no CPAP    Class 3 severe obesity due to excess calories with serious comorbidity and body mass index (BMI) of 40.0 to 44.9 in adult (HCC)    Persistent atrial fibrillation/flutter    Chronic bilateral severe lower extremity edema    Presence of cardiac pacemaker    Stage 3 chronic kidney disease (HCC)    Pleural effusion, right    Hepatic steatosis    Acute UTI (urinary tract infection)    Cirrhosis (HCC)    Constrictive pericarditis         LOS: 3 days      Erasmo Justin MD  01/08/22  16:00 EST                Revision History

## 2022-01-10 NOTE — PROGRESS NOTES
Continued Stay Note  [facility]     Patient Name: Akshat Winkler  MRN: [id number]  Today's Date: 1/10/2022    Admit Date: 1/5/2022     Discharge Plan     Row Name 01/10/22 1405       Plan    Plan Hospice Consult    Plan Comments Attempted to contact spouse to set up time for discussion/consultation.   No answer. Will attempt again at later time.                         Susu Marin RN

## 2022-01-10 NOTE — PROGRESS NOTES
"GI Daily Progress Note  Subjective:    Chief Complaint: Follow up congestive hepatopathy     Mr. Winkler complains of continued abdominal pain.      Objective:    /93 (BP Location: Left arm, Patient Position: Sitting)   Pulse 79   Temp 97.9 °F (36.6 °C) (Oral)   Resp 16   Ht 175.3 cm (69\")   Wt (!) 142 kg (313 lb 4.8 oz)   SpO2 94%   BMI 46.27 kg/m²     Physical Exam  Constitutional:       General: He is in acute distress.      Appearance: He is ill-appearing.   Cardiovascular:      Rate and Rhythm: Normal rate and regular rhythm.   Pulmonary:      Effort: Pulmonary effort is normal.   Abdominal:      Tenderness: There is abdominal tenderness. There is guarding and rebound.      Comments: Obese abdomen.   No bowel sound auscultated.      Musculoskeletal:      Right lower leg: Edema present.      Left lower leg: Edema present.       Lab  Lab Results   Component Value Date    WBC 10.61 01/10/2022    HGB 15.7 01/10/2022    HGB 16.2 01/09/2022    HGB 14.5 01/08/2022    MCV 98.3 (H) 01/10/2022    PLT 86 (L) 01/10/2022    INR 4.26 (H) 01/10/2022    INR 2.51 (H) 01/09/2022    INR 1.87 (H) 01/08/2022    INR 2.01 (H) 01/07/2022    INR 2.67 (H) 01/06/2022     Lab Results   Component Value Date    GLUCOSE 128 (H) 01/09/2022    BUN 49 (H) 01/09/2022    CREATININE 1.33 (H) 01/09/2022    EGFRIFNONA 54 (L) 01/09/2022    EGFRIFAFRI 92 02/08/2018    BCR 36.8 (H) 01/09/2022     (L) 01/09/2022    K 4.8 01/09/2022    CO2 21.0 (L) 01/09/2022    CALCIUM 10.0 01/09/2022    PROTENTOTREF 7.0 03/03/2018    ALBUMIN 3.20 (L) 01/09/2022    ALKPHOS 229 (H) 01/09/2022    BILITOT 7.5 (H) 01/09/2022    BILIDIR 3.2 (H) 01/06/2022    ALT 43 (H) 01/09/2022    AST 61 (H) 01/09/2022     Assessment:    Acute abdomen, free air seen on recent CT with concerns of pneumatosis in the right colon   Calcific constrictive pericarditis   Congestive hepatopathy   COVID-19 infection   Morbid obesity   Coagulopathy, INR worse today. "   Anticoagulation use, on Eliquis     Plan:    CT images reviewed with interventional radiologist and Dr. Justin.   There is concern of small pneumatosis in the ascending colon and cecum.  There is a 82 % perioperative mortality related to his liver disease.  This does not include mortality risk related to his constrictive pericarditis.    Acute abdomen concerns were discussed with surgery yesterday and he is not a surgical candidate.       Primary team has discussed this with his wife yesterday.  His code status has been changed to DNR/DNI.        Dr. Justin has additionally called his wife today.   Hospice has been consulted     RUPESH Hodge  01/10/22  12:48 EST

## 2022-01-10 NOTE — PROGRESS NOTES
[facility] Services  PROGRESS NOTE    Patient Name: Akshat Winkler  : 1955  MRN: [id number]    Date of Admission: 2022  Primary Care Physician: Saurabh Medina PA    Subjective   Subjective     CC:  abd pain    HPI:  More alert today, still with significant abd pain.  Asking for help with positioning to get more comfortable    ROS:  Gen- No fevers, chills  CV- No chest pain, palpitations  Resp- No cough, dyspnea  GI- No N/V/D,  + abd pain        Objective   Objective     Vital Signs:   Temp:  [97.6 °F (36.4 °C)-97.9 °F (36.6 °C)] 97.9 °F (36.6 °C)  Heart Rate:  [79-85] 79  Resp:  [16-22] 16  BP: (109-142)/() 109/93  Flow (L/min):  [2] 2     Physical Exam:  Constitutional: No acute distress, awake, alert  HENT: NCAT, mucous membranes moist  Respiratory: Clear to auscultation bilaterally, respiratory effort normal   Cardiovascular: RRR, no murmurs, rubs, or gallops  Gastrointestinal: distended, RUQ tenderness  Musculoskeletal: pitting edema, legs wrapped  Psychiatric: Appropriate affect, cooperative  Neurologic: Oriented x 2-3, speech clear  Skin: No rashes      Results Reviewed:  LAB RESULTS:      Lab 01/10/22  0646 01/10/22  0320 01/10/22  0300 22  1226 22  0408 22  0408 22  0806 22  0403 22  2256 22  2255 22  1115 22  1115   WBC  --   --  10.61  --  11.44* 8.59 6.64  --  6.54  --    < > 7.14   HEMOGLOBIN  --   --  15.7  --  16.2 14.5 14.2  --  15.2  --    < > 14.4   HEMATOCRIT  --   --  47.6  --  48.3 43.2 43.2  --  46.0  --    < > 44.2   PLATELETS  --   --  86*  --  123* 115* 124*  --  128*  --    < > 115*   NEUTROS ABS  --   --  9.55*  --  10.03* 7.63* 5.98  --  5.77  --    < > 5.82   IMMATURE GRANS (ABS)  --   --   --   --  0.04 0.03 0.03  --  0.01  --   --  0.03   LYMPHS ABS  --   --   --   --  0.12* 0.22* 0.28*  --  0.30*  --   --  0.37*   MONOS ABS  --   --   --   --  1.05* 0.56 0.35  --  0.34  --   --   0.77   EOS ABS  --   --  0.11  --  0.20 0.14 0.00  --  0.09  --    < > 0.10   MCV  --   --  98.3*  --  97.6* 98.9* 99.8*  --  100.4*  --    < > 101.4*   CRP  --   --  10.51*  --  5.84* 5.54* 6.37*  --   --  6.26*   < > 5.80*   PROCALCITONIN  --   --   --   --  0.56*  --   --   --   --   --   --  0.48*   LACTATE 5.2* 4.2*  --  4.6*  --   --   --   --   --   --   --   --    LDH  --   --   --   --   --   --  468*  --   --   --   --   --    PROTIME  --   --  39.0*  --  26.0* 20.8* 21.9* 27.3*  --   --    < > 26.0*   APTT  --   --   --   --  41.8*  --   --   --   --   --   --   --    D DIMER QUANT  --   --   --   --   --   --   --   --  2.01*  --   --   --     < > = values in this interval not displayed.         Lab 01/10/22  0300 01/09/22 2316 01/09/22 0408 01/08/22  0408 01/07/22  0806 01/06/22  0403 01/05/22  2255 01/05/22  1115 01/05/22  1115   SODIUM  --  130* 129* 127* 133*  --  134*   < > 132*   POTASSIUM  --  4.8 5.0 4.9 5.2  --  5.0   < > 4.7   CHLORIDE  --  91* 92* 91* 95*  --  96*   < > 95*   CO2  --  21.0* 20.0* 23.0 27.0  --  24.0   < > 25.0   ANION GAP  --  18.0* 17.0* 13.0 11.0  --  14.0   < > 12.0   BUN  --  49* 47* 44* 35*  --  30*   < > 29*   CREATININE  --  1.33* 1.65* 1.96* 1.65*  --  1.64*   < > 1.74*   GLUCOSE  --  128* 140* 129* 116*  --  156*   < > 98   CALCIUM  --  10.0 10.3 9.8 9.8  --  10.0   < > 10.0   MAGNESIUM 2.4  --   --   --   --  2.1 2.2  --  2.2    < > = values in this interval not displayed.         Lab 01/09/22  2316 01/09/22  0408 01/08/22  0408 01/07/22  0806 01/06/22 2049 01/05/22  2255 01/05/22  1115 01/05/22  1115   TOTAL PROTEIN 7.9 8.4 7.6 7.8  --  8.1   < > 7.9   ALBUMIN 3.20* 3.30* 3.00* 3.10*  --  3.30*   < > 3.30*   GLOBULIN 4.7 5.1 4.6 4.7  --  4.8   < > 4.6   ALT (SGPT) 43* 49* 42* 36  --  38   < > 36   AST (SGOT) 61* 91* 99* 94*  --  93*   < > 92*   BILIRUBIN 7.5* 5.8* 4.6* 5.2*  --  6.4*   < > 5.9*   BILIRUBIN DIRECT  --   --   --   --  3.2*  --   --   --    ALK  PHOS 229* 264* 247* 249*  --  263*   < > 260*   LIPASE  --   --   --   --   --   --   --  28    < > = values in this interval not displayed.         Lab 01/10/22  0300 01/09/22  0408 01/08/22  0408 01/07/22  0806 01/06/22  0403 01/05/22  1115 01/05/22  1115   PROBNP  --  2,114.0*  --   --   --   --  1,074.0*   TROPONIN T  --   --   --   --   --   --  0.026   PROTIME 39.0* 26.0* 20.8* 21.9* 27.3*   < > 26.0*   INR 4.26* 2.51* 1.87* 2.01* 2.67*   < > 2.50*    < > = values in this interval not displayed.             Lab 01/09/22  1302 01/09/22  0408 01/05/22  1115   FERRITIN  --   --  172.50   ABO TYPING A   < >  --    RH TYPING Positive   < >  --    ANTIBODY SCREEN Negative  --   --     < > = values in this interval not displayed.         Brief Urine Lab Results  (Last result in the past 365 days)      Color   Clarity   Blood   Leuk Est   Nitrite   Protein   CREAT   Urine HCG        01/05/22 1635             31.3         01/05/22 1635 Yellow   Clear   Small (1+)   Trace   Negative   100 mg/dL (2+)                 Microbiology Results Abnormal     Procedure Component Value - Date/Time    Body Fluid Culture - Body Fluid, Pleural Cavity [[id number]] Collected: 01/07/22 1327    Lab Status: Final result Specimen: Body Fluid from Pleural Cavity Updated: 01/10/22 0607     Body Fluid Culture No growth at 3 days     Gram Stain No WBCs or organisms seen          CT Abdomen Pelvis Without Contrast    Result Date: 1/9/2022  CT Abdomen Pelvis WO INDICATION: Increasing abdominal distention and pain. TECHNIQUE: CT of the abdomen and pelvis without IV contrast. Coronal and sagittal reconstructions were obtained.  Radiation dose reduction techniques included automated exposure control or exposure modulation based on body size. Count of known CT and cardiac nuc med studies performed in previous 12 months: 2. COMPARISON: 1/8/2022 FINDINGS: Pericardial calcifications are unchanged compatible with prior pericarditis. Small right pleural  effusion is similar to prior. There is continued pneumonia in the right lower and right middle lobes. There is some atelectasis in the left lung base. Generalized anasarca is again identified. There is a small amount of ascites similar to prior. There is atherosclerotic disease with no aortic aneurysm. The IVC and iliac veins in the pelvis remains distended which may be secondary to right heart dysfunction. This is unchanged. Gallbladder is filled with stones. No biliary obstruction. There is a nonobstructing stone or vascular calcification in the left kidney. No ureteral stones are seen on either side, and there is no hydronephrosis. Unenhanced solid abdominal organs are otherwise normal. Urinary bladder is decompressed and poorly characterized. Prostate is unremarkable. There is no evidence of acute colitis, and there is no colonic distention. The stomach is normal. There is no small bowel obstruction. There is nothing to suggest acute appendicitis. Multiple venous varicosities are noted in both groins, right worse than left. Patient is status post kyphoplasty of L4, L2, and L1. There are 2 tiny bubbles of potential free intraperitoneal air adjacent to the left lobe of the liver. Etiology is unclear as the bowel appears stable from prior. Surgical consultation and short interval follow-up recommended.     Impression: 1. 2 potential bubbles of free intraperitoneal air adjacent to the left hepatic lobe. Etiology is unclear as the bowel appears stable from prior and there is nothing to suggest a bowel perforation. Surgical consultation and follow-up recommended. 2. Otherwise, no significant change from the recent prior exam. Signer Name: Jorge Rudd MD  Signed: 1/9/2022 3:22 AM  Workstation Name: [email or web address]  [facility]    XR Chest 1 View    Result Date: 1/9/2022  EXAMINATION: XR CHEST 1 VW - 01/09/2022  INDICATION: Dyspnea on exertion.  COMPARISON: 01/08/2022  FINDINGS: Left-sided  single-lead pacemaker is again noted. The heart is enlarged. Vasculature cephalized. There is a diffuse interstitial disease pattern present, generally stable in the upper and mid lungs allowing for different film technique, and mildly increased in the right lung base where there is increased silhouetting of the medial right hemidiaphragm. No pneumothorax or significant effusion is seen.      Impression: 1. Cardiomegaly and mild CHF. 2. Mildly increased right basilar atelectasis versus pneumonia.  DICTATED:   01/09/2022 EDITED/lfs:   01/09/2022        XR Abdomen KUB    Result Date: 1/10/2022  CR Abdomen 1 Vw INDICATION: Abdominal pain. COMPARISON: CT abdomen pelvis earlier same day FINDINGS: AP radiograph(s) of the abdomen. Patient is status post multilevel kyphoplasty in the lumbar spine. Bowel gas pattern is nonspecific with large and small bowel gas noted. No radiopaque foreign bodies.     Impression: Nonspecific but nonobstructive bowel gas pattern. Signer Name: Jorge Rudd MD  Signed: 1/10/2022 12:13 AM  Workstation Name: [facility]  [facility]      Results for orders placed during the hospital encounter of 01/05/22    Adult Transthoracic Echo Complete W/ Cont if Necessary Per Protocol    Interpretation Summary  · Technically difficult study due to body habitus  · Left ventricular systolic function is normal. Estimated left ventricular EF = 60%.  · Abnormal left ventricular septal bounce suggestive of pericardial constriction  · The right ventricular cavity is dilated.  · Moderate tricuspid valve regurgitation is present.  · Estimated right ventricular systolic pressure from tricuspid regurgitation is moderately elevated (45-55 mmHg).      I have reviewed the medications:  Scheduled Meds:ampicillin, 500 mg, Oral, Q6H  apixaban, 5 mg, Oral, Q12H  cefTRIAXone, 2 g, Intravenous, Q24H  dexamethasone, 6 mg, Oral, Daily  insulin lispro, 0-7 Units, Subcutaneous, TID AC  lactulose, 20 g,  Oral, BID  magnesium oxide, 400 mg, Oral, Daily  pantoprazole, 40 mg, Intravenous, BID AC  pharmacy consult - MTM, , Does not apply, Daily  potassium chloride, 30 mEq, Oral, Daily  senna-docusate sodium, 2 tablet, Oral, BID      Continuous Infusions:Pharmacy Consult - Remdesivir,       PRN Meds:.•  albuterol sulfate HFA  •  aluminum-magnesium hydroxide-simethicone  •  benzonatate  •  bisacodyl  •  calcium carbonate  •  dextromethorphan polistirex ER  •  dextrose  •  dextrose  •  docusate sodium  •  glucagon (human recombinant)  •  hepatitis A  •  HYDROmorphone  •  magnesium sulfate **OR** magnesium sulfate in D5W 1g/100mL (PREMIX) **OR** magnesium sulfate  •  melatonin  •  Pharmacy Consult - Remdesivir  •  polyethylene glycol  •  sodium chloride    Assessment/Plan   Assessment & Plan     Active Hospital Problems    Diagnosis  POA   • **Acute on chronic right-sided heart failure (HCC) [I50.813]  Yes   • Cirrhosis (HCC) [K74.60]  Yes   • 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]  Yes   • Stage 3 chronic kidney disease (HCC) [N18.30]  Yes   • Pleural effusion, right [J90]  Yes   • Hepatic steatosis [K76.0]  Yes   • Acute UTI (urinary tract infection) [N39.0]  Yes   • COVID-19 virus infection [U07.1]  Yes   • Presence of cardiac pacemaker [Z95.0]  Yes   • Chronic bilateral severe lower extremity edema [R60.0]  Yes   • Persistent atrial fibrillation/flutter [I48.19]  Yes   • Type 2 diabetes mellitus without complication, without long-term current use of insulin (HCC) [E11.9]  Yes   • Essential hypertension [I10]  Yes   • Severe obstructive sleep apnea, no CPAP [G47.33]  Yes   • Class 3 severe obesity due to excess calories with serious comorbidity and body mass index (BMI) of 40.0 to 44.9 in adult (HCC) [E66.01, Z68.41]  Not Applicable      Resolved Hospital Problems    Diagnosis Date Resolved POA   • Acute pulmonary edema (HCC) [J81.0] 01/05/2022 Yes   • Pneumonia due to COVID-19 virus [U07.1, J12.82] 01/05/2022 Yes   •  Hyponatremia [E87.1] 01/07/2022 Yes   • Elevated procalcitonin [R79.89] 01/07/2022 Yes   • Dyspnea on exertion [R06.00] 01/07/2022 Yes   • Acute on chronic diastolic congestive heart failure (HCC) [I50.33] 01/05/2022 Yes        Brief Hospital Course to date:  Akshat Winkler is a 66 y.o. male  with PMH diastolic HF, afib with recent pacemaker placement, T2DM, elevated LFTs, JOSESITO, morbid obesity, and CKD who presented to the ED with chief complaint of increasing SOB over the last week.   S/p thoracentesis with 2 L of fluid removed     Dyspnea with heart failure  -Concern for constrictive pericarditis  -Status post thoracentesis with 2 L removed, transudate, reaccumulation on CT from 1/9  -COVID positive  -Continue remdesivir and Decadron  -Repeat CT abdomen/pelvis overnight with possible free air.  Likely secondary to peptic ulcer disease  -Start PPI  -Discussed case with Dr. Nunez on 1/9 -patient is high risk candidate would likely not survive anesthesia, any type of surgery.  Discussed with his wife  -discussed again with Dr Justin today, he spoke with his wife today who is agreeable to hospice   -liver biopsy on hold  -hospice consult, hopefully can get his better a little better controlled     Cirrhosis  Possible congestive hepatopathy  -Continue Rocephin empirically     UTI  -Enterococcus, continue ampicillin        A. Fib  -Recent pacemaker placed 2 weeks ago  -Continue Eliquis  -Cardiology following     CKD  - recheck in am     Hyponatremia  -Secondary to volume overload  -hold further lab draws once hospice eval completed     Diabetes  - continue SSI     Morbid obesity    DVT prophylaxis:  Medical DVT prophylaxis orders are present.       AM-PAC 6 Clicks Score (PT): 17 (01/08/22 0800)    Disposition: I expect the patient to be discharged pending hospice eval, likely inpatient hospice    CODE STATUS:   Code Status and Medical Interventions:   Ordered at: 01/09/22 1333     Medical Intervention Limits:    NO  intubation (DNI)     Level Of Support Discussed With:    Health Care Surrogate     Code Status (Patient has no pulse and is not breathing):    No CPR (Do Not Attempt to Resuscitate)     Medical Interventions (Patient has pulse or is breathing):    Limited Support     Comments:    his wife, alin Bowers, DO  01/10/22

## 2022-01-10 NOTE — DISCHARGE PLACEMENT REQUEST
"Akshat Winkler MYRON (66 y.o. Male)             Date of Birth Social Security Number Address Home Phone MRN    1955  [address] [id number] [phone] [id number]    [employer] Marital Status             P[employer]        Admission Date Admission Type Admitting Provider Attending Provider Department, Room/Bed    1/5/22 Emergency Patricia Bowers DO Carroll, Meghan C, DO [address], [address]/1    Discharge Date Discharge Disposition Discharge Destination                         Attending Provider: Patricia Bowers DO    Allergies: Bisoprolol, Flecainide, Metoprolol, Tikosyn [Dofetilide]    Isolation: Contact Air   Infection: COVID (confirmed) (01/05/22)   Code Status: No CPR   Advance Care Planning Activity    Ht: 175.3 cm (69\")   Wt: 142 kg (313 lb 4.8 oz)    Admission Cmt: None   Principal Problem: Acute on chronic right-sided heart failure (HCC) [I50.813] More...                 Active Insurance as of 1/5/2022     Primary Coverage     Payor Plan Insurance Group Employer/Plan Group    ANTHEM BLUE CROSS AN[employer]M BLUE CROSS BLUE [employer]IELD PPO [id number]     Payor Plan Address Payor Plan Phone Number Payor Plan Fax Number Effective Dates    [address] [phone]  9/1/2021 - None Entered    [place] [address]       Subscriber Name Subscriber Birth Date Member ID       AKSHAT WINKLER 1955 [id number]                 Emergency Contacts      (Rel.) Home Phone Work Phone Mobile Phone    Shama Winkler (Spouse) [phone] -- [phone]            Emergency Contact Information     Name Relation Home Work Mobile    Shama Winkler Spouse [phone]          Insurance Information                ANTHEM BLUE CROSS/ANTHEM BLUE CROSS BLUE SHIELD PPO Phone: [phone]    Subscriber: Akshat Winkler Subscriber#: [id number]    Group#: [id number] Precert#: [id number]             History & Physical      [place] MD at 01/05/22 1840 "              [facility] Medicine Services  HISTORY AND PHYSICAL    Patient Name: Akshat Winkler  : 1955  MRN: [id number]  Primary Care Physician: Saurabh Medina PA  Date of admission: 2022    Subjective   Subjective     Chief Complaint:  Dyspnea    HPI:  Akshat Winkler is a 66 y.o. male with PMH diastolic HF, afib with recent pacemaker placement, T2DM, elevated LFTs, JOSESITO, morbid obesity, and CKD who presented to the ED with chief complaint of increasing SOB over the last week.  He denies fever, myalgias or GI symptoms.  ED eval revealed COVID +.  Pt is fully vaccinated  He was discharged from hospital 21 after ablation and pacemaker placement, but didn't take diuretics after dc because he was unsure what to take per afib clinic note on 21 and he received IV Lasix and restarted home bumex and aldactone after that visit.  His symptoms and edema improved from that.        COVID Details:    Symptoms:    [] NONE [] Fever []  Cough [x] Shortness of breath [] Change in taste/smell    Review of Systems   Constitutional: Positive for activity change, chills and fatigue. Negative for fever.   HENT: Negative for congestion, hearing loss and sore throat.    Eyes: Negative for visual disturbance.   Respiratory: Positive for shortness of breath. Negative for cough and wheezing.    Cardiovascular: Positive for leg swelling. Negative for chest pain and palpitations.   Gastrointestinal: Positive for abdominal distention. Negative for abdominal pain, constipation, diarrhea, nausea and vomiting.   Genitourinary: Positive for frequency. Negative for decreased urine volume, dysuria and hematuria.   Musculoskeletal: Negative for back pain and myalgias.   Skin: Negative for wound.   Neurological: Negative for dizziness, weakness and light-headedness.   Psychiatric/Behavioral: Negative for confusion.        All other systems reviewed and are negative.     Personal History     Past Medical  History:   Diagnosis Date   • Acute diastolic HF (heart failure) ([facility])    • Acute hypokalemia    • Arrhythmia    • Back injury     Fall on 12/20/17   • Cellulitis of leg    • Chest pain syndrome    • CHF (congestive heart failure) ([facility])    • Deep vein thrombosis ([facility]) 1995   • Diabetes mellitus ([facility])     patient reports borderline    • Hyperlipidemia    • Hypertension    • Muscle pain     around back   • Obesity    • Obstructive sleep apnea     right now patient is sleeeping in recliner and does not use-when he lies down he will have to use cpap   • Paroxysmal A-fib ([facility])    • Peripheral artery disease ([facility])    • Spondylosis of 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 or radiculopathy 2/7/2018   • Wears glasses        Past Surgical History:   Procedure Laterality Date   • CARDIAC CATHETERIZATION     • CARDIAC ELECTROPHYSIOLOGY PROCEDURE N/A 12/20/2021    Procedure: AV node ablation + PM implant;  Surgeon: Joaquin Jeffery MD;  Location: [facility];  Service: Cardiovascular;  Laterality: N/A;   • CARDIAC ELECTROPHYSIOLOGY PROCEDURE N/A 12/20/2021    Procedure: AV node ablation;  Surgeon: Joaquin Jeffery MD;  Location: [facility] LOCATION;  Service: Cardiovascular;  Laterality: N/A;   • CARDIOVERSION      multiple   • CARDIOVERSION     • COLONOSCOPY      about 14 years ago   • KYPHOPLASTY N/A 3/5/2018    Procedure: KYPHOPLASTY L4;  Surgeon: Akshat Davidson MD;  Location:  [facility];  Service:    • KYPHOPLASTY N/A 4/16/2018    Procedure: KYPHOPLASTY L1 AND L2;  Surgeon: Akshat Davidson MD;  Location:  [facility];  Service: Neurosurgery   • OTHER SURGICAL HISTORY  06/29/2015    PVA       Family History: family history includes Alzheimer's disease in his father; Heart disease in his maternal grandmother; Hyperlipidemia in his father; Hypertension in his father and mother; Prostate cancer in his brother; Sleep disorder in his father; Stroke in his father and mother. Otherwise pertinent FHx was  reviewed and unremarkable.     Social History:  reports that he has never smoked. He has never used smokeless tobacco. He reports that he does not drink alcohol and does not use drugs.  Social History     Social History Narrative    Patient lives at home with his wife and denies caffeine intake.    Works in service at [email or web address]       Medications:  Vasculera, acetaminophen, apixaban, bumetanide, diphenhydrAMINE-acetaminophen, magnesium oxide, multivitamin with minerals, potassium chloride, rosuvastatin, and spironolactone    Allergies   Allergen Reactions   • Bisoprolol Other (See Comments)     Severe Hypotension   • Flecainide Other (See Comments)     Syncope / collapse   • Metoprolol Other (See Comments)      Bradycardia, Severe Hypotension   • Tikosyn [Dofetilide] Other (See Comments)     Wide complex tachycardia       Objective   Objective     Vital Signs:   Temp:  [96.3 °F (35.7 °C)-98.2 °F (36.8 °C)] 97.1 °F (36.2 °C)  Heart Rate:  [79-80] 80  Resp:  [16-20] 16  BP: (104-134)/(53-90) 107/79  Flow (L/min):  [2] 2    Physical Exam   Constitutional: Awake, alert  Eyes: PERRLA, icteric sclerae, no conjunctival injection  HENT: NCAT, mucous membranes moist  Neck: Supple, no thyromegaly, no lymphadenopathy, trachea midline  Respiratory: Decreased in bases, no wheezing/rhonchi noted, nonlabored respirations.  Some dyspnea with conversation  Cardiovascular: RRR, paced, no murmurs, rubs, or gallops, palpable pedal pulses bilaterally  Gastrointestinal: Positive bowel sounds, soft, nontender, nondistended, obese  Musculoskeletal: 2+bilateral ankle edema, no clubbing or cyanosis to extremities  Psychiatric: Appropriate affect, cooperative  Neurologic: Oriented x 3, CHENG, speech clear  Skin: No rashes      Results Reviewed:  I have personally reviewed most recent indicated data and agree with findings including:  [x]  Laboratory  [x]  Radiology  [x]  EKG/Telemetry  []  Pathology  []  Cardiac/Vascular Studies  [x]  Old  records  []  Other:  Most pertinent findings include:      LAB RESULTS:      Lab 01/05/22  2256 01/05/22  1115   WBC 6.54 7.14   HEMOGLOBIN 15.2 14.4   HEMATOCRIT 46.0 44.2   PLATELETS 128* 115*   NEUTROS ABS  --  5.82   IMMATURE GRANS (ABS)  --  0.03   LYMPHS ABS  --  0.37*   MONOS ABS  --  0.77   EOS ABS  --  0.10   .4* 101.4*   CRP  --  5.80*   PROCALCITONIN  --  0.48*   PROTIME  --  26.0*         Lab 01/05/22  1115   SODIUM 132*   POTASSIUM 4.7   CHLORIDE 95*   CO2 25.0   ANION GAP 12.0   BUN 29*   CREATININE 1.74*   GLUCOSE 98   CALCIUM 10.0   MAGNESIUM 2.2         Lab 01/05/22  1115   TOTAL PROTEIN 7.9   ALBUMIN 3.30*   GLOBULIN 4.6   ALT (SGPT) 36   AST (SGOT) 92*   BILIRUBIN 5.9*   ALK PHOS 260*   LIPASE 28         Lab 01/05/22  1115   PROBNP 1,074.0*   TROPONIN T 0.026   PROTIME 26.0*   INR 2.50*             Lab 01/05/22  1115   FERRITIN 172.50         Brief Urine Lab Results  (Last result in the past 365 days)      Color   Clarity   Blood   Leuk Est   Nitrite   Protein   CREAT   Urine HCG        01/05/22 1635 Yellow   Clear   Small (1+)   Trace   Negative   100 mg/dL (2+)               Microbiology Results (last 10 days)     Procedure Component Value - Date/Time    COVID-19, FLU A/B, RSV PCR - Swab, Nasopharynx [[id number]]  (Abnormal) Collected: 01/05/22 1556    Lab Status: Final result Specimen: Swab from Nasopharynx Updated: 01/05/22 1708     COVID19 Detected     Influenza A PCR Not Detected     Influenza B PCR Not Detected     RSV, PCR Not Detected          XR Chest 1 View    Result Date: 1/5/2022  EXAMINATION: XR CHEST 1 VW-  INDICATION: Upper Abdominal Pain Triage Protocol  TECHNIQUE: Frontal view of the chest  COMPARISON: 12/21/2021  FINDINGS:  Stable cardiomediastinal silhouette demonstrating mild cardiomegaly with unchanged single lead cardiac pacer device and left chest pulse generator. Perihilar vascular engorgement and diffuse interstitial prominence compatible with interstitial pulmonary  edema. New mild elevation of the right hemidiaphragm versus small-medium right pleural effusion. No pneumothorax.      Impression:  Interstitial pulmonary edema. Small-medium right pleural effusion versus mild elevation of the right hemidiaphragm from prior exam.  This report was finalized on 1/5/2022 2:02 PM by Juan Dave MD.      US Abdomen Limited    Result Date: 1/5/2022  EXAMINATION: US ABDOMEN LIMITED-  INDICATION: ruq - abnormal LFTs suspect cirrhosis  TECHNIQUE: Transverse and sagittal grayscale sonographic assessment of the right upper quadrant supplemented with color Doppler.  COMPARISON: CT abdomen and pelvis 7/20/2018,  FINDINGS:  The exam is somewhat limited owing to bowel gas, patient body habitus, as well as patient condition with limited mobility and inability to hold respirations.  The visualized portions of the pancreas appear unremarkable.  Increased echogenicity and coarsened echotexture of the liver parenchyma. No discrete hepatic lesion. No intrahepatic ductal dilatation.  The gallbladder contains multiple mobile shadowing dependent gallstones. No evidence of gallbladder wall thickening or pericholecystic fluid.  Normal appearance of the common bile duct measuring 2 mm in diameter.  Normal appearance of the right kidney.  Right pleural effusion.      Impression:  Somewhat limited exam owing to multiple patient factors.  Cholelithiasis without evidence of cholecystitis.  Increased echogenicity and coarsened echotexture of the liver compatible with hepatic steatosis and/or hepatic parenchymal disease.  Right pleural effusion.  This report was finalized on 1/5/2022 5:05 PM by Juan Dave MD.        Results for orders placed during the hospital encounter of 02/27/18    Adult Transthoracic Echo Complete W/ Cont if Necessary Per Protocol    Interpretation Summary  · Technically difficult study due to body habitus  · Left ventricular systolic function is normal. Estimated EF = 60%.  · The  cardiac valves are poorly visualized but there does not appear to be significant stenotic or regurgitant lesions.  · Right ventricular cavity is mild-to-moderately dilated.  · Atrial flutter noted throughout the examination.      Assessment/Plan   Assessment & Plan       Acute on chronic right-sided heart failure (HCC)    Type 2 diabetes mellitus without complication, without long-term current use of insulin (HCC)    Essential hypertension    Severe obstructive sleep apnea, no CPAP    Class 3 severe obesity due to excess calories with serious comorbidity and body mass index (BMI) of 40.0 to 44.9 in adult (HCC)    Persistent atrial fibrillation/flutter    MARKO (acute kidney injury) (HCC)    Hyponatremia    CKD (chronic kidney disease)    Pleural effusion, right    Hepatic steatosis    Elevated procalcitonin    Acute UTI (urinary tract infection)    COVID-19 virus infection      Dyspnea, multifactorial (including covid 19, volume overload/chf, possibly cirrhosis)  A/C DHF  Right sided pleural effusion (on cxr)  COVID 19 pcr +  -bumex 2mg iv tonight, then 1mg iv bid w/ hold parameters  - Cards consult in a.m. & will defer further diuresis orders to cardiology and welcome changes to the current orders (defer echo decision to cards)  -initiate remdesivir & decadron (sats reliably <94% w/ elevated crp), trend inflammatory markers; monitor renal fxn and LFT's  -RA sat 92% during hospitalist service initial exam, not on home O2    Renal insufficiency (Favor MARKO on ckd 3 appears baseline cr 1.3)   Mild hyponatremia  -current cr 1.74 (previous baseline cr ~1.3, within past month creatinine rising gradually to 1.65 on 12/23/21 day previous d/c)  -consider stopping remdesivir if renal function deteriorating  -cpk in a.m.; monitor sodium  -if renal function worsening w/ diuresis, consider nephrology consultation    UTI, poa  -initiate rocephin, follow urine culture    Hx Afib (recent avn ablation & pacemaker 2020)  -recent PPM  placement  -hold eliquis for now (inr 2.5, ? Due to liver dz); daily inr checks, consider restarting if inr <2      Elevated AST & bilirubin  Fatty liver, w/ suggestion cirrhosis on imaging  cholelithiasis  Morbid obesity/JOSESITO  -cpap nightly  -remote ct a/p 2018 suggested possible cirrhosis morphology  -Abd U/s in ED: gallstones, no darwin dil, no gb thickening, fatty liver w/ coarsened echotexture  -bilirubin 4 back in 2018; favor cirrhosis due to fatty liver, possibly exacerbated by right sided chf/congestive hepatopathy  -check acute hepatitis panel  -will consult GI in a.m. for their input    HTN  -stable    T2DM  -SSI, titrate prn  -A1C 6%      Am labs: cbc,cmp,mag, crp, daily inr (holding eliquis); cards & gi consults in a.m.    DVT prophylaxis:  On chronic eliquis, on hold currently (inr 2.5)      Daily Care Communication  Due to current limited visitation policies, an attempt will be made daily to update patient's identified best point-of-contact(s)   Contact: Shama   Relation: wife   Time of communication: 1900   Notes (if applicable): updated on COVID and POC        CODE STATUS:    Code Status (Patient has no pulse and is not breathing): CPR (Attempt to Resuscitate)  Medical Interventions (Patient has pulse or is breathing): Full Support      This note has been completed as part of a split-shared workflow.     Signature: Electronically signed by FAITH Salguero, 01/05/22, 7:44 PM EST      Attending   Admission Attestation       I have seen and examined the patient, performing an independent face-to-face diagnostic evaluation with plan of care reviewed and developed with the advanced practice clinician (APC).      Brief Summary Statement:   Akshat Winkler is a 66 y.o. male w/ hx obesity/josesito, dm2, ckd 3 (baseline cr ~1.2-3 earlier this year, but more recently 1.4-1.5 few weeks ago in mid-December 2021), chronic DHF, parox afib (sintolerant of previous antiarrhythmics) who was admitted here at [facility] to the  cardiology service 12/16/21 through 12/21/21 with worsening chf symptoms and rvr, was placed on milrinone drip, iv diuresis and underwent av joby ablation w/ placement of pacemaker on 12/20/21, was restarted on his eliquis day after discharge but no ace/arb/entresto were prescribed due to renal insufficiency, as creatinine was 1.65 on day of discharge.        Patient presents back to [facility] ED w/ gradually increasing dyspnea, worse w/ exertion of the past week. Has chronic BLE edema unchanged, but does note increased abdominal girth, orthopnea, dry cough. Denies chest pain, fever, chills. In ED oxygen sats consistently ~92% on room air; cxr showed interstitial pulm edema, small-medium right pleural effusion, elevated probnp 1074, creatinine 1.74, sodium 132, total bilirubin 5.9, ast 92, alt 36, alk phos 260; covid 19 pcr +, u/a w/ 1+ bacteria, 13-20 wbc, LE +. Given bumex 2mg iv in ed and admitted to hospitalist service. Initiated rocephin, remdesivir, decadron. In reviewing old records, appears previous ct imaging 2018 showed nodular appearing liver suggesting cirrhosis; and bilirubin has been >4 dating back to 2018 as well.     Remainder of detailed HPI is as noted by APC and has been reviewed and/or edited by me for completeness.    Attending Physical Exam:  Constitutional:Alert, oriented x 3, nontoxic appearing but slightly dyspneic w/ long sentences appearing sitting up on side of bed; no distress, no accessory muscle use  Psych:Normal/appropriate affect  HEENT:NCAT, oropharynx clear, nasal canula in place  Neck: neck supple, full range of motion  Neuro: Face symmetric, speech clear, equal , moves all extremities  Cardiac: RRR; 2+BLE edema  Resp: decreased air movement right lung base on posterior exam, no overt wheezes, slightly dyspneic w/ long sentences, but no distress  GI: abd soft, nontender, morbidly obese  Skin: chronic venous stasis changes BLE w/ 2+ edema  Musculoskeletal/extremities: no cyanosis  of extremities; full range of motiont knees and elbows      Brief Assessment/Plan :  See detailed assessment and plan developed with APC which I have reviewed and/or edited for completeness.        Admission Status: I believe that this patient meets inpatient status due to need for iv diuresis, covid treaments, supplemental oxygen, close monitoring of renal function w/ expected stay > 2 midnights      Lele Mays MD  01/05/22                      Electronically signed by Lele Mays MD at 01/05/22 9974

## 2022-01-10 NOTE — PLAN OF CARE
Goal Outcome Evaluation:  Pt had repeated requests during the night.    PRN pain medication added per patients c/o abdominal pain similar to what he had the previous night. Provider notified and STAT KUB and labs ordered.    Lactic acid critical this shift, provider notified and 500 cc bolus ordered, reflex lactic to be drawn.    Paced rhythm per telemetry.

## 2022-01-11 PROBLEM — I50.84 END STAGE HEART FAILURE: Status: ACTIVE | Noted: 2022-01-01

## 2022-01-11 NOTE — PROGRESS NOTES
Continued Stay Note  [facility]     Patient Name: Akshat Winkler  MRN: [id number]  Today's Date: 1/11/2022    Admit Date: 1/5/2022     Discharge Plan     Row Name 01/11/22 0912       Plan    Plan hospice consult    Plan Comments Attempted to contact spouse again. Left vm.                         Susu Marin RN

## 2022-01-11 NOTE — PROGRESS NOTES
Cardiology update:      Spoke with patient's wife, Shama.  They are in the process of making a will and will be bringing it to the hospital tomorrow as soon as it is ready.  She expects it will be ready in the morning.  She is awaiting a phone call from hospice to begin discussions on transitioning to inpatient hospice care.    BERNARDO Alvarez MD FAC, [facility]  Interventional and General Cardiology

## 2022-01-11 NOTE — PLAN OF CARE
Goal Outcome Evaluation:  Plan of Care Reviewed With: patient           Outcome Summary: BLE edema responding well to light compression with only minimal edema noted today.  Pt with mild reepithelialization of Post calf wound as well. PT will cont with decreased freq of dressing changes to allow for more comfort, but to help limit LE edema.

## 2022-01-11 NOTE — DISCHARGE SUMMARY
[facility] Medicine Services  DISCHARGE SUMMARY    Patient Name: Akshat Winkler  : 1955  MRN: [id number]    Date of Admission: 2022  2:35 PM  Date of Discharge:  2022  Primary Care Physician: Saurabh Medina PA    Consults     Date and Time Order Name Status Description    2022 12:36 AM Inpatient Gastroenterology Consult Completed     2022 12:36 AM Inpatient Cardiology Consult Completed           Hospital Course     Presenting Problem:   Acute pulmonary edema (HCC) [J81.0]    Active Hospital Problems    Diagnosis  POA   • **Acute on chronic right-sided heart failure (HCC) [I50.813]  Yes   • Acute pulmonary edema (HCC) [J81.0]  Yes   • Bowel perforation (HCC) [K63.1]  Clinically Undetermined   • Cirrhosis (HCC) [K74.60]  Yes   • 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]  Yes   • Stage 3 chronic kidney disease (HCC) [N18.30]  Yes   • Pleural effusion, right [J90]  Yes   • Hepatic steatosis [K76.0]  Yes   • Acute UTI (urinary tract infection) [N39.0]  Yes   • COVID-19 virus infection [U07.1]  Yes   • Presence of cardiac pacemaker [Z95.0]  Yes   • Chronic bilateral severe lower extremity edema [R60.0]  Yes   • Persistent atrial fibrillation/flutter [I48.19]  Yes   • Type 2 diabetes mellitus without complication, without long-term current use of insulin (HCC) [E11.9]  Yes   • Essential hypertension [I10]  Yes   • Severe obstructive sleep apnea, no CPAP [G47.33]  Yes   • Class 3 severe obesity due to excess calories with serious comorbidity and body mass index (BMI) of 40.0 to 44.9 in adult (HCC) [E66.01, Z68.41]  Not Applicable      Resolved Hospital Problems    Diagnosis Date Resolved POA   • Acute pulmonary edema (HCC) [J81.0] 2022 Yes   • Pneumonia due to COVID-19 virus [U07.1, J12.82] 2022 Yes   • Hyponatremia [E87.1] 2022 Yes   • Elevated procalcitonin [R79.89] 2022 Yes   • Dyspnea on exertion [R06.00] 2022 Yes   • Acute on chronic diastolic  congestive heart failure (HCC) [I50.33] 01/05/2022 Yes          Hospital Course:  Akshat Winkler is a 66 y.o. male  with PMH diastolic HF, afib with recent pacemaker placement, T2DM, elevated LFTs, JOSESITO, morbid obesity, and CKD who presented to the ED with chief complaint of increasing SOB over the last week.   S/p thoracentesis this admission with 2 L of fluid removed  He subsequently developed acute abdomen, case was discussed with general surgery and patient was poor operative candidate due to multiple comorbidities and end-stage heart failure.  He would not survive anesthesia or surgery.  This was discussed with his wife who was agreeable to hospice.  He is being discharged to inpatient hospice today     Dyspnea with heart failure   Cirrhosis  Possible congestive hepatopathy  UTI  A. Fib  CKD  Hyponatremia  Diabetes  Morbid obesity      Discharge Follow Up Recommendations for outpatient labs/diagnostics:  DC to inpatient hospice    Day of Discharge     HPI:   With significant pain, increased Dilaudid and Ativan prior to hospice admission.    Review of Systems  + abdominal pain, + weakness    Vital Signs:   Temp:  [97.5 °F (36.4 °C)-97.8 °F (36.6 °C)] 97.5 °F (36.4 °C)  Heart Rate:  [80-82] 80  Resp:  [16-20] 20  BP: (119-137)/(79-91) 136/87  Flow (L/min):  [2] 2      Physical Exam:  Constitutional: Mild anxiety secondary to pain, sitting in the chair  HENT: NCAT, mucous membranes moist  Respiratory: Clear to auscultation bilaterally, respiratory effort normal   Cardiovascular: RRR, no murmurs, rubs, or gallops  Gastrointestinal: Distended, tight, diffuse tenderness worse in right upper quadrant Musculoskeletal: Bilateral LE edema, legs wrapped  Psychiatric: Appropriate affect, cooperative  Neurologic: Oriented x 3, speech clear  Skin: No rashes      Pertinent  and/or Most Recent Results     LAB RESULTS:      Lab 01/11/22  0346 01/10/22  0646 01/10/22  0320 01/10/22  0300 01/09/22  1226 01/09/22  0408  01/08/22  0408 01/07/22  0806 01/06/22  0403 01/05/22 2256 01/05/22 2255 01/05/22 1115 01/05/22  1115   WBC  --   --   --  10.61  --  11.44* 8.59 6.64  --  6.54  --    < > 7.14   HEMOGLOBIN  --   --   --  15.7  --  16.2 14.5 14.2  --  15.2  --    < > 14.4   HEMATOCRIT  --   --   --  47.6  --  48.3 43.2 43.2  --  46.0  --    < > 44.2   PLATELETS  --   --   --  86*  --  123* 115* 124*  --  128*  --    < > 115*   NEUTROS ABS  --   --   --  9.55*  --  10.03* 7.63* 5.98  --  5.77  --    < > 5.82   IMMATURE GRANS (ABS)  --   --   --   --   --  0.04 0.03 0.03  --  0.01  --   --  0.03   LYMPHS ABS  --   --   --   --   --  0.12* 0.22* 0.28*  --  0.30*  --   --  0.37*   MONOS ABS  --   --   --   --   --  1.05* 0.56 0.35  --  0.34  --   --  0.77   EOS ABS  --   --   --  0.11  --  0.20 0.14 0.00  --  0.09  --    < > 0.10   MCV  --   --   --  98.3*  --  97.6* 98.9* 99.8*  --  100.4*  --    < > 101.4*   CRP  --   --   --  10.51*  --  5.84* 5.54* 6.37*  --   --  6.26*   < > 5.80*   PROCALCITONIN  --   --   --   --   --  0.56*  --   --   --   --   --   --  0.48*   LACTATE  --  5.2* 4.2*  --  4.6*  --   --   --   --   --   --   --   --    LDH  --   --   --   --   --   --   --  468*  --   --   --   --   --    PROTIME 33.5*  --   --  39.0*  --  26.0* 20.8* 21.9*   < >  --   --   --  26.0*   APTT  --   --   --   --   --  41.8*  --   --   --   --   --   --   --    D DIMER QUANT  --   --   --   --   --   --   --   --   --  2.01*  --   --   --     < > = values in this interval not displayed.         Lab 01/10/22  0300 01/09/22  2316 01/09/22  0408 01/08/22  0408 01/07/22  0806 01/06/22  0403 01/05/22  2255 01/05/22  1115 01/05/22  1115   SODIUM  --  130* 129* 127* 133*  --  134*   < > 132*   POTASSIUM  --  4.8 5.0 4.9 5.2  --  5.0   < > 4.7   CHLORIDE  --  91* 92* 91* 95*  --  96*   < > 95*   CO2  --  21.0* 20.0* 23.0 27.0  --  24.0   < > 25.0   ANION GAP  --  18.0* 17.0* 13.0 11.0  --  14.0   < > 12.0   BUN  --  49* 47* 44* 35*  --   30*   < > 29*   CREATININE  --  1.33* 1.65* 1.96* 1.65*  --  1.64*   < > 1.74*   GLUCOSE  --  128* 140* 129* 116*  --  156*   < > 98   CALCIUM  --  10.0 10.3 9.8 9.8  --  10.0   < > 10.0   MAGNESIUM 2.4  --   --   --   --  2.1 2.2  --  2.2    < > = values in this interval not displayed.         Lab 01/09/22  2316 01/09/22 0408 01/08/22  0408 01/07/22  0806 01/06/22 2049 01/05/22  2255 01/05/22  1115 01/05/22  1115   TOTAL PROTEIN 7.9 8.4 7.6 7.8  --  8.1   < > 7.9   ALBUMIN 3.20* 3.30* 3.00* 3.10*  --  3.30*   < > 3.30*   GLOBULIN 4.7 5.1 4.6 4.7  --  4.8   < > 4.6   ALT (SGPT) 43* 49* 42* 36  --  38   < > 36   AST (SGOT) 61* 91* 99* 94*  --  93*   < > 92*   BILIRUBIN 7.5* 5.8* 4.6* 5.2*  --  6.4*   < > 5.9*   BILIRUBIN DIRECT  --   --   --   --  3.2*  --   --   --    ALK PHOS 229* 264* 247* 249*  --  263*   < > 260*   LIPASE  --   --   --   --   --   --   --  28    < > = values in this interval not displayed.         Lab 01/11/22  0346 01/10/22  0300 01/09/22 0408 01/08/22 0408 01/07/22  0806 01/06/22  0403 01/05/22  1115   PROBNP  --   --  2,114.0*  --   --   --  1,074.0*   TROPONIN T  --   --   --   --   --   --  0.026   PROTIME 33.5* 39.0* 26.0* 20.8* 21.9*   < > 26.0*   INR 3.50* 4.26* 2.51* 1.87* 2.01*   < > 2.50*    < > = values in this interval not displayed.             Lab 01/09/22  1302 01/09/22  0408 01/05/22  1115   FERRITIN  --   --  172.50   ABO TYPING A   < >  --    RH TYPING Positive   < >  --    ANTIBODY SCREEN Negative  --   --     < > = values in this interval not displayed.         Brief Urine Lab Results  (Last result in the past 365 days)      Color   Clarity   Blood   Leuk Est   Nitrite   Protein   CREAT   Urine HCG        01/05/22 1635             31.3         01/05/22 1635 Yellow   Clear   Small (1+)   Trace   Negative   100 mg/dL (2+)               Microbiology Results (last 10 days)     Procedure Component Value - Date/Time    Body Fluid Culture - Body Fluid, Pleural Cavity  [[id number]] Collected: 01/07/22 1327    Lab Status: Final result Specimen: Body Fluid from Pleural Cavity Updated: 01/10/22 0607     Body Fluid Culture No growth at 3 days     Gram Stain No WBCs or organisms seen    Urine Culture - Urine, Urine, Clean Catch [[id number]]  (Abnormal)  (Susceptibility) Collected: 01/05/22 1635    Lab Status: Final result Specimen: Urine, Clean Catch Updated: 01/07/22 1118     Urine Culture >100,000 CFU/mL Enterococcus faecalis    Susceptibility      Enterococcus faecalis     GOPAL     Ampicillin Susceptible     Levofloxacin Susceptible     Nitrofurantoin Susceptible     Tetracycline Resistant     Vancomycin Susceptible                         COVID-19, FLU A/B, RSV PCR - Swab, Nasopharynx [[id number]]  (Abnormal) Collected: 01/05/22 1556    Lab Status: Final result Specimen: Swab from Nasopharynx Updated: 01/05/22 1708     COVID19 Detected     Influenza A PCR Not Detected     Influenza B PCR Not Detected     RSV, PCR Not Detected          Adult Transthoracic Echo Complete W/ Cont if Necessary Per Protocol    Addendum Date: 1/9/2022    · Technically difficult study due to body habitus · Left ventricular systolic function is normal. Estimated left ventricular EF = 60%. · Abnormal left ventricular septal bounce suggestive of pericardial constriction · The right ventricular cavity is dilated. · Moderate tricuspid valve regurgitation is present. · Estimated right ventricular systolic pressure from tricuspid regurgitation is moderately elevated (45-55 mmHg).      Result Date: 1/9/2022  · Technically difficult study · Left ventricular systolic function is normal. Estimated left ventricular EF = 60%. · The right ventricular cavity is dilated. · The right atrial cavity is mild to moderately dilated. · Moderate tricuspid valve regurgitation is present. · Estimated right ventricular systolic pressure from tricuspid regurgitation is moderately elevated (45-55 mmHg).      CT Abdomen Pelvis Without  Contrast    Result Date: 1/10/2022  EXAMINATION: CT CHEST WO CONTRAST DIAGNOSTIC, CT ABDOMEN/ PELVIS WO CONTRAST - 01/08/2022  INDICATION:  J81.0-Acute pulmonary edema; K75.81-Nonalcoholic steatohepatitis (SHERMAN); K74.60-Unspecified cirrhosis of liver. Shortness of breath and chest pain.  TECHNIQUE: Multiple axial CT imaging is obtained of the chest, abdomen and pelvis without the administration of oral or intravenous contrast.  The radiation dose reduction device was turned on for each scan per the ALARA (As Low as Reasonably Achievable) protocol.  COMPARISON: 12/22/2017 and 7/20/2018  FINDINGS:  CHEST: Thyroid is homogeneous. There is mild enlargement identified of the lymph nodes in the mediastinum. There is calcification seen of the pericardium. There is no pericardial effusion. There is a pleural effusion identified small in size on the right with ill-defined opacification and dense airspace disease in the right middle and lower lung fields. The left lung reveals mild atelectatic change with calcified granuloma identified in the lingula. There is sparing of the right upper lung field. Degenerative changes seen within the spine. There is no adenopathy identified in the axillary regions.  ABDOMEN: Some anasarca seen in the subcutaneous tissues. The liver is homogeneous. The gallbladder is filled with stones. There is enlargement identified of the iliac vessels and IVC. Findings raise the concern for possible elevated right atrium pressure. Clinical correlation is needed. Extensive varices identified within the mesentery. Kidneys and adrenal glands within normal limits. Pancreas is homogeneous. No renal or adrenal abnormality present. Some vascular calcification seen within the abdominal aorta and iliac vessels.  PELVIS: Distention seen of the iliac vessels. There are varices identified in the inguinal regions bilaterally. No pelvic adenopathy. No free fluid or free air. No abnormal mass or fluid collections  identified.      There is extensive dilatation identified of the IVC and iliac vessels raising the concern for elevated right atrium pressure. There is calcification seen of the pericardium. There is a small pleural effusion identified on the right with airspace disease and infiltrate in the right middle and lower lung field. Anasarca seen throughout the subcutaneous tissues of the abdomen and lower extremities with varices seen within the inguinal regions bilaterally.  DICTATED:   01/08/2022 EDITED/lfs:   01/08/2022  This report was finalized on 1/10/2022 1:39 PM by Dr. Juliane Bird MD.      CT Abdomen Pelvis Without Contrast    Result Date: 1/9/2022  CT Abdomen Pelvis WO INDICATION: Increasing abdominal distention and pain. TECHNIQUE: CT of the abdomen and pelvis without IV contrast. Coronal and sagittal reconstructions were obtained.  Radiation dose reduction techniques included automated exposure control or exposure modulation based on body size. Count of known CT and cardiac nuc med studies performed in previous 12 months: 2. COMPARISON: 1/8/2022 FINDINGS: Pericardial calcifications are unchanged compatible with prior pericarditis. Small right pleural effusion is similar to prior. There is continued pneumonia in the right lower and right middle lobes. There is some atelectasis in the left lung base. Generalized anasarca is again identified. There is a small amount of ascites similar to prior. There is atherosclerotic disease with no aortic aneurysm. The IVC and iliac veins in the pelvis remains distended which may be secondary to right heart dysfunction. This is unchanged. Gallbladder is filled with stones. No biliary obstruction. There is a nonobstructing stone or vascular calcification in the left kidney. No ureteral stones are seen on either side, and there is no hydronephrosis. Unenhanced solid abdominal organs are otherwise normal. Urinary bladder is decompressed and poorly characterized. Prostate is  unremarkable. There is no evidence of acute colitis, and there is no colonic distention. The stomach is normal. There is no small bowel obstruction. There is nothing to suggest acute appendicitis. Multiple venous varicosities are noted in both groins, right worse than left. Patient is status post kyphoplasty of L4, L2, and L1. There are 2 tiny bubbles of potential free intraperitoneal air adjacent to the left lobe of the liver. Etiology is unclear as the bowel appears stable from prior. Surgical consultation and short interval follow-up recommended.     1. 2 potential bubbles of free intraperitoneal air adjacent to the left hepatic lobe. Etiology is unclear as the bowel appears stable from prior and there is nothing to suggest a bowel perforation. Surgical consultation and follow-up recommended. 2. Otherwise, no significant change from the recent prior exam. Signer Name: Jorge Rudd MD  Signed: 1/9/2022 3:22 AM  Workstation [facility]: [email or web address]  [facility]    CT Chest Without Contrast Diagnostic    Result Date: 1/10/2022  EXAMINATION: CT CHEST WO CONTRAST DIAGNOSTIC, CT ABDOMEN/ PELVIS WO CONTRAST - 01/08/2022  INDICATION:  J81.0-Acute pulmonary edema; K75.81-Nonalcoholic steatohepatitis (SHERMAN); K74.60-Unspecified cirrhosis of liver. Shortness of breath and chest pain.  TECHNIQUE: Multiple axial CT imaging is obtained of the chest, abdomen and pelvis without the administration of oral or intravenous contrast.  The radiation dose reduction device was turned on for each scan per the ALARA (As Low as Reasonably Achievable) protocol.  COMPARISON: 12/22/2017 and 7/20/2018  FINDINGS:  CHEST: Thyroid is homogeneous. There is mild enlargement identified of the lymph nodes in the mediastinum. There is calcification seen of the pericardium. There is no pericardial effusion. There is a pleural effusion identified small in size on the right with ill-defined opacification and dense airspace disease in  the right middle and lower lung fields. The left lung reveals mild atelectatic change with calcified granuloma identified in the lingula. There is sparing of the right upper lung field. Degenerative changes seen within the spine. There is no adenopathy identified in the axillary regions.  ABDOMEN: Some anasarca seen in the subcutaneous tissues. The liver is homogeneous. The gallbladder is filled with stones. There is enlargement identified of the iliac vessels and IVC. Findings raise the concern for possible elevated right atrium pressure. Clinical correlation is needed. Extensive varices identified within the mesentery. Kidneys and adrenal glands within normal limits. Pancreas is homogeneous. No renal or adrenal abnormality present. Some vascular calcification seen within the abdominal aorta and iliac vessels.  PELVIS: Distention seen of the iliac vessels. There are varices identified in the inguinal regions bilaterally. No pelvic adenopathy. No free fluid or free air. No abnormal mass or fluid collections identified.      There is extensive dilatation identified of the IVC and iliac vessels raising the concern for elevated right atrium pressure. There is calcification seen of the pericardium. There is a small pleural effusion identified on the right with airspace disease and infiltrate in the right middle and lower lung field. Anasarca seen throughout the subcutaneous tissues of the abdomen and lower extremities with varices seen within the inguinal regions bilaterally.  DICTATED:   01/08/2022 EDITED/lfs:   01/08/2022  This report was finalized on 1/10/2022 1:39 PM by Dr. Juliane Bird MD.      XR Chest 1 View    Result Date: 1/11/2022  CR Chest 1 Vw INDICATION: Dyspnea with acute pulmonary edema. Nonalcoholic steatohepatitis. COMPARISON:  1/9/2022 FINDINGS: Portable AP view(s) of the chest. No visible support lines. Single lead pacemaker unchanged. Continued cardiomegaly with aortic atherosclerotic changes.  Diffuse haziness over both lungs may represent a combination of interstitial alveolar edema with diffuse pulmonary vascular congestion. Slight blunting of the right CP angle may represent small effusion. No definite consolidation. No pneumothorax. Partially visualized vertebroplasty changes lumbar spine.     Continued cardiomegaly with combination of findings suggesting CHF with mild edema. Questionable developing small right pleural effusion. Signer Name: YOUNG Avendano MD  Signed: 1/11/2022 8:00 AM  Workstation Name: [email or web address]  [facility]    XR Chest 1 View    Result Date: 1/10/2022   EXAMINATION: XR CHEST 1 VW - 01/08/2022  INDICATION: Dyspnea on exertion.  COMPARISON: 01/07/2022  FINDINGS: Portable chest reveals pacer leads in satisfactory position. Heart is borderline enlarged. Increased markings seen at the lung bases bilaterally with no definite pleural effusion. Heart is enlarged with degenerative changes seen within the spine. Pulmonary vascularity is within normal limits.       Minimal increased markings in the lung bases bilaterally. The heart is enlarged with low lung volumes bilaterally.  DICTATED:   01/08/2022 EDITED/lfs:   01/08/2022  This report was finalized on 1/10/2022 1:39 PM by Dr. Juliane Bird MD.      XR Chest 1 View    Result Date: 1/10/2022  EXAMINATION: XR CHEST 1 VW - 01/09/2022  INDICATION: Dyspnea on exertion.  COMPARISON: 01/08/2022  FINDINGS: Left-sided single-lead pacemaker is again noted. The heart is enlarged. Vasculature cephalized. There is a diffuse interstitial disease pattern present, generally stable in the upper and mid lungs allowing for different film technique, and mildly increased in the right lung base where there is increased silhouetting of the medial right hemidiaphragm. No pneumothorax or significant effusion is seen.      1. Cardiomegaly and mild CHF. 2. Mildly increased right basilar atelectasis versus pneumonia.  DICTATED:    01/09/2022 EDITED/lfs:   01/09/2022    This report was finalized on 1/10/2022 9:01 AM by Dr. Ted Wells MD.      XR Chest 1 View    Result Date: 1/7/2022  EXAMINATION: XR CHEST 1 VW-  INDICATION: s/p thora; J81.0-Acute pulmonary edema; K75.81-Nonalcoholic steatohepatitis (SHERMAN); K74.60-Unspecified cirrhosis of liver  COMPARISON: 1/5/2022  FINDINGS: Left-sided single lead pacemaker is noted. Heart is at upper limits of normal size. Vasculature remains cephalized and a diffuse pulmonary interstitial disease pattern appears stable overall. Right pleural effusion appears to have been drained. There is trace amount of remaining intrafissural fluid. No pneumothorax is seen.       1. Interval drainage of right pleural effusion. No evidence of pneumothorax. 2. Stable borderline heart shadow enlargement and mild pulmonary vascular congestion.    This report was finalized on 1/7/2022 1:06 PM by Dr. Ted Wells MD.      XR Chest 1 View    Result Date: 1/5/2022  EXAMINATION: XR CHEST 1 VW-  INDICATION: Upper Abdominal Pain Triage Protocol  TECHNIQUE: Frontal view of the chest  COMPARISON: 12/21/2021  FINDINGS:  Stable cardiomediastinal silhouette demonstrating mild cardiomegaly with unchanged single lead cardiac pacer device and left chest pulse generator. Perihilar vascular engorgement and diffuse interstitial prominence compatible with interstitial pulmonary edema. New mild elevation of the right hemidiaphragm versus small-medium right pleural effusion. No pneumothorax.       Interstitial pulmonary edema. Small-medium right pleural effusion versus mild elevation of the right hemidiaphragm from prior exam.  This report was finalized on 1/5/2022 2:02 PM by Juan Dave MD.      US Abdomen Limited    Result Date: 1/5/2022  EXAMINATION: US ABDOMEN LIMITED-  INDICATION: ruq - abnormal LFTs suspect cirrhosis  TECHNIQUE: Transverse and sagittal grayscale sonographic assessment of the right upper quadrant supplemented with color  Doppler.  COMPARISON: CT abdomen and pelvis 7/20/2018,  FINDINGS:  The exam is somewhat limited owing to bowel gas, patient body habitus, as well as patient condition with limited mobility and inability to hold respirations.  The visualized portions of the pancreas appear unremarkable.  Increased echogenicity and coarsened echotexture of the liver parenchyma. No discrete hepatic lesion. No intrahepatic ductal dilatation.  The gallbladder contains multiple mobile shadowing dependent gallstones. No evidence of gallbladder wall thickening or pericholecystic fluid.  Normal appearance of the common bile duct measuring 2 mm in diameter.  Normal appearance of the right kidney.  Right pleural effusion.       Somewhat limited exam owing to multiple patient factors.  Cholelithiasis without evidence of cholecystitis.  Increased echogenicity and coarsened echotexture of the liver compatible with hepatic steatosis and/or hepatic parenchymal disease.  Right pleural effusion.  This report was finalized on 1/5/2022 5:05 PM by Juan Dave MD.      US Thoracentesis    Result Date: 1/7/2022  DATE OF EXAM: 1/7/2022 10:56 AM  PROCEDURE: US THORACENTESIS-  INDICATIONS: pleural effusion; J81.0-Acute pulmonary edema; K75.81-Nonalcoholic steatohepatitis (SHERMAN); K74.60-Unspecified cirrhosis of liver  COMPARISON: No Comparisons Available  FLUOROSCOPIC TIME: None  PHYSICIAN MONITORED CONSCIOUS SEDATION TIME: None  CONTRAST MEDIA: None  TECHNIQUE: The patient's medications were reviewed prior to the procedure. The procedure, risks and alternatives were discussed and informed written consent was obtained. Maximal sterile barrier technique was utilized throughout the procedure. The patient was placed in the seated position and ultrasound was utilized to localize the right-sided pleural effusion. The skin was marked prepped and draped. Maximal sterile barrier technique was utilized during this procedure. Partial protective equipment was used  secondary to the patient's Covid positive status. The skin was marked prepped draped and anesthetized with 1% Xylocaine. A 5 [place] catheter was inserted in the right pleural space by trocar technique. A total of 2 L of fluid was removed. Appropriate specimens were sent to the lab. The catheter was removed and hemostasis achieved. Postprocedure chest radiograph shows no pneumothorax.       Technically successful ultrasound-guided right thoracentesis with removal of 2 L of fluid.  This report was finalized on 1/7/2022 1:51 PM by Lex Maravilla MD.      XR Abdomen KUB    Result Date: 1/11/2022  CR Abdomen 1 Vw INDICATION: Right lower quadrant pain. Nonalcoholic steatohepatitis. COMPARISON: 1/9/2022 FINDINGS: AP radiograph(s) of the abdomen. No abnormal masses or calcifications. No organomegaly. Abnormal bowel gas pattern with mildly air distended small and large bowel suggesting ileus over a high-grade obstruction. Multilevel vertebroplasty with bone cement in expected position. Single lead pacemaker lead. Curvilinear calcifications projecting over the inferior left heart border could represent ventricular wall calcification.     Continued nonspecific bowel gas pattern with increased small and large bowel gas suggesting ileus. Overall no significant change. Signer Name: YOUNG Avendano MD  Signed: 1/11/2022 8:02 AM  Workstation Name: [facility]  [facility]    XR Abdomen KUB    Result Date: 1/10/2022  CR Abdomen 1 Vw INDICATION: Abdominal pain. COMPARISON: CT abdomen pelvis earlier same day FINDINGS: AP radiograph(s) of the abdomen. Patient is status post multilevel kyphoplasty in the lumbar spine. Bowel gas pattern is nonspecific with large and small bowel gas noted. No radiopaque foreign bodies.     Nonspecific but nonobstructive bowel gas pattern. Signer Name: Jorge Rudd MD  Signed: 1/10/2022 12:13 AM  Workstation Name: [facility]  [facility]      Results for  orders placed in visit on 08/28/19    SCANNED VASCULAR STUDIES      Results for orders placed in visit on 08/28/19    SCANNED VASCULAR STUDIES      Results for orders placed during the hospital encounter of 01/05/22    Adult Transthoracic Echo Complete W/ Cont if Necessary Per Protocol    Interpretation Summary  · Technically difficult study due to body habitus  · Left ventricular systolic function is normal. Estimated left ventricular EF = 60%.  · Abnormal left ventricular septal bounce suggestive of pericardial constriction  · The right ventricular cavity is dilated.  · Moderate tricuspid valve regurgitation is present.  · Estimated right ventricular systolic pressure from tricuspid regurgitation is moderately elevated (45-55 mmHg).      Plan for Follow-up of Pending Labs/Results: DC to hospice    Discharge Details        Discharge Medications      ASK your doctor about these medications      Instructions Start Date   acetaminophen 325 MG tablet  Commonly known as: TYLENOL   650 mg, Oral, Every 6 Hours PRN, OTC      apixaban 5 MG tablet tablet  Commonly known as: Eliquis   5 mg, Oral, Every 12 Hours, Patient did not start until 12/22/2021      bumetanide 2 MG tablet  Commonly known as: BUMEX   2 mg, Oral, Daily      diphenhydrAMINE-acetaminophen  MG tablet per tablet  Commonly known as: TYLENOL PM   1 tablet, Oral, Nightly PRN, OTC      magnesium oxide 400 MG tablet  Commonly known as: MAG-OX   400 mg, Oral, Daily      multivitamin with minerals tablet tablet   1 tablet, Oral, Daily      potassium chloride 10 MEQ CR tablet   30 mEq, Oral, Daily      rosuvastatin 20 MG tablet  Commonly known as: CRESTOR   20 mg, Oral, Daily      spironolactone 100 MG tablet  Commonly known as: Aldactone   50 mg, Oral, Daily      Vasculera tablet   1 tablet, Oral, Daily             Allergies   Allergen Reactions   • Bisoprolol Other (See Comments)     Severe Hypotension   • Flecainide Other (See Comments)     Syncope /  collapse   • Metoprolol Other (See Comments)      Bradycardia, Severe Hypotension   • Tikosyn [Dofetilide] Other (See Comments)     Wide complex tachycardia         Discharge Disposition:      Diet:  Hospital:  Diet Order   Procedures   • Diet Clear Liquid            CODE STATUS:    Code Status and Medical Interventions:   Ordered at: 01/09/22 1333     Medical Intervention Limits:    NO intubation (DNI)     Level Of Support Discussed With:    Health Care Surrogate     Code Status (Patient has no pulse and is not breathing):    No CPR (Do Not Attempt to Resuscitate)     Medical Interventions (Patient has pulse or is breathing):    Limited Support     Comments:    his wife, alin       Future Appointments   Date Time Provider Department Center   1/19/2022  8:15 AM Saurabh Medina PA [facility]   2/1/2022 11:00 AM Courtney Carmichael APRN [facility]   3/21/2022 10:15 AM Jose Manuel Figueroa PA [facility]   5/25/2022  9:00 AM Joaquin Jeffery MD [facility]                 Patricia Bowers DO  01/11/22      Time Spent on Discharge:  I spent 41 minutes on this discharge activity which included: face-to-face encounter with the patient, reviewing the data in the system, coordination of the care with the nursing staff as well as consultants, documentation, and entering orders.

## 2022-01-11 NOTE — THERAPY WOUND CARE TREATMENT
Acute Care - Wound/Debridement Treatment Note   Danilo     Patient Name: Akshat Winkler  : 1955  MRN: [id number]  Today's Date: 2022                Admit Date: 2022    Visit Dx:    ICD-10-CM ICD-9-CM   1. Acute pulmonary edema (HCC)  J81.0 518.4   2. Liver cirrhosis secondary to SHERMAN (HCC)  K75.81 571.8    K74.60 571.5       Patient Active Problem List   Diagnosis   • Acute on chronic right-sided heart failure (HCC)   • Type 2 diabetes mellitus without complication, without long-term current use of insulin (HCC)   • Hyperlipidemia LDL goal <70   • Essential hypertension   • Coronary artery disease involving native coronary artery of native heart with angina pectoris (HCC)   • Severe obstructive sleep apnea, no CPAP   • Class 3 severe obesity due to excess calories with serious comorbidity and body mass index (BMI) of 40.0 to 44.9 in adult (HCC)   • Persistent atrial fibrillation/flutter   • Spondylosis of 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 or radiculopathy   • Lumbar stenosis with neurogenic claudication   • Lumbar disc herniation   • Myofascial pain   • Chronic bilateral severe lower extremity edema   • Mechanical low back pain   • Presence of cardiac pacemaker   • Stage 3 chronic kidney disease (HCC)   • Pleural effusion, right   • Hepatic steatosis   • Acute UTI (urinary tract infection)   • COVID-19 virus infection   • Cirrhosis (HCC)   • Constrictive pericarditis   • Acute pulmonary edema (HCC)   • Bowel perforation (HCC)        Past Medical History:   Diagnosis Date   • Acute diastolic HF (heart failure) (HCC)    • Acute hypokalemia    • Arrhythmia    • Back injury     Fall on 19   • Cellulitis of leg    • Chest pain syndrome    • CHF (congestive heart failure) (HCC)    • Compression fracture of lumbar vertebra (HCC) 2017   • Deep vein thrombosis (HCC)    • Diabetes mellitus (HCC)     patient reports borderline    • History of kyphoplasty 10/16/2018   • Hyperlipidemia    •  Hypertension    • Muscle pain     around back   • Obesity    • Obstructive sleep apnea     right now patient is sleeeping in recliner and does not use-when he lies down he will have to use cpap   • Paroxysmal A-fib (HCC)    • Peripheral artery disease (HCC)    • Spondylosis of 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 or radiculopathy 2/7/2018   • Wears glasses         Past Surgical History:   Procedure Laterality Date   • CARDIAC CATHETERIZATION     • CARDIAC ELECTROPHYSIOLOGY PROCEDURE N/A 12/20/2021    Procedure: AV node ablation + PM implant;  Surgeon: Joaquin Jeffery MD;  Location:  [facility];  Service: Cardiovascular;  Laterality: N/A;   • CARDIAC ELECTROPHYSIOLOGY PROCEDURE N/A 12/20/2021    Procedure: AV node ablation;  Surgeon: Joaquin Jeffery MD;  Location:  [facility];  Service: Cardiovascular;  Laterality: N/A;   • CARDIOVERSION      multiple   • CARDIOVERSION     • COLONOSCOPY      about 14 years ago   • KYPHOPLASTY N/A 3/5/2018    Procedure: KYPHOPLASTY L4;  Surgeon: Akshat Davidson MD;  Location:  [facility];  Service:    • KYPHOPLASTY N/A 4/16/2018    Procedure: KYPHOPLASTY L1 AND L2;  Surgeon: Akshat Davidson MD;  Location:  [facility];  Service: Neurosurgery   • OTHER SURGICAL HISTORY  06/29/2015    PVA           Wound 03/04/21 0845 Right posterior calf Venous Ulcer (Active)   Dressing Appearance intact; moist drainage 01/11/22 1000   Base moist; pink 01/11/22 1000   Periwound intact; moist 01/11/22 1000   Periwound Temperature warm 01/11/22 1000   Periwound Skin Turgor firm 01/11/22 1000   Edges irregular 01/11/22 1000   Drainage Characteristics/Odor serosanguineous 01/11/22 1000   Drainage Amount small 01/11/22 1000   Care, Wound irrigated with; sterile normal saline 01/11/22 1000   Dressing Care foam; low-adherent; silver impregnated 01/11/22 1000   Periwound Care cleansed with pH balanced cleanser 01/11/22 1000      Lymphedema     Row Name 01/11/22 1000              Lymphedema Edema Assessment    Ptting Edema Category By severity  -      Pitting Edema Mild  -              Compression/Skin Care    Compression/Skin Care skin care; wrapping location; bandaging  -      Skin Care washed/dried; lotion applied  -      Wrapping Location lower extremity  -      Wrapping Location LE bilateral:; foot to knee  -      Wrapping Comments mepilex Ag foam with cast padding to secure and size 4/5 compressogrip doubled and overlapping.  -      Bandage Layers padding/fluff layer; cotton elastic stocking- double layer (comment size)  -            User Key  (r) = Recorded By, (t) = Taken By, (c) = Cosigned By    Initials Name Provider Type     Juni Samuel, PT Physical Therapist                WOUND DEBRIDEMENT  Total area of Debridement: ~5cm2  Debridement Site 1  Location- Site 1: BLE wounds / scattered scabbed areas to LEs.  Selective Debridement- Site 1: Wound Surface <20cmsq  Instruments- Site 1: tweezers  Excised Tissue Description- Site 1: minimum, slough  Bleeding- Site 1: none               PT Assessment (last 12 hours)     PT Evaluation and Treatment     Row Name 01/11/22 1000          Physical Therapy Time and Intention    Subjective Information complains of; weakness; fatigue  -     Document Type therapy note (daily note); wound care  -     Mode of Treatment individual therapy; physical therapy  -     Row Name 01/11/22 1000          Pain    Additional Documentation Pain Scale: FACES Pre/Post-Treatment (Group)  -     Row Name 01/11/22 1000          Pain Scale: Word Pre/Post-Treatment    Pain Intervention(s) Repositioned  -     Row Name 01/11/22 1000          Pain Scale: FACES Pre/Post-Treatment    Pain: FACES Scale, Pretreatment 2-->hurts little bit  -     Posttreatment Pain Rating 2-->hurts little bit  -     Pain Location - Side Bilateral  -     Pain Location - Orientation lower  -     Pain Location extremity  -     Row Name 01/11/22 1000           Wound 03/04/21 0845 Right posterior calf Venous Ulcer    Wound - Properties Group Placement Date: 03/04/21  - Placement Time: 0845  - Present on Hospital Admission: Y  - Side: Right  - Orientation: posterior  - Location: calf  -MC Primary Wound Type: Venous ulcer  -MC     Dressing Appearance intact; moist drainage  -     Base moist; pink  -MF     Periwound intact; moist  -MF     Periwound Temperature warm  -     Periwound Skin Turgor firm  -     Edges irregular  -MF     Drainage Characteristics/Odor serosanguineous  -MF     Drainage Amount small  -MF     Care, Wound irrigated with; sterile normal saline  -MF     Dressing Care foam; low-adherent; silver impregnated  mepilex Ag foam  -MF     Periwound Care cleansed with pH balanced cleanser  -     Retired Wound - Properties Group Date first assessed: 03/04/21  - Time first assessed: 0845  - Present on Hospital Admission: Y  - Side: Right  - Location: calf  - Primary Wound Type: Venous ulcer  -MC     Row Name 01/11/22 1000          Coping    Observed Emotional State anxious; agitated; restless  -     Verbalized Emotional State acceptance  -     Trust Relationship/Rapport care explained  -     Row Name 01/11/22 1000          Plan of Care Review    Plan of Care Reviewed With patient  -     Outcome Summary BLE edema responding well to light compression with only minimal edema noted today.  Pt with mild reepithelialization of Post calf wound as well. PT will cont with decreased freq of dressing changes to allow for more comfort, but to help limit LE edema.  -     Row Name 01/11/22 1000          Positioning and Restraints    Pre-Treatment Position sitting in chair/recliner  -     Post Treatment Position chair  -     In Chair reclined; call light within reach  -           User Key  (r) = Recorded By, (t) = Taken By, (c) = Cosigned By    Initials Name Provider Type    Juni Good, PT Physical Therapist    MARIANA Burkett  Neva SON, PT Physical Therapist              Physical Therapy Education                 Title: PT OT SLP Therapies (In Progress)     Topic: Physical Therapy (In Progress)     Point: Mobility training (Done)     Learning Progress Summary           Patient Acceptance, E,TB, VU by  at 1/9/2022 0821                   Point: Home exercise program (Not Started)     Learner Progress:  Not documented in this visit.          Point: Body mechanics (Not Started)     Learner Progress:  Not documented in this visit.          Point: Precautions (Not Started)     Learner Progress:  Not documented in this visit.                      User Key     Initials Effective Dates Name Provider Type Discipline     06/16/21 -  Courtney Chen, RN Registered Nurse Nurse                Recommendation and Plan  Planned Therapy Interventions (PT): wound care  Plan of Care Reviewed With: patient           Outcome Summary: BLE edema responding well to light compression with only minimal edema noted today.  Pt with mild reepithelialization of Post calf wound as well. PT will cont with decreased freq of dressing changes to allow for more comfort, but to help limit LE edema.  Plan of Care Reviewed With: patient            Time Calculation   PT Charges     Row Name 01/11/22 1000             Time Calculation    Start Time 1000  -MF      PT Goal Re-Cert Due Date 01/18/22  -MF              Untimed Charges    36207-Eaoiyptlqw comp below knee 20  -MF      80014-Jkqzsgtsm debridement 15  -MF              Total Minutes    Untimed Charges Total Minutes 35  -MF       Total Minutes 35  -MF            User Key  (r) = Recorded By, (t) = Taken By, (c) = Cosigned By    Initials Name Provider Type    Juni Good, PT Physical Therapist                  Therapy Charges for Today     Code Description Service Date Service Provider Modifiers Qty    31615572727 HC PT MULTI LAYER COMP SYS BELOW KNEE 1/11/2022 Juni Samuel, PT GP 1    [id number]  HC STEFANIE DEBRIDE OPEN WOUND UP TO 20CM 1/11/2022 Juni Samuel, PT GP 1            PT G-Codes  AM-PAC 6 Clicks Score (PT): 12       Juni Samuel, PT  1/11/2022

## 2022-01-11 NOTE — SIGNIFICANT NOTE
Pt c/o severe RLQ abdominal pain this morning (per RN, pt w/ intermittent RLQ pain since admission). Reporting that he feels short of breath 2/2 severe pain. Pt received Dilaudid at 6:01 without relief. Per RN, exam is unchanged- lungs are clear; without wheeze or rhonchi. VS stable, no increase in oxygen requirement. CXR and KUB ordered and an additional dose of IV Dilaudid. Will continue to monitor closely.

## 2022-01-12 PROBLEM — J81.0 ACUTE PULMONARY EDEMA (HCC): Status: RESOLVED | Noted: 2022-01-01 | Resolved: 2022-01-01

## 2022-01-12 PROBLEM — N39.0 ACUTE UTI (URINARY TRACT INFECTION): Status: RESOLVED | Noted: 2022-01-01 | Resolved: 2022-01-01

## 2022-01-12 NOTE — PLAN OF CARE
"Goal Outcome Evaluation:     Pt has been resting comfortably most of the day. Sitting in his recliner due to \"smothering\" when he gets in the bed. Tomorrow's goal is to get him comfortable in the bed to prevent skin breakdown. Pt has not eaten today but has drank water. Wife was called this morning and talk to pt and nurse.     Problem: Skin Injury Risk Increased  Goal: Skin Health and Integrity  Outcome: Ongoing, Progressing  Intervention: Optimize Skin Protection  Recent Flowsheet Documentation  Taken 1/12/2022 1645 by Amanda Palma RN  Pressure Reduction Techniques: frequent weight shift encouraged  Pressure Reduction Devices: pressure-redistributing mattress utilized  Skin Protection:   adhesive use limited   incontinence pads utilized  Taken 1/12/2022 1400 by Amanda Palma RN  Pressure Reduction Techniques: frequent weight shift encouraged  Pressure Reduction Devices: pressure-redistributing mattress utilized  Skin Protection:   adhesive use limited   incontinence pads utilized  Taken 1/12/2022 1215 by Amanda Palma RN  Pressure Reduction Techniques: frequent weight shift encouraged  Pressure Reduction Devices: pressure-redistributing mattress utilized  Skin Protection:   adhesive use limited   incontinence pads utilized  Taken 1/12/2022 1045 by Amanda Palma RN  Pressure Reduction Techniques: frequent weight shift encouraged  Pressure Reduction Devices: pressure-redistributing mattress utilized  Skin Protection:   adhesive use limited   incontinence pads utilized  Taken 1/12/2022 0850 by Amanda Palma RN  Pressure Reduction Techniques: frequent weight shift encouraged  Head of Bed (HOB): HOB at 60 degrees  Pressure Reduction Devices:   pressure-redistributing mattress utilized   positioning supports utilized  Skin Protection:   adhesive use limited   incontinence pads utilized     Problem: Fall Injury Risk  Goal: Absence of Fall and Fall-Related Injury  Outcome: Ongoing, " Progressing  Intervention: Identify and Manage Contributors to Fall Injury Risk  Recent Flowsheet Documentation  Taken 1/12/2022 1645 by Amanda Palma RN  Medication Review/Management: medications reviewed  Taken 1/12/2022 1215 by Amanda Palma RN  Medication Review/Management: medications reviewed  Taken 1/12/2022 1045 by Amanda Palma RN  Medication Review/Management: medications reviewed  Taken 1/12/2022 0850 by Amanda Palma RN  Medication Review/Management: medications reviewed  Intervention: Promote Injury-Free Environment  Recent Flowsheet Documentation  Taken 1/12/2022 1645 by Amanda Palma RN  Safety Promotion/Fall Prevention:   activity supervised   assistive device/personal items within reach   clutter free environment maintained   fall prevention program maintained   lighting adjusted   muscle strengthening facilitated   nonskid shoes/slippers when out of bed   room organization consistent   safety round/check completed  Taken 1/12/2022 1400 by Amanda Palma RN  Safety Promotion/Fall Prevention:   assistive device/personal items within reach   activity supervised   fall prevention program maintained   lighting adjusted   muscle strengthening facilitated   room organization consistent   safety round/check completed  Taken 1/12/2022 1215 by Amanda Palma RN  Safety Promotion/Fall Prevention:   activity supervised   assistive device/personal items within reach   clutter free environment maintained   fall prevention program maintained   lighting adjusted   muscle strengthening facilitated   nonskid shoes/slippers when out of bed   room organization consistent   safety round/check completed  Taken 1/12/2022 1045 by Amanda Palam RN  Safety Promotion/Fall Prevention:   activity supervised   assistive device/personal items within reach   clutter free environment maintained   fall prevention program maintained   lighting adjusted   muscle strengthening facilitated   nonskid shoes/slippers  when out of bed   safety round/check completed   room organization consistent  Taken 1/12/2022 0850 by Amanda Palma RN  Safety Promotion/Fall Prevention:   activity supervised   assistive device/personal items within reach   clutter free environment maintained   fall prevention program maintained   lighting adjusted   muscle strengthening facilitated   nonskid shoes/slippers when out of bed   room organization consistent   safety round/check completed

## 2022-01-12 NOTE — PROGRESS NOTES
Continued Stay Note  [facility]     Patient Name: Akshat Winkler  MRN: [id number]  Today's Date: 1/12/2022    Admit Date: 1/11/2022     Discharge Plan     Row Name 01/12/22 1611       Plan    Plan IPU assessment    Plan Comments   1-12 PPS 20 % Due to covid restrictions I observed the patient from the window and report given by YOUNG CEUVAS and HANANE Monroe. Patient was sitting up in the chair when I observed him. Pt reported to Edwige RN he felt like he was suffocating when he laid flat. Morning Dilaudid held at this time to allow pt to rest as he had a restless night. Ok by APRN. Leg ulcers and gluteal PI dressings were changed by RN. Sheridan drained 800 cc, BM 1/8. O2 2 L NC. PRN Ativan 1 mg x 1/ 24 hr given for anxiety. Assessment by YOUNG CUEVAS “breathe sounds inspiratory wheezing and up in the chair. Pt is comfortable and no med changes”. Tomorrow patient must get out of the chair and repositioned to prevent further wounds. Currently patient remains GIP for complications of EOL care requiring skilled nursing and medical management of symptoms. Discharge plan dependent on a decreased level of care and survival of this admission.  Plan to address pt getting into bed tomorrow --- at this time he is more comfortable in chair --- LBM 1/8. Needs dul supp - unable to do while pt is in chair.  Nursing has held all scheduled medications today except one Ativan..... Nursing said he has been comfy, which i agree.... for now.... i do think different story tomorrow when he needs to be laid in bed. He can't keep living in a recliner. There is no way to reposition him and he needs a dul supp....... he has been in the chair for about 2 days, per nursing                     Discharge Codes    No documentation.                     Rosey Guzmán RN

## 2022-01-12 NOTE — INTERVAL H&P NOTE
Admitted to inpatient hospice today for full comfort focused plan of care.  Terminal dx of end stage heart failure.  Related pericarditis, cirrhosis, COVID19, and bowel perforation.  Prognosis days.      Arely Ortega MD  1/11/2022

## 2022-01-12 NOTE — H&P
Saurabh Medina PA      HPI:             Past Medical History:   Diagnosis Date    Acute diastolic HF (heart failure) ([facility])     Acute hypokalemia     Arrhythmia     Back injury     Fall on 12/20/17    Cellulitis of leg     Chest pain syndrome     CHF (congestive heart failure) ([facility])     Compression fracture of lumbar vertebra ([facility]) 12/22/2017    Deep vein thrombosis ([facility]) 1995    Diabetes mellitus ([facility])     patient reports borderline     History of kyphoplasty 10/16/2018    Hyperlipidemia     Hypertension     Muscle pain     around back    Obesity     Obstructive sleep apnea     right now patient is sleeeping in recliner and does not use-when he lies down he will have to use cpap    Paroxysmal A-fib ([facility])     Peripheral artery disease ([facility])     Spondylosis of 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 or radiculopathy 2/7/2018    Wears glasses      Past Surgical History:   Procedure Laterality Date    CARDIAC CATHETERIZATION      CARDIAC ELECTROPHYSIOLOGY PROCEDURE N/A 12/20/2021    Procedure: AV node ablation + PM implant;  Surgeon: Joaquin Jeffery MD;  Location: [facility] INVASIVE LOC[facility];  Service: Cardiovascular;  Laterality: N/A;    CARDIAC ELECTROPHYSIOLOGY PROCEDURE N/A 12/20/2021    Procedure: AV node ablation;  Surgeon: Joaquin Jeffery MD;  Location: [facility] INVASIVE LOCATION;  Service: Cardiovascular;  Laterality: N/A;    CARDIOVERSION      multiple    CARDIOVERSION      COLONOSCOPY      about 14 years ago    KYPHOPLASTY N/A 3/5/2018    Procedure: KYPHOPLASTY L4;  Surgeon: Akshat Davidson MD;  Location: [facility];  Service:     KYPHOPLASTY N/A 4/16/2018    Procedure: KYPHOPLASTY L1 AND L2;  Surgeon: Akshat Davidson MD;  Location:  [facility];  Service: Neurosurgery    OTHER SURGICAL HISTORY  06/29/2015    PVA     Current Code Status       Date Active Code Status Order ID Comments User Context       1/11/2022 1359 No CPR (Do Not Attempt to Resuscitate) [id number]  Arely Ortega MD Inpatient     Advance  Care Planning Activity          Questions for Current Code Status       Question Answer    Code Status (Patient has no pulse and is not breathing) No CPR (Do Not Attempt to Resuscitate)    Medical Interventions (Patient has pulse or is breathing) Comfort Measures    Level Of Support Discussed With Health Care Surrogate          Scheduled Meds:HYDROmorphone, 1 mg, Intravenous, Q6H  LORazepam, 1 mg, Intravenous, Q6H  palliative care oral rinse, , Mouth/Throat, Q6H      Continuous Infusions:   PRN Meds:.  acetaminophen    bisacodyl    glycopyrrolate    haloperidol lactate    HYDROmorphone    ketorolac    LORazepam    palliative care oral rinse    Scopolamine    Sodium Chloride (PF)      Allergies   Allergen Reactions    Bisoprolol Other (See Comments)     Severe Hypotension    Flecainide Other (See Comments)     Syncope / collapse    Metoprolol Other (See Comments)      Bradycardia, Severe Hypotension    Tikosyn [Dofetilide] Other (See Comments)     Wide complex tachycardia     Family History   Problem Relation Age of Onset    Hypertension Mother     Stroke Mother     Stroke Father     Sleep disorder Father     Alzheimer's disease Father     Hyperlipidemia Father     Hypertension Father     Prostate cancer Brother     Heart disease Maternal Grandmother      Social History     Socioeconomic History    Marital status:    Tobacco Use    Smoking status: Never Smoker    Smokeless tobacco: Never Used   Vaping Use    Vaping Use: Never used   Substance and Sexual Activity    Alcohol use: No    Drug use: No    Sexual activity: Not Currently     Birth control/protection: Condom     Review of Systems -       There were no vitals taken for this visit.  No intake or output data in the 24 hours ending 01/11/22 1951  Physical Exam:                   Results from last 7 days   Lab Units 01/10/22  0300   WBC 10*3/mm3 10.61   HEMOGLOBIN g/dL 15.7   HEMATOCRIT % 47.6   PLATELETS 10*3/mm3 86*     Results from last 7 days   Lab  Units 01/09/22  2316   SODIUM mmol/L 130*   POTASSIUM mmol/L 4.8   CHLORIDE mmol/L 91*   CO2 mmol/L 21.0*   BUN mg/dL 49*   CREATININE mg/dL 1.33*   CALCIUM mg/dL 10.0   BILIRUBIN mg/dL 7.5*   ALK PHOS U/L 229*   ALT (SGPT) U/L 43*   AST (SGOT) U/L 61*   GLUCOSE mg/dL 128*     Results from last 7 days   Lab Units 01/09/22  2316   SODIUM mmol/L 130*   POTASSIUM mmol/L 4.8   CHLORIDE mmol/L 91*   CO2 mmol/L 21.0*   BUN mg/dL 49*   CREATININE mg/dL 1.33*   GLUCOSE mg/dL 128*   CALCIUM mg/dL 10.0     Imaging Results (Last 72 Hours)       ** No results found for the last 72 hours. **          Impression:           Plan:              Arely Ortega MD  01/11/22  19:51 EST

## 2022-01-12 NOTE — PROGRESS NOTES
Hospice Progress Note    Patient Name: Akshat Winkler   : 1955  Gender: male    Code Status: comfort measures    Date of Admission: 2022    Subjective:    66yoM admitted inpt Hospice on  for end stage heart failure; Covid +    No events overnight.    Today pt remains OOB in recliner as this is where he is most comfortable. Somnolent for my visit but earlier today asked nurse for water and drank a cup of water and FT with wife. Appears comfortable.    - PRNs: ativan 1mg x1    - Held: all scheduled dilaudid and ativan    - Intake/Output  *PO: drank water this morning  *Urine: 800ml  *LBM:     ROS:  Review of Systems   Unable to perform ROS: Acuity of condition       Reviewed current scheduled and prn medications for route, type, dose and frequency.     •  acetaminophen  •  bisacodyl  •  glycopyrrolate  •  haloperidol lactate  •  HYDROmorphone  •  ketorolac  •  LORazepam  •  palliative care oral rinse  •  Scopolamine  •  Sodium Chloride (PF)    Objective:   /93 (BP Location: Right arm, Patient Position: Sitting)   Pulse 83   Temp 96.3 °F (35.7 °C) (Axillary)   Resp 22   SpO2 95%      PPS: 20%    Physical Exam:  Physical Exam  Constitutional:       General: He is not in acute distress.     Appearance: He is obese. He is ill-appearing.   HENT:      Head: Normocephalic.      Mouth/Throat:      Mouth: Mucous membranes are dry.   Eyes:      Comments: Eyes remained closed   Neck:      Comments: Increased neck circumference  Cardiovascular:      Rate and Rhythm: Normal rate and regular rhythm.      Comments: + edema (anasarca); + radial pulses  Pulmonary:      Comments: Even, non-labored; CTA; no audible congestion  Abdominal:      General: There is distension.      Comments: abd is rounded/distended; BSx4; moderately tense; pitting edema BLE, abd   Musculoskeletal:      Right lower leg: Edema present.      Left lower leg: Edema present.      Comments: BLE wrapped   Skin:     Coloration: Skin  is jaundiced.   Neurological:      Comments: Does not follow commands   Psychiatric:      Comments: No facial grimacing; no moaning           End stage heart failure (HCC)      Assessment/Plan:     66yoM admitted inpt Hospice on 1/11 for end stage heart failure; Covid +    AMS/somnolence  Dyspnea  Anxiety  Pain, nos  EOLC    Coordinated care with Nursing and Hospice IDT    Plan to address pt getting into bed tomorrow --- at this time he is more comfortable in chair --- LBM 1/8. Needs dul supp - unable to do while pt is in chair.     Continue current plan - medications are scheduled with prns available if needed    Discharge Disposition: EOLC    Total Visit Time: 20min  Face to Face Time: 7min    Justification for care:  Patient meets criteria for acute in-patient care with required nursing assessment and interventions for symptoms with IV medications.      Zully Archibald, CAMRON, MHA, APRN  [facility]  Hospice and Palliative Care Nurse Practitioner  01/12/22  15:46 EST

## 2022-01-13 NOTE — PROGRESS NOTES
Hospice Progress Note    Patient Name: Akshat Winkler   : 1955  Gender: male    Code Status: comfort measures    Date of Admission: 2022    Subjective:    66yoM admitted inpt Hospice on  for end stage heart failure; Covid +    Overnight notes reviewed: Patient resting throughout the night no c/o pain when asked, pt not restless throughout the night pt has been asleep for most of the shift, scheduled ativan held because patient was asleep. Pt on 2L NC while sleeping, O2 saturation above 90%.    Today, pt in bed, sitting up - moans when trying to lay him back. No family at bedside during time of visit but per Nursing, son and dtr did visit earlier today.    - PRNs: none    - Held: a few doses scheduled medications    - Intake/Output  *PO: NPO  *Urine: 1550ml  *LBM:       ROS:  Review of Systems   Unable to perform ROS: Acuity of condition       Reviewed current scheduled and prn medications for route, type, dose and frequency.     •  acetaminophen  •  bisacodyl  •  glycopyrrolate  •  haloperidol lactate  •  HYDROmorphone  •  ketorolac  •  LORazepam  •  palliative care oral rinse  •  Scopolamine  •  Sodium Chloride (PF)    Objective:   /82 (BP Location: Left arm, Patient Position: Sitting)   Pulse 80   Temp 96.8 °F (36 °C) (Axillary)   Resp 22   SpO2 97%      PPS: 10%    Physical Exam:  Constitutional:       General: NAD; comfortable     Appearance: obese; ill appearing  HENT:      Head: Normocephalic.      Mouth/Throat:      Mouth: Dry MM    Eyes:      Comments: Eyes remained closed   Neck:      Comments: Increased neck circumference  Cardiovascular:      Rate and Rhythm: Normal rate and regular rhythm.      Comments: + edema (anasarca); + radial pulses  Pulmonary:      Comments: Even, non-labored; CTA; no audible congestion  Abdominal:      General: Distention      Comments: abd is rounded/distended; BSx4; moderately tense; pitting edema BLE, abd   Musculoskeletal:      Right lower  leg: + edema      Left lower leg: + edema     Comments: BLE wrapped   Skin:     Coloration: jaundiced/copper skintone  Neurological:      Comments: Does not follow commands   Psychiatric:      Comments: No facial grimacing; no moaning         End stage heart failure (HCC)      Assessment/Plan:     66yoM admitted inpt Hospice on 1/11 for end stage heart failure; Covid +     AMS/somnolence  Dyspnea  Anxiety  Pain, nos  EOLC    Lasix 20mg q6h and prn    Continue plan of care - not holding scheduled medications    Goal for tomorrow is pt to be able to lay back (~ 30 degrees) comfortably in bed     Monitor for changing needs    Coordinated care with Nursing and Hospice IDT    Wife updated over the phone by Hospice    Discharge Disposition: EOLC    Total Visit Time: 20min  Face to Face Time: 5min    Justification for care:  Patient meets criteria for acute in-patient care with required nursing assessment and interventions for symptoms with IV medications.      Zully Archibald, DNP, MHA, APRN  [facility]  Hospice and Palliative Care Nurse Practitioner  01/13/22  12:01 EST

## 2022-01-13 NOTE — PLAN OF CARE
Goal Outcome Evaluation:  Patient resting throughout the night no c/o pain when asked, pt not restless throughout the night pt has been asleep for most of the shift, scheduled ativan held because patient was asleep.     Pt on 2L NC while sleeping, O2 saturation above 90%.

## 2022-01-13 NOTE — PAYOR COMM NOTE
"Chloé Ye RN CM  Phone [phone]  Fax [phone]    Please review for last day, patient was transferred to hospice bed on 1/11/22.   Akshat Winkler (66 y.o. Male)             Date of Birth Social Security Number Address Home Phone MRN    1955  [address] [address] [phone] [id number]    [place] Marital Status             [place]        Admission Date Admission Type Admitting Provider Attending Provider Department, Room/Bed    1/5/22 Emergency Patricia Bowers, [facility] 5F, S521/1    Discharge Date Discharge Disposition Discharge Destination          1/11/2022 Hospice/Home              Attending Provider: (none)   Allergies: Bisoprolol, Flecainide, Metoprolol, Tikosyn [Dofetilide]    Isolation: None   Infection: COVID (confirmed) (01/05/22)   Code Status: No CPR   Advance Care Planning Activity    Ht: 175.3 cm (69\")   Wt: 142 kg (313 lb 4.8 oz)    Admission Cmt: None   Principal Problem: Acute on chronic right-sided heart failure (HCC) [I50.813] More...                 Active Insurance as of 1/5/2022     Primary Coverage     Payor Plan Insurance Group Employer/Plan Group    [facility] PPO [id number]     Payor Plan Address Payor Plan Phone Number Payor Plan Fax Number Effective Dates    [address] [phone]  9/1/2021 - None Entered    [address] [address]       Subscriber Name Subscriber Birth Date Member ID       AKSHAT WINKLER 1955 [id number]                 Emergency Contacts      (Rel.) Home Phone Work Phone Mobile Phone    Shama Winkler (Spouse) [phone] -- [phone]               Physician Progress Notes (last 72 hours)      Lucian Alvarez IV, MD at 01/10/22 1926        Cardiology update:      Spoke with patient's wife, Shama.  They are in the process of making a will and will be bringing it to the hospital tomorrow as soon as it is ready.  She " "expects it will be ready in the morning.  She is awaiting a phone call from hospice to begin discussions on transitioning to inpatient hospice care.    BERNARDO DURHAM. Abhijit Alvarez MD [facility], [facility]  Interventional and General Cardiology      Electronically signed by Lucian Alvarez IV, MD at 01/10/22 1927     Lolis Green PA at 01/10/22 1248     Attestation signed by Erasmo Justin MD at 01/10/22 1311    I have reviewed this documentation and agree.    I called patient's wife Shama and discussed with her that he has a perforation somewhere in his GI tract and that he is not an operative candidate due to his severe cardiac status as well his his current liver condition.  With his current liver disease he has an 82% perioperative mortality.    Discussed Hospice with her  which she is agreeable to talk with.  Would recommend comfort care with pain control and comfort diet. She seems to understand the situation.  She would like her and her kids to be able to visit him                  GI Daily Progress Note  Subjective:    Chief Complaint: Follow up congestive hepatopathy     Mr. Winkler complains of continued abdominal pain.      Objective:    /93 (BP Location: Left arm, Patient Position: Sitting)   Pulse 79   Temp 97.9 °F (36.6 °C) (Oral)   Resp 16   Ht 175.3 cm (69\")   Wt (!) 142 kg (313 lb 4.8 oz)   SpO2 94%   BMI 46.27 kg/m²     Physical Exam  Constitutional:       General: He is in acute distress.      Appearance: He is ill-appearing.   Cardiovascular:      Rate and Rhythm: Normal rate and regular rhythm.   Pulmonary:      Effort: Pulmonary effort is normal.   Abdominal:      Tenderness: There is abdominal tenderness. There is guarding and rebound.      Comments: Obese abdomen.   No bowel sound auscultated.      Musculoskeletal:      Right lower leg: Edema present.      Left lower leg: Edema present.       Lab  Lab Results   Component Value Date    WBC 10.61 01/10/2022    HGB 15.7 01/10/2022    " HGB 16.2 01/09/2022    HGB 14.5 01/08/2022    MCV 98.3 (H) 01/10/2022    PLT 86 (L) 01/10/2022    INR 4.26 (H) 01/10/2022    INR 2.51 (H) 01/09/2022    INR 1.87 (H) 01/08/2022    INR 2.01 (H) 01/07/2022    INR 2.67 (H) 01/06/2022     Lab Results   Component Value Date    GLUCOSE 128 (H) 01/09/2022    BUN 49 (H) 01/09/2022    CREATININE 1.33 (H) 01/09/2022    EGFRIFNONA 54 (L) 01/09/2022    EGFRIFAFRI 92 02/08/2018    BCR 36.8 (H) 01/09/2022     (L) 01/09/2022    K 4.8 01/09/2022    CO2 21.0 (L) 01/09/2022    CALCIUM 10.0 01/09/2022    PROTENTOTREF 7.0 03/03/2018    ALBUMIN 3.20 (L) 01/09/2022    ALKPHOS 229 (H) 01/09/2022    BILITOT 7.5 (H) 01/09/2022    BILIDIR 3.2 (H) 01/06/2022    ALT 43 (H) 01/09/2022    AST 61 (H) 01/09/2022     Assessment:    Acute abdomen, free air seen on recent CT with concerns of pneumatosis in the right colon   Calcific constrictive pericarditis   Congestive hepatopathy   COVID-19 infection   Morbid obesity   Coagulopathy, INR worse today.   Anticoagulation use, on Eliquis     Plan:    CT images reviewed with interventional radiologist and Dr. Justin.   There is concern of small pneumatosis in the ascending colon and cecum.  There is a 82 % perioperative mortality related to his liver disease.  This does not include mortality risk related to his constrictive pericarditis.    Acute abdomen concerns were discussed with surgery yesterday and he is not a surgical candidate.       Primary team has discussed this with his wife yesterday.  His code status has been changed to DNR/DNI.        Dr. Justin has additionally called his wife today.   Hospice has been consulted     RUPESH Hodge  01/10/22  12:48 EST      Electronically signed by Erasmo Justin MD at 01/10/22 1311     Lucian Alvarez IV, MD at 01/10/22 1037          Cardiology Progress Note      Reason for visit:    · Constrictive pericarditis  · Right heart failure  ·     IDENTIFICATION: 66-year-old gentleman who  is  and resides in [place], [place]    Active Hospital Problems    Diagnosis  POA   • **Acute on chronic right-sided heart failure (HCC) [I50.813]  Yes     Priority: High     · Echo (12/10/2021): LVEF 60%.  Moderate TR.  RVSP 58 mmHg     • Stage 3 chronic kidney disease (HCC) [N18.30]  Yes     Priority: High   • COVID-19 virus infection [U07.1]  Yes     Priority: High   • Persistent atrial fibrillation/flutter [I48.19]  Yes     Priority: High     · Diagnosed 2011  · [id number] 3 (HTN, CAD, DM)  · Echo (10/11/2011): Normal LVEF, mild MR, mild LA dilation  · LOLY/ECVcardioversion (12/21/2011) initiation of propafenone therapy.   · Successful LOLY/ECV for recurrent atrial fibrillation, 11/15/2013  · PVA with Dr. Cary, 6/29/2015  · Cardioversion (07/17/2015) for atrial flutter occurring post ablation   · ECV for recurrent atrial flutter, 12/20/17 with reattempt at beta blocker/flecainide.  · Intolerant to beta blocker/flecanide with severe hypotension, intolerant to propafenone  · Tikosyn admission with development of wide complex tachycardia, 7/2018  · ECV for recurrent atrial flutter with early return of atrial flutter, 11/18/2021  · Admission to [facility] 12/16/2021 with acute CHF and atrial flutter with rapid ventricular response  · AV joby ablation with pacemaker placement 12/20/2021     • Type 2 diabetes mellitus without complication, without long-term current use of insulin (HCC) [E11.9]  Yes     Priority: High     · Statin therapy indicated given diabetic status     • Pleural effusion, right [J90]  Yes     Priority: Medium     · Status post thoracentesis with removal of 2 L fluid, 1/7/2022     • Hepatic steatosis [K76.0]  Yes     Priority: Medium   • Acute UTI (urinary tract infection) [N39.0]  Yes     Priority: Medium   • Presence of cardiac pacemaker [Z95.0]  Yes     Priority: Medium     · AV joby ablation with pacemaker placement 12/20/2021       • Essential hypertension [I10]  Yes      Priority: Medium   • Severe obstructive sleep apnea, no CPAP [G47.33]  Yes     Priority: Medium     No longer on CPAP, sleeps in reclined position     • Chronic bilateral severe lower extremity edema [R60.0]  Yes     Priority: Low   • Class 3 severe obesity due to excess calories with serious comorbidity and body mass index (BMI) of 40.0 to 44.9 in adult (HCC) [E66.01, Z68.41]  Not Applicable     Priority: Low   • Acute pulmonary edema (HCC) [J81.0]  Yes   • Cirrhosis (HCC) [K74.60]  Yes   • 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]  Yes     · Echo (1/7/2022): LVEF 70%.  LV septal bounce suggestive of constrictive physiology         Subjective     Patient had echocardiogram on Friday and there is concern of possible calcific constrictive pericarditis.  He had CT scan over the weekend which had a small amount of free air.         Objective   Vital Sign Min/Max for last 24 hours  Temp  Min: 97.6 °F (36.4 °C)  Max: 97.9 °F (36.6 °C)   BP  Min: 109/93  Max: 142/112   Pulse  Min: 79  Max: 85   Resp  Min: 16  Max: 22   SpO2  Min: 90 %  Max: 94 %   Flow (L/min)  Min: 2  Max: 2      Intake/Output Summary (Last 24 hours) at 1/10/2022 1037  Last data filed at 1/10/2022 0659  Gross per 24 hour   Intake --   Output 1850 ml   Net -1850 ml           Physical Exam  Constitutional:       Appearance: He is morbidly obese. He is ill-appearing.   HENT:      Head: Normocephalic.   Cardiovascular:      Rate and Rhythm: Normal rate and regular rhythm.   Psychiatric:         Behavior: Behavior is cooperative.         No physical exam was performed..  Chart was reviewed    Tele: Ventricular paced     Results Review (reviewed the patient's recent labs in the electronic medical record):      EKG (1/5/2022): Ventricular paced     CXR (1/9/2022):  Cardiomegaly and mild CHF.  Mildly increased right basilar atelectasis versus pneumonia     ECHO (1/7/2022):  LVEF 60%.  Abnormal left ventricular septal bounce suggestive of pericardial constriction,  moderate TR.  RVSP 45-55 mmHg.  RV is dilated.  Technically difficult study due to body habitus    Result Review:      Results from last 7 days   Lab Units 01/10/22  0300 01/09/22  2316 01/09/22  0408 01/08/22  0408 01/07/22  0806 01/07/22  0806 01/06/22  0403 01/05/22  2255 01/05/22  2255 01/05/22  1115 01/05/22  1115   SODIUM mmol/L  --  130* 129* 127*   < > 133*  --    < > 134*   < > 132*   POTASSIUM mmol/L  --  4.8 5.0 4.9   < > 5.2  --    < > 5.0   < > 4.7   CHLORIDE mmol/L  --  91* 92* 91*  --  95*  --   --  96*  --  95*   BUN mg/dL  --  49* 47* 44*   < > 35*  --    < > 30*   < > 29*   CREATININE mg/dL  --  1.33* 1.65* 1.96*   < > 1.65*  --    < > 1.64*   < > 1.74*   MAGNESIUM mg/dL 2.4  --   --   --   --   --  2.1  --  2.2   < > 2.2    < > = values in this interval not displayed.     Results from last 7 days   Lab Units 01/05/22  1115   TROPONIN T ng/mL 0.026     Results from last 7 days   Lab Units 01/10/22  0300 01/09/22  0408 01/08/22  0408   WBC 10*3/mm3 10.61 11.44* 8.59   HEMOGLOBIN g/dL 15.7 16.2 14.5   HEMATOCRIT % 47.6 48.3 43.2   PLATELETS 10*3/mm3 86* 123* 115*       Lab Results   Component Value Date    INR 4.26 (H) 01/10/2022    INR 2.51 (H) 01/09/2022    INR 1.87 (H) 01/08/2022    PROTIME 39.0 (H) 01/10/2022    PROTIME 26.0 (H) 01/09/2022    PROTIME 20.8 (H) 01/08/2022         Lab Results   Component Value Date    HGBA1C 6.00 (H) 12/16/2021       Lab Results   Component Value Date    CHOL 103 12/16/2021    CHLPL 138 05/09/2017    TRIG 81 12/16/2021    HDL 31 (L) 12/16/2021    LDL 56 12/16/2021         Assessment     Constrictive pericarditis/biventricular heart failure  · unfortunately patient is not a candidate for pericardiectomy due to worsening liver failure as well as new finding of perforated bowel/pneumoperitoneum  · Dr. Albarran had been previously consulted but with worsening liver function and new finding of bowel perforation, no longer a candidate.  · Status has declined quickly over  the last several days.  Hospice recommended       Cirrhosis  · Likely related to portal congestion per Dr. Gonsalez/GI     Atrial fibrillation/flutter   · Intolerant to multiple antiarrhythmics including beta-blocker, Tikosyn and flecainide causing severe hypotension  · Recent AV joby ablation with placement of permanent pacemaker  · Continue to hold Eliquis for any possible invasive procedure     Type 2 diabetes mellitus  · Management per hospitalist            Plan     · Agree with hospice care    Electronically signed by Lucian Alvarez IV, MD, 01/10/22, 7:11 PM EST.      Electronically signed by Lucian Alvarez IV, MD at 01/10/22 1913     Patricia Bowers DO at 01/10/22 0859              [facility] Medicine Services  PROGRESS NOTE    Patient Name: Akshat Winkler  : 1955  MRN: [id number]    Date of Admission: 2022  Primary Care Physician: Saurabh Medina PA    Subjective   Subjective     CC:  abd pain    HPI:  More alert today, still with significant abd pain.  Asking for help with positioning to get more comfortable    ROS:  Gen- No fevers, chills  CV- No chest pain, palpitations  Resp- No cough, dyspnea  GI- No N/V/D,  + abd pain        Objective   Objective     Vital Signs:   Temp:  [97.6 °F (36.4 °C)-97.9 °F (36.6 °C)] 97.9 °F (36.6 °C)  Heart Rate:  [79-85] 79  Resp:  [16-22] 16  BP: (109-142)/() 109/93  Flow (L/min):  [2] 2     Physical Exam:  Constitutional: No acute distress, awake, alert  HENT: NCAT, mucous membranes moist  Respiratory: Clear to auscultation bilaterally, respiratory effort normal   Cardiovascular: RRR, no murmurs, rubs, or gallops  Gastrointestinal: distended, RUQ tenderness  Musculoskeletal: pitting edema, legs wrapped  Psychiatric: Appropriate affect, cooperative  Neurologic: Oriented x 2-3, speech clear  Skin: No rashes      Results Reviewed:  LAB RESULTS:      Lab 01/10/22  0646 01/10/22  0320 01/10/22  0300  01/09/22  1226 01/09/22  0408 01/08/22  0408 01/07/22  0806 01/06/22  0403 01/05/22 2256 01/05/22  2255 01/05/22  1115 01/05/22  1115   WBC  --   --  10.61  --  11.44* 8.59 6.64  --  6.54  --    < > 7.14   HEMOGLOBIN  --   --  15.7  --  16.2 14.5 14.2  --  15.2  --    < > 14.4   HEMATOCRIT  --   --  47.6  --  48.3 43.2 43.2  --  46.0  --    < > 44.2   PLATELETS  --   --  86*  --  123* 115* 124*  --  128*  --    < > 115*   NEUTROS ABS  --   --  9.55*  --  10.03* 7.63* 5.98  --  5.77  --    < > 5.82   IMMATURE GRANS (ABS)  --   --   --   --  0.04 0.03 0.03  --  0.01  --   --  0.03   LYMPHS ABS  --   --   --   --  0.12* 0.22* 0.28*  --  0.30*  --   --  0.37*   MONOS ABS  --   --   --   --  1.05* 0.56 0.35  --  0.34  --   --  0.77   EOS ABS  --   --  0.11  --  0.20 0.14 0.00  --  0.09  --    < > 0.10   MCV  --   --  98.3*  --  97.6* 98.9* 99.8*  --  100.4*  --    < > 101.4*   CRP  --   --  10.51*  --  5.84* 5.54* 6.37*  --   --  6.26*   < > 5.80*   PROCALCITONIN  --   --   --   --  0.56*  --   --   --   --   --   --  0.48*   LACTATE 5.2* 4.2*  --  4.6*  --   --   --   --   --   --   --   --    LDH  --   --   --   --   --   --  468*  --   --   --   --   --    PROTIME  --   --  39.0*  --  26.0* 20.8* 21.9* 27.3*  --   --    < > 26.0*   APTT  --   --   --   --  41.8*  --   --   --   --   --   --   --    D DIMER QUANT  --   --   --   --   --   --   --   --  2.01*  --   --   --     < > = values in this interval not displayed.         Lab 01/10/22  0300 01/09/22  2316 01/09/22 0408 01/08/22  0408 01/07/22  0806 01/06/22  0403 01/05/22  2255 01/05/22  1115 01/05/22  1115   SODIUM  --  130* 129* 127* 133*  --  134*   < > 132*   POTASSIUM  --  4.8 5.0 4.9 5.2  --  5.0   < > 4.7   CHLORIDE  --  91* 92* 91* 95*  --  96*   < > 95*   CO2  --  21.0* 20.0* 23.0 27.0  --  24.0   < > 25.0   ANION GAP  --  18.0* 17.0* 13.0 11.0  --  14.0   < > 12.0   BUN  --  49* 47* 44* 35*  --  30*   < > 29*   CREATININE  --  1.33* 1.65* 1.96* 1.65*   --  1.64*   < > 1.74*   GLUCOSE  --  128* 140* 129* 116*  --  156*   < > 98   CALCIUM  --  10.0 10.3 9.8 9.8  --  10.0   < > 10.0   MAGNESIUM 2.4  --   --   --   --  2.1 2.2  --  2.2    < > = values in this interval not displayed.         Lab 01/09/22  2316 01/09/22 0408 01/08/22  0408 01/07/22  0806 01/06/22 2049 01/05/22 2255 01/05/22 1115 01/05/22 1115   TOTAL PROTEIN 7.9 8.4 7.6 7.8  --  8.1   < > 7.9   ALBUMIN 3.20* 3.30* 3.00* 3.10*  --  3.30*   < > 3.30*   GLOBULIN 4.7 5.1 4.6 4.7  --  4.8   < > 4.6   ALT (SGPT) 43* 49* 42* 36  --  38   < > 36   AST (SGOT) 61* 91* 99* 94*  --  93*   < > 92*   BILIRUBIN 7.5* 5.8* 4.6* 5.2*  --  6.4*   < > 5.9*   BILIRUBIN DIRECT  --   --   --   --  3.2*  --   --   --    ALK PHOS 229* 264* 247* 249*  --  263*   < > 260*   LIPASE  --   --   --   --   --   --   --  28    < > = values in this interval not displayed.         Lab 01/10/22  0300 01/09/22 0408 01/08/22 0408 01/07/22  0806 01/06/22  0403 01/05/22  1115 01/05/22  1115   PROBNP  --  2,114.0*  --   --   --   --  1,074.0*   TROPONIN T  --   --   --   --   --   --  0.026   PROTIME 39.0* 26.0* 20.8* 21.9* 27.3*   < > 26.0*   INR 4.26* 2.51* 1.87* 2.01* 2.67*   < > 2.50*    < > = values in this interval not displayed.             Lab 01/09/22  1302 01/09/22  0408 01/05/22  1115   FERRITIN  --   --  172.50   ABO TYPING A   < >  --    RH TYPING Positive   < >  --    ANTIBODY SCREEN Negative  --   --     < > = values in this interval not displayed.         Brief Urine Lab Results  (Last result in the past 365 days)      Color   Clarity   Blood   Leuk Est   Nitrite   Protein   CREAT   Urine HCG        01/05/22 1635             31.3         01/05/22 1635 Yellow   Clear   Small (1+)   Trace   Negative   100 mg/dL (2+)                 Microbiology Results Abnormal     Procedure Component Value - Date/Time    Body Fluid Culture - Body Fluid, Pleural Cavity [[id number]] Collected: 01/07/22 1327    Lab Status: Final result  Specimen: Body Fluid from Pleural Cavity Updated: 01/10/22 0607     Body Fluid Culture No growth at 3 days     Gram Stain No WBCs or organisms seen          CT Abdomen Pelvis Without Contrast    Result Date: 1/9/2022  CT Abdomen Pelvis WO INDICATION: Increasing abdominal distention and pain. TECHNIQUE: CT of the abdomen and pelvis without IV contrast. Coronal and sagittal reconstructions were obtained.  Radiation dose reduction techniques included automated exposure control or exposure modulation based on body size. Count of known CT and cardiac nuc med studies performed in previous 12 months: 2. COMPARISON: 1/8/2022 FINDINGS: Pericardial calcifications are unchanged compatible with prior pericarditis. Small right pleural effusion is similar to prior. There is continued pneumonia in the right lower and right middle lobes. There is some atelectasis in the left lung base. Generalized anasarca is again identified. There is a small amount of ascites similar to prior. There is atherosclerotic disease with no aortic aneurysm. The IVC and iliac veins in the pelvis remains distended which may be secondary to right heart dysfunction. This is unchanged. Gallbladder is filled with stones. No biliary obstruction. There is a nonobstructing stone or vascular calcification in the left kidney. No ureteral stones are seen on either side, and there is no hydronephrosis. Unenhanced solid abdominal organs are otherwise normal. Urinary bladder is decompressed and poorly characterized. Prostate is unremarkable. There is no evidence of acute colitis, and there is no colonic distention. The stomach is normal. There is no small bowel obstruction. There is nothing to suggest acute appendicitis. Multiple venous varicosities are noted in both groins, right worse than left. Patient is status post kyphoplasty of L4, L2, and L1. There are 2 tiny bubbles of potential free intraperitoneal air adjacent to the left lobe of the liver. Etiology is  unclear as the bowel appears stable from prior. Surgical consultation and short interval follow-up recommended.     Impression: 1. 2 potential bubbles of free intraperitoneal air adjacent to the left hepatic lobe. Etiology is unclear as the bowel appears stable from prior and there is nothing to suggest a bowel perforation. Surgical consultation and follow-up recommended. 2. Otherwise, no significant change from the recent prior exam. Signer Name: Jorge Rudd MD  Signed: 1/9/2022 3:22 AM  Workstation Name: [facility]    XR Chest 1 View    Result Date: 1/9/2022  EXAMINATION: XR CHEST 1 VW - 01/09/2022  INDICATION: Dyspnea on exertion.  COMPARISON: 01/08/2022  FINDINGS: Left-sided single-lead pacemaker is again noted. The heart is enlarged. Vasculature cephalized. There is a diffuse interstitial disease pattern present, generally stable in the upper and mid lungs allowing for different film technique, and mildly increased in the right lung base where there is increased silhouetting of the medial right hemidiaphragm. No pneumothorax or significant effusion is seen.      Impression: 1. Cardiomegaly and mild CHF. 2. Mildly increased right basilar atelectasis versus pneumonia.  DICTATED:   01/09/2022 EDITED/lfs:   01/09/2022        XR Abdomen KUB    Result Date: 1/10/2022  CR Abdomen 1 Vw INDICATION: Abdominal pain. COMPARISON: CT abdomen pelvis earlier same day FINDINGS: AP radiograph(s) of the abdomen. Patient is status post multilevel kyphoplasty in the lumbar spine. Bowel gas pattern is nonspecific with large and small bowel gas noted. No radiopaque foreign bodies.     Impression: Nonspecific but nonobstructive bowel gas pattern. Signer Name: Jorge Rudd MD  Signed: 1/10/2022 12:13 AM  Workstation Name: [facility]      Results for orders placed during the hospital encounter of 01/05/22    Adult Transthoracic Echo Complete W/ Cont if  Necessary Per Protocol    Interpretation Summary  · Technically difficult study due to body habitus  · Left ventricular systolic function is normal. Estimated left ventricular EF = 60%.  · Abnormal left ventricular septal bounce suggestive of pericardial constriction  · The right ventricular cavity is dilated.  · Moderate tricuspid valve regurgitation is present.  · Estimated right ventricular systolic pressure from tricuspid regurgitation is moderately elevated (45-55 mmHg).      I have reviewed the medications:  Scheduled Meds:ampicillin, 500 mg, Oral, Q6H  apixaban, 5 mg, Oral, Q12H  cefTRIAXone, 2 g, Intravenous, Q24H  dexamethasone, 6 mg, Oral, Daily  insulin lispro, 0-7 Units, Subcutaneous, TID AC  lactulose, 20 g, Oral, BID  magnesium oxide, 400 mg, Oral, Daily  pantoprazole, 40 mg, Intravenous, BID AC  pharmacy consult - MTM, , Does not apply, Daily  potassium chloride, 30 mEq, Oral, Daily  senna-docusate sodium, 2 tablet, Oral, BID      Continuous Infusions:Pharmacy Consult - Remdesivir,       PRN Meds:.•  albuterol sulfate HFA  •  aluminum-magnesium hydroxide-simethicone  •  benzonatate  •  bisacodyl  •  calcium carbonate  •  dextromethorphan polistirex ER  •  dextrose  •  dextrose  •  docusate sodium  •  glucagon (human recombinant)  •  hepatitis A  •  HYDROmorphone  •  magnesium sulfate **OR** magnesium sulfate in D5W 1g/100mL (PREMIX) **OR** magnesium sulfate  •  melatonin  •  Pharmacy Consult - Remdesivir  •  polyethylene glycol  •  sodium chloride    Assessment/Plan   Assessment & Plan     Active Hospital Problems    Diagnosis  POA   • **Acute on chronic right-sided heart failure (HCC) [I50.813]  Yes   • Cirrhosis (HCC) [K74.60]  Yes   • 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]  Yes   • Stage 3 chronic kidney disease (HCC) [N18.30]  Yes   • Pleural effusion, right [J90]  Yes   • Hepatic steatosis [K76.0]  Yes   • Acute UTI (urinary tract infection) [N39.0]  Yes   • COVID-19 virus infection [U07.1]  Yes   •  Presence of cardiac pacemaker [Z95.0]  Yes   • Chronic bilateral severe lower extremity edema [R60.0]  Yes   • Persistent atrial fibrillation/flutter [I48.19]  Yes   • Type 2 diabetes mellitus without complication, without long-term current use of insulin (HCC) [E11.9]  Yes   • Essential hypertension [I10]  Yes   • Severe obstructive sleep apnea, no CPAP [G47.33]  Yes   • Class 3 severe obesity due to excess calories with serious comorbidity and body mass index (BMI) of 40.0 to 44.9 in adult (HCC) [E66.01, Z68.41]  Not Applicable      Resolved Hospital Problems    Diagnosis Date Resolved POA   • Acute pulmonary edema (HCC) [J81.0] 01/05/2022 Yes   • Pneumonia due to COVID-19 virus [U07.1, J12.82] 01/05/2022 Yes   • Hyponatremia [E87.1] 01/07/2022 Yes   • Elevated procalcitonin [R79.89] 01/07/2022 Yes   • Dyspnea on exertion [R06.00] 01/07/2022 Yes   • Acute on chronic diastolic congestive heart failure (HCC) [I50.33] 01/05/2022 Yes        Brief Hospital Course to date:  Akshat Winkler is a 66 y.o. male  with PMH diastolic HF, afib with recent pacemaker placement, T2DM, elevated LFTs, JOSESITO, morbid obesity, and CKD who presented to the ED with chief complaint of increasing SOB over the last week.   S/p thoracentesis with 2 L of fluid removed     Dyspnea with heart failure  -Concern for constrictive pericarditis  -Status post thoracentesis with 2 L removed, transudate, reaccumulation on CT from 1/9  -COVID positive  -Continue remdesivir and Decadron  -Repeat CT abdomen/pelvis overnight with possible free air.  Likely secondary to peptic ulcer disease  -Start PPI  -Discussed case with Dr. Nunez on 1/9 -patient is high risk candidate would likely not survive anesthesia, any type of surgery.  Discussed with his wife  -discussed again with Dr Justin today, he spoke with his wife today who is agreeable to hospice   -liver biopsy on hold  -hospice consult, hopefully can get his better a little better  controlled     Cirrhosis  Possible congestive hepatopathy  -Continue Rocephin empirically     UTI  -Enterococcus, continue ampicillin        A. Fib  -Recent pacemaker placed 2 weeks ago  -Continue Eliquis  -Cardiology following     CKD  - recheck in am     Hyponatremia  -Secondary to volume overload  -hold further lab draws once hospice eval completed     Diabetes  - continue SSI     Morbid obesity    DVT prophylaxis:  Medical DVT prophylaxis orders are present.       AM-PAC 6 Clicks Score (PT): 17 (22 0800)    Disposition: I expect the patient to be discharged pending hospice eval, likely inpatient hospice    CODE STATUS:   Code Status and Medical Interventions:   Ordered at: 22 1333     Medical Intervention Limits:    NO intubation (DNI)     Level Of Support Discussed With:    Health Care Surrogate     Code Status (Patient has no pulse and is not breathing):    No CPR (Do Not Attempt to Resuscitate)     Medical Interventions (Patient has pulse or is breathing):    Limited Support     Comments:    his wife, alin YUAN DO Robinson  01/10/22                Electronically signed by Patricia Bowers DO at 01/10/22 1430       Consult Notes (last 72 hours)  Notes from 01/10/22 0752 through 22 0752   No notes of this type exist for this encounter.            Discharge Summary      Patricia Bowers DO at 22 1242              [facility]  DISCHARGE SUMMARY    Patient Name: Akshat Winkler  : 1955  MRN: [id number]    Date of Admission: 2022  2:35 PM  Date of Discharge:  2022  Primary Care Physician: Saurabh Medina PA    Consults     Date and Time Order Name Status Description    2022 12:36 AM Inpatient Gastroenterology Consult Completed     2022 12:36 AM Inpatient Cardiology Consult Completed           Hospital Course     Presenting Problem:   Acute pulmonary edema (HCC) [J81.0]    Active Hospital Problems    Diagnosis   POA   • **Acute on chronic right-sided heart failure (HCC) [I50.813]  Yes   • Acute pulmonary edema (HCC) [J81.0]  Yes   • Bowel perforation (HCC) [K63.1]  Clinically Undetermined   • Cirrhosis (HCC) [K74.60]  Yes   • 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]  Yes   • Stage 3 chronic kidney disease (HCC) [N18.30]  Yes   • Pleural effusion, right [J90]  Yes   • Hepatic steatosis [K76.0]  Yes   • Acute UTI (urinary tract infection) [N39.0]  Yes   • COVID-19 virus infection [U07.1]  Yes   • Presence of cardiac pacemaker [Z95.0]  Yes   • Chronic bilateral severe lower extremity edema [R60.0]  Yes   • Persistent atrial fibrillation/flutter [I48.19]  Yes   • Type 2 diabetes mellitus without complication, without long-term current use of insulin (HCC) [E11.9]  Yes   • Essential hypertension [I10]  Yes   • Severe obstructive sleep apnea, no CPAP [G47.33]  Yes   • Class 3 severe obesity due to excess calories with serious comorbidity and body mass index (BMI) of 40.0 to 44.9 in adult (HCC) [E66.01, Z68.41]  Not Applicable      Resolved Hospital Problems    Diagnosis Date Resolved POA   • Acute pulmonary edema (HCC) [J81.0] 01/05/2022 Yes   • Pneumonia due to COVID-19 virus [U07.1, J12.82] 01/05/2022 Yes   • Hyponatremia [E87.1] 01/07/2022 Yes   • Elevated procalcitonin [R79.89] 01/07/2022 Yes   • Dyspnea on exertion [R06.00] 01/07/2022 Yes   • Acute on chronic diastolic congestive heart failure (HCC) [I50.33] 01/05/2022 Yes          Hospital Course:  Akshat Winkler is a 66 y.o. male  with PMH diastolic HF, afib with recent pacemaker placement, T2DM, elevated LFTs, JOSESITO, morbid obesity, and CKD who presented to the ED with chief complaint of increasing SOB over the last week.   S/p thoracentesis this admission with 2 L of fluid removed  He subsequently developed acute abdomen, case was discussed with general surgery and patient was poor operative candidate due to multiple comorbidities and end-stage heart failure.  He would not  survive anesthesia or surgery.  This was discussed with his wife who was agreeable to hospice.  He is being discharged to inpatient hospice today     Dyspnea with heart failure   Cirrhosis  Possible congestive hepatopathy  UTI  A. Fib  CKD  Hyponatremia  Diabetes  Morbid obesity      Discharge Follow Up Recommendations for outpatient labs/diagnostics:  DC to inpatient hospice    Day of Discharge     HPI:   With significant pain, increased Dilaudid and Ativan prior to hospice admission.    Review of Systems  + abdominal pain, + weakness    Vital Signs:   Temp:  [97.5 °F (36.4 °C)-97.8 °F (36.6 °C)] 97.5 °F (36.4 °C)  Heart Rate:  [80-82] 80  Resp:  [16-20] 20  BP: (119-137)/(79-91) 136/87  Flow (L/min):  [2] 2      Physical Exam:  Constitutional: Mild anxiety secondary to pain, sitting in the chair  HENT: NCAT, mucous membranes moist  Respiratory: Clear to auscultation bilaterally, respiratory effort normal   Cardiovascular: RRR, no murmurs, rubs, or gallops  Gastrointestinal: Distended, tight, diffuse tenderness worse in right upper quadrant Musculoskeletal: Bilateral LE edema, legs wrapped  Psychiatric: Appropriate affect, cooperative  Neurologic: Oriented x 3, speech clear  Skin: No rashes      Pertinent  and/or Most Recent Results     LAB RESULTS:      Lab 01/11/22  0346 01/10/22  0646 01/10/22  0320 01/10/22  0300 01/09/22  1226 01/09/22  0408 01/08/22  0408 01/07/22  0806 01/06/22  0403 01/05/22  2256 01/05/22  2255 01/05/22  1115 01/05/22  1115   WBC  --   --   --  10.61  --  11.44* 8.59 6.64  --  6.54  --    < > 7.14   HEMOGLOBIN  --   --   --  15.7  --  16.2 14.5 14.2  --  15.2  --    < > 14.4   HEMATOCRIT  --   --   --  47.6  --  48.3 43.2 43.2  --  46.0  --    < > 44.2   PLATELETS  --   --   --  86*  --  123* 115* 124*  --  128*  --    < > 115*   NEUTROS ABS  --   --   --  9.55*  --  10.03* 7.63* 5.98  --  5.77  --    < > 5.82   IMMATURE GRANS (ABS)  --   --   --   --   --  0.04 0.03 0.03  --  0.01  --    --  0.03   LYMPHS ABS  --   --   --   --   --  0.12* 0.22* 0.28*  --  0.30*  --   --  0.37*   MONOS ABS  --   --   --   --   --  1.05* 0.56 0.35  --  0.34  --   --  0.77   EOS ABS  --   --   --  0.11  --  0.20 0.14 0.00  --  0.09  --    < > 0.10   MCV  --   --   --  98.3*  --  97.6* 98.9* 99.8*  --  100.4*  --    < > 101.4*   CRP  --   --   --  10.51*  --  5.84* 5.54* 6.37*  --   --  6.26*   < > 5.80*   PROCALCITONIN  --   --   --   --   --  0.56*  --   --   --   --   --   --  0.48*   LACTATE  --  5.2* 4.2*  --  4.6*  --   --   --   --   --   --   --   --    LDH  --   --   --   --   --   --   --  468*  --   --   --   --   --    PROTIME 33.5*  --   --  39.0*  --  26.0* 20.8* 21.9*   < >  --   --   --  26.0*   APTT  --   --   --   --   --  41.8*  --   --   --   --   --   --   --    D DIMER QUANT  --   --   --   --   --   --   --   --   --  2.01*  --   --   --     < > = values in this interval not displayed.         Lab 01/10/22  0300 01/09/22  2316 01/09/22 0408 01/08/22  0408 01/07/22  0806 01/06/22  0403 01/05/22  2255 01/05/22  1115 01/05/22  1115   SODIUM  --  130* 129* 127* 133*  --  134*   < > 132*   POTASSIUM  --  4.8 5.0 4.9 5.2  --  5.0   < > 4.7   CHLORIDE  --  91* 92* 91* 95*  --  96*   < > 95*   CO2  --  21.0* 20.0* 23.0 27.0  --  24.0   < > 25.0   ANION GAP  --  18.0* 17.0* 13.0 11.0  --  14.0   < > 12.0   BUN  --  49* 47* 44* 35*  --  30*   < > 29*   CREATININE  --  1.33* 1.65* 1.96* 1.65*  --  1.64*   < > 1.74*   GLUCOSE  --  128* 140* 129* 116*  --  156*   < > 98   CALCIUM  --  10.0 10.3 9.8 9.8  --  10.0   < > 10.0   MAGNESIUM 2.4  --   --   --   --  2.1 2.2  --  2.2    < > = values in this interval not displayed.         Lab 01/09/22  2316 01/09/22  0408 01/08/22  0408 01/07/22  0806 01/06/22  2049 01/05/22  2255 01/05/22  1115 01/05/22  1115   TOTAL PROTEIN 7.9 8.4 7.6 7.8  --  8.1   < > 7.9   ALBUMIN 3.20* 3.30* 3.00* 3.10*  --  3.30*   < > 3.30*   GLOBULIN 4.7 5.1 4.6 4.7  --  4.8   < > 4.6   ALT  (SGPT) 43* 49* 42* 36  --  38   < > 36   AST (SGOT) 61* 91* 99* 94*  --  93*   < > 92*   BILIRUBIN 7.5* 5.8* 4.6* 5.2*  --  6.4*   < > 5.9*   BILIRUBIN DIRECT  --   --   --   --  3.2*  --   --   --    ALK PHOS 229* 264* 247* 249*  --  263*   < > 260*   LIPASE  --   --   --   --   --   --   --  28    < > = values in this interval not displayed.         Lab 01/11/22  0346 01/10/22  0300 01/09/22  0408 01/08/22  0408 01/07/22  0806 01/06/22  0403 01/05/22  1115   PROBNP  --   --  2,114.0*  --   --   --  1,074.0*   TROPONIN T  --   --   --   --   --   --  0.026   PROTIME 33.5* 39.0* 26.0* 20.8* 21.9*   < > 26.0*   INR 3.50* 4.26* 2.51* 1.87* 2.01*   < > 2.50*    < > = values in this interval not displayed.             Lab 01/09/22  1302 01/09/22  0408 01/05/22  1115   FERRITIN  --   --  172.50   ABO TYPING A   < >  --    RH TYPING Positive   < >  --    ANTIBODY SCREEN Negative  --   --     < > = values in this interval not displayed.         Brief Urine Lab Results  (Last result in the past 365 days)      Color   Clarity   Blood   Leuk Est   Nitrite   Protein   CREAT   Urine HCG        01/05/22 1635             31.3         01/05/22 1635 Yellow   Clear   Small (1+)   Trace   Negative   100 mg/dL (2+)               Microbiology Results (last 10 days)     Procedure Component Value - Date/Time    Body Fluid Culture - Body Fluid, Pleural Cavity [[id number]] Collected: 01/07/22 1327    Lab Status: Final result Specimen: Body Fluid from Pleural Cavity Updated: 01/10/22 0607     Body Fluid Culture No growth at 3 days     Gram Stain No WBCs or organisms seen    Urine Culture - Urine, Urine, Clean Catch [[id number]]  (Abnormal)  (Susceptibility) Collected: 01/05/22 5517    Lab Status: Final result Specimen: Urine, Clean Catch Updated: 01/07/22 1118     Urine Culture >100,000 CFU/mL Enterococcus faecalis    Susceptibility      Enterococcus faecalis     GOPAL     Ampicillin Susceptible     Levofloxacin Susceptible     Nitrofurantoin  Susceptible     Tetracycline Resistant     Vancomycin Susceptible                         COVID-19, FLU A/B, RSV PCR - Swab, Nasopharynx [[id number]]  (Abnormal) Collected: 01/05/22 1556    Lab Status: Final result Specimen: Swab from Nasopharynx Updated: 01/05/22 1708     COVID19 Detected     Influenza A PCR Not Detected     Influenza B PCR Not Detected     RSV, PCR Not Detected          Adult Transthoracic Echo Complete W/ Cont if Necessary Per Protocol    Addendum Date: 1/9/2022    · Technically difficult study due to body habitus · Left ventricular systolic function is normal. Estimated left ventricular EF = 60%. · Abnormal left ventricular septal bounce suggestive of pericardial constriction · The right ventricular cavity is dilated. · Moderate tricuspid valve regurgitation is present. · Estimated right ventricular systolic pressure from tricuspid regurgitation is moderately elevated (45-55 mmHg).      Result Date: 1/9/2022  · Technically difficult study · Left ventricular systolic function is normal. Estimated left ventricular EF = 60%. · The right ventricular cavity is dilated. · The right atrial cavity is mild to moderately dilated. · Moderate tricuspid valve regurgitation is present. · Estimated right ventricular systolic pressure from tricuspid regurgitation is moderately elevated (45-55 mmHg).      CT Abdomen Pelvis Without Contrast    Result Date: 1/10/2022  EXAMINATION: CT CHEST WO CONTRAST DIAGNOSTIC, CT ABDOMEN/ PELVIS WO CONTRAST - 01/08/2022  INDICATION:  J81.0-Acute pulmonary edema; K75.81-Nonalcoholic steatohepatitis (SHERMAN); K74.60-Unspecified cirrhosis of liver. Shortness of breath and chest pain.  TECHNIQUE: Multiple axial CT imaging is obtained of the chest, abdomen and pelvis without the administration of oral or intravenous contrast.  The radiation dose reduction device was turned on for each scan per the ALARA (As Low as Reasonably Achievable) protocol.  COMPARISON: 12/22/2017 and 7/20/2018   FINDINGS:  CHEST: Thyroid is homogeneous. There is mild enlargement identified of the lymph nodes in the mediastinum. There is calcification seen of the pericardium. There is no pericardial effusion. There is a pleural effusion identified small in size on the right with ill-defined opacification and dense airspace disease in the right middle and lower lung fields. The left lung reveals mild atelectatic change with calcified granuloma identified in the lingula. There is sparing of the right upper lung field. Degenerative changes seen within the spine. There is no adenopathy identified in the axillary regions.  ABDOMEN: Some anasarca seen in the subcutaneous tissues. The liver is homogeneous. The gallbladder is filled with stones. There is enlargement identified of the iliac vessels and IVC. Findings raise the concern for possible elevated right atrium pressure. Clinical correlation is needed. Extensive varices identified within the mesentery. Kidneys and adrenal glands within normal limits. Pancreas is homogeneous. No renal or adrenal abnormality present. Some vascular calcification seen within the abdominal aorta and iliac vessels.  PELVIS: Distention seen of the iliac vessels. There are varices identified in the inguinal regions bilaterally. No pelvic adenopathy. No free fluid or free air. No abnormal mass or fluid collections identified.      There is extensive dilatation identified of the IVC and iliac vessels raising the concern for elevated right atrium pressure. There is calcification seen of the pericardium. There is a small pleural effusion identified on the right with airspace disease and infiltrate in the right middle and lower lung field. Anasarca seen throughout the subcutaneous tissues of the abdomen and lower extremities with varices seen within the inguinal regions bilaterally.  DICTATED:   01/08/2022 EDITED/lfs:   01/08/2022  This report was finalized on 1/10/2022 1:39 PM by Dr. Juliane Bird,  MD.      CT Abdomen Pelvis Without Contrast    Result Date: 1/9/2022  CT Abdomen Pelvis WO INDICATION: Increasing abdominal distention and pain. TECHNIQUE: CT of the abdomen and pelvis without IV contrast. Coronal and sagittal reconstructions were obtained.  Radiation dose reduction techniques included automated exposure control or exposure modulation based on body size. Count of known CT and cardiac nuc med studies performed in previous 12 months: 2. COMPARISON: 1/8/2022 FINDINGS: Pericardial calcifications are unchanged compatible with prior pericarditis. Small right pleural effusion is similar to prior. There is continued pneumonia in the right lower and right middle lobes. There is some atelectasis in the left lung base. Generalized anasarca is again identified. There is a small amount of ascites similar to prior. There is atherosclerotic disease with no aortic aneurysm. The IVC and iliac veins in the pelvis remains distended which may be secondary to right heart dysfunction. This is unchanged. Gallbladder is filled with stones. No biliary obstruction. There is a nonobstructing stone or vascular calcification in the left kidney. No ureteral stones are seen on either side, and there is no hydronephrosis. Unenhanced solid abdominal organs are otherwise normal. Urinary bladder is decompressed and poorly characterized. Prostate is unremarkable. There is no evidence of acute colitis, and there is no colonic distention. The stomach is normal. There is no small bowel obstruction. There is nothing to suggest acute appendicitis. Multiple venous varicosities are noted in both groins, right worse than left. Patient is status post kyphoplasty of L4, L2, and L1. There are 2 tiny bubbles of potential free intraperitoneal air adjacent to the left lobe of the liver. Etiology is unclear as the bowel appears stable from prior. Surgical consultation and short interval follow-up recommended.     1. 2 potential bubbles of free  intraperitoneal air adjacent to the left hepatic lobe. Etiology is unclear as the bowel appears stable from prior and there is nothing to suggest a bowel perforation. Surgical consultation and follow-up recommended. 2. Otherwise, no significant change from the recent prior exam. Signer Name: Jorge Rudd MD  Signed: 1/9/2022 3:22 AM  Workstation Name: [facility]  [facility]    CT Chest Without Contrast Diagnostic    Result Date: 1/10/2022  EXAMINATION: CT CHEST WO CONTRAST DIAGNOSTIC, CT ABDOMEN/ PELVIS WO CONTRAST - 01/08/2022  INDICATION:  J81.0-Acute pulmonary edema; K75.81-Nonalcoholic steatohepatitis (SHERMAN); K74.60-Unspecified cirrhosis of liver. Shortness of breath and chest pain.  TECHNIQUE: Multiple axial CT imaging is obtained of the chest, abdomen and pelvis without the administration of oral or intravenous contrast.  The radiation dose reduction device was turned on for each scan per the ALARA (As Low as Reasonably Achievable) protocol.  COMPARISON: 12/22/2017 and 7/20/2018  FINDINGS:  CHEST: Thyroid is homogeneous. There is mild enlargement identified of the lymph nodes in the mediastinum. There is calcification seen of the pericardium. There is no pericardial effusion. There is a pleural effusion identified small in size on the right with ill-defined opacification and dense airspace disease in the right middle and lower lung fields. The left lung reveals mild atelectatic change with calcified granuloma identified in the lingula. There is sparing of the right upper lung field. Degenerative changes seen within the spine. There is no adenopathy identified in the axillary regions.  ABDOMEN: Some anasarca seen in the subcutaneous tissues. The liver is homogeneous. The gallbladder is filled with stones. There is enlargement identified of the iliac vessels and IVC. Findings raise the concern for possible elevated right atrium pressure. Clinical correlation is needed. Extensive  varices identified within the mesentery. Kidneys and adrenal glands within normal limits. Pancreas is homogeneous. No renal or adrenal abnormality present. Some vascular calcification seen within the abdominal aorta and iliac vessels.  PELVIS: Distention seen of the iliac vessels. There are varices identified in the inguinal regions bilaterally. No pelvic adenopathy. No free fluid or free air. No abnormal mass or fluid collections identified.      There is extensive dilatation identified of the IVC and iliac vessels raising the concern for elevated right atrium pressure. There is calcification seen of the pericardium. There is a small pleural effusion identified on the right with airspace disease and infiltrate in the right middle and lower lung field. Anasarca seen throughout the subcutaneous tissues of the abdomen and lower extremities with varices seen within the inguinal regions bilaterally.  DICTATED:   01/08/2022 EDITED/lfs:   01/08/2022  This report was finalized on 1/10/2022 1:39 PM by Dr. Juliane Bird MD.      XR Chest 1 View    Result Date: 1/11/2022  CR Chest 1 Vw INDICATION: Dyspnea with acute pulmonary edema. Nonalcoholic steatohepatitis. COMPARISON:  1/9/2022 FINDINGS: Portable AP view(s) of the chest. No visible support lines. Single lead pacemaker unchanged. Continued cardiomegaly with aortic atherosclerotic changes. Diffuse haziness over both lungs may represent a combination of interstitial alveolar edema with diffuse pulmonary vascular congestion. Slight blunting of the right CP angle may represent small effusion. No definite consolidation. No pneumothorax. Partially visualized vertebroplasty changes lumbar spine.     Continued cardiomegaly with combination of findings suggesting CHF with mild edema. Questionable developing small right pleural effusion. Signer Name: YOUNG Avendano MD  Signed: 1/11/2022 8:00 AM  Workstation Name: [email or web address]  [facility]    XR  Chest 1 View    Result Date: 1/10/2022   EXAMINATION: XR CHEST 1 VW - 01/08/2022  INDICATION: Dyspnea on exertion.  COMPARISON: 01/07/2022  FINDINGS: Portable chest reveals pacer leads in satisfactory position. Heart is borderline enlarged. Increased markings seen at the lung bases bilaterally with no definite pleural effusion. Heart is enlarged with degenerative changes seen within the spine. Pulmonary vascularity is within normal limits.       Minimal increased markings in the lung bases bilaterally. The heart is enlarged with low lung volumes bilaterally.  DICTATED:   01/08/2022 EDITED/lfs:   01/08/2022  This report was finalized on 1/10/2022 1:39 PM by Dr. Juliane Bird MD.      XR Chest 1 View    Result Date: 1/10/2022  EXAMINATION: XR CHEST 1 VW - 01/09/2022  INDICATION: Dyspnea on exertion.  COMPARISON: 01/08/2022  FINDINGS: Left-sided single-lead pacemaker is again noted. The heart is enlarged. Vasculature cephalized. There is a diffuse interstitial disease pattern present, generally stable in the upper and mid lungs allowing for different film technique, and mildly increased in the right lung base where there is increased silhouetting of the medial right hemidiaphragm. No pneumothorax or significant effusion is seen.      1. Cardiomegaly and mild CHF. 2. Mildly increased right basilar atelectasis versus pneumonia.  DICTATED:   01/09/2022 EDITED/lfs:   01/09/2022    This report was finalized on 1/10/2022 9:01 AM by Dr. Ted Wells MD.      XR Chest 1 View    Result Date: 1/7/2022  EXAMINATION: XR CHEST 1 VW-  INDICATION: s/p thora; J81.0-Acute pulmonary edema; K75.81-Nonalcoholic steatohepatitis (SHERMAN); K74.60-Unspecified cirrhosis of liver  COMPARISON: 1/5/2022  FINDINGS: Left-sided single lead pacemaker is noted. Heart is at upper limits of normal size. Vasculature remains cephalized and a diffuse pulmonary interstitial disease pattern appears stable overall. Right pleural effusion appears to have been  drained. There is trace amount of remaining intrafissural fluid. No pneumothorax is seen.       1. Interval drainage of right pleural effusion. No evidence of pneumothorax. 2. Stable borderline heart shadow enlargement and mild pulmonary vascular congestion.    This report was finalized on 1/7/2022 1:06 PM by Dr. Ted Wells MD.      XR Chest 1 View    Result Date: 1/5/2022  EXAMINATION: XR CHEST 1 VW-  INDICATION: Upper Abdominal Pain Triage Protocol  TECHNIQUE: Frontal view of the chest  COMPARISON: 12/21/2021  FINDINGS:  Stable cardiomediastinal silhouette demonstrating mild cardiomegaly with unchanged single lead cardiac pacer device and left chest pulse generator. Perihilar vascular engorgement and diffuse interstitial prominence compatible with interstitial pulmonary edema. New mild elevation of the right hemidiaphragm versus small-medium right pleural effusion. No pneumothorax.       Interstitial pulmonary edema. Small-medium right pleural effusion versus mild elevation of the right hemidiaphragm from prior exam.  This report was finalized on 1/5/2022 2:02 PM by Juan Dave MD.      US Abdomen Limited    Result Date: 1/5/2022  EXAMINATION: US ABDOMEN LIMITED-  INDICATION: ruq - abnormal LFTs suspect cirrhosis  TECHNIQUE: Transverse and sagittal grayscale sonographic assessment of the right upper quadrant supplemented with color Doppler.  COMPARISON: CT abdomen and pelvis 7/20/2018,  FINDINGS:  The exam is somewhat limited owing to bowel gas, patient body habitus, as well as patient condition with limited mobility and inability to hold respirations.  The visualized portions of the pancreas appear unremarkable.  Increased echogenicity and coarsened echotexture of the liver parenchyma. No discrete hepatic lesion. No intrahepatic ductal dilatation.  The gallbladder contains multiple mobile shadowing dependent gallstones. No evidence of gallbladder wall thickening or pericholecystic fluid.  Normal appearance of  the common bile duct measuring 2 mm in diameter.  Normal appearance of the right kidney.  Right pleural effusion.       Somewhat limited exam owing to multiple patient factors.  Cholelithiasis without evidence of cholecystitis.  Increased echogenicity and coarsened echotexture of the liver compatible with hepatic steatosis and/or hepatic parenchymal disease.  Right pleural effusion.  This report was finalized on 1/5/2022 5:05 PM by Juan Dave MD.      US Thoracentesis    Result Date: 1/7/2022  DATE OF EXAM: 1/7/2022 10:56 AM  PROCEDURE: US THORACENTESIS-  INDICATIONS: pleural effusion; J81.0-Acute pulmonary edema; K75.81-Nonalcoholic steatohepatitis (SHERMAN); K74.60-Unspecified cirrhosis of liver  COMPARISON: No Comparisons Available  FLUOROSCOPIC TIME: None  PHYSICIAN MONITORED CONSCIOUS SEDATION TIME: None  CONTRAST MEDIA: None  TECHNIQUE: The patient's medications were reviewed prior to the procedure. The procedure, risks and alternatives were discussed and informed written consent was obtained. Maximal sterile barrier technique was utilized throughout the procedure. The patient was placed in the seated position and ultrasound was utilized to localize the right-sided pleural effusion. The skin was marked prepped and draped. Maximal sterile barrier technique was utilized during this procedure. Partial protective equipment was used secondary to the patient's Covid positive status. The skin was marked prepped draped and anesthetized with 1% Xylocaine. A 5 Turkish catheter was inserted in the right pleural space by trocar technique. A total of 2 L of fluid was removed. Appropriate specimens were sent to the lab. The catheter was removed and hemostasis achieved. Postprocedure chest radiograph shows no pneumothorax.       Technically successful ultrasound-guided right thoracentesis with removal of 2 L of fluid.  This report was finalized on 1/7/2022 1:51 PM by Lex Maravilla MD.      XR Abdomen KUB    Result Date:  1/11/2022  CR Abdomen 1 Vw INDICATION: Right lower quadrant pain. Nonalcoholic steatohepatitis. COMPARISON: 1/9/2022 FINDINGS: AP radiograph(s) of the abdomen. No abnormal masses or calcifications. No organomegaly. Abnormal bowel gas pattern with mildly air distended small and large bowel suggesting ileus over a high-grade obstruction. Multilevel vertebroplasty with bone cement in expected position. Single lead pacemaker lead. Curvilinear calcifications projecting over the inferior left heart border could represent ventricular wall calcification.     Continued nonspecific bowel gas pattern with increased small and large bowel gas suggesting ileus. Overall no significant change. Signer Name: YOUNG Avendano MD  Signed: 1/11/2022 8:02 AM  [facility]  [facility]    XR Abdomen KUB    Result Date: 1/10/2022  CR Abdomen 1 Vw INDICATION: Abdominal pain. COMPARISON: CT abdomen pelvis earlier same day FINDINGS: AP radiograph(s) of the abdomen. Patient is status post multilevel kyphoplasty in the lumbar spine. Bowel gas pattern is nonspecific with large and small bowel gas noted. No radiopaque foreign bodies.     Nonspecific but nonobstructive bowel gas pattern. Signer Name: Jorge Rudd MD  Signed: 1/10/2022 12:13 AM  Workstation Name: [facility]  [facility]      Results for orders placed in visit on 08/28/19    SCANNED VASCULAR STUDIES      Results for orders placed in visit on 08/28/19    SCANNED VASCULAR STUDIES      Results for orders placed during the hospital encounter of 01/05/22    Adult Transthoracic Echo Complete W/ Cont if Necessary Per Protocol    Interpretation Summary  · Technically difficult study due to body habitus  · Left ventricular systolic function is normal. Estimated left ventricular EF = 60%.  · Abnormal left ventricular septal bounce suggestive of pericardial constriction  · The right ventricular cavity is dilated.  · Moderate  tricuspid valve regurgitation is present.  · Estimated right ventricular systolic pressure from tricuspid regurgitation is moderately elevated (45-55 mmHg).      Plan for Follow-up of Pending Labs/Results: DC to hospice    Discharge Details        Discharge Medications      ASK your doctor about these medications      Instructions Start Date   acetaminophen 325 MG tablet  Commonly known as: TYLENOL   650 mg, Oral, Every 6 Hours PRN, OTC      apixaban 5 MG tablet tablet  Commonly known as: Eliquis   5 mg, Oral, Every 12 Hours, Patient did not start until 12/22/2021      bumetanide 2 MG tablet  Commonly known as: BUMEX   2 mg, Oral, Daily      diphenhydrAMINE-acetaminophen  MG tablet per tablet  Commonly known as: TYLENOL PM   1 tablet, Oral, Nightly PRN, OTC      magnesium oxide 400 MG tablet  Commonly known as: MAG-OX   400 mg, Oral, Daily      multivitamin with minerals tablet tablet   1 tablet, Oral, Daily      potassium chloride 10 MEQ CR tablet   30 mEq, Oral, Daily      rosuvastatin 20 MG tablet  Commonly known as: CRESTOR   20 mg, Oral, Daily      spironolactone 100 MG tablet  Commonly known as: Aldactone   50 mg, Oral, Daily      Vasculera tablet   1 tablet, Oral, Daily             Allergies   Allergen Reactions   • Bisoprolol Other (See Comments)     Severe Hypotension   • Flecainide Other (See Comments)     Syncope / collapse   • Metoprolol Other (See Comments)      Bradycardia, Severe Hypotension   • Tikosyn [Dofetilide] Other (See Comments)     Wide complex tachycardia         Discharge Disposition:      Diet:  Hospital:  Diet Order   Procedures   • Diet Clear Liquid            CODE STATUS:    Code Status and Medical Interventions:   Ordered at: 01/09/22 1333     Medical Intervention Limits:    NO intubation (DNI)     Level Of Support Discussed With:    Health Care Surrogate     Code Status (Patient has no pulse and is not breathing):    No CPR (Do Not Attempt to Resuscitate)     Medical  Interventions (Patient has pulse or is breathing):    Limited Support     Comments:    his wife, alin       Future Appointments   Date Time Provider Department Center   1/19/2022  8:15 AM Saurabh Medina PA [facility]   2/1/2022 11:00 AM Courtney Carmichael APRN [facility]   3/21/2022 10:15 AM Jose Manuel Figueroa PA [facility]   5/25/2022  9:00 AM [facility]                 Patricia Bowers DO  01/11/22      Time Spent on Discharge:  I spent 41 minutes on this discharge activity which included: face-to-face encounter with the patient, reviewing the data in the system, coordination of the care with the nursing staff as well as consultants, documentation, and entering orders.            Electronically signed by Patricia Bowers DO at 01/11/22 [id number]

## 2022-01-13 NOTE — PLAN OF CARE
Goal Outcome Evaluation:     Scheduled meds given today, pt seems comfortable when lying still. Becomes agitated and uncomfortable when staff is turning patient. Family came to visit patient. Pt will moan at times but is mostly unresponsive, no food or water consumed today. 300 ml urine output during day shift.    Problem: Skin Injury Risk Increased  Goal: Skin Health and Integrity  Outcome: Ongoing, Progressing  Intervention: Optimize Skin Protection  Recent Flowsheet Documentation  Taken 1/13/2022 1800 by Amanda Palma RN  Head of Bed (HOB): HOB at 60-90 degrees  Taken 1/13/2022 1600 by Amanda Palma RN  Head of Bed (HOB): HOB at 60-90 degrees  Taken 1/13/2022 1417 by Amanda Palma RN  Head of Bed (HOB): HOB at 60 degrees  Taken 1/13/2022 1200 by Amanda Palma RN  Pressure Reduction Techniques: frequent weight shift encouraged  Head of Bed (HOB): HOB at 45 degrees  Pressure Reduction Devices: pressure-redistributing mattress utilized  Skin Protection: adhesive use limited  Taken 1/13/2022 1050 by Amanda Palma RN  Pressure Reduction Techniques: frequent weight shift encouraged  Head of Bed (HOB): HOB at 60 degrees  Pressure Reduction Devices: pressure-redistributing mattress utilized  Skin Protection:   adhesive use limited   incontinence pads utilized  Taken 1/13/2022 0945 by Amanda Palma RN  Pressure Reduction Techniques: frequent weight shift encouraged  Head of Bed (HOB): HOB at 60-90 degrees  Pressure Reduction Devices: positioning supports utilized  Skin Protection:   adhesive use limited   incontinence pads utilized  Taken 1/13/2022 0800 by Amanda Palma RN  Pressure Reduction Techniques: frequent weight shift encouraged  Head of Bed (HOB): HOB at 90 degrees  Pressure Reduction Devices: pressure-redistributing mattress utilized  Skin Protection: adhesive use limited     Problem: Fall Injury Risk  Goal: Absence of Fall and Fall-Related Injury  Outcome: Ongoing,  Progressing  Intervention: Identify and Manage Contributors to Fall Injury Risk  Recent Flowsheet Documentation  Taken 1/13/2022 1600 by Amanda Palma RN  Medication Review/Management: medications reviewed  Taken 1/13/2022 1417 by Amanda Palma RN  Medication Review/Management: medications reviewed  Taken 1/13/2022 1200 by Amanda Palma RN  Medication Review/Management: medications reviewed  Taken 1/13/2022 1050 by Amanda Palma RN  Medication Review/Management: medications reviewed  Taken 1/13/2022 0945 by Amanda Palma RN  Medication Review/Management: medications reviewed  Taken 1/13/2022 0800 by Amanda Palma RN  Medication Review/Management: medications reviewed  Intervention: Promote Injury-Free Environment  Recent Flowsheet Documentation  Taken 1/13/2022 1800 by Amanda Palma RN  Safety Promotion/Fall Prevention:   activity supervised   assistive device/personal items within reach   clutter free environment maintained   fall prevention program maintained   lighting adjusted   muscle strengthening facilitated   nonskid shoes/slippers when out of bed   room organization consistent   safety round/check completed  Taken 1/13/2022 1600 by Amanda Palma RN  Safety Promotion/Fall Prevention:   activity supervised   assistive device/personal items within reach   clutter free environment maintained   fall prevention program maintained   lighting adjusted   nonskid shoes/slippers when out of bed   room organization consistent   safety round/check completed  Taken 1/13/2022 1417 by Amanda Palma RN  Safety Promotion/Fall Prevention:   activity supervised   assistive device/personal items within reach   clutter free environment maintained   fall prevention program maintained   lighting adjusted   muscle strengthening facilitated   nonskid shoes/slippers when out of bed   room organization consistent   safety round/check completed  Taken 1/13/2022 1200 by Amanda Palma RN  Safety  Promotion/Fall Prevention:   activity supervised   assistive device/personal items within reach   clutter free environment maintained   fall prevention program maintained   lighting adjusted   muscle strengthening facilitated   nonskid shoes/slippers when out of bed   room organization consistent   safety round/check completed  Taken 1/13/2022 1050 by Amanda Palma RN  Safety Promotion/Fall Prevention:   activity supervised   assistive device/personal items within [facility]   clutter free environment maintained   fall prevention program maintained   lighting adjusted   muscle strengthening facilitated   nonskid shoes/slippers when out of bed   room organization consistent   safety round/check completed  Taken 1/13/2022 0945 by Amanda Palma RN  Safety Promotion/Fall Prevention:   activity supervised   assistive device/personal items within [facility]   clutter free environment maintained   fall prevention program maintained   lighting adjusted   muscle strengthening facilitated   nonskid shoes/slippers when out of bed   room organization consistent   safety round/check completed  Taken 1/13/2022 0800 by Amanda Palma RN  Safety Promotion/Fall Prevention:   activity supervised   clutter free environment maintained   assistive device/personal items within [facility]   fall prevention program maintained   lighting adjusted   muscle strengthening facilitated   nonskid shoes/slippers when out of bed   room organization consistent   safety round/check completed

## 2022-01-14 NOTE — PROGRESS NOTES
Continued Stay Note  [facility]     Patient Name: Akshat Winkler  MRN: [id number]  Today's Date: 1/14/2022    Admit Date: 1/11/2022     Discharge Plan     Row Name 01/14/22 1053       Plan    Plan Inpatient hospice    Plan Comments Visit this am. Observed patient via plexiglass window in door. hr 92, b/p 112/63, eyes closed, respirations even, unlabored, darwin (=) excursion. Report with Cristine KOO.  Relays no needs, no need for changes at present.  This Rn contacted patient's spouse. Updated. Discussed assessment, eol signs, symptoms, food and water, nonverbal indicators of comfort, discomfort, goals, poc, medications. Rn offered ongoing support, open line of communication, continuous availability.  Spouse is to come to visit, as well as patient's children.  Rn notes spouse to call [facility] Charge RN to discuss visiting.    Final Discharge Disposition Code 51 - hospice medical facility                                    Susu Marin RN

## 2022-01-14 NOTE — PROGRESS NOTES
Hospice Progress Note    Patient Name: Akshat Winkler   : 1955  Gender: male    Code Status: comfort measures    Date of Admission: 2022    Subjective:    66yoM admitted inpt Hospice on  for end stage heart failure; Covid +    Overnight notes reviewed: Patient asleep for most of shift, only responding to painful stimuli. When asked any question patient only moans. Scheduled medication given as ordered.    Pt laying supine in bed - much more tolerant to care. Comfortable; undisturbed by exam. No family/visitors at bedside.      - PRNs: tylenol supp x1    - Held: none    - Intake/Output  *PO: NPO  *Urine: 1550ml; 500ml  *LBM: last documentation is on       ROS:  Review of Systems   Unable to perform ROS: Acuity of condition       Reviewed current scheduled and prn medications for route, type, dose and frequency.     •  acetaminophen  •  bisacodyl  •  furosemide  •  glycopyrrolate  •  haloperidol lactate  •  HYDROmorphone  •  ketorolac  •  LORazepam  •  palliative care oral rinse  •  Scopolamine  •  Sodium Chloride (PF)    Objective:   /63 (BP Location: Right arm, Patient Position: Lying)   Pulse 80   Temp 100.4 °F (38 °C) (Axillary)   Resp 22   SpO2 92%      PPS: 10%    Physical Exam: [realtively unchanged]  Constitutional:       General: NAD; comfortable     Appearance: obese; ill appearing  HENT:      Head: Normocephalic.      Mouth/Throat:      Mouth: Dry MM    Eyes:      Comments: Eyes remained closed   Neck:      Comments: Increased neck circumference  Cardiovascular:      Rate and Rhythm: DHT     Comments: + edema (anasarca); + radial pulses  Pulmonary:      Comments: Even, non-labored; CTA; no audible congestion  Abdominal:      General: Distention      Comments: abd is rounded/distended; BSx4; mildly tense; pitting edema BLE  Skin:     Coloration: jaundiced/copper skintone  Neurological:      Comments: Does not follow commands   Psychiatric:      Comments: No facial grimacing;  no moaning         End stage heart failure (HCC)      Assessment/Plan:     66yoM admitted inpt Hospice on 1/11 for end stage heart failure; Covid +     AMS/somnolence  Dyspnea  Anxiety  Pain, nos  EOLC    Discontinue tray for pt    Dul supp x3 nights    Wife updated by Hospice - she is planning on spending the night tonight with pt    Continue:    Lasix 20mg q6h    Dilaudid 1mg q6h    Ativan 1mg q6h    Scheduled and prn palliative oral rinse    Scheduled and prn eye gtts    Coordinated care with Nursing and Hospice IDT    Discharge Disposition: EOLC    Total Visit Time: 20min  Face to Face Time: 5min    Justification for care:  Patient meets criteria for acute in-patient care with required nursing assessment and interventions for symptoms with IV medications.      Zully Archibald, CAMRON, MHA, FAITH  [facility] Care Navigators  Hospice and Palliative Care Nurse Practitioner  01/14/22  14:27 EST

## 2022-01-15 NOTE — SIGNIFICANT NOTE
Exam confirms with auscultation zero audible heart tones and zero audible respirations. Mr.Steven MYRON Winkler was pronounced dead at 0858.  MD notified by Patient's RN.    Abida Shepard RN  Clinical House Supervisor  1/15/2022 09:49 EST

## 2022-01-15 NOTE — PLAN OF CARE
Pt remained with o2 sat in 80s throughout shift, wife remained at bedside, pt withdrawing from pain but does not open eyes, does not respond to questions, pt noted with increased secretions throughout shift, robinul given with minimal effect noted, scopalamine patch applied, ativan and dilauded given per order, pt noted to have agonal breathing for most of shift

## 2022-01-18 NOTE — DISCHARGE SUMMARY
Date of Death: 1/15/2022  Time of Death:  0858    Presenting Problem/History of Present Illness    End stage heart failure (HCC)      Hospital Course    Per Hospitalist Discharge Summary:    Hospital Course:  Akshat Winkler is a 66 y.o. male  with PMH diastolic HF, afib with recent pacemaker placement, T2DM, elevated LFTs, JOSESITO, morbid obesity, and CKD who presented to the ED with chief complaint of increasing SOB over the last week.   S/p thoracentesis this admission with 2 L of fluid removed  He subsequently developed acute abdomen, case was discussed with general surgery and patient was poor operative candidate due to multiple comorbidities and end-stage heart failure.  He would not survive anesthesia or surgery.  This was discussed with his wife who was agreeable to hospice.  He is being discharged to inpatient hospice today     Dyspnea with heart failure   Cirrhosis  Possible congestive hepatopathy  UTI  A. Fib  CKD  Hyponatremia  Diabetes  Morbid obesity        Discharge Follow Up Recommendations for outpatient labs/diagnostics:  DC to inpatient hospice    Social History:  Social History     Tobacco Use   • Smoking status: Never Smoker   • Smokeless tobacco: Never Used   Substance Use Topics   • Alcohol use: No         Consults:   Consults     Date and Time Order Name Status Description    1/6/2022 12:36 AM Inpatient Gastroenterology Consult Completed     1/6/2022 12:36 AM Inpatient Cardiology Consult Completed           Exam confirms with auscultation zero audible heart tones and zero audible respirations. Mr.Steven MYRON Winkler was pronounced dead at 0858.  MD notified by Patient's RN.     Abida Shepard RN  Clinical House Supervisor  1/15/2022 09:49 EST      Zully Archibald, CAMRON, MHA, APRN  [facility] Navigators  Hospice and Palliative Care Nurse Practitioner  01/18/22  17:10 EST

## 2023-06-13 NOTE — PROGRESS NOTES
Continued Stay Note   [facility]     Patient Name: Akshat Winkler  MRN: [id number]  Today's Date: 3/2/2018    Admit Date: 2/27/2018          Discharge Plan       03/02/18 1126    Case Management/Social Work Plan    Plan Dependent Upon Medical Condition    Patient/Family In Agreement With Plan yes    Additional Comments Spoke with patient in room.  Explained to him that therapy is recommending inpatient rehab at discharge.  His goal is home, but he is agreeable to whatever is needed.  Per chart, surgery scheduled for Monday, 3/5.  Will await PT/OT after surgery and make appropriate referrals as needed.  CM will continue to follow.  Sara Dubose RN [phone]              Discharge Codes     None        Expected Discharge Date and Time     Expected Discharge Date Expected Discharge Time    Mar 5, 2018             Sara Dubose RN     PAST SURGICAL HISTORY:  No significant past surgical history

## 2025-04-09 NOTE — PROGRESS NOTES
Patient: Tiarra Jackson Date: 2025  : 1949 Attending: Kristian Brennan MD  76 year old female         Subjective        - no chest pain or sob reported. Family is at bedside.    utilized     no chest pain. For cath this afternoon.      patient with daughter patient denies any chest pain        ALLERGIES:  Patient has no known allergies.      Medications And Infusions     Scheduled:   Current Facility-Administered Medications   Medication Dose Route Frequency Provider Last Rate Last Admin    sodium chloride 0.9 % injection 2 mL  2 mL Intracatheter 2 times per day Toribio Bhandari MD        clopidogrel (PLAVIX) tablet 75 mg  75 mg Oral Daily Toribio Bhandari MD   75 mg at 25    atorvastatin (LIPITOR) tablet 80 mg  80 mg Oral Nightly Toribio Bhandari MD   80 mg at 25    insulin lispro (ADMELOG,HumaLOG) - Correction Dose   Subcutaneous 4x Daily AC & HS Kristian Brennan MD   2 Units at 25    Potassium Standard Replacement Protocol (Levels 3.5 and lower)   Does not apply See Admin Instructions Toribio Bhandari MD        Sofosbuvir-Velpatasvir 400-100 MG Tab 1 tablet  1 tablet Oral Daily Urs, Rina I, CNP   1 tablet at 25 0820    sodium chloride 0.9 % injection 2 mL  2 mL Intracatheter 2 times per day Urs, Rina I, CNP   2 mL at 25 0914    Magnesium Standard Replacement Protocol   Does not apply See Admin Instructions Urs, Rina I, CNP        aspirin (ECOTRIN) enteric coated tablet 81 mg  81 mg Oral Daily Urs, Rina I, CNP   81 mg at 25 0818    [Held by provider] lisinopril (ZESTRIL) tablet 20 mg  20 mg Oral Daily Urs, Rina I, CNP   20 mg at 25 0835        Infusions:  Current Facility-Administered Medications   Medication Dose Route Frequency Provider Last Rate Last Admin    bivalirudin (ANGIOMAX) bolus from bag    Continuous PRN Toribio Bhandari  mL/hr at 25 1614 49.125 mg at 25 1614    bivalirudin (ANGIOMAX)  "NEUROSURGERY PROGRESS NOTE     LOS: 3 days   Patient Care Team:  Oren Mark MD as PCP - General (Family Medicine)  Lucian Alvarez IV, MD as Consulting Physician (Cardiology)  Mandy Clark PA-C as Physician Assistant (Physician Assistant)  Josh Moss MD as Consulting Physician (Pain Medicine)    Chief Complaint: Low back pain.    Interval History:   The patient was able to ambulate a short distance yesterday.  Patient Complaints: Low back pain.  Patient Denies: Weakness or numbness in his legs.    Review of Systems:   Musculoskeletal and Neurological systems were reviewed and are negative except for:  Musculoskeletal: positive for back pain  Neurological: positive for difficulty walking    Vital Signs: Blood pressure (!) 140/103, pulse 102, temperature 98.3 °F (36.8 °C), temperature source Oral, resp. rate 16, height 178 cm (70.08\"), weight 132 kg (290 lb), SpO2 98 %.  Intake/Output:   Intake/Output Summary (Last 24 hours) at 03/02/18 0744  Last data filed at 03/02/18 0100   Gross per 24 hour   Intake             1080 ml   Output             5900 ml   Net            -4820 ml       Physical Exam:  The patient is awake, alert, and sitting up in bed.  Motor and sensory function appears to be intact in his lower extremities.  Edema is substantially improved in his legs albeit not absent.     Data Review:      Results from last 7 days  Lab Units 03/01/18  1337   SODIUM mmol/L 143   POTASSIUM mmol/L 3.1*   CHLORIDE mmol/L 106   CO2 mmol/L 23.0   BUN mg/dL 59*   CREATININE mg/dL 4.60*   GLUCOSE mg/dL 148*   CALCIUM mg/dL 9.5         Assessment/Plan:  1.  Severe L4 compression fracture.  The patient previously presented for kyphoplasty but the surgery was canceled earlier in the week due to his medical issues.  I talked with Dr. Tavares.  I will schedule the patient for L4 kyphoplasty on the upcoming Monday.  If he is not medically ready then I will cancel the case.  Kyphoplasty is " 250 mg in sodium chloride 0.9 % 50 mL cath lab infusion protocol    Continuous PRN Toribio Bhandari MD   Stopped at 04/08/25 2519    sodium chloride 0.9% infusion   Intravenous Continuous Tiarra Hidalgo MD   Stopped at 04/08/25 3655         Prior To Admission Medications  Medications Prior to Admission   Medication Sig Dispense Refill    Epclusa 400-100 MG Tab Take 1 tablet by mouth daily.      DISPENSE Take 1 capsule by mouth daily. Super Nopal Supplement      aspirin (ECOTRIN) 81 MG EC tablet Take 81 mg by mouth daily.      acetaminophen (TYLENOL) 500 MG tablet Take 500 mg by mouth every 6 hours as needed for Pain.      cetirizine (ZyrTEC) 10 MG chewable tablet Chew 10 mg by mouth daily.      lisinopril (ZESTRIL) 20 MG tablet Take 20 mg by mouth daily.      atorvastatin (LIPITOR) 40 MG tablet Take 40 mg by mouth every evening.      metFORMIN (GLUCOPHAGE) 500 MG tablet Take 500 mg by mouth 2 times daily (with meals).      glimepiride (AMARYL) 2 MG tablet Take 2 mg by mouth daily (before breakfast).      pantoprazole (PROTONIX) 20 MG tablet Take 20 mg by mouth daily.      alendronate (FOSAMAX) 70 MG tablet Take 70 mg by mouth 1 day a week. Friday            Rhythm: Normal Sinus Rhythm      Physical Exam       Vitals Last Value 24 Hour Range  Temperature 98.4 °F (36.9 °C) Temp  Min: 97.5 °F (36.4 °C)  Max: 98.4 °F (36.9 °C)  Pulse 92 Pulse  Min: 81  Max: 98  Respiratory 17 Resp  Min: 17  Max: 19  Blood Pressure 120/80 BP  Min: 102/71  Max: 143/83  Arterial BP   No data recorded  Pulse Oximetry 96 % SpO2  Min: 90 %  Max: 98 %      Vitals Today Admission  Weight 65.4 kg (144 lb 2.9 oz) Weight: 69.3 kg (152 lb 12.5 oz)    Body mass index is 25.54 kg/m².    Weight over the past 48 Hours:  Patient Vitals for the past 48 hrs:   Weight   04/08/25 0600 65.5 kg (144 lb 6.4 oz)   04/09/25 0402 65.4 kg (144 lb 2.9 oz)        Intake/Output:    I/O's    Intake/Output Summary (Last 24 hours) at 4/9/2025 0822  Last data filed at  not an all-encompassing solution for his problem but he is not a candidate for a major spinal reconstruction given his medical issues.  2.  Renal insufficiency, slowly improving.  3.  Congestive heart failure, coronary artery disease, atrial fibrillation.  4.  Hypertension.  5.  Obesity.  6.  Diabetes mellitus.      Akshat Davidson MD  03/02/18  7:44 AM     4/8/2025 2300  Gross per 24 hour   Intake 930.32 ml   Output --   Net 930.32 ml       Physical Exam  Vitals and nursing note reviewed.   Constitutional:       General: She is not in acute distress.     Appearance: She is not ill-appearing.   Cardiovascular:      Rate and Rhythm: Normal rate and regular rhythm.      Pulses: Normal pulses.      Heart sounds: Normal heart sounds.   Pulmonary:      Effort: Pulmonary effort is normal.      Breath sounds: Normal breath sounds.   Musculoskeletal:         General: Normal range of motion.   Skin:     General: Skin is warm.   Neurological:      Mental Status: She is alert and oriented to person, place, and time.           Laboratory Data       Recent Labs   Lab 04/09/25  0419 04/08/25  0409 04/07/25  0415 04/06/25  1232 04/06/25  0516   WBC 7.8 6.8 6.3  --  6.2   HCT 31.3* 34.2* 35.0*  --  33.4*   HGB 9.9* 11.1* 11.1*  --  10.6*    159 162  --  156   SODIUM 137 135 136  --  139   POTASSIUM 4.2 4.5 4.6  --  4.3   CHLORIDE 109 107 107  --  110   CO2 24 24 25  --  25   GLUCOSE 211* 212* 206*  --  182*   BUN 23* 23* 25*  --  22*   CREATININE 0.97* 0.91 1.09*  --  1.15*   TSH  --   --   --   --  2.080   CHOLESTEROL  --   --   --  134 131   HDL  --   --   --  32* 32*   CALCLDL  --   --   --  62 51   TRIGLYCERIDE  --   --   --  201* 239*         Troponin:    Lab Results   Component Value Date    HTROPI 221 () 04/06/2025    HTROPI 322 () 04/05/2025    HTROPI 313 () 04/05/2025                   Recent Results (from the past 24 hours)   GLUCOSE, BEDSIDE - POINT OF CARE    Collection Time: 04/08/25  1:06 PM    Specimen: Blood   Result Value Ref Range    GLUCOSE, BEDSIDE - POINT OF CARE 182 (H) 70 - 99 mg/dL   Activated Clotting Time - POC    Collection Time: 04/08/25  4:31 PM   Result Value Ref Range    Activated Clotting Time 397 Baseline/Target ranges are set by clinicians for each patient/procedure   GLUCOSE, BEDSIDE - POINT OF CARE    Collection Time: 04/08/25  5:41 PM     Specimen: Blood   Result Value Ref Range    GLUCOSE, BEDSIDE - POINT OF CARE 160 (H) 70 - 99 mg/dL   Electrocardiogram 12-Lead    Collection Time: 04/08/25  5:54 PM   Result Value Ref Range    Ventricular Rate EKG/Min (BPM) 88     Atrial Rate (BPM) 88     WI-Interval (MSEC) 146     QRS-Interval (MSEC) 72     QT-Interval (MSEC) 366     QTc 443     P Axis (Degrees) 56     R Axis (Degrees) 26     T Axis (Degrees) 5     REPORT TEXT       Normal sinus rhythm  Normal ECG  When compared with ECG of  05-APR-2025 15:59,  Nonspecific T wave abnormality no longer evident in  Anterior leads  Confirmed by BRIGITTE MOODY MD ([id number]) on 4/9/2025 8:03:26 AM     GLUCOSE, BEDSIDE - POINT OF CARE    Collection Time: 04/08/25  8:45 PM    Specimen: Blood   Result Value Ref Range    GLUCOSE, BEDSIDE - POINT OF CARE 274 (H) 70 - 99 mg/dL   Comprehensive Metabolic Panel    Collection Time: 04/09/25  4:19 AM    Specimen: Blood, Venous   Result Value Ref Range    Fasting Status      Sodium 137 135 - 145 mmol/L    Potassium 4.2 3.4 - 5.1 mmol/L    Chloride 109 97 - 110 mmol/L    Carbon Dioxide 24 21 - 32 mmol/L    Anion Gap 8 7 - 19 mmol/L    Glucose 211 (H) 70 - 99 mg/dL    BUN 23 (H) 6 - 20 mg/dL    Creatinine 0.97 (H) 0.51 - 0.95 mg/dL    Glomerular Filtration Rate 61 >=60    BUN/Cr 24 7 - 25    Calcium 8.8 8.4 - 10.2 mg/dL    Bilirubin, Total 0.4 0.2 - 1.0 mg/dL    GOT/AST 16 <=37 Units/L    GPT/ALT 15 <64 Units/L    Alkaline Phosphatase 62 45 - 117 Units/L    Albumin 3.2 (L) 3.4 - 5.0 g/dL    Protein, Total 6.7 6.4 - 8.2 g/dL    Globulin 3.5 2.0 - 4.0 g/dL    A/G Ratio 0.9 (L) 1.0 - 2.4   CBC with Automated Differential (performable only)    Collection Time: 04/09/25  4:19 AM    Specimen: Blood, Venous   Result Value Ref Range    WBC 7.8 4.2 - 11.0 K/mcL    RBC 3.38 (L) 4.00 - 5.20 mil/mcL    HGB 9.9 (L) 12.0 - 15.5 g/dL    HCT 31.3 (L) 36.0 - 46.5 %    MCV 92.6 78.0 - 100.0 fl    MCH 29.3 26.0 - 34.0 pg    MCHC 31.6 (L) 32.0 - 36.5  g/dL    RDW-CV 13.4 11.0 - 15.0 %    RDW-SD 45.6 39.0 - 50.0 fL     140 - 450 K/mcL    NRBC 0 <=0 /100 WBC    Neutrophil, Percent 64 %    Lymphocytes, Percent 27 %    Mono, Percent 7 %    Eosinophils, Percent 2 %    Basophils, Percent 0 %    Immature Granulocytes 0 %    Absolute Neutrophils 4.9 1.8 - 7.7 K/mcL    Absolute Lymphocytes 2.1 1.0 - 4.0 K/mcL    Absolute Monocytes 0.6 0.3 - 0.9 K/mcL    Absolute Eosinophils  0.2 0.0 - 0.5 K/mcL    Absolute Basophils 0.0 0.0 - 0.3 K/mcL    Absolute Immature Granulocytes 0.0 0.0 - 0.2 K/mcL   GLUCOSE, BEDSIDE - POINT OF CARE    Collection Time: 25  7:32 AM    Specimen: Blood   Result Value Ref Range    GLUCOSE, BEDSIDE - POINT OF CARE 217 (H) 70 - 99 mg/dL                 Diagnostic Imaging     LAST ECHO/ECHO STRESS:  === 25 ===    TRANSTHORACIC ECHO(TTE) COMPLETE W/ W/O IMAGING AGENT    - Narrative -  *[facility]*  *[facility]*  [address] [place], [place] [id number] [phone]  Transthoracic Echocardiogram (TTE)    Patient: Tiarra Fernandez     Study Date/Time:   2025 8:34AM  MRN:     [id number]                       FIN#:              [id number]  :     1949                    Ht/Wt:             160cm 67kg  Age:     76                            BSA/BMI:           1.74m^2 26.2kg/m^2  Gender:  F                             Baseline BP:       99 / 65  *Ordering Physician:* Rina Dwyer    *Attending Physician:* Kristian Brennan  *Performing Physician:* Toribio Bhandari MD  *Diagnostic Physician:* Toribio Bhandari MD  *Sonographer:* Kirti Hernandez RDCS    ------------------------------------------------------------------------------  INDICATIONS:   Chest pain.    ------------------------------------------------------------------------------  STUDY CONCLUSIONS  SUMMARY:    1. Left ventricle: The cavity size is normal. Wall thickness is normal.  Systolic function is normal. The ejection fraction was  measured by biplane  method of disks. Doppler parameters are consistent with abnormal left  ventricular relaxation (grade 1 diastolic dysfunction). The ejection  fraction is 60%.  2. Aortic valve: There is mild to moderate stenosis. The peak systolic  gradient is 18mm Hg. The valve area is 1.2cm^2.  3. Right ventricle: The cavity size is normal. Wall thickness is normal.  Systolic function is normal.  4. Tricuspid valve: There is trivial regurgitation.            Cath/PV Case   Final Result      US VASC LOWER EXTREMITY VENOUS DUPLEX BILATERAL   Final Result   1.   No evidence of bilateral lower extremity deep venous thrombosis.            Electronically Signed by: ANGEL DIAZ M.D.    Signed on: 4/6/2025 1:15 AM    Workstation ID: [id number]      XR CHEST AP OR PA   Final Result   No acute abnormality is noted.      Electronically Signed by: STEPHANIE CAZARES M.D.    Signed on: 4/5/2025 5:32 PM    Workstation ID: [id number]            CARDIAC STUDIES:   EKG:   Encounter Date: 04/05/25   Electrocardiogram 12-Lead   Result Value    Ventricular Rate EKG/Min (BPM) 88    Atrial Rate (BPM) 88    VT-Interval (MSEC) 146    QRS-Interval (MSEC) 72    QT-Interval (MSEC) 366    QTc 443    P Axis (Degrees) 56    R Axis (Degrees) 26    T Axis (Degrees) 5    REPORT TEXT      Normal sinus rhythm  Normal ECG  When compared with ECG of  05-APR-2025 15:59,  Nonspecific T wave abnormality no longer evident in  Anterior leads  Confirmed by BRIGITTE MOODY MD ([id number]) on 4/9/2025 8:03:26 AM         CARDIAC CATH AND INTERVENTIONS:   Date of procedure 4/18/2025     Procedure performed  1 selective left and right coronary angiogram  2 cardiac catheterization  3.  Percutaneous coronary intervention of the proximal LAD with a drug-eluting stent 4.0 x 15 mm followed by postdilatation 4.5 mm with good angiographic results.  4.  Conscious sedation administered by registered nurse under my direct supervision approximate time 49 minutes      Indication  Non-STEMI history of hypertension and diabetes and hyperlipidemia with recurrent chest pains.  Also chronic kidney disease creatinine initially was 1.3 today was 0.9 undergoing cardiac catheterization     Finding  Right radial approach     There was significant spasm in the radial artery multiple doses of arterial nicardipine nitroglycerin given.     1.  JL 3 5 was used     Left main large size caliber vessels angiographically normal  LAD was large size caliber vessel at the mid segment after a large septal  there was a small diagonal in that area of the LAD has a plaque rupture multiple luminal angiographic views were obtained and this shows about 70 to 80% with haziness.  The vessel itself is tortuous after that.  3.  Left circumflex is nondominant vessels angiographically normal     4.  Right coronary artery is dominant and gives a PDA and PLB branch     5.  Left ventriculogram was not performed to conserve dye     Interventional details.  Due to spasm of the radial artery the guiding catheter could not go beyond the patient intra-arterial nitroglycerin nicardipine.  Then I decided to change to femoral.     Femoral approach was done limited angiogram was done also close eventually with     XB 3.5 guide was used.  Run-through wire crossed the lesion with no difficulty.  The lesion was dilated using a 3.0 x 15 mm.  This was followed by stenting with a 4.0 x 15 mm Xience drug-eluting stent which was inflated up to 14 shalonda.  Intragraft coronary nitroglycerin nicardipine was given.  There was some tortuosity and wire bias noted at the distal segment.     4.5 x 12 mm was advanced and inflated twice at nominal pressure.  Angiogram shows good results there is no evidence of dissection.  The small diagonal was pinched but the flow was intact.  Patient has no chest pain.  This was left without any intervention.     Addendum procedure right femoral angiogram shows that patient was at the femoral  artery bifurcation Mynx was deployed patient tolerated procedure well there was no immediate complication the patient left the cardiac catheterization in stable condition.  Plavix and aspirin given        Impression  Significant stenosis of the mid LAD with a plaque rupture and 70% stenosis status post PCI with drug-eluting stent 4.0 x 15 mm stent followed by postdilatation 4.5 mm with radiographic results  MERVAT-3 flow     Plan continue dual antiplatelet therapy with aspirin and Plavix   --------------------------------------------------------------------------                      Smoking Cessation  Counseling given: Not Answered       Assessment and Plan:     Patient with history of DM, CKD, DVT, HTN, hyperlipidemia, GERD,  hepatitis C, venous insufficiency presented to ED due to chest pain, shortness of breath, and dizziness. Patient was hypoxic on arrival, oxygen saturation 86% and placed on nasal cannula.      EKG: sinus tachycardia, 122bpm, nonspecific ST/T wave abnormality     Labs remarkable for: Cr 1.3, Mg 1.6, proBNP 286, troponin 313 > 322 > 221, hgb 11.8.     CXR and venous duplex unremarkable, without acute findings/DVT.     Consult due to chest pain, with elevated troponin.     NSTEMI, with acute heart failure,  unspecified type  EKG: sinus tachycardia, 122bpm, nonspecific ST/T wave abnormality  ProBNP: 286  Troponin: 313 > 322 > 221   Echocardiogram Normal EF   Nuclear stress test cancelled,  Cath Status post PCI of the Mid LAD   Cont Asa and Plavix     Patient can be discharged home follow-up in the office in 2 weeks  Groin is within normal  Creatinine is a stable 0.97       HTN on Lisinopril 20 mg daily (will hold now due to borderline hypotension)   VSS    Hyperlipidemia on Lipitor 40 mg daily   Obtain lipid panel     Other:  -DM  -CKD, Cr 1.3 on arrival  -anemia, hgb 11.8 on arrival  -hypomagnesemia, 1.6 on arrival - given 400 mg once in ED  Management by appropriate teams

## (undated) DEVICE — FLTR HME STR UNIV W/SMPL PORT

## (undated) DEVICE — BONE BIOPSY DEVICE F05A BBD SIZE 3: Brand: MEDTRONIC REUSABLE INSTRUMENTS

## (undated) DEVICE — GLV SURG RAD SENSICARE SHLD PF LF SZ8 STRL

## (undated) DEVICE — ENCORE® LATEX MICRO SIZE 6.5, STERILE LATEX POWDER-FREE SURGICAL GLOVE: Brand: ENCORE

## (undated) DEVICE — MEDI-VAC YANKAUER SUCTION HANDLE W/BULBOUS TIP: Brand: CARDINAL HEALTH

## (undated) DEVICE — CANN NASL CO2 DIVIDED A/

## (undated) DEVICE — ST EXT IV SMARTSITE 2VLV SP M LL 5ML IV1

## (undated) DEVICE — INTRO SHEATH FASTCATH SRO .038IN 8.5F 63CM

## (undated) DEVICE — CANNULA,OXY,ADULT,SUPERSOFT,W/7'TUB,UC: Brand: MEDLINE

## (undated) DEVICE — DRSNG SURESITE123 4X4.8IN

## (undated) DEVICE — AIRWY SZ11

## (undated) DEVICE — ADULT, W/LG. BACK PAD, RADIOTRANSPARENT ELEMENT AND LEAD WIRE: Brand: DEFIBRILLATION ELECTRODES

## (undated) DEVICE — SOL NACL 0.9PCT 1000ML

## (undated) DEVICE — MEDI-VAC NON-CONDUCTIVE SUCTION TUBING: Brand: CARDINAL HEALTH

## (undated) DEVICE — 3M™ STERI-STRIP™ REINFORCED ADHESIVE SKIN CLOSURES, R1547, 1/2 IN X 4 IN (12 MM X 100 MM), 6 STRIPS/ENVELOPE: Brand: 3M™ STERI-STRIP™

## (undated) DEVICE — ADHESIVE ISLAND DRESSING: Brand: TELFA

## (undated) DEVICE — PENCL ES MEGADINE EZ/CLEAN BUTN W/HOLSTR 10FT

## (undated) DEVICE — BONE TAMP KIT KPX203PB FF X2 20/3 1 STP: Brand: KYPHOPAK™ TRAY

## (undated) DEVICE — ENCORE® LATEX MICRO SIZE 7.5, STERILE LATEX POWDER-FREE SURGICAL GLOVE: Brand: ENCORE

## (undated) DEVICE — SET PRIMARY GRVTY 10DP MALE LL 104IN

## (undated) DEVICE — APPL CHLORAPREP W/TINT 26ML BLU

## (undated) DEVICE — PK KYPHOPLASTY 10

## (undated) DEVICE — ST INF 2NDARY 20DRP VNT/NOVNT 30IN

## (undated) DEVICE — MIXER A07A CEMENT

## (undated) DEVICE — IRRIGATOR BULB ASEPTO 60CC STRL

## (undated) DEVICE — DEV CUT BIOP BONE KYPHX

## (undated) DEVICE — TUBING, SUCTION, 1/4" X 10', STRAIGHT: Brand: MEDLINE

## (undated) DEVICE — ST INF PRI SMRTSTE 20DRP 2VLV 24ML 117

## (undated) DEVICE — ENCORE® LATEX MICRO SIZE 7, STERILE LATEX POWDER-FREE SURGICAL GLOVE: Brand: ENCORE

## (undated) DEVICE — Device: Brand: THERMOCOOL SF NAV

## (undated) DEVICE — HDRST INTUB GENTLETOUCH SLOT 7IN RT

## (undated) DEVICE — INTRO SHEATH ENGAGE W/50 GW .038 7F12

## (undated) DEVICE — ADHS LIQ MASTISOL 2/3ML

## (undated) DEVICE — Device: Brand: EZ STEER

## (undated) DEVICE — LEX ELECTRO PHYSIOLOGY: Brand: MEDLINE INDUSTRIES, INC.

## (undated) DEVICE — INTRO TEAR AWAY/LVD W/SD PRT 7F 13CM

## (undated) DEVICE — CAUTERY TIP POLISHER: Brand: DEVON

## (undated) DEVICE — 3M™ TEGADERM™ IV TRANSPARENT FILM DRESSING WITH BORDER 1610: Brand: 3M™ TEGADERM™

## (undated) DEVICE — LIMB HOLDER, WRIST/ANKLE: Brand: DEROYAL

## (undated) DEVICE — SYS CLS VASC/VENI VASCADE MVP 6TO12F

## (undated) DEVICE — BONE TAMP KIT KPX203NB AF X2 20/3

## (undated) DEVICE — DECANT BG O JET